# Patient Record
Sex: FEMALE | Race: NATIVE HAWAIIAN OR OTHER PACIFIC ISLANDER | Employment: OTHER
[De-identification: names, ages, dates, MRNs, and addresses within clinical notes are randomized per-mention and may not be internally consistent; named-entity substitution may affect disease eponyms.]

---

## 2017-01-12 ENCOUNTER — OFFICE VISIT (OUTPATIENT)
Dept: FAMILY MEDICINE CLINIC | Facility: CLINIC | Age: 47
End: 2017-01-12

## 2017-01-12 VITALS
TEMPERATURE: 98.1 F | HEIGHT: 59 IN | DIASTOLIC BLOOD PRESSURE: 77 MMHG | WEIGHT: 233.2 LBS | HEART RATE: 90 BPM | BODY MASS INDEX: 47.01 KG/M2 | SYSTOLIC BLOOD PRESSURE: 125 MMHG

## 2017-01-12 DIAGNOSIS — Z79.4 TYPE 2 DIABETES MELLITUS WITH CHRONIC KIDNEY DISEASE ON CHRONIC DIALYSIS, WITH LONG-TERM CURRENT USE OF INSULIN (HCC): Primary | ICD-10-CM

## 2017-01-12 DIAGNOSIS — L73.2 HIDRADENITIS SUPPURATIVA: ICD-10-CM

## 2017-01-12 DIAGNOSIS — I10 ESSENTIAL HYPERTENSION: ICD-10-CM

## 2017-01-12 DIAGNOSIS — Z99.2 TYPE 2 DIABETES MELLITUS WITH CHRONIC KIDNEY DISEASE ON CHRONIC DIALYSIS, WITH LONG-TERM CURRENT USE OF INSULIN (HCC): Primary | ICD-10-CM

## 2017-01-12 DIAGNOSIS — E11.22 TYPE 2 DIABETES MELLITUS WITH CHRONIC KIDNEY DISEASE ON CHRONIC DIALYSIS, WITH LONG-TERM CURRENT USE OF INSULIN (HCC): Primary | ICD-10-CM

## 2017-01-12 DIAGNOSIS — G47.33 SLEEP APNEA, OBSTRUCTIVE: ICD-10-CM

## 2017-01-12 DIAGNOSIS — N18.6 TYPE 2 DIABETES MELLITUS WITH CHRONIC KIDNEY DISEASE ON CHRONIC DIALYSIS, WITH LONG-TERM CURRENT USE OF INSULIN (HCC): Primary | ICD-10-CM

## 2017-01-12 LAB — HBA1C MFR BLD: 5.6 %

## 2017-01-12 PROCEDURE — 99214 OFFICE O/P EST MOD 30 MIN: CPT | Performed by: FAMILY MEDICINE

## 2017-01-12 PROCEDURE — 83036 HEMOGLOBIN GLYCOSYLATED A1C: CPT | Performed by: FAMILY MEDICINE

## 2017-01-12 RX ORDER — FLUCONAZOLE 150 MG/1
150 TABLET ORAL DAILY
Qty: 3 TABLET | Refills: 0 | Status: SHIPPED | OUTPATIENT
Start: 2017-01-12 | End: 2017-02-22 | Stop reason: ALTCHOICE

## 2017-01-12 RX ORDER — DOXYCYCLINE 100 MG/1
100 TABLET ORAL 2 TIMES DAILY
Qty: 20 TABLET | Refills: 0 | Status: SHIPPED | OUTPATIENT
Start: 2017-01-12 | End: 2017-01-22

## 2017-01-12 RX ORDER — HYDROCODONE BITARTRATE AND ACETAMINOPHEN 5; 325 MG/1; MG/1
1 TABLET ORAL EVERY 6 HOURS PRN
Qty: 15 TABLET | Refills: 0 | Status: SHIPPED | OUTPATIENT
Start: 2017-01-12 | End: 2017-03-28 | Stop reason: SDUPTHER

## 2017-01-30 RX ORDER — ASPIRIN 81 MG
TABLET, DELAYED RELEASE (ENTERIC COATED) ORAL
Qty: 30 TABLET | Refills: 3 | Status: SHIPPED | OUTPATIENT
Start: 2017-01-30 | End: 2017-10-03

## 2017-01-30 RX ORDER — SIMVASTATIN 10 MG
TABLET ORAL
Qty: 30 TABLET | Refills: 3 | Status: SHIPPED | OUTPATIENT
Start: 2017-01-30 | End: 2017-03-22 | Stop reason: ALTCHOICE

## 2017-01-31 ENCOUNTER — TELEPHONE (OUTPATIENT)
Dept: FAMILY MEDICINE CLINIC | Facility: CLINIC | Age: 47
End: 2017-01-31

## 2017-01-31 ASSESSMENT — ENCOUNTER SYMPTOMS
SHORTNESS OF BREATH: 0
ABDOMINAL PAIN: 0

## 2017-02-01 ENCOUNTER — TELEPHONE (OUTPATIENT)
Dept: FAMILY MEDICINE CLINIC | Facility: CLINIC | Age: 47
End: 2017-02-01

## 2017-02-01 RX ORDER — ACETAMINOPHEN 500 MG
1000 TABLET ORAL EVERY 6 HOURS PRN
Qty: 120 TABLET | Refills: 3 | Status: SHIPPED | OUTPATIENT
Start: 2017-02-01 | End: 2017-04-18 | Stop reason: ALTCHOICE

## 2017-02-14 ENCOUNTER — OFFICE VISIT (OUTPATIENT)
Dept: FAMILY MEDICINE CLINIC | Facility: CLINIC | Age: 47
End: 2017-02-14

## 2017-02-14 VITALS
HEART RATE: 69 BPM | DIASTOLIC BLOOD PRESSURE: 69 MMHG | TEMPERATURE: 95 F | BODY MASS INDEX: 47.26 KG/M2 | SYSTOLIC BLOOD PRESSURE: 125 MMHG | WEIGHT: 234 LBS

## 2017-02-14 DIAGNOSIS — E66.01 MORBID OBESITY DUE TO EXCESS CALORIES (HCC): Primary | ICD-10-CM

## 2017-02-14 DIAGNOSIS — M25.562 CHRONIC PAIN OF BOTH KNEES: ICD-10-CM

## 2017-02-14 DIAGNOSIS — N18.6 ESRD ON DIALYSIS (HCC): ICD-10-CM

## 2017-02-14 DIAGNOSIS — G89.29 CHRONIC PAIN OF BOTH KNEES: ICD-10-CM

## 2017-02-14 DIAGNOSIS — G89.29 CHRONIC MIDLINE LOW BACK PAIN WITHOUT SCIATICA: ICD-10-CM

## 2017-02-14 DIAGNOSIS — M25.561 CHRONIC PAIN OF BOTH KNEES: ICD-10-CM

## 2017-02-14 DIAGNOSIS — M54.50 CHRONIC MIDLINE LOW BACK PAIN WITHOUT SCIATICA: ICD-10-CM

## 2017-02-14 DIAGNOSIS — Z99.2 ESRD ON DIALYSIS (HCC): ICD-10-CM

## 2017-02-14 PROCEDURE — 99213 OFFICE O/P EST LOW 20 MIN: CPT | Performed by: FAMILY MEDICINE

## 2017-02-14 RX ORDER — INSULIN GLARGINE 100 [IU]/ML
20 INJECTION, SOLUTION SUBCUTANEOUS NIGHTLY
Qty: 1 VIAL | Refills: 3 | Status: SHIPPED | OUTPATIENT
Start: 2017-02-14 | End: 2017-09-18 | Stop reason: SDUPTHER

## 2017-02-14 RX ORDER — HYDROCODONE BITARTRATE AND ACETAMINOPHEN 5; 325 MG/1; MG/1
1 TABLET ORAL EVERY 6 HOURS PRN
Qty: 20 TABLET | Refills: 0 | Status: SHIPPED | OUTPATIENT
Start: 2017-02-14 | End: 2017-02-21

## 2017-02-14 ASSESSMENT — ENCOUNTER SYMPTOMS: BACK PAIN: 1

## 2017-02-22 ENCOUNTER — TELEPHONE (OUTPATIENT)
Dept: FAMILY MEDICINE CLINIC | Facility: CLINIC | Age: 47
End: 2017-02-22

## 2017-02-22 RX ORDER — THIAMINE MONONITRATE (VIT B1) 100 MG
TABLET ORAL
Qty: 30 TABLET | Refills: 3 | Status: SHIPPED | OUTPATIENT
Start: 2017-02-22 | End: 2017-04-18 | Stop reason: ALTCHOICE

## 2017-02-22 RX ORDER — MOMETASONE FUROATE AND FORMOTEROL FUMARATE DIHYDRATE 200; 5 UG/1; UG/1
AEROSOL RESPIRATORY (INHALATION)
Qty: 13 G | Refills: 3 | Status: SHIPPED | OUTPATIENT
Start: 2017-02-22 | End: 2017-06-09 | Stop reason: SDUPTHER

## 2017-02-22 RX ORDER — ATORVASTATIN CALCIUM 10 MG/1
10 TABLET, FILM COATED ORAL DAILY
Qty: 30 TABLET | Refills: 3 | Status: SHIPPED | OUTPATIENT
Start: 2017-02-22 | End: 2017-06-09 | Stop reason: SDUPTHER

## 2017-02-22 RX ORDER — PREDNISONE 20 MG/1
40 TABLET ORAL DAILY
Qty: 10 TABLET | Refills: 0 | OUTPATIENT
Start: 2017-02-22 | End: 2017-02-27

## 2017-02-22 RX ORDER — GUAIFENESIN 600 MG/1
600 TABLET, EXTENDED RELEASE ORAL 4 TIMES DAILY PRN
Qty: 30 TABLET | Refills: 1 | OUTPATIENT
Start: 2017-02-22

## 2017-02-22 RX ORDER — ASPIRIN 81 MG
TABLET, DELAYED RELEASE (ENTERIC COATED) ORAL
Qty: 30 TABLET | Refills: 3 | Status: ON HOLD | OUTPATIENT
Start: 2017-02-22 | End: 2017-04-17 | Stop reason: HOSPADM

## 2017-02-22 RX ORDER — SIMVASTATIN 10 MG
TABLET ORAL
Qty: 30 TABLET | Refills: 3 | OUTPATIENT
Start: 2017-02-22

## 2017-02-23 ENCOUNTER — TELEPHONE (OUTPATIENT)
Dept: FAMILY MEDICINE CLINIC | Facility: CLINIC | Age: 47
End: 2017-02-23

## 2017-02-28 RX ORDER — PREDNISONE 20 MG/1
20 TABLET ORAL 2 TIMES DAILY
Qty: 10 TABLET | Refills: 0 | Status: SHIPPED | OUTPATIENT
Start: 2017-02-28 | End: 2017-04-20 | Stop reason: SDUPTHER

## 2017-02-28 RX ORDER — GUAIFENESIN 600 MG/1
600 TABLET, EXTENDED RELEASE ORAL 2 TIMES DAILY
Qty: 20 TABLET | Refills: 0 | Status: SHIPPED | OUTPATIENT
Start: 2017-02-28 | End: 2017-03-10

## 2017-03-03 RX ORDER — MOMETASONE FUROATE AND FORMOTEROL FUMARATE DIHYDRATE 200; 5 UG/1; UG/1
AEROSOL RESPIRATORY (INHALATION)
Qty: 13 G | Refills: 3 | Status: ON HOLD | OUTPATIENT
Start: 2017-03-03 | End: 2017-04-17 | Stop reason: HOSPADM

## 2017-03-22 ENCOUNTER — TELEPHONE (OUTPATIENT)
Dept: PODIATRY | Age: 47
End: 2017-03-22

## 2017-03-22 ENCOUNTER — TELEPHONE (OUTPATIENT)
Dept: FAMILY MEDICINE CLINIC | Age: 47
End: 2017-03-22

## 2017-03-22 RX ORDER — FUROSEMIDE 40 MG/1
TABLET ORAL
Qty: 60 TABLET | Refills: 3 | Status: SHIPPED | OUTPATIENT
Start: 2017-03-22 | End: 2017-07-12 | Stop reason: SDUPTHER

## 2017-03-22 NOTE — TELEPHONE ENCOUNTER
Good morning called and states they need to know is the pt taking Atorvastatin 10 mg, it had the most recent date on it and they just wanted to make sure if it was the one the pt is taking, Simvastatin is also on the most recent list thanks writer

## 2017-03-28 ENCOUNTER — OFFICE VISIT (OUTPATIENT)
Dept: FAMILY MEDICINE CLINIC | Age: 47
End: 2017-03-28
Payer: COMMERCIAL

## 2017-03-28 VITALS
TEMPERATURE: 97.4 F | WEIGHT: 243.2 LBS | BODY MASS INDEX: 49.03 KG/M2 | HEIGHT: 59 IN | DIASTOLIC BLOOD PRESSURE: 68 MMHG | HEART RATE: 105 BPM | SYSTOLIC BLOOD PRESSURE: 128 MMHG

## 2017-03-28 DIAGNOSIS — G47.33 OSA (OBSTRUCTIVE SLEEP APNEA): ICD-10-CM

## 2017-03-28 DIAGNOSIS — M54.50 CHRONIC MIDLINE LOW BACK PAIN WITHOUT SCIATICA: ICD-10-CM

## 2017-03-28 DIAGNOSIS — N18.6 TYPE 2 DIABETES MELLITUS WITH CHRONIC KIDNEY DISEASE ON CHRONIC DIALYSIS, WITH LONG-TERM CURRENT USE OF INSULIN (HCC): ICD-10-CM

## 2017-03-28 DIAGNOSIS — E11.22 TYPE 2 DIABETES MELLITUS WITH CHRONIC KIDNEY DISEASE ON CHRONIC DIALYSIS, WITH LONG-TERM CURRENT USE OF INSULIN (HCC): ICD-10-CM

## 2017-03-28 DIAGNOSIS — Z79.4 TYPE 2 DIABETES MELLITUS WITH CHRONIC KIDNEY DISEASE ON CHRONIC DIALYSIS, WITH LONG-TERM CURRENT USE OF INSULIN (HCC): ICD-10-CM

## 2017-03-28 DIAGNOSIS — Z99.2 TYPE 2 DIABETES MELLITUS WITH CHRONIC KIDNEY DISEASE ON CHRONIC DIALYSIS, WITH LONG-TERM CURRENT USE OF INSULIN (HCC): ICD-10-CM

## 2017-03-28 DIAGNOSIS — J45.40 MODERATE PERSISTENT ASTHMA WITHOUT COMPLICATION: Primary | ICD-10-CM

## 2017-03-28 DIAGNOSIS — B37.31 YEAST INFECTION OF THE VAGINA: ICD-10-CM

## 2017-03-28 DIAGNOSIS — E66.01 MORBID OBESITY DUE TO EXCESS CALORIES (HCC): ICD-10-CM

## 2017-03-28 DIAGNOSIS — G89.29 CHRONIC MIDLINE LOW BACK PAIN WITHOUT SCIATICA: ICD-10-CM

## 2017-03-28 PROCEDURE — 99214 OFFICE O/P EST MOD 30 MIN: CPT | Performed by: FAMILY MEDICINE

## 2017-03-28 RX ORDER — CINACALCET HYDROCHLORIDE 30 MG/1
1 TABLET, COATED ORAL DAILY
Status: ON HOLD | COMMUNITY
Start: 2017-03-17 | End: 2021-01-01

## 2017-03-28 RX ORDER — HYDROCODONE BITARTRATE AND ACETAMINOPHEN 5; 325 MG/1; MG/1
1 TABLET ORAL EVERY 6 HOURS PRN
Qty: 20 TABLET | Refills: 0 | Status: SHIPPED | OUTPATIENT
Start: 2017-03-28 | End: 2021-01-01

## 2017-03-28 RX ORDER — FLUCONAZOLE 150 MG/1
150 TABLET ORAL ONCE
Qty: 1 TABLET | Refills: 0 | Status: SHIPPED | OUTPATIENT
Start: 2017-03-28 | End: 2017-03-28

## 2017-03-28 RX ORDER — ALBUTEROL SULFATE 2.5 MG/3ML
2.5 SOLUTION RESPIRATORY (INHALATION) EVERY 6 HOURS PRN
Qty: 120 EACH | Refills: 3 | Status: SHIPPED | OUTPATIENT
Start: 2017-03-28 | End: 2018-03-22 | Stop reason: SDUPTHER

## 2017-03-28 RX ORDER — ALBUTEROL SULFATE 2.5 MG/3ML
SOLUTION RESPIRATORY (INHALATION)
COMMUNITY
Start: 2017-02-23 | End: 2017-03-28 | Stop reason: SDUPTHER

## 2017-03-28 ASSESSMENT — ENCOUNTER SYMPTOMS
SHORTNESS OF BREATH: 1
BACK PAIN: 1
COUGH: 0
WHEEZING: 0

## 2017-04-03 RX ORDER — PREDNISONE 20 MG/1
TABLET ORAL
Qty: 10 TABLET | OUTPATIENT
Start: 2017-04-03

## 2017-04-12 ENCOUNTER — HOSPITAL ENCOUNTER (INPATIENT)
Age: 47
LOS: 5 days | Discharge: HOME | DRG: 291 | End: 2017-04-17
Attending: EMERGENCY MEDICINE | Admitting: FAMILY MEDICINE
Payer: COMMERCIAL

## 2017-04-12 ENCOUNTER — APPOINTMENT (OUTPATIENT)
Dept: GENERAL RADIOLOGY | Age: 47
DRG: 291 | End: 2017-04-12
Payer: COMMERCIAL

## 2017-04-12 DIAGNOSIS — R79.89 ELEVATED LFTS: ICD-10-CM

## 2017-04-12 DIAGNOSIS — D64.9 SYMPTOMATIC ANEMIA: Primary | ICD-10-CM

## 2017-04-12 DIAGNOSIS — E83.39 HYPOPHOSPHATEMIA: ICD-10-CM

## 2017-04-12 DIAGNOSIS — I50.9 CONGESTIVE HEART FAILURE, UNSPECIFIED CONGESTIVE HEART FAILURE CHRONICITY, UNSPECIFIED CONGESTIVE HEART FAILURE TYPE: ICD-10-CM

## 2017-04-12 DIAGNOSIS — E87.6 HYPOKALEMIA: ICD-10-CM

## 2017-04-12 DIAGNOSIS — R79.89 ELEVATED SERUM CREATININE: ICD-10-CM

## 2017-04-12 LAB
ABSOLUTE EOS #: 0.2 K/UL (ref 0–0.4)
ABSOLUTE EOS #: 0.2 K/UL (ref 0–0.4)
ABSOLUTE LYMPH #: 1.4 K/UL (ref 1–4.8)
ABSOLUTE LYMPH #: 1.4 K/UL (ref 1–4.8)
ABSOLUTE MONO #: 0.6 K/UL (ref 0.1–1.2)
ABSOLUTE MONO #: 0.7 K/UL (ref 0.1–1.2)
ABSOLUTE RETIC #: 0.09 M/UL (ref 0.02–0.1)
ALBUMIN SERPL-MCNC: 3.4 G/DL (ref 3.5–5.2)
ALBUMIN/GLOBULIN RATIO: 0.9 (ref 1–2.5)
ALP BLD-CCNC: 122 U/L (ref 35–104)
ALT SERPL-CCNC: 36 U/L (ref 5–33)
ANION GAP SERPL CALCULATED.3IONS-SCNC: 9 MMOL/L (ref 9–17)
AST SERPL-CCNC: 27 U/L
BASOPHILS # BLD: 0 % (ref 0–2)
BASOPHILS # BLD: 0 % (ref 0–2)
BASOPHILS ABSOLUTE: 0 K/UL (ref 0–0.2)
BASOPHILS ABSOLUTE: 0 K/UL (ref 0–0.2)
BILIRUB SERPL-MCNC: 0.31 MG/DL (ref 0.3–1.2)
BNP INTERPRETATION: ABNORMAL
BUN BLDV-MCNC: 17 MG/DL (ref 6–20)
BUN/CREAT BLD: ABNORMAL (ref 9–20)
CALCIUM SERPL-MCNC: 8.3 MG/DL (ref 8.6–10.4)
CHLORIDE BLD-SCNC: 103 MMOL/L (ref 98–107)
CO2: 32 MMOL/L (ref 20–31)
CREAT SERPL-MCNC: 1.98 MG/DL (ref 0.5–0.9)
DIFFERENTIAL TYPE: ABNORMAL
DIFFERENTIAL TYPE: ABNORMAL
EOSINOPHILS RELATIVE PERCENT: 2 % (ref 1–4)
EOSINOPHILS RELATIVE PERCENT: 2 % (ref 1–4)
FERRITIN: 903 UG/L (ref 13–150)
FIO2: ABNORMAL
GFR AFRICAN AMERICAN: 33 ML/MIN
GFR NON-AFRICAN AMERICAN: 27 ML/MIN
GFR SERPL CREATININE-BSD FRML MDRD: ABNORMAL ML/MIN/{1.73_M2}
GFR SERPL CREATININE-BSD FRML MDRD: ABNORMAL ML/MIN/{1.73_M2}
GLUCOSE BLD-MCNC: 105 MG/DL (ref 70–99)
HCO3 VENOUS: 39.9 MMOL/L (ref 24–30)
HCT VFR BLD CALC: 26.7 % (ref 36–46)
HCT VFR BLD CALC: 29.5 % (ref 36–46)
HEMOGLOBIN: 8.7 G/DL (ref 12–16)
HEMOGLOBIN: 9.5 G/DL (ref 12–16)
IRON SATURATION: 77 % (ref 20–55)
IRON: 134 UG/DL (ref 37–145)
LACTIC ACID, WHOLE BLOOD: 1 MMOL/L (ref 0.7–2.1)
LYMPHOCYTES # BLD: 14 % (ref 24–44)
LYMPHOCYTES # BLD: 17 % (ref 24–44)
MAGNESIUM: 2 MG/DL (ref 1.6–2.6)
MCH RBC QN AUTO: 31.4 PG (ref 26–34)
MCH RBC QN AUTO: 31.4 PG (ref 26–34)
MCHC RBC AUTO-ENTMCNC: 32.4 G/DL (ref 31–37)
MCHC RBC AUTO-ENTMCNC: 32.5 G/DL (ref 31–37)
MCV RBC AUTO: 96.8 FL (ref 80–100)
MCV RBC AUTO: 97 FL (ref 80–100)
MONOCYTES # BLD: 7 % (ref 2–11)
MONOCYTES # BLD: 8 % (ref 2–11)
NEGATIVE BASE EXCESS, VEN: ABNORMAL (ref 0–2)
O2 DEVICE/FLOW/%: ABNORMAL
O2 SAT, VEN: 89 %
PATIENT TEMP: ABNORMAL
PCO2, VEN: 71 MM HG (ref 39–55)
PDW BLD-RTO: 16.1 % (ref 12.5–15.4)
PDW BLD-RTO: 16.3 % (ref 12.5–15.4)
PH VENOUS: 7.36 (ref 7.32–7.42)
PHOSPHORUS: 2.3 MG/DL (ref 2.6–4.5)
PLATELET # BLD: 160 K/UL (ref 140–450)
PLATELET # BLD: 174 K/UL (ref 140–450)
PLATELET ESTIMATE: ABNORMAL
PLATELET ESTIMATE: ABNORMAL
PMV BLD AUTO: 7.5 FL (ref 6–12)
PMV BLD AUTO: 7.8 FL (ref 6–12)
PO2, VEN: 63 MM HG (ref 30–50)
POC LACTIC ACID, VEN: 2.04 MMOL/L (ref 0.9–1.7)
POC PCO2 TEMP: ABNORMAL MM HG
POC PH TEMP: ABNORMAL
POC PO2 TEMP: ABNORMAL MM HG
POC TROPONIN I: 0.05 NG/ML (ref 0–0.1)
POC TROPONIN I: 0.05 NG/ML (ref 0–0.1)
POC TROPONIN INTERP: NORMAL
POC TROPONIN INTERP: NORMAL
POSITIVE BASE EXCESS, VEN: 14 (ref 0–2)
POTASSIUM SERPL-SCNC: 3.6 MMOL/L (ref 3.7–5.3)
PRO-BNP: ABNORMAL PG/ML
RBC # BLD: 2.76 M/UL (ref 4–5.2)
RBC # BLD: 3.04 M/UL (ref 4–5.2)
RBC # BLD: ABNORMAL 10*6/UL
RBC # BLD: ABNORMAL 10*6/UL
RETIC %: 3.3 % (ref 0.5–2)
SEG NEUTROPHILS: 74 % (ref 36–66)
SEG NEUTROPHILS: 76 % (ref 36–66)
SEGMENTED NEUTROPHILS ABSOLUTE COUNT: 6.4 K/UL (ref 1.8–7.7)
SEGMENTED NEUTROPHILS ABSOLUTE COUNT: 7.3 K/UL (ref 1.8–7.7)
SODIUM BLD-SCNC: 144 MMOL/L (ref 135–144)
TOTAL CO2, VENOUS: 42 MM HG (ref 25–31)
TOTAL IRON BINDING CAPACITY: 174 UG/DL (ref 250–450)
TOTAL PROTEIN: 7.1 G/DL (ref 6.4–8.3)
TROPONIN INTERP: ABNORMAL
TROPONIN T: 0.05 NG/ML
TSH SERPL DL<=0.05 MIU/L-ACNC: 1.22 MIU/L (ref 0.3–5)
UNSATURATED IRON BINDING CAPACITY: 40 UG/DL (ref 112–347)
WBC # BLD: 8.6 K/UL (ref 3.5–11)
WBC # BLD: 9.6 K/UL (ref 3.5–11)
WBC # BLD: ABNORMAL 10*3/UL
WBC # BLD: ABNORMAL 10*3/UL

## 2017-04-12 PROCEDURE — 71020 XR CHEST STANDARD TWO VW: CPT

## 2017-04-12 PROCEDURE — 83605 ASSAY OF LACTIC ACID: CPT

## 2017-04-12 PROCEDURE — 84443 ASSAY THYROID STIM HORMONE: CPT

## 2017-04-12 PROCEDURE — 83540 ASSAY OF IRON: CPT

## 2017-04-12 PROCEDURE — 83880 ASSAY OF NATRIURETIC PEPTIDE: CPT

## 2017-04-12 PROCEDURE — 2580000003 HC RX 258: Performed by: STUDENT IN AN ORGANIZED HEALTH CARE EDUCATION/TRAINING PROGRAM

## 2017-04-12 PROCEDURE — 1200000000 HC SEMI PRIVATE

## 2017-04-12 PROCEDURE — 99285 EMERGENCY DEPT VISIT HI MDM: CPT

## 2017-04-12 PROCEDURE — 93005 ELECTROCARDIOGRAM TRACING: CPT

## 2017-04-12 PROCEDURE — 85045 AUTOMATED RETICULOCYTE COUNT: CPT

## 2017-04-12 PROCEDURE — 83735 ASSAY OF MAGNESIUM: CPT

## 2017-04-12 PROCEDURE — 84484 ASSAY OF TROPONIN QUANT: CPT

## 2017-04-12 PROCEDURE — 82728 ASSAY OF FERRITIN: CPT

## 2017-04-12 PROCEDURE — 85025 COMPLETE CBC W/AUTO DIFF WBC: CPT

## 2017-04-12 PROCEDURE — 6360000002 HC RX W HCPCS: Performed by: STUDENT IN AN ORGANIZED HEALTH CARE EDUCATION/TRAINING PROGRAM

## 2017-04-12 PROCEDURE — 36415 COLL VENOUS BLD VENIPUNCTURE: CPT

## 2017-04-12 PROCEDURE — 84100 ASSAY OF PHOSPHORUS: CPT

## 2017-04-12 PROCEDURE — 6370000000 HC RX 637 (ALT 250 FOR IP): Performed by: STUDENT IN AN ORGANIZED HEALTH CARE EDUCATION/TRAINING PROGRAM

## 2017-04-12 PROCEDURE — 83550 IRON BINDING TEST: CPT

## 2017-04-12 PROCEDURE — 82803 BLOOD GASES ANY COMBINATION: CPT

## 2017-04-12 PROCEDURE — 80053 COMPREHEN METABOLIC PANEL: CPT

## 2017-04-12 PROCEDURE — G0328 FECAL BLOOD SCRN IMMUNOASSAY: HCPCS

## 2017-04-12 PROCEDURE — 99223 1ST HOSP IP/OBS HIGH 75: CPT | Performed by: FAMILY MEDICINE

## 2017-04-12 PROCEDURE — 87040 BLOOD CULTURE FOR BACTERIA: CPT

## 2017-04-12 RX ORDER — THIAMINE MONONITRATE (VIT B1) 100 MG
1 TABLET ORAL DAILY
Status: CANCELLED | OUTPATIENT
Start: 2017-04-12

## 2017-04-12 RX ORDER — SODIUM CHLORIDE 0.9 % (FLUSH) 0.9 %
10 SYRINGE (ML) INJECTION EVERY 12 HOURS SCHEDULED
Status: DISCONTINUED | OUTPATIENT
Start: 2017-04-12 | End: 2017-04-17 | Stop reason: HOSPADM

## 2017-04-12 RX ORDER — ONDANSETRON 2 MG/ML
4 INJECTION INTRAMUSCULAR; INTRAVENOUS EVERY 6 HOURS PRN
Status: DISCONTINUED | OUTPATIENT
Start: 2017-04-12 | End: 2017-04-17 | Stop reason: HOSPADM

## 2017-04-12 RX ORDER — INSULIN GLARGINE 100 [IU]/ML
20 INJECTION, SOLUTION SUBCUTANEOUS NIGHTLY
Status: DISCONTINUED | OUTPATIENT
Start: 2017-04-12 | End: 2017-04-17 | Stop reason: HOSPADM

## 2017-04-12 RX ORDER — DEXTROSE MONOHYDRATE 50 MG/ML
100 INJECTION, SOLUTION INTRAVENOUS PRN
Status: DISCONTINUED | OUTPATIENT
Start: 2017-04-12 | End: 2017-04-17 | Stop reason: HOSPADM

## 2017-04-12 RX ORDER — SODIUM CHLORIDE 0.9 % (FLUSH) 0.9 %
10 SYRINGE (ML) INJECTION EVERY 12 HOURS SCHEDULED
Status: CANCELLED | OUTPATIENT
Start: 2017-04-12

## 2017-04-12 RX ORDER — POTASSIUM CHLORIDE 20MEQ/15ML
40 LIQUID (ML) ORAL PRN
Status: DISCONTINUED | OUTPATIENT
Start: 2017-04-12 | End: 2017-04-17 | Stop reason: HOSPADM

## 2017-04-12 RX ORDER — ATORVASTATIN CALCIUM 10 MG/1
10 TABLET, FILM COATED ORAL DAILY
Status: DISCONTINUED | OUTPATIENT
Start: 2017-04-13 | End: 2017-04-17 | Stop reason: HOSPADM

## 2017-04-12 RX ORDER — POTASSIUM CHLORIDE 7.45 MG/ML
10 INJECTION INTRAVENOUS PRN
Status: DISCONTINUED | OUTPATIENT
Start: 2017-04-12 | End: 2017-04-17 | Stop reason: HOSPADM

## 2017-04-12 RX ORDER — POTASSIUM CHLORIDE 20 MEQ/1
40 TABLET, EXTENDED RELEASE ORAL PRN
Status: DISCONTINUED | OUTPATIENT
Start: 2017-04-12 | End: 2017-04-17 | Stop reason: HOSPADM

## 2017-04-12 RX ORDER — BISACODYL 10 MG
10 SUPPOSITORY, RECTAL RECTAL DAILY PRN
Status: DISCONTINUED | OUTPATIENT
Start: 2017-04-12 | End: 2017-04-17 | Stop reason: HOSPADM

## 2017-04-12 RX ORDER — LISINOPRIL 5 MG/1
5 TABLET ORAL DAILY
Status: DISCONTINUED | OUTPATIENT
Start: 2017-04-13 | End: 2017-04-17 | Stop reason: HOSPADM

## 2017-04-12 RX ORDER — ACETAMINOPHEN 325 MG/1
650 TABLET ORAL EVERY 4 HOURS PRN
Status: DISCONTINUED | OUTPATIENT
Start: 2017-04-12 | End: 2017-04-17 | Stop reason: HOSPADM

## 2017-04-12 RX ORDER — ASPIRIN 81 MG/1
81 TABLET ORAL ONCE
Status: DISCONTINUED | OUTPATIENT
Start: 2017-04-12 | End: 2017-04-17 | Stop reason: HOSPADM

## 2017-04-12 RX ORDER — CINACALCET 30 MG/1
30 TABLET, FILM COATED ORAL DAILY
Status: DISCONTINUED | OUTPATIENT
Start: 2017-04-13 | End: 2017-04-17 | Stop reason: HOSPADM

## 2017-04-12 RX ORDER — ALBUTEROL SULFATE 2.5 MG/3ML
2.5 SOLUTION RESPIRATORY (INHALATION) EVERY 6 HOURS PRN
Status: DISCONTINUED | OUTPATIENT
Start: 2017-04-12 | End: 2017-04-17 | Stop reason: HOSPADM

## 2017-04-12 RX ORDER — NICOTINE POLACRILEX 4 MG
15 LOZENGE BUCCAL PRN
Status: DISCONTINUED | OUTPATIENT
Start: 2017-04-12 | End: 2017-04-17 | Stop reason: HOSPADM

## 2017-04-12 RX ORDER — FUROSEMIDE 10 MG/ML
40 INJECTION INTRAMUSCULAR; INTRAVENOUS DAILY
Status: DISCONTINUED | OUTPATIENT
Start: 2017-04-12 | End: 2017-04-14

## 2017-04-12 RX ORDER — DEXTROSE MONOHYDRATE 25 G/50ML
12.5 INJECTION, SOLUTION INTRAVENOUS PRN
Status: DISCONTINUED | OUTPATIENT
Start: 2017-04-12 | End: 2017-04-17 | Stop reason: HOSPADM

## 2017-04-12 RX ORDER — ALBUTEROL SULFATE 90 UG/1
2 AEROSOL, METERED RESPIRATORY (INHALATION) EVERY 6 HOURS PRN
Status: DISCONTINUED | OUTPATIENT
Start: 2017-04-12 | End: 2017-04-17 | Stop reason: HOSPADM

## 2017-04-12 RX ORDER — SEVELAMER CARBONATE 800 MG/1
1600 TABLET, FILM COATED ORAL
Status: DISCONTINUED | OUTPATIENT
Start: 2017-04-13 | End: 2017-04-17 | Stop reason: HOSPADM

## 2017-04-12 RX ORDER — MAGNESIUM SULFATE 1 G/100ML
1 INJECTION INTRAVENOUS PRN
Status: DISCONTINUED | OUTPATIENT
Start: 2017-04-12 | End: 2017-04-17 | Stop reason: HOSPADM

## 2017-04-12 RX ORDER — HEPARIN SODIUM 5000 [USP'U]/ML
5000 INJECTION, SOLUTION INTRAVENOUS; SUBCUTANEOUS EVERY 8 HOURS SCHEDULED
Status: DISCONTINUED | OUTPATIENT
Start: 2017-04-12 | End: 2017-04-17 | Stop reason: HOSPADM

## 2017-04-12 RX ORDER — FLUTICASONE PROPIONATE 50 MCG
1 SPRAY, SUSPENSION (ML) NASAL DAILY
Status: DISCONTINUED | OUTPATIENT
Start: 2017-04-13 | End: 2017-04-17 | Stop reason: HOSPADM

## 2017-04-12 RX ORDER — CHOLECALCIFEROL (VITAMIN D3) 10 MCG
1 TABLET ORAL DAILY
Status: DISCONTINUED | OUTPATIENT
Start: 2017-04-13 | End: 2017-04-17 | Stop reason: HOSPADM

## 2017-04-12 RX ORDER — SODIUM CHLORIDE 0.9 % (FLUSH) 0.9 %
10 SYRINGE (ML) INJECTION PRN
Status: CANCELLED | OUTPATIENT
Start: 2017-04-12

## 2017-04-12 RX ORDER — FUROSEMIDE 40 MG/1
1 TABLET ORAL 2 TIMES DAILY
Status: CANCELLED | OUTPATIENT
Start: 2017-04-12

## 2017-04-12 RX ORDER — SODIUM CHLORIDE 0.9 % (FLUSH) 0.9 %
10 SYRINGE (ML) INJECTION PRN
Status: DISCONTINUED | OUTPATIENT
Start: 2017-04-12 | End: 2017-04-17 | Stop reason: HOSPADM

## 2017-04-12 RX ADMIN — INSULIN GLARGINE 20 UNITS: 100 INJECTION, SOLUTION SUBCUTANEOUS at 23:38

## 2017-04-12 RX ADMIN — HEPARIN SODIUM 5000 UNITS: 5000 INJECTION, SOLUTION INTRAVENOUS; SUBCUTANEOUS at 23:39

## 2017-04-12 RX ADMIN — Medication 10 ML: at 23:39

## 2017-04-12 ASSESSMENT — ENCOUNTER SYMPTOMS
EYE REDNESS: 0
VOMITING: 0
COUGH: 1
RHINORRHEA: 0
ABDOMINAL PAIN: 0
SHORTNESS OF BREATH: 0
NAUSEA: 0
EYE DISCHARGE: 0

## 2017-04-12 ASSESSMENT — PAIN SCALES - GENERAL: PAINLEVEL_OUTOF10: 6

## 2017-04-12 ASSESSMENT — PAIN DESCRIPTION - ORIENTATION: ORIENTATION: MID

## 2017-04-12 ASSESSMENT — PAIN DESCRIPTION - LOCATION: LOCATION: CHEST

## 2017-04-13 LAB
ALBUMIN SERPL-MCNC: 3.3 G/DL (ref 3.5–5.2)
ALBUMIN/GLOBULIN RATIO: 0.9 (ref 1–2.5)
ALP BLD-CCNC: 125 U/L (ref 35–104)
ALT SERPL-CCNC: 33 U/L (ref 5–33)
ANION GAP SERPL CALCULATED.3IONS-SCNC: 14 MMOL/L (ref 9–17)
AST SERPL-CCNC: 24 U/L
BILIRUB SERPL-MCNC: 0.24 MG/DL (ref 0.3–1.2)
BILIRUBIN DIRECT: <0.08 MG/DL
BILIRUBIN, INDIRECT: ABNORMAL MG/DL (ref 0–1)
BUN BLDV-MCNC: 32 MG/DL (ref 6–20)
BUN/CREAT BLD: ABNORMAL (ref 9–20)
CALCIUM SERPL-MCNC: 8.5 MG/DL (ref 8.6–10.4)
CHLORIDE BLD-SCNC: 104 MMOL/L (ref 98–107)
CO2: 29 MMOL/L (ref 20–31)
CREAT SERPL-MCNC: 3.12 MG/DL (ref 0.5–0.9)
EKG ATRIAL RATE: 97 BPM
EKG P AXIS: 68 DEGREES
EKG P-R INTERVAL: 126 MS
EKG Q-T INTERVAL: 360 MS
EKG QRS DURATION: 68 MS
EKG QTC CALCULATION (BAZETT): 457 MS
EKG R AXIS: 23 DEGREES
EKG T AXIS: 69 DEGREES
EKG VENTRICULAR RATE: 97 BPM
GFR AFRICAN AMERICAN: 19 ML/MIN
GFR NON-AFRICAN AMERICAN: 16 ML/MIN
GFR SERPL CREATININE-BSD FRML MDRD: ABNORMAL ML/MIN/{1.73_M2}
GFR SERPL CREATININE-BSD FRML MDRD: ABNORMAL ML/MIN/{1.73_M2}
GLOBULIN: ABNORMAL G/DL (ref 1.5–3.8)
GLUCOSE BLD-MCNC: 137 MG/DL (ref 65–105)
GLUCOSE BLD-MCNC: 151 MG/DL (ref 70–99)
GLUCOSE BLD-MCNC: 155 MG/DL (ref 65–105)
HAPTOGLOBIN: 213 MG/DL (ref 30–200)
HCT VFR BLD CALC: 27.6 % (ref 36–46)
HEMOGLOBIN: 8.7 G/DL (ref 12–16)
INR BLD: 1
LACTATE DEHYDROGENASE: 172 U/L (ref 135–214)
MCH RBC QN AUTO: 30.9 PG (ref 26–34)
MCHC RBC AUTO-ENTMCNC: 31.5 G/DL (ref 31–37)
MCV RBC AUTO: 98 FL (ref 80–100)
PDW BLD-RTO: 16.4 % (ref 12.5–15.4)
PLATELET # BLD: 165 K/UL (ref 140–450)
PMV BLD AUTO: 7.2 FL (ref 6–12)
POTASSIUM SERPL-SCNC: 4.1 MMOL/L (ref 3.7–5.3)
PROTHROMBIN TIME: 10.7 SEC (ref 9.4–12.6)
RBC # BLD: 2.82 M/UL (ref 4–5.2)
SODIUM BLD-SCNC: 147 MMOL/L (ref 135–144)
SURGICAL PATHOLOGY REPORT: NORMAL
TOTAL PROTEIN: 7.1 G/DL (ref 6.4–8.3)
TROPONIN INTERP: ABNORMAL
TROPONIN INTERP: ABNORMAL
TROPONIN T: 0.03 NG/ML
TROPONIN T: 0.06 NG/ML
WBC # BLD: 10.4 K/UL (ref 3.5–11)

## 2017-04-13 PROCEDURE — 94640 AIRWAY INHALATION TREATMENT: CPT

## 2017-04-13 PROCEDURE — 93005 ELECTROCARDIOGRAM TRACING: CPT

## 2017-04-13 PROCEDURE — 83010 ASSAY OF HAPTOGLOBIN QUANT: CPT

## 2017-04-13 PROCEDURE — 6370000000 HC RX 637 (ALT 250 FOR IP): Performed by: STUDENT IN AN ORGANIZED HEALTH CARE EDUCATION/TRAINING PROGRAM

## 2017-04-13 PROCEDURE — 6360000002 HC RX W HCPCS: Performed by: STUDENT IN AN ORGANIZED HEALTH CARE EDUCATION/TRAINING PROGRAM

## 2017-04-13 PROCEDURE — 80048 BASIC METABOLIC PNL TOTAL CA: CPT

## 2017-04-13 PROCEDURE — 87070 CULTURE OTHR SPECIMN AEROBIC: CPT

## 2017-04-13 PROCEDURE — 5A1D60Z PERFORMANCE OF URINARY FILTRATION, MULTIPLE: ICD-10-PCS | Performed by: INTERNAL MEDICINE

## 2017-04-13 PROCEDURE — 82947 ASSAY GLUCOSE BLOOD QUANT: CPT

## 2017-04-13 PROCEDURE — 97530 THERAPEUTIC ACTIVITIES: CPT

## 2017-04-13 PROCEDURE — 36415 COLL VENOUS BLD VENIPUNCTURE: CPT

## 2017-04-13 PROCEDURE — 84484 ASSAY OF TROPONIN QUANT: CPT

## 2017-04-13 PROCEDURE — 99232 SBSQ HOSP IP/OBS MODERATE 35: CPT | Performed by: FAMILY MEDICINE

## 2017-04-13 PROCEDURE — 85610 PROTHROMBIN TIME: CPT

## 2017-04-13 PROCEDURE — 87205 SMEAR GRAM STAIN: CPT

## 2017-04-13 PROCEDURE — 1200000000 HC SEMI PRIVATE

## 2017-04-13 PROCEDURE — 85027 COMPLETE CBC AUTOMATED: CPT

## 2017-04-13 PROCEDURE — 97161 PT EVAL LOW COMPLEX 20 MIN: CPT

## 2017-04-13 PROCEDURE — 2580000003 HC RX 258: Performed by: STUDENT IN AN ORGANIZED HEALTH CARE EDUCATION/TRAINING PROGRAM

## 2017-04-13 PROCEDURE — G8978 MOBILITY CURRENT STATUS: HCPCS

## 2017-04-13 PROCEDURE — G8979 MOBILITY GOAL STATUS: HCPCS

## 2017-04-13 PROCEDURE — 80076 HEPATIC FUNCTION PANEL: CPT

## 2017-04-13 PROCEDURE — 83615 LACTATE (LD) (LDH) ENZYME: CPT

## 2017-04-13 RX ORDER — 0.9 % SODIUM CHLORIDE 0.9 %
250 INTRAVENOUS SOLUTION INTRAVENOUS PRN
Status: DISCONTINUED | OUTPATIENT
Start: 2017-04-13 | End: 2017-04-17 | Stop reason: HOSPADM

## 2017-04-13 RX ORDER — HEPARIN SODIUM 1000 [USP'U]/ML
3000 INJECTION INTRAVENOUS; SUBCUTANEOUS PRN
Status: DISCONTINUED | OUTPATIENT
Start: 2017-04-13 | End: 2017-04-17 | Stop reason: HOSPADM

## 2017-04-13 RX ORDER — HEPARIN SODIUM 1000 [USP'U]/ML
1000 INJECTION INTRAVENOUS; SUBCUTANEOUS ONCE
Status: DISCONTINUED | OUTPATIENT
Start: 2017-04-13 | End: 2017-04-17 | Stop reason: HOSPADM

## 2017-04-13 RX ORDER — HYDROCODONE BITARTRATE AND ACETAMINOPHEN 5; 325 MG/1; MG/1
1 TABLET ORAL EVERY 6 HOURS PRN
Status: DISCONTINUED | OUTPATIENT
Start: 2017-04-13 | End: 2017-04-17 | Stop reason: HOSPADM

## 2017-04-13 RX ORDER — DILTIAZEM HYDROCHLORIDE 180 MG/1
180 CAPSULE, COATED, EXTENDED RELEASE ORAL DAILY
Status: DISCONTINUED | OUTPATIENT
Start: 2017-04-13 | End: 2017-04-17 | Stop reason: HOSPADM

## 2017-04-13 RX ORDER — 0.9 % SODIUM CHLORIDE 0.9 %
150 INTRAVENOUS SOLUTION INTRAVENOUS PRN
Status: DISCONTINUED | OUTPATIENT
Start: 2017-04-13 | End: 2017-04-17 | Stop reason: HOSPADM

## 2017-04-13 RX ADMIN — LISINOPRIL 5 MG: 5 TABLET ORAL at 08:18

## 2017-04-13 RX ADMIN — HEPARIN SODIUM 5000 UNITS: 5000 INJECTION, SOLUTION INTRAVENOUS; SUBCUTANEOUS at 07:07

## 2017-04-13 RX ADMIN — Medication 10 ML: at 20:01

## 2017-04-13 RX ADMIN — SEVELAMER CARBONATE 1600 MG: 800 TABLET, FILM COATED ORAL at 17:55

## 2017-04-13 RX ADMIN — HYDROCODONE BITARTRATE AND ACETAMINOPHEN 1 TABLET: 5; 325 TABLET ORAL at 00:23

## 2017-04-13 RX ADMIN — SEVELAMER CARBONATE 1600 MG: 800 TABLET, FILM COATED ORAL at 13:21

## 2017-04-13 RX ADMIN — HYDROCODONE BITARTRATE AND ACETAMINOPHEN 1 TABLET: 5; 325 TABLET ORAL at 15:32

## 2017-04-13 RX ADMIN — SEVELAMER CARBONATE 1600 MG: 800 TABLET, FILM COATED ORAL at 08:18

## 2017-04-13 RX ADMIN — HEPARIN SODIUM 5000 UNITS: 5000 INJECTION, SOLUTION INTRAVENOUS; SUBCUTANEOUS at 21:24

## 2017-04-13 RX ADMIN — ASCORBIC ACID, THIAMINE MONONITRATE,RIBOFLAVIN, NIACINAMIDE, PYRIDOXINE HYDROCHLORIDE, FOLIC ACID, CYANOCOBALAMIN, BIOTIN, CALCIUM PANTOTHENATE, 1 MG: 100; 1.5; 1.7; 20; 10; 1; 6000; 150000; 5 CAPSULE, LIQUID FILLED ORAL at 08:18

## 2017-04-13 RX ADMIN — DILTIAZEM HYDROCHLORIDE 180 MG: 180 CAPSULE, COATED, EXTENDED RELEASE ORAL at 20:01

## 2017-04-13 RX ADMIN — ATORVASTATIN CALCIUM 10 MG: 10 TABLET, FILM COATED ORAL at 20:00

## 2017-04-13 RX ADMIN — Medication 10 ML: at 08:18

## 2017-04-13 RX ADMIN — INSULIN GLARGINE 20 UNITS: 100 INJECTION, SOLUTION SUBCUTANEOUS at 21:24

## 2017-04-13 RX ADMIN — HEPARIN SODIUM 5000 UNITS: 5000 INJECTION, SOLUTION INTRAVENOUS; SUBCUTANEOUS at 13:22

## 2017-04-13 RX ADMIN — FLUTICASONE PROPIONATE 1 SPRAY: 50 SPRAY, METERED NASAL at 08:18

## 2017-04-13 RX ADMIN — FUROSEMIDE 40 MG: 10 INJECTION, SOLUTION INTRAMUSCULAR; INTRAVENOUS at 08:18

## 2017-04-13 RX ADMIN — CINACALCET HYDROCHLORIDE 30 MG: 30 TABLET, COATED ORAL at 08:18

## 2017-04-13 RX ADMIN — MOMETASONE FUROATE AND FORMOTEROL FUMARATE DIHYDRATE 2 PUFF: 200; 5 AEROSOL RESPIRATORY (INHALATION) at 07:37

## 2017-04-13 RX ADMIN — MOMETASONE FUROATE AND FORMOTEROL FUMARATE DIHYDRATE 2 PUFF: 200; 5 AEROSOL RESPIRATORY (INHALATION) at 22:33

## 2017-04-13 ASSESSMENT — PAIN DESCRIPTION - DESCRIPTORS: DESCRIPTORS: ACHING

## 2017-04-13 ASSESSMENT — PAIN DESCRIPTION - ORIENTATION: ORIENTATION: MID

## 2017-04-13 ASSESSMENT — PAIN SCALES - GENERAL
PAINLEVEL_OUTOF10: 8
PAINLEVEL_OUTOF10: 8
PAINLEVEL_OUTOF10: 3

## 2017-04-13 ASSESSMENT — PAIN DESCRIPTION - PAIN TYPE: TYPE: CHRONIC PAIN

## 2017-04-13 ASSESSMENT — PAIN DESCRIPTION - LOCATION: LOCATION: BACK

## 2017-04-14 ENCOUNTER — HOSPITAL ENCOUNTER (INPATIENT)
Dept: DIALYSIS | Age: 47
Discharge: HOME | DRG: 291 | End: 2017-04-14
Payer: COMMERCIAL

## 2017-04-14 LAB
ANION GAP SERPL CALCULATED.3IONS-SCNC: 15 MMOL/L (ref 9–17)
BUN BLDV-MCNC: 53 MG/DL (ref 6–20)
BUN/CREAT BLD: ABNORMAL (ref 9–20)
CALCIUM SERPL-MCNC: 8.6 MG/DL (ref 8.6–10.4)
CHLORIDE BLD-SCNC: 96 MMOL/L (ref 98–107)
CO2: 29 MMOL/L (ref 20–31)
CREAT SERPL-MCNC: 5.51 MG/DL (ref 0.5–0.9)
DATE, STOOL #1: NORMAL
DATE, STOOL #2: NORMAL
DATE, STOOL #3: NORMAL
EKG ATRIAL RATE: 99 BPM
EKG P AXIS: 59 DEGREES
EKG P-R INTERVAL: 132 MS
EKG Q-T INTERVAL: 356 MS
EKG QRS DURATION: 76 MS
EKG QTC CALCULATION (BAZETT): 456 MS
EKG R AXIS: 13 DEGREES
EKG T AXIS: 23 DEGREES
EKG VENTRICULAR RATE: 99 BPM
GFR AFRICAN AMERICAN: 10 ML/MIN
GFR NON-AFRICAN AMERICAN: 8 ML/MIN
GFR SERPL CREATININE-BSD FRML MDRD: ABNORMAL ML/MIN/{1.73_M2}
GFR SERPL CREATININE-BSD FRML MDRD: ABNORMAL ML/MIN/{1.73_M2}
GLUCOSE BLD-MCNC: 150 MG/DL (ref 65–105)
GLUCOSE BLD-MCNC: 196 MG/DL (ref 70–99)
GLUCOSE BLD-MCNC: 211 MG/DL (ref 65–105)
HCT VFR BLD CALC: 24.2 % (ref 36–46)
HEMOCCULT SP1 STL QL: NEGATIVE
HEMOCCULT SP2 STL QL: NORMAL
HEMOCCULT SP3 STL QL: NORMAL
HEMOGLOBIN: 7.9 G/DL (ref 12–16)
MCH RBC QN AUTO: 31.1 PG (ref 26–34)
MCHC RBC AUTO-ENTMCNC: 32.5 G/DL (ref 31–37)
MCV RBC AUTO: 95.9 FL (ref 80–100)
PATHOLOGIST REVIEW: NORMAL
PDW BLD-RTO: 16.1 % (ref 12.5–15.4)
PLATELET # BLD: 141 K/UL (ref 140–450)
PMV BLD AUTO: 7.7 FL (ref 6–12)
POTASSIUM SERPL-SCNC: 3.9 MMOL/L (ref 3.7–5.3)
RBC # BLD: 2.52 M/UL (ref 4–5.2)
SODIUM BLD-SCNC: 140 MMOL/L (ref 135–144)
TIME, STOOL #1: 600
TIME, STOOL #2: NORMAL
TIME, STOOL #3: NORMAL
WBC # BLD: 8.9 K/UL (ref 3.5–11)

## 2017-04-14 PROCEDURE — 2580000003 HC RX 258: Performed by: INTERNAL MEDICINE

## 2017-04-14 PROCEDURE — 6370000000 HC RX 637 (ALT 250 FOR IP): Performed by: STUDENT IN AN ORGANIZED HEALTH CARE EDUCATION/TRAINING PROGRAM

## 2017-04-14 PROCEDURE — 80048 BASIC METABOLIC PNL TOTAL CA: CPT

## 2017-04-14 PROCEDURE — 6360000002 HC RX W HCPCS: Performed by: STUDENT IN AN ORGANIZED HEALTH CARE EDUCATION/TRAINING PROGRAM

## 2017-04-14 PROCEDURE — 94640 AIRWAY INHALATION TREATMENT: CPT

## 2017-04-14 PROCEDURE — 2580000003 HC RX 258: Performed by: STUDENT IN AN ORGANIZED HEALTH CARE EDUCATION/TRAINING PROGRAM

## 2017-04-14 PROCEDURE — 1200000000 HC SEMI PRIVATE

## 2017-04-14 PROCEDURE — 99231 SBSQ HOSP IP/OBS SF/LOW 25: CPT | Performed by: FAMILY MEDICINE

## 2017-04-14 PROCEDURE — 90937 HEMODIALYSIS REPEATED EVAL: CPT

## 2017-04-14 PROCEDURE — 85027 COMPLETE CBC AUTOMATED: CPT

## 2017-04-14 PROCEDURE — 6360000002 HC RX W HCPCS: Performed by: FAMILY MEDICINE

## 2017-04-14 PROCEDURE — 6370000000 HC RX 637 (ALT 250 FOR IP): Performed by: INTERNAL MEDICINE

## 2017-04-14 PROCEDURE — 82947 ASSAY GLUCOSE BLOOD QUANT: CPT

## 2017-04-14 RX ORDER — MIDODRINE HYDROCHLORIDE 5 MG/1
5 TABLET ORAL ONCE
Status: COMPLETED | OUTPATIENT
Start: 2017-04-14 | End: 2017-04-14

## 2017-04-14 RX ORDER — LEVOFLOXACIN 5 MG/ML
250 INJECTION, SOLUTION INTRAVENOUS
Status: DISCONTINUED | OUTPATIENT
Start: 2017-04-14 | End: 2017-04-15

## 2017-04-14 RX ORDER — FUROSEMIDE 40 MG/1
40 TABLET ORAL DAILY
Status: DISCONTINUED | OUTPATIENT
Start: 2017-04-14 | End: 2017-04-17 | Stop reason: HOSPADM

## 2017-04-14 RX ORDER — 0.9 % SODIUM CHLORIDE 0.9 %
250 INTRAVENOUS SOLUTION INTRAVENOUS ONCE
Status: COMPLETED | OUTPATIENT
Start: 2017-04-14 | End: 2017-04-14

## 2017-04-14 RX ORDER — DEXTROMETHORPHAN POLISTIREX 30 MG/5ML
30 SUSPENSION ORAL NIGHTLY PRN
Status: DISCONTINUED | OUTPATIENT
Start: 2017-04-14 | End: 2017-04-17 | Stop reason: HOSPADM

## 2017-04-14 RX ADMIN — INSULIN GLARGINE 20 UNITS: 100 INJECTION, SOLUTION SUBCUTANEOUS at 23:44

## 2017-04-14 RX ADMIN — SEVELAMER CARBONATE 1600 MG: 800 TABLET, FILM COATED ORAL at 21:47

## 2017-04-14 RX ADMIN — FLUTICASONE PROPIONATE 1 SPRAY: 50 SPRAY, METERED NASAL at 16:52

## 2017-04-14 RX ADMIN — HYDROCODONE BITARTRATE AND ACETAMINOPHEN 1 TABLET: 5; 325 TABLET ORAL at 11:29

## 2017-04-14 RX ADMIN — HEPARIN SODIUM 5000 UNITS: 5000 INJECTION, SOLUTION INTRAVENOUS; SUBCUTANEOUS at 16:48

## 2017-04-14 RX ADMIN — DILTIAZEM HYDROCHLORIDE 180 MG: 180 CAPSULE, COATED, EXTENDED RELEASE ORAL at 09:40

## 2017-04-14 RX ADMIN — HEPARIN SODIUM 5000 UNITS: 5000 INJECTION, SOLUTION INTRAVENOUS; SUBCUTANEOUS at 23:43

## 2017-04-14 RX ADMIN — ATORVASTATIN CALCIUM 10 MG: 10 TABLET, FILM COATED ORAL at 21:47

## 2017-04-14 RX ADMIN — HEPARIN SODIUM 5000 UNITS: 5000 INJECTION, SOLUTION INTRAVENOUS; SUBCUTANEOUS at 05:36

## 2017-04-14 RX ADMIN — Medication 30 MG: at 01:03

## 2017-04-14 RX ADMIN — HYDROCODONE BITARTRATE AND ACETAMINOPHEN 1 TABLET: 5; 325 TABLET ORAL at 21:53

## 2017-04-14 RX ADMIN — HYDROCODONE BITARTRATE AND ACETAMINOPHEN 1 TABLET: 5; 325 TABLET ORAL at 01:06

## 2017-04-14 RX ADMIN — CINACALCET HYDROCHLORIDE 30 MG: 30 TABLET, COATED ORAL at 16:48

## 2017-04-14 RX ADMIN — Medication 10 ML: at 21:47

## 2017-04-14 RX ADMIN — LEVOFLOXACIN 250 MG: 5 INJECTION, SOLUTION INTRAVENOUS at 16:08

## 2017-04-14 RX ADMIN — SODIUM CHLORIDE 250 ML: 9 INJECTION, SOLUTION INTRAVENOUS at 15:15

## 2017-04-14 RX ADMIN — MOMETASONE FUROATE AND FORMOTEROL FUMARATE DIHYDRATE 2 PUFF: 200; 5 AEROSOL RESPIRATORY (INHALATION) at 20:25

## 2017-04-14 RX ADMIN — ASCORBIC ACID, THIAMINE MONONITRATE,RIBOFLAVIN, NIACINAMIDE, PYRIDOXINE HYDROCHLORIDE, FOLIC ACID, CYANOCOBALAMIN, BIOTIN, CALCIUM PANTOTHENATE, 1 MG: 100; 1.5; 1.7; 20; 10; 1; 6000; 150000; 5 CAPSULE, LIQUID FILLED ORAL at 16:48

## 2017-04-14 RX ADMIN — MIDODRINE HYDROCHLORIDE 5 MG: 5 TABLET ORAL at 16:48

## 2017-04-14 ASSESSMENT — PAIN SCALES - GENERAL
PAINLEVEL_OUTOF10: 6
PAINLEVEL_OUTOF10: 8
PAINLEVEL_OUTOF10: 9
PAINLEVEL_OUTOF10: 7
PAINLEVEL_OUTOF10: 8
PAINLEVEL_OUTOF10: 3

## 2017-04-14 ASSESSMENT — PAIN DESCRIPTION - DESCRIPTORS: DESCRIPTORS: CONSTANT;ACHING

## 2017-04-14 ASSESSMENT — PAIN DESCRIPTION - LOCATION
LOCATION: BACK
LOCATION: ARM

## 2017-04-14 ASSESSMENT — PAIN DESCRIPTION - PAIN TYPE
TYPE: ACUTE PAIN
TYPE: CHRONIC PAIN

## 2017-04-14 ASSESSMENT — PAIN DESCRIPTION - ORIENTATION: ORIENTATION: LEFT

## 2017-04-15 LAB
ANION GAP SERPL CALCULATED.3IONS-SCNC: 11 MMOL/L (ref 9–17)
BUN BLDV-MCNC: 36 MG/DL (ref 6–20)
BUN/CREAT BLD: ABNORMAL (ref 9–20)
CALCIUM SERPL-MCNC: 8.4 MG/DL (ref 8.6–10.4)
CHLORIDE BLD-SCNC: 100 MMOL/L (ref 98–107)
CO2: 28 MMOL/L (ref 20–31)
CREAT SERPL-MCNC: 4.35 MG/DL (ref 0.5–0.9)
GFR AFRICAN AMERICAN: 13 ML/MIN
GFR NON-AFRICAN AMERICAN: 11 ML/MIN
GFR SERPL CREATININE-BSD FRML MDRD: ABNORMAL ML/MIN/{1.73_M2}
GFR SERPL CREATININE-BSD FRML MDRD: ABNORMAL ML/MIN/{1.73_M2}
GLUCOSE BLD-MCNC: 102 MG/DL (ref 65–105)
GLUCOSE BLD-MCNC: 116 MG/DL (ref 70–99)
GLUCOSE BLD-MCNC: 137 MG/DL (ref 65–105)
HCT VFR BLD CALC: 25.4 % (ref 36–46)
HEMOGLOBIN: 8.3 G/DL (ref 12–16)
LV EF: 55 %
LVEF MODALITY: NORMAL
MCH RBC QN AUTO: 31.5 PG (ref 26–34)
MCHC RBC AUTO-ENTMCNC: 32.6 G/DL (ref 31–37)
MCV RBC AUTO: 96.6 FL (ref 80–100)
PDW BLD-RTO: 16.1 % (ref 12.5–15.4)
PLATELET # BLD: 155 K/UL (ref 140–450)
PMV BLD AUTO: 7.1 FL (ref 6–12)
POTASSIUM SERPL-SCNC: 4.8 MMOL/L (ref 3.7–5.3)
RBC # BLD: 2.63 M/UL (ref 4–5.2)
SODIUM BLD-SCNC: 139 MMOL/L (ref 135–144)
WBC # BLD: 10.8 K/UL (ref 3.5–11)

## 2017-04-15 PROCEDURE — 1200000000 HC SEMI PRIVATE

## 2017-04-15 PROCEDURE — 80048 BASIC METABOLIC PNL TOTAL CA: CPT

## 2017-04-15 PROCEDURE — 82947 ASSAY GLUCOSE BLOOD QUANT: CPT

## 2017-04-15 PROCEDURE — 36415 COLL VENOUS BLD VENIPUNCTURE: CPT

## 2017-04-15 PROCEDURE — 6370000000 HC RX 637 (ALT 250 FOR IP): Performed by: STUDENT IN AN ORGANIZED HEALTH CARE EDUCATION/TRAINING PROGRAM

## 2017-04-15 PROCEDURE — 2580000003 HC RX 258: Performed by: STUDENT IN AN ORGANIZED HEALTH CARE EDUCATION/TRAINING PROGRAM

## 2017-04-15 PROCEDURE — 94762 N-INVAS EAR/PLS OXIMTRY CONT: CPT

## 2017-04-15 PROCEDURE — 6360000002 HC RX W HCPCS: Performed by: STUDENT IN AN ORGANIZED HEALTH CARE EDUCATION/TRAINING PROGRAM

## 2017-04-15 PROCEDURE — 99231 SBSQ HOSP IP/OBS SF/LOW 25: CPT | Performed by: FAMILY MEDICINE

## 2017-04-15 PROCEDURE — 93306 TTE W/DOPPLER COMPLETE: CPT

## 2017-04-15 PROCEDURE — 85027 COMPLETE CBC AUTOMATED: CPT

## 2017-04-15 PROCEDURE — 94640 AIRWAY INHALATION TREATMENT: CPT

## 2017-04-15 RX ADMIN — Medication 10 ML: at 21:23

## 2017-04-15 RX ADMIN — HYDROCODONE BITARTRATE AND ACETAMINOPHEN 1 TABLET: 5; 325 TABLET ORAL at 20:03

## 2017-04-15 RX ADMIN — CINACALCET HYDROCHLORIDE 30 MG: 30 TABLET, COATED ORAL at 08:34

## 2017-04-15 RX ADMIN — HEPARIN SODIUM 5000 UNITS: 5000 INJECTION, SOLUTION INTRAVENOUS; SUBCUTANEOUS at 06:25

## 2017-04-15 RX ADMIN — ATORVASTATIN CALCIUM 10 MG: 10 TABLET, FILM COATED ORAL at 08:34

## 2017-04-15 RX ADMIN — FLUTICASONE PROPIONATE 1 SPRAY: 50 SPRAY, METERED NASAL at 08:36

## 2017-04-15 RX ADMIN — HEPARIN SODIUM 5000 UNITS: 5000 INJECTION, SOLUTION INTRAVENOUS; SUBCUTANEOUS at 21:22

## 2017-04-15 RX ADMIN — Medication 10 ML: at 13:24

## 2017-04-15 RX ADMIN — MOMETASONE FUROATE AND FORMOTEROL FUMARATE DIHYDRATE 2 PUFF: 200; 5 AEROSOL RESPIRATORY (INHALATION) at 21:13

## 2017-04-15 RX ADMIN — HEPARIN SODIUM 5000 UNITS: 5000 INJECTION, SOLUTION INTRAVENOUS; SUBCUTANEOUS at 13:24

## 2017-04-15 RX ADMIN — INSULIN GLARGINE 20 UNITS: 100 INJECTION, SOLUTION SUBCUTANEOUS at 21:23

## 2017-04-15 RX ADMIN — DILTIAZEM HYDROCHLORIDE 180 MG: 180 CAPSULE, COATED, EXTENDED RELEASE ORAL at 08:34

## 2017-04-15 RX ADMIN — SEVELAMER CARBONATE 1600 MG: 800 TABLET, FILM COATED ORAL at 13:32

## 2017-04-15 RX ADMIN — ASCORBIC ACID, THIAMINE MONONITRATE,RIBOFLAVIN, NIACINAMIDE, PYRIDOXINE HYDROCHLORIDE, FOLIC ACID, CYANOCOBALAMIN, BIOTIN, CALCIUM PANTOTHENATE, 1 MG: 100; 1.5; 1.7; 20; 10; 1; 6000; 150000; 5 CAPSULE, LIQUID FILLED ORAL at 08:34

## 2017-04-15 RX ADMIN — SEVELAMER CARBONATE 1600 MG: 800 TABLET, FILM COATED ORAL at 08:34

## 2017-04-15 ASSESSMENT — PAIN SCALES - GENERAL
PAINLEVEL_OUTOF10: 7
PAINLEVEL_OUTOF10: 8

## 2017-04-15 ASSESSMENT — PAIN DESCRIPTION - PAIN TYPE: TYPE: ACUTE PAIN

## 2017-04-16 LAB
ANION GAP SERPL CALCULATED.3IONS-SCNC: 13 MMOL/L (ref 9–17)
BUN BLDV-MCNC: 54 MG/DL (ref 6–20)
BUN/CREAT BLD: ABNORMAL (ref 9–20)
CALCIUM SERPL-MCNC: 8.7 MG/DL (ref 8.6–10.4)
CHLORIDE BLD-SCNC: 99 MMOL/L (ref 98–107)
CO2: 27 MMOL/L (ref 20–31)
CREAT SERPL-MCNC: 5.95 MG/DL (ref 0.5–0.9)
CULTURE: ABNORMAL
CULTURE: ABNORMAL
DIRECT EXAM: ABNORMAL
GFR AFRICAN AMERICAN: 9 ML/MIN
GFR NON-AFRICAN AMERICAN: 8 ML/MIN
GFR SERPL CREATININE-BSD FRML MDRD: ABNORMAL ML/MIN/{1.73_M2}
GFR SERPL CREATININE-BSD FRML MDRD: ABNORMAL ML/MIN/{1.73_M2}
GLUCOSE BLD-MCNC: 89 MG/DL (ref 70–99)
GLUCOSE BLD-MCNC: 91 MG/DL (ref 65–105)
HCT VFR BLD CALC: 24.4 % (ref 36–46)
HEMOGLOBIN: 8.1 G/DL (ref 12–16)
Lab: ABNORMAL
MCH RBC QN AUTO: 31.6 PG (ref 26–34)
MCHC RBC AUTO-ENTMCNC: 33.1 G/DL (ref 31–37)
MCV RBC AUTO: 95.6 FL (ref 80–100)
PDW BLD-RTO: 16.4 % (ref 12.5–15.4)
PLATELET # BLD: 190 K/UL (ref 140–450)
PMV BLD AUTO: 7.7 FL (ref 6–12)
POTASSIUM SERPL-SCNC: 5.1 MMOL/L (ref 3.7–5.3)
RBC # BLD: 2.56 M/UL (ref 4–5.2)
SODIUM BLD-SCNC: 139 MMOL/L (ref 135–144)
SPECIMEN DESCRIPTION: ABNORMAL
STATUS: ABNORMAL
WBC # BLD: 11.5 K/UL (ref 3.5–11)

## 2017-04-16 PROCEDURE — G8987 SELF CARE CURRENT STATUS: HCPCS

## 2017-04-16 PROCEDURE — 94640 AIRWAY INHALATION TREATMENT: CPT

## 2017-04-16 PROCEDURE — 85027 COMPLETE CBC AUTOMATED: CPT

## 2017-04-16 PROCEDURE — 1200000000 HC SEMI PRIVATE

## 2017-04-16 PROCEDURE — G8988 SELF CARE GOAL STATUS: HCPCS

## 2017-04-16 PROCEDURE — 2580000003 HC RX 258: Performed by: STUDENT IN AN ORGANIZED HEALTH CARE EDUCATION/TRAINING PROGRAM

## 2017-04-16 PROCEDURE — 6360000002 HC RX W HCPCS: Performed by: STUDENT IN AN ORGANIZED HEALTH CARE EDUCATION/TRAINING PROGRAM

## 2017-04-16 PROCEDURE — 36415 COLL VENOUS BLD VENIPUNCTURE: CPT

## 2017-04-16 PROCEDURE — 97166 OT EVAL MOD COMPLEX 45 MIN: CPT

## 2017-04-16 PROCEDURE — 6370000000 HC RX 637 (ALT 250 FOR IP): Performed by: STUDENT IN AN ORGANIZED HEALTH CARE EDUCATION/TRAINING PROGRAM

## 2017-04-16 PROCEDURE — 97535 SELF CARE MNGMENT TRAINING: CPT

## 2017-04-16 PROCEDURE — 82947 ASSAY GLUCOSE BLOOD QUANT: CPT

## 2017-04-16 PROCEDURE — G8989 SELF CARE D/C STATUS: HCPCS

## 2017-04-16 PROCEDURE — 99232 SBSQ HOSP IP/OBS MODERATE 35: CPT | Performed by: FAMILY MEDICINE

## 2017-04-16 PROCEDURE — 80048 BASIC METABOLIC PNL TOTAL CA: CPT

## 2017-04-16 RX ADMIN — SEVELAMER CARBONATE 1600 MG: 800 TABLET, FILM COATED ORAL at 08:56

## 2017-04-16 RX ADMIN — DILTIAZEM HYDROCHLORIDE 180 MG: 180 CAPSULE, COATED, EXTENDED RELEASE ORAL at 08:56

## 2017-04-16 RX ADMIN — HEPARIN SODIUM 5000 UNITS: 5000 INJECTION, SOLUTION INTRAVENOUS; SUBCUTANEOUS at 20:50

## 2017-04-16 RX ADMIN — FLUTICASONE PROPIONATE 1 SPRAY: 50 SPRAY, METERED NASAL at 09:02

## 2017-04-16 RX ADMIN — LISINOPRIL 5 MG: 5 TABLET ORAL at 08:56

## 2017-04-16 RX ADMIN — ASCORBIC ACID, THIAMINE MONONITRATE,RIBOFLAVIN, NIACINAMIDE, PYRIDOXINE HYDROCHLORIDE, FOLIC ACID, CYANOCOBALAMIN, BIOTIN, CALCIUM PANTOTHENATE, 1 MG: 100; 1.5; 1.7; 20; 10; 1; 6000; 150000; 5 CAPSULE, LIQUID FILLED ORAL at 08:55

## 2017-04-16 RX ADMIN — INSULIN GLARGINE 20 UNITS: 100 INJECTION, SOLUTION SUBCUTANEOUS at 20:50

## 2017-04-16 RX ADMIN — ATORVASTATIN CALCIUM 10 MG: 10 TABLET, FILM COATED ORAL at 08:57

## 2017-04-16 RX ADMIN — HEPARIN SODIUM 5000 UNITS: 5000 INJECTION, SOLUTION INTRAVENOUS; SUBCUTANEOUS at 12:28

## 2017-04-16 RX ADMIN — HYDROCODONE BITARTRATE AND ACETAMINOPHEN 1 TABLET: 5; 325 TABLET ORAL at 09:00

## 2017-04-16 RX ADMIN — MOMETASONE FUROATE AND FORMOTEROL FUMARATE DIHYDRATE 2 PUFF: 200; 5 AEROSOL RESPIRATORY (INHALATION) at 20:05

## 2017-04-16 RX ADMIN — HYDROCODONE BITARTRATE AND ACETAMINOPHEN 1 TABLET: 5; 325 TABLET ORAL at 16:59

## 2017-04-16 RX ADMIN — Medication 10 ML: at 20:50

## 2017-04-16 RX ADMIN — SEVELAMER CARBONATE 1600 MG: 800 TABLET, FILM COATED ORAL at 12:28

## 2017-04-16 RX ADMIN — SEVELAMER CARBONATE 1600 MG: 800 TABLET, FILM COATED ORAL at 16:59

## 2017-04-16 RX ADMIN — Medication 10 ML: at 08:57

## 2017-04-16 RX ADMIN — HEPARIN SODIUM 5000 UNITS: 5000 INJECTION, SOLUTION INTRAVENOUS; SUBCUTANEOUS at 07:19

## 2017-04-16 RX ADMIN — FUROSEMIDE 40 MG: 40 TABLET ORAL at 08:56

## 2017-04-16 RX ADMIN — CINACALCET HYDROCHLORIDE 30 MG: 30 TABLET, COATED ORAL at 08:56

## 2017-04-16 RX ADMIN — MOMETASONE FUROATE AND FORMOTEROL FUMARATE DIHYDRATE 2 PUFF: 200; 5 AEROSOL RESPIRATORY (INHALATION) at 08:13

## 2017-04-16 ASSESSMENT — PAIN SCALES - GENERAL
PAINLEVEL_OUTOF10: 7
PAINLEVEL_OUTOF10: 3

## 2017-04-16 ASSESSMENT — PAIN DESCRIPTION - PAIN TYPE
TYPE: CHRONIC PAIN
TYPE: CHRONIC PAIN

## 2017-04-16 ASSESSMENT — PAIN DESCRIPTION - LOCATION
LOCATION: GENERALIZED
LOCATION: BACK
LOCATION: GENERALIZED

## 2017-04-17 ENCOUNTER — HOSPITAL ENCOUNTER (INPATIENT)
Dept: DIALYSIS | Age: 47
Discharge: HOME | DRG: 291 | End: 2017-04-17
Payer: COMMERCIAL

## 2017-04-17 ENCOUNTER — APPOINTMENT (OUTPATIENT)
Dept: INTERVENTIONAL RADIOLOGY/VASCULAR | Age: 47
DRG: 291 | End: 2017-04-17
Payer: COMMERCIAL

## 2017-04-17 VITALS
RESPIRATION RATE: 16 BRPM | OXYGEN SATURATION: 92 % | TEMPERATURE: 98.1 F | BODY MASS INDEX: 49.56 KG/M2 | SYSTOLIC BLOOD PRESSURE: 129 MMHG | DIASTOLIC BLOOD PRESSURE: 74 MMHG | WEIGHT: 245.81 LBS | HEART RATE: 92 BPM

## 2017-04-17 LAB
ANION GAP SERPL CALCULATED.3IONS-SCNC: 16 MMOL/L (ref 9–17)
BUN BLDV-MCNC: 72 MG/DL (ref 6–20)
BUN/CREAT BLD: ABNORMAL (ref 9–20)
CALCIUM SERPL-MCNC: 8.7 MG/DL (ref 8.6–10.4)
CHLORIDE BLD-SCNC: 99 MMOL/L (ref 98–107)
CO2: 24 MMOL/L (ref 20–31)
CREAT SERPL-MCNC: 7.43 MG/DL (ref 0.5–0.9)
GFR AFRICAN AMERICAN: 7 ML/MIN
GFR NON-AFRICAN AMERICAN: 6 ML/MIN
GFR SERPL CREATININE-BSD FRML MDRD: ABNORMAL ML/MIN/{1.73_M2}
GFR SERPL CREATININE-BSD FRML MDRD: ABNORMAL ML/MIN/{1.73_M2}
GLUCOSE BLD-MCNC: 174 MG/DL (ref 65–105)
GLUCOSE BLD-MCNC: 194 MG/DL (ref 65–105)
GLUCOSE BLD-MCNC: 70 MG/DL (ref 70–99)
GLUCOSE BLD-MCNC: 83 MG/DL (ref 65–105)
HCT VFR BLD CALC: 24.4 % (ref 36–46)
HEMOGLOBIN: 7.8 G/DL (ref 12–16)
MCH RBC QN AUTO: 31.2 PG (ref 26–34)
MCHC RBC AUTO-ENTMCNC: 32 G/DL (ref 31–37)
MCV RBC AUTO: 97.5 FL (ref 80–100)
PDW BLD-RTO: 16.6 % (ref 12.5–15.4)
PLATELET # BLD: 145 K/UL (ref 140–450)
PMV BLD AUTO: 7.5 FL (ref 6–12)
POTASSIUM SERPL-SCNC: 5 MMOL/L (ref 3.7–5.3)
RBC # BLD: 2.5 M/UL (ref 4–5.2)
SODIUM BLD-SCNC: 139 MMOL/L (ref 135–144)
WBC # BLD: 9.7 K/UL (ref 3.5–11)

## 2017-04-17 PROCEDURE — 85027 COMPLETE CBC AUTOMATED: CPT

## 2017-04-17 PROCEDURE — 6360000002 HC RX W HCPCS

## 2017-04-17 PROCEDURE — 80048 BASIC METABOLIC PNL TOTAL CA: CPT

## 2017-04-17 PROCEDURE — 99239 HOSP IP/OBS DSCHRG MGMT >30: CPT | Performed by: FAMILY MEDICINE

## 2017-04-17 PROCEDURE — 36901 INTRO CATH DIALYSIS CIRCUIT: CPT | Performed by: RADIOLOGY

## 2017-04-17 PROCEDURE — C1894 INTRO/SHEATH, NON-LASER: HCPCS

## 2017-04-17 PROCEDURE — 6370000000 HC RX 637 (ALT 250 FOR IP): Performed by: STUDENT IN AN ORGANIZED HEALTH CARE EDUCATION/TRAINING PROGRAM

## 2017-04-17 PROCEDURE — 36415 COLL VENOUS BLD VENIPUNCTURE: CPT

## 2017-04-17 PROCEDURE — 90937 HEMODIALYSIS REPEATED EVAL: CPT

## 2017-04-17 PROCEDURE — 6360000002 HC RX W HCPCS: Performed by: RADIOLOGY

## 2017-04-17 PROCEDURE — 6360000004 HC RX CONTRAST MEDICATION: Performed by: FAMILY MEDICINE

## 2017-04-17 PROCEDURE — 82947 ASSAY GLUCOSE BLOOD QUANT: CPT

## 2017-04-17 RX ORDER — HYDROCODONE BITARTRATE AND ACETAMINOPHEN 5; 325 MG/1; MG/1
1 TABLET ORAL EVERY 6 HOURS PRN
Qty: 10 TABLET | Refills: 0 | Status: SHIPPED | OUTPATIENT
Start: 2017-04-17 | End: 2017-04-18 | Stop reason: SDUPTHER

## 2017-04-17 RX ORDER — SODIUM CHLORIDE 0.9 % (FLUSH) 0.9 %
10 SYRINGE (ML) INJECTION PRN
Status: DISCONTINUED | OUTPATIENT
Start: 2017-04-17 | End: 2017-04-17 | Stop reason: HOSPADM

## 2017-04-17 RX ORDER — SEVELAMER CARBONATE 800 MG/1
1600 TABLET, FILM COATED ORAL
Qty: 90 TABLET | Refills: 3 | Status: SHIPPED | OUTPATIENT
Start: 2017-04-17 | End: 2021-01-01 | Stop reason: SINTOL

## 2017-04-17 RX ORDER — ACETAMINOPHEN 325 MG/1
650 TABLET ORAL EVERY 4 HOURS PRN
Status: DISCONTINUED | OUTPATIENT
Start: 2017-04-17 | End: 2017-04-17 | Stop reason: HOSPADM

## 2017-04-17 RX ORDER — FENTANYL CITRATE 50 UG/ML
INJECTION, SOLUTION INTRAMUSCULAR; INTRAVENOUS
Status: COMPLETED | OUTPATIENT
Start: 2017-04-17 | End: 2017-04-17

## 2017-04-17 RX ORDER — MIDAZOLAM HYDROCHLORIDE 1 MG/ML
INJECTION INTRAMUSCULAR; INTRAVENOUS
Status: COMPLETED | OUTPATIENT
Start: 2017-04-17 | End: 2017-04-17

## 2017-04-17 RX ORDER — SEVELAMER CARBONATE 800 MG/1
1600 TABLET, FILM COATED ORAL
Qty: 90 TABLET | Refills: 3 | Status: CANCELLED | OUTPATIENT
Start: 2017-04-17

## 2017-04-17 RX ADMIN — FUROSEMIDE 40 MG: 40 TABLET ORAL at 14:21

## 2017-04-17 RX ADMIN — DILTIAZEM HYDROCHLORIDE 180 MG: 180 CAPSULE, COATED, EXTENDED RELEASE ORAL at 14:21

## 2017-04-17 RX ADMIN — FENTANYL CITRATE 50 MCG: 50 INJECTION INTRAMUSCULAR; INTRAVENOUS at 09:12

## 2017-04-17 RX ADMIN — IOVERSOL 60 ML: 509 INJECTION INTRA-ARTERIAL; INTRAVENOUS at 09:10

## 2017-04-17 RX ADMIN — MIDAZOLAM HYDROCHLORIDE 0.5 MG: 1 INJECTION, SOLUTION INTRAMUSCULAR; INTRAVENOUS at 09:12

## 2017-04-17 RX ADMIN — HYDROCODONE BITARTRATE AND ACETAMINOPHEN 1 TABLET: 5; 325 TABLET ORAL at 14:25

## 2017-04-17 RX ADMIN — ASCORBIC ACID, THIAMINE MONONITRATE,RIBOFLAVIN, NIACINAMIDE, PYRIDOXINE HYDROCHLORIDE, FOLIC ACID, CYANOCOBALAMIN, BIOTIN, CALCIUM PANTOTHENATE, 1 MG: 100; 1.5; 1.7; 20; 10; 1; 6000; 150000; 5 CAPSULE, LIQUID FILLED ORAL at 14:21

## 2017-04-17 RX ADMIN — CINACALCET HYDROCHLORIDE 30 MG: 30 TABLET, COATED ORAL at 14:21

## 2017-04-17 RX ADMIN — FLUTICASONE PROPIONATE 1 SPRAY: 50 SPRAY, METERED NASAL at 14:21

## 2017-04-17 RX ADMIN — SEVELAMER CARBONATE 1600 MG: 800 TABLET, FILM COATED ORAL at 14:20

## 2017-04-17 RX ADMIN — LISINOPRIL 5 MG: 5 TABLET ORAL at 14:21

## 2017-04-17 ASSESSMENT — PAIN SCALES - GENERAL: PAINLEVEL_OUTOF10: 8

## 2017-04-18 ENCOUNTER — CARE COORDINATION (OUTPATIENT)
Dept: CARE COORDINATION | Age: 47
End: 2017-04-18

## 2017-04-18 LAB
CULTURE: NORMAL
CULTURE: NORMAL
Lab: NORMAL
SPECIMEN DESCRIPTION: NORMAL
STATUS: NORMAL

## 2017-04-20 ENCOUNTER — OFFICE VISIT (OUTPATIENT)
Dept: FAMILY MEDICINE CLINIC | Age: 47
End: 2017-04-20
Payer: COMMERCIAL

## 2017-04-20 VITALS
DIASTOLIC BLOOD PRESSURE: 50 MMHG | HEART RATE: 96 BPM | WEIGHT: 246 LBS | TEMPERATURE: 98 F | SYSTOLIC BLOOD PRESSURE: 117 MMHG | BODY MASS INDEX: 49.59 KG/M2

## 2017-04-20 DIAGNOSIS — N18.6 ESRD (END STAGE RENAL DISEASE) ON DIALYSIS (HCC): ICD-10-CM

## 2017-04-20 DIAGNOSIS — J44.1 COPD EXACERBATION (HCC): ICD-10-CM

## 2017-04-20 DIAGNOSIS — M54.50 CHRONIC MIDLINE LOW BACK PAIN WITHOUT SCIATICA: ICD-10-CM

## 2017-04-20 DIAGNOSIS — E66.01 MORBID OBESITY DUE TO EXCESS CALORIES (HCC): ICD-10-CM

## 2017-04-20 DIAGNOSIS — M25.562 CHRONIC PAIN OF BOTH KNEES: ICD-10-CM

## 2017-04-20 DIAGNOSIS — G89.29 CHRONIC MIDLINE LOW BACK PAIN WITHOUT SCIATICA: ICD-10-CM

## 2017-04-20 DIAGNOSIS — I50.9 ACUTE ON CHRONIC CONGESTIVE HEART FAILURE, UNSPECIFIED CONGESTIVE HEART FAILURE TYPE: ICD-10-CM

## 2017-04-20 DIAGNOSIS — N18.6 ESRD ON DIALYSIS (HCC): ICD-10-CM

## 2017-04-20 DIAGNOSIS — E66.01 MORBID OBESITY DUE TO EXCESS CALORIES (HCC): Primary | ICD-10-CM

## 2017-04-20 DIAGNOSIS — Z99.2 ESRD ON DIALYSIS (HCC): ICD-10-CM

## 2017-04-20 DIAGNOSIS — Z99.2 ESRD (END STAGE RENAL DISEASE) ON DIALYSIS (HCC): ICD-10-CM

## 2017-04-20 DIAGNOSIS — I50.32 CHRONIC DIASTOLIC CONGESTIVE HEART FAILURE (HCC): Primary | ICD-10-CM

## 2017-04-20 DIAGNOSIS — G89.29 CHRONIC PAIN OF BOTH KNEES: ICD-10-CM

## 2017-04-20 DIAGNOSIS — M25.561 CHRONIC PAIN OF BOTH KNEES: ICD-10-CM

## 2017-04-20 PROCEDURE — 99496 TRANSJ CARE MGMT HIGH F2F 7D: CPT | Performed by: FAMILY MEDICINE

## 2017-04-20 RX ORDER — PREDNISONE 20 MG/1
20 TABLET ORAL 2 TIMES DAILY
Qty: 10 TABLET | Refills: 0 | Status: SHIPPED | OUTPATIENT
Start: 2017-04-20 | End: 2017-04-25

## 2017-05-19 DIAGNOSIS — Z79.4 TYPE 2 DIABETES MELLITUS WITH HYPERGLYCEMIA, WITH LONG-TERM CURRENT USE OF INSULIN (HCC): Primary | ICD-10-CM

## 2017-05-19 DIAGNOSIS — E11.65 TYPE 2 DIABETES MELLITUS WITH HYPERGLYCEMIA, WITH LONG-TERM CURRENT USE OF INSULIN (HCC): Primary | ICD-10-CM

## 2017-05-23 RX ORDER — LANCETS 30 GAUGE
EACH MISCELLANEOUS
Qty: 100 EACH | Refills: 3 | Status: SHIPPED | OUTPATIENT
Start: 2017-05-23 | End: 2020-03-17 | Stop reason: SDUPTHER

## 2017-05-23 RX ORDER — BLOOD-GLUCOSE METER
1 KIT MISCELLANEOUS DAILY
Qty: 1 KIT | Refills: 0 | Status: ON HOLD | OUTPATIENT
Start: 2017-05-23 | End: 2021-01-01 | Stop reason: HOSPADM

## 2017-05-30 ENCOUNTER — CARE COORDINATION (OUTPATIENT)
Dept: CARE COORDINATION | Age: 47
End: 2017-05-30

## 2017-06-12 RX ORDER — ASPIRIN 81 MG
TABLET, DELAYED RELEASE (ENTERIC COATED) ORAL
Qty: 30 TABLET | Refills: 3 | Status: SHIPPED | OUTPATIENT
Start: 2017-06-12 | End: 2017-09-18 | Stop reason: SDUPTHER

## 2017-06-12 RX ORDER — ATORVASTATIN CALCIUM 10 MG/1
TABLET, FILM COATED ORAL
Qty: 30 TABLET | Refills: 3 | Status: SHIPPED | OUTPATIENT
Start: 2017-06-12 | End: 2017-09-18 | Stop reason: SDUPTHER

## 2017-06-12 RX ORDER — MOMETASONE FUROATE AND FORMOTEROL FUMARATE DIHYDRATE 200; 5 UG/1; UG/1
AEROSOL RESPIRATORY (INHALATION)
Qty: 13 G | Refills: 3 | Status: SHIPPED | OUTPATIENT
Start: 2017-06-12 | End: 2017-09-18 | Stop reason: SDUPTHER

## 2017-07-17 RX ORDER — FUROSEMIDE 40 MG/1
TABLET ORAL
Qty: 60 TABLET | Refills: 1 | Status: SHIPPED | OUTPATIENT
Start: 2017-07-17 | End: 2017-08-31 | Stop reason: SDUPTHER

## 2017-07-17 RX ORDER — ISOPROPYL ALCOHOL 0.7 ML/ML
SWAB TOPICAL
Qty: 100 EACH | Refills: 1 | Status: SHIPPED | OUTPATIENT
Start: 2017-07-17 | End: 2017-10-10 | Stop reason: SDUPTHER

## 2017-07-20 ENCOUNTER — TELEPHONE (OUTPATIENT)
Dept: FAMILY MEDICINE CLINIC | Age: 47
End: 2017-07-20

## 2017-07-20 DIAGNOSIS — E66.01 MORBID OBESITY WITH BMI OF 45.0-49.9, ADULT (HCC): ICD-10-CM

## 2017-07-20 DIAGNOSIS — R06.83 SNORING: ICD-10-CM

## 2017-07-20 DIAGNOSIS — G47.33 OSA (OBSTRUCTIVE SLEEP APNEA): Primary | Chronic | ICD-10-CM

## 2017-08-01 ENCOUNTER — OFFICE VISIT (OUTPATIENT)
Dept: FAMILY MEDICINE CLINIC | Age: 47
End: 2017-08-01
Payer: COMMERCIAL

## 2017-08-01 VITALS
TEMPERATURE: 96.7 F | BODY MASS INDEX: 48.18 KG/M2 | HEART RATE: 91 BPM | SYSTOLIC BLOOD PRESSURE: 134 MMHG | WEIGHT: 239 LBS | DIASTOLIC BLOOD PRESSURE: 80 MMHG

## 2017-08-01 DIAGNOSIS — G47.33 OSA (OBSTRUCTIVE SLEEP APNEA): Primary | ICD-10-CM

## 2017-08-01 DIAGNOSIS — Z99.2 ESRD (END STAGE RENAL DISEASE) ON DIALYSIS (HCC): ICD-10-CM

## 2017-08-01 DIAGNOSIS — E66.01 MORBID OBESITY WITH BMI OF 45.0-49.9, ADULT (HCC): ICD-10-CM

## 2017-08-01 DIAGNOSIS — I10 ESSENTIAL HYPERTENSION: Chronic | ICD-10-CM

## 2017-08-01 DIAGNOSIS — N18.6 ESRD (END STAGE RENAL DISEASE) ON DIALYSIS (HCC): ICD-10-CM

## 2017-08-01 DIAGNOSIS — I50.32 CHRONIC DIASTOLIC CONGESTIVE HEART FAILURE (HCC): ICD-10-CM

## 2017-08-01 PROCEDURE — 99214 OFFICE O/P EST MOD 30 MIN: CPT | Performed by: FAMILY MEDICINE

## 2017-08-01 ASSESSMENT — ENCOUNTER SYMPTOMS
COUGH: 0
WHEEZING: 0
SHORTNESS OF BREATH: 1

## 2017-08-15 ENCOUNTER — TELEPHONE (OUTPATIENT)
Dept: FAMILY MEDICINE CLINIC | Age: 47
End: 2017-08-15

## 2017-08-15 ENCOUNTER — TELEPHONE (OUTPATIENT)
Dept: ADMINISTRATIVE | Age: 47
End: 2017-08-15

## 2017-08-15 DIAGNOSIS — Z99.2 ESRD (END STAGE RENAL DISEASE) ON DIALYSIS (HCC): ICD-10-CM

## 2017-08-15 DIAGNOSIS — N18.6 ESRD (END STAGE RENAL DISEASE) ON DIALYSIS (HCC): ICD-10-CM

## 2017-08-15 DIAGNOSIS — I50.32 CHRONIC DIASTOLIC CONGESTIVE HEART FAILURE (HCC): ICD-10-CM

## 2017-08-15 DIAGNOSIS — I10 ESSENTIAL HYPERTENSION: Chronic | ICD-10-CM

## 2017-08-15 DIAGNOSIS — G47.33 OSA (OBSTRUCTIVE SLEEP APNEA): ICD-10-CM

## 2017-08-15 DIAGNOSIS — E66.01 MORBID OBESITY WITH BMI OF 45.0-49.9, ADULT (HCC): ICD-10-CM

## 2017-08-15 NOTE — TELEPHONE ENCOUNTER
Leobardo Clarke , Did you fax patients C-Pap Order over you were with  this day? Just want to see if you did it or not, so If not I can take care of it. Thank you.

## 2017-09-01 RX ORDER — FUROSEMIDE 40 MG/1
TABLET ORAL
Qty: 60 TABLET | Refills: 3 | Status: SHIPPED | OUTPATIENT
Start: 2017-09-01 | End: 2017-10-03

## 2017-09-05 DIAGNOSIS — B37.31 YEAST INFECTION OF THE VAGINA: ICD-10-CM

## 2017-09-05 RX ORDER — PREDNISONE 20 MG/1
TABLET ORAL
Qty: 10 TABLET | OUTPATIENT
Start: 2017-09-05

## 2017-09-05 RX ORDER — FLUCONAZOLE 150 MG/1
TABLET ORAL
Qty: 1 TABLET | OUTPATIENT
Start: 2017-09-05

## 2017-09-18 RX ORDER — MOMETASONE FUROATE AND FORMOTEROL FUMARATE DIHYDRATE 200; 5 UG/1; UG/1
AEROSOL RESPIRATORY (INHALATION)
Qty: 13 G | Refills: 0 | Status: SHIPPED | OUTPATIENT
Start: 2017-09-18 | End: 2017-10-03

## 2017-09-18 RX ORDER — INSULIN GLARGINE 100 [IU]/ML
INJECTION, SOLUTION SUBCUTANEOUS
Qty: 15 ML | Refills: 0 | Status: SHIPPED | OUTPATIENT
Start: 2017-09-18 | End: 2017-10-03

## 2017-09-18 RX ORDER — ATORVASTATIN CALCIUM 10 MG/1
TABLET, FILM COATED ORAL
Qty: 30 TABLET | Refills: 0 | Status: SHIPPED | OUTPATIENT
Start: 2017-09-18 | End: 2017-10-03

## 2017-09-18 RX ORDER — ASPIRIN 81 MG/1
TABLET ORAL
Qty: 30 TABLET | Refills: 0 | Status: SHIPPED | OUTPATIENT
Start: 2017-09-18 | End: 2017-10-03

## 2017-10-03 RX ORDER — ATORVASTATIN CALCIUM 10 MG/1
TABLET, FILM COATED ORAL
Qty: 30 TABLET | Refills: 3 | Status: SHIPPED | OUTPATIENT
Start: 2017-10-03 | End: 2018-01-27 | Stop reason: SDUPTHER

## 2017-10-03 RX ORDER — INSULIN GLARGINE 100 [IU]/ML
INJECTION, SOLUTION SUBCUTANEOUS
Qty: 15 ML | Refills: 3 | Status: SHIPPED | OUTPATIENT
Start: 2017-10-03 | End: 2018-03-22 | Stop reason: SDUPTHER

## 2017-10-03 RX ORDER — ASPIRIN 81 MG/1
TABLET ORAL
Qty: 30 TABLET | Refills: 3 | Status: SHIPPED | OUTPATIENT
Start: 2017-10-03 | End: 2018-01-27 | Stop reason: SDUPTHER

## 2017-10-03 RX ORDER — BLOOD-GLUCOSE METER
EACH MISCELLANEOUS
Qty: 100 EACH | Refills: 3 | Status: SHIPPED | OUTPATIENT
Start: 2017-10-03 | End: 2017-10-10

## 2017-10-03 RX ORDER — MOMETASONE FUROATE AND FORMOTEROL FUMARATE DIHYDRATE 200; 5 UG/1; UG/1
AEROSOL RESPIRATORY (INHALATION)
Qty: 13 G | Refills: 3 | Status: SHIPPED | OUTPATIENT
Start: 2017-10-03 | End: 2018-01-27 | Stop reason: SDUPTHER

## 2017-10-03 RX ORDER — FUROSEMIDE 40 MG/1
TABLET ORAL
Qty: 60 TABLET | Refills: 3 | Status: SHIPPED | OUTPATIENT
Start: 2017-10-03 | End: 2017-10-10

## 2017-10-03 NOTE — TELEPHONE ENCOUNTER
respiratory failure with hypoxia (HCC)     COPD exacerbation (HCC)     Asthma exacerbation     Type 2 diabetes mellitus with diabetic nephropathy (HCC)     ESRD (end stage renal disease) on dialysis ([facility] [address]     Bronchitis     Essential hypertension     Sleep apnea, obstructive     Hyperglycemia, drug-induced     Needs smoking cessation education     Hyperglycemia     Drowsiness     Acute respiratory acidosis     Acute on chronic respiratory failure with hypercapnia ([facility] [address]     Mobility impaired     S/P cardiac cath-mINIMAL caD 3/15/16 - dR. 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)     Type 2 diabetes mellitus without complication (HCC)     Congestive heart failure (HCC)     Asthma with COPD with exacerbation (HCC)     Acute exacerbation of CHF (congestive heart failure) (HCC)     COPD exacerbation (HCC)     Chronic kidney disease, stage V (HCC)     Acute respiratory acidosis     Precordial pain     Gastroesophageal reflux disease without esophagitis     Pulmonary HTN     Morbid obesity due to excess calories (HCC)     Symptomatic anemia     Elevated serum creatinine

## 2017-10-03 NOTE — TELEPHONE ENCOUNTER
Please address the medication refill and close the encounter. If I can be of assistance, please route to the applicable pool. Thank you. Health Maintenance   Topic Date Due    Diabetic retinal exam  02/25/1980    DTaP/Tdap/Td vaccine (1 - Tdap) 02/25/1989    Diabetic foot exam  03/04/2016    Lipid screen  12/09/2016    Flu vaccine (1) 09/01/2017    Cervical cancer screen  09/05/2017    Diabetic hemoglobin A1C test  01/12/2018    Pneumococcal highest risk (3 of 3 - PPSV23) 10/03/2018    HIV screen  Completed       Hemoglobin A1C (%)   Date Value   01/12/2017 5.6   06/13/2016 6.2 (H)   03/14/2016 7.1 (H)             ( goal A1C is < 7)   Microalb/Crt.  Ratio (mcg/mg creat)   Date Value   04/29/2014 1828     LDL Cholesterol (mg/dL)   Date Value   12/09/2015 65       (goal LDL is <100)   AST (U/L)   Date Value   04/13/2017 24     ALT (U/L)   Date Value   04/13/2017 33     BUN (mg/dL)   Date Value   04/17/2017 72 (H)     BP Readings from Last 3 Encounters:   08/01/17 134/80   04/20/17 (!) 117/50   04/17/17 129/74          (goal 120/80)    All Future Testing planned in [facility]  Lab Frequency Next Occurrence       Next Visit Date:  Future Appointments  Date Time Provider Micha Farrar   10/5/2017 10:45 AM aMlcom Galindo MD [address] [facility]            Patient Active Problem List:     Asthma     YONI (obstructive sleep apnea)     HTN (hypertension)     Left knee pain     Hidradenitis     Current smoker     Microalbuminuria     Acute exacerbation of CHF (congestive heart failure) (HCC)     Essential hypertension     YONI (obstructive sleep apnea)     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 kidney disease) stage 4, GFR 15-29 ml/min (HCC)     Acute diastolic congestive heart failure (HCC)     Hyperkalemia     Acute systolic congestive heart failure ([facility] [address]     Pulmonary hypertension     Morbid obesity with body mass index of 40.0-44.9 in [facility])     Acute on chronic

## 2017-10-10 RX ORDER — LANCING DEVICE
EACH MISCELLANEOUS
Qty: 100 EACH | Refills: 3 | Status: SHIPPED | OUTPATIENT
Start: 2017-10-10 | End: 2018-04-10 | Stop reason: SDUPTHER

## 2017-10-10 RX ORDER — BLOOD-GLUCOSE METER
EACH MISCELLANEOUS
Qty: 100 EACH | Refills: 3 | Status: SHIPPED | OUTPATIENT
Start: 2017-10-10 | End: 2018-12-07

## 2017-10-10 RX ORDER — FUROSEMIDE 40 MG/1
TABLET ORAL
Qty: 60 TABLET | Refills: 3 | Status: SHIPPED | OUTPATIENT
Start: 2017-10-10 | End: 2018-02-01

## 2017-10-10 NOTE — TELEPHONE ENCOUNTER
Please address the medication refill and close the encounter. If I can be of assistance, please route to the applicable pool. Thank you. Health Maintenance   Topic Date Due    Diabetic retinal exam  02/25/1980    DTaP/Tdap/Td vaccine (1 - Tdap) 02/25/1989    Diabetic foot exam  03/04/2016    Lipid screen  12/09/2016    Flu vaccine (1) 09/01/2017    Cervical cancer screen  09/05/2017    Diabetic hemoglobin A1C test  01/12/2018    Pneumococcal highest risk (3 of 3 - PPSV23) 10/03/2018    HIV screen  Completed       Hemoglobin A1C (%)   Date Value   01/12/2017 5.6   06/13/2016 6.2 (H)   03/14/2016 7.1 (H)             ( goal A1C is < 7)   Microalb/Crt.  Ratio (mcg/mg creat)   Date Value   04/29/2014 1,828     LDL Cholesterol (mg/dL)   Date Value   12/09/2015 65       (goal LDL is <100)   AST (U/L)   Date Value   04/13/2017 24     ALT (U/L)   Date Value   04/13/2017 33     BUN (mg/dL)   Date Value   04/17/2017 72 (H)     BP Readings from Last 3 Encounters:   08/01/17 134/80   04/20/17 (!) 117/50   04/17/17 129/74          (goal 120/80)    All Future Testing planned in [facility]  Lab Frequency Next Occurrence       Next Visit Date:  Future Appointments  Date Time Provider Micha Farrar   10/26/2017 1:30 PM Richard Horn MD [address] [facility]            Patient Active Problem List:     Asthma     YONI (obstructive sleep apnea)     HTN (hypertension)     Left knee pain     Hidradenitis     Current smoker     Microalbuminuria     Acute exacerbation of CHF (congestive heart failure) (HCC)     Essential hypertension     YONI (obstructive sleep apnea)     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 kidney disease) stage 4, GFR 15-29 ml/min (HCC)     Acute diastolic congestive heart failure (HCC)     Hyperkalemia     Acute systolic congestive heart failure ([facility] [address]     Pulmonary hypertension     Morbid obesity with body mass index of 40.0-44.9 in [facility])     Acute on chronic

## 2017-10-16 ENCOUNTER — TELEPHONE (OUTPATIENT)
Dept: ADMINISTRATIVE | Age: 47
End: 2017-10-16

## 2017-10-16 RX ORDER — LISINOPRIL 5 MG/1
5 TABLET ORAL DAILY
Qty: 30 TABLET | Refills: 3 | Status: SHIPPED | OUTPATIENT
Start: 2017-10-16 | End: 2018-02-01 | Stop reason: SDUPTHER

## 2017-10-16 RX ORDER — LISINOPRIL 5 MG/1
5 TABLET ORAL DAILY
Qty: 30 TABLET | Refills: 0 | Status: CANCELLED | OUTPATIENT
Start: 2017-10-16

## 2017-10-16 NOTE — TELEPHONE ENCOUNTER
Pt calling to [facility] refill for Lisinopril. Pharmacy faxed req to office last week.

## 2017-10-26 ENCOUNTER — OFFICE VISIT (OUTPATIENT)
Dept: FAMILY MEDICINE CLINIC | Age: 47
End: 2017-10-26
Payer: COMMERCIAL

## 2017-10-26 VITALS
SYSTOLIC BLOOD PRESSURE: 131 MMHG | HEART RATE: 99 BPM | WEIGHT: 237.8 LBS | TEMPERATURE: 97.6 F | DIASTOLIC BLOOD PRESSURE: 82 MMHG | BODY MASS INDEX: 47.94 KG/M2

## 2017-10-26 DIAGNOSIS — Z79.4 TYPE 2 DIABETES MELLITUS WITH CHRONIC KIDNEY DISEASE ON CHRONIC DIALYSIS, WITH LONG-TERM CURRENT USE OF INSULIN (HCC): ICD-10-CM

## 2017-10-26 DIAGNOSIS — Z99.89 OSA ON CPAP: Primary | ICD-10-CM

## 2017-10-26 DIAGNOSIS — N18.6 TYPE 2 DIABETES MELLITUS WITH CHRONIC KIDNEY DISEASE ON CHRONIC DIALYSIS, WITH LONG-TERM CURRENT USE OF INSULIN (HCC): ICD-10-CM

## 2017-10-26 DIAGNOSIS — G47.33 OSA ON CPAP: Primary | ICD-10-CM

## 2017-10-26 DIAGNOSIS — Z99.2 TYPE 2 DIABETES MELLITUS WITH CHRONIC KIDNEY DISEASE ON CHRONIC DIALYSIS, WITH LONG-TERM CURRENT USE OF INSULIN (HCC): ICD-10-CM

## 2017-10-26 DIAGNOSIS — E11.22 TYPE 2 DIABETES MELLITUS WITH CHRONIC KIDNEY DISEASE ON CHRONIC DIALYSIS, WITH LONG-TERM CURRENT USE OF INSULIN (HCC): ICD-10-CM

## 2017-10-26 DIAGNOSIS — E66.01 MORBID OBESITY WITH BMI OF 45.0-49.9, ADULT (HCC): ICD-10-CM

## 2017-10-26 PROCEDURE — 3044F HG A1C LEVEL LT 7.0%: CPT | Performed by: FAMILY MEDICINE

## 2017-10-26 PROCEDURE — G8427 DOCREV CUR MEDS BY ELIG CLIN: HCPCS | Performed by: FAMILY MEDICINE

## 2017-10-26 PROCEDURE — 99214 OFFICE O/P EST MOD 30 MIN: CPT | Performed by: FAMILY MEDICINE

## 2017-10-26 PROCEDURE — G8417 CALC BMI ABV UP PARAM F/U: HCPCS | Performed by: FAMILY MEDICINE

## 2017-10-26 PROCEDURE — G8484 FLU IMMUNIZE NO ADMIN: HCPCS | Performed by: FAMILY MEDICINE

## 2017-10-26 PROCEDURE — 1036F TOBACCO NON-USER: CPT | Performed by: FAMILY MEDICINE

## 2017-10-26 ASSESSMENT — PATIENT HEALTH QUESTIONNAIRE - PHQ9
SUM OF ALL RESPONSES TO PHQ QUESTIONS 1-9: 0
SUM OF ALL RESPONSES TO PHQ9 QUESTIONS 1 & 2: 0
1. LITTLE INTEREST OR PLEASURE IN DOING THINGS: 0
2. FEELING DOWN, DEPRESSED OR HOPELESS: 0

## 2017-10-26 NOTE — PROGRESS NOTES
Subjective:      Patient ID: Devyn Alexander is a 52 y.o. female. HPI  Established patient, here in follow up. YONI - reports compliance with CPAP. Symptoms have improved since she adheres to nocturnal cpap. Reports feeling better. DM - reports to using insulin as prescribed. Denies any acute hypo or hyperglycemic episodes. Is due for eye exam  Declines flu shot today    PMHx and SocialHx reviewed and remain unchanged. Review of Systems   Constitutional: Negative for chills, fatigue and fever. HENT: Negative for congestion. Eyes: Negative for visual disturbance. Respiratory: Negative for shortness of breath. Cardiovascular: Negative for chest pain. Gastrointestinal: Negative for abdominal pain. Endocrine: Negative for polydipsia and polyuria. Musculoskeletal: Positive for arthralgias. Neurological: Negative for dizziness, light-headedness and headaches. Objective:   Physical Exam   Constitutional: She appears well-developed and well-nourished. HENT:   Head: Normocephalic and atraumatic. Neck: Neck supple. Cardiovascular: Normal rate, regular rhythm and normal heart sounds. Pulmonary/Chest: Effort normal and breath sounds normal. No respiratory distress. She has no wheezes. Abdominal: Soft. Bowel sounds are normal. She exhibits no distension. There is no tenderness. Musculoskeletal: She exhibits no edema. Sensory exam of the foot is normal, tested with the monofilament. Good pulses, no lesions or ulcers, good peripheral pulses. Assessment  & Plan     1. YONI on CPAP  Continue using CPAP    2. Morbid obesity with BMI of 45.0-49.9, adult (HCC)  Diet and lifestyle modifications    3.  Type 2 diabetes mellitus with chronic kidney disease on chronic dialysis, with long-term current use of insulin ([facility])  Continue lantus 20u  - HM DIABETES FOOT EXAM  - eye exam indicated, pt to make appt      Careful review of urgent symptoms and need for immediate medical attention if

## 2017-10-26 NOTE — PATIENT INSTRUCTIONS
Visit Information    Have you changed or started any medications since your last visit including any over-the-counter medicines, vitamins, or herbal medicines? no   Have you stopped taking any of your medications? Is so, why? -  no  Are you having any side effects from any of your medications? - no    Have you seen any other physician or provider since your last visit?  no   Have you had any other diagnostic tests since your last visit?  no   Have you been seen in the emergency room and/or had an admission in a hospital since we last saw you?  no   Have you had your routine dental cleaning in the past 6 months?  no     Do you have an active MyChart account? If no, what is the barrier? No:     Patient Care Team:  Nalini Rodriguez MD as PCP - General (Family Medicine)  Mayuri Dobbs MD (Obstetrics & Gynecology)  [facility] [place] ([place] [address])  Nalini Rodriguez MD as Referring Physician (Family Medicine)    Medical History Review  Past Medical, Family, and Social History reviewed and does not contribute to the patient presenting condition    Health Maintenance   Topic Date Due    Diabetic retinal exam  1980    DTaP/Tdap/Td vaccine (1 - Tdap) 1989    Diabetic foot exam  2016    Lipid screen  2016    Flu vaccine (1) 2017    Cervical cancer screen  2017    Diabetic hemoglobin A1C test  2018    Pneumococcal highest risk (3 of 3 - PPSV23) 10/03/2018    HIV screen  Completed     Thank you for letting us take care of you today. We hope all your questions were addressed. If a question was overlooked or something else comes to mind after you return home, please contact a member of your Care Team listed below. Please make sure you have a routine office visit set up to follow-up on [address].      Your Care Team at [place] [address] is Team #3  Nalini Rodriguez MD (Faculty)  Manuela Nixon MD (Faculty  Phyllis Miller MD (Resident)  Evert Williamson MD (Resident)  Charlene Worthy MD (Resident)  Gideon Murray MD (Resident)  TIM Hayes RMA Jenna Croak ([address])  Alesha Mooney RN, ([address] )  Justin Bhandari, Ph.D., (Behavioral Services)  Darrell Nino, [facility] (Clinical Pharmacist)     Office phone number: [phone]    If you need to get in right away due to illness, please be advised we have \"Same Day\" appointments available Monday-Friday. Please call us at [phone] option #1 to schedule your \"Same Day\" appointment.

## 2017-10-27 ENCOUNTER — TELEPHONE (OUTPATIENT)
Dept: ADMINISTRATIVE | Age: 47
End: 2017-10-27

## 2017-10-27 DIAGNOSIS — L30.9 ECZEMA, UNSPECIFIED TYPE: Primary | ICD-10-CM

## 2017-10-27 ASSESSMENT — ENCOUNTER SYMPTOMS
SHORTNESS OF BREATH: 0
ABDOMINAL PAIN: 0

## 2017-10-27 NOTE — TELEPHONE ENCOUNTER
Patient seen Dr Arjun Costa yesterday 10/26/17 and she forgot to ask if doctor would send something to her pharmacy for her eczema. She would like something sent over to [facility] [place] on [address]. Please call patient and let her know if doctor will or when RX is sent to pharmacy. Thank you.

## 2017-11-24 RX ORDER — ISOPROPYL ALCOHOL 0.7 ML/ML
SWAB TOPICAL
Qty: 100 EACH | Refills: 3 | Status: SHIPPED | OUTPATIENT
Start: 2017-11-24 | End: 2018-04-13 | Stop reason: SDUPTHER

## 2018-01-09 DIAGNOSIS — L30.9 ECZEMA, UNSPECIFIED TYPE: ICD-10-CM

## 2018-01-09 RX ORDER — WATER / MINERAL OIL / WHITE PETROLATUM 16 OZ
CREAM TOPICAL
Qty: 454 G | Refills: 0 | Status: SHIPPED | OUTPATIENT
Start: 2018-01-09 | End: 2018-04-23 | Stop reason: SDUPTHER

## 2018-01-09 NOTE — TELEPHONE ENCOUNTER
Please address the medication refill and close the encounter. If I can be of assistance, please route to the applicable pool. Thank you. Next Visit Date:  Future Appointments  Date Time Provider Micha Fitzgeraldi   1/23/2018 11:00 AM Imelda Schmid MD [facility] [address] [facility]   1/30/2018 8:15 AM Imelda Schmid  [facility] Maintenance   Topic Date Due    Diabetic retinal exam  02/25/1980    DTaP/Tdap/Td vaccine (1 - Tdap) 02/25/1989    Lipid screen  12/09/2016    Cervical cancer screen  09/05/2017    A1C test (Diabetic or Prediabetic)  01/12/2018    Flu vaccine (1) 10/27/2018 (Originally 9/1/2017)    Potassium monitoring  04/17/2018    Creatinine monitoring  04/17/2018    Pneumococcal highest risk (3 of 3 - PPSV23) 10/03/2018    Diabetic foot exam  10/27/2018    HIV screen  Completed       Hemoglobin A1C (%)   Date Value   01/12/2017 5.6   06/13/2016 6.2 (H)   03/14/2016 7.1 (H)             ( goal A1C is < 7)   Microalb/Crt.  Ratio (mcg/mg creat)   Date Value   04/29/2014 1,828     LDL Cholesterol (mg/dL)   Date Value   12/09/2015 65       (goal LDL is <100)   AST (U/L)   Date Value   04/13/2017 24     ALT (U/L)   Date Value   04/13/2017 33     BUN (mg/dL)   Date Value   04/17/2017 72 (H)     BP Readings from Last 3 Encounters:   10/26/17 131/82   08/01/17 134/80   04/20/17 (!) 117/50          (goal 120/80)    All Future Testing planned in [facility]  Lab Frequency Next Occurrence               Patient Active Problem List:     Asthma     YONI (obstructive sleep apnea)     HTN (hypertension)     Left knee pain     Hidradenitis     Current smoker     Microalbuminuria     Acute exacerbation of CHF (congestive heart failure) (HCC)     Essential hypertension     YONI (obstructive sleep apnea)     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 kidney disease) stage 4, GFR 15-29 ml/min (HCC)     Acute diastolic congestive heart failure (HCC)     Hyperkalemia     Acute

## 2018-01-31 NOTE — TELEPHONE ENCOUNTER
mass index of 40.0-44.9 in adult [facility])     Acute on chronic respiratory failure with hypoxia (HCC)     COPD exacerbation (HCC)     Asthma exacerbation     Type 2 diabetes mellitus with diabetic nephropathy (HCC)     ESRD (end stage renal disease) on dialysis ([facility] [address]     Bronchitis     Essential hypertension     Sleep apnea, obstructive     Hyperglycemia, drug-induced     Needs smoking cessation education     Hyperglycemia     Drowsiness     Acute respiratory acidosis     Acute on chronic respiratory failure with hypercapnia ([facility] [address]     Mobility impaired     S/P cardiac cath-mINIMAL caD 3/15/16 - dR. 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)     Type 2 diabetes mellitus without complication (HCC)     Congestive heart failure (HCC)     Asthma with COPD with exacerbation (HCC)     Acute exacerbation of CHF (congestive heart failure) (HCC)     COPD exacerbation (HCC)     Chronic kidney disease, stage V (HCC)     Acute respiratory acidosis     Precordial pain     Gastroesophageal reflux disease without esophagitis     Pulmonary HTN     Morbid obesity due to excess calories (HCC)     Symptomatic anemia     Elevated serum creatinine

## 2018-02-01 RX ORDER — LISINOPRIL 5 MG/1
5 TABLET ORAL DAILY
Qty: 30 TABLET | Refills: 2 | Status: SHIPPED | OUTPATIENT
Start: 2018-02-01 | End: 2018-04-13 | Stop reason: SDUPTHER

## 2018-02-01 RX ORDER — FUROSEMIDE 40 MG/1
TABLET ORAL
Qty: 60 TABLET | Refills: 2 | Status: SHIPPED | OUTPATIENT
Start: 2018-02-01 | End: 2018-04-11 | Stop reason: SDUPTHER

## 2018-02-01 RX ORDER — MOMETASONE FUROATE AND FORMOTEROL FUMARATE DIHYDRATE 200; 5 UG/1; UG/1
AEROSOL RESPIRATORY (INHALATION)
Qty: 13 G | Refills: 2 | Status: SHIPPED | OUTPATIENT
Start: 2018-02-01 | End: 2018-04-13 | Stop reason: SDUPTHER

## 2018-02-01 RX ORDER — ATORVASTATIN CALCIUM 10 MG/1
TABLET, FILM COATED ORAL
Qty: 30 TABLET | Refills: 2 | Status: SHIPPED | OUTPATIENT
Start: 2018-02-01 | End: 2018-04-13 | Stop reason: SDUPTHER

## 2018-02-01 RX ORDER — ASPIRIN 81 MG
TABLET, DELAYED RELEASE (ENTERIC COATED) ORAL
Qty: 30 TABLET | Refills: 2 | Status: SHIPPED | OUTPATIENT
Start: 2018-02-01 | End: 2018-04-13 | Stop reason: SDUPTHER

## 2018-02-01 NOTE — TELEPHONE ENCOUNTER
Please address the medication refill and close the encounter. If I can be of assistance, please route to the applicable pool. Thank you. Next Visit Date:  No future appointments. Health Maintenance   Topic Date Due    Diabetic retinal exam  02/25/1980    DTaP/Tdap/Td vaccine (1 - Tdap) 02/25/1989    Lipid screen  12/09/2016    Cervical cancer screen  09/05/2017    A1C test (Diabetic or Prediabetic)  01/12/2018    Flu vaccine (1) 10/27/2018 (Originally 9/1/2017)    Potassium monitoring  04/17/2018    Creatinine monitoring  04/17/2018    Pneumococcal highest risk (3 of 3 - PPSV23) 10/03/2018    Diabetic foot exam  10/27/2018    HIV screen  Completed       Hemoglobin A1C (%)   Date Value   01/12/2017 5.6   06/13/2016 6.2 (H)   03/14/2016 7.1 (H)             ( goal A1C is < 7)   Microalb/Crt.  Ratio (mcg/mg creat)   Date Value   04/29/2014 1,828     LDL Cholesterol (mg/dL)   Date Value   12/09/2015 65       (goal LDL is <100)   AST (U/L)   Date Value   04/13/2017 24     ALT (U/L)   Date Value   04/13/2017 33     BUN (mg/dL)   Date Value   04/17/2017 72 (H)     BP Readings from Last 3 Encounters:   10/26/17 131/82   08/01/17 134/80   04/20/17 (!) 117/50          (goal 120/80)    All Future Testing planned in [facility]  Lab Frequency Next Occurrence               Patient Active Problem List:     Asthma     YONI (obstructive sleep apnea)     HTN (hypertension)     Left knee pain     Hidradenitis     Current smoker     Microalbuminuria     Acute exacerbation of CHF (congestive heart failure) (HCC)     Essential hypertension     YONI (obstructive sleep apnea)     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 kidney disease) stage 4, GFR 15-29 ml/min (HCC)     Acute diastolic congestive heart failure (HCC)     Hyperkalemia     Acute systolic congestive heart failure ([facility] [address]     Pulmonary hypertension     Morbid obesity with body mass index of 40.0-44.9 in [facility])     Acute on

## 2018-02-09 ENCOUNTER — TELEPHONE (OUTPATIENT)
Dept: ADMINISTRATIVE | Age: 48
End: 2018-02-09

## 2018-02-09 DIAGNOSIS — G47.33 OSA (OBSTRUCTIVE SLEEP APNEA): Primary | ICD-10-CM

## 2018-02-20 ENCOUNTER — TELEPHONE (OUTPATIENT)
Dept: ADMINISTRATIVE | Age: 48
End: 2018-02-20

## 2018-02-20 NOTE — TELEPHONE ENCOUNTER
Patient called for new face mask for cpap machine. She asked it to be faxed. But the notes says for pt to . Can you please fax. [phone]   Thanks. She also needs her lasix. This needs to go to [facility].
cpap orders and face to face orders faxed.
Strict aspiration and reflux precautions!/yes

## 2018-02-22 DIAGNOSIS — J45.40 MODERATE PERSISTENT ASTHMA WITHOUT COMPLICATION: ICD-10-CM

## 2018-03-07 RX ORDER — ALBUTEROL SULFATE 2.5 MG/3ML
SOLUTION RESPIRATORY (INHALATION)
Qty: 360 ML | OUTPATIENT
Start: 2018-03-07

## 2018-03-19 ENCOUNTER — TELEPHONE (OUTPATIENT)
Dept: FAMILY MEDICINE CLINIC | Age: 48
End: 2018-03-19

## 2018-03-22 ENCOUNTER — OFFICE VISIT (OUTPATIENT)
Dept: FAMILY MEDICINE CLINIC | Age: 48
End: 2018-03-22
Payer: COMMERCIAL

## 2018-03-22 VITALS
TEMPERATURE: 97.5 F | BODY MASS INDEX: 50.45 KG/M2 | WEIGHT: 257 LBS | HEIGHT: 60 IN | HEART RATE: 116 BPM | DIASTOLIC BLOOD PRESSURE: 68 MMHG | SYSTOLIC BLOOD PRESSURE: 107 MMHG

## 2018-03-22 DIAGNOSIS — J44.9 CHRONIC OBSTRUCTIVE PULMONARY DISEASE, UNSPECIFIED COPD TYPE (HCC): ICD-10-CM

## 2018-03-22 DIAGNOSIS — Z99.2 TYPE 2 DIABETES MELLITUS WITH CHRONIC KIDNEY DISEASE ON CHRONIC DIALYSIS, WITH LONG-TERM CURRENT USE OF INSULIN (HCC): Primary | Chronic | ICD-10-CM

## 2018-03-22 DIAGNOSIS — G47.33 OSA ON CPAP: ICD-10-CM

## 2018-03-22 DIAGNOSIS — K59.00 CONSTIPATION, UNSPECIFIED CONSTIPATION TYPE: ICD-10-CM

## 2018-03-22 DIAGNOSIS — I10 ESSENTIAL HYPERTENSION: Chronic | ICD-10-CM

## 2018-03-22 DIAGNOSIS — J45.40 MODERATE PERSISTENT ASTHMA WITHOUT COMPLICATION: ICD-10-CM

## 2018-03-22 DIAGNOSIS — Z79.4 TYPE 2 DIABETES MELLITUS WITH CHRONIC KIDNEY DISEASE ON CHRONIC DIALYSIS, WITH LONG-TERM CURRENT USE OF INSULIN (HCC): Primary | Chronic | ICD-10-CM

## 2018-03-22 DIAGNOSIS — E11.22 TYPE 2 DIABETES MELLITUS WITH CHRONIC KIDNEY DISEASE ON CHRONIC DIALYSIS, WITH LONG-TERM CURRENT USE OF INSULIN (HCC): Primary | Chronic | ICD-10-CM

## 2018-03-22 DIAGNOSIS — Z99.89 OSA ON CPAP: ICD-10-CM

## 2018-03-22 DIAGNOSIS — J96.21 ACUTE ON CHRONIC RESPIRATORY FAILURE WITH HYPOXIA (HCC): ICD-10-CM

## 2018-03-22 DIAGNOSIS — N18.6 TYPE 2 DIABETES MELLITUS WITH CHRONIC KIDNEY DISEASE ON CHRONIC DIALYSIS, WITH LONG-TERM CURRENT USE OF INSULIN (HCC): Primary | Chronic | ICD-10-CM

## 2018-03-22 LAB — HBA1C MFR BLD: 5 %

## 2018-03-22 PROCEDURE — G8484 FLU IMMUNIZE NO ADMIN: HCPCS | Performed by: FAMILY MEDICINE

## 2018-03-22 PROCEDURE — G8926 SPIRO NO PERF OR DOC: HCPCS | Performed by: FAMILY MEDICINE

## 2018-03-22 PROCEDURE — 83036 HEMOGLOBIN GLYCOSYLATED A1C: CPT | Performed by: FAMILY MEDICINE

## 2018-03-22 PROCEDURE — 1036F TOBACCO NON-USER: CPT | Performed by: FAMILY MEDICINE

## 2018-03-22 PROCEDURE — 3044F HG A1C LEVEL LT 7.0%: CPT | Performed by: FAMILY MEDICINE

## 2018-03-22 PROCEDURE — G8417 CALC BMI ABV UP PARAM F/U: HCPCS | Performed by: FAMILY MEDICINE

## 2018-03-22 PROCEDURE — 3023F SPIROM DOC REV: CPT | Performed by: FAMILY MEDICINE

## 2018-03-22 PROCEDURE — 99214 OFFICE O/P EST MOD 30 MIN: CPT | Performed by: FAMILY MEDICINE

## 2018-03-22 PROCEDURE — G8427 DOCREV CUR MEDS BY ELIG CLIN: HCPCS | Performed by: FAMILY MEDICINE

## 2018-03-22 RX ORDER — ALBUTEROL SULFATE 2.5 MG/3ML
2.5 SOLUTION RESPIRATORY (INHALATION) EVERY 6 HOURS PRN
Qty: 120 EACH | Refills: 3 | Status: SHIPPED | OUTPATIENT
Start: 2018-03-22 | End: 2018-07-18 | Stop reason: SDUPTHER

## 2018-03-22 RX ORDER — CETIRIZINE HYDROCHLORIDE 10 MG/1
10 TABLET ORAL DAILY
Qty: 30 TABLET | Refills: 1 | Status: SHIPPED | OUTPATIENT
Start: 2018-03-22 | End: 2018-05-09 | Stop reason: SDUPTHER

## 2018-03-22 RX ORDER — SENNA AND DOCUSATE SODIUM 50; 8.6 MG/1; MG/1
1 TABLET, FILM COATED ORAL DAILY
Qty: 30 TABLET | Refills: 1 | Status: SHIPPED | OUTPATIENT
Start: 2018-03-22 | End: 2018-05-09 | Stop reason: SDUPTHER

## 2018-03-22 NOTE — PROGRESS NOTES
Subjective:      Patient ID: Lemuel Stephen is a 50 y.o. female. HPI  Established patient, here in follow up. DM - reports to be taking her lantus as prescribed. Denies any hypoglycemic episodes but does report that the FBS have been in 80/90 at times. +ESRD on HD at [facility]. Has not updated on eye exam.    In regards to her breathing, reports to be continuously using oxygen at 2L and then at 4L at night. Does report to be using her CPAP. Denies any acute exacerbation of her respiratory symptoms. Needs to follow up with pulm doctor. At times, uses her nebulizer machine but is out of her albuterol solution. HTN - Denies any thunderclap headaches, blurred vision, or palpitations. Reports constipation. Usually regular, daily BM. But now has been having harder stools and not going daily. PMHx and SocialHx reviewed and remain unchanged. Review of Systems   Constitutional: Negative for chills, fatigue and fever. HENT: Negative for congestion. Eyes: Negative for visual disturbance. Respiratory: Negative for shortness of breath. Cardiovascular: Negative for chest pain. Gastrointestinal: Negative for abdominal pain. Endocrine: Negative for polydipsia and polyuria. Neurological: Negative for dizziness, light-headedness and headaches. Objective:   Physical Exam   Constitutional: She appears well-developed and well-nourished. HENT:   Head: Normocephalic and atraumatic. Neck: Neck supple. Cardiovascular: Normal rate, regular rhythm and normal heart sounds. Pulmonary/Chest: Effort normal and breath sounds normal. No respiratory distress. She has no wheezes. Abdominal: Soft. Bowel sounds are normal. She exhibits no distension. There is no tenderness. Musculoskeletal: She exhibits no edema. Assessment:      1. Type 2 diabetes mellitus with chronic kidney disease on chronic dialysis, with long-term current use of insulin ([address]    2. YONI on CPAP    3.  Moderate persistent asthma without complication    4. Chronic obstructive pulmonary disease, unspecified COPD type ([address]    5. Acute on chronic respiratory failure with hypoxia (HCC)    6. Essential hypertension    7. Constipation, unspecified constipation type          Plan:      1. Type 2 diabetes mellitus with chronic kidney disease on chronic dialysis, with long-term current use of insulin ([facility])  POCT glycosylated hemoglobin (Hb A1C) - 5.0  Lantus decreased from 20u to 15u    Lipid Panel    Comprehensive Metabolic Panel    insulin glargine (LANTUS SOLOSTAR) 100 UNIT/ML injection pen   2. YONI on CPAP  Continue CPAP   3. Moderate persistent asthma without complication  albuterol (PROVENTIL) (2.5 MG/3ML) 0.083% nebulizer solution   4. Chronic obstructive pulmonary disease, unspecified COPD type ([facility]  Advised to follow up with pulm doctor as scheduled. 5. Acute on chronic respiratory failure with hypoxia (HCC)     6. Essential hypertension  Comprehensive Metabolic Panel   7. Constipation, unspecified constipation type  sennosides-docusate sodium (SENOKOT-S) 8.6-50 MG tablet     More than 25 minutes spent  in face to face encounter with the patient and more than half in counseling. Patient's questions were answered. Patient Voiced understanding to the counseling. Return in about 3 months (around 6/22/2018) for constipation.

## 2018-03-24 ASSESSMENT — ENCOUNTER SYMPTOMS
SHORTNESS OF BREATH: 0
ABDOMINAL PAIN: 0

## 2018-03-28 ENCOUNTER — TELEPHONE (OUTPATIENT)
Dept: ADMINISTRATIVE | Age: 48
End: 2018-03-28

## 2018-03-29 RX ORDER — ALBUTEROL SULFATE 90 UG/1
2 AEROSOL, METERED RESPIRATORY (INHALATION) EVERY 6 HOURS PRN
Qty: 1 INHALER | Refills: 3 | Status: SHIPPED | OUTPATIENT
Start: 2018-03-29 | End: 2018-11-13 | Stop reason: SDUPTHER

## 2018-04-10 RX ORDER — BLOOD-GLUCOSE METER
EACH MISCELLANEOUS
Qty: 100 EACH | Refills: 2 | Status: SHIPPED | OUTPATIENT
Start: 2018-04-10 | End: 2018-08-11 | Stop reason: SDUPTHER

## 2018-04-11 RX ORDER — FUROSEMIDE 40 MG/1
TABLET ORAL
Qty: 60 TABLET | Refills: 0 | Status: SHIPPED | OUTPATIENT
Start: 2018-04-11 | End: 2018-05-09 | Stop reason: SDUPTHER

## 2018-04-13 RX ORDER — ATORVASTATIN CALCIUM 10 MG/1
TABLET, FILM COATED ORAL
Qty: 30 TABLET | Refills: 3 | Status: SHIPPED | OUTPATIENT
Start: 2018-04-13 | End: 2018-09-11 | Stop reason: SDUPTHER

## 2018-04-13 RX ORDER — ISOPROPYL ALCOHOL 0.7 ML/ML
SWAB TOPICAL
Qty: 100 EACH | Refills: 3 | Status: SHIPPED | OUTPATIENT
Start: 2018-04-13 | End: 2019-01-11 | Stop reason: SDUPTHER

## 2018-04-13 RX ORDER — LISINOPRIL 5 MG/1
5 TABLET ORAL DAILY
Qty: 30 TABLET | Refills: 3 | Status: SHIPPED | OUTPATIENT
Start: 2018-04-13 | End: 2018-08-11 | Stop reason: SDUPTHER

## 2018-04-13 RX ORDER — MOMETASONE FUROATE AND FORMOTEROL FUMARATE DIHYDRATE 200; 5 UG/1; UG/1
AEROSOL RESPIRATORY (INHALATION)
Qty: 13 G | Refills: 3 | Status: SHIPPED | OUTPATIENT
Start: 2018-04-13 | End: 2018-08-11 | Stop reason: SDUPTHER

## 2018-04-13 RX ORDER — ASPIRIN 81 MG/1
TABLET ORAL
Qty: 30 TABLET | Refills: 3 | Status: SHIPPED | OUTPATIENT
Start: 2018-04-13 | End: 2018-08-28 | Stop reason: SDUPTHER

## 2018-05-09 DIAGNOSIS — K59.00 CONSTIPATION, UNSPECIFIED CONSTIPATION TYPE: ICD-10-CM

## 2018-05-10 RX ORDER — SENNA AND DOCUSATE SODIUM 50; 8.6 MG/1; MG/1
1 TABLET, FILM COATED ORAL DAILY
Qty: 30 TABLET | Refills: 2 | Status: SHIPPED | OUTPATIENT
Start: 2018-05-10 | End: 2018-07-25 | Stop reason: SDUPTHER

## 2018-05-10 RX ORDER — FUROSEMIDE 40 MG/1
TABLET ORAL
Qty: 60 TABLET | Refills: 2 | Status: SHIPPED | OUTPATIENT
Start: 2018-05-10 | End: 2018-07-25 | Stop reason: SDUPTHER

## 2018-05-10 RX ORDER — CETIRIZINE HYDROCHLORIDE 10 MG/1
10 TABLET ORAL DAILY
Qty: 30 TABLET | Refills: 2 | Status: SHIPPED | OUTPATIENT
Start: 2018-05-10 | End: 2018-07-25 | Stop reason: SDUPTHER

## 2018-05-22 DIAGNOSIS — L30.9 ECZEMA, UNSPECIFIED TYPE: ICD-10-CM

## 2018-05-22 RX ORDER — WATER / MINERAL OIL / WHITE PETROLATUM 16 OZ
CREAM TOPICAL
Qty: 454 G | Refills: 3 | Status: SHIPPED | OUTPATIENT
Start: 2018-05-22 | End: 2020-03-10 | Stop reason: SDUPTHER

## 2018-07-18 DIAGNOSIS — J45.40 MODERATE PERSISTENT ASTHMA WITHOUT COMPLICATION: ICD-10-CM

## 2018-07-19 RX ORDER — ALBUTEROL SULFATE 2.5 MG/3ML
SOLUTION RESPIRATORY (INHALATION)
Qty: 360 ML | Refills: 1 | Status: SHIPPED | OUTPATIENT
Start: 2018-07-19 | End: 2018-09-18 | Stop reason: SDUPTHER

## 2018-07-19 NOTE — TELEPHONE ENCOUNTER
Medication is on med list please review and address    Please address the medication refill and close the encounter. If I can be of assistance, please route to the applicable pool. Thank you. Next Visit Date:  Future Appointments  Date Time Provider Micha Farrar   7/31/2018 9:00 AM Sánchez Mosqueda MD [address] [place] [address] Maintenance   Topic Date Due    Diabetic retinal exam  02/25/1980    DTaP/Tdap/Td vaccine (1 - Tdap) 02/25/1989    Lipid screen  12/09/2016    Cervical cancer screen  09/05/2017    Potassium monitoring  04/17/2018    Creatinine monitoring  04/17/2018    Flu vaccine (1) 10/27/2018 (Originally 9/1/2018)    Pneumococcal highest risk (3 of 3 - PPSV23) 10/03/2018    Diabetic foot exam  10/27/2018    A1C test (Diabetic or Prediabetic)  03/22/2019    HIV screen  Completed       Hemoglobin A1C (%)   Date Value   03/22/2018 5.0   01/12/2017 5.6   06/13/2016 6.2 (H)             ( goal A1C is < 7)   Microalb/Crt.  Ratio (mcg/mg creat)   Date Value   04/29/2014 1,828     LDL Cholesterol (mg/dL)   Date Value   12/09/2015 65   04/14/2015 92       (goal LDL is <100)   AST (U/L)   Date Value   04/13/2017 24     ALT (U/L)   Date Value   04/13/2017 33     BUN (mg/dL)   Date Value   04/17/2017 72 (H)     BP Readings from Last 3 Encounters:   03/22/18 107/68   10/26/17 131/82   08/01/17 134/80          (goal 120/80)    All Future Testing planned in [facility]  Lab Frequency Next Occurrence   Lipid Panel Once 08/07/2018   Comprehensive Metabolic Panel Once 28/47/6067               Patient Active Problem List:     Asthma     YONI (obstructive sleep apnea)     HTN (hypertension)     Left knee pain     Hidradenitis     Current smoker     Microalbuminuria     Acute exacerbation of CHF (congestive heart failure) (HCC)     Essential hypertension     YONI (obstructive sleep apnea)     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 kidney disease) stage 4, GFR 15-29 ml/min

## 2018-07-25 DIAGNOSIS — K59.00 CONSTIPATION, UNSPECIFIED CONSTIPATION TYPE: ICD-10-CM

## 2018-07-26 NOTE — TELEPHONE ENCOUNTER
([address]     Acute diastolic congestive heart failure (HCC)     Hyperkalemia     Acute systolic congestive heart failure ([address]     Pulmonary hypertension     Morbid obesity with body mass index of 40.0-44.9 in adult [facility])     Acute on chronic respiratory failure with hypoxia (HCC)     COPD exacerbation (HCC)     Asthma exacerbation     Type 2 diabetes mellitus with diabetic nephropathy (HCC)     ESRD (end stage renal disease) on dialysis ([address]     Bronchitis     Essential hypertension     Sleep apnea, obstructive     Hyperglycemia, drug-induced     Needs smoking cessation education     Hyperglycemia     Drowsiness     Acute respiratory acidosis     Acute on chronic respiratory failure with hypercapnia ([address]     Mobility impaired     S/P cardiac cath-mINIMAL caD 3/15/16 - dR. 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)     Type 2 diabetes mellitus without complication (HCC)     Congestive heart failure (HCC)     Asthma with COPD with exacerbation (HCC)     Acute exacerbation of CHF (congestive heart failure) (HCC)     COPD exacerbation (HCC)     Chronic kidney disease, stage V (HCC)     Acute respiratory acidosis     Precordial pain     Gastroesophageal reflux disease without esophagitis     Pulmonary HTN     Morbid obesity due to excess calories (HCC)     Symptomatic anemia     Elevated serum creatinine

## 2018-07-28 RX ORDER — FUROSEMIDE 40 MG/1
TABLET ORAL
Qty: 60 TABLET | Refills: 3 | Status: SHIPPED | OUTPATIENT
Start: 2018-07-28 | End: 2018-11-13 | Stop reason: SDUPTHER

## 2018-07-28 RX ORDER — ASPIRIN 81 MG
1 TABLET, DELAYED RELEASE (ENTERIC COATED) ORAL DAILY
Qty: 30 TABLET | Refills: 3 | Status: ON HOLD | OUTPATIENT
Start: 2018-07-28 | End: 2021-01-01

## 2018-07-28 RX ORDER — CETIRIZINE HYDROCHLORIDE 10 MG/1
10 TABLET ORAL DAILY
Qty: 30 TABLET | Refills: 3 | Status: SHIPPED | OUTPATIENT
Start: 2018-07-28 | End: 2021-01-01

## 2018-08-11 DIAGNOSIS — K59.00 CONSTIPATION, UNSPECIFIED CONSTIPATION TYPE: ICD-10-CM

## 2018-08-13 RX ORDER — BLOOD-GLUCOSE METER
EACH MISCELLANEOUS
Qty: 100 EACH | Refills: 3 | Status: SHIPPED | OUTPATIENT
Start: 2018-08-13 | End: 2019-04-01 | Stop reason: SDUPTHER

## 2018-08-13 RX ORDER — ASPIRIN 81 MG
1 TABLET, DELAYED RELEASE (ENTERIC COATED) ORAL DAILY
Qty: 30 TABLET | OUTPATIENT
Start: 2018-08-13

## 2018-08-13 RX ORDER — LISINOPRIL 5 MG/1
5 TABLET ORAL DAILY
Qty: 30 TABLET | Refills: 3 | Status: SHIPPED | OUTPATIENT
Start: 2018-08-13 | End: 2018-11-13 | Stop reason: SDUPTHER

## 2018-08-13 RX ORDER — MOMETASONE FUROATE AND FORMOTEROL FUMARATE DIHYDRATE 200; 5 UG/1; UG/1
AEROSOL RESPIRATORY (INHALATION)
Qty: 13 G | Refills: 3 | Status: SHIPPED | OUTPATIENT
Start: 2018-08-13 | End: 2018-11-13 | Stop reason: SDUPTHER

## 2018-08-13 RX ORDER — CETIRIZINE HYDROCHLORIDE 10 MG/1
10 TABLET ORAL DAILY
Qty: 30 TABLET | OUTPATIENT
Start: 2018-08-13

## 2018-08-13 NOTE — TELEPHONE ENCOUNTER
Medications are on med list please review and address    Please address the medication refill and close the encounter. If I can be of assistance, please route to the applicable pool. Thank you. Last visit:N/A  Last Med refill:N/A    Next Visit Date:  Future Appointments  Date Time Provider Micha Farrar   8/16/2018 3:30 PM Tavares Hernandez MD [address] [place] [address] Maintenance   Topic Date Due    Diabetic retinal exam  02/25/1980    DTaP/Tdap/Td vaccine (1 - Tdap) 02/25/1989    Lipid screen  12/09/2016    Cervical cancer screen  09/05/2017    Potassium monitoring  04/17/2018    Creatinine monitoring  04/17/2018    Flu vaccine (1) 10/27/2018 (Originally 9/1/2018)    Pneumococcal highest risk (3 of 3 - PPSV23) 10/03/2018    Diabetic foot exam  10/27/2018    A1C test (Diabetic or Prediabetic)  03/22/2019    HIV screen  Completed       Hemoglobin A1C (%)   Date Value   03/22/2018 5.0   01/12/2017 5.6   06/13/2016 6.2 (H)             ( goal A1C is < 7)   Microalb/Crt.  Ratio (mcg/mg creat)   Date Value   04/29/2014 1,828     LDL Cholesterol (mg/dL)   Date Value   12/09/2015 65   04/14/2015 92       (goal LDL is <100)   AST (U/L)   Date Value   04/13/2017 24     ALT (U/L)   Date Value   04/13/2017 33     BUN (mg/dL)   Date Value   04/17/2017 72 (H)     BP Readings from Last 3 Encounters:   03/22/18 107/68   10/26/17 131/82   08/01/17 134/80          (goal 120/80)    All Future Testing planned in [facility]  Lab Frequency Next Occurrence   Lipid Panel Once 08/07/2018   Comprehensive Metabolic Panel Once 11/22/8906               Patient Active Problem List:     Asthma     YONI (obstructive sleep apnea)     HTN (hypertension)     Left knee pain     Hidradenitis     Current smoker     Microalbuminuria     Acute exacerbation of CHF (congestive heart failure) (HCC)     Essential hypertension     YONI (obstructive sleep apnea)     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

## 2018-08-28 RX ORDER — ASPIRIN 81 MG/1
TABLET ORAL
Qty: 30 TABLET | Refills: 2 | Status: SHIPPED | OUTPATIENT
Start: 2018-08-28 | End: 2018-12-10 | Stop reason: SDUPTHER

## 2018-09-11 RX ORDER — ATORVASTATIN CALCIUM 10 MG/1
TABLET, FILM COATED ORAL
Qty: 30 TABLET | OUTPATIENT
Start: 2018-09-11

## 2018-09-11 RX ORDER — ATORVASTATIN CALCIUM 10 MG/1
TABLET, FILM COATED ORAL
Qty: 30 TABLET | Refills: 3 | Status: SHIPPED | OUTPATIENT
Start: 2018-09-11 | End: 2018-11-13 | Stop reason: SDUPTHER

## 2018-09-11 RX ORDER — LISINOPRIL 5 MG/1
5 TABLET ORAL DAILY
Qty: 30 TABLET | OUTPATIENT
Start: 2018-09-11

## 2018-09-11 NOTE — TELEPHONE ENCOUNTER
Medication is on med list please review and address    Please address the medication refill and close the encounter. If I can be of assistance, please route to the applicable pool. Thank you. Last visit:n/a  Last Med refill:n/a    Next Visit Date:  No future appointments. Health Maintenance   Topic Date Due    Diabetic retinal exam  02/25/1980    DTaP/Tdap/Td vaccine (1 - Tdap) 02/25/1989    Lipid screen  12/09/2016    Cervical cancer screen  09/05/2017    Potassium monitoring  04/17/2018    Creatinine monitoring  04/17/2018    Flu vaccine (1) 10/27/2018 (Originally 9/1/2018)    Pneumococcal highest risk (3 of 3 - PPSV23) 10/03/2018    Diabetic foot exam  10/27/2018    A1C test (Diabetic or Prediabetic)  03/22/2019    HIV screen  Completed       Hemoglobin A1C (%)   Date Value   03/22/2018 5.0   01/12/2017 5.6   06/13/2016 6.2 (H)             ( goal A1C is < 7)   Microalb/Crt.  Ratio (mcg/mg creat)   Date Value   04/29/2014 1,828     LDL Cholesterol (mg/dL)   Date Value   12/09/2015 65   04/14/2015 92       (goal LDL is <100)   AST (U/L)   Date Value   04/13/2017 24     ALT (U/L)   Date Value   04/13/2017 33     BUN (mg/dL)   Date Value   04/17/2017 72 (H)     BP Readings from Last 3 Encounters:   03/22/18 107/68   10/26/17 131/82   08/01/17 134/80          (goal 120/80)    All Future Testing planned in [facility]  Lab Frequency Next Occurrence   Lipid Panel Once 09/22/2018   Comprehensive Metabolic Panel Once 82/73/7307               Patient Active Problem List:     Asthma     YONI (obstructive sleep apnea)     HTN (hypertension)     Left knee pain     Hidradenitis     Current smoker     Microalbuminuria     Acute exacerbation of CHF (congestive heart failure) (HCC)     Essential hypertension     YONI (obstructive sleep apnea)     Type 2 diabetes mellitus with renal manifestations ([facility])     CKD (chronic kidney disease) stage 4, GFR 15-29 ml/min (HCC)     Acute diastolic congestive heart failure

## 2018-09-18 DIAGNOSIS — J45.40 MODERATE PERSISTENT ASTHMA WITHOUT COMPLICATION: ICD-10-CM

## 2018-09-20 RX ORDER — ALBUTEROL SULFATE 2.5 MG/3ML
SOLUTION RESPIRATORY (INHALATION)
Qty: 360 ML | Refills: 3 | Status: SHIPPED | OUTPATIENT
Start: 2018-09-20 | End: 2018-11-13 | Stop reason: SDUPTHER

## 2018-11-12 RX ORDER — FUROSEMIDE 40 MG/1
TABLET ORAL
Qty: 60 TABLET | Refills: 3 | OUTPATIENT
Start: 2018-11-12

## 2018-11-12 RX ORDER — GUAIFENESIN 600 MG/1
600 TABLET, EXTENDED RELEASE ORAL 2 TIMES DAILY
Qty: 20 TABLET | Refills: 0 | OUTPATIENT
Start: 2018-11-12

## 2018-11-12 RX ORDER — GUAIFENESIN 600 MG/1
600 TABLET, EXTENDED RELEASE ORAL 2 TIMES DAILY
COMMUNITY
End: 2018-11-13 | Stop reason: SDUPTHER

## 2018-11-12 NOTE — TELEPHONE ENCOUNTER
Patient called asking about her medication refills. Both scripts were refused due to patient needing to make an appointment. Patient is scheduled for 11/13/18 for medication refills.

## 2018-11-13 ENCOUNTER — OFFICE VISIT (OUTPATIENT)
Dept: FAMILY MEDICINE CLINIC | Age: 48
End: 2018-11-13
Payer: COMMERCIAL

## 2018-11-13 VITALS
BODY MASS INDEX: 46.87 KG/M2 | HEART RATE: 113 BPM | DIASTOLIC BLOOD PRESSURE: 90 MMHG | WEIGHT: 236 LBS | SYSTOLIC BLOOD PRESSURE: 140 MMHG | TEMPERATURE: 99.2 F

## 2018-11-13 DIAGNOSIS — Z99.2 TYPE 2 DIABETES MELLITUS WITH CHRONIC KIDNEY DISEASE ON CHRONIC DIALYSIS, WITH LONG-TERM CURRENT USE OF INSULIN (HCC): ICD-10-CM

## 2018-11-13 DIAGNOSIS — N18.6 TYPE 2 DIABETES MELLITUS WITH CHRONIC KIDNEY DISEASE ON CHRONIC DIALYSIS, WITH LONG-TERM CURRENT USE OF INSULIN (HCC): ICD-10-CM

## 2018-11-13 DIAGNOSIS — J45.40 MODERATE PERSISTENT ASTHMA WITHOUT COMPLICATION: ICD-10-CM

## 2018-11-13 DIAGNOSIS — J96.21 ACUTE ON CHRONIC RESPIRATORY FAILURE WITH HYPOXIA (HCC): Primary | ICD-10-CM

## 2018-11-13 DIAGNOSIS — E11.22 TYPE 2 DIABETES MELLITUS WITH CHRONIC KIDNEY DISEASE ON CHRONIC DIALYSIS, WITH LONG-TERM CURRENT USE OF INSULIN (HCC): ICD-10-CM

## 2018-11-13 DIAGNOSIS — J44.9 CHRONIC OBSTRUCTIVE PULMONARY DISEASE, UNSPECIFIED COPD TYPE (HCC): ICD-10-CM

## 2018-11-13 DIAGNOSIS — I50.32 CHRONIC DIASTOLIC CONGESTIVE HEART FAILURE (HCC): ICD-10-CM

## 2018-11-13 DIAGNOSIS — Z79.4 TYPE 2 DIABETES MELLITUS WITH CHRONIC KIDNEY DISEASE ON CHRONIC DIALYSIS, WITH LONG-TERM CURRENT USE OF INSULIN (HCC): ICD-10-CM

## 2018-11-13 DIAGNOSIS — I10 ESSENTIAL HYPERTENSION: ICD-10-CM

## 2018-11-13 PROCEDURE — 99214 OFFICE O/P EST MOD 30 MIN: CPT | Performed by: STUDENT IN AN ORGANIZED HEALTH CARE EDUCATION/TRAINING PROGRAM

## 2018-11-13 RX ORDER — IPRATROPIUM BROMIDE 42 UG/1
2 SPRAY, METERED NASAL 4 TIMES DAILY
Qty: 1 BOTTLE | Refills: 3 | Status: CANCELLED | OUTPATIENT
Start: 2018-11-13

## 2018-11-13 RX ORDER — ALBUTEROL SULFATE 90 UG/1
2 AEROSOL, METERED RESPIRATORY (INHALATION) EVERY 6 HOURS PRN
Qty: 1 INHALER | Refills: 3 | Status: ON HOLD | OUTPATIENT
Start: 2018-11-13 | End: 2021-01-01 | Stop reason: SDUPTHER

## 2018-11-13 RX ORDER — LEVOFLOXACIN 250 MG/1
250 TABLET ORAL DAILY
Qty: 7 TABLET | Refills: 0 | Status: SHIPPED | OUTPATIENT
Start: 2018-11-13 | End: 2018-11-20

## 2018-11-13 RX ORDER — DILTIAZEM HYDROCHLORIDE 180 MG/1
CAPSULE, COATED, EXTENDED RELEASE ORAL
Qty: 30 CAPSULE | Refills: 0 | Status: SHIPPED | OUTPATIENT
Start: 2018-11-13 | End: 2021-01-01 | Stop reason: SDUPTHER

## 2018-11-13 RX ORDER — LISINOPRIL 5 MG/1
5 TABLET ORAL DAILY
Qty: 30 TABLET | Refills: 3 | Status: SHIPPED | OUTPATIENT
Start: 2018-11-13 | End: 2019-04-11 | Stop reason: SDUPTHER

## 2018-11-13 RX ORDER — ALBUTEROL SULFATE 2.5 MG/3ML
SOLUTION RESPIRATORY (INHALATION)
Qty: 360 ML | Refills: 3 | Status: SHIPPED | OUTPATIENT
Start: 2018-11-13 | End: 2021-01-01 | Stop reason: SDUPTHER

## 2018-11-13 RX ORDER — GUAIFENESIN 600 MG/1
600 TABLET, EXTENDED RELEASE ORAL 2 TIMES DAILY
Qty: 10 TABLET | Refills: 0 | Status: SHIPPED | OUTPATIENT
Start: 2018-11-13 | End: 2018-11-18

## 2018-11-13 RX ORDER — FUROSEMIDE 40 MG/1
TABLET ORAL
Qty: 60 TABLET | Refills: 3 | Status: SHIPPED | OUTPATIENT
Start: 2018-11-13 | End: 2019-02-27 | Stop reason: SDUPTHER

## 2018-11-13 RX ORDER — ATORVASTATIN CALCIUM 10 MG/1
TABLET, FILM COATED ORAL
Qty: 30 TABLET | Refills: 3 | Status: SHIPPED | OUTPATIENT
Start: 2018-11-13 | End: 2019-04-11 | Stop reason: SDUPTHER

## 2018-11-13 RX ORDER — PREDNISONE 20 MG/1
20 TABLET ORAL DAILY
Qty: 5 TABLET | Refills: 0 | Status: SHIPPED | OUTPATIENT
Start: 2018-11-13 | End: 2018-11-18

## 2018-11-13 ASSESSMENT — PATIENT HEALTH QUESTIONNAIRE - PHQ9
SUM OF ALL RESPONSES TO PHQ9 QUESTIONS 1 & 2: 0
SUM OF ALL RESPONSES TO PHQ QUESTIONS 1-9: 0
SUM OF ALL RESPONSES TO PHQ QUESTIONS 1-9: 0
1. LITTLE INTEREST OR PLEASURE IN DOING THINGS: 0
2. FEELING DOWN, DEPRESSED OR HOPELESS: 0

## 2018-11-13 ASSESSMENT — ENCOUNTER SYMPTOMS
SHORTNESS OF BREATH: 1
EYE ITCHING: 0
EYE DISCHARGE: 0
WHEEZING: 1
COUGH: 1
RHINORRHEA: 1

## 2018-12-07 RX ORDER — BLOOD-GLUCOSE METER
EACH MISCELLANEOUS
Qty: 100 EACH | Refills: 3 | Status: SHIPPED | OUTPATIENT
Start: 2018-12-07 | End: 2019-11-08 | Stop reason: SDUPTHER

## 2018-12-11 RX ORDER — ASPIRIN 81 MG/1
TABLET, COATED ORAL
Qty: 30 TABLET | Refills: 2 | Status: SHIPPED | OUTPATIENT
Start: 2018-12-11 | End: 2019-03-04 | Stop reason: SDUPTHER

## 2019-01-15 RX ORDER — BLOOD SUGAR DIAGNOSTIC
STRIP MISCELLANEOUS
Qty: 100 EACH | Refills: 2 | Status: SHIPPED | OUTPATIENT
Start: 2019-01-15 | End: 2019-03-05 | Stop reason: SDUPTHER

## 2019-02-12 ENCOUNTER — TELEPHONE (OUTPATIENT)
Dept: FAMILY MEDICINE CLINIC | Age: 49
End: 2019-02-12

## 2019-02-12 DIAGNOSIS — G89.29 CHRONIC LOW BACK PAIN, UNSPECIFIED BACK PAIN LATERALITY, WITH SCIATICA PRESENCE UNSPECIFIED: Primary | ICD-10-CM

## 2019-02-12 DIAGNOSIS — M54.5 CHRONIC LOW BACK PAIN, UNSPECIFIED BACK PAIN LATERALITY, WITH SCIATICA PRESENCE UNSPECIFIED: Primary | ICD-10-CM

## 2019-02-12 NOTE — TELEPHONE ENCOUNTER
I will refer patient to pain management as she requested. After their work up/recommendations, if surgery is indicated, they will coordinate with neurosurgery at that time. Please ask where patient would like to go for pain mgmt. I can place referral based on her location preference.

## 2019-02-12 NOTE — TELEPHONE ENCOUNTER
Pt is requesting a referral for her back pain a bulging disc to Neuro can you place one and pain management as well?

## 2019-02-26 ENCOUNTER — HOSPITAL ENCOUNTER (OUTPATIENT)
Dept: PAIN MANAGEMENT | Age: 49
Discharge: HOME | End: 2019-02-26
Payer: COMMERCIAL

## 2019-02-26 VITALS
BODY MASS INDEX: 48.38 KG/M2 | HEIGHT: 59 IN | SYSTOLIC BLOOD PRESSURE: 103 MMHG | RESPIRATION RATE: 14 BRPM | DIASTOLIC BLOOD PRESSURE: 57 MMHG | TEMPERATURE: 98.6 F | WEIGHT: 240 LBS | HEART RATE: 92 BPM

## 2019-02-26 DIAGNOSIS — M54.42 CHRONIC BILATERAL LOW BACK PAIN WITH BILATERAL SCIATICA: ICD-10-CM

## 2019-02-26 DIAGNOSIS — Z99.2 ESRD (END STAGE RENAL DISEASE) ON DIALYSIS (HCC): ICD-10-CM

## 2019-02-26 DIAGNOSIS — G47.33 OSA (OBSTRUCTIVE SLEEP APNEA): Primary | Chronic | ICD-10-CM

## 2019-02-26 DIAGNOSIS — G89.29 CHRONIC BILATERAL LOW BACK PAIN WITH BILATERAL SCIATICA: ICD-10-CM

## 2019-02-26 DIAGNOSIS — M54.41 CHRONIC BILATERAL LOW BACK PAIN WITH BILATERAL SCIATICA: ICD-10-CM

## 2019-02-26 DIAGNOSIS — E66.01 MORBID OBESITY WITH BMI OF 45.0-49.9, ADULT (HCC): ICD-10-CM

## 2019-02-26 DIAGNOSIS — N18.6 ESRD (END STAGE RENAL DISEASE) ON DIALYSIS (HCC): ICD-10-CM

## 2019-02-26 PROCEDURE — 99204 OFFICE O/P NEW MOD 45 MIN: CPT | Performed by: ANESTHESIOLOGY

## 2019-02-26 PROCEDURE — 99203 OFFICE O/P NEW LOW 30 MIN: CPT

## 2019-02-26 ASSESSMENT — ENCOUNTER SYMPTOMS
SHORTNESS OF BREATH: 1
COUGH: 1
ABDOMINAL DISTENTION: 0
APNEA: 1
BACK PAIN: 1

## 2019-02-26 ASSESSMENT — PAIN DESCRIPTION - ORIENTATION: ORIENTATION: LOWER

## 2019-02-26 ASSESSMENT — PAIN DESCRIPTION - LOCATION: LOCATION: BACK;KNEE;SHOULDER

## 2019-02-26 ASSESSMENT — PAIN DESCRIPTION - DESCRIPTORS: DESCRIPTORS: CONSTANT;ACHING;SHOOTING;SHARP

## 2019-02-27 DIAGNOSIS — I50.32 CHRONIC DIASTOLIC CONGESTIVE HEART FAILURE (HCC): ICD-10-CM

## 2019-02-27 RX ORDER — FUROSEMIDE 40 MG/1
TABLET ORAL
Qty: 60 TABLET | Refills: 0 | Status: SHIPPED | OUTPATIENT
Start: 2019-02-27 | End: 2019-04-01 | Stop reason: SDUPTHER

## 2019-03-04 DIAGNOSIS — E11.22 TYPE 2 DIABETES MELLITUS WITH CHRONIC KIDNEY DISEASE ON CHRONIC DIALYSIS, WITH LONG-TERM CURRENT USE OF INSULIN (HCC): Chronic | ICD-10-CM

## 2019-03-04 DIAGNOSIS — Z99.2 TYPE 2 DIABETES MELLITUS WITH CHRONIC KIDNEY DISEASE ON CHRONIC DIALYSIS, WITH LONG-TERM CURRENT USE OF INSULIN (HCC): Chronic | ICD-10-CM

## 2019-03-04 DIAGNOSIS — N18.6 TYPE 2 DIABETES MELLITUS WITH CHRONIC KIDNEY DISEASE ON CHRONIC DIALYSIS, WITH LONG-TERM CURRENT USE OF INSULIN (HCC): Chronic | ICD-10-CM

## 2019-03-04 DIAGNOSIS — Z79.4 TYPE 2 DIABETES MELLITUS WITH CHRONIC KIDNEY DISEASE ON CHRONIC DIALYSIS, WITH LONG-TERM CURRENT USE OF INSULIN (HCC): Chronic | ICD-10-CM

## 2019-03-05 ENCOUNTER — TELEPHONE (OUTPATIENT)
Dept: FAMILY MEDICINE CLINIC | Age: 49
End: 2019-03-05

## 2019-03-05 RX ORDER — ASPIRIN 81 MG/1
TABLET, COATED ORAL
Qty: 30 TABLET | Refills: 3 | Status: SHIPPED | OUTPATIENT
Start: 2019-03-05 | End: 2019-06-15 | Stop reason: SDUPTHER

## 2019-03-05 RX ORDER — BLOOD SUGAR DIAGNOSTIC
STRIP MISCELLANEOUS
Qty: 100 EACH | Refills: 2 | Status: SHIPPED | OUTPATIENT
Start: 2019-03-05 | End: 2019-07-11 | Stop reason: SDUPTHER

## 2019-03-12 ENCOUNTER — HOSPITAL ENCOUNTER (OUTPATIENT)
Dept: PHYSICAL THERAPY | Age: 49
Setting detail: THERAPIES SERIES
Discharge: HOME | End: 2019-03-12

## 2019-04-01 DIAGNOSIS — I50.32 CHRONIC DIASTOLIC CONGESTIVE HEART FAILURE (HCC): ICD-10-CM

## 2019-04-02 RX ORDER — BLOOD-GLUCOSE METER
EACH MISCELLANEOUS
Qty: 100 EACH | Refills: 3 | Status: SHIPPED | OUTPATIENT
Start: 2019-04-02 | End: 2019-11-08 | Stop reason: SDUPTHER

## 2019-04-02 RX ORDER — FUROSEMIDE 40 MG/1
TABLET ORAL
Qty: 60 TABLET | Refills: 0 | Status: SHIPPED | OUTPATIENT
Start: 2019-04-02 | End: 2019-04-26 | Stop reason: SDUPTHER

## 2019-04-02 NOTE — TELEPHONE ENCOUNTER
Please address the medication refill and close the encounter. If I can be of assistance, please route to the applicable pool. Thank you. Last visit: [id number]  Last Med refill: [id number]  Does patient have enough medication for 72 hours:   Next Visit Date:  Future Appointments   Date Time Provider Micha Farrar   4/9/2019  9:00 AM Lashell Sorensen MD [address] [place] [address] Maintenance   Topic Date Due    Diabetic retinal exam  02/25/1980    Hepatitis B Vaccine (1 of 3 - Risk Recombivax 3-dose series) 02/25/1989    DTaP/Tdap/Td vaccine (1 - Tdap) 02/25/1989    Lipid screen  12/09/2016    Cervical cancer screen  09/05/2017    Potassium monitoring  04/17/2018    Creatinine monitoring  04/17/2018    Pneumococcal 0-64 years at Risk Vaccine (3 of 3 - PPSV23) 10/03/2018    Diabetic foot exam  10/27/2018    A1C test (Diabetic or Prediabetic)  03/22/2019    Flu vaccine (Season Ended) 09/01/2019    HIV screen  Completed    HPV vaccine  Aged Out       Hemoglobin A1C (%)   Date Value   03/22/2018 5.0   01/12/2017 5.6   06/13/2016 6.2 (H)             ( goal A1C is < 7)   Microalb/Crt.  Ratio (mcg/mg creat)   Date Value   04/29/2014 1,828     LDL Cholesterol (mg/dL)   Date Value   12/09/2015 65   04/14/2015 92       (goal LDL is <100)   AST (U/L)   Date Value   04/13/2017 24     ALT (U/L)   Date Value   04/13/2017 33     BUN (mg/dL)   Date Value   04/17/2017 72 (H)     BP Readings from Last 3 Encounters:   02/26/19 (!) 103/57   11/13/18 (!) 140/90   03/22/18 107/68          (goal 120/80)    All Future Testing planned in [facility]  Lab Frequency Next Occurrence   ECHO Complete 2D W Doppler W Color Once 57/27/2161   Basic Metabolic Panel Once 40/20/2283   Hemoglobin A1C Once 11/13/2019   EMG Once 02/26/2019   XR LUMBAR SPINE (2-3 VIEWS) Once 02/26/2019               Patient Active Problem List:     Asthma     YONI (obstructive sleep apnea)     HTN (hypertension)     Left knee pain     Hidradenitis     Current 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 kidney disease) stage 4, GFR 15-29 ml/min (HCC)     Acute diastolic congestive heart failure (HCC)     Hyperkalemia     Acute systolic congestive heart failure (HCC)     Pulmonary hypertension (HCC)     Morbid obesity with BMI of 45.0-49.9, adult (HCC)     Acute on chronic respiratory failure with hypoxia (HCC)     COPD exacerbation (HCC)     Asthma exacerbation     Type 2 diabetes mellitus with diabetic nephropathy (HCC)     ESRD (end stage renal disease) on dialysis ([address]     Bronchitis     Essential hypertension     YONI on CPAP     Hyperglycemia, drug-induced     Needs smoking cessation education     Hyperglycemia     Drowsiness     Acute respiratory acidosis     Acute on chronic respiratory failure with hypercapnia ([address]     Mobility impaired     S/P cardiac cath-mINIMAL caD 3/15/16 - dR. 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)     Type 2 diabetes mellitus without complication (HCC)     Congestive heart failure (HCC)     Asthma with COPD with exacerbation (HCC)     Acute exacerbation of CHF (congestive heart failure) (HCC)     Chronic obstructive pulmonary disease (HCC)     Chronic kidney disease, stage V (HCC)     Acute respiratory acidosis     Precordial pain     Gastroesophageal reflux disease without esophagitis     Pulmonary HTN ([address]     Morbid obesity due to excess calories (HCC)     Symptomatic anemia     Elevated serum creatinine

## 2019-04-03 ENCOUNTER — TELEPHONE (OUTPATIENT)
Dept: FAMILY MEDICINE CLINIC | Age: 49
End: 2019-04-03

## 2019-04-11 DIAGNOSIS — I10 ESSENTIAL HYPERTENSION: ICD-10-CM

## 2019-04-11 NOTE — TELEPHONE ENCOUNTER
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 kidney disease) stage 4, GFR 15-29 ml/min (HCC)     Acute diastolic congestive heart failure (HCC)     Hyperkalemia     Acute systolic congestive heart failure (HCC)     Pulmonary hypertension (HCC)     Morbid obesity with BMI of 45.0-49.9, adult (HCC)     Acute on chronic respiratory failure with hypoxia (HCC)     COPD exacerbation (HCC)     Asthma exacerbation     Type 2 diabetes mellitus with diabetic nephropathy (HCC)     ESRD (end stage renal disease) on dialysis ([address]     Bronchitis     Essential hypertension     YONI on CPAP     Hyperglycemia, drug-induced     Needs smoking cessation education     Hyperglycemia     Drowsiness     Acute respiratory acidosis     Acute on chronic respiratory failure with hypercapnia ([address]     Mobility impaired     S/P cardiac cath-mINIMAL caD 3/15/16 - dR. 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)     Type 2 diabetes mellitus without complication (HCC)     Congestive heart failure (HCC)     Asthma with COPD with exacerbation (HCC)     Acute exacerbation of CHF (congestive heart failure) (HCC)     Chronic obstructive pulmonary disease (HCC)     Chronic kidney disease, stage V (HCC)     Acute respiratory acidosis     Precordial pain     Gastroesophageal reflux disease without esophagitis     Pulmonary HTN ([address]     Morbid obesity due to excess calories (HCC)     Symptomatic anemia     Elevated serum creatinine

## 2019-04-12 RX ORDER — ATORVASTATIN CALCIUM 10 MG/1
TABLET, FILM COATED ORAL
Qty: 30 TABLET | Refills: 3 | Status: SHIPPED | OUTPATIENT
Start: 2019-04-12 | End: 2019-08-08 | Stop reason: SDUPTHER

## 2019-04-12 RX ORDER — LISINOPRIL 5 MG/1
5 TABLET ORAL DAILY
Qty: 30 TABLET | Refills: 3 | Status: SHIPPED | OUTPATIENT
Start: 2019-04-12 | End: 2019-08-08 | Stop reason: SDUPTHER

## 2019-04-26 DIAGNOSIS — I50.32 CHRONIC DIASTOLIC CONGESTIVE HEART FAILURE (HCC): ICD-10-CM

## 2019-04-26 RX ORDER — FUROSEMIDE 40 MG/1
TABLET ORAL
Qty: 60 TABLET | Refills: 0 | Status: SHIPPED | OUTPATIENT
Start: 2019-04-26 | End: 2019-05-22 | Stop reason: SDUPTHER

## 2019-04-26 NOTE — TELEPHONE ENCOUNTER
Please address the medication refill and close the encounter. If I can be of assistance, please route to the applicable pool. Thank you. Last visit: [id number]  Last Med refill:   Does patient have enough medication for 72 hours:   Next Visit Date:  No future appointments. Health Maintenance   Topic Date Due    Diabetic retinal exam  02/25/1980    Hepatitis B Vaccine (1 of 3 - Risk Recombivax 3-dose series) 02/25/1989    DTaP/Tdap/Td vaccine (1 - Tdap) 02/25/1989    Lipid screen  12/09/2016    Cervical cancer screen  09/05/2017    Potassium monitoring  04/17/2018    Creatinine monitoring  04/17/2018    Pneumococcal 0-64 years Vaccine (3 of 3 - PPSV23) 10/03/2018    Diabetic foot exam  10/27/2018    A1C test (Diabetic or Prediabetic)  03/22/2019    Flu vaccine (Season Ended) 09/01/2019    HIV screen  Completed    HPV vaccine  Aged Out       Hemoglobin A1C (%)   Date Value   03/22/2018 5.0   01/12/2017 5.6   06/13/2016 6.2 (H)             ( goal A1C is < 7)   Microalb/Crt.  Ratio (mcg/mg creat)   Date Value   04/29/2014 1,828     LDL Cholesterol (mg/dL)   Date Value   12/09/2015 65   04/14/2015 92       (goal LDL is <100)   AST (U/L)   Date Value   04/13/2017 24     ALT (U/L)   Date Value   04/13/2017 33     BUN (mg/dL)   Date Value   04/17/2017 72 (H)     BP Readings from Last 3 Encounters:   02/26/19 (!) 103/57   11/13/18 (!) 140/90   03/22/18 107/68          (goal 120/80)    All Future Testing planned in [facility]  Lab Frequency Next Occurrence   ECHO Complete 2D W Doppler W Color Once 68/86/1765   Basic Metabolic Panel Once 77/66/1320   Hemoglobin A1C Once 11/13/2019   EMG Once 02/26/2019   XR LUMBAR SPINE (2-3 VIEWS) Once 02/26/2019               Patient Active Problem List:     Asthma     YONI (obstructive sleep apnea)     HTN (hypertension)     Left knee pain     Hidradenitis     Current smoker     Microalbuminuria     Acute exacerbation of CHF (congestive heart failure) ([address]     Essential 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 kidney disease) stage 4, GFR 15-29 ml/min (HCC)     Acute diastolic congestive heart failure (HCC)     Hyperkalemia     Acute systolic congestive heart failure (HCC)     Pulmonary hypertension (HCC)     Morbid obesity with BMI of 45.0-49.9, adult ([address]     Acute on chronic respiratory failure with hypoxia (HCC)     COPD exacerbation (HCC)     Asthma exacerbation     Type 2 diabetes mellitus with diabetic nephropathy (HCC)     ESRD (end stage renal disease) on dialysis ([address]     Bronchitis     Essential hypertension     YONI on CPAP     Hyperglycemia, drug-induced     Needs smoking cessation education     Hyperglycemia     Drowsiness     Acute respiratory acidosis     Acute on chronic respiratory failure with hypercapnia ([address]     Mobility impaired     S/P cardiac cath-mINIMAL caD 3/15/16 - dR. 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)     Type 2 diabetes mellitus without complication (HCC)     Congestive heart failure (HCC)     Asthma with COPD with exacerbation (HCC)     Acute exacerbation of CHF (congestive heart failure) (HCC)     Chronic obstructive pulmonary disease (HCC)     Chronic kidney disease, stage V (HCC)     Acute respiratory acidosis     Precordial pain     Gastroesophageal reflux disease without esophagitis     Pulmonary HTN ([address]     Morbid obesity due to excess calories (HCC)     Symptomatic anemia     Elevated serum creatinine

## 2019-04-26 NOTE — TELEPHONE ENCOUNTER
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 kidney disease) stage 4, GFR 15-29 ml/min (HCC)     Acute diastolic congestive heart failure (HCC)     Hyperkalemia     Acute systolic congestive heart failure (HCC)     Pulmonary hypertension (HCC)     Morbid obesity with BMI of 45.0-49.9, adult ([address]     Acute on chronic respiratory failure with hypoxia (HCC)     COPD exacerbation (HCC)     Asthma exacerbation     Type 2 diabetes mellitus with diabetic nephropathy (HCC)     ESRD (end stage renal disease) on dialysis ([address]     Bronchitis     Essential hypertension     YONI on CPAP     Hyperglycemia, drug-induced     Needs smoking cessation education     Hyperglycemia     Drowsiness     Acute respiratory acidosis     Acute on chronic respiratory failure with hypercapnia ([address]     Mobility impaired     S/P cardiac cath-mINIMAL caD 3/15/16 - dR. 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)     Type 2 diabetes mellitus without complication (HCC)     Congestive heart failure (HCC)     Asthma with COPD with exacerbation (HCC)     Acute exacerbation of CHF (congestive heart failure) (HCC)     Chronic obstructive pulmonary disease (HCC)     Chronic kidney disease, stage V (HCC)     Acute respiratory acidosis     Precordial pain     Gastroesophageal reflux disease without esophagitis     Pulmonary HTN ([address]     Morbid obesity due to excess calories (HCC)     Symptomatic anemia     Elevated serum creatinine

## 2019-05-22 DIAGNOSIS — I50.32 CHRONIC DIASTOLIC CONGESTIVE HEART FAILURE (HCC): ICD-10-CM

## 2019-05-22 RX ORDER — FUROSEMIDE 40 MG/1
TABLET ORAL
Qty: 60 TABLET | Refills: 0 | Status: SHIPPED | OUTPATIENT
Start: 2019-05-22 | End: 2019-06-15 | Stop reason: SDUPTHER

## 2019-05-22 NOTE — TELEPHONE ENCOUNTER
Last ov 11/2018    Health Maintenance   Topic Date Due    Diabetic retinal exam  02/25/1980    Hepatitis B Vaccine (1 of 3 - Risk Recombivax 3-dose series) 02/25/1989    DTaP/Tdap/Td vaccine (1 - Tdap) 02/25/1989    Lipid screen  12/09/2016    Cervical cancer screen  09/05/2017    Potassium monitoring  04/17/2018    Creatinine monitoring  04/17/2018    Pneumococcal 0-64 years Vaccine (3 of 3 - PPSV23) 10/03/2018    Diabetic foot exam  10/27/2018    A1C test (Diabetic or Prediabetic)  03/22/2019    Flu vaccine (Season Ended) 09/01/2019    HIV screen  Completed    HPV vaccine  Aged Out             (applicable per patient's age: Cancer Screenings, Depression Screening, Fall Risk Screening, Immunizations)    Hemoglobin A1C (%)   Date Value   03/22/2018 5.0   01/12/2017 5.6   06/13/2016 6.2 (H)     Microalb/Crt. Ratio (mcg/mg creat)   Date Value   04/29/2014 1,828     LDL Cholesterol (mg/dL)   Date Value   12/09/2015 65     AST (U/L)   Date Value   04/13/2017 24     ALT (U/L)   Date Value   04/13/2017 33     BUN (mg/dL)   Date Value   04/17/2017 72 (H)      (goal A1C is < 7)   (goal LDL is <100) need 30-50% reduction from baseline     BP Readings from Last 3 Encounters:   02/26/19 (!) 103/57   11/13/18 (!) 140/90   03/22/18 107/68    (goal /80)      All Future Testing planned in [facility]:  Lab Frequency Next Occurrence   ECHO Complete 2D W Doppler W Color Once 61/76/2667   Basic Metabolic Panel Once 04/96/2080   Hemoglobin A1C Once 11/13/2019   XR LUMBAR SPINE (2-3 VIEWS) Once 02/26/2019       Next Visit Date:  No future appointments.          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 kidney disease) stage 4, GFR 15-29 ml/min (HCC)     Acute diastolic congestive heart failure (HCC)     Hyperkalemia     Acute systolic congestive heart failure (HCC)     Pulmonary hypertension (HCC)     Morbid obesity with BMI of 45.0-49.9, adult (HCC)     Acute on chronic respiratory failure with hypoxia (HCC)     COPD exacerbation (HCC)     Asthma exacerbation     Type 2 diabetes mellitus with diabetic nephropathy (HCC)     ESRD (end stage renal disease) on dialysis ([address]     Bronchitis     Essential hypertension     YONI on CPAP     Hyperglycemia, drug-induced     Needs smoking cessation education     Hyperglycemia     Drowsiness     Acute respiratory acidosis     Acute on chronic respiratory failure with hypercapnia ([address]     Mobility impaired     S/P cardiac cath-mINIMAL caD 3/15/16 - dR. 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)     Type 2 diabetes mellitus without complication (HCC)     Congestive heart failure (HCC)     Asthma with COPD with exacerbation (HCC)     Acute exacerbation of CHF (congestive heart failure) (HCC)     Chronic obstructive pulmonary disease (HCC)     Chronic kidney disease, stage V (HCC)     Acute respiratory acidosis     Precordial pain     Gastroesophageal reflux disease without esophagitis     Pulmonary HTN ([address]     Morbid obesity due to excess calories (HCC)     Symptomatic anemia     Elevated serum creatinine

## 2019-06-15 DIAGNOSIS — I50.32 CHRONIC DIASTOLIC CONGESTIVE HEART FAILURE (HCC): ICD-10-CM

## 2019-06-17 RX ORDER — FUROSEMIDE 40 MG/1
TABLET ORAL
Qty: 60 TABLET | Refills: 0 | Status: ON HOLD | OUTPATIENT
Start: 2019-06-17 | End: 2019-07-01 | Stop reason: SDUPTHER

## 2019-06-17 RX ORDER — ASPIRIN 81 MG/1
TABLET, COATED ORAL
Qty: 30 TABLET | Refills: 0 | Status: ON HOLD | OUTPATIENT
Start: 2019-06-17 | End: 2019-07-01 | Stop reason: SDUPTHER

## 2019-06-17 NOTE — TELEPHONE ENCOUNTER
2 diabetes mellitus with renal manifestations (HCC)     CKD (chronic kidney disease) stage 4, GFR 15-29 ml/min (HCC)     Acute diastolic congestive heart failure (HCC)     Hyperkalemia     Acute systolic congestive heart failure (HCC)     Pulmonary hypertension (HCC)     Morbid obesity with BMI of 45.0-49.9, adult ([address]     Acute on chronic respiratory failure with hypoxia (HCC)     COPD exacerbation (HCC)     Asthma exacerbation     Type 2 diabetes mellitus with diabetic nephropathy (HCC)     ESRD (end stage renal disease) on dialysis ([address]     Bronchitis     Essential hypertension     YONI on CPAP     Hyperglycemia, drug-induced     Needs smoking cessation education     Hyperglycemia     Drowsiness     Acute respiratory acidosis     Acute on chronic respiratory failure with hypercapnia ([address]     Mobility impaired     S/P cardiac cath-mINIMAL caD 3/15/16 - dR. 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)     Type 2 diabetes mellitus without complication (HCC)     Congestive heart failure (HCC)     Asthma with COPD with exacerbation (HCC)     Acute exacerbation of CHF (congestive heart failure) (HCC)     Chronic obstructive pulmonary disease (HCC)     Chronic kidney disease, stage V (HCC)     Acute respiratory acidosis     Precordial pain     Gastroesophageal reflux disease without esophagitis     Pulmonary HTN ([address]     Morbid obesity due to excess calories (HCC)     Symptomatic anemia     Elevated serum creatinine

## 2019-07-01 ENCOUNTER — HOSPITAL ENCOUNTER (EMERGENCY)
Age: 49
Discharge: HOME | End: 2019-07-01
Attending: EMERGENCY MEDICINE
Payer: COMMERCIAL

## 2019-07-01 ENCOUNTER — APPOINTMENT (OUTPATIENT)
Dept: GENERAL RADIOLOGY | Age: 49
DRG: 291 | End: 2019-07-01
Payer: COMMERCIAL

## 2019-07-01 ENCOUNTER — HOSPITAL ENCOUNTER (INPATIENT)
Age: 49
LOS: 1 days | Discharge: HOME | DRG: 291 | End: 2019-07-02
Attending: EMERGENCY MEDICINE | Admitting: INTERNAL MEDICINE
Payer: COMMERCIAL

## 2019-07-01 VITALS
DIASTOLIC BLOOD PRESSURE: 94 MMHG | SYSTOLIC BLOOD PRESSURE: 137 MMHG | BODY MASS INDEX: 46.37 KG/M2 | WEIGHT: 230 LBS | HEIGHT: 59 IN | HEART RATE: 101 BPM | RESPIRATION RATE: 42 BRPM | TEMPERATURE: 97 F | OXYGEN SATURATION: 93 %

## 2019-07-01 DIAGNOSIS — R06.03 RESPIRATORY DISTRESS: Primary | ICD-10-CM

## 2019-07-01 DIAGNOSIS — R06.02 SOB (SHORTNESS OF BREATH): ICD-10-CM

## 2019-07-01 DIAGNOSIS — E87.70 HYPERVOLEMIA, UNSPECIFIED HYPERVOLEMIA TYPE: Primary | ICD-10-CM

## 2019-07-01 DIAGNOSIS — J81.0 ACUTE PULMONARY EDEMA (HCC): ICD-10-CM

## 2019-07-01 DIAGNOSIS — I50.32 CHRONIC DIASTOLIC CONGESTIVE HEART FAILURE (HCC): ICD-10-CM

## 2019-07-01 DIAGNOSIS — E87.5 HYPERKALEMIA: ICD-10-CM

## 2019-07-01 DIAGNOSIS — K59.00 CONSTIPATION, UNSPECIFIED CONSTIPATION TYPE: ICD-10-CM

## 2019-07-01 PROBLEM — Z99.2 ESRD NEEDING DIALYSIS (HCC): Status: ACTIVE | Noted: 2019-07-01

## 2019-07-01 PROBLEM — N18.6 ESRD NEEDING DIALYSIS (HCC): Status: ACTIVE | Noted: 2019-07-01

## 2019-07-01 LAB
ABSOLUTE EOS #: 0.35 K/UL (ref 0–0.44)
ABSOLUTE IMMATURE GRANULOCYTE: 0.06 K/UL (ref 0–0.3)
ABSOLUTE LYMPH #: 1.61 K/UL (ref 1.1–3.7)
ABSOLUTE MONO #: 0.83 K/UL (ref 0.1–1.2)
ALLEN TEST: ABNORMAL
ANION GAP SERPL CALCULATED.3IONS-SCNC: 17 MMOL/L (ref 9–17)
BASOPHILS # BLD: 0 % (ref 0–2)
BASOPHILS ABSOLUTE: 0.04 K/UL (ref 0–0.2)
BNP INTERPRETATION: ABNORMAL
BUN BLDV-MCNC: 87 MG/DL (ref 6–20)
BUN/CREAT BLD: ABNORMAL (ref 9–20)
CALCIUM SERPL-MCNC: 8.4 MG/DL (ref 8.6–10.4)
CHLORIDE BLD-SCNC: 105 MMOL/L (ref 98–107)
CO2: 20 MMOL/L (ref 20–31)
CREAT SERPL-MCNC: 7.57 MG/DL (ref 0.5–0.9)
DIFFERENTIAL TYPE: ABNORMAL
EOSINOPHILS RELATIVE PERCENT: 3 % (ref 1–4)
FIO2: ABNORMAL
GFR AFRICAN AMERICAN: 7 ML/MIN
GFR NON-AFRICAN AMERICAN: 6 ML/MIN
GFR SERPL CREATININE-BSD FRML MDRD: ABNORMAL ML/MIN/{1.73_M2}
GFR SERPL CREATININE-BSD FRML MDRD: ABNORMAL ML/MIN/{1.73_M2}
GLUCOSE BLD-MCNC: 91 MG/DL (ref 70–99)
HCO3 VENOUS: 21.8 MMOL/L (ref 22–29)
HCT VFR BLD CALC: 32 % (ref 36.3–47.1)
HEMOGLOBIN: 9.8 G/DL (ref 11.9–15.1)
IMMATURE GRANULOCYTES: 1 %
LYMPHOCYTES # BLD: 14 % (ref 24–43)
MCH RBC QN AUTO: 32.5 PG (ref 25.2–33.5)
MCHC RBC AUTO-ENTMCNC: 30.6 G/DL (ref 28.4–34.8)
MCV RBC AUTO: 106 FL (ref 82.6–102.9)
MODE: ABNORMAL
MONOCYTES # BLD: 7 % (ref 3–12)
NEGATIVE BASE EXCESS, VEN: 4 (ref 0–2)
NRBC AUTOMATED: 0 PER 100 WBC
O2 DEVICE/FLOW/%: ABNORMAL
O2 SAT, VEN: 88 % (ref 60–85)
PATIENT TEMP: ABNORMAL
PCO2, VEN: 43.8 MM HG (ref 41–51)
PDW BLD-RTO: 14.2 % (ref 11.8–14.4)
PH VENOUS: 7.3 (ref 7.32–7.43)
PLATELET # BLD: 207 K/UL (ref 138–453)
PLATELET ESTIMATE: ABNORMAL
PMV BLD AUTO: 10.2 FL (ref 8.1–13.5)
PO2, VEN: 59.9 MM HG (ref 30–50)
POC PCO2 TEMP: ABNORMAL MM HG
POC PH TEMP: ABNORMAL
POC PO2 TEMP: ABNORMAL MM HG
POSITIVE BASE EXCESS, VEN: ABNORMAL (ref 0–3)
POTASSIUM SERPL-SCNC: 5.7 MMOL/L (ref 3.7–5.3)
PRO-BNP: ABNORMAL PG/ML
RBC # BLD: 3.02 M/UL (ref 3.95–5.11)
RBC # BLD: ABNORMAL 10*6/UL
SAMPLE SITE: ABNORMAL
SEG NEUTROPHILS: 75 % (ref 36–65)
SEGMENTED NEUTROPHILS ABSOLUTE COUNT: 8.59 K/UL (ref 1.5–8.1)
SODIUM BLD-SCNC: 142 MMOL/L (ref 135–144)
TOTAL CO2, VENOUS: 23 MMOL/L (ref 23–30)
TROPONIN INTERP: ABNORMAL
TROPONIN INTERP: ABNORMAL
TROPONIN T: ABNORMAL NG/ML
TROPONIN T: ABNORMAL NG/ML
TROPONIN, HIGH SENSITIVITY: 67 NG/L (ref 0–14)
TROPONIN, HIGH SENSITIVITY: 72 NG/L (ref 0–14)
WBC # BLD: 11.5 K/UL (ref 3.5–11.3)
WBC # BLD: ABNORMAL 10*3/UL

## 2019-07-01 PROCEDURE — 80048 BASIC METABOLIC PNL TOTAL CA: CPT

## 2019-07-01 PROCEDURE — 6360000002 HC RX W HCPCS: Performed by: EMERGENCY MEDICINE

## 2019-07-01 PROCEDURE — 94640 AIRWAY INHALATION TREATMENT: CPT

## 2019-07-01 PROCEDURE — 6360000002 HC RX W HCPCS: Performed by: STUDENT IN AN ORGANIZED HEALTH CARE EDUCATION/TRAINING PROGRAM

## 2019-07-01 PROCEDURE — 96375 TX/PRO/DX INJ NEW DRUG ADDON: CPT

## 2019-07-01 PROCEDURE — 85025 COMPLETE CBC W/AUTO DIFF WBC: CPT

## 2019-07-01 PROCEDURE — 84484 ASSAY OF TROPONIN QUANT: CPT

## 2019-07-01 PROCEDURE — 2060000000 HC ICU INTERMEDIATE R&B

## 2019-07-01 PROCEDURE — 99284 EMERGENCY DEPT VISIT MOD MDM: CPT

## 2019-07-01 PROCEDURE — 71045 X-RAY EXAM CHEST 1 VIEW: CPT

## 2019-07-01 PROCEDURE — 6370000000 HC RX 637 (ALT 250 FOR IP): Performed by: STUDENT IN AN ORGANIZED HEALTH CARE EDUCATION/TRAINING PROGRAM

## 2019-07-01 PROCEDURE — 93005 ELECTROCARDIOGRAM TRACING: CPT

## 2019-07-01 PROCEDURE — 94660 CPAP INITIATION&MGMT: CPT

## 2019-07-01 PROCEDURE — 96374 THER/PROPH/DIAG INJ IV PUSH: CPT

## 2019-07-01 PROCEDURE — 82803 BLOOD GASES ANY COMBINATION: CPT

## 2019-07-01 PROCEDURE — 99285 EMERGENCY DEPT VISIT HI MDM: CPT

## 2019-07-01 PROCEDURE — 83880 ASSAY OF NATRIURETIC PEPTIDE: CPT

## 2019-07-01 RX ORDER — ALBUTEROL SULFATE 90 UG/1
2 AEROSOL, METERED RESPIRATORY (INHALATION)
Status: DISCONTINUED | OUTPATIENT
Start: 2019-07-01 | End: 2019-07-02 | Stop reason: HOSPADM

## 2019-07-01 RX ORDER — IPRATROPIUM BROMIDE AND ALBUTEROL SULFATE 2.5; .5 MG/3ML; MG/3ML
1 SOLUTION RESPIRATORY (INHALATION)
Status: DISCONTINUED | OUTPATIENT
Start: 2019-07-02 | End: 2019-07-01

## 2019-07-01 RX ORDER — ALBUTEROL SULFATE 90 UG/1
2 AEROSOL, METERED RESPIRATORY (INHALATION)
Status: DISCONTINUED | OUTPATIENT
Start: 2019-07-01 | End: 2019-07-01

## 2019-07-01 RX ORDER — ALBUTEROL SULFATE 2.5 MG/3ML
5 SOLUTION RESPIRATORY (INHALATION)
Status: DISCONTINUED | OUTPATIENT
Start: 2019-07-01 | End: 2019-07-02 | Stop reason: HOSPADM

## 2019-07-01 RX ORDER — ALBUTEROL SULFATE 2.5 MG/3ML
5 SOLUTION RESPIRATORY (INHALATION)
Status: DISCONTINUED | OUTPATIENT
Start: 2019-07-01 | End: 2019-07-07 | Stop reason: HOSPADM

## 2019-07-01 RX ORDER — ALBUTEROL SULFATE 90 UG/1
2 AEROSOL, METERED RESPIRATORY (INHALATION)
Status: DISCONTINUED | OUTPATIENT
Start: 2019-07-01 | End: 2019-07-07 | Stop reason: HOSPADM

## 2019-07-01 RX ORDER — NITROGLYCERIN 0.4 MG/1
0.4 TABLET SUBLINGUAL EVERY 5 MIN PRN
Status: DISCONTINUED | OUTPATIENT
Start: 2019-07-01 | End: 2019-07-02 | Stop reason: HOSPADM

## 2019-07-01 RX ORDER — MORPHINE SULFATE 4 MG/ML
4 INJECTION, SOLUTION INTRAMUSCULAR; INTRAVENOUS ONCE
Status: COMPLETED | OUTPATIENT
Start: 2019-07-01 | End: 2019-07-01

## 2019-07-01 RX ORDER — ASPIRIN 81 MG/1
324 TABLET, CHEWABLE ORAL ONCE
Status: DISCONTINUED | OUTPATIENT
Start: 2019-07-01 | End: 2019-07-02 | Stop reason: HOSPADM

## 2019-07-01 RX ORDER — ALBUTEROL SULFATE 2.5 MG/3ML
5 SOLUTION RESPIRATORY (INHALATION)
Status: DISCONTINUED | OUTPATIENT
Start: 2019-07-01 | End: 2019-07-01

## 2019-07-01 RX ORDER — METHYLPREDNISOLONE SODIUM SUCCINATE 125 MG/2ML
125 INJECTION, POWDER, LYOPHILIZED, FOR SOLUTION INTRAMUSCULAR; INTRAVENOUS ONCE
Status: COMPLETED | OUTPATIENT
Start: 2019-07-01 | End: 2019-07-01

## 2019-07-01 RX ADMIN — ALBUTEROL SULFATE 5 MG: 5 SOLUTION RESPIRATORY (INHALATION) at 21:42

## 2019-07-01 RX ADMIN — ALBUTEROL SULFATE 5 MG: 5 SOLUTION RESPIRATORY (INHALATION) at 21:41

## 2019-07-01 RX ADMIN — NITROGLYCERIN 0.4 MG: 0.4 TABLET SUBLINGUAL at 21:57

## 2019-07-01 RX ADMIN — IPRATROPIUM BROMIDE 0.5 MG: 0.5 SOLUTION RESPIRATORY (INHALATION) at 21:41

## 2019-07-01 RX ADMIN — IPRATROPIUM BROMIDE 0.5 MG: 0.5 SOLUTION RESPIRATORY (INHALATION) at 21:42

## 2019-07-01 RX ADMIN — METHYLPREDNISOLONE SODIUM SUCCINATE 125 MG: 125 INJECTION, POWDER, FOR SOLUTION INTRAMUSCULAR; INTRAVENOUS at 21:52

## 2019-07-01 RX ADMIN — MORPHINE SULFATE 4 MG: 4 INJECTION INTRAVENOUS at 21:52

## 2019-07-01 ASSESSMENT — PAIN DESCRIPTION - PAIN TYPE
TYPE: ACUTE PAIN
TYPE: ACUTE PAIN

## 2019-07-01 ASSESSMENT — PAIN DESCRIPTION - LOCATION
LOCATION: CHEST
LOCATION: CHEST

## 2019-07-01 ASSESSMENT — PAIN SCALES - GENERAL
PAINLEVEL_OUTOF10: 10
PAINLEVEL_OUTOF10: 10
PAINLEVEL_OUTOF10: 8

## 2019-07-02 ENCOUNTER — APPOINTMENT (OUTPATIENT)
Dept: GENERAL RADIOLOGY | Age: 49
DRG: 291 | End: 2019-07-02
Payer: COMMERCIAL

## 2019-07-02 VITALS
BODY MASS INDEX: 45.16 KG/M2 | DIASTOLIC BLOOD PRESSURE: 80 MMHG | HEIGHT: 59 IN | TEMPERATURE: 98.4 F | HEART RATE: 98 BPM | WEIGHT: 223.99 LBS | OXYGEN SATURATION: 99 % | RESPIRATION RATE: 26 BRPM | SYSTOLIC BLOOD PRESSURE: 142 MMHG

## 2019-07-02 LAB
ANION GAP SERPL CALCULATED.3IONS-SCNC: 15 MMOL/L (ref 9–17)
BNP INTERPRETATION: ABNORMAL
BUN BLDV-MCNC: 57 MG/DL (ref 6–20)
BUN/CREAT BLD: ABNORMAL (ref 9–20)
CALCIUM SERPL-MCNC: 8.5 MG/DL (ref 8.6–10.4)
CHLORIDE BLD-SCNC: 99 MMOL/L (ref 98–107)
CHOLESTEROL/HDL RATIO: 2.2
CHOLESTEROL: 75 MG/DL
CO2: 23 MMOL/L (ref 20–31)
CREAT SERPL-MCNC: 5.68 MG/DL (ref 0.5–0.9)
EKG ATRIAL RATE: 60 BPM
EKG Q-T INTERVAL: 354 MS
EKG QRS DURATION: 74 MS
EKG QTC CALCULATION (BAZETT): 454 MS
EKG R AXIS: -2 DEGREES
EKG T AXIS: 55 DEGREES
EKG VENTRICULAR RATE: 99 BPM
GFR AFRICAN AMERICAN: 10 ML/MIN
GFR NON-AFRICAN AMERICAN: 8 ML/MIN
GFR SERPL CREATININE-BSD FRML MDRD: ABNORMAL ML/MIN/{1.73_M2}
GFR SERPL CREATININE-BSD FRML MDRD: ABNORMAL ML/MIN/{1.73_M2}
GLUCOSE BLD-MCNC: 181 MG/DL (ref 65–105)
GLUCOSE BLD-MCNC: 190 MG/DL (ref 65–105)
GLUCOSE BLD-MCNC: 197 MG/DL (ref 70–99)
HCT VFR BLD CALC: 27.5 % (ref 36.3–47.1)
HDLC SERPL-MCNC: 34 MG/DL
HEMOGLOBIN: 8.6 G/DL (ref 11.9–15.1)
LDL CHOLESTEROL: 33 MG/DL (ref 0–130)
LV EF: 45 %
LVEF MODALITY: NORMAL
MAGNESIUM: 2 MG/DL (ref 1.6–2.6)
MCH RBC QN AUTO: 32.5 PG (ref 25.2–33.5)
MCHC RBC AUTO-ENTMCNC: 31.3 G/DL (ref 28.4–34.8)
MCV RBC AUTO: 103.8 FL (ref 82.6–102.9)
NRBC AUTOMATED: 0 PER 100 WBC
PDW BLD-RTO: 13.9 % (ref 11.8–14.4)
PLATELET # BLD: 169 K/UL (ref 138–453)
PMV BLD AUTO: 9.9 FL (ref 8.1–13.5)
POTASSIUM SERPL-SCNC: 4.7 MMOL/L (ref 3.7–5.3)
PRO-BNP: ABNORMAL PG/ML
RBC # BLD: 2.65 M/UL (ref 3.95–5.11)
SODIUM BLD-SCNC: 137 MMOL/L (ref 135–144)
TRIGL SERPL-MCNC: 39 MG/DL
TSH SERPL DL<=0.05 MIU/L-ACNC: 0.88 MIU/L (ref 0.3–5)
VLDLC SERPL CALC-MCNC: ABNORMAL MG/DL (ref 1–30)
WBC # BLD: 10.5 K/UL (ref 3.5–11.3)

## 2019-07-02 PROCEDURE — 93306 TTE W/DOPPLER COMPLETE: CPT

## 2019-07-02 PROCEDURE — 83735 ASSAY OF MAGNESIUM: CPT

## 2019-07-02 PROCEDURE — 97530 THERAPEUTIC ACTIVITIES: CPT

## 2019-07-02 PROCEDURE — 5A1D70Z PERFORMANCE OF URINARY FILTRATION, INTERMITTENT, LESS THAN 6 HOURS PER DAY: ICD-10-PCS | Performed by: INTERNAL MEDICINE

## 2019-07-02 PROCEDURE — 6370000000 HC RX 637 (ALT 250 FOR IP): Performed by: NURSE PRACTITIONER

## 2019-07-02 PROCEDURE — 2580000003 HC RX 258: Performed by: NURSE PRACTITIONER

## 2019-07-02 PROCEDURE — 6360000002 HC RX W HCPCS: Performed by: NURSE PRACTITIONER

## 2019-07-02 PROCEDURE — 85027 COMPLETE CBC AUTOMATED: CPT

## 2019-07-02 PROCEDURE — 90935 HEMODIALYSIS ONE EVALUATION: CPT

## 2019-07-02 PROCEDURE — 80048 BASIC METABOLIC PNL TOTAL CA: CPT

## 2019-07-02 PROCEDURE — 80061 LIPID PANEL: CPT

## 2019-07-02 PROCEDURE — 71046 X-RAY EXAM CHEST 2 VIEWS: CPT

## 2019-07-02 PROCEDURE — 83880 ASSAY OF NATRIURETIC PEPTIDE: CPT

## 2019-07-02 PROCEDURE — 36415 COLL VENOUS BLD VENIPUNCTURE: CPT

## 2019-07-02 PROCEDURE — 84443 ASSAY THYROID STIM HORMONE: CPT

## 2019-07-02 PROCEDURE — 97162 PT EVAL MOD COMPLEX 30 MIN: CPT

## 2019-07-02 PROCEDURE — 2700000000 HC OXYGEN THERAPY PER DAY

## 2019-07-02 PROCEDURE — 94660 CPAP INITIATION&MGMT: CPT

## 2019-07-02 PROCEDURE — 6370000000 HC RX 637 (ALT 250 FOR IP): Performed by: INTERNAL MEDICINE

## 2019-07-02 PROCEDURE — 99236 HOSP IP/OBS SAME DATE HI 85: CPT | Performed by: INTERNAL MEDICINE

## 2019-07-02 PROCEDURE — 94640 AIRWAY INHALATION TREATMENT: CPT

## 2019-07-02 PROCEDURE — 82947 ASSAY GLUCOSE BLOOD QUANT: CPT

## 2019-07-02 PROCEDURE — 94761 N-INVAS EAR/PLS OXIMETRY MLT: CPT

## 2019-07-02 RX ORDER — SENNA AND DOCUSATE SODIUM 50; 8.6 MG/1; MG/1
1 TABLET, FILM COATED ORAL DAILY
Status: DISCONTINUED | OUTPATIENT
Start: 2019-07-02 | End: 2019-07-02 | Stop reason: HOSPADM

## 2019-07-02 RX ORDER — ATORVASTATIN CALCIUM 10 MG/1
10 TABLET, FILM COATED ORAL DAILY
Status: DISCONTINUED | OUTPATIENT
Start: 2019-07-02 | End: 2019-07-02 | Stop reason: HOSPADM

## 2019-07-02 RX ORDER — SODIUM CHLORIDE 0.9 % (FLUSH) 0.9 %
10 SYRINGE (ML) INJECTION PRN
Status: DISCONTINUED | OUTPATIENT
Start: 2019-07-02 | End: 2019-07-02 | Stop reason: HOSPADM

## 2019-07-02 RX ORDER — HEPARIN SODIUM 5000 [USP'U]/ML
5000 INJECTION, SOLUTION INTRAVENOUS; SUBCUTANEOUS EVERY 8 HOURS SCHEDULED
Status: DISCONTINUED | OUTPATIENT
Start: 2019-07-02 | End: 2019-07-02 | Stop reason: HOSPADM

## 2019-07-02 RX ORDER — CETIRIZINE HYDROCHLORIDE 10 MG/1
10 TABLET ORAL DAILY
Status: DISCONTINUED | OUTPATIENT
Start: 2019-07-02 | End: 2019-07-02 | Stop reason: HOSPADM

## 2019-07-02 RX ORDER — GUAIFENESIN 600 MG/1
600 TABLET, EXTENDED RELEASE ORAL 2 TIMES DAILY
Status: DISCONTINUED | OUTPATIENT
Start: 2019-07-02 | End: 2019-07-02 | Stop reason: HOSPADM

## 2019-07-02 RX ORDER — NICOTINE 21 MG/24HR
1 PATCH, TRANSDERMAL 24 HOURS TRANSDERMAL DAILY PRN
Status: DISCONTINUED | OUTPATIENT
Start: 2019-07-02 | End: 2019-07-02 | Stop reason: HOSPADM

## 2019-07-02 RX ORDER — ONDANSETRON 2 MG/ML
4 INJECTION INTRAMUSCULAR; INTRAVENOUS EVERY 6 HOURS PRN
Status: DISCONTINUED | OUTPATIENT
Start: 2019-07-02 | End: 2019-07-02 | Stop reason: HOSPADM

## 2019-07-02 RX ORDER — SODIUM CHLORIDE 0.9 % (FLUSH) 0.9 %
10 SYRINGE (ML) INJECTION EVERY 12 HOURS SCHEDULED
Status: DISCONTINUED | OUTPATIENT
Start: 2019-07-02 | End: 2019-07-02 | Stop reason: HOSPADM

## 2019-07-02 RX ORDER — NICOTINE 21 MG/24HR
1 PATCH, TRANSDERMAL 24 HOURS TRANSDERMAL EVERY 24 HOURS
Status: DISCONTINUED | OUTPATIENT
Start: 2019-07-02 | End: 2019-07-02 | Stop reason: HOSPADM

## 2019-07-02 RX ORDER — ASPIRIN 81 MG/1
81 TABLET ORAL DAILY
Status: DISCONTINUED | OUTPATIENT
Start: 2019-07-02 | End: 2019-07-02 | Stop reason: HOSPADM

## 2019-07-02 RX ORDER — FUROSEMIDE 10 MG/ML
40 INJECTION INTRAMUSCULAR; INTRAVENOUS 2 TIMES DAILY
Status: DISCONTINUED | OUTPATIENT
Start: 2019-07-02 | End: 2019-07-02 | Stop reason: HOSPADM

## 2019-07-02 RX ORDER — SEVELAMER CARBONATE 800 MG/1
1600 TABLET, FILM COATED ORAL
Status: DISCONTINUED | OUTPATIENT
Start: 2019-07-02 | End: 2019-07-02 | Stop reason: HOSPADM

## 2019-07-02 RX ORDER — ASPIRIN 81 MG/1
TABLET ORAL
Qty: 30 TABLET | Refills: 0 | Status: SHIPPED | OUTPATIENT
Start: 2019-07-02 | End: 2019-08-08 | Stop reason: SDUPTHER

## 2019-07-02 RX ORDER — CINACALCET 30 MG/1
30 TABLET, FILM COATED ORAL DAILY
Status: DISCONTINUED | OUTPATIENT
Start: 2019-07-02 | End: 2019-07-02 | Stop reason: HOSPADM

## 2019-07-02 RX ORDER — INSULIN GLARGINE 100 [IU]/ML
15 INJECTION, SOLUTION SUBCUTANEOUS NIGHTLY
Status: DISCONTINUED | OUTPATIENT
Start: 2019-07-02 | End: 2019-07-02 | Stop reason: HOSPADM

## 2019-07-02 RX ORDER — LISINOPRIL 5 MG/1
5 TABLET ORAL DAILY
Status: DISCONTINUED | OUTPATIENT
Start: 2019-07-02 | End: 2019-07-02 | Stop reason: HOSPADM

## 2019-07-02 RX ORDER — FUROSEMIDE 40 MG/1
TABLET ORAL
Qty: 60 TABLET | Refills: 0 | Status: SHIPPED | OUTPATIENT
Start: 2019-07-02 | End: 2019-08-08 | Stop reason: SDUPTHER

## 2019-07-02 RX ORDER — CHOLECALCIFEROL (VITAMIN D3) 10 MCG
1 TABLET ORAL DAILY
Status: DISCONTINUED | OUTPATIENT
Start: 2019-07-02 | End: 2019-07-02 | Stop reason: HOSPADM

## 2019-07-02 RX ORDER — GUAIFENESIN 600 MG/1
600 TABLET, EXTENDED RELEASE ORAL 2 TIMES DAILY
Qty: 30 TABLET | Refills: 0 | Status: SHIPPED | OUTPATIENT
Start: 2019-07-02 | End: 2021-01-01 | Stop reason: SDUPTHER

## 2019-07-02 RX ORDER — ACETAMINOPHEN 325 MG/1
650 TABLET ORAL EVERY 4 HOURS PRN
Status: DISCONTINUED | OUTPATIENT
Start: 2019-07-02 | End: 2019-07-02 | Stop reason: HOSPADM

## 2019-07-02 RX ADMIN — FUROSEMIDE 40 MG: 10 INJECTION, SOLUTION INTRAMUSCULAR; INTRAVENOUS at 19:30

## 2019-07-02 RX ADMIN — ATORVASTATIN CALCIUM 10 MG: 10 TABLET, FILM COATED ORAL at 08:22

## 2019-07-02 RX ADMIN — CETIRIZINE HYDROCHLORIDE 10 MG: 10 TABLET ORAL at 08:22

## 2019-07-02 RX ADMIN — LISINOPRIL 5 MG: 5 TABLET ORAL at 08:29

## 2019-07-02 RX ADMIN — Medication 10 ML: at 08:26

## 2019-07-02 RX ADMIN — HEPARIN SODIUM 5000 UNITS: 5000 INJECTION INTRAVENOUS; SUBCUTANEOUS at 05:39

## 2019-07-02 RX ADMIN — MOMETASONE FUROATE AND FORMOTEROL FUMARATE DIHYDRATE 2 PUFF: 200; 5 AEROSOL RESPIRATORY (INHALATION) at 08:22

## 2019-07-02 RX ADMIN — ASPIRIN 81 MG: 81 TABLET ORAL at 08:22

## 2019-07-02 RX ADMIN — SENNOSIDES AND DOCUSATE SODIUM 1 TABLET: 8.6; 5 TABLET ORAL at 08:21

## 2019-07-02 RX ADMIN — SEVELAMER CARBONATE 1600 MG: 800 TABLET, FILM COATED ORAL at 17:08

## 2019-07-02 RX ADMIN — SEVELAMER CARBONATE 1600 MG: 800 TABLET, FILM COATED ORAL at 11:47

## 2019-07-02 RX ADMIN — ASCORBIC ACID, THIAMINE MONONITRATE,RIBOFLAVIN, NIACINAMIDE, PYRIDOXINE HYDROCHLORIDE, FOLIC ACID, CYANOCOBALAMIN, BIOTIN, CALCIUM PANTOTHENATE, 1 MG: 100; 1.5; 1.7; 20; 10; 1; 6000; 150000; 5 CAPSULE, LIQUID FILLED ORAL at 08:21

## 2019-07-02 RX ADMIN — GUAIFENESIN 600 MG: 600 TABLET, EXTENDED RELEASE ORAL at 19:01

## 2019-07-02 RX ADMIN — SEVELAMER CARBONATE 1600 MG: 800 TABLET, FILM COATED ORAL at 08:21

## 2019-07-02 RX ADMIN — FUROSEMIDE 40 MG: 10 INJECTION, SOLUTION INTRAMUSCULAR; INTRAVENOUS at 08:22

## 2019-07-02 RX ADMIN — CINACALCET HYDROCHLORIDE 30 MG: 30 TABLET, COATED ORAL at 08:22

## 2019-07-02 ASSESSMENT — PAIN SCALES - GENERAL
PAINLEVEL_OUTOF10: 0
PAINLEVEL_OUTOF10: 7
PAINLEVEL_OUTOF10: 7
PAINLEVEL_OUTOF10: 5

## 2019-07-02 ASSESSMENT — ENCOUNTER SYMPTOMS
VOMITING: 0
SHORTNESS OF BREATH: 1
ABDOMINAL PAIN: 0
TROUBLE SWALLOWING: 0
SORE THROAT: 0
WHEEZING: 0
CHEST TIGHTNESS: 0
BACK PAIN: 0
COUGH: 0
PHOTOPHOBIA: 0
NAUSEA: 0

## 2019-07-02 ASSESSMENT — PAIN DESCRIPTION - ORIENTATION
ORIENTATION: LOWER;RIGHT
ORIENTATION: RIGHT
ORIENTATION: RIGHT

## 2019-07-02 ASSESSMENT — PAIN DESCRIPTION - DESCRIPTORS
DESCRIPTORS: DISCOMFORT
DESCRIPTORS: DISCOMFORT

## 2019-07-02 ASSESSMENT — PAIN DESCRIPTION - PAIN TYPE
TYPE: ACUTE PAIN

## 2019-07-02 ASSESSMENT — PAIN DESCRIPTION - FREQUENCY
FREQUENCY: INTERMITTENT
FREQUENCY: CONTINUOUS

## 2019-07-02 ASSESSMENT — PAIN DESCRIPTION - PROGRESSION
CLINICAL_PROGRESSION: NOT CHANGED
CLINICAL_PROGRESSION: NOT CHANGED

## 2019-07-02 ASSESSMENT — PAIN DESCRIPTION - LOCATION
LOCATION: ABDOMEN
LOCATION: BACK;ARM
LOCATION: CHEST

## 2019-07-02 ASSESSMENT — PAIN DESCRIPTION - ONSET
ONSET: ON-GOING
ONSET: ON-GOING

## 2019-07-02 NOTE — PROGRESS NOTES
Physical Therapy    Facility/Department: [facility] 4B STEPDOWN  Initial Assessment    NAME: Genia Lopez  : 1970  MRN: [id number]    Date of Service: 2019  Chief Complaint   Patient presents with    Respiratory Distress     brought in my LS4 on CPAP. Pt has had diff breathing all day. Was on way to hospital and had to call EMS     Discharge Recommendations:    No therapy recommended at discharge. PT Equipment Recommendations  Equipment Needed: No    Assessment   Body structures, Functions, Activity limitations: Decreased functional mobility ; Decreased endurance;Decreased balance  Assessment: pt mod I for all mobility, amb 125' using single point cane, pt took two standing rest breaks and states this is normal at baseline as she does not amb far distances at baseline   Prognosis: Good  Decision Making: Medium Complexity  Patient Education: PT POC  REQUIRES PT FOLLOW UP: No  Activity Tolerance  Activity Tolerance: Patient Tolerated treatment well;Patient limited by endurance; Patient limited by fatigue       Patient Diagnosis(es): The primary encounter diagnosis was Hypervolemia, unspecified hypervolemia type. Diagnoses of Acute pulmonary edema ([address] SOB (shortness of breath), and Hyperkalemia were also pertinent to this visit. has a past medical history of Asthma, CHF (congestive heart failure) ([address] Chronic kidney disease, COPD (chronic obstructive pulmonary disease) ([address] Dialysis patient ([address] Hemodialysis patient ([address] Hyperlipidemia, Hypertension, Obesity, YONI (obstructive sleep apnea), Pneumonia, Renal failure, Type II or unspecified type diabetes mellitus without mention of complication, not stated as uncontrolled, and Ventilator dependence ([address] has a past surgical history that includes Hysterectomy (2014);  section ([address]); and skin full graft ().     Restrictions  Restrictions/Precautions  Restrictions/Precautions: General Precautions  Required Braces or Orthoses?: No  Position Activity Restriction  Other position/activity restrictions:  Up with assist  Vision/Hearing  Vision: Within Functional Limits  Hearing: Within functional limits     Subjective  General  Chart Reviewed: Yes  Patient assessed for rehabilitation services?: Yes  Response To Previous Treatment: Not applicable  Family / Caregiver Present: No  Follows Commands: Within Functional Limits  Subjective  Subjective: pt and RN agreeable to PT, pt alert in chair upon arrival  Pain Screening  Patient Currently in Pain: Yes  Pain Assessment  Pain Assessment: 0-10  Pain Level: 5  Pain Type: Acute pain  Pain Location: Back;Arm  Pain Orientation: Lower;Right  Response to Pain Intervention: Patient Satisfied  Vital Signs  Patient Currently in Pain: Yes  Pre Treatment Pain Screening  Intervention List: Patient able to continue with treatment    Orientation  Orientation  Overall Orientation Status: Within Functional Limits  Social/Functional History  Social/Functional History  Lives With: Family  Type of Home: Apartment  Home Layout: One level  Home Access: Level entry  Bathroom Shower/Tub: Tub/Shower unit  Bathroom Toilet: Standard  Bathroom Equipment: Grab bars in shower  Home Equipment: Electric scooter, Cane(pt uses cane at baseline for household distances, for community distances pt stated she uses an electric scooter)  ADL Assistance: Independent  Homemaking Assistance: Independent  Homemaking Responsibilities: Yes  Ambulation Assistance: Independent  Transfer Assistance: Independent  Active : No  Patient's  Info: family  Occupation: On disability  Leisure & Hobbies: Go to Jainism, watch [employer] play sports  Additional Comments: pt stated baseline ambulation is limited to household distances, pt is on 4L home O2   Cognition   Cognition  Overall Cognitive Status: WFL    Objective     Observation/Palpation  Posture: Good    AROM RLE (degrees)  RLE AROM: WFL  AROM LLE (degrees)  LLE AROM : WFL  AROM Raad Mckeon     Patient was evaluated/treated by QUANG Truong, and the above documentation was completed and checked for accuracy by the supervising Physical Therapist

## 2019-07-02 NOTE — CONSULTS
Diagnosis Date    Asthma     AS A CHILD    CHF (congestive heart failure) ([facility]) 2000    Chronic kidney disease     COPD (chronic obstructive pulmonary disease) ([facility] 2000    INHALER USE AS NEEDED    Dialysis patient ([facility]     CENTRAL DIALYSIS MON, WEDS AND FRI, FLUID RESTRICTION 2L/24 HRS PER PT    Hemodialysis patient [facility])     had Dilaysis 8-17-16  M-W-F @ [facility]    Hyperlipidemia     Hypertension 2000    ON RX    Obesity     YONI (obstructive sleep apnea) 2013    SLEEP STUDY -6/2016 AWAITING MACHINE, USING O2 AT NIGHT PRESENTLY    Pneumonia 2000    HAD TO BE ON VENTILATOR 12 DAYS    Renal failure     STARTED DIALYSIS 02/25/2016    Type II or unspecified type diabetes mellitus without mention of complication, not stated as uncontrolled 2000    IDDM    Ventilator dependence ([facility]     X 2 200 AND 2014.  2014 HAD TO GO ON VENT POST OP       Access:  previous  Left AV fistula    Past Surgical History:        Procedure Laterality Date   [facility]    x3    HYSTERECTOMY  12/12/2014    TOTAL    SKIN FULL GRAFT  1983    CHEST-BIRTH YOU AND MOLES REMOVED       Current Medications:      aspirin EC tablet 81 mg Daily   atorvastatin (LIPITOR) tablet 10 mg Daily   b complex-C-folic acid (NEPHROCAPS) capsule 1 mg Daily   cetirizine (ZYRTEC) tablet 10 mg Daily   mometasone-formoterol (DULERA) 200-5 MCG/ACT inhaler 2 puff BID   insulin glargine (LANTUS) injection vial 15 Units Nightly   lisinopril (PRINIVIL;ZESTRIL) tablet 5 mg Daily   nicotine (NICODERM CQ) 21 MG/24HR 1 patch Q24H   sennosides-docusate sodium (SENOKOT-S) 8.6-50 MG tablet 1 tablet Daily   cinacalcet (SENSIPAR) tablet 30 mg Daily   sevelamer (RENVELA) tablet 1,600 mg TID WC   sodium chloride flush 0.9 % injection 10 mL 2 times per day   sodium chloride flush 0.9 % injection 10 mL PRN   magnesium hydroxide (MILK OF MAGNESIA) 400 MG/5ML suspension 30 mL Daily PRN   ondansetron (ZOFRAN) injection 4 mg Q6H PRN   nicotine (NICODERM CQ) 21 MG/24HR 1 patch Daily PRN   acetaminophen (TYLENOL) tablet 650 mg Q4H PRN   furosemide (LASIX) injection 40 mg BID   heparin (porcine) injection 5,000 Units 3 times per day   guaiFENesin (MUCINEX) extended release tablet 600 mg BID   aspirin chewable tablet 324 mg Once   nitroGLYCERIN (NITROSTAT) SL tablet 0.4 mg Q5 Min PRN   albuterol (PROVENTIL) nebulizer solution 5 mg As Directed RT PRN   albuterol (PROVENTIL) nebulizer solution 5 mg As Directed RT PRN   albuterol sulfate  (90 Base) MCG/ACT inhaler 2 puff As Directed RT PRN   ipratropium (ATROVENT) 0.02 % nebulizer solution 0.5 mg As Directed RT PRN       Allergies:  Tramadol; Motrin [ibuprofen micronized];  Strawberry extract; and Tape Clance Brayden tape]    Social History:    Social History     Socioeconomic History    Marital status: Single     Spouse name: Not on file    Number of children: Not on file    Years of education: Not on file    Highest education level: Not on file   Occupational History    Not on file   Social Needs    Financial resource strain: Not on file    Food insecurity:     Worry: Not on file     Inability: Not on file    Transportation needs:     Medical: Not on file     Non-medical: Not on file   Tobacco Use    Smoking status: Former Smoker     Years: 7.00     Types: Cigarettes     Last attempt to quit: 2017     Years since quittin.3    Smokeless tobacco: Never Used    Tobacco comment: 1-2 cigs daily   Substance and Sexual Activity    Alcohol use: No    Drug use: No    Sexual activity: Not Currently     Partners: Male   Lifestyle    Physical activity:     Days per week: Not on file     Minutes per session: Not on file    Stress: Not on file   Relationships    Social connections:     Talks on phone: Not on file     Gets together: Not on file     Attends Congregational service: Not on file     Active member of club or organization: Not on file     Attends meetings of clubs or

## 2019-07-02 NOTE — ED NOTES
Bed: 28  Expected date:   Expected time:   Means of arrival:   Comments:  [address] Sohail Nassar RN  07/01/19 5499

## 2019-07-02 NOTE — H&P
ON RX    Obesity     YONI (obstructive sleep apnea) 2013    SLEEP STUDY -6/2016 AWAITING MACHINE, USING O2 AT NIGHT PRESENTLY    Pneumonia 2000    HAD TO BE ON VENTILATOR 12 DAYS    Renal failure     STARTED DIALYSIS 02/25/2016    Type II or unspecified type diabetes mellitus without mention of complication, not stated as uncontrolled 2000    IDDM    Ventilator dependence ([facility]     X 2 200 AND 2014. 2014 HAD TO GO ON VENT POST OP        Past Surgical History:     Past Surgical History:   Procedure Laterality Date   [address]    x3    HYSTERECTOMY  12/12/2014    TOTAL    SKIN FULL GRAFT  1983    CHEST-BIRTH YOU AND MOLES REMOVED        Medications Prior to Admission:     Prior to Admission medications    Medication Sig Start Date End Date Taking?  Authorizing Provider   guaiFENesin (MUCINEX) 600 MG extended release tablet Take 1 tablet by mouth 2 times daily 7/2/19  Yes Lewis Bowden MD   fluticasone-salmeterol (ADVAIR) 250-50 MCG/DOSE AEPB INHALE 1 PUFF BY MOUTH AND INTO THE LUNGS TWICE DAILY 7/2/19   Gabriela Sicard, MD   furosemide (LASIX) 40 MG tablet TAKE 1 TABLET BY MOUTH TWICE DAILY 7/2/19   Gabriela Sicard, MD   ASPIRIN ADULT LOW STRENGTH 81 MG EC tablet TAKE 1 TABLET BY MOUTH DAILY 7/2/19   Gabriela Sicard, MD   atorvastatin (LIPITOR) 10 MG tablet TAKE 1 TABLET BY MOUTH DAILY 4/12/19   Jeffery Rao MD   lisinopril (PRINIVIL;ZESTRIL) 5 MG tablet TAKE 1 TABLET BY MOUTH DAILY 4/12/19   Jeffery Rao MD   EASY COMFORT LANCETS MISC USE TO CHECK BLOOD SUGAR TWICE DAILY AS DIRECTED 4/2/19   Jeffery Rao MD   insulin glargine (LANTUS SOLOSTAR) 100 UNIT/ML injection pen INJECT 15 UNITS SUBCUTANEOUSLY EVERY NIGHTY 3/5/19   Jeffery Rao MD   Alcohol Swabs (ALCOHOL PADS) 70 % PADS USE TO TEST BLOOD SUGAR THREE TIMES DAILY AS DIRECTED 3/5/19   Jeffery Rao MD   EASY COMFORT PEN NEEDLES 31G X 5 MM MISC USE TO INJECT INSULIN ONCE DAILY AS DIRECTED 12/7/18   Jeffery Rao MD   diltiazeandreea (CARDIZEM CD) 180 MG extended release capsule TAKE 1 CAPSULE BY MOUTH DAILY 11/13/18   Kizzy Santizo MD   mometasone-formoterol (DULERA) 200-5 MCG/ACT inhaler INHALE 2 PUFFS INTO THE LUNGS BY MOUTH EVERY 12 HOURS 11/13/18   Kizzy Santizo MD   albuterol (PROVENTIL) (2.5 MG/3ML) 0.083% nebulizer solution INHALE THE CONTENTS OF 1 VIAL VIA NEBULIZER EVERY 6 HOURS AS NEEDED FOR WHEEZING OR FOR SHORTNESS OF BREATH 11/13/18   Kizzy Santizo MD   albuterol sulfate HFA (PROAIR HFA) 108 (90 Base) MCG/ACT inhaler Inhale 2 puffs into the lungs every 6 hours as needed for Wheezing 11/13/18   Kizzy Santizo MD   ipratropium (ATROVENT HFA) 17 MCG/ACT inhaler Inhale 1 puff into the lungs 3 times daily 11/13/18 11/13/19  Kizzy Santizo MD   SENNA PLUS 8.6-50 MG per tablet TAKE 1 TABLET BY MOUTH DAILY 7/28/18   Catie Baker MD   cetirizine (ZYRTEC) 10 MG tablet TAKE 1 TABLET BY MOUTH DAILY 7/28/18   Catie Baker MD   Skin Protectants, Misc. (EUCERIN) cream APPLY TO AFFECTED AREA TOPICALLY AS NEEDED 5/22/18   Lawyer Logan MD   Misc. Devices MISC 1 each by Does not apply route daily Electric scooter 8/15/17   Milagro Emmanuel MD   glucose blood VI test strips (FREESTYLE LITE) strip Test 1 time daily, DX: DM 5/23/17   Milagro Emmanuel MD   Lancets (BD LANCET ULTRAFINE 30G) MISC TEST TWICE DAILY 5/23/17   Milagro Emmanuel MD   glucose monitoring kit (FREESTYLE) monitoring kit 1 kit by Does not apply route daily 5/23/17   Milagro Emmanuel MD   sevelamer (RENVELA) 800 MG tablet Take 2 tablets by mouth 3 times daily (with meals) 4/17/17   Helen Cárdenas MD   SENSIPAR 30 MG tablet Take 1 tablet by mouth daily 3/17/17   Historical Provider, MD   HYDROcodone-acetaminophen (NORCO) 5-325 MG per tablet Take 1 tablet by mouth every 6 hours as needed for Pain .  3/28/17   Milagro Emmanuel MD   OXYGEN Inhale into the lungs O2  3L  PER NASAL CANNULA NIGHTLY    Historical Provider, MD   nicotine (NICODERM CQ) 21 MG/24HR Place 1 patch onto the skin every 24 hours 6/14/16   Manuel Dobbs MD   b complex-C-folic acid (NEPHROCAPS) 1 MG capsule Take 1 capsule by mouth daily Indications: two in AM,  one at snack time, one at night    Historical Provider, MD   ondansetron (ZOFRAN ODT) 4 MG disintegrating tablet Take 1 tablet by mouth every 8 hours as needed for Nausea or Vomiting 6/10/16   Urena Flow, APRN - CNP   Blood Glucose Monitoring Suppl (FREESTYLE LITE) DANITZA 1 Device by Does not apply route daily as needed 4/6/16   Josie Velasquez MD   [facility]. Devices (DIGITAL GLASS SCALE) MISC Diagnosis: Diastolic Congestive heart failure 3/9/16   Maria E Rainey MD   nitroGLYCERIN (NITROSTAT) 0.4 MG SL tablet Place 1 tablet under the tongue every 5 minutes as needed for Chest pain 12/15/15   Ly Martinez MD   fluticasone Devota Aase) 50 MCG/ACT nasal spray 1 spray by Nasal route daily  Patient taking differently: 1 spray by Nasal route as needed  12/15/15   Ly Martinez MD        Allergies:     Tramadol; Motrin [ibuprofen micronized]; Strawberry extract; and Tape [adhesive tape]    Social History:     Tobacco:    reports that she quit smoking about 2 years ago. Her smoking use included cigarettes. She quit after 7.00 years of use. She has never used smokeless tobacco.  Alcohol:      reports that she does not drink alcohol. Drug Use:  reports that she does not use drugs.     Family History:     Family History   Problem Relation Age of Onset    Diabetes Other     Cancer Mother         BREAST    Heart Disease Father         CAD-MI    Diabetes Father     High Blood Pressure Father     Diabetes Maternal Grandfather     Diabetes Paternal Grandfather     Heart Disease Paternal Grandfather     High Blood Pressure Paternal Grandfather        Review of Systems:     Negative except for those highlighted:    CONSTITUTIONAL:  fevers, chills, sweats, fatigue, weight loss, generalized weakness  HEENT:  Vision changes, hearing changes, runny nose, throat pain  RESPIRATORY:  shortness of breath, sensation, normal speech, cranial nerves II through XII grossly intact  Skin: No gross lesions, rashes, bruising or bleeding on exposed skin area  Extremities:  peripheral pulses palpable, no pedal edema or calf pain with palpation  Psych: normal affect    Investigations:      Laboratory Testing:  Recent Results (from the past 24 hour(s))   Basic Metabolic Panel    Collection Time: 07/01/19  9:39 PM   Result Value Ref Range    Glucose 91 70 - 99 mg/dL    BUN 87 (H) 6 - 20 mg/dL    CREATININE 7.57 (HH) 0.50 - 0.90 mg/dL    Bun/Cre Ratio NOT REPORTED 9 - 20    Calcium 8.4 (L) 8.6 - 10.4 mg/dL    Sodium 142 135 - 144 mmol/L    Potassium 5.7 (H) 3.7 - 5.3 mmol/L    Chloride 105 98 - 107 mmol/L    CO2 20 20 - 31 mmol/L    Anion Gap 17 9 - 17 mmol/L    GFR Non-African American 6 (L) >60 mL/min    GFR  7 (L) >60 mL/min    GFR Comment          GFR Staging NOT REPORTED    Brain Natriuretic Peptide    Collection Time: 07/01/19  9:39 PM   Result Value Ref Range    Pro-BNP 33,503 (H) <300 pg/mL    BNP Interpretation Pro-BNP Reference Range:    CBC Auto Differential    Collection Time: 07/01/19  9:39 PM   Result Value Ref Range    WBC 11.5 (H) 3.5 - 11.3 k/uL    RBC 3.02 (L) 3.95 - 5.11 m/uL    Hemoglobin 9.8 (L) 11.9 - 15.1 g/dL    Hematocrit 32.0 (L) 36.3 - 47.1 %    .0 (H) 82.6 - 102.9 fL    MCH 32.5 25.2 - 33.5 pg    MCHC 30.6 28.4 - 34.8 g/dL    RDW 14.2 11.8 - 14.4 %    Platelets 010 229 - 940 k/uL    MPV 10.2 8.1 - 13.5 fL    NRBC Automated 0.0 0.0 per 100 WBC    Differential Type NOT REPORTED     Seg Neutrophils 75 (H) 36 - 65 %    Lymphocytes 14 (L) 24 - 43 %    Monocytes 7 3 - 12 %    Eosinophils % 3 1 - 4 %    Basophils 0 0 - 2 %    Immature Granulocytes 1 (H) 0 %    Segs Absolute 8.59 (H) 1.50 - 8.10 k/uL    Absolute Lymph # 1.61 1.10 - 3.70 k/uL    Absolute Mono # 0.83 0.10 - 1.20 k/uL    Absolute Eos # 0.35 0.00 - 0.44 k/uL    Basophils # 0.04 0.00 - 0.20 k/uL    Absolute Immature Granulocyte

## 2019-07-02 NOTE — ED NOTES
Pt continues to receive dialysis in ED room. Remains on monitor. Dialysis nurse at bedside. Will cont to monitor.      Fauzia Corado RN  07/02/19 6034

## 2019-07-02 NOTE — ED NOTES
Pt calls out asking for ice chips. Dr. Janette Jacome denies the request at this time.      Anita Larsen RN  07/01/19 5121

## 2019-07-02 NOTE — ED PROVIDER NOTES
[facility]     Emergency Department     Faculty Attestation    I performed a history and physical examination of the patient and discussed management with the resident. I have reviewed and agree with the residents findings including all diagnostic interpretations, and treatment plans as written. Any areas of disagreement are noted on the chart. I was personally present for the key portions of any procedures. I have documented in the chart those procedures where I was not present during the key portions. I have reviewed the emergency nurses triage note. I agree with the chief complaint, past medical history, past surgical history, allergies, medications, social and family history as documented unless otherwise noted below. Documentation of the HPI, Physical Exam and Medical Decision Making performed by scribjean claude is based on my personal performance of the HPI, PE and MDM. For Physician Assistant/ Nurse Practitioner cases/documentation I have personally evaluated this patient and have completed at least one if not all key elements of the E/M (history, physical exam, and MDM). Additional findings are as noted. Primary Care Physician: Veronica Sanders MD    History: This is a 52 y.o. female who presents to the Emergency Department with complaint of wrist pain and shortness of breath. Patient is end-stage renal dialysis patient missed her dialysis 2 days ago. Because she was feeling poorly, also reports subjective fever at home. With cough. She does report history of respiratory failure and has been on a ventilator twice in the past.  Patient does have a history of CHF as well as COPD. She does have a previous history of smoking. Was brought in on CPAP by EMS is on 4 L home oxygen    Physical:   height is 4' 11\" (1.499 m) and weight is 230 lb (104.3 kg). Her oral temperature is 97 °F (36.1 °C). Her blood pressure is 137/94 (abnormal) and her pulse is 101.
Troponin    Troponin    Basic Metabolic Panel w/ Reflex to MG    Magnesium    TSH without Reflex    Brain Natriuretic Peptide    Lipid panel - fasting    CBC    DIET RENAL; Daily Fluid Restriction: 1500 ml    Continuous Pulse Oximetry    Vital signs per unit routine    Telemetry monitoring    Notify physician    Up with assistance    Daily weights    Intake and output    Tobacco cessation education protocol    Encourage deep breathing and coughing every two hours while awake    Place intermittent pneumatic compression device    Full Code    Inpatient consult to Nephrology    Inpatient consult to Hospitalist    Inpatient consult to Dietitian    Inpatient consult to Nephrology    OT eval and treat    PT evaluation and treat    Initiate ED Aerosol Protocol    HHN Treatment    MDI Treatment    BIPAP    Initiate Oxygen Therapy Protocol    Pulse Oximetry Spot Check    POC Blood Gas    EKG 12 Lead    Insert peripheral IV    Hemodialysis    PATIENT STATUS (FROM ED OR OR/PROCEDURAL) Inpatient       MEDICATIONS ORDERED:  Orders Placed This Encounter   Medications    aspirin chewable tablet 324 mg    morphine injection 4 mg    nitroGLYCERIN (NITROSTAT) SL tablet 0.4 mg    albuterol (PROVENTIL) nebulizer solution 5 mg    albuterol (PROVENTIL) nebulizer solution 5 mg    albuterol sulfate  (90 Base) MCG/ACT inhaler 2 puff    ipratropium (ATROVENT) 0.02 % nebulizer solution 0.5 mg    DISCONTD: albuterol (PROVENTIL) nebulizer solution 5 mg    DISCONTD: ipratropium-albuterol (DUONEB) nebulizer solution 1 ampule    DISCONTD: albuterol (PROVENTIL) nebulizer solution 5 mg    DISCONTD: albuterol sulfate  (90 Base) MCG/ACT inhaler 2 puff    DISCONTD: albuterol sulfate  (90 Base) MCG/ACT inhaler 2 puff    DISCONTD: ipratropium (ATROVENT HFA) 17 MCG/ACT inhaler 2 puff    DISCONTD: ipratropium (ATROVENT) 0.02 % nebulizer solution 0.5 mg    methylPREDNISolone sodium

## 2019-07-02 NOTE — ED NOTES
Report given to Anibal Barry RN. All questions answered at this time.      Vita Nesbitt RN  07/02/19 0156

## 2019-07-02 NOTE — CARE COORDINATION
Case Management Initial Discharge Plan  [facility],             Met with:patient to discuss discharge plans. Information verified: address, contacts, phone number, , insurance Yes  PCP: Veronica Sanders MD  Date of last visit: 18 per [address] Provider: Milka Kelly    Discharge Planning    Living Arrangements:  (with cousin)   Support Systems:  Children, Family Members, Parent    Home has 1 stories  0 stairs to climb to get into front door, n/a stairs to climb to reach second floor  Location of bedroom/bathroom in home ground floor apartment    Patient able to perform ADL's:Independent    Current Services (outpatient & in home) DME  DME equipment: electric scooter, oxygen, nebulizer, cane, glucometer   DME provider: [facility]    Pharmacy: [facility] [place] (delivery)   Potential Assistance Purchasing Medications:  No  Does patient want to participate in local refill/ meds to beds program?  No    Potential Assistance Needed:  Home Care, Durable Medical Equipment    Patient agreeable to home care: No  May of choice provided:  n/a    Prior SNF/Rehab Placement and Facility: No  Agreeable to SNF/Rehab: No  May of choice provided: n/a   Evaluation: n/a    Expected Discharge date:  19  Patient expects to be discharged to:  home  Follow Up Appointment: Best Day/ Time: Monday PM    Transportation provider: cab or mother  Transportation arrangements needed for discharge: No     Readmission Risk              Risk of Unplanned Readmission:        26             Does patient have a readmission risk score greater than 14?: Yes  If yes, follow-up appointment must be made within 7 days of discharge. Discharge Plan: Home independently  Lives at home with her cousin, but has daughters (ages 22 and 32) that she sees often. She goes to dialysis T--Sat via cab. She has [facility].         Electronically signed by Fior Caballero RN on 19 at 12:23
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
## 2019-07-02 NOTE — DISCHARGE SUMMARY
Jf Crowder [place] [address]    Discharge Summary     Patient ID: Mari Salomon  :  1970   MRN: [id number]     ACCOUNT:  [de-identified]   Patient's PCP: Jeffery Rao MD  Admit Date: 2019   Discharge Date: 2019    Length of Stay: 1  Code Status:  Full Code  Admitting Physician: Lewis Bowden MD  Discharge Physician: Lewis Bowden MD     Active Discharge Diagnoses:     Hospital Problem Lists:  Principal Problem:    ESRD needing dialysis [facility])  Active Problems:    Acute on chronic respiratory failure with hypoxia ([facility]    Acute on chronic respiratory failure with hypercapnia ([facility]    Essential hypertension    Type 2 diabetes mellitus with renal manifestations ([facility]    CKD (chronic kidney disease) stage 4, GFR 15-29 ml/min ([facility])    Pulmonary hypertension (HCC)    YONI (obstructive sleep apnea)    Current smoker    Acute exacerbation of CHF (congestive heart failure) ([facility]    Morbid obesity with BMI of 45.0-49.9, adult ([facility]    COPD exacerbation ([facility]    Type 2 diabetes mellitus without complication ([facility]    Gastroesophageal reflux disease without esophagitis  Resolved Problems:    * No resolved hospital problems. *      Admission Condition:  poor     Discharged Condition: fair    Hospital Stay:     Hospital Course:  Mari Salomon is a 52 y.o. female who was admitted for the management of   ESRD needing dialysis [facility]) , presented to ER with Respiratory Distress (brought in my LS4 on CPAP. Pt has had diff breathing all day. Was on way to hospital and had to call EMS)    The patient is a 52 y.o. Non-/non  female who presents with Respiratory Distress (brought in my LS4 on CPAP. Pt has had diff breathing all day.  Was on way to hospital and had to call EMS)   and she is admitted to the hospital for the management of Acute CHf Exacerbation.      She has the following significant co-morbidities: ESRD on HD, HTN, COPD, GERD.      She presented with Standard (2 Vw)    Result Date: 7/2/2019  Improved CHF with persistent findings, as above. Xr Chest Portable    Result Date: 7/1/2019  Findings concerning for pulmonary edema with small effusions. Consultations:    Consults:     Final Specialist Recommendations/Findings:   IP CONSULT TO NEPHROLOGY  IP CONSULT TO HOSPITALIST  IP CONSULT TO DIETITIAN  IP CONSULT TO NEPHROLOGY      The patient was seen and examined on day of discharge and this discharge summary is in conjunction with any daily progress note from day of discharge. Discharge plan:     Disposition: Home    Physician Follow Up:     Jose Hill MD  [facility] [address] [place] [id number]  [phone]             Requiring Further Evaluation/Follow Up POST HOSPITALIZATION/Incidental Findings: F/U with PCP as advised    Diet: cardiac diet, diabetic diet and renal diet    Activity: As tolerated    Instructions to Patient: F/U as advised    Discharge Medications:      Medication List      START taking these medications    guaiFENesin 600 MG extended release tablet  Commonly known as:  MUCINEX  Take 1 tablet by mouth 2 times daily        CHANGE how you take these medications    fluticasone 50 MCG/ACT nasal spray  Commonly known as:  FLONASE  1 spray by Nasal route daily  What changed:    · when to take this  · reasons to take this     fluticasone-salmeterol 250-50 MCG/DOSE Aepb  Commonly known as:  ADVAIR  INHALE 1 PUFF BY MOUTH AND INTO THE LUNGS TWICE DAILY  What changed:  See the new instructions.         CONTINUE taking these medications    * albuterol (2.5 MG/3ML) 0.083% nebulizer solution  Commonly known as:  PROVENTIL  INHALE THE CONTENTS OF 1 VIAL VIA NEBULIZER EVERY 6 HOURS AS NEEDED FOR WHEEZING OR FOR SHORTNESS OF BREATH     * albuterol sulfate  (90 Base) MCG/ACT inhaler  Commonly known as:  PROAIR HFA  Inhale 2 puffs into the lungs every 6 hours as needed for Wheezing     Alcohol Pads 70 % Pads  USE TO TEST BLOOD SUGAR THREE TIMES onto the skin every 24 hours     nitroGLYCERIN 0.4 MG SL tablet  Commonly known as:  NITROSTAT  Place 1 tablet under the tongue every 5 minutes as needed for Chest pain     ondansetron 4 MG disintegrating tablet  Commonly known as:  ZOFRAN ODT  Take 1 tablet by mouth every 8 hours as needed for Nausea or Vomiting     OXYGEN     SENNA PLUS 8.6-50 MG per tablet  Generic drug:  senna-docusate  TAKE 1 TABLET BY MOUTH DAILY     SENSIPAR 30 MG tablet  Generic drug:  cinacalcet     sevelamer 800 MG tablet  Commonly known as:  RENVELA  Take 2 tablets by mouth 3 times daily (with meals)         * This list has 8 medication(s) that are the same as other medications prescribed for you. Read the directions carefully, and ask your doctor or other care provider to review them with you. Where to Get Your Medications      These medications were sent to [facility] [address] [facility], [address], [address] [place] [address]    Phone:  [phone]   · guaiFENesin 600 MG extended release tablet     These medications were sent to [address] [place] [address], [address] [place] [address]. [facility] [phone] - F [phone]  [facility] [address] [place]. [facility] Nurse [address] [place] [address]    Phone:  [phone]   · ASPIRIN ADULT LOW STRENGTH 81 MG EC tablet  · fluticasone-salmeterol 250-50 MCG/DOSE Aepb  · furosemide 40 MG tablet         Time Spent on discharge is  37 mins in patient examination, evaluation, counseling as well as medication reconciliation, prescriptions for required medications, discharge plan and follow up. Electronically signed by   Camilla Green MD  7/2/2019  6:44 PM      Thank you Dr. Tyler Cortez MD for the opportunity to be involved in this patient's care.

## 2019-07-03 ENCOUNTER — TELEPHONE (OUTPATIENT)
Dept: FAMILY MEDICINE CLINIC | Age: 49
End: 2019-07-03

## 2019-07-11 DIAGNOSIS — E11.22 TYPE 2 DIABETES MELLITUS WITH CHRONIC KIDNEY DISEASE ON CHRONIC DIALYSIS, WITH LONG-TERM CURRENT USE OF INSULIN (HCC): Chronic | ICD-10-CM

## 2019-07-11 DIAGNOSIS — N18.6 TYPE 2 DIABETES MELLITUS WITH CHRONIC KIDNEY DISEASE ON CHRONIC DIALYSIS, WITH LONG-TERM CURRENT USE OF INSULIN (HCC): Chronic | ICD-10-CM

## 2019-07-11 DIAGNOSIS — Z99.2 TYPE 2 DIABETES MELLITUS WITH CHRONIC KIDNEY DISEASE ON CHRONIC DIALYSIS, WITH LONG-TERM CURRENT USE OF INSULIN (HCC): Chronic | ICD-10-CM

## 2019-07-11 DIAGNOSIS — E11.65 TYPE 2 DIABETES MELLITUS WITH HYPERGLYCEMIA, WITH LONG-TERM CURRENT USE OF INSULIN (HCC): ICD-10-CM

## 2019-07-11 DIAGNOSIS — Z79.4 TYPE 2 DIABETES MELLITUS WITH CHRONIC KIDNEY DISEASE ON CHRONIC DIALYSIS, WITH LONG-TERM CURRENT USE OF INSULIN (HCC): Chronic | ICD-10-CM

## 2019-07-11 DIAGNOSIS — Z79.4 TYPE 2 DIABETES MELLITUS WITH HYPERGLYCEMIA, WITH LONG-TERM CURRENT USE OF INSULIN (HCC): ICD-10-CM

## 2019-07-12 RX ORDER — LANCING DEVICE
EACH MISCELLANEOUS
Qty: 1 EACH | Refills: 0 | Status: SHIPPED | OUTPATIENT
Start: 2019-07-12 | End: 2020-03-16 | Stop reason: SDUPTHER

## 2019-07-12 RX ORDER — LANCING DEVICE
EACH MISCELLANEOUS
Qty: 100 EACH | Refills: 0 | Status: ON HOLD | OUTPATIENT
Start: 2019-07-12 | End: 2021-01-01 | Stop reason: HOSPADM

## 2019-07-12 RX ORDER — PEN NEEDLE, DIABETIC
NEEDLE, DISPOSABLE MISCELLANEOUS
Qty: 100 EACH | Refills: 0 | Status: SHIPPED | OUTPATIENT
Start: 2019-07-12 | End: 2019-09-16 | Stop reason: SDUPTHER

## 2019-07-12 RX ORDER — LANCING DEVICE
EACH MISCELLANEOUS
Qty: 1 DEVICE | Refills: 0 | Status: SHIPPED | OUTPATIENT
Start: 2019-07-12 | End: 2019-12-13 | Stop reason: SDUPTHER

## 2019-07-12 RX ORDER — ISOPROPYL ALCOHOL 0.7 ML/ML
SWAB TOPICAL
Qty: 100 EACH | Refills: 0 | Status: SHIPPED | OUTPATIENT
Start: 2019-07-12 | End: 2019-09-16 | Stop reason: SDUPTHER

## 2019-08-08 DIAGNOSIS — I10 ESSENTIAL HYPERTENSION: ICD-10-CM

## 2019-08-08 DIAGNOSIS — I50.32 CHRONIC DIASTOLIC CONGESTIVE HEART FAILURE (HCC): ICD-10-CM

## 2019-08-12 RX ORDER — ASPIRIN 81 MG/1
TABLET, COATED ORAL
Qty: 30 TABLET | Refills: 0 | Status: SHIPPED | OUTPATIENT
Start: 2019-08-12 | End: 2019-09-09 | Stop reason: SDUPTHER

## 2019-08-12 RX ORDER — ATORVASTATIN CALCIUM 10 MG/1
TABLET, FILM COATED ORAL
Qty: 30 TABLET | Refills: 0 | Status: SHIPPED | OUTPATIENT
Start: 2019-08-12 | End: 2019-09-09 | Stop reason: SDUPTHER

## 2019-08-12 RX ORDER — FUROSEMIDE 40 MG/1
TABLET ORAL
Qty: 60 TABLET | Refills: 0 | Status: SHIPPED | OUTPATIENT
Start: 2019-08-12 | End: 2019-09-09 | Stop reason: SDUPTHER

## 2019-08-12 RX ORDER — LISINOPRIL 5 MG/1
5 TABLET ORAL DAILY
Qty: 30 TABLET | Refills: 0 | Status: SHIPPED | OUTPATIENT
Start: 2019-08-12 | End: 2019-09-09 | Stop reason: SDUPTHER

## 2019-08-19 ENCOUNTER — OFFICE VISIT (OUTPATIENT)
Dept: FAMILY MEDICINE CLINIC | Age: 49
End: 2019-08-19
Payer: COMMERCIAL

## 2019-08-19 VITALS
SYSTOLIC BLOOD PRESSURE: 140 MMHG | HEIGHT: 59 IN | BODY MASS INDEX: 45.16 KG/M2 | OXYGEN SATURATION: 94 % | DIASTOLIC BLOOD PRESSURE: 76 MMHG | WEIGHT: 223.99 LBS | TEMPERATURE: 97.5 F

## 2019-08-19 DIAGNOSIS — Z99.2 TYPE 2 DIABETES MELLITUS WITH CHRONIC KIDNEY DISEASE ON CHRONIC DIALYSIS, WITH LONG-TERM CURRENT USE OF INSULIN (HCC): Primary | ICD-10-CM

## 2019-08-19 DIAGNOSIS — Z79.4 TYPE 2 DIABETES MELLITUS WITH CHRONIC KIDNEY DISEASE ON CHRONIC DIALYSIS, WITH LONG-TERM CURRENT USE OF INSULIN (HCC): Primary | ICD-10-CM

## 2019-08-19 DIAGNOSIS — E11.22 TYPE 2 DIABETES MELLITUS WITH CHRONIC KIDNEY DISEASE ON CHRONIC DIALYSIS, WITH LONG-TERM CURRENT USE OF INSULIN (HCC): Primary | ICD-10-CM

## 2019-08-19 DIAGNOSIS — I10 ESSENTIAL HYPERTENSION: ICD-10-CM

## 2019-08-19 DIAGNOSIS — N18.6 TYPE 2 DIABETES MELLITUS WITH CHRONIC KIDNEY DISEASE ON CHRONIC DIALYSIS, WITH LONG-TERM CURRENT USE OF INSULIN (HCC): Primary | ICD-10-CM

## 2019-08-19 DIAGNOSIS — Z01.00 DIABETIC EYE EXAM (HCC): ICD-10-CM

## 2019-08-19 DIAGNOSIS — J44.9 CHRONIC OBSTRUCTIVE PULMONARY DISEASE, UNSPECIFIED COPD TYPE (HCC): ICD-10-CM

## 2019-08-19 DIAGNOSIS — D64.9 LOW HEMOGLOBIN: ICD-10-CM

## 2019-08-19 DIAGNOSIS — K92.1 BLOOD IN STOOL: ICD-10-CM

## 2019-08-19 DIAGNOSIS — E11.9 DIABETIC EYE EXAM (HCC): ICD-10-CM

## 2019-08-19 LAB — HBA1C MFR BLD: 4.6 %

## 2019-08-19 PROCEDURE — 99213 OFFICE O/P EST LOW 20 MIN: CPT | Performed by: STUDENT IN AN ORGANIZED HEALTH CARE EDUCATION/TRAINING PROGRAM

## 2019-08-19 PROCEDURE — 1036F TOBACCO NON-USER: CPT | Performed by: STUDENT IN AN ORGANIZED HEALTH CARE EDUCATION/TRAINING PROGRAM

## 2019-08-19 PROCEDURE — G8428 CUR MEDS NOT DOCUMENT: HCPCS | Performed by: STUDENT IN AN ORGANIZED HEALTH CARE EDUCATION/TRAINING PROGRAM

## 2019-08-19 PROCEDURE — 3023F SPIROM DOC REV: CPT | Performed by: STUDENT IN AN ORGANIZED HEALTH CARE EDUCATION/TRAINING PROGRAM

## 2019-08-19 PROCEDURE — G8926 SPIRO NO PERF OR DOC: HCPCS | Performed by: STUDENT IN AN ORGANIZED HEALTH CARE EDUCATION/TRAINING PROGRAM

## 2019-08-19 PROCEDURE — 2022F DILAT RTA XM EVC RTNOPTHY: CPT | Performed by: STUDENT IN AN ORGANIZED HEALTH CARE EDUCATION/TRAINING PROGRAM

## 2019-08-19 PROCEDURE — G8417 CALC BMI ABV UP PARAM F/U: HCPCS | Performed by: STUDENT IN AN ORGANIZED HEALTH CARE EDUCATION/TRAINING PROGRAM

## 2019-08-19 PROCEDURE — 83036 HEMOGLOBIN GLYCOSYLATED A1C: CPT | Performed by: STUDENT IN AN ORGANIZED HEALTH CARE EDUCATION/TRAINING PROGRAM

## 2019-08-19 PROCEDURE — 3044F HG A1C LEVEL LT 7.0%: CPT | Performed by: STUDENT IN AN ORGANIZED HEALTH CARE EDUCATION/TRAINING PROGRAM

## 2019-08-19 ASSESSMENT — ENCOUNTER SYMPTOMS
APNEA: 0
CONSTIPATION: 1
CHEST TIGHTNESS: 0
SHORTNESS OF BREATH: 0
BACK PAIN: 1

## 2019-08-19 ASSESSMENT — PATIENT HEALTH QUESTIONNAIRE - PHQ9
1. LITTLE INTEREST OR PLEASURE IN DOING THINGS: 0
SUM OF ALL RESPONSES TO PHQ QUESTIONS 1-9: 0
2. FEELING DOWN, DEPRESSED OR HOPELESS: 0
SUM OF ALL RESPONSES TO PHQ QUESTIONS 1-9: 0
SUM OF ALL RESPONSES TO PHQ9 QUESTIONS 1 & 2: 0

## 2019-08-19 NOTE — PROGRESS NOTES
Positive for constipation. Genitourinary: Negative for difficulty urinating. Musculoskeletal: Positive for back pain. Negative for joint swelling. Neurological: Negative for dizziness and light-headedness. Family History   Problem Relation Age of Onset    Diabetes Other     Cancer Mother         BREAST    Heart Disease Father         CAD-MI    Diabetes Father     High Blood Pressure Father     Diabetes Maternal Grandfather     Diabetes Paternal Grandfather     Heart Disease Paternal Grandfather     High Blood Pressure Paternal Grandfather        Objective:    BP (!) 140/76 (Site: Right Upper Arm, Position: Sitting, Cuff Size: Medium Adult)   Temp 97.5 °F (36.4 °C) (Temporal)   Ht 4' 11.02\" (1.499 m)   Wt 223 lb 15.8 oz (101.6 kg)   LMP 11/12/2014 (Approximate)   SpO2 94% Comment: 3 liters of oxygen  BMI 45.22 kg/m²     BP Readings from Last 3 Encounters:   08/19/19 (!) 140/76   07/02/19 (!) 142/80   07/01/19 (!) 137/94       Physical Exam   Constitutional: She is oriented to person, place, and time. She appears well-developed and well-nourished. Cardiovascular: Normal rate, regular rhythm, normal heart sounds and intact distal pulses. B/L 1+ lower extremity pitting edema w/ tenderness on palpation    Pulmonary/Chest: Effort normal and breath sounds normal.   Abdominal: Soft. Neurological: She is alert and oriented to person, place, and time. Psychiatric: She has a normal mood and affect.  Her behavior is normal. Thought content normal.       Lab Results   Component Value Date    WBC 10.5 07/02/2019    HGB 8.6 (L) 07/02/2019    HCT 27.5 (L) 07/02/2019     07/02/2019    CHOL 75 07/02/2019    TRIG 39 07/02/2019    HDL 34 (L) 07/02/2019    ALT 33 04/13/2017    AST 24 04/13/2017     07/02/2019    K 4.7 07/02/2019    CL 99 07/02/2019    CREATININE 5.68 (HH) 07/02/2019    BUN 57 (H) 07/02/2019    CO2 23 07/02/2019    TSH 0.88 07/02/2019    INR 1.0 04/13/2017    LABA1C 4.6

## 2019-09-09 DIAGNOSIS — I10 ESSENTIAL HYPERTENSION: ICD-10-CM

## 2019-09-09 DIAGNOSIS — I50.32 CHRONIC DIASTOLIC CONGESTIVE HEART FAILURE (HCC): ICD-10-CM

## 2019-09-09 NOTE — TELEPHONE ENCOUNTER
Last visit: 08/19/2019  Last Med refill:   Does patient have enough medication for 72 hours:     Next Visit Date:  Future Appointments   Date Time Provider Micha Farrar   9/11/2019 10:30 AM SCHEDULE, [facility] Maintenance   Topic Date Due    Diabetic retinal exam  02/25/1980    Hepatitis B Vaccine (1 of 3 - Risk Recombivax 3-dose series) 02/25/1989    DTaP/Tdap/Td vaccine (1 - Tdap) 02/25/1989    Cervical cancer screen  09/05/2017    Pneumococcal 0-64 years Vaccine (3 of 3 - PPSV23) 10/03/2018    Diabetic foot exam  10/27/2018    Flu vaccine (1) 09/01/2019    Lipid screen  07/02/2020    Potassium monitoring  07/02/2020    Creatinine monitoring  07/02/2020    A1C test (Diabetic or Prediabetic)  08/19/2020    HIV screen  Completed    HPV vaccine  Aged Out       Hemoglobin A1C (%)   Date Value   08/19/2019 4.6   03/22/2018 5.0   01/12/2017 5.6             ( goal A1C is < 7)   Microalb/Crt.  Ratio (mcg/mg creat)   Date Value   04/29/2014 1,828     LDL Cholesterol (mg/dL)   Date Value   07/02/2019 33   12/09/2015 65       (goal LDL is <100)   AST (U/L)   Date Value   04/13/2017 24     ALT (U/L)   Date Value   04/13/2017 33     BUN (mg/dL)   Date Value   07/02/2019 57 (H)     BP Readings from Last 3 Encounters:   08/19/19 (!) 140/76   07/02/19 (!) 142/80   07/01/19 (!) 137/94          (goal 120/80)    All Future Testing planned in [facility]  Lab Frequency Next Occurrence   ECHO Complete 2D W Doppler W Color Once 92/03/2932   Basic Metabolic Panel Once 37/03/2462   Hemoglobin A1C Once 11/13/2019   XR LUMBAR SPINE (2-3 VIEWS) Once 02/26/2019               Patient Active Problem List:     Asthma     YONI (obstructive sleep apnea)     HTN (hypertension)     Left knee pain     Hidradenitis     Current smoker     Microalbuminuria     Acute exacerbation of CHF (congestive heart failure) ([facility]     Essential hypertension     Type 2 diabetes mellitus with renal

## 2019-09-11 RX ORDER — ATORVASTATIN CALCIUM 10 MG/1
TABLET, FILM COATED ORAL
Qty: 30 TABLET | Refills: 0 | Status: SHIPPED | OUTPATIENT
Start: 2019-09-11 | End: 2019-10-04 | Stop reason: SDUPTHER

## 2019-09-11 RX ORDER — FUROSEMIDE 40 MG/1
TABLET ORAL
Qty: 60 TABLET | Refills: 0 | Status: SHIPPED | OUTPATIENT
Start: 2019-09-11 | End: 2019-10-04 | Stop reason: SDUPTHER

## 2019-09-11 RX ORDER — LISINOPRIL 5 MG/1
5 TABLET ORAL DAILY
Qty: 30 TABLET | Refills: 0 | Status: SHIPPED | OUTPATIENT
Start: 2019-09-11 | End: 2019-10-04 | Stop reason: SDUPTHER

## 2019-09-11 RX ORDER — ASPIRIN 81 MG/1
TABLET, COATED ORAL
Qty: 30 TABLET | Refills: 0 | Status: SHIPPED | OUTPATIENT
Start: 2019-09-11 | End: 2019-10-04 | Stop reason: SDUPTHER

## 2019-09-16 DIAGNOSIS — E11.65 TYPE 2 DIABETES MELLITUS WITH HYPERGLYCEMIA, WITH LONG-TERM CURRENT USE OF INSULIN (HCC): ICD-10-CM

## 2019-09-16 DIAGNOSIS — Z79.4 TYPE 2 DIABETES MELLITUS WITH HYPERGLYCEMIA, WITH LONG-TERM CURRENT USE OF INSULIN (HCC): ICD-10-CM

## 2019-09-19 RX ORDER — ISOPROPYL ALCOHOL 0.7 ML/ML
SWAB TOPICAL
Qty: 200 EACH | Refills: 2 | Status: SHIPPED | OUTPATIENT
Start: 2019-09-19 | End: 2020-02-05 | Stop reason: SDUPTHER

## 2019-09-19 RX ORDER — PEN NEEDLE, DIABETIC
NEEDLE, DISPOSABLE MISCELLANEOUS
Qty: 100 EACH | Refills: 0 | Status: ON HOLD | OUTPATIENT
Start: 2019-09-19 | End: 2021-01-01 | Stop reason: HOSPADM

## 2019-10-04 DIAGNOSIS — I50.32 CHRONIC DIASTOLIC CONGESTIVE HEART FAILURE (HCC): ICD-10-CM

## 2019-10-04 DIAGNOSIS — I10 ESSENTIAL HYPERTENSION: ICD-10-CM

## 2019-10-08 RX ORDER — ASPIRIN 81 MG/1
TABLET, COATED ORAL
Qty: 30 TABLET | Refills: 0 | Status: SHIPPED | OUTPATIENT
Start: 2019-10-08 | End: 2019-11-12 | Stop reason: SDUPTHER

## 2019-10-08 RX ORDER — LISINOPRIL 5 MG/1
5 TABLET ORAL DAILY
Qty: 30 TABLET | Refills: 0 | Status: SHIPPED | OUTPATIENT
Start: 2019-10-08 | End: 2019-11-12 | Stop reason: SDUPTHER

## 2019-10-08 RX ORDER — ATORVASTATIN CALCIUM 10 MG/1
TABLET, FILM COATED ORAL
Qty: 30 TABLET | Refills: 0 | Status: SHIPPED | OUTPATIENT
Start: 2019-10-08 | End: 2019-11-12 | Stop reason: SDUPTHER

## 2019-10-08 RX ORDER — FUROSEMIDE 40 MG/1
TABLET ORAL
Qty: 60 TABLET | Refills: 0 | Status: SHIPPED | OUTPATIENT
Start: 2019-10-08 | End: 2019-11-12 | Stop reason: SDUPTHER

## 2019-11-08 DIAGNOSIS — I10 ESSENTIAL HYPERTENSION: ICD-10-CM

## 2019-11-08 DIAGNOSIS — I50.32 CHRONIC DIASTOLIC CONGESTIVE HEART FAILURE (HCC): ICD-10-CM

## 2019-11-11 DIAGNOSIS — E11.22 TYPE 2 DIABETES MELLITUS WITH CHRONIC KIDNEY DISEASE ON CHRONIC DIALYSIS, WITH LONG-TERM CURRENT USE OF INSULIN (HCC): Chronic | ICD-10-CM

## 2019-11-11 DIAGNOSIS — Z79.4 TYPE 2 DIABETES MELLITUS WITH CHRONIC KIDNEY DISEASE ON CHRONIC DIALYSIS, WITH LONG-TERM CURRENT USE OF INSULIN (HCC): Chronic | ICD-10-CM

## 2019-11-11 DIAGNOSIS — Z99.2 TYPE 2 DIABETES MELLITUS WITH CHRONIC KIDNEY DISEASE ON CHRONIC DIALYSIS, WITH LONG-TERM CURRENT USE OF INSULIN (HCC): Chronic | ICD-10-CM

## 2019-11-11 DIAGNOSIS — E11.65 TYPE 2 DIABETES MELLITUS WITH HYPERGLYCEMIA, WITH LONG-TERM CURRENT USE OF INSULIN (HCC): ICD-10-CM

## 2019-11-11 DIAGNOSIS — N18.6 TYPE 2 DIABETES MELLITUS WITH CHRONIC KIDNEY DISEASE ON CHRONIC DIALYSIS, WITH LONG-TERM CURRENT USE OF INSULIN (HCC): Chronic | ICD-10-CM

## 2019-11-11 DIAGNOSIS — Z79.4 TYPE 2 DIABETES MELLITUS WITH HYPERGLYCEMIA, WITH LONG-TERM CURRENT USE OF INSULIN (HCC): ICD-10-CM

## 2019-11-11 RX ORDER — BLOOD-GLUCOSE METER
EACH MISCELLANEOUS
Qty: 100 EACH | Refills: 3 | Status: SHIPPED | OUTPATIENT
Start: 2019-11-11 | End: 2020-02-05 | Stop reason: SDUPTHER

## 2019-11-12 RX ORDER — FUROSEMIDE 40 MG/1
TABLET ORAL
Qty: 60 TABLET | Refills: 0 | Status: SHIPPED | OUTPATIENT
Start: 2019-11-12 | End: 2019-11-13 | Stop reason: SDUPTHER

## 2019-11-12 RX ORDER — LISINOPRIL 5 MG/1
5 TABLET ORAL DAILY
Qty: 30 TABLET | Refills: 0 | Status: SHIPPED | OUTPATIENT
Start: 2019-11-12 | End: 2019-11-13 | Stop reason: SDUPTHER

## 2019-11-12 RX ORDER — ATORVASTATIN CALCIUM 10 MG/1
TABLET, FILM COATED ORAL
Qty: 30 TABLET | Refills: 0 | Status: SHIPPED | OUTPATIENT
Start: 2019-11-12 | End: 2019-11-13 | Stop reason: SDUPTHER

## 2019-11-12 RX ORDER — ASPIRIN 81 MG/1
TABLET, COATED ORAL
Qty: 30 TABLET | Refills: 0 | Status: SHIPPED | OUTPATIENT
Start: 2019-11-12 | End: 2019-11-13 | Stop reason: SDUPTHER

## 2019-11-13 DIAGNOSIS — I10 ESSENTIAL HYPERTENSION: ICD-10-CM

## 2019-11-13 DIAGNOSIS — Z79.4 TYPE 2 DIABETES MELLITUS WITH HYPERGLYCEMIA, WITH LONG-TERM CURRENT USE OF INSULIN (HCC): ICD-10-CM

## 2019-11-13 DIAGNOSIS — E11.65 TYPE 2 DIABETES MELLITUS WITH HYPERGLYCEMIA, WITH LONG-TERM CURRENT USE OF INSULIN (HCC): ICD-10-CM

## 2019-11-13 DIAGNOSIS — I50.32 CHRONIC DIASTOLIC CONGESTIVE HEART FAILURE (HCC): ICD-10-CM

## 2019-11-13 RX ORDER — LISINOPRIL 5 MG/1
5 TABLET ORAL DAILY
Qty: 30 TABLET | Refills: 0 | Status: SHIPPED | OUTPATIENT
Start: 2019-11-13 | End: 2020-01-01

## 2019-11-13 RX ORDER — FUROSEMIDE 40 MG/1
TABLET ORAL
Qty: 60 TABLET | Refills: 0 | Status: SHIPPED | OUTPATIENT
Start: 2019-11-13 | End: 2020-01-01

## 2019-11-13 RX ORDER — ASPIRIN 81 MG/1
TABLET, COATED ORAL
Qty: 30 TABLET | Refills: 0 | Status: SHIPPED | OUTPATIENT
Start: 2019-11-13 | End: 2020-01-01

## 2019-11-13 RX ORDER — ATORVASTATIN CALCIUM 10 MG/1
TABLET, FILM COATED ORAL
Qty: 30 TABLET | Refills: 0 | Status: SHIPPED | OUTPATIENT
Start: 2019-11-13 | End: 2020-01-01

## 2019-12-31 NOTE — TELEPHONE ENCOUNTER
chronic respiratory failure with hypoxia (HCC)     COPD exacerbation (HCC)     Asthma exacerbation     Type 2 diabetes mellitus with diabetic nephropathy (HCC)     ESRD (end stage renal disease) on dialysis ([address]     Bronchitis     Essential hypertension     YONI on CPAP     Hyperglycemia, drug-induced     Needs smoking cessation education     Hyperglycemia     Drowsiness     Acute respiratory acidosis     Acute on chronic respiratory failure with hypercapnia ([address]     Mobility impaired     S/P cardiac cath-mINIMAL caD 3/15/16 - dR. 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

## 2020-01-01 ENCOUNTER — TELEPHONE (OUTPATIENT)
Dept: FAMILY MEDICINE CLINIC | Age: 50
End: 2020-01-01

## 2020-01-01 DIAGNOSIS — I50.32 CHRONIC DIASTOLIC CONGESTIVE HEART FAILURE (HCC): ICD-10-CM

## 2020-01-01 DIAGNOSIS — E11.22 TYPE 2 DIABETES MELLITUS WITH CHRONIC KIDNEY DISEASE ON CHRONIC DIALYSIS, WITH LONG-TERM CURRENT USE OF INSULIN (HCC): Chronic | ICD-10-CM

## 2020-01-01 DIAGNOSIS — N18.6 TYPE 2 DIABETES MELLITUS WITH CHRONIC KIDNEY DISEASE ON CHRONIC DIALYSIS, WITH LONG-TERM CURRENT USE OF INSULIN (HCC): Chronic | ICD-10-CM

## 2020-01-01 DIAGNOSIS — Z79.4 TYPE 2 DIABETES MELLITUS WITH CHRONIC KIDNEY DISEASE ON CHRONIC DIALYSIS, WITH LONG-TERM CURRENT USE OF INSULIN (HCC): Chronic | ICD-10-CM

## 2020-01-01 DIAGNOSIS — I10 ESSENTIAL HYPERTENSION: ICD-10-CM

## 2020-01-01 DIAGNOSIS — Z99.2 TYPE 2 DIABETES MELLITUS WITH CHRONIC KIDNEY DISEASE ON CHRONIC DIALYSIS, WITH LONG-TERM CURRENT USE OF INSULIN (HCC): Chronic | ICD-10-CM

## 2020-01-01 RX ORDER — ASPIRIN 81 MG/1
TABLET ORAL
Qty: 30 TABLET | Refills: 0 | Status: SHIPPED | OUTPATIENT
Start: 2020-01-01 | End: 2020-01-01

## 2020-01-01 RX ORDER — ATORVASTATIN CALCIUM 10 MG/1
TABLET, FILM COATED ORAL
Qty: 30 TABLET | Refills: 0 | Status: SHIPPED | OUTPATIENT
Start: 2020-01-01 | End: 2020-02-03

## 2020-01-01 RX ORDER — ATORVASTATIN CALCIUM 10 MG/1
TABLET, FILM COATED ORAL
Qty: 30 TABLET | Refills: 0 | Status: SHIPPED | OUTPATIENT
Start: 2020-01-01 | End: 2021-01-01

## 2020-01-01 RX ORDER — FUROSEMIDE 40 MG/1
TABLET ORAL
Qty: 60 TABLET | Refills: 0 | Status: SHIPPED | OUTPATIENT
Start: 2020-01-01 | End: 2020-01-01

## 2020-01-01 RX ORDER — BLOOD SUGAR DIAGNOSTIC
STRIP MISCELLANEOUS
Qty: 100 EACH | Refills: 5 | Status: SHIPPED | OUTPATIENT
Start: 2020-01-01 | End: 2021-01-01

## 2020-01-01 RX ORDER — BLOOD-GLUCOSE METER
EACH MISCELLANEOUS
Qty: 100 EACH | Refills: 3 | Status: ON HOLD
Start: 2020-01-01 | End: 2021-01-01 | Stop reason: HOSPADM

## 2020-01-01 RX ORDER — FUROSEMIDE 40 MG/1
TABLET ORAL
Qty: 60 TABLET | Refills: 0 | Status: SHIPPED | OUTPATIENT
Start: 2020-01-01 | End: 2020-02-02

## 2020-01-01 RX ORDER — ATORVASTATIN CALCIUM 10 MG/1
TABLET, FILM COATED ORAL
Qty: 30 TABLET | Refills: 0 | Status: SHIPPED | OUTPATIENT
Start: 2020-01-01 | End: 2020-01-01

## 2020-01-01 RX ORDER — LISINOPRIL 5 MG/1
5 TABLET ORAL DAILY
Qty: 30 TABLET | Refills: 0 | Status: SHIPPED | OUTPATIENT
Start: 2020-01-01 | End: 2020-02-03

## 2020-01-01 RX ORDER — ASPIRIN 81 MG/1
TABLET, COATED ORAL
Qty: 30 TABLET | Refills: 0 | Status: SHIPPED | OUTPATIENT
Start: 2020-01-01 | End: 2020-02-03

## 2020-01-01 RX ORDER — LISINOPRIL 5 MG/1
5 TABLET ORAL DAILY
Qty: 30 TABLET | Refills: 3 | Status: SHIPPED | OUTPATIENT
Start: 2020-01-01 | End: 2021-01-01

## 2020-01-30 NOTE — TELEPHONE ENCOUNTER
Next Visit Date:  No future appointments. Health Maintenance   Topic Date Due    Diabetic retinal exam  02/25/1980    DTaP/Tdap/Td vaccine (1 - Tdap) 02/25/1981    Hepatitis B vaccine (1 of 3 - Risk 3-dose series) 02/25/1989    Cervical cancer screen  09/05/2017    Pneumococcal 0-64 years Vaccine (3 of 3 - PPSV23) 10/03/2018    Diabetic foot exam  10/27/2018    Annual Wellness Visit (AWV)  06/23/2019    Flu vaccine (1) 09/01/2019    Lipid screen  07/02/2020    Potassium monitoring  07/02/2020    Creatinine monitoring  07/02/2020    A1C test (Diabetic or Prediabetic)  08/19/2020    HIV screen  Completed    HPV vaccine  Aged Out       Hemoglobin A1C (%)   Date Value   08/19/2019 4.6   03/22/2018 5.0   01/12/2017 5.6             ( goal A1C is < 7)   Microalb/Crt.  Ratio (mcg/mg creat)   Date Value   04/29/2014 1,828     LDL Cholesterol (mg/dL)   Date Value   07/02/2019 33   12/09/2015 65       (goal LDL is <100)   AST (U/L)   Date Value   04/13/2017 24     ALT (U/L)   Date Value   04/13/2017 33     BUN (mg/dL)   Date Value   07/02/2019 57 (H)     BP Readings from Last 3 Encounters:   08/19/19 (!) 140/76   07/02/19 (!) 142/80   07/01/19 (!) 137/94          (goal 120/80)    All Future Testing planned in [facility]  Lab Frequency Next Occurrence   XR LUMBAR SPINE (2-3 VIEWS) Once 02/26/2019               Patient Active Problem List:     Asthma     YONI (obstructive sleep apnea)     HTN (hypertension)     Left knee pain     Hidradenitis     Current 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 kidney disease) stage 4, GFR 15-29 ml/min (HCC)     Acute diastolic congestive heart failure (HCC)     Hyperkalemia     Acute systolic congestive heart failure ([address]     Pulmonary hypertension ([facility]     Morbid obesity with BMI of 45.0-49.9, adult ([address]     Acute on chronic respiratory failure with hypoxia (HCC)     COPD exacerbation (HCC)     Asthma exacerbation     Type 2 diabetes mellitus with diabetic nephropathy (HCC)     ESRD (end stage renal disease) on dialysis ([address]     Bronchitis     Essential hypertension     YONI on CPAP     Hyperglycemia, drug-induced     Needs smoking cessation education     Hyperglycemia     Drowsiness     Acute respiratory acidosis     Acute on chronic respiratory failure with hypercapnia ([address]     Mobility impaired     S/P cardiac cath-mINIMAL caD 3/15/16 - dR. 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

## 2020-02-02 RX ORDER — FUROSEMIDE 40 MG/1
TABLET ORAL
Qty: 60 TABLET | Refills: 0 | Status: SHIPPED | OUTPATIENT
Start: 2020-02-02 | End: 2020-03-10 | Stop reason: SDUPTHER

## 2020-02-03 RX ORDER — ATORVASTATIN CALCIUM 10 MG/1
TABLET, FILM COATED ORAL
Qty: 30 TABLET | Refills: 0 | Status: SHIPPED | OUTPATIENT
Start: 2020-02-03 | End: 2020-02-05 | Stop reason: SDUPTHER

## 2020-02-03 RX ORDER — LISINOPRIL 5 MG/1
5 TABLET ORAL DAILY
Qty: 30 TABLET | Refills: 0 | Status: SHIPPED | OUTPATIENT
Start: 2020-02-03 | End: 2020-03-10 | Stop reason: SDUPTHER

## 2020-02-03 RX ORDER — ASPIRIN 81 MG/1
TABLET, COATED ORAL
Qty: 30 TABLET | Refills: 0 | Status: SHIPPED | OUTPATIENT
Start: 2020-02-03 | End: 2020-02-05 | Stop reason: SDUPTHER

## 2020-02-03 NOTE — TELEPHONE ENCOUNTER
failure (HCC)     Hyperkalemia     Acute systolic congestive heart failure (HCC)     Pulmonary hypertension (HCC)     Morbid obesity with BMI of 45.0-49.9, adult (HCC)     Acute on chronic respiratory failure with hypoxia (HCC)     COPD exacerbation (HCC)     Asthma exacerbation     Type 2 diabetes mellitus with diabetic nephropathy (HCC)     ESRD (end stage renal disease) on dialysis ([address]     Bronchitis     Essential hypertension     YONI on CPAP     Hyperglycemia, drug-induced     Needs smoking cessation education     Hyperglycemia     Drowsiness     Acute respiratory acidosis     Acute on chronic respiratory failure with hypercapnia ([address]     Mobility impaired     S/P cardiac cath-mINIMAL caD 3/15/16 - dR. 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

## 2020-02-05 NOTE — TELEPHONE ENCOUNTER
The aspirin and atorvastatin pharmacy is requesting refills on them. Please address the medication refill and close the encounter. If I can be of assistance, please route to the applicable pool. Thank you. Last visit: 08/19/19  Last Med refill: 02/03/20 aspirin    02/03/20 atorvastatin    01/01/20 fluticasone    11/11/19 [facility] ambika    11/11/19 [facility] need    07/12/19 insulin    11/13/19 blood glucose test    09/19/19 alcohol   Does patient have enough medication for 72 hours: No:     Next Visit Date:  No future appointments. Health Maintenance   Topic Date Due    Diabetic retinal exam  02/25/1980    DTaP/Tdap/Td vaccine (1 - Tdap) 02/25/1981    Hepatitis B vaccine (1 of 3 - Risk 3-dose series) 02/25/1989    Cervical cancer screen  09/05/2017    Pneumococcal 0-64 years Vaccine (3 of 3 - PPSV23) 10/03/2018    Diabetic foot exam  10/27/2018    Annual Wellness Visit (AWV)  06/23/2019    Flu vaccine (1) 09/01/2019    Shingles Vaccine (1 of 2) 02/25/2020    Lipid screen  07/02/2020    Potassium monitoring  07/02/2020    Creatinine monitoring  07/02/2020    A1C test (Diabetic or Prediabetic)  08/19/2020    HIV screen  Completed    Hepatitis A vaccine  Aged Out    Hib vaccine  Aged Out    Meningococcal (ACWY) vaccine  Aged Out       Hemoglobin A1C (%)   Date Value   08/19/2019 4.6   03/22/2018 5.0   01/12/2017 5.6             ( goal A1C is < 7)   Microalb/Crt.  Ratio (mcg/mg creat)   Date Value   04/29/2014 1,828     LDL Cholesterol (mg/dL)   Date Value   07/02/2019 33   12/09/2015 65       (goal LDL is <100)   AST (U/L)   Date Value   04/13/2017 24     ALT (U/L)   Date Value   04/13/2017 33     BUN (mg/dL)   Date Value   07/02/2019 57 (H)     BP Readings from Last 3 Encounters:   08/19/19 (!) 140/76   07/02/19 (!) 142/80   07/01/19 (!) 137/94          (goal 120/80)    All Future Testing planned in [facility]  Lab Frequency Next Occurrence   XR LUMBAR SPINE (2-3 VIEWS) Once 02/26/2019               Patient Active Problem List:     Asthma     YONI (obstructive sleep apnea)     HTN (hypertension)     Left knee pain     Hidradenitis     Current smoker     Microalbuminuria     Acute exacerbation of CHF (congestive heart failure) ([address]     Essential 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 kidney disease) stage 4, GFR 15-29 ml/min (HCC)     Acute diastolic congestive heart failure (HCC)     Hyperkalemia     Acute systolic congestive heart failure ([address]     Pulmonary hypertension ([address]     Morbid obesity with BMI of 45.0-49.9, adult ([address]     Acute on chronic respiratory failure with hypoxia (HCC)     COPD exacerbation (HCC)     Asthma exacerbation     Type 2 diabetes mellitus with diabetic nephropathy (HCC)     ESRD (end stage renal disease) on dialysis ([address]     Bronchitis     Essential hypertension     YONI on CPAP     Hyperglycemia, drug-induced     Needs smoking cessation education     Hyperglycemia     Drowsiness     Acute respiratory acidosis     Acute on chronic respiratory failure with hypercapnia ([address]     Mobility impaired     S/P cardiac cath-mINIMAL caD 3/15/16 - dR. 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

## 2020-02-06 RX ORDER — ATORVASTATIN CALCIUM 10 MG/1
TABLET, FILM COATED ORAL
Qty: 30 TABLET | Refills: 0 | Status: SHIPPED | OUTPATIENT
Start: 2020-02-06 | End: 2020-03-10 | Stop reason: SDUPTHER

## 2020-02-06 RX ORDER — ASPIRIN 81 MG/1
TABLET ORAL
Qty: 30 TABLET | Refills: 0 | Status: SHIPPED | OUTPATIENT
Start: 2020-02-06 | End: 2020-03-10 | Stop reason: SDUPTHER

## 2020-02-06 RX ORDER — ISOPROPYL ALCOHOL 0.7 ML/ML
SWAB TOPICAL
Qty: 200 EACH | Refills: 2 | Status: SHIPPED | OUTPATIENT
Start: 2020-02-06 | End: 2020-03-17 | Stop reason: SDUPTHER

## 2020-02-06 RX ORDER — BLOOD-GLUCOSE METER
EACH MISCELLANEOUS
Qty: 100 EACH | Refills: 3 | Status: SHIPPED | OUTPATIENT
Start: 2020-02-06 | End: 2020-08-17 | Stop reason: SDUPTHER

## 2020-03-10 RX ORDER — FUROSEMIDE 40 MG/1
TABLET ORAL
Qty: 60 TABLET | Refills: 0 | Status: SHIPPED | OUTPATIENT
Start: 2020-03-10 | End: 2020-03-13 | Stop reason: SDUPTHER

## 2020-03-10 RX ORDER — ASPIRIN 81 MG/1
TABLET ORAL
Qty: 30 TABLET | Refills: 0 | Status: SHIPPED | OUTPATIENT
Start: 2020-03-10 | End: 2020-03-13 | Stop reason: SDUPTHER

## 2020-03-10 RX ORDER — LISINOPRIL 5 MG/1
5 TABLET ORAL DAILY
Qty: 30 TABLET | Refills: 0 | Status: SHIPPED | OUTPATIENT
Start: 2020-03-10 | End: 2020-03-13 | Stop reason: SDUPTHER

## 2020-03-10 RX ORDER — ATORVASTATIN CALCIUM 10 MG/1
TABLET, FILM COATED ORAL
Qty: 30 TABLET | Refills: 0 | Status: SHIPPED | OUTPATIENT
Start: 2020-03-10 | End: 2020-03-13 | Stop reason: SDUPTHER

## 2020-03-10 RX ORDER — WATER / MINERAL OIL / WHITE PETROLATUM 16 OZ
CREAM TOPICAL
Qty: 454 G | Refills: 3 | Status: SHIPPED | OUTPATIENT
Start: 2020-03-10 | End: 2020-03-13 | Stop reason: SDUPTHER

## 2020-03-12 NOTE — TELEPHONE ENCOUNTER
Last visit:   Last Med refill:   Does patient have enough medication for 72 hours: no patient pharmacy called stating that Dr. Lin Mercado # is not recognized need a attending to reorder these med    Next Visit Date:  No future appointments. Health Maintenance   Topic Date Due    Diabetic retinal exam  02/25/1980    Hepatitis B vaccine (1 of 3 - Risk 3-dose series) 02/25/1989    DTaP/Tdap/Td vaccine (1 - Tdap) 02/25/1989    Cervical cancer screen  09/05/2017    Pneumococcal 0-64 years Vaccine (3 of 3 - PPSV23) 10/03/2018    Diabetic foot exam  10/27/2018    Annual Wellness Visit (AWV)  06/23/2019    Flu vaccine (1) 09/01/2019    Shingles Vaccine (1 of 2) 02/25/2020    Breast cancer screen  02/25/2020    Colon Cancer Screen FIT/FOBT  02/25/2020    Lipid screen  07/02/2020    Potassium monitoring  07/02/2020    Creatinine monitoring  07/02/2020    A1C test (Diabetic or Prediabetic)  08/19/2020    HIV screen  Completed    Hepatitis A vaccine  Aged Out    Hib vaccine  Aged Out    Meningococcal (ACWY) vaccine  Aged Out       Hemoglobin A1C (%)   Date Value   08/19/2019 4.6   03/22/2018 5.0   01/12/2017 5.6             ( goal A1C is < 7)   Microalb/Crt.  Ratio (mcg/mg creat)   Date Value   04/29/2014 1,828     LDL Cholesterol (mg/dL)   Date Value   07/02/2019 33   12/09/2015 65       (goal LDL is <100)   AST (U/L)   Date Value   04/13/2017 24     ALT (U/L)   Date Value   04/13/2017 33     BUN (mg/dL)   Date Value   07/02/2019 57 (H)     BP Readings from Last 3 Encounters:   08/19/19 (!) 140/76   07/02/19 (!) 142/80   07/01/19 (!) 137/94          (goal 120/80)    All Future Testing planned in [facility]  Lab Frequency Next Occurrence               Patient Active Problem List:     Asthma     YONI (obstructive sleep apnea)     HTN (hypertension)     Left knee pain     Hidradenitis     Current smoker     Microalbuminuria     Acute exacerbation of CHF (congestive heart failure) (HCC)     Essential hypertension

## 2020-03-13 RX ORDER — WATER / MINERAL OIL / WHITE PETROLATUM 16 OZ
CREAM TOPICAL
Qty: 454 G | Refills: 3 | Status: SHIPPED | OUTPATIENT
Start: 2020-03-13 | End: 2020-06-24

## 2020-03-13 RX ORDER — LISINOPRIL 5 MG/1
5 TABLET ORAL DAILY
Qty: 30 TABLET | Refills: 0 | Status: SHIPPED | OUTPATIENT
Start: 2020-03-13 | End: 2020-05-19 | Stop reason: SDUPTHER

## 2020-03-13 RX ORDER — ATORVASTATIN CALCIUM 10 MG/1
TABLET, FILM COATED ORAL
Qty: 30 TABLET | Refills: 0 | Status: SHIPPED | OUTPATIENT
Start: 2020-03-13 | End: 2020-04-06

## 2020-03-13 RX ORDER — FUROSEMIDE 40 MG/1
TABLET ORAL
Qty: 60 TABLET | Refills: 0 | Status: SHIPPED | OUTPATIENT
Start: 2020-03-13 | End: 2020-05-27 | Stop reason: SDUPTHER

## 2020-03-13 RX ORDER — ASPIRIN 81 MG/1
TABLET ORAL
Qty: 30 TABLET | Refills: 0 | Status: SHIPPED | OUTPATIENT
Start: 2020-03-13 | End: 2020-04-06

## 2020-03-16 NOTE — TELEPHONE ENCOUNTER
Please address the medication refill and close the encounter. If I can be of assistance, please route to the applicable pool. Thank you. Last visit:08/19/2019  Last Med refill: 12/10/2019  Does patient have enough medication for 72 hours: No:     Next Visit Date:  No future appointments. Health Maintenance   Topic Date Due    Diabetic retinal exam  02/25/1980    Hepatitis B vaccine (1 of 3 - Risk 3-dose series) 02/25/1989    DTaP/Tdap/Td vaccine (1 - Tdap) 02/25/1989    Cervical cancer screen  09/05/2017    Pneumococcal 0-64 years Vaccine (3 of 3 - PPSV23) 10/03/2018    Diabetic foot exam  10/27/2018    Annual Wellness Visit (AWV)  06/23/2019    Flu vaccine (1) 09/01/2019    Shingles Vaccine (1 of 2) 02/25/2020    Breast cancer screen  02/25/2020    Colon Cancer Screen FIT/FOBT  02/25/2020    Lipid screen  07/02/2020    Potassium monitoring  07/02/2020    Creatinine monitoring  07/02/2020    A1C test (Diabetic or Prediabetic)  08/19/2020    HIV screen  Completed    Hepatitis A vaccine  Aged Out    Hib vaccine  Aged Out    Meningococcal (ACWY) vaccine  Aged Out       Hemoglobin A1C (%)   Date Value   08/19/2019 4.6   03/22/2018 5.0   01/12/2017 5.6             ( goal A1C is < 7)   Microalb/Crt.  Ratio (mcg/mg creat)   Date Value   04/29/2014 1,828     LDL Cholesterol (mg/dL)   Date Value   07/02/2019 33   12/09/2015 65       (goal LDL is <100)   AST (U/L)   Date Value   04/13/2017 24     ALT (U/L)   Date Value   04/13/2017 33     BUN (mg/dL)   Date Value   07/02/2019 57 (H)     BP Readings from Last 3 Encounters:   08/19/19 (!) 140/76   07/02/19 (!) 142/80   07/01/19 (!) 137/94          (goal 120/80)    All Future Testing planned in [facility]  Lab Frequency Next Occurrence               Patient Active Problem List:     Asthma     YONI (obstructive sleep apnea)     HTN (hypertension)     Left knee pain     Hidradenitis     Current smoker     Microalbuminuria     Acute exacerbation of CHF

## 2020-03-17 RX ORDER — LANCING DEVICE
EACH MISCELLANEOUS
Qty: 1 EACH | Refills: 0 | Status: ON HOLD | OUTPATIENT
Start: 2020-03-17 | End: 2021-01-01 | Stop reason: HOSPADM

## 2020-03-17 RX ORDER — LANCETS 30 GAUGE
EACH MISCELLANEOUS
Qty: 100 EACH | Refills: 3 | Status: ON HOLD | OUTPATIENT
Start: 2020-03-17 | End: 2021-01-01 | Stop reason: HOSPADM

## 2020-03-17 RX ORDER — CALCIUM CITRATE/VITAMIN D3 200MG-6.25
TABLET ORAL
Qty: 50 STRIP | Refills: 5 | Status: SHIPPED | OUTPATIENT
Start: 2020-03-17 | End: 2020-08-17 | Stop reason: SDUPTHER

## 2020-03-17 RX ORDER — ISOPROPYL ALCOHOL 0.7 ML/ML
SWAB TOPICAL
Qty: 200 EACH | Refills: 2 | Status: SHIPPED | OUTPATIENT
Start: 2020-03-17 | End: 2020-01-01

## 2020-04-06 RX ORDER — ATORVASTATIN CALCIUM 10 MG/1
TABLET, FILM COATED ORAL
Qty: 30 TABLET | Refills: 0 | Status: SHIPPED | OUTPATIENT
Start: 2020-04-06 | End: 2020-08-06

## 2020-04-06 RX ORDER — LISINOPRIL 30 MG/1
TABLET ORAL
Qty: 30 TABLET | Refills: 5 | Status: SHIPPED | OUTPATIENT
Start: 2020-04-06 | End: 2020-05-27

## 2020-04-06 RX ORDER — ASPIRIN 81 MG/1
TABLET ORAL
Qty: 30 TABLET | Refills: 0 | Status: SHIPPED | OUTPATIENT
Start: 2020-04-06 | End: 2020-05-27 | Stop reason: SDUPTHER

## 2020-04-13 ENCOUNTER — TELEPHONE (OUTPATIENT)
Dept: INTERNAL MEDICINE | Age: 50
End: 2020-04-13

## 2020-04-17 RX ORDER — INSULIN GLARGINE 100 [IU]/ML
INJECTION, SOLUTION SUBCUTANEOUS
Qty: 15 ML | Refills: 0 | Status: SHIPPED | OUTPATIENT
Start: 2020-04-17 | End: 2020-05-27 | Stop reason: SDUPTHER

## 2020-04-17 NOTE — TELEPHONE ENCOUNTER
Hyperkalemia     Acute systolic congestive heart failure ([address]     Pulmonary hypertension (HCC)     Morbid obesity with BMI of 45.0-49.9, adult (HCC)     Acute on chronic respiratory failure with hypoxia (HCC)     COPD exacerbation (HCC)     Asthma exacerbation     Type 2 diabetes mellitus with diabetic nephropathy (HCC)     ESRD (end stage renal disease) on dialysis ([address]     Bronchitis     Essential hypertension     YONI on CPAP     Hyperglycemia, drug-induced     Needs smoking cessation education     Hyperglycemia     Drowsiness     Acute on chronic respiratory failure with hypercapnia ([address]     Mobility impaired     S/P cardiac cath-mINIMAL caD 3/15/16 - dR. 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

## 2020-05-12 ENCOUNTER — TELEPHONE (OUTPATIENT)
Dept: FAMILY MEDICINE CLINIC | Age: 50
End: 2020-05-12

## 2020-05-15 ENCOUNTER — TELEPHONE (OUTPATIENT)
Dept: FAMILY MEDICINE CLINIC | Age: 50
End: 2020-05-15

## 2020-05-18 ENCOUNTER — TELEPHONE (OUTPATIENT)
Dept: FAMILY MEDICINE CLINIC | Age: 50
End: 2020-05-18

## 2020-05-19 RX ORDER — LISINOPRIL 5 MG/1
5 TABLET ORAL DAILY
Qty: 30 TABLET | Refills: 3 | Status: SHIPPED | OUTPATIENT
Start: 2020-05-19 | End: 2020-08-17

## 2020-05-27 ENCOUNTER — OFFICE VISIT (OUTPATIENT)
Dept: FAMILY MEDICINE CLINIC | Age: 50
End: 2020-05-27
Payer: COMMERCIAL

## 2020-05-27 VITALS
OXYGEN SATURATION: 98 % | HEIGHT: 59 IN | SYSTOLIC BLOOD PRESSURE: 130 MMHG | HEART RATE: 76 BPM | DIASTOLIC BLOOD PRESSURE: 80 MMHG | BODY MASS INDEX: 47.78 KG/M2 | WEIGHT: 237 LBS | TEMPERATURE: 98.3 F

## 2020-05-27 PROCEDURE — G8428 CUR MEDS NOT DOCUMENT: HCPCS | Performed by: STUDENT IN AN ORGANIZED HEALTH CARE EDUCATION/TRAINING PROGRAM

## 2020-05-27 PROCEDURE — 2022F DILAT RTA XM EVC RTNOPTHY: CPT | Performed by: STUDENT IN AN ORGANIZED HEALTH CARE EDUCATION/TRAINING PROGRAM

## 2020-05-27 PROCEDURE — 3017F COLORECTAL CA SCREEN DOC REV: CPT | Performed by: STUDENT IN AN ORGANIZED HEALTH CARE EDUCATION/TRAINING PROGRAM

## 2020-05-27 PROCEDURE — 99213 OFFICE O/P EST LOW 20 MIN: CPT | Performed by: STUDENT IN AN ORGANIZED HEALTH CARE EDUCATION/TRAINING PROGRAM

## 2020-05-27 PROCEDURE — 3046F HEMOGLOBIN A1C LEVEL >9.0%: CPT | Performed by: STUDENT IN AN ORGANIZED HEALTH CARE EDUCATION/TRAINING PROGRAM

## 2020-05-27 PROCEDURE — 1036F TOBACCO NON-USER: CPT | Performed by: STUDENT IN AN ORGANIZED HEALTH CARE EDUCATION/TRAINING PROGRAM

## 2020-05-27 PROCEDURE — G8417 CALC BMI ABV UP PARAM F/U: HCPCS | Performed by: STUDENT IN AN ORGANIZED HEALTH CARE EDUCATION/TRAINING PROGRAM

## 2020-05-27 RX ORDER — ASPIRIN 81 MG/1
TABLET ORAL
Qty: 30 TABLET | Refills: 0 | Status: SHIPPED | OUTPATIENT
Start: 2020-05-27 | End: 2020-08-17 | Stop reason: SDUPTHER

## 2020-05-27 RX ORDER — INSULIN GLARGINE 100 [IU]/ML
INJECTION, SOLUTION SUBCUTANEOUS
Qty: 15 ML | Refills: 0 | Status: SHIPPED | OUTPATIENT
Start: 2020-05-27 | End: 2020-07-21

## 2020-05-27 RX ORDER — FUROSEMIDE 40 MG/1
TABLET ORAL
Qty: 60 TABLET | Refills: 0 | Status: SHIPPED | OUTPATIENT
Start: 2020-05-27 | End: 2020-07-06

## 2020-05-27 ASSESSMENT — ENCOUNTER SYMPTOMS
NAUSEA: 0
SORE THROAT: 0
VOMITING: 0
SHORTNESS OF BREATH: 0
PHOTOPHOBIA: 0
RHINORRHEA: 0
EYE PAIN: 0
WHEEZING: 0
ABDOMINAL PAIN: 0
COUGH: 0

## 2020-05-27 NOTE — PROGRESS NOTES
Visit Information    Have you changed or started any medications since your last visit including any over-the-counter medicines, vitamins, or herbal medicines? no   Have you stopped taking any of your medications? Is so, why? -  no  Are you having any side effects from any of your medications? - no    Have you seen any other physician or provider since your last visit? Yes   Have you had any other diagnostic tests since your last visit?  no   Have you been seen in the emergency room and/or had an admission in a hospital since we last saw you?  no   Have you had your routine dental cleaning in the past 6 months?  no     Do you have an active MyChart account? If no, what is the barrier?   Yes    Patient Care Team:  Irene Lin MD as PCP - General (Family Medicine)  Carla Duran MD (Obstetrics & Gynecology)  [facility] [place] ([place] [id number])  Krissy Lu MD as Referring Physician (Family Medicine)    Medical History Review  Past Medical, Family, and Social History reviewed and does contribute to the patient presenting condition    Health Maintenance   Topic Date Due    Diabetic retinal exam  02/25/1980    DTaP/Tdap/Td vaccine (1 - Tdap) 02/25/1989    Cervical cancer screen  09/05/2017    Diabetic foot exam  10/27/2018    Annual Wellness Visit (AWV)  06/23/2019    Breast cancer screen  02/25/2020    Colon Cancer Screen FIT/FOBT  02/25/2020    Hepatitis B vaccine (1 of 3 - Risk 3-dose series) 05/27/2021 (Originally 2/25/1989)    Shingles Vaccine (1 of 2) 05/27/2021 (Originally 2/25/2020)    Pneumococcal 0-64 years Vaccine (3 of 3 - PPSV23) 05/27/2021 (Originally 10/3/2018)    Lipid screen  07/02/2020    Potassium monitoring  07/02/2020    Creatinine monitoring  07/02/2020    A1C test (Diabetic or Prediabetic)  08/19/2020    Flu vaccine (Season Ended) 09/01/2020    HIV screen  Completed    Hepatitis A vaccine  Aged Out    Hib vaccine  Aged Out    Meningococcal (ACWY) vaccine  Aged Out

## 2020-05-27 NOTE — PROGRESS NOTES
Objective:    /80   Pulse 76   Temp 98.3 °F (36.8 °C) (Temporal)   Ht 4' 11.02\" (1.499 m)   Wt 237 lb (107.5 kg)   LMP 11/12/2014 (Approximate)   SpO2 98% Comment: 3 liters oxygen  BMI 47.84 kg/m²    BP Readings from Last 3 Encounters:   05/27/20 130/80   08/19/19 (!) 140/76   07/02/19 (!) 142/80       Physical Exam  Constitutional:       General: She is not in acute distress. Appearance: She is well-developed. Comments: obese   HENT:      Head: Normocephalic and atraumatic. Eyes:      Pupils: Pupils are equal, round, and reactive to light. Neck:      Musculoskeletal: Neck supple. Trachea: No tracheal deviation. Cardiovascular:      Rate and Rhythm: Normal rate and regular rhythm. Heart sounds: No murmur. No friction rub. No gallop. Pulmonary:      Effort: Pulmonary effort is normal.      Breath sounds: Normal breath sounds. No wheezing or rales. Comments: On 3L NC  Abdominal:      General: There is no distension. Palpations: Abdomen is soft. There is no mass. Tenderness: There is no abdominal tenderness. Musculoskeletal: Normal range of motion. General: No tenderness or deformity. Skin:     General: Skin is warm and dry. Findings: No erythema or rash. Neurological:      Mental Status: She is alert and oriented to person, place, and time. Psychiatric:         Behavior: Behavior is cooperative.        Visual inspection:  Deformity/amputation: absent  Skin lesions/pre-ulcerative calluses: absent  Edema: right- negative, left- negative    Sensory exam:  Monofilament sensation: normal  (minimum of 5 random plantar locations tested, avoiding callused areas - > 1 area with absence of sensation is + for neuropathy)    Plus at least one of the following:  Pulses: normal,   Pinprick: Intact  Proprioception: Intact  Vibration (128 Hz): N/A    Lab Results   Component Value Date    WBC 10.5 07/02/2019    HGB 8.6 (L) 07/02/2019    HCT 27.5 (L) 07/02/2019     07/02/2019    CHOL 75 07/02/2019    TRIG 39 07/02/2019    HDL 34 (L) 07/02/2019    ALT 33 04/13/2017    AST 24 04/13/2017     07/02/2019    K 4.7 07/02/2019    CL 99 07/02/2019    CREATININE 5.68 (HH) 07/02/2019    BUN 57 (H) 07/02/2019    CO2 23 07/02/2019    TSH 0.88 07/02/2019    INR 1.0 04/13/2017    LABA1C 4.6 08/19/2019    LABMICR 1,828 04/29/2014     Lab Results   Component Value Date    CALCIUM 8.5 (L) 07/02/2019    PHOS 2.3 (L) 04/12/2017     Lab Results   Component Value Date    LDLCHOLESTEROL 33 07/02/2019       Assessment and Plan:    1. Type 2 diabetes mellitus with chronic kidney disease on chronic dialysis, with long-term current use of insulin (HCC)  -  DIABETES FOOT EXAM  - aspirin (ASPIRIN LOW DOSE) 81 MG EC tablet; TAKE ONE TABLET BY MOUTH  Dispense: 30 tablet; Refill: 0  - insulin glargine (LANTUS SOLOSTAR) 100 UNIT/ML injection pen; INJECT 15 UNITS SUBCUTANEOUSLY EVERY NIGHT  Dispense: 15 mL; Refill: 0    2. Essential hypertension  - BP well controlled  - Continue current medication regimen    3. Chronic diastolic congestive heart failure (HCC)  - furosemide (LASIX) 40 MG tablet; TAKE 1 TABLET BY MOUTH TWICE DAILY  Dispense: 60 tablet; Refill: 0    4. Anemia due to chronic kidney disease, unspecified CKD stage  - CBC With Auto Differential; Future    5. Encounter for screening mammogram for breast cancer  - [facility] Digital Screen Bilateral [[id number]]; Future    6.  Colon cancer screening  - [address] [facility],[address], Ana Paula Redd DO, General Surgery, [facility]      Requested Prescriptions     Signed Prescriptions Disp Refills    aspirin (ASPIRIN LOW DOSE) 81 MG EC tablet 30 tablet 0     Sig: TAKE ONE TABLET BY MOUTH    furosemide (LASIX) 40 MG tablet 60 tablet 0     Sig: TAKE 1 TABLET BY MOUTH TWICE DAILY    insulin glargine (LANTUS SOLOSTAR) 100 UNIT/ML injection pen 15 mL 0     Sig: INJECT 15 UNITS SUBCUTANEOUSLY EVERY NIGHT       Medications Discontinued During This Encounter Medication Reason    lisinopril (PRINIVIL;ZESTRIL) 30 MG tablet LIST CLEANUP    aspirin (ASPIRIN LOW DOSE) 81 MG EC tablet REORDER    furosemide (LASIX) 40 MG tablet REORDER    insulin glargine (LANTUS SOLOSTAR) 100 UNIT/ML injection pen REORDER       Opal received counseling on the following healthy behaviors:nutrition, exercise and medication adherence    Discussed use, benefit, and side effects of prescribed medications. Barriers to medication compliance addressed. All patient questions answered. Pt voicedunderstanding.      Return in about 3 months (around 8/27/2020) for DM, HTN, .

## 2020-06-24 RX ORDER — LANOLIN ALCOHOL/MO/W.PET/CERES
CREAM (GRAM) TOPICAL
Qty: 454 G | Refills: 3 | Status: ON HOLD | OUTPATIENT
Start: 2020-06-24 | End: 2021-01-01 | Stop reason: HOSPADM

## 2020-07-02 NOTE — TELEPHONE ENCOUNTER
Last visit:   Last Med refill:   Does patient have enough medication for 72 hours: No:     Next Visit Date:  Future Appointments   Date Time Provider [place] Farrar   7/8/2020  9:00 AM SCHEDULE, [facility] [address] [facility] Maintenance   Topic Date Due    Diabetic retinal exam  02/25/1980    DTaP/Tdap/Td vaccine (1 - Tdap) 02/25/1989    Cervical cancer screen  09/05/2017    Annual Wellness Visit (AWV)  06/23/2019    Breast cancer screen  02/25/2020    Colon Cancer Screen FIT/FOBT  02/25/2020    Potassium monitoring  07/02/2020    Creatinine monitoring  07/02/2020    Lipid screen  07/02/2020    Hepatitis B vaccine (1 of 3 - Risk 3-dose series) 05/27/2021 (Originally 2/25/1989)    Shingles Vaccine (1 of 2) 05/27/2021 (Originally 2/25/2020)    Pneumococcal 0-64 years Vaccine (3 of 3 - PPSV23) 05/27/2021 (Originally 10/3/2018)    A1C test (Diabetic or Prediabetic)  08/19/2020    Flu vaccine (1) 09/01/2020    Diabetic foot exam  05/27/2021    HIV screen  Completed    Hepatitis A vaccine  Aged Out    Hib vaccine  Aged Out    Meningococcal (ACWY) vaccine  Aged Out       Hemoglobin A1C (%)   Date Value   08/19/2019 4.6   03/22/2018 5.0   01/12/2017 5.6             ( goal A1C is < 7)   Microalb/Crt.  Ratio (mcg/mg creat)   Date Value   04/29/2014 1,828     LDL Cholesterol (mg/dL)   Date Value   07/02/2019 33   12/09/2015 65       (goal LDL is <100)   AST (U/L)   Date Value   04/13/2017 24     ALT (U/L)   Date Value   04/13/2017 33     BUN (mg/dL)   Date Value   07/02/2019 57 (H)     BP Readings from Last 3 Encounters:   05/27/20 130/80   08/19/19 (!) 140/76   07/02/19 (!) 142/80          (goal 120/80)    All Future Testing planned in [facility]  Lab Frequency Next Occurrence   [facility] Digital Screen Bilateral [[id number]] Once 06/26/2020   CBC With Auto Differential Once 10/10/2020               Patient Active Problem List:     Asthma     YONI (obstructive sleep apnea)     HTN (hypertension)     Left knee pain     Hidradenitis     Current smoker     Microalbuminuria     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 kidney disease) stage 4, GFR 15-29 ml/min (HCC)     Acute diastolic congestive heart failure (HCC)     Hyperkalemia     Acute systolic congestive heart failure (HCC)     Pulmonary hypertension (HCC)     Morbid obesity with BMI of 45.0-49.9, adult ([address]     Acute on chronic respiratory failure with hypoxia (HCC)     COPD exacerbation (HCC)     Asthma exacerbation     Type 2 diabetes mellitus with diabetic nephropathy (HCC)     ESRD (end stage renal disease) on dialysis ([address]     Bronchitis     Essential hypertension     YONI on CPAP     Hyperglycemia, drug-induced     Needs smoking cessation education     Hyperglycemia     Drowsiness     Acute on chronic respiratory failure with hypercapnia ([address]     Mobility impaired     S/P cardiac cath-mINIMAL caD 3/15/16 - dR. 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)     Type 2 diabetes mellitus without complication (HCC)     Congestive heart failure (HCC)     Asthma with COPD with exacerbation (HCC)     Acute exacerbation of CHF (congestive heart failure) (HCC)     Chronic obstructive pulmonary disease (HCC)     Chronic kidney disease, stage V (HCC)     Acute respiratory acidosis     Precordial pain     Gastroesophageal reflux disease without esophagitis     Pulmonary HTN ([address]     Morbid obesity due to excess calories (HCC)     Symptomatic anemia     Elevated serum creatinine     ESRD needing dialysis ([address]           Please address the medication refill and close the encounter. If I can be of assistance, please route to the applicable pool. Thank you.

## 2020-07-06 RX ORDER — FUROSEMIDE 40 MG/1
TABLET ORAL
Qty: 60 TABLET | Refills: 0 | Status: SHIPPED | OUTPATIENT
Start: 2020-07-06 | End: 2020-07-07

## 2020-07-07 RX ORDER — FUROSEMIDE 40 MG/1
TABLET ORAL
Qty: 60 TABLET | Refills: 0 | Status: SHIPPED | OUTPATIENT
Start: 2020-07-07 | End: 2020-08-17

## 2020-07-07 NOTE — TELEPHONE ENCOUNTER
Last visit:   Last Med refill:   Does patient have enough medication for 72 hours: No:     Next Visit Date:  Future Appointments   Date Time Provider [place] Farrar   7/8/2020  9:00 AM SCHEDULE, [facility] Maintenance   Topic Date Due    Diabetic retinal exam  02/25/1980    DTaP/Tdap/Td vaccine (1 - Tdap) 02/25/1989    Cervical cancer screen  09/05/2017    Annual Wellness Visit (AWV)  06/23/2019    Breast cancer screen  02/25/2020    Colon Cancer Screen FIT/FOBT  02/25/2020    Potassium monitoring  07/02/2020    Creatinine monitoring  07/02/2020    Lipid screen  07/02/2020    Hepatitis B vaccine (1 of 3 - Risk 3-dose series) 05/27/2021 (Originally 2/25/1989)    Shingles Vaccine (1 of 2) 05/27/2021 (Originally 2/25/2020)    Pneumococcal 0-64 years Vaccine (3 of 3 - PPSV23) 05/27/2021 (Originally 10/3/2018)    A1C test (Diabetic or Prediabetic)  08/19/2020    Flu vaccine (1) 09/01/2020    Diabetic foot exam  05/27/2021    HIV screen  Completed    Hepatitis A vaccine  Aged Out    Hib vaccine  Aged Out    Meningococcal (ACWY) vaccine  Aged Out       Hemoglobin A1C (%)   Date Value   08/19/2019 4.6   03/22/2018 5.0   01/12/2017 5.6             ( goal A1C is < 7)   Microalb/Crt.  Ratio (mcg/mg creat)   Date Value   04/29/2014 1,828     LDL Cholesterol (mg/dL)   Date Value   07/02/2019 33   12/09/2015 65       (goal LDL is <100)   AST (U/L)   Date Value   04/13/2017 24     ALT (U/L)   Date Value   04/13/2017 33     BUN (mg/dL)   Date Value   07/02/2019 57 (H)     BP Readings from Last 3 Encounters:   05/27/20 130/80   08/19/19 (!) 140/76   07/02/19 (!) 142/80          (goal 120/80)    All Future Testing planned in [facility]  Lab Frequency Next Occurrence   [facility] Digital Screen Bilateral [[id number]] Once 06/26/2020   CBC With Auto Differential Once 10/10/2020               Patient Active Problem List:     Asthma     YONI (obstructive sleep apnea)     HTN (hypertension)     Left knee pain     Hidradenitis     Current smoker     Microalbuminuria     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 kidney disease) stage 4, GFR 15-29 ml/min (HCC)     Acute diastolic congestive heart failure (HCC)     Hyperkalemia     Acute systolic congestive heart failure (HCC)     Pulmonary hypertension (HCC)     Morbid obesity with BMI of 45.0-49.9, adult ([address]     Acute on chronic respiratory failure with hypoxia (HCC)     COPD exacerbation (HCC)     Asthma exacerbation     Type 2 diabetes mellitus with diabetic nephropathy (HCC)     ESRD (end stage renal disease) on dialysis ([address]     Bronchitis     Essential hypertension     YONI on CPAP     Hyperglycemia, drug-induced     Needs smoking cessation education     Hyperglycemia     Drowsiness     Acute on chronic respiratory failure with hypercapnia ([address]     Mobility impaired     S/P cardiac cath-mINIMAL caD 3/15/16 - dR. 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)     Type 2 diabetes mellitus without complication (HCC)     Congestive heart failure (HCC)     Asthma with COPD with exacerbation (HCC)     Acute exacerbation of CHF (congestive heart failure) (HCC)     Chronic obstructive pulmonary disease (HCC)     Chronic kidney disease, stage V (HCC)     Acute respiratory acidosis     Precordial pain     Gastroesophageal reflux disease without esophagitis     Pulmonary HTN ([address]     Morbid obesity due to excess calories (HCC)     Symptomatic anemia     Elevated serum creatinine     ESRD needing dialysis ([address]           Please address the medication refill and close the encounter. If I can be of assistance, please route to the applicable pool. Thank you.

## 2020-07-21 RX ORDER — INSULIN GLARGINE 100 [IU]/ML
INJECTION, SOLUTION SUBCUTANEOUS
Qty: 15 ML | Refills: 0 | Status: SHIPPED | OUTPATIENT
Start: 2020-07-21 | End: 2020-10-13

## 2020-07-21 NOTE — TELEPHONE ENCOUNTER
Please address the medication refill and close the encounter. If I can be of assistance, please route to the applicable pool. Thank you.

## 2020-08-04 NOTE — TELEPHONE ENCOUNTER
Next Visit Date:  No future appointments. Health Maintenance   Topic Date Due    Diabetic retinal exam  02/25/1980    DTaP/Tdap/Td vaccine (1 - Tdap) 02/25/1989    Cervical cancer screen  09/05/2017    Annual Wellness Visit (AWV)  06/23/2019    Breast cancer screen  02/25/2020    Colon Cancer Screen FIT/FOBT  02/25/2020    Lipid screen  07/02/2020    Potassium monitoring  07/02/2020    Creatinine monitoring  07/02/2020    Hepatitis B vaccine (1 of 3 - Risk 3-dose series) 05/27/2021 (Originally 2/25/1989)    Shingles Vaccine (1 of 2) 05/27/2021 (Originally 2/25/2020)    Pneumococcal 0-64 years Vaccine (3 of 3 - PPSV23) 05/27/2021 (Originally 10/3/2018)    A1C test (Diabetic or Prediabetic)  08/19/2020    Flu vaccine (1) 09/01/2020    Diabetic foot exam  05/27/2021    HIV screen  Completed    Hepatitis A vaccine  Aged Out    Hib vaccine  Aged Out    Meningococcal (ACWY) vaccine  Aged Out       Hemoglobin A1C (%)   Date Value   08/19/2019 4.6   03/22/2018 5.0   01/12/2017 5.6             ( goal A1C is < 7)   Microalb/Crt.  Ratio (mcg/mg creat)   Date Value   04/29/2014 1,828     LDL Cholesterol (mg/dL)   Date Value   07/02/2019 33   12/09/2015 65       (goal LDL is <100)   AST (U/L)   Date Value   04/13/2017 24     ALT (U/L)   Date Value   04/13/2017 33     BUN (mg/dL)   Date Value   07/02/2019 57 (H)     BP Readings from Last 3 Encounters:   05/27/20 130/80   08/19/19 (!) 140/76   07/02/19 (!) 142/80          (goal 120/80)    All Future Testing planned in [facility]  Lab Frequency Next Occurrence   [facility] Digital Screen Bilateral [[id number]] Once 10/10/2020   CBC With Auto Differential Once 10/10/2020               Patient Active Problem List:     Asthma     YONI (obstructive sleep apnea)     HTN (hypertension)     Left knee pain     Hidradenitis     Current smoker     Microalbuminuria     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 kidney disease) stage 4, GFR 15-29 ml/min (HCC)     Acute diastolic congestive heart failure (HCC)     Hyperkalemia     Acute systolic congestive heart failure (HCC)     Pulmonary hypertension (HCC)     Morbid obesity with BMI of 45.0-49.9, adult (HCC)     Acute on chronic respiratory failure with hypoxia (HCC)     COPD exacerbation (HCC)     Asthma exacerbation     Type 2 diabetes mellitus with diabetic nephropathy (HCC)     ESRD (end stage renal disease) on dialysis ([address]     Bronchitis     Essential hypertension     YONI on CPAP     Hyperglycemia, drug-induced     Needs smoking cessation education     Hyperglycemia     Drowsiness     Acute on chronic respiratory failure with hypercapnia ([address]     Mobility impaired     S/P cardiac cath-mINIMAL caD 3/15/16 - dR. 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

## 2020-08-06 RX ORDER — ATORVASTATIN CALCIUM 10 MG/1
TABLET, FILM COATED ORAL
Qty: 30 TABLET | Refills: 0 | Status: SHIPPED | OUTPATIENT
Start: 2020-08-06 | End: 2020-08-17

## 2020-08-17 RX ORDER — LISINOPRIL 5 MG/1
5 TABLET ORAL DAILY
Qty: 30 TABLET | Refills: 3 | Status: SHIPPED | OUTPATIENT
Start: 2020-08-17 | End: 2020-01-01

## 2020-08-17 RX ORDER — BLOOD-GLUCOSE METER
EACH MISCELLANEOUS
Qty: 100 EACH | Refills: 3 | Status: SHIPPED | OUTPATIENT
Start: 2020-08-17 | End: 2021-01-01

## 2020-08-17 RX ORDER — FUROSEMIDE 40 MG/1
TABLET ORAL
Qty: 60 TABLET | Refills: 0 | Status: SHIPPED | OUTPATIENT
Start: 2020-08-17 | End: 2020-09-11

## 2020-08-17 RX ORDER — ATORVASTATIN CALCIUM 10 MG/1
TABLET, FILM COATED ORAL
Qty: 30 TABLET | Refills: 0 | Status: SHIPPED | OUTPATIENT
Start: 2020-08-17 | End: 2020-09-11

## 2020-08-17 RX ORDER — ASPIRIN 81 MG/1
TABLET ORAL
Qty: 30 TABLET | Refills: 0 | Status: SHIPPED | OUTPATIENT
Start: 2020-08-17 | End: 2020-09-15

## 2020-08-17 RX ORDER — CALCIUM CITRATE/VITAMIN D3 200MG-6.25
TABLET ORAL
Qty: 50 STRIP | Refills: 5 | Status: SHIPPED | OUTPATIENT
Start: 2020-08-17 | End: 2020-09-30

## 2020-08-17 NOTE — TELEPHONE ENCOUNTER
Last visit: 05/27/20  Last Med refill:   Does patient have enough medication for 72 hours:     Next Visit Date:  No future appointments. Health Maintenance   Topic Date Due    Diabetic retinal exam  02/25/1980    DTaP/Tdap/Td vaccine (1 - Tdap) 02/25/1989    Cervical cancer screen  09/05/2017    Annual Wellness Visit (AWV)  06/23/2019    Breast cancer screen  02/25/2020    Colon Cancer Screen FIT/FOBT  02/25/2020    Lipid screen  07/02/2020    Potassium monitoring  07/02/2020    Creatinine monitoring  07/02/2020    A1C test (Diabetic or Prediabetic)  08/19/2020    Hepatitis B vaccine (1 of 3 - Risk 3-dose series) 05/27/2021 (Originally 2/25/1989)    Shingles Vaccine (1 of 2) 05/27/2021 (Originally 2/25/2020)    Pneumococcal 0-64 years Vaccine (3 of 3 - PPSV23) 05/27/2021 (Originally 10/3/2018)    Flu vaccine (1) 09/01/2020    Diabetic foot exam  05/27/2021    HIV screen  Completed    Hepatitis A vaccine  Aged Out    Hib vaccine  Aged Out    Meningococcal (ACWY) vaccine  Aged Out       Hemoglobin A1C (%)   Date Value   08/19/2019 4.6   03/22/2018 5.0   01/12/2017 5.6             ( goal A1C is < 7)   Microalb/Crt.  Ratio (mcg/mg creat)   Date Value   04/29/2014 1,828     LDL Cholesterol (mg/dL)   Date Value   07/02/2019 33   12/09/2015 65       (goal LDL is <100)   AST (U/L)   Date Value   04/13/2017 24     ALT (U/L)   Date Value   04/13/2017 33     BUN (mg/dL)   Date Value   07/02/2019 57 (H)     BP Readings from Last 3 Encounters:   05/27/20 130/80   08/19/19 (!) 140/76   07/02/19 (!) 142/80          (goal 120/80)    All Future Testing planned in [facility]  Lab Frequency Next Occurrence   KR[facility] Digital Screen Bilateral [[id number]] Once 10/10/2020   CBC With Auto Differential Once 10/10/2020               Patient Active Problem List:     Asthma     YONI (obstructive sleep apnea)     HTN (hypertension)     Left knee pain     Hidradenitis     Current smoker     Microalbuminuria     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 kidney disease) stage 4, GFR 15-29 ml/min (HCC)     Acute diastolic congestive heart failure (HCC)     Hyperkalemia     Acute systolic congestive heart failure (HCC)     Pulmonary hypertension (HCC)     Morbid obesity with BMI of 45.0-49.9, adult (HCC)     Acute on chronic respiratory failure with hypoxia (HCC)     COPD exacerbation (HCC)     Asthma exacerbation     Type 2 diabetes mellitus with diabetic nephropathy (HCC)     ESRD (end stage renal disease) on dialysis ([address]     Bronchitis     Essential hypertension     YONI on CPAP     Hyperglycemia, drug-induced     Needs smoking cessation education     Hyperglycemia     Drowsiness     Acute on chronic respiratory failure with hypercapnia ([address]     Mobility impaired     S/P cardiac cath-mINIMAL caD 3/15/16 - dR. 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

## 2020-09-10 NOTE — TELEPHONE ENCOUNTER
Please address the medication refill and close the encounter. If I can be of assistance, please route to the applicable pool. Thank you. Last visit: 05/27/20  Last Med refill: 08/17/20 atorvastatin    08/17/20 furosemide  Does patient have enough medication for 72 hours: No:     Next Visit Date:  No future appointments. Health Maintenance   Topic Date Due    Diabetic retinal exam  02/25/1980    DTaP/Tdap/Td vaccine (1 - Tdap) 02/25/1989    Cervical cancer screen  09/05/2017    Annual Wellness Visit (AWV)  06/23/2019    Breast cancer screen  02/25/2020    Colon Cancer Screen FIT/FOBT  02/25/2020    Lipid screen  07/02/2020    Potassium monitoring  07/02/2020    Creatinine monitoring  07/02/2020    A1C test (Diabetic or Prediabetic)  08/19/2020    Flu vaccine (1) 09/01/2020    Hepatitis B vaccine (1 of 3 - Risk 3-dose series) 05/27/2021 (Originally 2/25/1989)    Shingles Vaccine (1 of 2) 05/27/2021 (Originally 2/25/2020)    Pneumococcal 0-64 years Vaccine (3 of 3 - PPSV23) 05/27/2021 (Originally 10/3/2018)    Diabetic foot exam  05/27/2021    HIV screen  Completed    Hepatitis A vaccine  Aged Out    Hib vaccine  Aged Out    Meningococcal (ACWY) vaccine  Aged Out       Hemoglobin A1C (%)   Date Value   08/19/2019 4.6   03/22/2018 5.0   01/12/2017 5.6             ( goal A1C is < 7)   Microalb/Crt.  Ratio (mcg/mg creat)   Date Value   04/29/2014 1,828     LDL Cholesterol (mg/dL)   Date Value   07/02/2019 33   12/09/2015 65       (goal LDL is <100)   AST (U/L)   Date Value   04/13/2017 24     ALT (U/L)   Date Value   04/13/2017 33     BUN (mg/dL)   Date Value   07/02/2019 57 (H)     BP Readings from Last 3 Encounters:   05/27/20 130/80   08/19/19 (!) 140/76   07/02/19 (!) 142/80          (goal 120/80)    All Future Testing planned in [facility]  Lab Frequency Next Occurrence   KRISHNA Digital Screen Bilateral [[id number]] Once 10/10/2020   CBC With Auto Differential Once 10/10/2020 Patient Active Problem List:     Asthma     YONI (obstructive sleep apnea)     HTN (hypertension)     Left knee pain     Hidradenitis     Current smoker     Microalbuminuria     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 kidney disease) stage 4, GFR 15-29 ml/min (HCC)     Acute diastolic congestive heart failure (HCC)     Hyperkalemia     Acute systolic congestive heart failure (HCC)     Pulmonary hypertension (HCC)     Morbid obesity with BMI of 45.0-49.9, adult ([address]     Acute on chronic respiratory failure with hypoxia (HCC)     COPD exacerbation (HCC)     Asthma exacerbation     Type 2 diabetes mellitus with diabetic nephropathy (HCC)     ESRD (end stage renal disease) on dialysis ([address]     Bronchitis     Essential hypertension     YONI on CPAP     Hyperglycemia, drug-induced     Needs smoking cessation education     Hyperglycemia     Drowsiness     Acute on chronic respiratory failure with hypercapnia ([address]     Mobility impaired     S/P cardiac cath-mINIMAL caD 3/15/16 - dR. 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

## 2020-09-11 RX ORDER — ATORVASTATIN CALCIUM 10 MG/1
TABLET, FILM COATED ORAL
Qty: 30 TABLET | Refills: 0 | Status: SHIPPED | OUTPATIENT
Start: 2020-09-11 | End: 2020-10-13

## 2020-09-11 RX ORDER — FUROSEMIDE 40 MG/1
TABLET ORAL
Qty: 60 TABLET | Refills: 0 | Status: SHIPPED | OUTPATIENT
Start: 2020-09-11 | End: 2020-10-13

## 2020-09-15 ENCOUNTER — TELEPHONE (OUTPATIENT)
Dept: FAMILY MEDICINE CLINIC | Age: 50
End: 2020-09-15

## 2020-09-15 DIAGNOSIS — R05.9 COUGH: ICD-10-CM

## 2020-09-15 DIAGNOSIS — J30.9 ALLERGIC RHINITIS, UNSPECIFIED SEASONALITY, UNSPECIFIED TRIGGER: Primary | ICD-10-CM

## 2020-09-15 RX ORDER — CETIRIZINE HYDROCHLORIDE 10 MG/1
10 TABLET ORAL DAILY
Qty: 30 TABLET | Refills: 0 | Status: SHIPPED | OUTPATIENT
Start: 2020-09-15 | End: 2020-10-13

## 2020-09-15 RX ORDER — BENZONATATE 100 MG/1
100 CAPSULE ORAL 2 TIMES DAILY PRN
Qty: 20 CAPSULE | Refills: 0 | Status: SHIPPED | OUTPATIENT
Start: 2020-09-15 | End: 2020-09-22

## 2020-09-17 ENCOUNTER — TELEPHONE (OUTPATIENT)
Dept: FAMILY MEDICINE CLINIC | Age: 50
End: 2020-09-17

## 2020-09-17 NOTE — TELEPHONE ENCOUNTER
Please address the medication refill and close the encounter. If I can be of assistance, please route to the applicable pool. Thank you. Last visit: 05/27/20  Last Med refill: 08/17/20  Does patient have enough medication for 72 hours: No:     Next Visit Date:  No future appointments. Health Maintenance   Topic Date Due    Diabetic retinal exam  02/25/1980    DTaP/Tdap/Td vaccine (1 - Tdap) 02/25/1989    Cervical cancer screen  09/05/2017    Annual Wellness Visit (AWV)  06/23/2019    Breast cancer screen  02/25/2020    Colon Cancer Screen FIT/FOBT  02/25/2020    Lipid screen  07/02/2020    Potassium monitoring  07/02/2020    Creatinine monitoring  07/02/2020    A1C test (Diabetic or Prediabetic)  08/19/2020    Flu vaccine (1) 09/01/2020    Hepatitis B vaccine (1 of 3 - Risk 3-dose series) 05/27/2021 (Originally 2/25/1989)    Shingles Vaccine (1 of 2) 05/27/2021 (Originally 2/25/2020)    Pneumococcal 0-64 years Vaccine (3 of 3 - PPSV23) 05/27/2021 (Originally 10/3/2018)    Diabetic foot exam  05/27/2021    HIV screen  Completed    Hepatitis A vaccine  Aged Out    Hib vaccine  Aged Out    Meningococcal (ACWY) vaccine  Aged Out       Hemoglobin A1C (%)   Date Value   08/19/2019 4.6   03/22/2018 5.0   01/12/2017 5.6             ( goal A1C is < 7)   Microalb/Crt.  Ratio (mcg/mg creat)   Date Value   04/29/2014 1,828     LDL Cholesterol (mg/dL)   Date Value   07/02/2019 33   12/09/2015 65       (goal LDL is <100)   AST (U/L)   Date Value   04/13/2017 24     ALT (U/L)   Date Value   04/13/2017 33     BUN (mg/dL)   Date Value   07/02/2019 57 (H)     BP Readings from Last 3 Encounters:   05/27/20 130/80   08/19/19 (!) 140/76   07/02/19 (!) 142/80          (goal 120/80)    All Future Testing planned in [facility]  Lab Frequency Next Occurrence   KRISHNA Digital Screen Bilateral [[id number]] Once 10/10/2020   CBC With Auto Differential Once 10/10/2020               Patient Active Problem List: Asthma     YONI (obstructive sleep apnea)     HTN (hypertension)     Left knee pain     Hidradenitis     Current smoker     Microalbuminuria     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 kidney disease) stage 4, GFR 15-29 ml/min (HCC)     Acute diastolic congestive heart failure (HCC)     Hyperkalemia     Acute systolic congestive heart failure (HCC)     Pulmonary hypertension (HCC)     Morbid obesity with BMI of 45.0-49.9, adult ([address]     Acute on chronic respiratory failure with hypoxia (HCC)     COPD exacerbation (HCC)     Asthma exacerbation     Type 2 diabetes mellitus with diabetic nephropathy (HCC)     ESRD (end stage renal disease) on dialysis ([address]     Bronchitis     Essential hypertension     YONI on CPAP     Hyperglycemia, drug-induced     Needs smoking cessation education     Hyperglycemia     Drowsiness     Acute on chronic respiratory failure with hypercapnia ([address]     Mobility impaired     S/P cardiac cath-mINIMAL caD 3/15/16 - dR. 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

## 2020-09-17 NOTE — TELEPHONE ENCOUNTER
Please address the medication refill and close the encounter. If I can be of assistance, please route to the applicable pool. Thank you. Last visit: 05/27/20  Last Med refill: 09/15/20 aspirin    08/17/20 advair  Does patient have enough medication for 72 hours: No:     Next Visit Date:  No future appointments. Health Maintenance   Topic Date Due    Diabetic retinal exam  02/25/1980    DTaP/Tdap/Td vaccine (1 - Tdap) 02/25/1989    Cervical cancer screen  09/05/2017    Annual Wellness Visit (AWV)  06/23/2019    Breast cancer screen  02/25/2020    Colon Cancer Screen FIT/FOBT  02/25/2020    Lipid screen  07/02/2020    Potassium monitoring  07/02/2020    Creatinine monitoring  07/02/2020    A1C test (Diabetic or Prediabetic)  08/19/2020    Flu vaccine (1) 09/01/2020    Hepatitis B vaccine (1 of 3 - Risk 3-dose series) 05/27/2021 (Originally 2/25/1989)    Shingles Vaccine (1 of 2) 05/27/2021 (Originally 2/25/2020)    Pneumococcal 0-64 years Vaccine (3 of 3 - PPSV23) 05/27/2021 (Originally 10/3/2018)    Diabetic foot exam  05/27/2021    HIV screen  Completed    Hepatitis A vaccine  Aged Out    Hib vaccine  Aged Out    Meningococcal (ACWY) vaccine  Aged Out       Hemoglobin A1C (%)   Date Value   08/19/2019 4.6   03/22/2018 5.0   01/12/2017 5.6             ( goal A1C is < 7)   Microalb/Crt.  Ratio (mcg/mg creat)   Date Value   04/29/2014 1,828     LDL Cholesterol (mg/dL)   Date Value   07/02/2019 33   12/09/2015 65       (goal LDL is <100)   AST (U/L)   Date Value   04/13/2017 24     ALT (U/L)   Date Value   04/13/2017 33     BUN (mg/dL)   Date Value   07/02/2019 57 (H)     BP Readings from Last 3 Encounters:   05/27/20 130/80   08/19/19 (!) 140/76   07/02/19 (!) 142/80          (goal 120/80)    All Future Testing planned in [facility]  Lab Frequency Next Occurrence   KRISHNA Digital Screen Bilateral [[id number]] Once 10/10/2020   CBC With Auto Differential Once 10/10/2020               Patient Active Problem List:     Asthma     YONI (obstructive sleep apnea)     HTN (hypertension)     Left knee pain     Hidradenitis     Current smoker     Microalbuminuria     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 kidney disease) stage 4, GFR 15-29 ml/min (HCC)     Acute diastolic congestive heart failure (HCC)     Hyperkalemia     Acute systolic congestive heart failure (HCC)     Pulmonary hypertension (HCC)     Morbid obesity with BMI of 45.0-49.9, adult ([place])     Acute on chronic respiratory failure with hypoxia (HCC)     COPD exacerbation (HCC)     Asthma exacerbation     Type 2 diabetes mellitus with diabetic nephropathy (HCC)     ESRD (end stage renal disease) on dialysis ([address])     Bronchitis     Essential hypertension     YONI on CPAP     Hyperglycemia, drug-induced     Needs smoking cessation education     Hyperglycemia     Drowsiness     Acute on chronic respiratory failure with hypercapnia ([address])     Mobility impaired     S/P cardiac cath-mINIMAL caD 3/15/16 - dR. 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)     Type 2 diabetes mellitus without complication (HCC)     Congestive heart failure (HCC)     Asthma with COPD with exacerbation (HCC)     Acute exacerbation of CHF (congestive heart failure) (HCC)     Chronic obstructive pulmonary disease (HCC)     Chronic kidney disease, stage V (HCC)     Acute respiratory acidosis     Precordial pain     Gastroesophageal reflux disease without esophagitis     Pulmonary HTN ([address])     Morbid obesity due to excess calories (HCC)     Symptomatic anemia     Elevated serum creatinine     ESRD needing dialysis ([address])

## 2020-09-18 RX ORDER — ASPIRIN 81 MG/1
TABLET ORAL
Qty: 30 TABLET | Refills: 0 | Status: SHIPPED | OUTPATIENT
Start: 2020-09-18 | End: 2020-10-13

## 2020-09-30 RX ORDER — CALCIUM CITRATE/VITAMIN D3 200MG-6.25
TABLET ORAL
Qty: 50 STRIP | Refills: 5 | Status: SHIPPED | OUTPATIENT
Start: 2020-09-30 | End: 2021-01-01

## 2020-09-30 NOTE — TELEPHONE ENCOUNTER
Juan Manuel Request for pending medication. Last Visit Date:5/27/2020   Next Visit Date:  No future appointments. Health Maintenance   Topic Date Due    Diabetic retinal exam  02/25/1980    DTaP/Tdap/Td vaccine (1 - Tdap) 02/25/1989    Cervical cancer screen  09/05/2017    Annual Wellness Visit (AWV)  06/23/2019    Breast cancer screen  02/25/2020    Colon Cancer Screen FIT/FOBT  02/25/2020    Lipid screen  07/02/2020    Potassium monitoring  07/02/2020    Creatinine monitoring  07/02/2020    A1C test (Diabetic or Prediabetic)  08/19/2020    Flu vaccine (1) 09/01/2020    Hepatitis B vaccine (1 of 3 - Risk 3-dose series) 05/27/2021 (Originally 2/25/1989)    Shingles Vaccine (1 of 2) 05/27/2021 (Originally 2/25/2020)    Pneumococcal 0-64 years Vaccine (3 of 3 - PPSV23) 05/27/2021 (Originally 10/3/2018)    Diabetic foot exam  05/27/2021    HIV screen  Completed    Hepatitis A vaccine  Aged Out    Hib vaccine  Aged Out    Meningococcal (ACWY) vaccine  Aged Out       Hemoglobin A1C (%)   Date Value   08/19/2019 4.6   03/22/2018 5.0   01/12/2017 5.6             ( goal A1C is < 7)   Microalb/Crt. Ratio (mcg/mg creat)   Date Value   04/29/2014 1,828     LDL Cholesterol (mg/dL)   Date Value   07/02/2019 33       (goal LDL is <100)   AST (U/L)   Date Value   04/13/2017 24     ALT (U/L)   Date Value   04/13/2017 33     BUN (mg/dL)   Date Value   07/02/2019 57 (H)     BP Readings from Last 3 Encounters:   05/27/20 130/80   08/19/19 (!) 140/76   07/02/19 (!) 142/80          (goal 120/80)    All Future Testing planned in [facility]  Lab Frequency Next Occurrence   KRISHNA Digital Screen Bilateral [[id number]] Once 10/10/2020   CBC With Auto Differential Once 10/10/2020       Next Visit Date:  No future appointments.       Patient Active Problem List:     Asthma     YONI (obstructive sleep apnea)     HTN (hypertension)     Left knee pain     Hidradenitis     Current smoker     Microalbuminuria     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 kidney disease) stage 4, GFR 15-29 ml/min (HCC)     Acute diastolic congestive heart failure (HCC)     Hyperkalemia     Acute systolic congestive heart failure (HCC)     Pulmonary hypertension (HCC)     Morbid obesity with BMI of 45.0-49.9, adult (HCC)     Acute on chronic respiratory failure with hypoxia (HCC)     COPD exacerbation (HCC)     Asthma exacerbation     Type 2 diabetes mellitus with diabetic nephropathy (HCC)     ESRD (end stage renal disease) on dialysis ([address]     Bronchitis     Essential hypertension     YONI on CPAP     Hyperglycemia, drug-induced     Needs smoking cessation education     Hyperglycemia     Drowsiness     Acute on chronic respiratory failure with hypercapnia ([address]     Mobility impaired     S/P cardiac cath-mINIMAL caD 3/15/16 - dR. 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

## 2020-10-08 NOTE — TELEPHONE ENCOUNTER
Last Visit Date:  5-27-20  Next Visit Date:  10/8/2020    Hemoglobin A1C (%)   Date Value   08/19/2019 4.6   03/22/2018 5.0   01/12/2017 5.6             ( goal A1C is < 7)   Microalb/Crt. Ratio (mcg/mg creat)   Date Value   04/29/2014 1,828     LDL Cholesterol (mg/dL)   Date Value   07/02/2019 33       (goal LDL is <100)   AST (U/L)   Date Value   04/13/2017 24     ALT (U/L)   Date Value   04/13/2017 33     BUN (mg/dL)   Date Value   07/02/2019 57 (H)     BP Readings from Last 3 Encounters:   05/27/20 130/80   08/19/19 (!) 140/76   07/02/19 (!) 142/80          (goal 120/80)        Patient Active Problem List:     Asthma     YONI (obstructive sleep apnea)     HTN (hypertension)     Left knee pain     Hidradenitis     Current smoker     Microalbuminuria     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 kidney disease) stage 4, GFR 15-29 ml/min (HCC)     Acute diastolic congestive heart failure (HCC)     Hyperkalemia     Acute systolic congestive heart failure (HCC)     Pulmonary hypertension (HCC)     Morbid obesity with BMI of 45.0-49.9, adult ([address]     Acute on chronic respiratory failure with hypoxia (HCC)     COPD exacerbation (HCC)     Asthma exacerbation     Type 2 diabetes mellitus with diabetic nephropathy (HCC)     ESRD (end stage renal disease) on dialysis ([address]     Bronchitis     Essential hypertension     YONI on CPAP     Hyperglycemia, drug-induced     Needs smoking cessation education     Hyperglycemia     Drowsiness     Acute on chronic respiratory failure with hypercapnia ([address]     Mobility impaired     S/P cardiac cath-mINIMAL caD 3/15/16 - dR. 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)     Type 2 diabetes mellitus without complication (HCC)     Congestive heart failure (HCC)     Asthma with COPD with exacerbation (HCC)     Acute exacerbation of CHF (congestive heart failure) (HCC)     Chronic obstructive pulmonary disease ([facility]     Chronic kidney disease, stage V ([facility] [facility]     Acute respiratory acidosis     Precordial pain     Gastroesophageal reflux disease without esophagitis     Pulmonary HTN ([address]     Morbid obesity due to excess calories (HCC)     Symptomatic anemia     Elevated serum creatinine     ESRD needing dialysis ([facility] [address]      ----Helen Hamilton

## 2020-10-08 NOTE — TELEPHONE ENCOUNTER
Last Visit Date:  5-27-20  Next Visit Date:  Visit date not found    Hemoglobin A1C (%)   Date Value   08/19/2019 4.6   03/22/2018 5.0   01/12/2017 5.6             ( goal A1C is < 7)   Microalb/Crt. Ratio (mcg/mg creat)   Date Value   04/29/2014 1,828     LDL Cholesterol (mg/dL)   Date Value   07/02/2019 33       (goal LDL is <100)   AST (U/L)   Date Value   04/13/2017 24     ALT (U/L)   Date Value   04/13/2017 33     BUN (mg/dL)   Date Value   07/02/2019 57 (H)     BP Readings from Last 3 Encounters:   05/27/20 130/80   08/19/19 (!) 140/76   07/02/19 (!) 142/80          (goal 120/80)        Patient Active Problem List:     Asthma     YONI (obstructive sleep apnea)     HTN (hypertension)     Left knee pain     Hidradenitis     Current smoker     Microalbuminuria     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 kidney disease) stage 4, GFR 15-29 ml/min (HCC)     Acute diastolic congestive heart failure (HCC)     Hyperkalemia     Acute systolic congestive heart failure (HCC)     Pulmonary hypertension (HCC)     Morbid obesity with BMI of 45.0-49.9, adult ([address]     Acute on chronic respiratory failure with hypoxia (HCC)     COPD exacerbation (HCC)     Asthma exacerbation     Type 2 diabetes mellitus with diabetic nephropathy (HCC)     ESRD (end stage renal disease) on dialysis ([address]     Bronchitis     Essential hypertension     YONI on CPAP     Hyperglycemia, drug-induced     Needs smoking cessation education     Hyperglycemia     Drowsiness     Acute on chronic respiratory failure with hypercapnia ([address]     Mobility impaired     S/P cardiac cath-mINIMAL caD 3/15/16 - dR. 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)     Type 2 diabetes mellitus without complication (HCC)     Congestive heart failure (HCC)     Asthma with COPD with exacerbation (HCC)     Acute exacerbation of CHF (congestive heart failure) (HCC)     Chronic obstructive pulmonary disease ([facility] [address]     Chronic kidney disease, stage V ([facility] [address]     Acute respiratory acidosis     Precordial pain     Gastroesophageal reflux disease without esophagitis     Pulmonary HTN (HCC)     Morbid obesity due to excess calories (HCC)     Symptomatic anemia     Elevated serum creatinine     ESRD needing dialysis ([facility] [address]      ----Tank Ybarra

## 2020-10-13 RX ORDER — ATORVASTATIN CALCIUM 10 MG/1
TABLET, FILM COATED ORAL
Qty: 30 TABLET | Refills: 0 | Status: SHIPPED | OUTPATIENT
Start: 2020-10-13 | End: 2020-01-01

## 2020-10-13 RX ORDER — CETIRIZINE HYDROCHLORIDE 10 MG/1
10 TABLET ORAL DAILY
Qty: 30 TABLET | Refills: 0 | Status: SHIPPED | OUTPATIENT
Start: 2020-10-13 | End: 2020-01-01

## 2020-10-13 RX ORDER — ASPIRIN 81 MG/1
TABLET ORAL
Qty: 30 TABLET | Refills: 0 | Status: SHIPPED | OUTPATIENT
Start: 2020-10-13 | End: 2020-01-01

## 2020-10-13 RX ORDER — INSULIN GLARGINE 100 [IU]/ML
INJECTION, SOLUTION SUBCUTANEOUS
Qty: 15 ML | Refills: 0 | Status: SHIPPED | OUTPATIENT
Start: 2020-10-13 | End: 2020-01-01

## 2020-10-13 RX ORDER — FUROSEMIDE 40 MG/1
TABLET ORAL
Qty: 60 TABLET | Refills: 0 | Status: SHIPPED | OUTPATIENT
Start: 2020-10-13 | End: 2020-01-01

## 2020-10-13 NOTE — TELEPHONE ENCOUNTER
Last Visit Date:  5-27-20  Next Visit Date:  Visit date not found    Hemoglobin A1C (%)   Date Value   08/19/2019 4.6   03/22/2018 5.0   01/12/2017 5.6             ( goal A1C is < 7)   Microalb/Crt. Ratio (mcg/mg creat)   Date Value   04/29/2014 1,828     LDL Cholesterol (mg/dL)   Date Value   07/02/2019 33       (goal LDL is <100)   AST (U/L)   Date Value   04/13/2017 24     ALT (U/L)   Date Value   04/13/2017 33     BUN (mg/dL)   Date Value   07/02/2019 57 (H)     BP Readings from Last 3 Encounters:   05/27/20 130/80   08/19/19 (!) 140/76   07/02/19 (!) 142/80          (goal 120/80)        Patient Active Problem List:     Asthma     YONI (obstructive sleep apnea)     HTN (hypertension)     Left knee pain     Hidradenitis     Current smoker     Microalbuminuria     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 kidney disease) stage 4, GFR 15-29 ml/min (HCC)     Acute diastolic congestive heart failure (HCC)     Hyperkalemia     Acute systolic congestive heart failure (HCC)     Pulmonary hypertension (HCC)     Morbid obesity with BMI of 45.0-49.9, adult ([address]     Acute on chronic respiratory failure with hypoxia (HCC)     COPD exacerbation (HCC)     Asthma exacerbation     Type 2 diabetes mellitus with diabetic nephropathy (HCC)     ESRD (end stage renal disease) on dialysis ([address]     Bronchitis     Essential hypertension     YONI on CPAP     Hyperglycemia, drug-induced     Needs smoking cessation education     Hyperglycemia     Drowsiness     Acute on chronic respiratory failure with hypercapnia ([address]     Mobility impaired     S/P cardiac cath-mINIMAL caD 3/15/16 - dR. 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)     Type 2 diabetes mellitus without complication (HCC)     Congestive heart failure (HCC)     Asthma with COPD with exacerbation (HCC)     Acute exacerbation of CHF (congestive heart failure) (HCC)     Chronic obstructive pulmonary disease ([facility] [address]     Chronic kidney disease, stage V ([email or web address]     Acute respiratory acidosis     Precordial pain     Gastroesophageal reflux disease without esophagitis     Pulmonary HTN (HCC)     Morbid obesity due to excess calories (HCC)     Symptomatic anemia     Elevated serum creatinine     ESRD needing dialysis ([email or web address]      ----Sarah Schneider

## 2020-10-14 NOTE — TELEPHONE ENCOUNTER
Last visit:  05/27/20  Last Med refill:   Does patient have enough medication for 72 hours:     Next Visit Date:  No future appointments. Health Maintenance   Topic Date Due    Diabetic retinal exam  02/25/1980    DTaP/Tdap/Td vaccine (1 - Tdap) 02/25/1989    Cervical cancer screen  09/05/2017    Annual Wellness Visit (AWV)  06/23/2019    Breast cancer screen  02/25/2020    Colon Cancer Screen FIT/FOBT  02/25/2020    Lipid screen  07/02/2020    Potassium monitoring  07/02/2020    Creatinine monitoring  07/02/2020    A1C test (Diabetic or Prediabetic)  08/19/2020    Flu vaccine (1) 09/01/2020    Hepatitis B vaccine (1 of 3 - Risk 3-dose series) 05/27/2021 (Originally 2/25/1989)    Shingles Vaccine (1 of 2) 05/27/2021 (Originally 2/25/2020)    Pneumococcal 0-64 years Vaccine (3 of 3 - PPSV23) 05/27/2021 (Originally 10/3/2018)    Diabetic foot exam  05/27/2021    HIV screen  Completed    Hepatitis A vaccine  Aged Out    Hib vaccine  Aged Out    Meningococcal (ACWY) vaccine  Aged Out       Hemoglobin A1C (%)   Date Value   08/19/2019 4.6   03/22/2018 5.0   01/12/2017 5.6             ( goal A1C is < 7)   Microalb/Crt.  Ratio (mcg/mg creat)   Date Value   04/29/2014 1,828     LDL Cholesterol (mg/dL)   Date Value   07/02/2019 33   12/09/2015 65       (goal LDL is <100)   AST (U/L)   Date Value   04/13/2017 24     ALT (U/L)   Date Value   04/13/2017 33     BUN (mg/dL)   Date Value   07/02/2019 57 (H)     BP Readings from Last 3 Encounters:   05/27/20 130/80   08/19/19 (!) 140/76   07/02/19 (!) 142/80          (goal 120/80)    All Future Testing planned in [facility]  Lab Frequency Next Occurrence   KRISHNA Digital Screen Bilateral [[id number]] Once 10/10/2020   CBC With Auto Differential Once 10/10/2020               Patient Active Problem List:     Asthma     YONI (obstructive sleep apnea)     HTN (hypertension)     Left knee pain     Hidradenitis     Current smoker     Microalbuminuria     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 kidney disease) stage 4, GFR 15-29 ml/min (HCC)     Acute diastolic congestive heart failure (HCC)     Hyperkalemia     Acute systolic congestive heart failure (HCC)     Pulmonary hypertension (HCC)     Morbid obesity with BMI of 45.0-49.9, adult (HCC)     Acute on chronic respiratory failure with hypoxia (HCC)     COPD exacerbation (HCC)     Asthma exacerbation     Type 2 diabetes mellitus with diabetic nephropathy (HCC)     ESRD (end stage renal disease) on dialysis ([address]     Bronchitis     Essential hypertension     YONI on CPAP     Hyperglycemia, drug-induced     Needs smoking cessation education     Hyperglycemia     Drowsiness     Acute on chronic respiratory failure with hypercapnia ([address]     Mobility impaired     S/P cardiac cath-mINIMAL caD 3/15/16 - dR. 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

## 2020-10-19 NOTE — TELEPHONE ENCOUNTER
Please address the medication refill and close the encounter. If I can be of assistance, please route to the applicable pool. Thank you. Last visit: 05/27/20  Last Med refill: 10/18/20  Does patient have enough medication for 72 hours: No:     Next Visit Date:  No future appointments. Health Maintenance   Topic Date Due    Diabetic retinal exam  02/25/1980    DTaP/Tdap/Td vaccine (1 - Tdap) 02/25/1989    Cervical cancer screen  09/05/2017    Annual Wellness Visit (AWV)  06/23/2019    Breast cancer screen  02/25/2020    Colon Cancer Screen FIT/FOBT  02/25/2020    Lipid screen  07/02/2020    Potassium monitoring  07/02/2020    Creatinine monitoring  07/02/2020    A1C test (Diabetic or Prediabetic)  08/19/2020    Flu vaccine (1) 09/01/2020    Hepatitis B vaccine (1 of 3 - Risk 3-dose series) 05/27/2021 (Originally 2/25/1989)    Shingles Vaccine (1 of 2) 05/27/2021 (Originally 2/25/2020)    Pneumococcal 0-64 years Vaccine (3 of 3 - PPSV23) 05/27/2021 (Originally 10/3/2018)    Diabetic foot exam  05/27/2021    HIV screen  Completed    Hepatitis A vaccine  Aged Out    Hib vaccine  Aged Out    Meningococcal (ACWY) vaccine  Aged Out       Hemoglobin A1C (%)   Date Value   08/19/2019 4.6   03/22/2018 5.0   01/12/2017 5.6             ( goal A1C is < 7)   Microalb/Crt.  Ratio (mcg/mg creat)   Date Value   04/29/2014 1,828     LDL Cholesterol (mg/dL)   Date Value   07/02/2019 33   12/09/2015 65       (goal LDL is <100)   AST (U/L)   Date Value   04/13/2017 24     ALT (U/L)   Date Value   04/13/2017 33     BUN (mg/dL)   Date Value   07/02/2019 57 (H)     BP Readings from Last 3 Encounters:   05/27/20 130/80   08/19/19 (!) 140/76   07/02/19 (!) 142/80          (goal 120/80)    All Future Testing planned in [facility]  Lab Frequency Next Occurrence   KRISHNA Digital Screen Bilateral [[id number]] Once 10/10/2020   CBC With Auto Differential Once 10/10/2020               Patient Active Problem List: Asthma     YONI (obstructive sleep apnea)     HTN (hypertension)     Left knee pain     Hidradenitis     Current smoker     Microalbuminuria     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 kidney disease) stage 4, GFR 15-29 ml/min (HCC)     Acute diastolic congestive heart failure (HCC)     Hyperkalemia     Acute systolic congestive heart failure (HCC)     Pulmonary hypertension (HCC)     Morbid obesity with BMI of 45.0-49.9, adult ([address]     Acute on chronic respiratory failure with hypoxia (HCC)     COPD exacerbation (HCC)     Asthma exacerbation     Type 2 diabetes mellitus with diabetic nephropathy (HCC)     ESRD (end stage renal disease) on dialysis ([address]     Bronchitis     Essential hypertension     YONI on CPAP     Hyperglycemia, drug-induced     Needs smoking cessation education     Hyperglycemia     Drowsiness     Acute on chronic respiratory failure with hypercapnia ([address]     Mobility impaired     S/P cardiac cath-mINIMAL caD 3/15/16 - dR. 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

## 2020-11-05 NOTE — TELEPHONE ENCOUNTER
Last visit: 5/27/20  Last Med refill: 10/13/20  Does patient have enough medication for 72 hours: Yes    Next Visit Date:  No future appointments. Health Maintenance   Topic Date Due    Diabetic retinal exam  02/25/1980    DTaP/Tdap/Td vaccine (1 - Tdap) 02/25/1989    Cervical cancer screen  09/05/2017    Annual Wellness Visit (AWV)  06/23/2019    Breast cancer screen  02/25/2020    Colon Cancer Screen FIT/FOBT  02/25/2020    Lipid screen  07/02/2020    Potassium monitoring  07/02/2020    Creatinine monitoring  07/02/2020    A1C test (Diabetic or Prediabetic)  08/19/2020    Flu vaccine (1) 09/01/2020    Hepatitis B vaccine (1 of 3 - Risk 3-dose series) 05/27/2021 (Originally 2/25/1989)    Shingles Vaccine (1 of 2) 05/27/2021 (Originally 2/25/2020)    Pneumococcal 0-64 years Vaccine (3 of 3 - PPSV23) 05/27/2021 (Originally 10/3/2018)    Diabetic foot exam  05/27/2021    HIV screen  Completed    Hepatitis A vaccine  Aged Out    Hib vaccine  Aged Out    Meningococcal (ACWY) vaccine  Aged Out       Hemoglobin A1C (%)   Date Value   08/19/2019 4.6   03/22/2018 5.0   01/12/2017 5.6             ( goal A1C is < 7)   Microalb/Crt.  Ratio (mcg/mg creat)   Date Value   04/29/2014 1,828     LDL Cholesterol (mg/dL)   Date Value   07/02/2019 33   12/09/2015 65       (goal LDL is <100)   AST (U/L)   Date Value   04/13/2017 24     ALT (U/L)   Date Value   04/13/2017 33     BUN (mg/dL)   Date Value   07/02/2019 57 (H)     BP Readings from Last 3 Encounters:   05/27/20 130/80   08/19/19 (!) 140/76   07/02/19 (!) 142/80          (goal 120/80)    All Future Testing planned in [facility]  Lab Frequency Next Occurrence   KRISHNA Digital Screen Bilateral [[id number]] Once 05/27/2021   CBC With Auto Differential Once 05/27/2021               Patient Active Problem List:     Asthma     YONI (obstructive sleep apnea)     Left knee pain     Hidradenitis     Current smoker     Microalbuminuria     Type 2 diabetes mellitus with renal manifestations ([address]     CKD (chronic kidney disease) stage 4, GFR 15-29 ml/min (HCC)     Acute diastolic congestive heart failure (HCC)     Hyperkalemia     Acute systolic congestive heart failure (HCC)     Pulmonary hypertension (HCC)     Morbid obesity with BMI of 45.0-49.9, adult (HCC)     Acute on chronic respiratory failure with hypoxia (HCC)     COPD exacerbation (HCC)     Asthma exacerbation     Type 2 diabetes mellitus with diabetic nephropathy (HCC)     ESRD (end stage renal disease) on dialysis ([address]     Bronchitis     Essential hypertension     YONI on CPAP     Hyperglycemia, drug-induced     Needs smoking cessation education     Hyperglycemia     Drowsiness     Acute on chronic respiratory failure with hypercapnia ([address]     Mobility impaired     S/P cardiac cath-mINIMAL caD 3/15/16 - dR. 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

## 2020-12-01 NOTE — TELEPHONE ENCOUNTER
Last visit: 05/27/2020  Last Med refill: 07/21/20  Does patient have enough medication for 72 hours: Yes     Next Visit Date:  No future appointments. Health Maintenance   Topic Date Due    Diabetic retinal exam  02/25/1980    DTaP/Tdap/Td vaccine (1 - Tdap) 02/25/1989    Cervical cancer screen  09/05/2017    Annual Wellness Visit (AWV)  06/23/2019    Breast cancer screen  02/25/2020    Colon Cancer Screen FIT/FOBT  02/25/2020    Lipid screen  07/02/2020    Potassium monitoring  07/02/2020    Creatinine monitoring  07/02/2020    A1C test (Diabetic or Prediabetic)  08/19/2020    Flu vaccine (1) 09/01/2020    Hepatitis B vaccine (1 of 3 - Risk 3-dose series) 05/27/2021 (Originally 2/25/1989)    Shingles Vaccine (1 of 2) 05/27/2021 (Originally 2/25/2020)    Pneumococcal 0-64 years Vaccine (3 of 3 - PPSV23) 05/27/2021 (Originally 10/3/2018)    Diabetic foot exam  05/27/2021    HIV screen  Completed    Hepatitis A vaccine  Aged Out    Hib vaccine  Aged Out    Meningococcal (ACWY) vaccine  Aged Out       Hemoglobin A1C (%)   Date Value   08/19/2019 4.6   03/22/2018 5.0   01/12/2017 5.6             ( goal A1C is < 7)   Microalb/Crt.  Ratio (mcg/mg creat)   Date Value   04/29/2014 1,828     LDL Cholesterol (mg/dL)   Date Value   07/02/2019 33   12/09/2015 65       (goal LDL is <100)   AST (U/L)   Date Value   04/13/2017 24     ALT (U/L)   Date Value   04/13/2017 33     BUN (mg/dL)   Date Value   07/02/2019 57 (H)     BP Readings from Last 3 Encounters:   05/27/20 130/80   08/19/19 (!) 140/76   07/02/19 (!) 142/80          (goal 120/80)    All Future Testing planned in [facility]  Lab Frequency Next Occurrence   KRISHNA Digital Screen Bilateral [[id number]] Once 05/27/2021   CBC With Auto Differential Once 05/27/2021               Patient Active Problem List:     Asthma     YONI (obstructive sleep apnea)     Left knee pain     Hidradenitis     Current smoker     Microalbuminuria     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 kidney disease) stage 4, GFR 15-29 ml/min (HCC)     Acute diastolic congestive heart failure (HCC)     Hyperkalemia     Acute systolic congestive heart failure (HCC)     Pulmonary hypertension (HCC)     Morbid obesity with BMI of 45.0-49.9, adult (HCC)     Acute on chronic respiratory failure with hypoxia (HCC)     COPD exacerbation (HCC)     Asthma exacerbation     Type 2 diabetes mellitus with diabetic nephropathy (HCC)     ESRD (end stage renal disease) on dialysis ([address]     Bronchitis     Essential hypertension     YONI on CPAP     Hyperglycemia, drug-induced     Needs smoking cessation education     Hyperglycemia     Drowsiness     Acute on chronic respiratory failure with hypercapnia ([address]     Mobility impaired     S/P cardiac cath-mINIMAL caD 3/15/16 - dR. 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

## 2021-01-01 ENCOUNTER — CARE COORDINATION (OUTPATIENT)
Dept: CASE MANAGEMENT | Age: 51
End: 2021-01-01

## 2021-01-01 ENCOUNTER — APPOINTMENT (OUTPATIENT)
Dept: ULTRASOUND IMAGING | Age: 51
DRG: 291 | End: 2021-01-01
Payer: COMMERCIAL

## 2021-01-01 ENCOUNTER — ANESTHESIA (OUTPATIENT)
Dept: OPERATING ROOM | Age: 51
DRG: 291 | End: 2021-01-01
Payer: COMMERCIAL

## 2021-01-01 ENCOUNTER — APPOINTMENT (OUTPATIENT)
Dept: GENERAL RADIOLOGY | Age: 51
DRG: 312 | End: 2021-01-01
Payer: COMMERCIAL

## 2021-01-01 ENCOUNTER — HOSPITAL ENCOUNTER (INPATIENT)
Age: 51
LOS: 14 days | DRG: 312 | End: 2021-10-23
Attending: EMERGENCY MEDICINE | Admitting: STUDENT IN AN ORGANIZED HEALTH CARE EDUCATION/TRAINING PROGRAM
Payer: COMMERCIAL

## 2021-01-01 ENCOUNTER — OFFICE VISIT (OUTPATIENT)
Dept: FAMILY MEDICINE CLINIC | Age: 51
End: 2021-01-01
Payer: COMMERCIAL

## 2021-01-01 ENCOUNTER — ANESTHESIA EVENT (OUTPATIENT)
Dept: INPATIENT UNIT | Age: 51
DRG: 312 | End: 2021-01-01
Payer: COMMERCIAL

## 2021-01-01 ENCOUNTER — TELEPHONE (OUTPATIENT)
Dept: FAMILY MEDICINE CLINIC | Age: 51
End: 2021-01-01

## 2021-01-01 ENCOUNTER — APPOINTMENT (OUTPATIENT)
Dept: INTERVENTIONAL RADIOLOGY/VASCULAR | Age: 51
DRG: 312 | End: 2021-01-01
Payer: COMMERCIAL

## 2021-01-01 ENCOUNTER — APPOINTMENT (OUTPATIENT)
Dept: CT IMAGING | Age: 51
DRG: 291 | End: 2021-01-01
Payer: COMMERCIAL

## 2021-01-01 ENCOUNTER — ANESTHESIA EVENT (OUTPATIENT)
Dept: OPERATING ROOM | Age: 51
DRG: 291 | End: 2021-01-01
Payer: COMMERCIAL

## 2021-01-01 ENCOUNTER — APPOINTMENT (OUTPATIENT)
Dept: CT IMAGING | Age: 51
DRG: 312 | End: 2021-01-01
Payer: COMMERCIAL

## 2021-01-01 ENCOUNTER — TELEPHONE (OUTPATIENT)
Dept: GASTROENTEROLOGY | Age: 51
End: 2021-01-01

## 2021-01-01 ENCOUNTER — ANESTHESIA (OUTPATIENT)
Dept: INPATIENT UNIT | Age: 51
DRG: 312 | End: 2021-01-01
Payer: COMMERCIAL

## 2021-01-01 ENCOUNTER — HOSPITAL ENCOUNTER (INPATIENT)
Age: 51
LOS: 9 days | Discharge: HOME | DRG: 291 | End: 2021-08-29
Attending: EMERGENCY MEDICINE | Admitting: INTERNAL MEDICINE
Payer: COMMERCIAL

## 2021-01-01 ENCOUNTER — APPOINTMENT (OUTPATIENT)
Dept: GENERAL RADIOLOGY | Age: 51
DRG: 291 | End: 2021-01-01
Payer: COMMERCIAL

## 2021-01-01 VITALS
HEIGHT: 59 IN | BODY MASS INDEX: 50.8 KG/M2 | TEMPERATURE: 96.8 F | RESPIRATION RATE: 16 BRPM | WEIGHT: 252 LBS | DIASTOLIC BLOOD PRESSURE: 121 MMHG | HEART RATE: 92 BPM | OXYGEN SATURATION: 94 % | SYSTOLIC BLOOD PRESSURE: 150 MMHG

## 2021-01-01 VITALS
DIASTOLIC BLOOD PRESSURE: 71 MMHG | WEIGHT: 241 LBS | BODY MASS INDEX: 48.65 KG/M2 | SYSTOLIC BLOOD PRESSURE: 124 MMHG | HEART RATE: 78 BPM

## 2021-01-01 VITALS
BODY MASS INDEX: 57.58 KG/M2 | OXYGEN SATURATION: 76 % | SYSTOLIC BLOOD PRESSURE: 97 MMHG | HEIGHT: 59 IN | TEMPERATURE: 91.5 F | RESPIRATION RATE: 36 BRPM | WEIGHT: 285.6 LBS | DIASTOLIC BLOOD PRESSURE: 58 MMHG

## 2021-01-01 VITALS
SYSTOLIC BLOOD PRESSURE: 101 MMHG | RESPIRATION RATE: 35 BRPM | OXYGEN SATURATION: 100 % | DIASTOLIC BLOOD PRESSURE: 35 MMHG

## 2021-01-01 DIAGNOSIS — J45.40 MODERATE PERSISTENT ASTHMA WITHOUT COMPLICATION: ICD-10-CM

## 2021-01-01 DIAGNOSIS — I10 ESSENTIAL HYPERTENSION: ICD-10-CM

## 2021-01-01 DIAGNOSIS — I50.32 CHRONIC DIASTOLIC CONGESTIVE HEART FAILURE (HCC): ICD-10-CM

## 2021-01-01 DIAGNOSIS — E11.22 TYPE 2 DIABETES MELLITUS WITH CHRONIC KIDNEY DISEASE ON CHRONIC DIALYSIS, WITH LONG-TERM CURRENT USE OF INSULIN (HCC): Chronic | ICD-10-CM

## 2021-01-01 DIAGNOSIS — E11.65 TYPE 2 DIABETES MELLITUS WITH HYPERGLYCEMIA, WITH LONG-TERM CURRENT USE OF INSULIN (HCC): ICD-10-CM

## 2021-01-01 DIAGNOSIS — Z99.2 TYPE 2 DIABETES MELLITUS WITH CHRONIC KIDNEY DISEASE ON CHRONIC DIALYSIS, WITH LONG-TERM CURRENT USE OF INSULIN (HCC): Chronic | ICD-10-CM

## 2021-01-01 DIAGNOSIS — I50.31 ACUTE DIASTOLIC CONGESTIVE HEART FAILURE (HCC): Primary | ICD-10-CM

## 2021-01-01 DIAGNOSIS — J44.9 CHRONIC OBSTRUCTIVE PULMONARY DISEASE, UNSPECIFIED COPD TYPE (HCC): Primary | ICD-10-CM

## 2021-01-01 DIAGNOSIS — Z79.4 TYPE 2 DIABETES MELLITUS WITH CHRONIC KIDNEY DISEASE ON CHRONIC DIALYSIS, WITH LONG-TERM CURRENT USE OF INSULIN (HCC): Chronic | ICD-10-CM

## 2021-01-01 DIAGNOSIS — J96.21 ACUTE ON CHRONIC RESPIRATORY FAILURE WITH HYPOXIA (HCC): ICD-10-CM

## 2021-01-01 DIAGNOSIS — I95.9 HYPOTENSION, UNSPECIFIED HYPOTENSION TYPE: ICD-10-CM

## 2021-01-01 DIAGNOSIS — G89.29 CHRONIC MIDLINE BACK PAIN, UNSPECIFIED BACK LOCATION: ICD-10-CM

## 2021-01-01 DIAGNOSIS — J44.9 CHRONIC OBSTRUCTIVE PULMONARY DISEASE, UNSPECIFIED COPD TYPE (HCC): ICD-10-CM

## 2021-01-01 DIAGNOSIS — R07.9 CHEST PAIN, UNSPECIFIED TYPE: Primary | ICD-10-CM

## 2021-01-01 DIAGNOSIS — N18.6 TYPE 2 DIABETES MELLITUS WITH CHRONIC KIDNEY DISEASE ON CHRONIC DIALYSIS, WITH LONG-TERM CURRENT USE OF INSULIN (HCC): Chronic | ICD-10-CM

## 2021-01-01 DIAGNOSIS — Z79.4 TYPE 2 DIABETES MELLITUS WITH HYPERGLYCEMIA, WITH LONG-TERM CURRENT USE OF INSULIN (HCC): ICD-10-CM

## 2021-01-01 DIAGNOSIS — Z01.811 PRE-OP CHEST EXAM: Primary | ICD-10-CM

## 2021-01-01 DIAGNOSIS — M54.9 CHRONIC MIDLINE BACK PAIN, UNSPECIFIED BACK LOCATION: ICD-10-CM

## 2021-01-01 LAB
-: NORMAL
ABSOLUTE EOS #: 0 K/UL (ref 0–0.4)
ABSOLUTE EOS #: 0 K/UL (ref 0–0.44)
ABSOLUTE EOS #: 0.07 K/UL (ref 0–0.4)
ABSOLUTE EOS #: 0.07 K/UL (ref 0–0.44)
ABSOLUTE EOS #: 0.07 K/UL (ref 0–0.44)
ABSOLUTE EOS #: 0.08 K/UL (ref 0–0.44)
ABSOLUTE EOS #: 0.09 K/UL (ref 0–0.4)
ABSOLUTE EOS #: 0.09 K/UL (ref 0–0.4)
ABSOLUTE EOS #: 0.11 K/UL (ref 0–0.44)
ABSOLUTE EOS #: 0.15 K/UL (ref 0–0.4)
ABSOLUTE EOS #: 0.17 K/UL (ref 0–0.44)
ABSOLUTE EOS #: 0.21 K/UL (ref 0–0.44)
ABSOLUTE EOS #: 0.22 K/UL (ref 0–0.44)
ABSOLUTE IMMATURE GRANULOCYTE: 0 K/UL (ref 0–0.3)
ABSOLUTE IMMATURE GRANULOCYTE: 0.07 K/UL (ref 0–0.3)
ABSOLUTE IMMATURE GRANULOCYTE: 0.07 K/UL (ref 0–0.3)
ABSOLUTE IMMATURE GRANULOCYTE: 0.08 K/UL (ref 0–0.3)
ABSOLUTE IMMATURE GRANULOCYTE: 0.08 K/UL (ref 0–0.3)
ABSOLUTE IMMATURE GRANULOCYTE: 0.09 K/UL (ref 0–0.3)
ABSOLUTE IMMATURE GRANULOCYTE: 0.11 K/UL (ref 0–0.3)
ABSOLUTE LYMPH #: 0.15 K/UL (ref 1.1–3.7)
ABSOLUTE LYMPH #: 0.87 K/UL (ref 1.1–3.7)
ABSOLUTE LYMPH #: 1.3 K/UL (ref 1.1–3.7)
ABSOLUTE LYMPH #: 1.33 K/UL (ref 1.1–3.7)
ABSOLUTE LYMPH #: 1.36 K/UL (ref 1.1–3.7)
ABSOLUTE LYMPH #: 1.42 K/UL (ref 1–4.8)
ABSOLUTE LYMPH #: 1.56 K/UL (ref 1.1–3.7)
ABSOLUTE LYMPH #: 1.63 K/UL (ref 1–4.8)
ABSOLUTE LYMPH #: 1.65 K/UL (ref 1.1–3.7)
ABSOLUTE LYMPH #: 1.65 K/UL (ref 1–4.8)
ABSOLUTE LYMPH #: 1.67 K/UL (ref 1.1–3.7)
ABSOLUTE LYMPH #: 1.7 K/UL (ref 1–4.8)
ABSOLUTE LYMPH #: 2.47 K/UL (ref 1–4.8)
ABSOLUTE MONO #: 0.3 K/UL (ref 0.1–1.2)
ABSOLUTE MONO #: 0.36 K/UL (ref 0.2–0.8)
ABSOLUTE MONO #: 0.55 K/UL (ref 0.1–1.2)
ABSOLUTE MONO #: 0.64 K/UL (ref 0.1–1.2)
ABSOLUTE MONO #: 0.79 K/UL (ref 0.1–1.2)
ABSOLUTE MONO #: 0.83 K/UL (ref 0.2–0.8)
ABSOLUTE MONO #: 0.85 K/UL (ref 0.2–0.8)
ABSOLUTE MONO #: 0.89 K/UL (ref 0.1–1.2)
ABSOLUTE MONO #: 0.89 K/UL (ref 0.2–0.8)
ABSOLUTE MONO #: 0.91 K/UL (ref 0.1–1.2)
ABSOLUTE MONO #: 0.97 K/UL (ref 0.1–1.2)
ABSOLUTE MONO #: 1.11 K/UL (ref 0.2–0.8)
ABSOLUTE MONO #: 1.25 K/UL (ref 0.1–1.2)
ABSOLUTE RETIC #: 0.12 M/UL (ref 0.03–0.08)
ABSOLUTE RETIC #: 0.12 M/UL (ref 0.03–0.08)
ACTION: NORMAL
ALBUMIN SERPL-MCNC: 3.3 G/DL (ref 3.5–5.2)
ALBUMIN SERPL-MCNC: 3.4 G/DL (ref 3.5–5.2)
ALBUMIN SERPL-MCNC: 3.5 G/DL (ref 3.5–5.2)
ALBUMIN SERPL-MCNC: 3.5 G/DL (ref 3.5–5.2)
ALBUMIN SERPL-MCNC: 3.7 G/DL (ref 3.5–5.2)
ALBUMIN/GLOBULIN RATIO: ABNORMAL (ref 1–2.5)
ALLEN TEST: ABNORMAL
ALP BLD-CCNC: 121 U/L (ref 35–104)
ALP BLD-CCNC: 126 U/L (ref 35–104)
ALP BLD-CCNC: 144 U/L (ref 35–104)
ALP BLD-CCNC: 148 U/L (ref 35–104)
ALP BLD-CCNC: 151 U/L (ref 35–104)
ALP BLD-CCNC: 164 U/L (ref 35–104)
ALP BLD-CCNC: 168 U/L (ref 35–104)
ALP BLD-CCNC: 177 U/L (ref 35–104)
ALT SERPL-CCNC: 10 U/L (ref 5–33)
ALT SERPL-CCNC: 11 U/L (ref 5–33)
ALT SERPL-CCNC: 13 U/L (ref 5–33)
ALT SERPL-CCNC: 14 U/L (ref 5–33)
ALT SERPL-CCNC: 15 U/L (ref 5–33)
ALT SERPL-CCNC: 15 U/L (ref 5–33)
ANION GAP SERPL CALCULATED.3IONS-SCNC: 10 MMOL/L (ref 9–17)
ANION GAP SERPL CALCULATED.3IONS-SCNC: 11 MMOL/L (ref 9–17)
ANION GAP SERPL CALCULATED.3IONS-SCNC: 11 MMOL/L (ref 9–17)
ANION GAP SERPL CALCULATED.3IONS-SCNC: 12 MMOL/L (ref 9–17)
ANION GAP SERPL CALCULATED.3IONS-SCNC: 13 MMOL/L (ref 9–17)
ANION GAP SERPL CALCULATED.3IONS-SCNC: 13 MMOL/L (ref 9–17)
ANION GAP SERPL CALCULATED.3IONS-SCNC: 14 MMOL/L (ref 9–17)
ANION GAP SERPL CALCULATED.3IONS-SCNC: 15 MMOL/L (ref 9–17)
ANION GAP SERPL CALCULATED.3IONS-SCNC: 16 MMOL/L (ref 9–17)
ANION GAP SERPL CALCULATED.3IONS-SCNC: 17 MMOL/L (ref 9–17)
ANION GAP SERPL CALCULATED.3IONS-SCNC: 17 MMOL/L (ref 9–17)
ANION GAP SERPL CALCULATED.3IONS-SCNC: 19 MMOL/L (ref 9–17)
ANION GAP SERPL CALCULATED.3IONS-SCNC: 20 MMOL/L (ref 9–17)
ANION GAP SERPL CALCULATED.3IONS-SCNC: 20 MMOL/L (ref 9–17)
ANION GAP SERPL CALCULATED.3IONS-SCNC: 24 MMOL/L (ref 9–17)
ANTI-XA UNFRAC HEPARIN: 0.15 IU/L (ref 0.3–0.7)
ANTI-XA UNFRAC HEPARIN: 0.18 IU/L (ref 0.3–0.7)
ANTI-XA UNFRAC HEPARIN: 0.4 IU/L (ref 0.3–0.7)
ANTI-XA UNFRAC HEPARIN: 0.42 IU/L (ref 0.3–0.7)
AST SERPL-CCNC: 13 U/L
AST SERPL-CCNC: 23 U/L
AST SERPL-CCNC: 23 U/L
AST SERPL-CCNC: 24 U/L
AST SERPL-CCNC: 25 U/L
AST SERPL-CCNC: 25 U/L
AST SERPL-CCNC: 26 U/L
AST SERPL-CCNC: 27 U/L
BASOPHILS # BLD: 0 %
BASOPHILS # BLD: 0 % (ref 0–2)
BASOPHILS # BLD: 1 %
BASOPHILS # BLD: 1 %
BASOPHILS # BLD: 1 % (ref 0–2)
BASOPHILS ABSOLUTE: 0 K/UL (ref 0–0.2)
BASOPHILS ABSOLUTE: 0.05 K/UL (ref 0–0.2)
BASOPHILS ABSOLUTE: 0.07 K/UL (ref 0–0.2)
BASOPHILS ABSOLUTE: 0.08 K/UL (ref 0–0.2)
BASOPHILS ABSOLUTE: 0.08 K/UL (ref 0–0.2)
BASOPHILS ABSOLUTE: 0.09 K/UL (ref 0–0.2)
BASOPHILS ABSOLUTE: 0.09 K/UL (ref 0–0.2)
BASOPHILS ABSOLUTE: 0.11 K/UL (ref 0–0.2)
BILIRUB SERPL-MCNC: 0.35 MG/DL (ref 0.3–1.2)
BILIRUB SERPL-MCNC: 0.46 MG/DL (ref 0.3–1.2)
BILIRUB SERPL-MCNC: 0.47 MG/DL (ref 0.3–1.2)
BILIRUB SERPL-MCNC: 0.48 MG/DL (ref 0.3–1.2)
BILIRUB SERPL-MCNC: 0.48 MG/DL (ref 0.3–1.2)
BILIRUB SERPL-MCNC: 0.51 MG/DL (ref 0.3–1.2)
BILIRUB SERPL-MCNC: 0.53 MG/DL (ref 0.3–1.2)
BILIRUB SERPL-MCNC: 0.55 MG/DL (ref 0.3–1.2)
BILIRUBIN DIRECT: 0.1 MG/DL
BILIRUBIN DIRECT: 0.27 MG/DL
BILIRUBIN, INDIRECT: 0.2 MG/DL (ref 0–1)
BILIRUBIN, INDIRECT: 0.25 MG/DL (ref 0–1)
BNP INTERPRETATION: ABNORMAL
BNP INTERPRETATION: ABNORMAL
BUN BLDV-MCNC: 13 MG/DL (ref 6–20)
BUN BLDV-MCNC: 13 MG/DL (ref 6–20)
BUN BLDV-MCNC: 17 MG/DL (ref 6–20)
BUN BLDV-MCNC: 19 MG/DL (ref 6–20)
BUN BLDV-MCNC: 19 MG/DL (ref 6–20)
BUN BLDV-MCNC: 20 MG/DL (ref 6–20)
BUN BLDV-MCNC: 21 MG/DL (ref 6–20)
BUN BLDV-MCNC: 21 MG/DL (ref 6–20)
BUN BLDV-MCNC: 22 MG/DL (ref 6–20)
BUN BLDV-MCNC: 22 MG/DL (ref 6–20)
BUN BLDV-MCNC: 23 MG/DL (ref 6–20)
BUN BLDV-MCNC: 23 MG/DL (ref 6–20)
BUN BLDV-MCNC: 25 MG/DL (ref 6–20)
BUN BLDV-MCNC: 25 MG/DL (ref 6–20)
BUN BLDV-MCNC: 26 MG/DL (ref 6–20)
BUN BLDV-MCNC: 27 MG/DL (ref 6–20)
BUN BLDV-MCNC: 30 MG/DL (ref 6–20)
BUN BLDV-MCNC: 34 MG/DL (ref 6–20)
BUN BLDV-MCNC: 42 MG/DL (ref 6–20)
BUN BLDV-MCNC: 44 MG/DL (ref 6–20)
BUN/CREAT BLD: 3 (ref 9–20)
BUN/CREAT BLD: 4 (ref 9–20)
BUN/CREAT BLD: 5 (ref 9–20)
BUN/CREAT BLD: 6 (ref 9–20)
BUN/CREAT BLD: 6 (ref 9–20)
BUN/CREAT BLD: 7 (ref 9–20)
BUN/CREAT BLD: 8 (ref 9–20)
C DIFF AG + TOXIN: NEGATIVE
CALCIUM IONIZED: 1.06 MMOL/L (ref 1.13–1.33)
CALCIUM IONIZED: 1.1 MMOL/L (ref 1.13–1.33)
CALCIUM SERPL-MCNC: 5.5 MG/DL (ref 8.6–10.4)
CALCIUM SERPL-MCNC: 5.6 MG/DL (ref 8.6–10.4)
CALCIUM SERPL-MCNC: 6 MG/DL (ref 8.6–10.4)
CALCIUM SERPL-MCNC: 6.1 MG/DL (ref 8.6–10.4)
CALCIUM SERPL-MCNC: 6.3 MG/DL (ref 8.6–10.4)
CALCIUM SERPL-MCNC: 6.6 MG/DL (ref 8.6–10.4)
CALCIUM SERPL-MCNC: 6.8 MG/DL (ref 8.6–10.4)
CALCIUM SERPL-MCNC: 7.4 MG/DL (ref 8.6–10.4)
CALCIUM SERPL-MCNC: 7.6 MG/DL (ref 8.6–10.4)
CALCIUM SERPL-MCNC: 7.8 MG/DL (ref 8.6–10.4)
CALCIUM SERPL-MCNC: 7.9 MG/DL (ref 8.6–10.4)
CALCIUM SERPL-MCNC: 8 MG/DL (ref 8.6–10.4)
CALCIUM SERPL-MCNC: 8.1 MG/DL (ref 8.6–10.4)
CALCIUM SERPL-MCNC: 8.2 MG/DL (ref 8.6–10.4)
CALCIUM SERPL-MCNC: 8.3 MG/DL (ref 8.6–10.4)
CALCIUM SERPL-MCNC: 8.3 MG/DL (ref 8.6–10.4)
CALCIUM SERPL-MCNC: 8.5 MG/DL (ref 8.6–10.4)
CALCIUM SERPL-MCNC: 8.5 MG/DL (ref 8.6–10.4)
CALCIUM SERPL-MCNC: 8.6 MG/DL (ref 8.6–10.4)
CHLORIDE BLD-SCNC: 100 MMOL/L (ref 98–107)
CHLORIDE BLD-SCNC: 101 MMOL/L (ref 98–107)
CHLORIDE BLD-SCNC: 102 MMOL/L (ref 98–107)
CHLORIDE BLD-SCNC: 102 MMOL/L (ref 98–107)
CHLORIDE BLD-SCNC: 90 MMOL/L (ref 98–107)
CHLORIDE BLD-SCNC: 91 MMOL/L (ref 98–107)
CHLORIDE BLD-SCNC: 91 MMOL/L (ref 98–107)
CHLORIDE BLD-SCNC: 92 MMOL/L (ref 98–107)
CHLORIDE BLD-SCNC: 93 MMOL/L (ref 98–107)
CHLORIDE BLD-SCNC: 93 MMOL/L (ref 98–107)
CHLORIDE BLD-SCNC: 94 MMOL/L (ref 98–107)
CHLORIDE BLD-SCNC: 95 MMOL/L (ref 98–107)
CHLORIDE BLD-SCNC: 96 MMOL/L (ref 98–107)
CHLORIDE BLD-SCNC: 98 MMOL/L (ref 98–107)
CHOLESTEROL/HDL RATIO: 1.9
CHOLESTEROL: 60 MG/DL
CO2: 16 MMOL/L (ref 20–31)
CO2: 18 MMOL/L (ref 20–31)
CO2: 20 MMOL/L (ref 20–31)
CO2: 21 MMOL/L (ref 20–31)
CO2: 21 MMOL/L (ref 20–31)
CO2: 22 MMOL/L (ref 20–31)
CO2: 23 MMOL/L (ref 20–31)
CO2: 24 MMOL/L (ref 20–31)
CO2: 25 MMOL/L (ref 20–31)
CO2: 26 MMOL/L (ref 20–31)
CO2: 26 MMOL/L (ref 20–31)
CO2: 27 MMOL/L (ref 20–31)
CO2: 27 MMOL/L (ref 20–31)
CO2: 28 MMOL/L (ref 20–31)
CO2: 28 MMOL/L (ref 20–31)
CO2: 29 MMOL/L (ref 20–31)
CO2: 29 MMOL/L (ref 20–31)
CO2: 30 MMOL/L (ref 20–31)
CORTISOL COLLECTION INFO: ABNORMAL
CORTISOL COLLECTION INFO: NORMAL
CORTISOL: 18.7 UG/DL (ref 2.7–18.4)
CORTISOL: 6.5 UG/DL (ref 2.7–18.4)
CREAT SERPL-MCNC: 2.6 MG/DL (ref 0.5–0.9)
CREAT SERPL-MCNC: 2.61 MG/DL (ref 0.5–0.9)
CREAT SERPL-MCNC: 2.63 MG/DL (ref 0.5–0.9)
CREAT SERPL-MCNC: 2.96 MG/DL (ref 0.5–0.9)
CREAT SERPL-MCNC: 3.32 MG/DL (ref 0.5–0.9)
CREAT SERPL-MCNC: 3.39 MG/DL (ref 0.5–0.9)
CREAT SERPL-MCNC: 3.65 MG/DL (ref 0.5–0.9)
CREAT SERPL-MCNC: 3.66 MG/DL (ref 0.5–0.9)
CREAT SERPL-MCNC: 3.74 MG/DL (ref 0.5–0.9)
CREAT SERPL-MCNC: 3.88 MG/DL (ref 0.5–0.9)
CREAT SERPL-MCNC: 4.1 MG/DL (ref 0.5–0.9)
CREAT SERPL-MCNC: 4.12 MG/DL (ref 0.5–0.9)
CREAT SERPL-MCNC: 4.23 MG/DL (ref 0.5–0.9)
CREAT SERPL-MCNC: 4.32 MG/DL (ref 0.5–0.9)
CREAT SERPL-MCNC: 4.67 MG/DL (ref 0.5–0.9)
CREAT SERPL-MCNC: 4.85 MG/DL (ref 0.5–0.9)
CREAT SERPL-MCNC: 5.04 MG/DL (ref 0.5–0.9)
CREAT SERPL-MCNC: 5.05 MG/DL (ref 0.5–0.9)
CREAT SERPL-MCNC: 5.06 MG/DL (ref 0.5–0.9)
CREAT SERPL-MCNC: 5.08 MG/DL (ref 0.5–0.9)
CREAT SERPL-MCNC: 5.2 MG/DL (ref 0.5–0.9)
CREAT SERPL-MCNC: 5.32 MG/DL (ref 0.5–0.9)
CREAT SERPL-MCNC: 5.35 MG/DL (ref 0.5–0.9)
CREAT SERPL-MCNC: 5.37 MG/DL (ref 0.5–0.9)
CREAT SERPL-MCNC: 5.59 MG/DL (ref 0.5–0.9)
CREAT SERPL-MCNC: 5.63 MG/DL (ref 0.5–0.9)
CREAT SERPL-MCNC: 6.6 MG/DL (ref 0.5–0.9)
CULTURE: NORMAL
DATE AND TIME: NORMAL
DATE, STOOL #1: ABNORMAL
DATE, STOOL #2: ABNORMAL
DATE, STOOL #3: ABNORMAL
DIFFERENTIAL TYPE: ABNORMAL
EKG ATRIAL RATE: 132 BPM
EKG ATRIAL RATE: 234 BPM
EKG ATRIAL RATE: 249 BPM
EKG ATRIAL RATE: 258 BPM
EKG ATRIAL RATE: 276 BPM
EKG ATRIAL RATE: 57 BPM
EKG ATRIAL RATE: 77 BPM
EKG ATRIAL RATE: 80 BPM
EKG P AXIS: 70 DEGREES
EKG P AXIS: 72 DEGREES
EKG P AXIS: 78 DEGREES
EKG P AXIS: 84 DEGREES
EKG P-R INTERVAL: 136 MS
EKG P-R INTERVAL: 178 MS
EKG P-R INTERVAL: 184 MS
EKG Q-T INTERVAL: 284 MS
EKG Q-T INTERVAL: 294 MS
EKG Q-T INTERVAL: 336 MS
EKG Q-T INTERVAL: 354 MS
EKG Q-T INTERVAL: 364 MS
EKG Q-T INTERVAL: 390 MS
EKG Q-T INTERVAL: 398 MS
EKG Q-T INTERVAL: 422 MS
EKG QRS DURATION: 62 MS
EKG QRS DURATION: 62 MS
EKG QRS DURATION: 64 MS
EKG QRS DURATION: 70 MS
EKG QRS DURATION: 72 MS
EKG QRS DURATION: 74 MS
EKG QRS DURATION: 76 MS
EKG QRS DURATION: 80 MS
EKG QTC CALCULATION (BAZETT): 351 MS
EKG QTC CALCULATION (BAZETT): 400 MS
EKG QTC CALCULATION (BAZETT): 410 MS
EKG QTC CALCULATION (BAZETT): 420 MS
EKG QTC CALCULATION (BAZETT): 435 MS
EKG QTC CALCULATION (BAZETT): 490 MS
EKG QTC CALCULATION (BAZETT): 531 MS
EKG QTC CALCULATION (BAZETT): 539 MS
EKG R AXIS: 12 DEGREES
EKG R AXIS: 24 DEGREES
EKG R AXIS: 28 DEGREES
EKG R AXIS: 29 DEGREES
EKG R AXIS: 32 DEGREES
EKG R AXIS: 50 DEGREES
EKG R AXIS: 80 DEGREES
EKG R AXIS: 95 DEGREES
EKG T AXIS: 108 DEGREES
EKG T AXIS: 131 DEGREES
EKG T AXIS: 174 DEGREES
EKG T AXIS: 19 DEGREES
EKG T AXIS: 38 DEGREES
EKG T AXIS: 65 DEGREES
EKG T AXIS: 91 DEGREES
EKG T AXIS: 93 DEGREES
EKG VENTRICULAR RATE: 107 BPM
EKG VENTRICULAR RATE: 132 BPM
EKG VENTRICULAR RATE: 132 BPM
EKG VENTRICULAR RATE: 57 BPM
EKG VENTRICULAR RATE: 77 BPM
EKG VENTRICULAR RATE: 92 BPM
EKG VENTRICULAR RATE: 94 BPM
EKG VENTRICULAR RATE: 95 BPM
EOSINOPHILS RELATIVE PERCENT: 0 % (ref 1–4)
EOSINOPHILS RELATIVE PERCENT: 0 % (ref 1–4)
EOSINOPHILS RELATIVE PERCENT: 1 % (ref 1–4)
EOSINOPHILS RELATIVE PERCENT: 2 % (ref 1–4)
ESTIMATED AVERAGE GLUCOSE: 80 MG/DL
FERRITIN: 884 UG/L (ref 13–150)
FIO2: 100
FIO2: 60
FIO2: ABNORMAL
FIO2: ABNORMAL
FOLATE: 19.4 NG/ML
FREE KAPPA/LAMBDA RATIO: 1.73 (ref 0.26–1.65)
GFR AFRICAN AMERICAN: 10 ML/MIN
GFR AFRICAN AMERICAN: 11 ML/MIN
GFR AFRICAN AMERICAN: 12 ML/MIN
GFR AFRICAN AMERICAN: 13 ML/MIN
GFR AFRICAN AMERICAN: 13 ML/MIN
GFR AFRICAN AMERICAN: 14 ML/MIN
GFR AFRICAN AMERICAN: 14 ML/MIN
GFR AFRICAN AMERICAN: 15 ML/MIN
GFR AFRICAN AMERICAN: 15 ML/MIN
GFR AFRICAN AMERICAN: 16 ML/MIN
GFR AFRICAN AMERICAN: 16 ML/MIN
GFR AFRICAN AMERICAN: 17 ML/MIN
GFR AFRICAN AMERICAN: 18 ML/MIN
GFR AFRICAN AMERICAN: 20 ML/MIN
GFR AFRICAN AMERICAN: 23 ML/MIN
GFR AFRICAN AMERICAN: 23 ML/MIN
GFR AFRICAN AMERICAN: 24 ML/MIN
GFR AFRICAN AMERICAN: 8 ML/MIN
GFR NON-AFRICAN AMERICAN: 10 ML/MIN
GFR NON-AFRICAN AMERICAN: 11 ML/MIN
GFR NON-AFRICAN AMERICAN: 12 ML/MIN
GFR NON-AFRICAN AMERICAN: 13 ML/MIN
GFR NON-AFRICAN AMERICAN: 14 ML/MIN
GFR NON-AFRICAN AMERICAN: 15 ML/MIN
GFR NON-AFRICAN AMERICAN: 17 ML/MIN
GFR NON-AFRICAN AMERICAN: 19 ML/MIN
GFR NON-AFRICAN AMERICAN: 7 ML/MIN
GFR NON-AFRICAN AMERICAN: 8 ML/MIN
GFR NON-AFRICAN AMERICAN: 9 ML/MIN
GFR SERPL CREATININE-BSD FRML MDRD: ABNORMAL ML/MIN/{1.73_M2}
GLOBULIN: ABNORMAL G/DL (ref 1.5–3.8)
GLOBULIN: ABNORMAL G/DL (ref 1.5–3.8)
GLUCOSE BLD-MCNC: 101 MG/DL (ref 65–105)
GLUCOSE BLD-MCNC: 101 MG/DL (ref 65–105)
GLUCOSE BLD-MCNC: 102 MG/DL (ref 65–105)
GLUCOSE BLD-MCNC: 102 MG/DL (ref 65–105)
GLUCOSE BLD-MCNC: 103 MG/DL (ref 65–105)
GLUCOSE BLD-MCNC: 103 MG/DL (ref 65–105)
GLUCOSE BLD-MCNC: 106 MG/DL (ref 65–105)
GLUCOSE BLD-MCNC: 106 MG/DL (ref 65–105)
GLUCOSE BLD-MCNC: 108 MG/DL (ref 65–105)
GLUCOSE BLD-MCNC: 108 MG/DL (ref 65–105)
GLUCOSE BLD-MCNC: 109 MG/DL (ref 65–105)
GLUCOSE BLD-MCNC: 109 MG/DL (ref 65–105)
GLUCOSE BLD-MCNC: 111 MG/DL (ref 65–105)
GLUCOSE BLD-MCNC: 112 MG/DL (ref 65–105)
GLUCOSE BLD-MCNC: 112 MG/DL (ref 70–99)
GLUCOSE BLD-MCNC: 113 MG/DL (ref 65–105)
GLUCOSE BLD-MCNC: 115 MG/DL (ref 65–105)
GLUCOSE BLD-MCNC: 115 MG/DL (ref 70–99)
GLUCOSE BLD-MCNC: 116 MG/DL (ref 65–105)
GLUCOSE BLD-MCNC: 118 MG/DL (ref 65–105)
GLUCOSE BLD-MCNC: 120 MG/DL (ref 65–105)
GLUCOSE BLD-MCNC: 120 MG/DL (ref 70–99)
GLUCOSE BLD-MCNC: 122 MG/DL (ref 70–99)
GLUCOSE BLD-MCNC: 124 MG/DL (ref 65–105)
GLUCOSE BLD-MCNC: 127 MG/DL (ref 65–105)
GLUCOSE BLD-MCNC: 128 MG/DL (ref 65–105)
GLUCOSE BLD-MCNC: 128 MG/DL (ref 65–105)
GLUCOSE BLD-MCNC: 129 MG/DL (ref 65–105)
GLUCOSE BLD-MCNC: 130 MG/DL (ref 65–105)
GLUCOSE BLD-MCNC: 130 MG/DL (ref 65–105)
GLUCOSE BLD-MCNC: 132 MG/DL (ref 65–105)
GLUCOSE BLD-MCNC: 136 MG/DL (ref 65–105)
GLUCOSE BLD-MCNC: 136 MG/DL (ref 65–105)
GLUCOSE BLD-MCNC: 137 MG/DL (ref 65–105)
GLUCOSE BLD-MCNC: 139 MG/DL (ref 65–105)
GLUCOSE BLD-MCNC: 139 MG/DL (ref 70–99)
GLUCOSE BLD-MCNC: 141 MG/DL (ref 70–99)
GLUCOSE BLD-MCNC: 145 MG/DL (ref 65–105)
GLUCOSE BLD-MCNC: 147 MG/DL (ref 70–99)
GLUCOSE BLD-MCNC: 155 MG/DL (ref 65–105)
GLUCOSE BLD-MCNC: 155 MG/DL (ref 65–105)
GLUCOSE BLD-MCNC: 156 MG/DL (ref 65–105)
GLUCOSE BLD-MCNC: 158 MG/DL (ref 70–99)
GLUCOSE BLD-MCNC: 160 MG/DL (ref 70–99)
GLUCOSE BLD-MCNC: 168 MG/DL (ref 65–105)
GLUCOSE BLD-MCNC: 172 MG/DL (ref 65–105)
GLUCOSE BLD-MCNC: 179 MG/DL (ref 65–105)
GLUCOSE BLD-MCNC: 182 MG/DL (ref 65–105)
GLUCOSE BLD-MCNC: 186 MG/DL (ref 70–99)
GLUCOSE BLD-MCNC: 189 MG/DL (ref 65–105)
GLUCOSE BLD-MCNC: 191 MG/DL (ref 65–105)
GLUCOSE BLD-MCNC: 195 MG/DL (ref 65–105)
GLUCOSE BLD-MCNC: 195 MG/DL (ref 70–99)
GLUCOSE BLD-MCNC: 198 MG/DL (ref 70–99)
GLUCOSE BLD-MCNC: 211 MG/DL (ref 70–99)
GLUCOSE BLD-MCNC: 242 MG/DL (ref 65–105)
GLUCOSE BLD-MCNC: 252 MG/DL (ref 70–99)
GLUCOSE BLD-MCNC: 386 MG/DL (ref 65–105)
GLUCOSE BLD-MCNC: 46 MG/DL (ref 70–99)
GLUCOSE BLD-MCNC: 50 MG/DL (ref 65–105)
GLUCOSE BLD-MCNC: 51 MG/DL (ref 65–105)
GLUCOSE BLD-MCNC: 53 MG/DL (ref 65–105)
GLUCOSE BLD-MCNC: 53 MG/DL (ref 65–105)
GLUCOSE BLD-MCNC: 55 MG/DL (ref 65–105)
GLUCOSE BLD-MCNC: 55 MG/DL (ref 65–105)
GLUCOSE BLD-MCNC: 56 MG/DL (ref 65–105)
GLUCOSE BLD-MCNC: 58 MG/DL (ref 65–105)
GLUCOSE BLD-MCNC: 59 MG/DL (ref 65–105)
GLUCOSE BLD-MCNC: 60 MG/DL (ref 65–105)
GLUCOSE BLD-MCNC: 61 MG/DL (ref 65–105)
GLUCOSE BLD-MCNC: 62 MG/DL (ref 65–105)
GLUCOSE BLD-MCNC: 62 MG/DL (ref 65–105)
GLUCOSE BLD-MCNC: 63 MG/DL (ref 65–105)
GLUCOSE BLD-MCNC: 64 MG/DL (ref 65–105)
GLUCOSE BLD-MCNC: 65 MG/DL (ref 65–105)
GLUCOSE BLD-MCNC: 66 MG/DL (ref 65–105)
GLUCOSE BLD-MCNC: 66 MG/DL (ref 65–105)
GLUCOSE BLD-MCNC: 66 MG/DL (ref 70–99)
GLUCOSE BLD-MCNC: 68 MG/DL (ref 65–105)
GLUCOSE BLD-MCNC: 69 MG/DL (ref 65–105)
GLUCOSE BLD-MCNC: 69 MG/DL (ref 70–99)
GLUCOSE BLD-MCNC: 69 MG/DL (ref 70–99)
GLUCOSE BLD-MCNC: 70 MG/DL (ref 65–105)
GLUCOSE BLD-MCNC: 71 MG/DL (ref 65–105)
GLUCOSE BLD-MCNC: 72 MG/DL (ref 65–105)
GLUCOSE BLD-MCNC: 72 MG/DL (ref 65–105)
GLUCOSE BLD-MCNC: 73 MG/DL (ref 65–105)
GLUCOSE BLD-MCNC: 74 MG/DL (ref 65–105)
GLUCOSE BLD-MCNC: 75 MG/DL (ref 65–105)
GLUCOSE BLD-MCNC: 76 MG/DL (ref 65–105)
GLUCOSE BLD-MCNC: 76 MG/DL (ref 65–105)
GLUCOSE BLD-MCNC: 76 MG/DL (ref 70–99)
GLUCOSE BLD-MCNC: 77 MG/DL (ref 65–105)
GLUCOSE BLD-MCNC: 77 MG/DL (ref 65–105)
GLUCOSE BLD-MCNC: 78 MG/DL (ref 65–105)
GLUCOSE BLD-MCNC: 78 MG/DL (ref 65–105)
GLUCOSE BLD-MCNC: 79 MG/DL (ref 65–105)
GLUCOSE BLD-MCNC: 81 MG/DL (ref 65–105)
GLUCOSE BLD-MCNC: 81 MG/DL (ref 70–99)
GLUCOSE BLD-MCNC: 82 MG/DL (ref 65–105)
GLUCOSE BLD-MCNC: 83 MG/DL (ref 65–105)
GLUCOSE BLD-MCNC: 83 MG/DL (ref 70–99)
GLUCOSE BLD-MCNC: 83 MG/DL (ref 70–99)
GLUCOSE BLD-MCNC: 84 MG/DL (ref 65–105)
GLUCOSE BLD-MCNC: 84 MG/DL (ref 70–99)
GLUCOSE BLD-MCNC: 85 MG/DL (ref 65–105)
GLUCOSE BLD-MCNC: 85 MG/DL (ref 65–105)
GLUCOSE BLD-MCNC: 86 MG/DL (ref 65–105)
GLUCOSE BLD-MCNC: 87 MG/DL (ref 65–105)
GLUCOSE BLD-MCNC: 87 MG/DL (ref 65–105)
GLUCOSE BLD-MCNC: 88 MG/DL (ref 65–105)
GLUCOSE BLD-MCNC: 88 MG/DL (ref 70–99)
GLUCOSE BLD-MCNC: 89 MG/DL (ref 65–105)
GLUCOSE BLD-MCNC: 89 MG/DL (ref 65–105)
GLUCOSE BLD-MCNC: 90 MG/DL (ref 65–105)
GLUCOSE BLD-MCNC: 91 MG/DL (ref 65–105)
GLUCOSE BLD-MCNC: 92 MG/DL (ref 65–105)
GLUCOSE BLD-MCNC: 92 MG/DL (ref 65–105)
GLUCOSE BLD-MCNC: 94 MG/DL (ref 65–105)
GLUCOSE BLD-MCNC: 94 MG/DL (ref 70–99)
GLUCOSE BLD-MCNC: 95 MG/DL (ref 65–105)
GLUCOSE BLD-MCNC: 95 MG/DL (ref 65–105)
GLUCOSE BLD-MCNC: 96 MG/DL (ref 65–105)
GLUCOSE BLD-MCNC: 97 MG/DL (ref 65–105)
GLUCOSE BLD-MCNC: 97 MG/DL (ref 65–105)
GLUCOSE BLD-MCNC: 97 MG/DL (ref 70–99)
GLUCOSE BLD-MCNC: 98 MG/DL (ref 65–105)
GLUCOSE BLD-MCNC: 99 MG/DL (ref 70–99)
HBA1C MFR BLD: 4.4 % (ref 4–6)
HBV SURFACE AB TITR SER: <3.5 MIU/ML
HCO3 VENOUS: 24.3 MMOL/L (ref 22–29)
HCT VFR BLD CALC: 26.8 % (ref 36.3–47.1)
HCT VFR BLD CALC: 27.1 % (ref 36.3–47.1)
HCT VFR BLD CALC: 27.1 % (ref 36.3–47.1)
HCT VFR BLD CALC: 27.8 % (ref 36.3–47.1)
HCT VFR BLD CALC: 28.2 % (ref 36.3–47.1)
HCT VFR BLD CALC: 28.3 % (ref 36.3–47.1)
HCT VFR BLD CALC: 28.3 % (ref 36.3–47.1)
HCT VFR BLD CALC: 28.6 % (ref 36.3–47.1)
HCT VFR BLD CALC: 28.7 % (ref 36.3–47.1)
HCT VFR BLD CALC: 28.7 % (ref 36.3–47.1)
HCT VFR BLD CALC: 28.8 % (ref 36.3–47.1)
HCT VFR BLD CALC: 28.9 % (ref 36.3–47.1)
HCT VFR BLD CALC: 29 % (ref 36.3–47.1)
HCT VFR BLD CALC: 29 % (ref 36.3–47.1)
HCT VFR BLD CALC: 29.1 % (ref 36.3–47.1)
HCT VFR BLD CALC: 29.1 % (ref 36.3–47.1)
HCT VFR BLD CALC: 29.2 % (ref 36.3–47.1)
HCT VFR BLD CALC: 29.4 % (ref 36.3–47.1)
HCT VFR BLD CALC: 29.4 % (ref 36.3–47.1)
HCT VFR BLD CALC: 29.5 % (ref 36.3–47.1)
HCT VFR BLD CALC: 29.7 % (ref 36.3–47.1)
HCT VFR BLD CALC: 29.8 % (ref 36.3–47.1)
HCT VFR BLD CALC: 29.9 % (ref 36.3–47.1)
HCT VFR BLD CALC: 29.9 % (ref 36.3–47.1)
HCT VFR BLD CALC: 30 % (ref 36.3–47.1)
HCT VFR BLD CALC: 30.2 % (ref 36.3–47.1)
HCT VFR BLD CALC: 30.3 % (ref 36.3–47.1)
HCT VFR BLD CALC: 30.4 % (ref 36.3–47.1)
HCT VFR BLD CALC: 30.6 % (ref 36.3–47.1)
HCT VFR BLD CALC: 30.6 % (ref 36.3–47.1)
HCT VFR BLD CALC: 30.7 % (ref 36.3–47.1)
HCT VFR BLD CALC: 30.7 % (ref 36.3–47.1)
HCT VFR BLD CALC: 31 % (ref 36.3–47.1)
HCT VFR BLD CALC: 31 % (ref 36.3–47.1)
HCT VFR BLD CALC: 31.1 % (ref 36.3–47.1)
HCT VFR BLD CALC: 31.3 % (ref 36.3–47.1)
HCT VFR BLD CALC: 31.3 % (ref 36.3–47.1)
HCT VFR BLD CALC: 32.4 % (ref 36.3–47.1)
HCT VFR BLD CALC: 32.6 % (ref 36.3–47.1)
HCT VFR BLD CALC: 32.9 % (ref 36.3–47.1)
HCT VFR BLD CALC: 33.1 % (ref 36.3–47.1)
HCT VFR BLD CALC: 33.3 % (ref 36.3–47.1)
HCT VFR BLD CALC: 34 % (ref 36.3–47.1)
HCT VFR BLD CALC: 34.3 % (ref 36.3–47.1)
HCT VFR BLD CALC: 36.5 % (ref 36.3–47.1)
HDLC SERPL-MCNC: 32 MG/DL
HEMOCCULT SP1 STL QL: POSITIVE
HEMOCCULT SP2 STL QL: ABNORMAL
HEMOCCULT SP3 STL QL: ABNORMAL
HEMOGLOBIN: 10.1 G/DL (ref 11.9–15.1)
HEMOGLOBIN: 7.1 G/DL (ref 11.9–15.1)
HEMOGLOBIN: 7.3 G/DL (ref 11.9–15.1)
HEMOGLOBIN: 7.6 G/DL (ref 11.9–15.1)
HEMOGLOBIN: 7.8 G/DL (ref 11.9–15.1)
HEMOGLOBIN: 7.8 G/DL (ref 11.9–15.1)
HEMOGLOBIN: 7.9 G/DL (ref 11.9–15.1)
HEMOGLOBIN: 8 G/DL (ref 11.9–15.1)
HEMOGLOBIN: 8.1 G/DL (ref 11.9–15.1)
HEMOGLOBIN: 8.2 G/DL (ref 11.9–15.1)
HEMOGLOBIN: 8.3 G/DL (ref 11.9–15.1)
HEMOGLOBIN: 8.4 G/DL (ref 11.9–15.1)
HEMOGLOBIN: 8.5 G/DL (ref 11.9–15.1)
HEMOGLOBIN: 8.6 G/DL (ref 11.9–15.1)
HEMOGLOBIN: 8.7 G/DL (ref 11.9–15.1)
HEMOGLOBIN: 8.8 G/DL (ref 11.9–15.1)
HEMOGLOBIN: 8.8 G/DL (ref 11.9–15.1)
HEMOGLOBIN: 9 G/DL (ref 11.9–15.1)
HEMOGLOBIN: 9 G/DL (ref 11.9–15.1)
HEMOGLOBIN: 9.1 G/DL (ref 11.9–15.1)
HEMOGLOBIN: 9.3 G/DL (ref 11.9–15.1)
HEMOGLOBIN: 9.4 G/DL (ref 11.9–15.1)
HEPATITIS B CORE TOTAL ANTIBODY: NONREACTIVE
HEPATITIS B SURFACE ANTIGEN: NONREACTIVE
IMMATURE GRANULOCYTES: 0 %
IMMATURE GRANULOCYTES: 1 %
IMMATURE RETIC FRACT: 41.8 % (ref 2.7–18.3)
IMMATURE RETIC FRACT: 44.4 % (ref 2.7–18.3)
INR BLD: 1.3
INR BLD: 1.8
IRON SATURATION: 53 % (ref 20–55)
IRON SATURATION: 75 % (ref 20–55)
IRON: 129 UG/DL (ref 37–145)
IRON: 92 UG/DL (ref 37–145)
KAPPA FREE LIGHT CHAINS QNT: 40.16 MG/DL (ref 0.37–1.94)
LACTIC ACID, SEPSIS WHOLE BLOOD: NORMAL MMOL/L (ref 0.5–1.9)
LACTIC ACID, SEPSIS: 0.8 MMOL/L (ref 0.5–1.9)
LACTIC ACID, SEPSIS: 1 MMOL/L (ref 0.5–1.9)
LACTIC ACID, SEPSIS: 1.3 MMOL/L (ref 0.5–1.9)
LACTIC ACID, SEPSIS: 1.8 MMOL/L (ref 0.5–1.9)
LACTIC ACID: 1.2 MMOL/L (ref 0.5–2.2)
LACTIC ACID: 2.5 MMOL/L (ref 0.5–2.2)
LACTIC ACID: 3.2 MMOL/L (ref 0.5–2.2)
LACTIC ACID: 8.9 MMOL/L (ref 0.5–2.2)
LAMBDA FREE LIGHT CHAINS QNT: 23.22 MG/DL (ref 0.57–2.63)
LDL CHOLESTEROL: 18 MG/DL (ref 0–130)
LIPASE: 26 U/L (ref 13–60)
LV EF: 45 %
LV EF: 65 %
LVEF MODALITY: NORMAL
LVEF MODALITY: NORMAL
LYMPHOCYTES # BLD: 11 % (ref 24–43)
LYMPHOCYTES # BLD: 12 % (ref 24–43)
LYMPHOCYTES # BLD: 15 % (ref 24–43)
LYMPHOCYTES # BLD: 16 % (ref 24–43)
LYMPHOCYTES # BLD: 17 % (ref 24–43)
LYMPHOCYTES # BLD: 2 % (ref 24–43)
LYMPHOCYTES # BLD: 20 % (ref 24–43)
LYMPHOCYTES # BLD: 20 % (ref 24–44)
LYMPHOCYTES # BLD: 20 % (ref 24–44)
LYMPHOCYTES # BLD: 22 % (ref 24–43)
LYMPHOCYTES # BLD: 22 % (ref 24–44)
LYMPHOCYTES # BLD: 22 % (ref 24–44)
LYMPHOCYTES # BLD: 29 % (ref 24–44)
Lab: NORMAL
MAGNESIUM: 1.5 MG/DL (ref 1.6–2.6)
MAGNESIUM: 1.5 MG/DL (ref 1.6–2.6)
MAGNESIUM: 1.6 MG/DL (ref 1.6–2.6)
MAGNESIUM: 1.7 MG/DL (ref 1.6–2.6)
MAGNESIUM: 1.9 MG/DL (ref 1.6–2.6)
MAGNESIUM: 2 MG/DL (ref 1.6–2.6)
MAGNESIUM: 2.1 MG/DL (ref 1.6–2.6)
MAGNESIUM: 2.1 MG/DL (ref 1.6–2.6)
MCH RBC QN AUTO: 30 PG (ref 25.2–33.5)
MCH RBC QN AUTO: 30 PG (ref 25.2–33.5)
MCH RBC QN AUTO: 30.2 PG (ref 25.2–33.5)
MCH RBC QN AUTO: 30.2 PG (ref 25.2–33.5)
MCH RBC QN AUTO: 30.6 PG (ref 25.2–33.5)
MCH RBC QN AUTO: 30.7 PG (ref 25.2–33.5)
MCH RBC QN AUTO: 30.7 PG (ref 25.2–33.5)
MCH RBC QN AUTO: 30.8 PG (ref 25.2–33.5)
MCH RBC QN AUTO: 31 PG (ref 25.2–33.5)
MCH RBC QN AUTO: 31 PG (ref 25.2–33.5)
MCH RBC QN AUTO: 31.1 PG (ref 25.2–33.5)
MCH RBC QN AUTO: 31.3 PG (ref 25.2–33.5)
MCH RBC QN AUTO: 31.3 PG (ref 25.2–33.5)
MCH RBC QN AUTO: 32.9 PG (ref 25.2–33.5)
MCH RBC QN AUTO: 33 PG (ref 25.2–33.5)
MCH RBC QN AUTO: 33.1 PG (ref 25.2–33.5)
MCH RBC QN AUTO: 34 PG (ref 25.2–33.5)
MCHC RBC AUTO-ENTMCNC: 27 G/DL (ref 28.4–34.8)
MCHC RBC AUTO-ENTMCNC: 27 G/DL (ref 28.4–34.8)
MCHC RBC AUTO-ENTMCNC: 27.2 G/DL (ref 28.4–34.8)
MCHC RBC AUTO-ENTMCNC: 27.4 G/DL (ref 28.4–34.8)
MCHC RBC AUTO-ENTMCNC: 27.5 G/DL (ref 28.4–34.8)
MCHC RBC AUTO-ENTMCNC: 27.6 G/DL (ref 28.4–34.8)
MCHC RBC AUTO-ENTMCNC: 27.9 G/DL (ref 28.4–34.8)
MCHC RBC AUTO-ENTMCNC: 27.9 G/DL (ref 28.4–34.8)
MCHC RBC AUTO-ENTMCNC: 28 G/DL (ref 28.4–34.8)
MCHC RBC AUTO-ENTMCNC: 28.1 G/DL (ref 28.4–34.8)
MCHC RBC AUTO-ENTMCNC: 28.3 G/DL (ref 28.4–34.8)
MCHC RBC AUTO-ENTMCNC: 29 G/DL (ref 28.4–34.8)
MCHC RBC AUTO-ENTMCNC: 29.1 G/DL (ref 28.4–34.8)
MCHC RBC AUTO-ENTMCNC: 29.1 G/DL (ref 28.4–34.8)
MCV RBC AUTO: 103.3 FL (ref 82.6–102.9)
MCV RBC AUTO: 105.6 FL (ref 82.6–102.9)
MCV RBC AUTO: 107 FL (ref 82.6–102.9)
MCV RBC AUTO: 107.7 FL (ref 82.6–102.9)
MCV RBC AUTO: 108.3 FL (ref 82.6–102.9)
MCV RBC AUTO: 111.4 FL (ref 82.6–102.9)
MCV RBC AUTO: 111.8 FL (ref 82.6–102.9)
MCV RBC AUTO: 111.9 FL (ref 82.6–102.9)
MCV RBC AUTO: 112 FL (ref 82.6–102.9)
MCV RBC AUTO: 112.3 FL (ref 82.6–102.9)
MCV RBC AUTO: 112.5 FL (ref 82.6–102.9)
MCV RBC AUTO: 113.5 FL (ref 82.6–102.9)
MCV RBC AUTO: 114.1 FL (ref 82.6–102.9)
MCV RBC AUTO: 114.8 FL (ref 82.6–102.9)
MCV RBC AUTO: 117.8 FL (ref 82.6–102.9)
MCV RBC AUTO: 118.4 FL (ref 82.6–102.9)
MCV RBC AUTO: 119.5 FL (ref 82.6–102.9)
MCV RBC AUTO: 120.4 FL (ref 82.6–102.9)
MCV RBC AUTO: 121.6 FL (ref 82.6–102.9)
MODE: ABNORMAL
MONOCYTES # BLD: 10 % (ref 1–7)
MONOCYTES # BLD: 10 % (ref 3–12)
MONOCYTES # BLD: 11 % (ref 1–7)
MONOCYTES # BLD: 12 % (ref 1–7)
MONOCYTES # BLD: 13 % (ref 1–7)
MONOCYTES # BLD: 14 % (ref 3–12)
MONOCYTES # BLD: 15 % (ref 3–12)
MONOCYTES # BLD: 4 % (ref 3–12)
MONOCYTES # BLD: 5 % (ref 1–7)
MONOCYTES # BLD: 7 % (ref 3–12)
MONOCYTES # BLD: 7 % (ref 3–12)
MONOCYTES # BLD: 8 % (ref 3–12)
MONOCYTES # BLD: 9 % (ref 3–12)
MORPHOLOGY: ABNORMAL
MYOGLOBIN: 169 NG/ML (ref 25–58)
MYOGLOBIN: 175 NG/ML (ref 25–58)
MYOGLOBIN: 194 NG/ML (ref 25–58)
MYOGLOBIN: 197 NG/ML (ref 25–58)
MYOGLOBIN: 205 NG/ML (ref 25–58)
MYOGLOBIN: 216 NG/ML (ref 25–58)
NEGATIVE BASE EXCESS, ART: 10 (ref 0–2)
NEGATIVE BASE EXCESS, ART: 11 (ref 0–2)
NEGATIVE BASE EXCESS, ART: 16 (ref 0–2)
NEGATIVE BASE EXCESS, ART: 5 (ref 0–2)
NEGATIVE BASE EXCESS, ART: 6 (ref 0–2)
NEGATIVE BASE EXCESS, ART: 6 (ref 0–2)
NEGATIVE BASE EXCESS, VEN: 4 (ref 0–2)
NOTIFY: NORMAL
NRBC AUTOMATED: 0 PER 100 WBC
NRBC AUTOMATED: 0 PER 100 WBC
NRBC AUTOMATED: 0.2 PER 100 WBC
NRBC AUTOMATED: 0.2 PER 100 WBC
NRBC AUTOMATED: 0.3 PER 100 WBC
NRBC AUTOMATED: 0.4 PER 100 WBC
NRBC AUTOMATED: 0.5 PER 100 WBC
NRBC AUTOMATED: 0.5 PER 100 WBC
NRBC AUTOMATED: 0.7 PER 100 WBC
NRBC AUTOMATED: 0.8 PER 100 WBC
NRBC AUTOMATED: 1.6 PER 100 WBC
NRBC AUTOMATED: 1.8 PER 100 WBC
NRBC AUTOMATED: 3.8 PER 100 WBC
NRBC AUTOMATED: 7.2 PER 100 WBC
NRBC AUTOMATED: ABNORMAL PER 100 WBC
NRBC AUTOMATED: ABNORMAL PER 100 WBC
NUCLEATED RED BLOOD CELLS: 4 PER 100 WBC
O2 DEVICE/FLOW/%: ABNORMAL
O2 SAT, VEN: 99 % (ref 60–85)
PARTIAL THROMBOPLASTIN TIME: 30.2 SEC (ref 23.9–33.8)
PARTIAL THROMBOPLASTIN TIME: 36.1 SEC (ref 23.9–33.8)
PATHOLOGIST REVIEW: NORMAL
PATHOLOGIST: NORMAL
PATIENT TEMP: 37
PATIENT TEMP: ABNORMAL
PCO2, VEN: 58.6 MM HG (ref 41–51)
PDW BLD-RTO: 15.3 % (ref 11.8–14.4)
PDW BLD-RTO: 15.5 % (ref 11.8–14.4)
PDW BLD-RTO: 16 % (ref 11.8–14.4)
PDW BLD-RTO: 16.1 % (ref 11.8–14.4)
PDW BLD-RTO: 16.2 % (ref 11.8–14.4)
PDW BLD-RTO: 16.3 % (ref 11.8–14.4)
PDW BLD-RTO: 16.4 % (ref 11.8–14.4)
PDW BLD-RTO: 16.4 % (ref 11.8–14.4)
PDW BLD-RTO: 16.5 % (ref 11.8–14.4)
PDW BLD-RTO: 16.5 % (ref 11.8–14.4)
PDW BLD-RTO: 17.2 % (ref 11.8–14.4)
PDW BLD-RTO: 17.2 % (ref 11.8–14.4)
PDW BLD-RTO: 17.3 % (ref 11.8–14.4)
PDW BLD-RTO: 17.5 % (ref 11.8–14.4)
PDW BLD-RTO: 17.6 % (ref 11.8–14.4)
PDW BLD-RTO: 17.9 % (ref 11.8–14.4)
PDW BLD-RTO: 18 % (ref 11.8–14.4)
PH VENOUS: 7.23 (ref 7.32–7.43)
PHOSPHORUS: 2.4 MG/DL (ref 2.6–4.5)
PHOSPHORUS: 2.7 MG/DL (ref 2.6–4.5)
PHOSPHORUS: 2.8 MG/DL (ref 2.6–4.5)
PHOSPHORUS: 3.1 MG/DL (ref 2.6–4.5)
PHOSPHORUS: 3.7 MG/DL (ref 2.6–4.5)
PHOSPHORUS: 4.1 MG/DL (ref 2.6–4.5)
PHOSPHORUS: 4.1 MG/DL (ref 2.6–4.5)
PHOSPHORUS: 4.2 MG/DL (ref 2.6–4.5)
PLATELET # BLD: 123 K/UL (ref 138–453)
PLATELET # BLD: 128 K/UL (ref 138–453)
PLATELET # BLD: 131 K/UL (ref 138–453)
PLATELET # BLD: 131 K/UL (ref 138–453)
PLATELET # BLD: 132 K/UL (ref 138–453)
PLATELET # BLD: 132 K/UL (ref 138–453)
PLATELET # BLD: 133 K/UL (ref 138–453)
PLATELET # BLD: 137 K/UL (ref 138–453)
PLATELET # BLD: 145 K/UL (ref 138–453)
PLATELET # BLD: 145 K/UL (ref 138–453)
PLATELET # BLD: 148 K/UL (ref 138–453)
PLATELET # BLD: 149 K/UL (ref 138–453)
PLATELET # BLD: 150 K/UL (ref 138–453)
PLATELET # BLD: 153 K/UL (ref 138–453)
PLATELET # BLD: 171 K/UL (ref 138–453)
PLATELET # BLD: 176 K/UL (ref 138–453)
PLATELET # BLD: 183 K/UL (ref 138–453)
PLATELET # BLD: 80 K/UL (ref 138–453)
PLATELET # BLD: 94 K/UL (ref 138–453)
PLATELET ESTIMATE: ABNORMAL
PMV BLD AUTO: 10 FL (ref 8.1–13.5)
PMV BLD AUTO: 10.1 FL (ref 8.1–13.5)
PMV BLD AUTO: 10.5 FL (ref 8.1–13.5)
PMV BLD AUTO: 10.6 FL (ref 8.1–13.5)
PMV BLD AUTO: 10.7 FL (ref 8.1–13.5)
PMV BLD AUTO: 10.8 FL (ref 8.1–13.5)
PMV BLD AUTO: 11 FL (ref 8.1–13.5)
PMV BLD AUTO: 11.1 FL (ref 8.1–13.5)
PMV BLD AUTO: 11.1 FL (ref 8.1–13.5)
PMV BLD AUTO: 11.4 FL (ref 8.1–13.5)
PMV BLD AUTO: 11.4 FL (ref 8.1–13.5)
PMV BLD AUTO: 11.7 FL (ref 8.1–13.5)
PMV BLD AUTO: 11.7 FL (ref 8.1–13.5)
PMV BLD AUTO: 11.8 FL (ref 8.1–13.5)
PMV BLD AUTO: 12.3 FL (ref 8.1–13.5)
PO2, VEN: 150 MM HG (ref 30–50)
POC HCO3: 14.1 MMOL/L (ref 21–28)
POC HCO3: 19.2 MMOL/L (ref 21–28)
POC HCO3: 19.4 MMOL/L (ref 21–28)
POC HCO3: 23.7 MMOL/L (ref 21–28)
POC HCO3: 24 MMOL/L (ref 21–28)
POC HCO3: 24.8 MMOL/L (ref 21–28)
POC IONIZED CALCIUM: 0.78 MMOL/L (ref 1.15–1.33)
POC IONIZED CALCIUM: 0.8 MMOL/L (ref 1.15–1.33)
POC IONIZED CALCIUM: 0.85 MMOL/L (ref 1.15–1.33)
POC IONIZED CALCIUM: 0.88 MMOL/L (ref 1.15–1.33)
POC IONIZED CALCIUM: 0.9 MMOL/L (ref 1.15–1.33)
POC IONIZED CALCIUM: 0.94 MMOL/L (ref 1.15–1.33)
POC IONIZED CALCIUM: 0.94 MMOL/L (ref 1.15–1.33)
POC IONIZED CALCIUM: 0.96 MMOL/L (ref 1.15–1.33)
POC O2 SATURATION: 62 % (ref 94–98)
POC O2 SATURATION: 65 % (ref 94–98)
POC O2 SATURATION: 68 % (ref 94–98)
POC O2 SATURATION: 75 % (ref 94–98)
POC O2 SATURATION: 81 % (ref 94–98)
POC O2 SATURATION: 90 % (ref 94–98)
POC PCO2 TEMP: ABNORMAL MM HG
POC PCO2: 50.2 MM HG (ref 35–48)
POC PCO2: 57.2 MM HG (ref 35–48)
POC PCO2: 61.9 MM HG (ref 35–48)
POC PCO2: 64.9 MM HG (ref 35–48)
POC PCO2: 72 MM HG (ref 35–48)
POC PCO2: 86.9 MM HG (ref 35–48)
POC PH TEMP: ABNORMAL
POC PH: 7.05 (ref 7.35–7.45)
POC PH: 7.06 (ref 7.35–7.45)
POC PH: 7.1 (ref 7.35–7.45)
POC PH: 7.12 (ref 7.35–7.45)
POC PH: 7.14 (ref 7.35–7.45)
POC PH: 7.18 (ref 7.35–7.45)
POC PO2 TEMP: ABNORMAL MM HG
POC PO2: 45.6 MM HG (ref 83–108)
POC PO2: 46.2 MM HG (ref 83–108)
POC PO2: 46.4 MM HG (ref 83–108)
POC PO2: 50.9 MM HG (ref 83–108)
POC PO2: 65.1 MM HG (ref 83–108)
POC PO2: 79.9 MM HG (ref 83–108)
POC TCO2: 16 MMOL/L (ref 22–30)
POSITIVE BASE EXCESS, ART: ABNORMAL (ref 0–3)
POSITIVE BASE EXCESS, VEN: ABNORMAL (ref 0–3)
POTASSIUM SERPL-SCNC: 2.8 MMOL/L (ref 3.7–5.3)
POTASSIUM SERPL-SCNC: 3.1 MMOL/L (ref 3.7–5.3)
POTASSIUM SERPL-SCNC: 3.2 MMOL/L (ref 3.7–5.3)
POTASSIUM SERPL-SCNC: 3.6 MMOL/L (ref 3.7–5.3)
POTASSIUM SERPL-SCNC: 3.8 MMOL/L (ref 3.7–5.3)
POTASSIUM SERPL-SCNC: 3.8 MMOL/L (ref 3.7–5.3)
POTASSIUM SERPL-SCNC: 3.9 MMOL/L (ref 3.7–5.3)
POTASSIUM SERPL-SCNC: 4 MMOL/L (ref 3.7–5.3)
POTASSIUM SERPL-SCNC: 4.1 MMOL/L (ref 3.7–5.3)
POTASSIUM SERPL-SCNC: 4.2 MMOL/L (ref 3.7–5.3)
POTASSIUM SERPL-SCNC: 4.3 MMOL/L (ref 3.7–5.3)
POTASSIUM SERPL-SCNC: 4.4 MMOL/L (ref 3.7–5.3)
POTASSIUM SERPL-SCNC: 4.6 MMOL/L (ref 3.7–5.3)
POTASSIUM SERPL-SCNC: 4.7 MMOL/L (ref 3.7–5.3)
POTASSIUM SERPL-SCNC: 4.8 MMOL/L (ref 3.7–5.3)
POTASSIUM SERPL-SCNC: 5 MMOL/L (ref 3.7–5.3)
POTASSIUM SERPL-SCNC: 5 MMOL/L (ref 3.7–5.3)
POTASSIUM SERPL-SCNC: 5.4 MMOL/L (ref 3.7–5.3)
POTASSIUM SERPL-SCNC: 5.7 MMOL/L (ref 3.7–5.3)
PRO-BNP: ABNORMAL PG/ML
PRO-BNP: ABNORMAL PG/ML
PROCALCITONIN: 0.47 NG/ML
PROTHROMBIN TIME: 16 SEC (ref 11.5–14.2)
PROTHROMBIN TIME: 20.6 SEC (ref 11.5–14.2)
RBC # BLD: 2.45 M/UL (ref 3.95–5.11)
RBC # BLD: 2.5 M/UL (ref 3.95–5.11)
RBC # BLD: 2.52 M/UL (ref 3.95–5.11)
RBC # BLD: 2.52 M/UL (ref 3.95–5.11)
RBC # BLD: 2.58 M/UL (ref 3.95–5.11)
RBC # BLD: 2.64 M/UL (ref 3.95–5.11)
RBC # BLD: 2.72 M/UL (ref 3.95–5.11)
RBC # BLD: 2.77 M/UL (ref 3.95–5.11)
RBC # BLD: 2.77 M/UL (ref 3.95–5.11)
RBC # BLD: 2.84 M/UL (ref 3.95–5.11)
RBC # BLD: 2.85 M/UL (ref 3.95–5.11)
RBC # BLD: 2.85 M/UL (ref 3.95–5.11)
RBC # BLD: 2.86 M/UL (ref 3.95–5.11)
RBC # BLD: 2.87 M/UL (ref 3.95–5.11)
RBC # BLD: 2.9 M/UL (ref 3.95–5.11)
RBC # BLD: 2.91 M/UL (ref 3.95–5.11)
RBC # BLD: 2.93 M/UL (ref 3.95–5.11)
RBC # BLD: 3.03 M/UL (ref 3.95–5.11)
RBC # BLD: 3.04 M/UL (ref 3.95–5.11)
RBC # BLD: ABNORMAL 10*6/UL
READ BACK: YES
REASON FOR REJECTION: NORMAL
RETIC %: 4.5 % (ref 0.5–1.9)
RETIC %: 4.8 % (ref 0.5–1.9)
RETIC HEMOGLOBIN: 22.1 PG (ref 28.2–35.7)
RETIC HEMOGLOBIN: 22.5 PG (ref 28.2–35.7)
SAMPLE SITE: ABNORMAL
SARS-COV-2, RAPID: NOT DETECTED
SARS-COV-2, RAPID: NOT DETECTED
SEG NEUTROPHILS: 58 % (ref 36–66)
SEG NEUTROPHILS: 63 % (ref 36–65)
SEG NEUTROPHILS: 64 % (ref 36–66)
SEG NEUTROPHILS: 64 % (ref 36–66)
SEG NEUTROPHILS: 65 % (ref 36–65)
SEG NEUTROPHILS: 65 % (ref 36–66)
SEG NEUTROPHILS: 68 % (ref 36–65)
SEG NEUTROPHILS: 70 % (ref 36–65)
SEG NEUTROPHILS: 73 % (ref 36–65)
SEG NEUTROPHILS: 75 % (ref 36–66)
SEG NEUTROPHILS: 79 % (ref 36–65)
SEG NEUTROPHILS: 80 % (ref 36–65)
SEG NEUTROPHILS: 94 % (ref 36–65)
SEGMENTED NEUTROPHILS ABSOLUTE COUNT: 4.08 K/UL (ref 1.5–8.1)
SEGMENTED NEUTROPHILS ABSOLUTE COUNT: 4.74 K/UL (ref 1.8–7.7)
SEGMENTED NEUTROPHILS ABSOLUTE COUNT: 4.83 K/UL (ref 1.5–8.1)
SEGMENTED NEUTROPHILS ABSOLUTE COUNT: 4.87 K/UL (ref 1.8–7.7)
SEGMENTED NEUTROPHILS ABSOLUTE COUNT: 4.91 K/UL (ref 1.8–7.7)
SEGMENTED NEUTROPHILS ABSOLUTE COUNT: 5.32 K/UL (ref 1.8–7.7)
SEGMENTED NEUTROPHILS ABSOLUTE COUNT: 5.39 K/UL (ref 1.5–8.1)
SEGMENTED NEUTROPHILS ABSOLUTE COUNT: 5.42 K/UL (ref 1.8–7.7)
SEGMENTED NEUTROPHILS ABSOLUTE COUNT: 6.24 K/UL (ref 1.5–8.1)
SEGMENTED NEUTROPHILS ABSOLUTE COUNT: 7.05 K/UL (ref 1.5–8.1)
SEGMENTED NEUTROPHILS ABSOLUTE COUNT: 7.06 K/UL (ref 1.5–8.1)
SEGMENTED NEUTROPHILS ABSOLUTE COUNT: 8.1 K/UL (ref 1.5–8.1)
SEGMENTED NEUTROPHILS ABSOLUTE COUNT: 9.04 K/UL (ref 1.5–8.1)
SERUM IFX INTERP: NORMAL
SODIUM BLD-SCNC: 125 MMOL/L (ref 135–144)
SODIUM BLD-SCNC: 125 MMOL/L (ref 135–144)
SODIUM BLD-SCNC: 128 MMOL/L (ref 135–144)
SODIUM BLD-SCNC: 129 MMOL/L (ref 135–144)
SODIUM BLD-SCNC: 131 MMOL/L (ref 135–144)
SODIUM BLD-SCNC: 131 MMOL/L (ref 135–144)
SODIUM BLD-SCNC: 132 MMOL/L (ref 135–144)
SODIUM BLD-SCNC: 133 MMOL/L (ref 135–144)
SODIUM BLD-SCNC: 133 MMOL/L (ref 135–144)
SODIUM BLD-SCNC: 134 MMOL/L (ref 135–144)
SODIUM BLD-SCNC: 134 MMOL/L (ref 135–144)
SODIUM BLD-SCNC: 135 MMOL/L (ref 135–144)
SODIUM BLD-SCNC: 138 MMOL/L (ref 135–144)
SODIUM BLD-SCNC: 139 MMOL/L (ref 135–144)
SODIUM BLD-SCNC: 141 MMOL/L (ref 135–144)
SODIUM BLD-SCNC: 142 MMOL/L (ref 135–144)
SODIUM BLD-SCNC: 144 MMOL/L (ref 135–144)
SODIUM BLD-SCNC: 146 MMOL/L (ref 135–144)
SPECIMEN DESCRIPTION: NORMAL
SURGICAL PATHOLOGY REPORT: NORMAL
SURGICAL PATHOLOGY REPORT: NORMAL
TCO2 (CALC), ART: ABNORMAL MMOL/L (ref 22–29)
TIME, STOOL #1: 1148
TIME, STOOL #1: 1829
TIME, STOOL #1: ABNORMAL
TIME, STOOL #2: ABNORMAL
TIME, STOOL #3: ABNORMAL
TOTAL CO2, VENOUS: ABNORMAL MMOL/L (ref 23–30)
TOTAL IRON BINDING CAPACITY: 172 UG/DL (ref 250–450)
TOTAL IRON BINDING CAPACITY: 174 UG/DL (ref 250–450)
TOTAL PROTEIN: 6.3 G/DL (ref 6.4–8.3)
TOTAL PROTEIN: 6.4 G/DL (ref 6.4–8.3)
TOTAL PROTEIN: 6.6 G/DL (ref 6.4–8.3)
TOTAL PROTEIN: 6.6 G/DL (ref 6.4–8.3)
TOTAL PROTEIN: 6.7 G/DL (ref 6.4–8.3)
TOTAL PROTEIN: 7.1 G/DL (ref 6.4–8.3)
TOTAL PROTEIN: 7.4 G/DL (ref 6.4–8.3)
TOTAL PROTEIN: 7.6 G/DL (ref 6.4–8.3)
TRIGL SERPL-MCNC: 50 MG/DL
TROPONIN INTERP: ABNORMAL
TROPONIN T: ABNORMAL NG/ML
TROPONIN, HIGH SENSITIVITY: 35 NG/L (ref 0–14)
TROPONIN, HIGH SENSITIVITY: 37 NG/L (ref 0–14)
TROPONIN, HIGH SENSITIVITY: 41 NG/L (ref 0–14)
TROPONIN, HIGH SENSITIVITY: 45 NG/L (ref 0–14)
TROPONIN, HIGH SENSITIVITY: 50 NG/L (ref 0–14)
TROPONIN, HIGH SENSITIVITY: 51 NG/L (ref 0–14)
TROPONIN, HIGH SENSITIVITY: 52 NG/L (ref 0–14)
TROPONIN, HIGH SENSITIVITY: 53 NG/L (ref 0–14)
TROPONIN, HIGH SENSITIVITY: 55 NG/L (ref 0–14)
TROPONIN, HIGH SENSITIVITY: 59 NG/L (ref 0–14)
TSH SERPL DL<=0.05 MIU/L-ACNC: 4.79 MIU/L (ref 0.3–5)
UNSATURATED IRON BINDING CAPACITY: 43 UG/DL (ref 112–347)
UNSATURATED IRON BINDING CAPACITY: 82 UG/DL (ref 112–347)
VITAMIN B-12: >2000 PG/ML (ref 232–1245)
VLDLC SERPL CALC-MCNC: ABNORMAL MG/DL (ref 1–30)
WBC # BLD: 10 K/UL (ref 3.5–11.3)
WBC # BLD: 11.1 K/UL (ref 3.5–11.3)
WBC # BLD: 11.3 K/UL (ref 3.5–11.3)
WBC # BLD: 11.3 K/UL (ref 3.5–11.3)
WBC # BLD: 2.7 K/UL (ref 3.5–11.3)
WBC # BLD: 6.5 K/UL (ref 3.5–11.3)
WBC # BLD: 7.1 K/UL (ref 3.5–11.3)
WBC # BLD: 7.4 K/UL (ref 3.5–11.3)
WBC # BLD: 7.4 K/UL (ref 3.5–11.3)
WBC # BLD: 7.5 K/UL (ref 3.5–11.3)
WBC # BLD: 7.5 K/UL (ref 3.5–11.3)
WBC # BLD: 7.6 K/UL (ref 3.5–11.3)
WBC # BLD: 7.9 K/UL (ref 3.5–11.3)
WBC # BLD: 8.3 K/UL (ref 3.5–11.3)
WBC # BLD: 8.5 K/UL (ref 3.5–11.3)
WBC # BLD: ABNORMAL 10*3/UL
ZZ NTE CLEAN UP: ORDERED TEST: NORMAL
ZZ NTE WITH NAME CLEAN UP: SPECIMEN SOURCE: NORMAL

## 2021-01-01 PROCEDURE — 2500000003 HC RX 250 WO HCPCS: Performed by: INTERNAL MEDICINE

## 2021-01-01 PROCEDURE — 6370000000 HC RX 637 (ALT 250 FOR IP): Performed by: INTERNAL MEDICINE

## 2021-01-01 PROCEDURE — 97163 PT EVAL HIGH COMPLEX 45 MIN: CPT

## 2021-01-01 PROCEDURE — 83540 ASSAY OF IRON: CPT

## 2021-01-01 PROCEDURE — 83550 IRON BINDING TEST: CPT

## 2021-01-01 PROCEDURE — 80048 BASIC METABOLIC PNL TOTAL CA: CPT

## 2021-01-01 PROCEDURE — 82803 BLOOD GASES ANY COMBINATION: CPT

## 2021-01-01 PROCEDURE — 85520 HEPARIN ASSAY: CPT

## 2021-01-01 PROCEDURE — 6370000000 HC RX 637 (ALT 250 FOR IP): Performed by: NURSE PRACTITIONER

## 2021-01-01 PROCEDURE — C9113 INJ PANTOPRAZOLE SODIUM, VIA: HCPCS | Performed by: INTERNAL MEDICINE

## 2021-01-01 PROCEDURE — 93005 ELECTROCARDIOGRAM TRACING: CPT | Performed by: INTERNAL MEDICINE

## 2021-01-01 PROCEDURE — 2580000003 HC RX 258: Performed by: INTERNAL MEDICINE

## 2021-01-01 PROCEDURE — 82330 ASSAY OF CALCIUM: CPT

## 2021-01-01 PROCEDURE — 90935 HEMODIALYSIS ONE EVALUATION: CPT

## 2021-01-01 PROCEDURE — 93005 ELECTROCARDIOGRAM TRACING: CPT | Performed by: STUDENT IN AN ORGANIZED HEALTH CARE EDUCATION/TRAINING PROGRAM

## 2021-01-01 PROCEDURE — 84484 ASSAY OF TROPONIN QUANT: CPT

## 2021-01-01 PROCEDURE — 82607 VITAMIN B-12: CPT

## 2021-01-01 PROCEDURE — 2500000003 HC RX 250 WO HCPCS

## 2021-01-01 PROCEDURE — 94761 N-INVAS EAR/PLS OXIMETRY MLT: CPT

## 2021-01-01 PROCEDURE — 2700000000 HC OXYGEN THERAPY PER DAY

## 2021-01-01 PROCEDURE — 99232 SBSQ HOSP IP/OBS MODERATE 35: CPT | Performed by: INTERNAL MEDICINE

## 2021-01-01 PROCEDURE — 87040 BLOOD CULTURE FOR BACTERIA: CPT

## 2021-01-01 PROCEDURE — G8417 CALC BMI ABV UP PARAM F/U: HCPCS | Performed by: STUDENT IN AN ORGANIZED HEALTH CARE EDUCATION/TRAINING PROGRAM

## 2021-01-01 PROCEDURE — 85014 HEMATOCRIT: CPT

## 2021-01-01 PROCEDURE — 36415 COLL VENOUS BLD VENIPUNCTURE: CPT

## 2021-01-01 PROCEDURE — 6360000002 HC RX W HCPCS: Performed by: INTERNAL MEDICINE

## 2021-01-01 PROCEDURE — 83735 ASSAY OF MAGNESIUM: CPT

## 2021-01-01 PROCEDURE — 2060000000 HC ICU INTERMEDIATE R&B

## 2021-01-01 PROCEDURE — 85025 COMPLETE CBC W/AUTO DIFF WBC: CPT

## 2021-01-01 PROCEDURE — 94640 AIRWAY INHALATION TREATMENT: CPT

## 2021-01-01 PROCEDURE — 80053 COMPREHEN METABOLIC PANEL: CPT

## 2021-01-01 PROCEDURE — 0DB98ZX EXCISION OF DUODENUM, VIA NATURAL OR ARTIFICIAL OPENING ENDOSCOPIC, DIAGNOSTIC: ICD-10-PCS | Performed by: INTERNAL MEDICINE

## 2021-01-01 PROCEDURE — 82947 ASSAY GLUCOSE BLOOD QUANT: CPT

## 2021-01-01 PROCEDURE — 2500000003 HC RX 250 WO HCPCS: Performed by: NURSE PRACTITIONER

## 2021-01-01 PROCEDURE — 87324 CLOSTRIDIUM AG IA: CPT

## 2021-01-01 PROCEDURE — 37799 UNLISTED PX VASCULAR SURGERY: CPT

## 2021-01-01 PROCEDURE — 93005 ELECTROCARDIOGRAM TRACING: CPT | Performed by: NURSE PRACTITIONER

## 2021-01-01 PROCEDURE — P9041 ALBUMIN (HUMAN),5%, 50ML: HCPCS | Performed by: INTERNAL MEDICINE

## 2021-01-01 PROCEDURE — 99223 1ST HOSP IP/OBS HIGH 75: CPT | Performed by: INTERNAL MEDICINE

## 2021-01-01 PROCEDURE — 97530 THERAPEUTIC ACTIVITIES: CPT

## 2021-01-01 PROCEDURE — 99222 1ST HOSP IP/OBS MODERATE 55: CPT | Performed by: NURSE PRACTITIONER

## 2021-01-01 PROCEDURE — 85018 HEMOGLOBIN: CPT

## 2021-01-01 PROCEDURE — 3609012400 HC EGD TRANSORAL BIOPSY SINGLE/MULTIPLE: Performed by: INTERNAL MEDICINE

## 2021-01-01 PROCEDURE — 6360000002 HC RX W HCPCS: Performed by: STUDENT IN AN ORGANIZED HEALTH CARE EDUCATION/TRAINING PROGRAM

## 2021-01-01 PROCEDURE — 2580000003 HC RX 258

## 2021-01-01 PROCEDURE — 2580000003 HC RX 258: Performed by: NURSE PRACTITIONER

## 2021-01-01 PROCEDURE — G0378 HOSPITAL OBSERVATION PER HR: HCPCS

## 2021-01-01 PROCEDURE — 36620 INSERTION CATHETER ARTERY: CPT

## 2021-01-01 PROCEDURE — 93010 ELECTROCARDIOGRAM REPORT: CPT | Performed by: INTERNAL MEDICINE

## 2021-01-01 PROCEDURE — 2000000000 HC ICU R&B

## 2021-01-01 PROCEDURE — 86317 IMMUNOASSAY INFECTIOUS AGENT: CPT

## 2021-01-01 PROCEDURE — 96372 THER/PROPH/DIAG INJ SC/IM: CPT

## 2021-01-01 PROCEDURE — G0328 FECAL BLOOD SCRN IMMUNOASSAY: HCPCS

## 2021-01-01 PROCEDURE — APPSS45 APP SPLIT SHARED TIME 31-45 MINUTES: Performed by: NURSE PRACTITIONER

## 2021-01-01 PROCEDURE — 85027 COMPLETE CBC AUTOMATED: CPT

## 2021-01-01 PROCEDURE — 36556 INSERT NON-TUNNEL CV CATH: CPT | Performed by: RADIOLOGY

## 2021-01-01 PROCEDURE — 77001 FLUOROGUIDE FOR VEIN DEVICE: CPT | Performed by: RADIOLOGY

## 2021-01-01 PROCEDURE — 90945 DIALYSIS ONE EVALUATION: CPT

## 2021-01-01 PROCEDURE — 99232 SBSQ HOSP IP/OBS MODERATE 35: CPT | Performed by: STUDENT IN AN ORGANIZED HEALTH CARE EDUCATION/TRAINING PROGRAM

## 2021-01-01 PROCEDURE — 84100 ASSAY OF PHOSPHORUS: CPT

## 2021-01-01 PROCEDURE — 6370000000 HC RX 637 (ALT 250 FOR IP): Performed by: STUDENT IN AN ORGANIZED HEALTH CARE EDUCATION/TRAINING PROGRAM

## 2021-01-01 PROCEDURE — 0BH18EZ INSERTION OF ENDOTRACHEAL AIRWAY INTO TRACHEA, VIA NATURAL OR ARTIFICIAL OPENING ENDOSCOPIC: ICD-10-PCS | Performed by: INTERNAL MEDICINE

## 2021-01-01 PROCEDURE — 2022F DILAT RTA XM EVC RTNOPTHY: CPT | Performed by: STUDENT IN AN ORGANIZED HEALTH CARE EDUCATION/TRAINING PROGRAM

## 2021-01-01 PROCEDURE — 86850 RBC ANTIBODY SCREEN: CPT

## 2021-01-01 PROCEDURE — 94660 CPAP INITIATION&MGMT: CPT

## 2021-01-01 PROCEDURE — 86334 IMMUNOFIX E-PHORESIS SERUM: CPT

## 2021-01-01 PROCEDURE — 6360000002 HC RX W HCPCS

## 2021-01-01 PROCEDURE — P9047 ALBUMIN (HUMAN), 25%, 50ML: HCPCS | Performed by: INTERNAL MEDICINE

## 2021-01-01 PROCEDURE — 6360000002 HC RX W HCPCS: Performed by: RADIOLOGY

## 2021-01-01 PROCEDURE — 85730 THROMBOPLASTIN TIME PARTIAL: CPT

## 2021-01-01 PROCEDURE — APPSS30 APP SPLIT SHARED TIME 16-30 MINUTES: Performed by: NURSE PRACTITIONER

## 2021-01-01 PROCEDURE — 93308 TTE F-UP OR LMTD: CPT

## 2021-01-01 PROCEDURE — 97535 SELF CARE MNGMENT TRAINING: CPT

## 2021-01-01 PROCEDURE — 80076 HEPATIC FUNCTION PANEL: CPT

## 2021-01-01 PROCEDURE — 97110 THERAPEUTIC EXERCISES: CPT

## 2021-01-01 PROCEDURE — 83880 ASSAY OF NATRIURETIC PEPTIDE: CPT

## 2021-01-01 PROCEDURE — 74022 RADEX COMPL AQT ABD SERIES: CPT

## 2021-01-01 PROCEDURE — 99497 ADVNCD CARE PLAN 30 MIN: CPT | Performed by: INTERNAL MEDICINE

## 2021-01-01 PROCEDURE — 85045 AUTOMATED RETICULOCYTE COUNT: CPT

## 2021-01-01 PROCEDURE — 93306 TTE W/DOPPLER COMPLETE: CPT

## 2021-01-01 PROCEDURE — 02HV33Z INSERTION OF INFUSION DEVICE INTO SUPERIOR VENA CAVA, PERCUTANEOUS APPROACH: ICD-10-PCS | Performed by: RADIOLOGY

## 2021-01-01 PROCEDURE — 6360000002 HC RX W HCPCS: Performed by: NURSE PRACTITIONER

## 2021-01-01 PROCEDURE — 99233 SBSQ HOSP IP/OBS HIGH 50: CPT | Performed by: STUDENT IN AN ORGANIZED HEALTH CARE EDUCATION/TRAINING PROGRAM

## 2021-01-01 PROCEDURE — 80061 LIPID PANEL: CPT

## 2021-01-01 PROCEDURE — 83605 ASSAY OF LACTIC ACID: CPT

## 2021-01-01 PROCEDURE — APPNB180 APP NON BILLABLE TIME > 60 MINS: Performed by: NURSE PRACTITIONER

## 2021-01-01 PROCEDURE — 5A1D70Z PERFORMANCE OF URINARY FILTRATION, INTERMITTENT, LESS THAN 6 HOURS PER DAY: ICD-10-PCS | Performed by: INTERNAL MEDICINE

## 2021-01-01 PROCEDURE — 76705 ECHO EXAM OF ABDOMEN: CPT

## 2021-01-01 PROCEDURE — 83874 ASSAY OF MYOGLOBIN: CPT

## 2021-01-01 PROCEDURE — 84145 PROCALCITONIN (PCT): CPT

## 2021-01-01 PROCEDURE — 94002 VENT MGMT INPAT INIT DAY: CPT

## 2021-01-01 PROCEDURE — 6360000002 HC RX W HCPCS: Performed by: NURSE ANESTHETIST, CERTIFIED REGISTERED

## 2021-01-01 PROCEDURE — 99233 SBSQ HOSP IP/OBS HIGH 50: CPT | Performed by: INTERNAL MEDICINE

## 2021-01-01 PROCEDURE — 96375 TX/PRO/DX INJ NEW DRUG ADDON: CPT

## 2021-01-01 PROCEDURE — 71045 X-RAY EXAM CHEST 1 VIEW: CPT

## 2021-01-01 PROCEDURE — 74176 CT ABD & PELVIS W/O CONTRAST: CPT

## 2021-01-01 PROCEDURE — 88305 TISSUE EXAM BY PATHOLOGIST: CPT

## 2021-01-01 PROCEDURE — 5A1D90Z PERFORMANCE OF URINARY FILTRATION, CONTINUOUS, GREATER THAN 18 HOURS PER DAY: ICD-10-PCS | Performed by: INTERNAL MEDICINE

## 2021-01-01 PROCEDURE — 43239 EGD BIOPSY SINGLE/MULTIPLE: CPT | Performed by: INTERNAL MEDICINE

## 2021-01-01 PROCEDURE — 7100000000 HC PACU RECOVERY - FIRST 15 MIN: Performed by: INTERNAL MEDICINE

## 2021-01-01 PROCEDURE — 3700000000 HC ANESTHESIA ATTENDED CARE: Performed by: INTERNAL MEDICINE

## 2021-01-01 PROCEDURE — 87635 SARS-COV-2 COVID-19 AMP PRB: CPT

## 2021-01-01 PROCEDURE — 96374 THER/PROPH/DIAG INJ IV PUSH: CPT

## 2021-01-01 PROCEDURE — 83036 HEMOGLOBIN GLYCOSYLATED A1C: CPT

## 2021-01-01 PROCEDURE — 1200000000 HC SEMI PRIVATE

## 2021-01-01 PROCEDURE — 85610 PROTHROMBIN TIME: CPT

## 2021-01-01 PROCEDURE — G8926 SPIRO NO PERF OR DOC: HCPCS | Performed by: STUDENT IN AN ORGANIZED HEALTH CARE EDUCATION/TRAINING PROGRAM

## 2021-01-01 PROCEDURE — APPNB30 APP NON BILLABLE TIME 0-30 MINS: Performed by: NURSE PRACTITIONER

## 2021-01-01 PROCEDURE — 99213 OFFICE O/P EST LOW 20 MIN: CPT | Performed by: STUDENT IN AN ORGANIZED HEALTH CARE EDUCATION/TRAINING PROGRAM

## 2021-01-01 PROCEDURE — 02H633Z INSERTION OF INFUSION DEVICE INTO RIGHT ATRIUM, PERCUTANEOUS APPROACH: ICD-10-PCS | Performed by: RADIOLOGY

## 2021-01-01 PROCEDURE — 97116 GAIT TRAINING THERAPY: CPT

## 2021-01-01 PROCEDURE — 87449 NOS EACH ORGANISM AG IA: CPT

## 2021-01-01 PROCEDURE — P9047 ALBUMIN (HUMAN), 25%, 50ML: HCPCS

## 2021-01-01 PROCEDURE — 93005 ELECTROCARDIOGRAM TRACING: CPT | Performed by: EMERGENCY MEDICINE

## 2021-01-01 PROCEDURE — 82728 ASSAY OF FERRITIN: CPT

## 2021-01-01 PROCEDURE — 99283 EMERGENCY DEPT VISIT LOW MDM: CPT

## 2021-01-01 PROCEDURE — 1036F TOBACCO NON-USER: CPT | Performed by: STUDENT IN AN ORGANIZED HEALTH CARE EDUCATION/TRAINING PROGRAM

## 2021-01-01 PROCEDURE — 2500000003 HC RX 250 WO HCPCS: Performed by: NURSE ANESTHETIST, CERTIFIED REGISTERED

## 2021-01-01 PROCEDURE — 2709999900 IR FLUORO GUIDED CVA DEVICE PLMT/REPLACE/REMOVAL

## 2021-01-01 PROCEDURE — 82533 TOTAL CORTISOL: CPT

## 2021-01-01 PROCEDURE — 83883 ASSAY NEPHELOMETRY NOT SPEC: CPT

## 2021-01-01 PROCEDURE — 97530 THERAPEUTIC ACTIVITIES: CPT | Performed by: NURSE PRACTITIONER

## 2021-01-01 PROCEDURE — 94760 N-INVAS EAR/PLS OXIMETRY 1: CPT

## 2021-01-01 PROCEDURE — 3023F SPIROM DOC REV: CPT | Performed by: STUDENT IN AN ORGANIZED HEALTH CARE EDUCATION/TRAINING PROGRAM

## 2021-01-01 PROCEDURE — 84443 ASSAY THYROID STIM HORMONE: CPT

## 2021-01-01 PROCEDURE — 97167 OT EVAL HIGH COMPLEX 60 MIN: CPT

## 2021-01-01 PROCEDURE — 99239 HOSP IP/OBS DSCHRG MGMT >30: CPT | Performed by: INTERNAL MEDICINE

## 2021-01-01 PROCEDURE — 2580000003 HC RX 258: Performed by: NURSE ANESTHETIST, CERTIFIED REGISTERED

## 2021-01-01 PROCEDURE — 87340 HEPATITIS B SURFACE AG IA: CPT

## 2021-01-01 PROCEDURE — 76937 US GUIDE VASCULAR ACCESS: CPT | Performed by: RADIOLOGY

## 2021-01-01 PROCEDURE — 36430 TRANSFUSION BLD/BLD COMPNT: CPT

## 2021-01-01 PROCEDURE — 82272 OCCULT BLD FECES 1-3 TESTS: CPT

## 2021-01-01 PROCEDURE — 6370000000 HC RX 637 (ALT 250 FOR IP): Performed by: EMERGENCY MEDICINE

## 2021-01-01 PROCEDURE — 82374 ASSAY BLOOD CARBON DIOXIDE: CPT

## 2021-01-01 PROCEDURE — 2709999900 HC NON-CHARGEABLE SUPPLY: Performed by: INTERNAL MEDICINE

## 2021-01-01 PROCEDURE — 99284 EMERGENCY DEPT VISIT MOD MDM: CPT

## 2021-01-01 PROCEDURE — 5A1935Z RESPIRATORY VENTILATION, LESS THAN 24 CONSECUTIVE HOURS: ICD-10-PCS | Performed by: INTERNAL MEDICINE

## 2021-01-01 PROCEDURE — 80051 ELECTROLYTE PANEL: CPT

## 2021-01-01 PROCEDURE — 74019 RADEX ABDOMEN 2 VIEWS: CPT

## 2021-01-01 PROCEDURE — 2500000003 HC RX 250 WO HCPCS: Performed by: ANESTHESIOLOGY

## 2021-01-01 PROCEDURE — 3046F HEMOGLOBIN A1C LEVEL >9.0%: CPT | Performed by: STUDENT IN AN ORGANIZED HEALTH CARE EDUCATION/TRAINING PROGRAM

## 2021-01-01 PROCEDURE — 7100000001 HC PACU RECOVERY - ADDTL 15 MIN: Performed by: INTERNAL MEDICINE

## 2021-01-01 PROCEDURE — 96376 TX/PRO/DX INJ SAME DRUG ADON: CPT

## 2021-01-01 PROCEDURE — 3700000001 HC ADD 15 MINUTES (ANESTHESIA): Performed by: INTERNAL MEDICINE

## 2021-01-01 PROCEDURE — 84132 ASSAY OF SERUM POTASSIUM: CPT

## 2021-01-01 PROCEDURE — 2709999900 IR NON TUNNELED CATH WO PORT REPLACEMENT

## 2021-01-01 PROCEDURE — 99222 1ST HOSP IP/OBS MODERATE 55: CPT | Performed by: INTERNAL MEDICINE

## 2021-01-01 PROCEDURE — 3017F COLORECTAL CA SCREEN DOC REV: CPT | Performed by: STUDENT IN AN ORGANIZED HEALTH CARE EDUCATION/TRAINING PROGRAM

## 2021-01-01 PROCEDURE — 80069 RENAL FUNCTION PANEL: CPT

## 2021-01-01 PROCEDURE — 83690 ASSAY OF LIPASE: CPT

## 2021-01-01 PROCEDURE — 86900 BLOOD TYPING SEROLOGIC ABO: CPT

## 2021-01-01 PROCEDURE — 82746 ASSAY OF FOLIC ACID SERUM: CPT

## 2021-01-01 PROCEDURE — 86901 BLOOD TYPING SEROLOGIC RH(D): CPT

## 2021-01-01 PROCEDURE — 86920 COMPATIBILITY TEST SPIN: CPT

## 2021-01-01 PROCEDURE — 74018 RADEX ABDOMEN 1 VIEW: CPT

## 2021-01-01 PROCEDURE — G8427 DOCREV CUR MEDS BY ELIG CLIN: HCPCS | Performed by: STUDENT IN AN ORGANIZED HEALTH CARE EDUCATION/TRAINING PROGRAM

## 2021-01-01 PROCEDURE — 86704 HEP B CORE ANTIBODY TOTAL: CPT

## 2021-01-01 PROCEDURE — P9016 RBC LEUKOCYTES REDUCED: HCPCS

## 2021-01-01 PROCEDURE — 2709999900 IR NONTUNNELED VASCULAR CATHETER > 5 YEARS

## 2021-01-01 RX ORDER — SODIUM CHLORIDE 9 MG/ML
25 INJECTION, SOLUTION INTRAVENOUS PRN
Status: DISCONTINUED | OUTPATIENT
Start: 2021-01-01 | End: 2021-01-01 | Stop reason: SDUPTHER

## 2021-01-01 RX ORDER — MIDODRINE HYDROCHLORIDE 10 MG/1
10 TABLET ORAL
Status: DISCONTINUED | OUTPATIENT
Start: 2021-01-01 | End: 2021-01-01

## 2021-01-01 RX ORDER — FUROSEMIDE 40 MG/1
40 TABLET ORAL DAILY
Qty: 60 TABLET | Refills: 3 | Status: ON HOLD
Start: 2021-01-01 | End: 2021-01-01 | Stop reason: SDUPTHER

## 2021-01-01 RX ORDER — ATORVASTATIN CALCIUM 10 MG/1
10 TABLET, FILM COATED ORAL NIGHTLY
Qty: 30 TABLET | Refills: 2 | Status: SHIPPED | OUTPATIENT
Start: 2021-01-01 | End: 2021-01-01 | Stop reason: SDUPTHER

## 2021-01-01 RX ORDER — SODIUM CHLORIDE 0.9 % (FLUSH) 0.9 %
10 SYRINGE (ML) INJECTION PRN
Status: DISCONTINUED | OUTPATIENT
Start: 2021-01-01 | End: 2021-01-01 | Stop reason: HOSPADM

## 2021-01-01 RX ORDER — ATORVASTATIN CALCIUM 40 MG/1
40 TABLET, FILM COATED ORAL NIGHTLY
Status: DISCONTINUED | OUTPATIENT
Start: 2021-01-01 | End: 2021-01-01 | Stop reason: HOSPADM

## 2021-01-01 RX ORDER — PANTOPRAZOLE SODIUM 40 MG/10ML
40 INJECTION, POWDER, LYOPHILIZED, FOR SOLUTION INTRAVENOUS DAILY
Status: DISCONTINUED | OUTPATIENT
Start: 2021-01-01 | End: 2021-01-01

## 2021-01-01 RX ORDER — POLYETHYLENE GLYCOL 3350 17 G/17G
238 POWDER, FOR SOLUTION ORAL ONCE
Status: COMPLETED | OUTPATIENT
Start: 2021-01-01 | End: 2021-01-01

## 2021-01-01 RX ORDER — SODIUM CHLORIDE 9 MG/ML
INJECTION, SOLUTION INTRAVENOUS CONTINUOUS
Status: DISCONTINUED | OUTPATIENT
Start: 2021-01-01 | End: 2021-01-01 | Stop reason: HOSPADM

## 2021-01-01 RX ORDER — LIDOCAINE HYDROCHLORIDE 10 MG/ML
5 INJECTION, SOLUTION EPIDURAL; INFILTRATION; INTRACAUDAL; PERINEURAL ONCE
Status: DISCONTINUED | OUTPATIENT
Start: 2021-01-01 | End: 2021-01-01 | Stop reason: HOSPADM

## 2021-01-01 RX ORDER — MIDAZOLAM HYDROCHLORIDE 1 MG/ML
INJECTION INTRAMUSCULAR; INTRAVENOUS
Status: DISPENSED
Start: 2021-01-01 | End: 2021-01-01

## 2021-01-01 RX ORDER — HEPARIN SODIUM 1000 [USP'U]/ML
INJECTION, SOLUTION INTRAVENOUS; SUBCUTANEOUS
Status: COMPLETED | OUTPATIENT
Start: 2021-01-01 | End: 2021-01-01

## 2021-01-01 RX ORDER — MIDODRINE HYDROCHLORIDE 10 MG/1
10 TABLET ORAL
Status: COMPLETED | OUTPATIENT
Start: 2021-01-01 | End: 2021-01-01

## 2021-01-01 RX ORDER — SODIUM CHLORIDE 0.9 % (FLUSH) 0.9 %
5-40 SYRINGE (ML) INJECTION EVERY 12 HOURS SCHEDULED
Status: DISCONTINUED | OUTPATIENT
Start: 2021-01-01 | End: 2021-01-01 | Stop reason: SDUPTHER

## 2021-01-01 RX ORDER — SODIUM CHLORIDE 9 MG/ML
INJECTION, SOLUTION INTRAVENOUS CONTINUOUS
Status: DISCONTINUED | OUTPATIENT
Start: 2021-01-01 | End: 2021-01-01

## 2021-01-01 RX ORDER — POLYETHYLENE GLYCOL 3350 17 G/17G
17 POWDER, FOR SOLUTION ORAL DAILY PRN
Status: ON HOLD | COMMUNITY
Start: 2021-01-01 | End: 2021-01-01 | Stop reason: HOSPADM

## 2021-01-01 RX ORDER — ALBUTEROL SULFATE 90 UG/1
2 AEROSOL, METERED RESPIRATORY (INHALATION)
Status: DISCONTINUED | OUTPATIENT
Start: 2021-01-01 | End: 2021-01-01 | Stop reason: HOSPADM

## 2021-01-01 RX ORDER — HYDROCORTISONE 25 MG/G
CREAM TOPICAL
Qty: 1 TUBE | Refills: 0 | Status: ON HOLD
Start: 2021-01-01 | End: 2021-01-01 | Stop reason: HOSPADM

## 2021-01-01 RX ORDER — ALBUMIN (HUMAN) 12.5 G/50ML
SOLUTION INTRAVENOUS
Status: COMPLETED
Start: 2021-01-01 | End: 2021-01-01

## 2021-01-01 RX ORDER — LIDOCAINE HYDROCHLORIDE 20 MG/ML
INJECTION, SOLUTION INFILTRATION; PERINEURAL PRN
Status: DISCONTINUED | OUTPATIENT
Start: 2021-01-01 | End: 2021-01-01 | Stop reason: SDUPTHER

## 2021-01-01 RX ORDER — LORAZEPAM 0.5 MG/1
0.25 TABLET ORAL ONCE
Status: COMPLETED | OUTPATIENT
Start: 2021-01-01 | End: 2021-01-01

## 2021-01-01 RX ORDER — DEXTROSE MONOHYDRATE 50 MG/ML
100 INJECTION, SOLUTION INTRAVENOUS PRN
Status: DISCONTINUED | OUTPATIENT
Start: 2021-01-01 | End: 2021-01-01 | Stop reason: HOSPADM

## 2021-01-01 RX ORDER — AMIODARONE HYDROCHLORIDE 200 MG/1
200 TABLET ORAL 2 TIMES DAILY
Qty: 60 TABLET | Refills: 1 | OUTPATIENT
Start: 2021-01-01

## 2021-01-01 RX ORDER — INSULIN GLARGINE 100 [IU]/ML
15 INJECTION, SOLUTION SUBCUTANEOUS NIGHTLY
Status: DISCONTINUED | OUTPATIENT
Start: 2021-01-01 | End: 2021-01-01

## 2021-01-01 RX ORDER — MILRINONE LACTATE 0.2 MG/ML
.125-.75 INJECTION, SOLUTION INTRAVENOUS CONTINUOUS
Status: DISCONTINUED | OUTPATIENT
Start: 2021-01-01 | End: 2021-01-01

## 2021-01-01 RX ORDER — DILTIAZEM HYDROCHLORIDE 180 MG/1
CAPSULE, COATED, EXTENDED RELEASE ORAL
Qty: 30 CAPSULE | Refills: 1 | Status: SHIPPED | OUTPATIENT
Start: 2021-01-01 | End: 2021-01-01

## 2021-01-01 RX ORDER — PROPOFOL 10 MG/ML
INJECTION, EMULSION INTRAVENOUS PRN
Status: DISCONTINUED | OUTPATIENT
Start: 2021-01-01 | End: 2021-01-01 | Stop reason: SDUPTHER

## 2021-01-01 RX ORDER — DIPHENHYDRAMINE HCL 25 MG
25 TABLET ORAL EVERY 6 HOURS PRN
Status: DISCONTINUED | OUTPATIENT
Start: 2021-01-01 | End: 2021-01-01 | Stop reason: HOSPADM

## 2021-01-01 RX ORDER — MAGNESIUM SULFATE IN WATER 40 MG/ML
2000 INJECTION, SOLUTION INTRAVENOUS ONCE
Status: COMPLETED | OUTPATIENT
Start: 2021-01-01 | End: 2021-01-01

## 2021-01-01 RX ORDER — CALCIUM ACETATE 667 MG/1
2 CAPSULE ORAL
Status: DISCONTINUED | OUTPATIENT
Start: 2021-01-01 | End: 2021-01-01 | Stop reason: HOSPADM

## 2021-01-01 RX ORDER — GUAIFENESIN 600 MG/1
600 TABLET, EXTENDED RELEASE ORAL 2 TIMES DAILY PRN
Status: DISCONTINUED | OUTPATIENT
Start: 2021-01-01 | End: 2021-01-01 | Stop reason: HOSPADM

## 2021-01-01 RX ORDER — SODIUM CHLORIDE 9 MG/ML
INJECTION, SOLUTION INTRAVENOUS PRN
Status: DISCONTINUED | OUTPATIENT
Start: 2021-01-01 | End: 2021-01-01 | Stop reason: HOSPADM

## 2021-01-01 RX ORDER — MIDODRINE HYDROCHLORIDE 10 MG/1
15 TABLET ORAL
Status: DISCONTINUED | OUTPATIENT
Start: 2021-01-01 | End: 2021-01-01 | Stop reason: HOSPADM

## 2021-01-01 RX ORDER — HYDROCORTISONE 25 MG/G
CREAM TOPICAL 2 TIMES DAILY
Status: DISCONTINUED | OUTPATIENT
Start: 2021-01-01 | End: 2021-01-01 | Stop reason: HOSPADM

## 2021-01-01 RX ORDER — CINACALCET 30 MG/1
30 TABLET, FILM COATED ORAL DAILY
Status: DISCONTINUED | OUTPATIENT
Start: 2021-01-01 | End: 2021-01-01

## 2021-01-01 RX ORDER — SODIUM CHLORIDE 9 MG/ML
INJECTION, SOLUTION INTRAVENOUS CONTINUOUS PRN
Status: DISCONTINUED | OUTPATIENT
Start: 2021-01-01 | End: 2021-01-01 | Stop reason: SDUPTHER

## 2021-01-01 RX ORDER — ALBUTEROL SULFATE 2.5 MG/3ML
2.5 SOLUTION RESPIRATORY (INHALATION) EVERY 4 HOURS PRN
Status: DISCONTINUED | OUTPATIENT
Start: 2021-01-01 | End: 2021-01-01

## 2021-01-01 RX ORDER — POLYETHYLENE GLYCOL 3350 17 G/17G
17 POWDER, FOR SOLUTION ORAL DAILY
Status: DISCONTINUED | OUTPATIENT
Start: 2021-01-01 | End: 2021-01-01 | Stop reason: HOSPADM

## 2021-01-01 RX ORDER — ONDANSETRON 4 MG/1
4 TABLET, ORALLY DISINTEGRATING ORAL EVERY 8 HOURS PRN
Status: DISCONTINUED | OUTPATIENT
Start: 2021-01-01 | End: 2021-01-01 | Stop reason: HOSPADM

## 2021-01-01 RX ORDER — SODIUM CITRATE 4 % (5 ML)
5 SYRINGE (ML) MISCELLANEOUS ONCE
Status: COMPLETED | OUTPATIENT
Start: 2021-01-01 | End: 2021-01-01

## 2021-01-01 RX ORDER — ALBUMIN (HUMAN) 12.5 G/50ML
25 SOLUTION INTRAVENOUS PRN
Status: DISCONTINUED | OUTPATIENT
Start: 2021-01-01 | End: 2021-01-01 | Stop reason: HOSPADM

## 2021-01-01 RX ORDER — ATORVASTATIN CALCIUM 10 MG/1
TABLET, FILM COATED ORAL
Qty: 30 TABLET | Refills: 0 | Status: SHIPPED | OUTPATIENT
Start: 2021-01-01 | End: 2021-01-01

## 2021-01-01 RX ORDER — ALBUTEROL SULFATE 2.5 MG/3ML
SOLUTION RESPIRATORY (INHALATION)
Qty: 360 ML | Refills: 3 | Status: ON HOLD | OUTPATIENT
Start: 2021-01-01 | End: 2021-01-01 | Stop reason: HOSPADM

## 2021-01-01 RX ORDER — PANTOPRAZOLE SODIUM 40 MG/1
40 TABLET, DELAYED RELEASE ORAL
Status: DISCONTINUED | OUTPATIENT
Start: 2021-01-01 | End: 2021-01-01 | Stop reason: HOSPADM

## 2021-01-01 RX ORDER — MIDODRINE HYDROCHLORIDE 5 MG/1
5 TABLET ORAL PRN
Status: ON HOLD | COMMUNITY
Start: 2021-01-01 | End: 2021-01-01 | Stop reason: HOSPADM

## 2021-01-01 RX ORDER — CALCIUM ACETATE 667 MG/1
1 CAPSULE ORAL PRN
Status: DISCONTINUED | OUTPATIENT
Start: 2021-01-01 | End: 2021-01-01 | Stop reason: HOSPADM

## 2021-01-01 RX ORDER — DIPHENHYDRAMINE HCL 25 MG
25 TABLET ORAL 2 TIMES DAILY PRN
Status: ON HOLD | COMMUNITY
Start: 2021-01-01 | End: 2021-01-01 | Stop reason: HOSPADM

## 2021-01-01 RX ORDER — ATORVASTATIN CALCIUM 10 MG/1
10 TABLET, FILM COATED ORAL NIGHTLY
Qty: 30 TABLET | Refills: 2 | Status: ON HOLD
Start: 2021-01-01 | End: 2021-01-01 | Stop reason: HOSPADM

## 2021-01-01 RX ORDER — BUDESONIDE AND FORMOTEROL FUMARATE DIHYDRATE 160; 4.5 UG/1; UG/1
2 AEROSOL RESPIRATORY (INHALATION) 2 TIMES DAILY
Status: DISCONTINUED | OUTPATIENT
Start: 2021-01-01 | End: 2021-01-01 | Stop reason: HOSPADM

## 2021-01-01 RX ORDER — SODIUM CHLORIDE 9 MG/ML
25 INJECTION, SOLUTION INTRAVENOUS PRN
Status: DISCONTINUED | OUTPATIENT
Start: 2021-01-01 | End: 2021-01-01 | Stop reason: HOSPADM

## 2021-01-01 RX ORDER — ISOPRPOPYL ALCOHOL 70 ML/100ML
SWAB TOPICAL
Qty: 1 EACH | Refills: 1 | Status: ON HOLD
Start: 2021-01-01 | End: 2021-01-01 | Stop reason: HOSPADM

## 2021-01-01 RX ORDER — DEXTROSE MONOHYDRATE 100 MG/ML
INJECTION, SOLUTION INTRAVENOUS CONTINUOUS
Status: DISCONTINUED | OUTPATIENT
Start: 2021-01-01 | End: 2021-01-01

## 2021-01-01 RX ORDER — AMIODARONE HYDROCHLORIDE 200 MG/1
200 TABLET ORAL 2 TIMES DAILY
Status: DISCONTINUED | OUTPATIENT
Start: 2021-01-01 | End: 2021-01-01 | Stop reason: HOSPADM

## 2021-01-01 RX ORDER — FUROSEMIDE 40 MG/1
40 TABLET ORAL DAILY
Qty: 30 TABLET | Refills: 3 | Status: ON HOLD
Start: 2021-01-01 | End: 2021-01-01 | Stop reason: HOSPADM

## 2021-01-01 RX ORDER — ALBUTEROL SULFATE 2.5 MG/3ML
SOLUTION RESPIRATORY (INHALATION)
Qty: 360 ML | Refills: 3 | Status: SHIPPED | OUTPATIENT
Start: 2021-01-01 | End: 2021-01-01

## 2021-01-01 RX ORDER — HEPARIN SODIUM 1000 [USP'U]/ML
4000 INJECTION, SOLUTION INTRAVENOUS; SUBCUTANEOUS ONCE
Status: COMPLETED | OUTPATIENT
Start: 2021-01-01 | End: 2021-01-01

## 2021-01-01 RX ORDER — GABAPENTIN 100 MG/1
100 CAPSULE ORAL 3 TIMES DAILY
Status: DISCONTINUED | OUTPATIENT
Start: 2021-01-01 | End: 2021-01-01 | Stop reason: HOSPADM

## 2021-01-01 RX ORDER — 0.9 % SODIUM CHLORIDE 0.9 %
400 INTRAVENOUS SOLUTION INTRAVENOUS ONCE
Status: COMPLETED | OUTPATIENT
Start: 2021-01-01 | End: 2021-01-01

## 2021-01-01 RX ORDER — MIDODRINE HYDROCHLORIDE 5 MG/1
5 TABLET ORAL
Status: DISCONTINUED | OUTPATIENT
Start: 2021-01-01 | End: 2021-01-01

## 2021-01-01 RX ORDER — FUROSEMIDE 40 MG/1
40 TABLET ORAL DAILY
Status: DISCONTINUED | OUTPATIENT
Start: 2021-01-01 | End: 2021-01-01 | Stop reason: HOSPADM

## 2021-01-01 RX ORDER — LISINOPRIL 5 MG/1
5 TABLET ORAL DAILY
Qty: 30 TABLET | Refills: 3 | Status: SHIPPED | OUTPATIENT
Start: 2021-01-01 | End: 2021-01-01 | Stop reason: SDUPTHER

## 2021-01-01 RX ORDER — DEXTROSE MONOHYDRATE 25 G/50ML
12.5 INJECTION, SOLUTION INTRAVENOUS PRN
Status: DISCONTINUED | OUTPATIENT
Start: 2021-01-01 | End: 2021-01-01 | Stop reason: HOSPADM

## 2021-01-01 RX ORDER — CALCITRIOL 0.25 UG/1
0.25 CAPSULE, LIQUID FILLED ORAL
Status: ON HOLD | COMMUNITY
End: 2021-01-01 | Stop reason: HOSPADM

## 2021-01-01 RX ORDER — BLOOD SUGAR DIAGNOSTIC
STRIP MISCELLANEOUS
Qty: 300 EACH | Refills: 1 | Status: ON HOLD
Start: 2021-01-01 | End: 2021-01-01 | Stop reason: HOSPADM

## 2021-01-01 RX ORDER — MAGNESIUM SULFATE 1 G/100ML
1000 INJECTION INTRAVENOUS PRN
Status: DISCONTINUED | OUTPATIENT
Start: 2021-01-01 | End: 2021-01-01 | Stop reason: HOSPADM

## 2021-01-01 RX ORDER — MIDODRINE HYDROCHLORIDE 10 MG/1
15 TABLET ORAL 4 TIMES DAILY
Qty: 100 TABLET | Refills: 1 | OUTPATIENT
Start: 2021-01-01

## 2021-01-01 RX ORDER — TRIAMCINOLONE ACETONIDE 1 MG/G
CREAM TOPICAL 2 TIMES DAILY
Status: DISCONTINUED | OUTPATIENT
Start: 2021-01-01 | End: 2021-01-01 | Stop reason: HOSPADM

## 2021-01-01 RX ORDER — CISATRACURIUM BESYLATE 2 MG/ML
INJECTION, SOLUTION INTRAVENOUS
Status: DISPENSED
Start: 2021-01-01 | End: 2021-01-01

## 2021-01-01 RX ORDER — 0.9 % SODIUM CHLORIDE 0.9 %
500 INTRAVENOUS SOLUTION INTRAVENOUS ONCE
Status: COMPLETED | OUTPATIENT
Start: 2021-01-01 | End: 2021-01-01

## 2021-01-01 RX ORDER — 0.9 % SODIUM CHLORIDE 0.9 %
1000 INTRAVENOUS SOLUTION INTRAVENOUS ONCE
Status: COMPLETED | OUTPATIENT
Start: 2021-01-01 | End: 2021-01-01

## 2021-01-01 RX ORDER — INSULIN GLARGINE 100 [IU]/ML
15 INJECTION, SOLUTION SUBCUTANEOUS NIGHTLY
Status: DISCONTINUED | OUTPATIENT
Start: 2021-01-01 | End: 2021-01-01 | Stop reason: HOSPADM

## 2021-01-01 RX ORDER — FUROSEMIDE 10 MG/ML
80 INJECTION INTRAMUSCULAR; INTRAVENOUS ONCE
Status: COMPLETED | OUTPATIENT
Start: 2021-01-01 | End: 2021-01-01

## 2021-01-01 RX ORDER — CINACALCET 90 MG/1
90 TABLET, FILM COATED ORAL
Status: ON HOLD | COMMUNITY
End: 2021-01-01 | Stop reason: HOSPADM

## 2021-01-01 RX ORDER — NITROGLYCERIN 0.4 MG/1
0.4 TABLET SUBLINGUAL EVERY 5 MIN PRN
Qty: 30 TABLET | Refills: 3 | Status: ON HOLD | OUTPATIENT
Start: 2021-01-01 | End: 2021-01-01

## 2021-01-01 RX ORDER — POTASSIUM CHLORIDE 20 MEQ/1
40 TABLET, EXTENDED RELEASE ORAL PRN
Status: DISCONTINUED | OUTPATIENT
Start: 2021-01-01 | End: 2021-01-01 | Stop reason: HOSPADM

## 2021-01-01 RX ORDER — LISINOPRIL 5 MG/1
5 TABLET ORAL DAILY
Status: DISCONTINUED | OUTPATIENT
Start: 2021-01-01 | End: 2021-01-01

## 2021-01-01 RX ORDER — SENNA PLUS 8.6 MG/1
2 TABLET ORAL NIGHTLY
Status: DISCONTINUED | OUTPATIENT
Start: 2021-01-01 | End: 2021-01-01

## 2021-01-01 RX ORDER — FLUTICASONE PROPIONATE 50 MCG
2 SPRAY, SUSPENSION (ML) NASAL DAILY
Status: DISCONTINUED | OUTPATIENT
Start: 2021-01-01 | End: 2021-01-01 | Stop reason: HOSPADM

## 2021-01-01 RX ORDER — FENTANYL CITRATE 50 UG/ML
25 INJECTION, SOLUTION INTRAMUSCULAR; INTRAVENOUS EVERY 10 MIN PRN
Status: DISCONTINUED | OUTPATIENT
Start: 2021-01-01 | End: 2021-01-01 | Stop reason: HOSPADM

## 2021-01-01 RX ORDER — ACETAMINOPHEN 650 MG/1
650 SUPPOSITORY RECTAL EVERY 6 HOURS PRN
Status: DISCONTINUED | OUTPATIENT
Start: 2021-01-01 | End: 2021-01-01 | Stop reason: HOSPADM

## 2021-01-01 RX ORDER — POTASSIUM CHLORIDE 7.45 MG/ML
10 INJECTION INTRAVENOUS PRN
Status: DISCONTINUED | OUTPATIENT
Start: 2021-01-01 | End: 2021-01-01 | Stop reason: HOSPADM

## 2021-01-01 RX ORDER — DIGOXIN 0.25 MG/ML
250 INJECTION INTRAMUSCULAR; INTRAVENOUS ONCE
Status: DISCONTINUED | OUTPATIENT
Start: 2021-01-01 | End: 2021-01-01

## 2021-01-01 RX ORDER — ASPIRIN 81 MG/1
81 TABLET ORAL DAILY
Qty: 30 TABLET | Refills: 3 | Status: ON HOLD | OUTPATIENT
Start: 2021-01-01 | End: 2021-01-01 | Stop reason: HOSPADM

## 2021-01-01 RX ORDER — HEPARIN SODIUM 10000 [USP'U]/100ML
5-30 INJECTION, SOLUTION INTRAVENOUS CONTINUOUS
Status: DISCONTINUED | OUTPATIENT
Start: 2021-01-01 | End: 2021-01-01

## 2021-01-01 RX ORDER — HEPARIN SODIUM 1000 [USP'U]/ML
4000 INJECTION, SOLUTION INTRAVENOUS; SUBCUTANEOUS PRN
Status: DISCONTINUED | OUTPATIENT
Start: 2021-01-01 | End: 2021-01-01 | Stop reason: SDUPTHER

## 2021-01-01 RX ORDER — HEPARIN SODIUM 5000 [USP'U]/ML
5000 INJECTION, SOLUTION INTRAVENOUS; SUBCUTANEOUS EVERY 8 HOURS SCHEDULED
Status: DISCONTINUED | OUTPATIENT
Start: 2021-01-01 | End: 2021-01-01 | Stop reason: HOSPADM

## 2021-01-01 RX ORDER — ALBUMIN (HUMAN) 12.5 G/50ML
25 SOLUTION INTRAVENOUS ONCE
Status: COMPLETED | OUTPATIENT
Start: 2021-01-01 | End: 2021-01-01

## 2021-01-01 RX ORDER — CALCITRIOL 0.25 UG/1
0.25 CAPSULE, LIQUID FILLED ORAL
Status: DISCONTINUED | OUTPATIENT
Start: 2021-01-01 | End: 2021-01-01

## 2021-01-01 RX ORDER — DIPHENHYDRAMINE HCL 25 MG
25 TABLET ORAL ONCE
Status: COMPLETED | OUTPATIENT
Start: 2021-01-01 | End: 2021-01-01

## 2021-01-01 RX ORDER — ALPRAZOLAM 0.25 MG/1
0.25 TABLET ORAL ONCE
Status: COMPLETED | OUTPATIENT
Start: 2021-01-01 | End: 2021-01-01

## 2021-01-01 RX ORDER — SENNA AND DOCUSATE SODIUM 50; 8.6 MG/1; MG/1
2 TABLET, FILM COATED ORAL 2 TIMES DAILY
Status: DISCONTINUED | OUTPATIENT
Start: 2021-01-01 | End: 2021-01-01 | Stop reason: HOSPADM

## 2021-01-01 RX ORDER — NITROGLYCERIN 0.4 MG/1
0.4 TABLET SUBLINGUAL EVERY 5 MIN PRN
Status: DISCONTINUED | OUTPATIENT
Start: 2021-01-01 | End: 2021-01-01 | Stop reason: HOSPADM

## 2021-01-01 RX ORDER — NICOTINE POLACRILEX 4 MG
15 LOZENGE BUCCAL PRN
Status: DISCONTINUED | OUTPATIENT
Start: 2021-01-01 | End: 2021-01-01 | Stop reason: HOSPADM

## 2021-01-01 RX ORDER — DILTIAZEM HYDROCHLORIDE 180 MG/1
CAPSULE, COATED, EXTENDED RELEASE ORAL
Qty: 30 CAPSULE | Refills: 1 | Status: ON HOLD | OUTPATIENT
Start: 2021-01-01 | End: 2021-01-01 | Stop reason: HOSPADM

## 2021-01-01 RX ORDER — SODIUM CHLORIDE 0.9 % (FLUSH) 0.9 %
10 SYRINGE (ML) INJECTION PRN
Status: DISCONTINUED | OUTPATIENT
Start: 2021-01-01 | End: 2021-01-01 | Stop reason: SDUPTHER

## 2021-01-01 RX ORDER — MIDODRINE HYDROCHLORIDE 5 MG/1
15 TABLET ORAL
Qty: 270 TABLET | Refills: 1 | Status: ON HOLD
Start: 2021-01-01 | End: 2021-01-01 | Stop reason: HOSPADM

## 2021-01-01 RX ORDER — GUAIFENESIN 600 MG/1
600 TABLET, EXTENDED RELEASE ORAL 2 TIMES DAILY
Qty: 30 TABLET | Refills: 0 | Status: ON HOLD | OUTPATIENT
Start: 2021-01-01 | End: 2021-01-01 | Stop reason: SDUPTHER

## 2021-01-01 RX ORDER — B,C/FOLIC/ZINC/SELENOMETH/D3/E 0.8-12.5MG
1 TABLET ORAL DAILY
Status: ON HOLD | COMMUNITY
Start: 2021-01-01 | End: 2021-01-01 | Stop reason: HOSPADM

## 2021-01-01 RX ORDER — ALBUTEROL SULFATE 2.5 MG/3ML
2.5 SOLUTION RESPIRATORY (INHALATION)
Status: DISCONTINUED | OUTPATIENT
Start: 2021-01-01 | End: 2021-01-01

## 2021-01-01 RX ORDER — SODIUM CHLORIDE 0.9 % (FLUSH) 0.9 %
5-40 SYRINGE (ML) INJECTION EVERY 12 HOURS SCHEDULED
Status: DISCONTINUED | OUTPATIENT
Start: 2021-01-01 | End: 2021-01-01 | Stop reason: HOSPADM

## 2021-01-01 RX ORDER — ALBUMIN, HUMAN INJ 5% 5 %
50 SOLUTION INTRAVENOUS ONCE
Status: COMPLETED | OUTPATIENT
Start: 2021-01-01 | End: 2021-01-01

## 2021-01-01 RX ORDER — MIDODRINE HYDROCHLORIDE 5 MG/1
5 TABLET ORAL ONCE
Status: COMPLETED | OUTPATIENT
Start: 2021-01-01 | End: 2021-01-01

## 2021-01-01 RX ORDER — HYDROXYZINE HYDROCHLORIDE 25 MG/1
25 TABLET, FILM COATED ORAL 3 TIMES DAILY PRN
Status: DISCONTINUED | OUTPATIENT
Start: 2021-01-01 | End: 2021-01-01 | Stop reason: HOSPADM

## 2021-01-01 RX ORDER — ACETAMINOPHEN 325 MG/1
650 TABLET ORAL EVERY 4 HOURS PRN
Status: DISCONTINUED | OUTPATIENT
Start: 2021-01-01 | End: 2021-01-01 | Stop reason: HOSPADM

## 2021-01-01 RX ORDER — CALCIUM CHLORIDE 100 MG/ML
1000 INJECTION INTRAVENOUS; INTRAVENTRICULAR ONCE
Status: COMPLETED | OUTPATIENT
Start: 2021-01-01 | End: 2021-01-01

## 2021-01-01 RX ORDER — SENNA AND DOCUSATE SODIUM 50; 8.6 MG/1; MG/1
1 TABLET, FILM COATED ORAL DAILY PRN
Status: ON HOLD | COMMUNITY
End: 2021-01-01 | Stop reason: HOSPADM

## 2021-01-01 RX ORDER — ASPIRIN 81 MG/1
81 TABLET ORAL DAILY
Qty: 30 TABLET | Refills: 3 | Status: SHIPPED | OUTPATIENT
Start: 2021-01-01 | End: 2021-01-01

## 2021-01-01 RX ORDER — ACETAMINOPHEN 325 MG/1
650 TABLET ORAL EVERY 4 HOURS PRN
Status: DISCONTINUED | OUTPATIENT
Start: 2021-01-01 | End: 2021-01-01

## 2021-01-01 RX ORDER — PANTOPRAZOLE SODIUM 40 MG/1
40 TABLET, DELAYED RELEASE ORAL
Qty: 30 TABLET | Refills: 3 | Status: ON HOLD
Start: 2021-01-01 | End: 2021-01-01 | Stop reason: HOSPADM

## 2021-01-01 RX ORDER — DIGOXIN 0.25 MG/ML
250 INJECTION INTRAMUSCULAR; INTRAVENOUS ONCE
Status: COMPLETED | OUTPATIENT
Start: 2021-01-01 | End: 2021-01-01

## 2021-01-01 RX ORDER — ACETAMINOPHEN 325 MG/1
650 TABLET ORAL EVERY 6 HOURS PRN
Status: DISCONTINUED | OUTPATIENT
Start: 2021-01-01 | End: 2021-01-01 | Stop reason: HOSPADM

## 2021-01-01 RX ORDER — CALCIUM ACETATE 667 MG/1
2 CAPSULE ORAL
Status: DISCONTINUED | OUTPATIENT
Start: 2021-01-01 | End: 2021-01-01

## 2021-01-01 RX ORDER — CALCIUM CHLORIDE 100 MG/ML
INJECTION INTRAVENOUS; INTRAVENTRICULAR
Status: COMPLETED
Start: 2021-01-01 | End: 2021-01-01

## 2021-01-01 RX ORDER — POTASSIUM CHLORIDE 20 MEQ/1
40 TABLET, EXTENDED RELEASE ORAL ONCE
Status: DISCONTINUED | OUTPATIENT
Start: 2021-01-01 | End: 2021-01-01

## 2021-01-01 RX ORDER — KETAMINE HCL IN NACL, ISO-OSM 100MG/10ML
SYRINGE (ML) INJECTION PRN
Status: DISCONTINUED | OUTPATIENT
Start: 2021-01-01 | End: 2021-01-01 | Stop reason: SDUPTHER

## 2021-01-01 RX ORDER — DOBUTAMINE HYDROCHLORIDE 400 MG/100ML
2-40 INJECTION INTRAVENOUS CONTINUOUS
Status: DISCONTINUED | OUTPATIENT
Start: 2021-01-01 | End: 2021-01-01 | Stop reason: HOSPADM

## 2021-01-01 RX ORDER — ASPIRIN 81 MG/1
81 TABLET ORAL DAILY
Status: DISCONTINUED | OUTPATIENT
Start: 2021-01-01 | End: 2021-01-01 | Stop reason: HOSPADM

## 2021-01-01 RX ORDER — 0.9 % SODIUM CHLORIDE 0.9 %
250 INTRAVENOUS SOLUTION INTRAVENOUS ONCE
Status: DISCONTINUED | OUTPATIENT
Start: 2021-01-01 | End: 2021-01-01

## 2021-01-01 RX ORDER — MIDODRINE HYDROCHLORIDE 10 MG/1
10 TABLET ORAL ONCE
Status: COMPLETED | OUTPATIENT
Start: 2021-01-01 | End: 2021-01-01

## 2021-01-01 RX ORDER — ATORVASTATIN CALCIUM 40 MG/1
40 TABLET, FILM COATED ORAL NIGHTLY
Qty: 30 TABLET | Refills: 3 | Status: ON HOLD
Start: 2021-01-01 | End: 2021-01-01 | Stop reason: HOSPADM

## 2021-01-01 RX ORDER — CINACALCET 30 MG/1
90 TABLET, FILM COATED ORAL
Status: DISCONTINUED | OUTPATIENT
Start: 2021-01-01 | End: 2021-01-01

## 2021-01-01 RX ORDER — ALBUTEROL SULFATE 2.5 MG/3ML
2.5 SOLUTION RESPIRATORY (INHALATION)
Status: DISCONTINUED | OUTPATIENT
Start: 2021-01-01 | End: 2021-01-01 | Stop reason: HOSPADM

## 2021-01-01 RX ORDER — ALBUTEROL SULFATE 90 UG/1
2 AEROSOL, METERED RESPIRATORY (INHALATION) EVERY 6 HOURS PRN
Qty: 1 INHALER | Refills: 3 | Status: ON HOLD
Start: 2021-01-01 | End: 2021-01-01 | Stop reason: HOSPADM

## 2021-01-01 RX ORDER — FUROSEMIDE 40 MG/1
40 TABLET ORAL DAILY
Status: DISCONTINUED | OUTPATIENT
Start: 2021-01-01 | End: 2021-01-01

## 2021-01-01 RX ORDER — METOPROLOL TARTRATE 5 MG/5ML
2.5 INJECTION INTRAVENOUS ONCE
Status: DISCONTINUED | OUTPATIENT
Start: 2021-01-01 | End: 2021-01-01

## 2021-01-01 RX ORDER — ALBUTEROL SULFATE 90 UG/1
2 AEROSOL, METERED RESPIRATORY (INHALATION) EVERY 4 HOURS PRN
Status: DISCONTINUED | OUTPATIENT
Start: 2021-01-01 | End: 2021-01-01 | Stop reason: HOSPADM

## 2021-01-01 RX ORDER — POLYETHYLENE GLYCOL 3350 17 G/17G
17 POWDER, FOR SOLUTION ORAL DAILY PRN
Status: DISCONTINUED | OUTPATIENT
Start: 2021-01-01 | End: 2021-01-01 | Stop reason: HOSPADM

## 2021-01-01 RX ORDER — LISINOPRIL 5 MG/1
5 TABLET ORAL DAILY
Qty: 30 TABLET | Refills: 3 | Status: SHIPPED | OUTPATIENT
Start: 2021-01-01 | End: 2021-01-01

## 2021-01-01 RX ORDER — LACTULOSE 10 G/15ML
20 SOLUTION ORAL 3 TIMES DAILY PRN
Status: DISCONTINUED | OUTPATIENT
Start: 2021-01-01 | End: 2021-01-01 | Stop reason: HOSPADM

## 2021-01-01 RX ORDER — ATORVASTATIN CALCIUM 10 MG/1
10 TABLET, FILM COATED ORAL NIGHTLY
Status: DISCONTINUED | OUTPATIENT
Start: 2021-01-01 | End: 2021-01-01 | Stop reason: DRUGHIGH

## 2021-01-01 RX ORDER — BLOOD-GLUCOSE METER
EACH MISCELLANEOUS
Qty: 100 EACH | Refills: 3 | Status: ON HOLD
Start: 2021-01-01 | End: 2021-01-01 | Stop reason: HOSPADM

## 2021-01-01 RX ORDER — FENTANYL CITRATE 50 UG/ML
25 INJECTION, SOLUTION INTRAMUSCULAR; INTRAVENOUS EVERY 5 MIN PRN
Status: DISCONTINUED | OUTPATIENT
Start: 2021-01-01 | End: 2021-01-01

## 2021-01-01 RX ORDER — CALCIUM ACETATE 667 MG/1
2 CAPSULE ORAL PRN
Status: ON HOLD | COMMUNITY
End: 2021-01-01 | Stop reason: HOSPADM

## 2021-01-01 RX ORDER — FENTANYL CITRATE 50 UG/ML
25 INJECTION, SOLUTION INTRAMUSCULAR; INTRAVENOUS
Status: DISCONTINUED | OUTPATIENT
Start: 2021-01-01 | End: 2021-01-01 | Stop reason: HOSPADM

## 2021-01-01 RX ORDER — DIPHENHYDRAMINE HCL 25 MG
25 TABLET ORAL
Status: DISCONTINUED | OUTPATIENT
Start: 2021-01-01 | End: 2021-01-01

## 2021-01-01 RX ORDER — HEPARIN SODIUM 5000 [USP'U]/ML
5000 INJECTION, SOLUTION INTRAVENOUS; SUBCUTANEOUS EVERY 8 HOURS SCHEDULED
Status: DISCONTINUED | OUTPATIENT
Start: 2021-01-01 | End: 2021-01-01

## 2021-01-01 RX ORDER — OXYCODONE HYDROCHLORIDE AND ACETAMINOPHEN 5; 325 MG/1; MG/1
1 TABLET ORAL EVERY 4 HOURS PRN
Status: DISCONTINUED | OUTPATIENT
Start: 2021-01-01 | End: 2021-01-01 | Stop reason: HOSPADM

## 2021-01-01 RX ORDER — ONDANSETRON 2 MG/ML
4 INJECTION INTRAMUSCULAR; INTRAVENOUS
Status: DISCONTINUED | OUTPATIENT
Start: 2021-01-01 | End: 2021-01-01

## 2021-01-01 RX ORDER — HYDROCODONE BITARTRATE AND ACETAMINOPHEN 5; 325 MG/1; MG/1
1 TABLET ORAL EVERY 6 HOURS PRN
Status: DISCONTINUED | OUTPATIENT
Start: 2021-01-01 | End: 2021-01-01 | Stop reason: HOSPADM

## 2021-01-01 RX ORDER — AMIODARONE HYDROCHLORIDE 50 MG/ML
300 INJECTION, SOLUTION INTRAVENOUS ONCE
Status: DISCONTINUED | OUTPATIENT
Start: 2021-01-01 | End: 2021-01-01 | Stop reason: CLARIF

## 2021-01-01 RX ORDER — ONDANSETRON 2 MG/ML
4 INJECTION INTRAMUSCULAR; INTRAVENOUS EVERY 6 HOURS PRN
Status: DISCONTINUED | OUTPATIENT
Start: 2021-01-01 | End: 2021-01-01 | Stop reason: HOSPADM

## 2021-01-01 RX ORDER — SODIUM CHLORIDE 9 MG/ML
10 INJECTION INTRAVENOUS DAILY
Status: DISCONTINUED | OUTPATIENT
Start: 2021-01-01 | End: 2021-01-01

## 2021-01-01 RX ORDER — GABAPENTIN 100 MG/1
100 CAPSULE ORAL 3 TIMES DAILY
Qty: 90 CAPSULE | Refills: 0 | OUTPATIENT
Start: 2021-01-01 | End: 2021-11-16

## 2021-01-01 RX ORDER — MIDODRINE HYDROCHLORIDE 10 MG/1
15 TABLET ORAL 4 TIMES DAILY
Status: DISCONTINUED | OUTPATIENT
Start: 2021-01-01 | End: 2021-01-01 | Stop reason: HOSPADM

## 2021-01-01 RX ORDER — DILTIAZEM HYDROCHLORIDE 180 MG/1
180 CAPSULE, COATED, EXTENDED RELEASE ORAL DAILY
Status: DISCONTINUED | OUTPATIENT
Start: 2021-01-01 | End: 2021-01-01

## 2021-01-01 RX ORDER — HEPARIN SODIUM 1000 [USP'U]/ML
2000 INJECTION, SOLUTION INTRAVENOUS; SUBCUTANEOUS PRN
Status: DISCONTINUED | OUTPATIENT
Start: 2021-01-01 | End: 2021-01-01 | Stop reason: SDUPTHER

## 2021-01-01 RX ORDER — LISINOPRIL 5 MG/1
5 TABLET ORAL DAILY
Qty: 30 TABLET | Refills: 3 | Status: ON HOLD
Start: 2021-01-01 | End: 2021-01-01 | Stop reason: HOSPADM

## 2021-01-01 RX ORDER — NITROGLYCERIN 0.4 MG/1
TABLET SUBLINGUAL
Qty: 25 TABLET | Refills: 5 | Status: ON HOLD
Start: 2021-01-01 | End: 2021-01-01 | Stop reason: HOSPADM

## 2021-01-01 RX ORDER — DEXTROSE MONOHYDRATE 25 G/50ML
25 INJECTION, SOLUTION INTRAVENOUS PRN
Status: COMPLETED | OUTPATIENT
Start: 2021-01-01 | End: 2021-01-01

## 2021-01-01 RX ORDER — DILTIAZEM HYDROCHLORIDE 120 MG/1
120 CAPSULE, COATED, EXTENDED RELEASE ORAL DAILY
Status: DISCONTINUED | OUTPATIENT
Start: 2021-01-01 | End: 2021-01-01

## 2021-01-01 RX ORDER — POTASSIUM CHLORIDE 29.8 MG/ML
20 INJECTION INTRAVENOUS PRN
Status: DISCONTINUED | OUTPATIENT
Start: 2021-01-01 | End: 2021-01-01

## 2021-01-01 RX ORDER — GUAIFENESIN 600 MG/1
600 TABLET, EXTENDED RELEASE ORAL 2 TIMES DAILY PRN
Qty: 30 TABLET | Refills: 0 | Status: ON HOLD
Start: 2021-01-01 | End: 2021-01-01 | Stop reason: HOSPADM

## 2021-01-01 RX ORDER — CINACALCET 30 MG/1
90 TABLET, FILM COATED ORAL
Status: DISCONTINUED | OUTPATIENT
Start: 2021-01-01 | End: 2021-01-01 | Stop reason: HOSPADM

## 2021-01-01 RX ORDER — ACETAMINOPHEN 325 MG/1
650 TABLET ORAL EVERY 6 HOURS PRN
Status: DISCONTINUED | OUTPATIENT
Start: 2021-01-01 | End: 2021-01-01 | Stop reason: SDUPTHER

## 2021-01-01 RX ORDER — SODIUM CHLORIDE 0.9 % (FLUSH) 0.9 %
5-40 SYRINGE (ML) INJECTION PRN
Status: DISCONTINUED | OUTPATIENT
Start: 2021-01-01 | End: 2021-01-01 | Stop reason: HOSPADM

## 2021-01-01 RX ORDER — ONDANSETRON 2 MG/ML
4 INJECTION INTRAMUSCULAR; INTRAVENOUS ONCE
Status: COMPLETED | OUTPATIENT
Start: 2021-01-01 | End: 2021-01-01

## 2021-01-01 RX ORDER — SODIUM CHLORIDE 9 MG/ML
50 INJECTION, SOLUTION INTRAVENOUS ONCE
Status: COMPLETED | OUTPATIENT
Start: 2021-01-01 | End: 2021-01-01

## 2021-01-01 RX ORDER — LORAZEPAM 2 MG/ML
0.5 INJECTION INTRAMUSCULAR
Status: DISCONTINUED | OUTPATIENT
Start: 2021-01-01 | End: 2021-01-01 | Stop reason: HOSPADM

## 2021-01-01 RX ORDER — GUAIFENESIN 600 MG/1
600 TABLET, EXTENDED RELEASE ORAL 2 TIMES DAILY
Status: DISCONTINUED | OUTPATIENT
Start: 2021-01-01 | End: 2021-01-01 | Stop reason: HOSPADM

## 2021-01-01 RX ORDER — ATORVASTATIN CALCIUM 10 MG/1
TABLET, FILM COATED ORAL
Qty: 30 TABLET | Refills: 0 | Status: SHIPPED | OUTPATIENT
Start: 2021-01-01 | End: 2021-01-01 | Stop reason: SDUPTHER

## 2021-01-01 RX ORDER — ALBUMIN (HUMAN) 12.5 G/50ML
25 SOLUTION INTRAVENOUS PRN
Status: DISCONTINUED | OUTPATIENT
Start: 2021-01-01 | End: 2021-01-01

## 2021-01-01 RX ORDER — DEXTROSE MONOHYDRATE 25 G/50ML
25 INJECTION, SOLUTION INTRAVENOUS ONCE
Status: COMPLETED | OUTPATIENT
Start: 2021-01-01 | End: 2021-01-01

## 2021-01-01 RX ORDER — PROPOFOL 10 MG/ML
INJECTION, EMULSION INTRAVENOUS
Status: DISPENSED
Start: 2021-01-01 | End: 2021-01-01

## 2021-01-01 RX ORDER — POTASSIUM CHLORIDE 29.8 MG/ML
20 INJECTION INTRAVENOUS PRN
Status: DISCONTINUED | OUTPATIENT
Start: 2021-01-01 | End: 2021-01-01 | Stop reason: HOSPADM

## 2021-01-01 RX ORDER — CALCIUM ACETATE 667 MG/1
2 CAPSULE ORAL
Status: ON HOLD | COMMUNITY
Start: 2021-01-01 | End: 2021-01-01 | Stop reason: HOSPADM

## 2021-01-01 RX ORDER — SENNA AND DOCUSATE SODIUM 50; 8.6 MG/1; MG/1
1 TABLET, FILM COATED ORAL DAILY
Status: DISCONTINUED | OUTPATIENT
Start: 2021-01-01 | End: 2021-01-01 | Stop reason: HOSPADM

## 2021-01-01 RX ADMIN — AMIODARONE HYDROCHLORIDE 200 MG: 200 TABLET ORAL at 08:51

## 2021-01-01 RX ADMIN — HEPARIN SODIUM 5000 UNITS: 5000 INJECTION INTRAVENOUS; SUBCUTANEOUS at 06:33

## 2021-01-01 RX ADMIN — ASPIRIN 81 MG: 81 TABLET, COATED ORAL at 08:10

## 2021-01-01 RX ADMIN — GABAPENTIN 100 MG: 100 CAPSULE ORAL at 20:57

## 2021-01-01 RX ADMIN — MIDODRINE HYDROCHLORIDE 15 MG: 10 TABLET ORAL at 12:40

## 2021-01-01 RX ADMIN — MIDODRINE HYDROCHLORIDE 15 MG: 10 TABLET ORAL at 21:30

## 2021-01-01 RX ADMIN — PANTOPRAZOLE SODIUM 40 MG: 40 TABLET, DELAYED RELEASE ORAL at 05:43

## 2021-01-01 RX ADMIN — ATORVASTATIN CALCIUM 40 MG: 40 TABLET, FILM COATED ORAL at 22:24

## 2021-01-01 RX ADMIN — ASPIRIN 81 MG: 81 TABLET, COATED ORAL at 08:40

## 2021-01-01 RX ADMIN — PANTOPRAZOLE SODIUM 40 MG: 40 TABLET, DELAYED RELEASE ORAL at 09:29

## 2021-01-01 RX ADMIN — Medication 85 MCG/MIN: at 22:01

## 2021-01-01 RX ADMIN — MIDODRINE HYDROCHLORIDE 15 MG: 10 TABLET ORAL at 21:05

## 2021-01-01 RX ADMIN — CALCIUM ACETATE 1334 MG: 667 CAPSULE ORAL at 07:55

## 2021-01-01 RX ADMIN — LORAZEPAM 0.25 MG: 0.5 TABLET ORAL at 22:27

## 2021-01-01 RX ADMIN — DEXTROSE MONOHYDRATE 150 MG: 50 INJECTION, SOLUTION INTRAVENOUS at 14:00

## 2021-01-01 RX ADMIN — VASOPRESSIN 0.03 UNITS/MIN: 20 INJECTION INTRAVENOUS at 18:24

## 2021-01-01 RX ADMIN — ALBUMIN (HUMAN) 25 G: 0.25 INJECTION, SOLUTION INTRAVENOUS at 10:43

## 2021-01-01 RX ADMIN — METRONIDAZOLE 500 MG: 500 INJECTION, SOLUTION INTRAVENOUS at 04:13

## 2021-01-01 RX ADMIN — AMIODARONE HYDROCHLORIDE 200 MG: 200 TABLET ORAL at 20:45

## 2021-01-01 RX ADMIN — SODIUM CHLORIDE, PRESERVATIVE FREE 10 ML: 5 INJECTION INTRAVENOUS at 10:47

## 2021-01-01 RX ADMIN — SODIUM CHLORIDE, PRESERVATIVE FREE 10 ML: 5 INJECTION INTRAVENOUS at 10:48

## 2021-01-01 RX ADMIN — MIDODRINE HYDROCHLORIDE 15 MG: 10 TABLET ORAL at 08:51

## 2021-01-01 RX ADMIN — DIPHENHYDRAMINE HCL 25 MG: 25 TABLET ORAL at 20:51

## 2021-01-01 RX ADMIN — GABAPENTIN 100 MG: 100 CAPSULE ORAL at 21:47

## 2021-01-01 RX ADMIN — SODIUM CHLORIDE, PRESERVATIVE FREE 10 ML: 5 INJECTION INTRAVENOUS at 09:46

## 2021-01-01 RX ADMIN — MIDODRINE HYDROCHLORIDE 15 MG: 10 TABLET ORAL at 09:01

## 2021-01-01 RX ADMIN — CALCIUM ACETATE 1334 MG: 667 CAPSULE ORAL at 12:30

## 2021-01-01 RX ADMIN — HYDROCORTISONE SODIUM SUCCINATE 100 MG: 100 INJECTION, POWDER, FOR SOLUTION INTRAMUSCULAR; INTRAVENOUS at 22:09

## 2021-01-01 RX ADMIN — MIDODRINE HYDROCHLORIDE 10 MG: 10 TABLET ORAL at 08:09

## 2021-01-01 RX ADMIN — DIPHENHYDRAMINE HCL 25 MG: 25 TABLET ORAL at 22:47

## 2021-01-01 RX ADMIN — ASPIRIN 81 MG: 81 TABLET, COATED ORAL at 08:07

## 2021-01-01 RX ADMIN — HEPARIN SODIUM 1100 UNITS: 1000 INJECTION, SOLUTION INTRAVENOUS; SUBCUTANEOUS at 15:53

## 2021-01-01 RX ADMIN — Medication: at 12:12

## 2021-01-01 RX ADMIN — CALCIUM ACETATE 1334 MG: 667 CAPSULE ORAL at 12:29

## 2021-01-01 RX ADMIN — PROBIOTIC PRODUCT - TAB 1 TABLET: TAB at 08:51

## 2021-01-01 RX ADMIN — OXYCODONE AND ACETAMINOPHEN 1 TABLET: 5; 325 TABLET ORAL at 03:53

## 2021-01-01 RX ADMIN — SODIUM PHOSPHATE, MONOBASIC, MONOHYDRATE AND SODIUM PHOSPHATE, DIBASIC, ANHYDROUS 6 MMOL: 276; 142 INJECTION, SOLUTION INTRAVENOUS at 18:02

## 2021-01-01 RX ADMIN — EPOETIN ALFA-EPBX 2000 UNITS: 2000 INJECTION, SOLUTION INTRAVENOUS; SUBCUTANEOUS at 10:51

## 2021-01-01 RX ADMIN — PANTOPRAZOLE SODIUM 40 MG: 40 INJECTION, POWDER, FOR SOLUTION INTRAVENOUS at 08:25

## 2021-01-01 RX ADMIN — PROBIOTIC PRODUCT - TAB 1 TABLET: TAB at 17:32

## 2021-01-01 RX ADMIN — HYDROCORTISONE: 25 CREAM TOPICAL at 20:05

## 2021-01-01 RX ADMIN — CALCIUM ACETATE 1334 MG: 667 CAPSULE ORAL at 12:44

## 2021-01-01 RX ADMIN — CALCIUM ACETATE 667 MG: 667 CAPSULE ORAL at 12:57

## 2021-01-01 RX ADMIN — MIDODRINE HYDROCHLORIDE 15 MG: 10 TABLET ORAL at 12:10

## 2021-01-01 RX ADMIN — MIDODRINE HYDROCHLORIDE 15 MG: 10 TABLET ORAL at 09:14

## 2021-01-01 RX ADMIN — Medication: at 07:51

## 2021-01-01 RX ADMIN — Medication 2 PUFF: at 20:20

## 2021-01-01 RX ADMIN — DIPHENHYDRAMINE HYDROCHLORIDE 25 MG: 25 TABLET ORAL at 17:21

## 2021-01-01 RX ADMIN — ASPIRIN 81 MG: 81 TABLET, COATED ORAL at 07:57

## 2021-01-01 RX ADMIN — MIDODRINE HYDROCHLORIDE 10 MG: 10 TABLET ORAL at 12:05

## 2021-01-01 RX ADMIN — DEXTROSE 15 G: 15 GEL ORAL at 08:05

## 2021-01-01 RX ADMIN — SODIUM CHLORIDE 1000 ML: 9 INJECTION, SOLUTION INTRAVENOUS at 16:06

## 2021-01-01 RX ADMIN — PHENYLEPHRINE HYDROCHLORIDE 30 MCG/MIN: 10 INJECTION INTRAVENOUS at 20:13

## 2021-01-01 RX ADMIN — Medication 2 PUFF: at 20:32

## 2021-01-01 RX ADMIN — Medication: at 13:15

## 2021-01-01 RX ADMIN — CALCIUM ACETATE 1334 MG: 667 CAPSULE ORAL at 08:39

## 2021-01-01 RX ADMIN — CALCIUM GLUCONATE 2000 MG: 98 INJECTION, SOLUTION INTRAVENOUS at 16:45

## 2021-01-01 RX ADMIN — HYDROXYZINE HYDROCHLORIDE 25 MG: 25 TABLET ORAL at 11:33

## 2021-01-01 RX ADMIN — SODIUM CHLORIDE, PRESERVATIVE FREE 10 ML: 5 INJECTION INTRAVENOUS at 20:55

## 2021-01-01 RX ADMIN — BUDESONIDE AND FORMOTEROL FUMARATE DIHYDRATE 2 PUFF: 160; 4.5 AEROSOL RESPIRATORY (INHALATION) at 07:30

## 2021-01-01 RX ADMIN — Medication 45 MCG/MIN: at 04:00

## 2021-01-01 RX ADMIN — ALBUMIN (HUMAN) 25 G: 0.25 INJECTION, SOLUTION INTRAVENOUS at 10:57

## 2021-01-01 RX ADMIN — METOPROLOL TARTRATE 25 MG: 25 TABLET, FILM COATED ORAL at 22:24

## 2021-01-01 RX ADMIN — BUDESONIDE AND FORMOTEROL FUMARATE DIHYDRATE 2 PUFF: 160; 4.5 AEROSOL RESPIRATORY (INHALATION) at 10:10

## 2021-01-01 RX ADMIN — PROPOFOL 30 MG: 10 INJECTION, EMULSION INTRAVENOUS at 15:38

## 2021-01-01 RX ADMIN — DEXTROSE MONOHYDRATE 12.5 G: 25 INJECTION, SOLUTION INTRAVENOUS at 23:54

## 2021-01-01 RX ADMIN — ATORVASTATIN CALCIUM 40 MG: 40 TABLET, FILM COATED ORAL at 21:27

## 2021-01-01 RX ADMIN — Medication 2 PUFF: at 21:21

## 2021-01-01 RX ADMIN — HEPARIN SODIUM 5000 UNITS: 5000 INJECTION INTRAVENOUS; SUBCUTANEOUS at 21:27

## 2021-01-01 RX ADMIN — CALCIUM ACETATE 1334 MG: 667 CAPSULE ORAL at 11:42

## 2021-01-01 RX ADMIN — ASPIRIN 81 MG: 81 TABLET, COATED ORAL at 22:24

## 2021-01-01 RX ADMIN — HYDROCORTISONE: 0.01 CREAM TOPICAL at 21:06

## 2021-01-01 RX ADMIN — PANTOPRAZOLE SODIUM 40 MG: 40 TABLET, DELAYED RELEASE ORAL at 09:17

## 2021-01-01 RX ADMIN — SODIUM CHLORIDE, PRESERVATIVE FREE 10 ML: 5 INJECTION INTRAVENOUS at 22:50

## 2021-01-01 RX ADMIN — POLYETHYLENE GLYCOL 3350 17 G: 17 POWDER, FOR SOLUTION ORAL at 09:36

## 2021-01-01 RX ADMIN — Medication 100 MCG/MIN: at 04:14

## 2021-01-01 RX ADMIN — TRIAMCINOLONE ACETONIDE: 1 CREAM TOPICAL at 20:45

## 2021-01-01 RX ADMIN — APIXABAN 5 MG: 5 TABLET, FILM COATED ORAL at 08:52

## 2021-01-01 RX ADMIN — APIXABAN 5 MG: 5 TABLET, FILM COATED ORAL at 17:33

## 2021-01-01 RX ADMIN — HYDROXYZINE HYDROCHLORIDE 25 MG: 25 TABLET ORAL at 08:49

## 2021-01-01 RX ADMIN — MIDODRINE HYDROCHLORIDE 15 MG: 10 TABLET ORAL at 21:49

## 2021-01-01 RX ADMIN — CALCIUM ACETATE 1334 MG: 667 CAPSULE ORAL at 18:25

## 2021-01-01 RX ADMIN — ENOXAPARIN SODIUM 120 MG: 150 INJECTION SUBCUTANEOUS at 17:26

## 2021-01-01 RX ADMIN — Medication 2000 ML/HR: at 12:32

## 2021-01-01 RX ADMIN — CALCIUM ACETATE 1334 MG: 667 CAPSULE ORAL at 12:09

## 2021-01-01 RX ADMIN — ASPIRIN 81 MG: 81 TABLET, COATED ORAL at 09:17

## 2021-01-01 RX ADMIN — MIDODRINE HYDROCHLORIDE 5 MG: 5 TABLET ORAL at 13:01

## 2021-01-01 RX ADMIN — GUAIFENESIN 600 MG: 600 TABLET ORAL at 20:52

## 2021-01-01 RX ADMIN — GABAPENTIN 100 MG: 100 CAPSULE ORAL at 15:11

## 2021-01-01 RX ADMIN — SODIUM CHLORIDE, PRESERVATIVE FREE 10 ML: 5 INJECTION INTRAVENOUS at 20:58

## 2021-01-01 RX ADMIN — CEFEPIME HYDROCHLORIDE 2000 MG: 2 INJECTION, POWDER, FOR SOLUTION INTRAVENOUS at 04:13

## 2021-01-01 RX ADMIN — GUAIFENESIN 600 MG: 600 TABLET ORAL at 08:56

## 2021-01-01 RX ADMIN — DIPHENHYDRAMINE HCL 25 MG: 25 TABLET ORAL at 22:16

## 2021-01-01 RX ADMIN — HYDROCORTISONE: 25 CREAM TOPICAL at 13:24

## 2021-01-01 RX ADMIN — Medication 2000 ML/HR: at 18:11

## 2021-01-01 RX ADMIN — SODIUM CHLORIDE 500 ML: 9 INJECTION, SOLUTION INTRAVENOUS at 00:10

## 2021-01-01 RX ADMIN — CALCIUM ACETATE 1334 MG: 667 CAPSULE ORAL at 17:27

## 2021-01-01 RX ADMIN — Medication 5 ML: at 04:13

## 2021-01-01 RX ADMIN — HYDROCORTISONE: 0.01 CREAM TOPICAL at 21:46

## 2021-01-01 RX ADMIN — MIDODRINE HYDROCHLORIDE 15 MG: 10 TABLET ORAL at 07:55

## 2021-01-01 RX ADMIN — DOCUSATE SODIUM 50MG AND SENNOSIDES 8.6MG 2 TABLET: 8.6; 5 TABLET, FILM COATED ORAL at 20:50

## 2021-01-01 RX ADMIN — AMIODARONE HYDROCHLORIDE 200 MG: 200 TABLET ORAL at 08:41

## 2021-01-01 RX ADMIN — DEXTROSE MONOHYDRATE: 100 INJECTION, SOLUTION INTRAVENOUS at 05:42

## 2021-01-01 RX ADMIN — BUDESONIDE AND FORMOTEROL FUMARATE DIHYDRATE 2 PUFF: 160; 4.5 AEROSOL RESPIRATORY (INHALATION) at 08:00

## 2021-01-01 RX ADMIN — GABAPENTIN 100 MG: 100 CAPSULE ORAL at 20:55

## 2021-01-01 RX ADMIN — AMIODARONE HYDROCHLORIDE 200 MG: 200 TABLET ORAL at 07:54

## 2021-01-01 RX ADMIN — CINACALCET HYDROCHLORIDE 90 MG: 30 TABLET, FILM COATED ORAL at 08:40

## 2021-01-01 RX ADMIN — PANTOPRAZOLE SODIUM 40 MG: 40 INJECTION, POWDER, FOR SOLUTION INTRAVENOUS at 08:13

## 2021-01-01 RX ADMIN — HYDROCORTISONE: 0.01 CREAM TOPICAL at 20:46

## 2021-01-01 RX ADMIN — SODIUM CHLORIDE, PRESERVATIVE FREE 10 ML: 5 INJECTION INTRAVENOUS at 09:36

## 2021-01-01 RX ADMIN — MIDODRINE HYDROCHLORIDE 10 MG: 10 TABLET ORAL at 12:12

## 2021-01-01 RX ADMIN — BUDESONIDE AND FORMOTEROL FUMARATE DIHYDRATE 2 PUFF: 160; 4.5 AEROSOL RESPIRATORY (INHALATION) at 09:28

## 2021-01-01 RX ADMIN — CINACALCET HYDROCHLORIDE 90 MG: 30 TABLET, FILM COATED ORAL at 09:17

## 2021-01-01 RX ADMIN — DEXTROSE MONOHYDRATE 12.5 G: 25 INJECTION, SOLUTION INTRAVENOUS at 09:44

## 2021-01-01 RX ADMIN — SODIUM CHLORIDE, PRESERVATIVE FREE 10 ML: 5 INJECTION INTRAVENOUS at 22:46

## 2021-01-01 RX ADMIN — HYDROCORTISONE SODIUM SUCCINATE 100 MG: 100 INJECTION, POWDER, FOR SOLUTION INTRAMUSCULAR; INTRAVENOUS at 06:13

## 2021-01-01 RX ADMIN — SODIUM CHLORIDE 1000 ML: 9 INJECTION, SOLUTION INTRAVENOUS at 20:14

## 2021-01-01 RX ADMIN — CALCIUM CHLORIDE 1000 MG: 100 INJECTION INTRAVENOUS; INTRAVENTRICULAR at 21:59

## 2021-01-01 RX ADMIN — Medication: at 23:43

## 2021-01-01 RX ADMIN — MIDODRINE HYDROCHLORIDE 10 MG: 10 TABLET ORAL at 12:45

## 2021-01-01 RX ADMIN — MIDODRINE HYDROCHLORIDE 10 MG: 10 TABLET ORAL at 09:49

## 2021-01-01 RX ADMIN — MIDODRINE HYDROCHLORIDE 10 MG: 10 TABLET ORAL at 14:42

## 2021-01-01 RX ADMIN — CALCIUM ACETATE 1334 MG: 667 CAPSULE ORAL at 09:13

## 2021-01-01 RX ADMIN — PHENYLEPHRINE HYDROCHLORIDE 300 MCG/MIN: 10 INJECTION INTRAVENOUS at 02:33

## 2021-01-01 RX ADMIN — EPINEPHRINE 30 MCG/MIN: 1 INJECTION INTRAMUSCULAR; INTRAVENOUS; SUBCUTANEOUS at 07:57

## 2021-01-01 RX ADMIN — DEXTROSE MONOHYDRATE 12.5 G: 25 INJECTION, SOLUTION INTRAVENOUS at 12:00

## 2021-01-01 RX ADMIN — NOREPINEPHRINE BITARTRATE 100 MCG/MIN: 1 INJECTION, SOLUTION, CONCENTRATE INTRAVENOUS at 09:18

## 2021-01-01 RX ADMIN — Medication: at 02:53

## 2021-01-01 RX ADMIN — Medication: at 00:06

## 2021-01-01 RX ADMIN — DOCUSATE SODIUM 50MG AND SENNOSIDES 8.6MG 2 TABLET: 8.6; 5 TABLET, FILM COATED ORAL at 08:08

## 2021-01-01 RX ADMIN — ASPIRIN 81 MG: 81 TABLET, COATED ORAL at 09:29

## 2021-01-01 RX ADMIN — MIDODRINE HYDROCHLORIDE 15 MG: 10 TABLET ORAL at 09:29

## 2021-01-01 RX ADMIN — ASPIRIN 81 MG: 81 TABLET, COATED ORAL at 08:24

## 2021-01-01 RX ADMIN — CALCIUM ACETATE 1334 MG: 667 CAPSULE ORAL at 08:46

## 2021-01-01 RX ADMIN — SODIUM CHLORIDE 500 ML: 9 INJECTION, SOLUTION INTRAVENOUS at 17:48

## 2021-01-01 RX ADMIN — POTASSIUM CHLORIDE 20 MEQ: 400 INJECTION, SOLUTION INTRAVENOUS at 09:09

## 2021-01-01 RX ADMIN — DEXTROSE MONOHYDRATE 12.5 G: 25 INJECTION, SOLUTION INTRAVENOUS at 03:55

## 2021-01-01 RX ADMIN — EPINEPHRINE 8 MCG/MIN: 1 INJECTION INTRAMUSCULAR; INTRAVENOUS; SUBCUTANEOUS at 22:45

## 2021-01-01 RX ADMIN — PANTOPRAZOLE SODIUM 40 MG: 40 TABLET, DELAYED RELEASE ORAL at 08:22

## 2021-01-01 RX ADMIN — MIDODRINE HYDROCHLORIDE 15 MG: 10 TABLET ORAL at 17:10

## 2021-01-01 RX ADMIN — SODIUM THIOSULFATE 25 G: 250 INJECTION, SOLUTION INTRAVENOUS at 19:30

## 2021-01-01 RX ADMIN — AMIODARONE HYDROCHLORIDE 200 MG: 200 TABLET ORAL at 09:29

## 2021-01-01 RX ADMIN — MIDODRINE HYDROCHLORIDE 15 MG: 10 TABLET ORAL at 13:45

## 2021-01-01 RX ADMIN — ASPIRIN 81 MG: 81 TABLET, COATED ORAL at 08:22

## 2021-01-01 RX ADMIN — GABAPENTIN 100 MG: 100 CAPSULE ORAL at 14:21

## 2021-01-01 RX ADMIN — SODIUM CHLORIDE, PRESERVATIVE FREE 10 ML: 5 INJECTION INTRAVENOUS at 08:52

## 2021-01-01 RX ADMIN — BUDESONIDE AND FORMOTEROL FUMARATE DIHYDRATE 2 PUFF: 160; 4.5 AEROSOL RESPIRATORY (INHALATION) at 08:39

## 2021-01-01 RX ADMIN — ALBUTEROL SULFATE 2.5 MG: 2.5 SOLUTION RESPIRATORY (INHALATION) at 13:39

## 2021-01-01 RX ADMIN — GABAPENTIN 100 MG: 100 CAPSULE ORAL at 21:30

## 2021-01-01 RX ADMIN — SODIUM CHLORIDE: 9 INJECTION, SOLUTION INTRAVENOUS at 16:15

## 2021-01-01 RX ADMIN — HYDROCORTISONE: 0.01 CREAM TOPICAL at 21:04

## 2021-01-01 RX ADMIN — ALBUTEROL SULFATE 2.5 MG: 2.5 SOLUTION RESPIRATORY (INHALATION) at 20:13

## 2021-01-01 RX ADMIN — MIDODRINE HYDROCHLORIDE 5 MG: 5 TABLET ORAL at 10:48

## 2021-01-01 RX ADMIN — DOCUSATE SODIUM 50MG AND SENNOSIDES 8.6MG 2 TABLET: 8.6; 5 TABLET, FILM COATED ORAL at 08:22

## 2021-01-01 RX ADMIN — HYDROCORTISONE: 0.01 CREAM TOPICAL at 08:02

## 2021-01-01 RX ADMIN — GABAPENTIN 100 MG: 100 CAPSULE ORAL at 14:26

## 2021-01-01 RX ADMIN — MAGNESIUM HYDROXIDE 30 ML: 2400 SUSPENSION ORAL at 04:19

## 2021-01-01 RX ADMIN — GUAIFENESIN 600 MG: 600 TABLET ORAL at 08:10

## 2021-01-01 RX ADMIN — MIDODRINE HYDROCHLORIDE 15 MG: 10 TABLET ORAL at 12:30

## 2021-01-01 RX ADMIN — CALCIUM ACETATE 1334 MG: 667 CAPSULE ORAL at 17:06

## 2021-01-01 RX ADMIN — MIDODRINE HYDROCHLORIDE 15 MG: 10 TABLET ORAL at 18:25

## 2021-01-01 RX ADMIN — ONDANSETRON 4 MG: 2 INJECTION INTRAMUSCULAR; INTRAVENOUS at 08:46

## 2021-01-01 RX ADMIN — APIXABAN 5 MG: 5 TABLET, FILM COATED ORAL at 21:47

## 2021-01-01 RX ADMIN — SODIUM CHLORIDE 50 ML: 9 INJECTION, SOLUTION INTRAVENOUS at 01:45

## 2021-01-01 RX ADMIN — ATORVASTATIN CALCIUM 40 MG: 40 TABLET, FILM COATED ORAL at 20:45

## 2021-01-01 RX ADMIN — HYDROCORTISONE: 25 CREAM TOPICAL at 22:48

## 2021-01-01 RX ADMIN — SODIUM CHLORIDE, PRESERVATIVE FREE 10 ML: 5 INJECTION INTRAVENOUS at 20:36

## 2021-01-01 RX ADMIN — DOCUSATE SODIUM 50MG AND SENNOSIDES 8.6MG 2 TABLET: 8.6; 5 TABLET, FILM COATED ORAL at 09:14

## 2021-01-01 RX ADMIN — FENTANYL CITRATE 25 MCG: 0.05 INJECTION, SOLUTION INTRAMUSCULAR; INTRAVENOUS at 14:04

## 2021-01-01 RX ADMIN — DIPHENHYDRAMINE HCL 25 MG: 25 TABLET ORAL at 00:38

## 2021-01-01 RX ADMIN — ATORVASTATIN CALCIUM 40 MG: 40 TABLET, FILM COATED ORAL at 21:19

## 2021-01-01 RX ADMIN — Medication 100 MEQ: at 23:19

## 2021-01-01 RX ADMIN — DOCUSATE SODIUM 50MG AND SENNOSIDES 8.6MG 2 TABLET: 8.6; 5 TABLET, FILM COATED ORAL at 08:41

## 2021-01-01 RX ADMIN — DIPHENHYDRAMINE HCL 25 MG: 25 TABLET ORAL at 15:29

## 2021-01-01 RX ADMIN — ASPIRIN 81 MG: 81 TABLET, COATED ORAL at 08:48

## 2021-01-01 RX ADMIN — ALBUTEROL SULFATE 2.5 MG: 2.5 SOLUTION RESPIRATORY (INHALATION) at 09:23

## 2021-01-01 RX ADMIN — MIDODRINE HYDROCHLORIDE 15 MG: 10 TABLET ORAL at 09:17

## 2021-01-01 RX ADMIN — SODIUM CHLORIDE: 9 INJECTION, SOLUTION INTRAVENOUS at 14:04

## 2021-01-01 RX ADMIN — DIPHENHYDRAMINE HYDROCHLORIDE 25 MG: 25 TABLET ORAL at 05:39

## 2021-01-01 RX ADMIN — MIDODRINE HYDROCHLORIDE 15 MG: 10 TABLET ORAL at 13:18

## 2021-01-01 RX ADMIN — CALCIUM ACETATE 1334 MG: 667 CAPSULE ORAL at 08:22

## 2021-01-01 RX ADMIN — SODIUM CHLORIDE, PRESERVATIVE FREE 10 ML: 5 INJECTION INTRAVENOUS at 20:44

## 2021-01-01 RX ADMIN — GABAPENTIN 100 MG: 100 CAPSULE ORAL at 09:17

## 2021-01-01 RX ADMIN — ATORVASTATIN CALCIUM 40 MG: 40 TABLET, FILM COATED ORAL at 20:46

## 2021-01-01 RX ADMIN — CALCIUM ACETATE 1334 MG: 667 CAPSULE ORAL at 12:45

## 2021-01-01 RX ADMIN — MIDODRINE HYDROCHLORIDE 10 MG: 10 TABLET ORAL at 17:36

## 2021-01-01 RX ADMIN — DEXTROSE MONOHYDRATE: 100 INJECTION, SOLUTION INTRAVENOUS at 05:38

## 2021-01-01 RX ADMIN — CALCIUM ACETATE 1334 MG: 667 CAPSULE ORAL at 17:29

## 2021-01-01 RX ADMIN — TRIAMCINOLONE ACETONIDE: 1 CREAM TOPICAL at 09:46

## 2021-01-01 RX ADMIN — CINACALCET HYDROCHLORIDE 30 MG: 30 TABLET, FILM COATED ORAL at 08:09

## 2021-01-01 RX ADMIN — BUDESONIDE AND FORMOTEROL FUMARATE DIHYDRATE 2 PUFF: 160; 4.5 AEROSOL RESPIRATORY (INHALATION) at 20:21

## 2021-01-01 RX ADMIN — DEXTROSE 1 MG/MIN: 5 SOLUTION INTRAVENOUS at 19:32

## 2021-01-01 RX ADMIN — PROBIOTIC PRODUCT - TAB 1 TABLET: TAB at 08:25

## 2021-01-01 RX ADMIN — BUDESONIDE AND FORMOTEROL FUMARATE DIHYDRATE 2 PUFF: 160; 4.5 AEROSOL RESPIRATORY (INHALATION) at 09:34

## 2021-01-01 RX ADMIN — HYDROCORTISONE: 0.01 CREAM TOPICAL at 20:56

## 2021-01-01 RX ADMIN — SODIUM CHLORIDE: 9 INJECTION, SOLUTION INTRAVENOUS at 02:11

## 2021-01-01 RX ADMIN — BUDESONIDE AND FORMOTEROL FUMARATE DIHYDRATE 2 PUFF: 160; 4.5 AEROSOL RESPIRATORY (INHALATION) at 20:34

## 2021-01-01 RX ADMIN — ATORVASTATIN CALCIUM 40 MG: 40 TABLET, FILM COATED ORAL at 20:04

## 2021-01-01 RX ADMIN — HYDROCORTISONE: 0.01 CREAM TOPICAL at 01:06

## 2021-01-01 RX ADMIN — MIDODRINE HYDROCHLORIDE 15 MG: 10 TABLET ORAL at 20:45

## 2021-01-01 RX ADMIN — POLYETHYLENE GLYCOL 3350 17 G: 17 POWDER, FOR SOLUTION ORAL at 07:38

## 2021-01-01 RX ADMIN — CALCIUM ACETATE 1334 MG: 667 CAPSULE ORAL at 14:09

## 2021-01-01 RX ADMIN — AMIODARONE HYDROCHLORIDE 200 MG: 200 TABLET ORAL at 20:56

## 2021-01-01 RX ADMIN — MIDODRINE HYDROCHLORIDE 15 MG: 10 TABLET ORAL at 17:05

## 2021-01-01 RX ADMIN — HYDROCODONE BITARTRATE AND ACETAMINOPHEN 1 TABLET: 5; 325 TABLET ORAL at 00:36

## 2021-01-01 RX ADMIN — MIDODRINE HYDROCHLORIDE 15 MG: 10 TABLET ORAL at 08:08

## 2021-01-01 RX ADMIN — APIXABAN 5 MG: 5 TABLET, FILM COATED ORAL at 09:31

## 2021-01-01 RX ADMIN — DIPHENHYDRAMINE HYDROCHLORIDE 25 MG: 25 TABLET ORAL at 19:46

## 2021-01-01 RX ADMIN — DEXTROSE MONOHYDRATE 12.5 G: 25 INJECTION, SOLUTION INTRAVENOUS at 15:02

## 2021-01-01 RX ADMIN — ATORVASTATIN CALCIUM 40 MG: 40 TABLET, FILM COATED ORAL at 22:48

## 2021-01-01 RX ADMIN — AMIODARONE HYDROCHLORIDE 200 MG: 200 TABLET ORAL at 09:17

## 2021-01-01 RX ADMIN — SODIUM CHLORIDE, PRESERVATIVE FREE 10 ML: 5 INJECTION INTRAVENOUS at 17:50

## 2021-01-01 RX ADMIN — HEPARIN SODIUM 1100 UNITS: 1000 INJECTION, SOLUTION INTRAVENOUS; SUBCUTANEOUS at 15:52

## 2021-01-01 RX ADMIN — AMIODARONE HYDROCHLORIDE 200 MG: 200 TABLET ORAL at 21:30

## 2021-01-01 RX ADMIN — MIDODRINE HYDROCHLORIDE 10 MG: 10 TABLET ORAL at 17:06

## 2021-01-01 RX ADMIN — FUROSEMIDE 40 MG: 40 TABLET ORAL at 10:48

## 2021-01-01 RX ADMIN — AMIODARONE HYDROCHLORIDE 200 MG: 200 TABLET ORAL at 07:44

## 2021-01-01 RX ADMIN — TRIAMCINOLONE ACETONIDE: 1 CREAM TOPICAL at 22:16

## 2021-01-01 RX ADMIN — SODIUM CHLORIDE, PRESERVATIVE FREE 5 ML: 5 INJECTION INTRAVENOUS at 20:05

## 2021-01-01 RX ADMIN — MIDODRINE HYDROCHLORIDE 5 MG: 5 TABLET ORAL at 17:01

## 2021-01-01 RX ADMIN — GABAPENTIN 100 MG: 100 CAPSULE ORAL at 21:26

## 2021-01-01 RX ADMIN — DOCUSATE SODIUM 50MG AND SENNOSIDES 8.6MG 1 TABLET: 8.6; 5 TABLET, FILM COATED ORAL at 08:18

## 2021-01-01 RX ADMIN — CALCIUM GLUCONATE 1000 MG: 98 INJECTION, SOLUTION INTRAVENOUS at 10:22

## 2021-01-01 RX ADMIN — ALBUMIN (HUMAN) 25 G: 0.25 INJECTION, SOLUTION INTRAVENOUS at 18:25

## 2021-01-01 RX ADMIN — HEPARIN SODIUM 8.5 UNITS/KG/HR: 10000 INJECTION, SOLUTION INTRAVENOUS at 10:57

## 2021-01-01 RX ADMIN — MIDODRINE HYDROCHLORIDE 10 MG: 10 TABLET ORAL at 23:35

## 2021-01-01 RX ADMIN — METOPROLOL TARTRATE 25 MG: 25 TABLET, FILM COATED ORAL at 08:18

## 2021-01-01 RX ADMIN — HYDROCORTISONE: 0.01 CREAM TOPICAL at 07:46

## 2021-01-01 RX ADMIN — EPOETIN ALFA-EPBX 3000 UNITS: 3000 INJECTION, SOLUTION INTRAVENOUS; SUBCUTANEOUS at 10:51

## 2021-01-01 RX ADMIN — GABAPENTIN 100 MG: 100 CAPSULE ORAL at 21:49

## 2021-01-01 RX ADMIN — MIDODRINE HYDROCHLORIDE 15 MG: 10 TABLET ORAL at 16:06

## 2021-01-01 RX ADMIN — MIDODRINE HYDROCHLORIDE 15 MG: 10 TABLET ORAL at 16:09

## 2021-01-01 RX ADMIN — ATORVASTATIN CALCIUM 40 MG: 40 TABLET, FILM COATED ORAL at 21:48

## 2021-01-01 RX ADMIN — PANTOPRAZOLE SODIUM 40 MG: 40 TABLET, DELAYED RELEASE ORAL at 08:51

## 2021-01-01 RX ADMIN — MIDODRINE HYDROCHLORIDE 15 MG: 10 TABLET ORAL at 20:55

## 2021-01-01 RX ADMIN — LORAZEPAM 0.5 MG: 2 INJECTION INTRAMUSCULAR; INTRAVENOUS at 14:04

## 2021-01-01 RX ADMIN — ONDANSETRON 4 MG: 2 INJECTION INTRAMUSCULAR; INTRAVENOUS at 03:01

## 2021-01-01 RX ADMIN — GABAPENTIN 100 MG: 100 CAPSULE ORAL at 13:38

## 2021-01-01 RX ADMIN — AMIODARONE HYDROCHLORIDE 200 MG: 200 TABLET ORAL at 09:14

## 2021-01-01 RX ADMIN — LIDOCAINE HYDROCHLORIDE 50 MG: 20 INJECTION, SOLUTION INFILTRATION; PERINEURAL at 15:38

## 2021-01-01 RX ADMIN — CINACALCET HYDROCHLORIDE 30 MG: 30 TABLET, FILM COATED ORAL at 08:56

## 2021-01-01 RX ADMIN — MIDODRINE HYDROCHLORIDE 15 MG: 10 TABLET ORAL at 16:56

## 2021-01-01 RX ADMIN — DEXTROSE MONOHYDRATE 12.5 G: 25 INJECTION, SOLUTION INTRAVENOUS at 17:08

## 2021-01-01 RX ADMIN — DEXTROSE MONOHYDRATE 12.5 G: 25 INJECTION, SOLUTION INTRAVENOUS at 09:31

## 2021-01-01 RX ADMIN — BUDESONIDE AND FORMOTEROL FUMARATE DIHYDRATE 2 PUFF: 160; 4.5 AEROSOL RESPIRATORY (INHALATION) at 08:08

## 2021-01-01 RX ADMIN — BUDESONIDE AND FORMOTEROL FUMARATE DIHYDRATE 2 PUFF: 160; 4.5 AEROSOL RESPIRATORY (INHALATION) at 08:25

## 2021-01-01 RX ADMIN — BUDESONIDE AND FORMOTEROL FUMARATE DIHYDRATE 2 PUFF: 160; 4.5 AEROSOL RESPIRATORY (INHALATION) at 20:16

## 2021-01-01 RX ADMIN — CALCIUM ACETATE 1334 MG: 667 CAPSULE ORAL at 08:56

## 2021-01-01 RX ADMIN — Medication 2000 ML/HR: at 02:07

## 2021-01-01 RX ADMIN — CINACALCET HYDROCHLORIDE 90 MG: 30 TABLET, FILM COATED ORAL at 22:24

## 2021-01-01 RX ADMIN — CALCIUM ACETATE 1334 MG: 667 CAPSULE ORAL at 13:18

## 2021-01-01 RX ADMIN — GABAPENTIN 100 MG: 100 CAPSULE ORAL at 12:41

## 2021-01-01 RX ADMIN — GABAPENTIN 100 MG: 100 CAPSULE ORAL at 08:49

## 2021-01-01 RX ADMIN — ALBUMIN (HUMAN) 25 G: 12.5 SOLUTION INTRAVENOUS at 19:04

## 2021-01-01 RX ADMIN — CEFEPIME HYDROCHLORIDE 2000 MG: 2 INJECTION, POWDER, FOR SOLUTION INTRAVENOUS at 22:39

## 2021-01-01 RX ADMIN — ALBUTEROL SULFATE 2 PUFF: 90 AEROSOL, METERED RESPIRATORY (INHALATION) at 11:28

## 2021-01-01 RX ADMIN — MIDODRINE HYDROCHLORIDE 15 MG: 10 TABLET ORAL at 16:31

## 2021-01-01 RX ADMIN — CINACALCET HYDROCHLORIDE 90 MG: 30 TABLET, FILM COATED ORAL at 14:10

## 2021-01-01 RX ADMIN — APIXABAN 5 MG: 5 TABLET, FILM COATED ORAL at 20:35

## 2021-01-01 RX ADMIN — SODIUM CHLORIDE, PRESERVATIVE FREE 10 ML: 5 INJECTION INTRAVENOUS at 08:25

## 2021-01-01 RX ADMIN — ATORVASTATIN CALCIUM 40 MG: 40 TABLET, FILM COATED ORAL at 20:02

## 2021-01-01 RX ADMIN — MIDODRINE HYDROCHLORIDE 15 MG: 10 TABLET ORAL at 17:33

## 2021-01-01 RX ADMIN — MIDODRINE HYDROCHLORIDE 5 MG: 10 TABLET ORAL at 10:30

## 2021-01-01 RX ADMIN — HYDROCORTISONE: 0.01 CREAM TOPICAL at 08:48

## 2021-01-01 RX ADMIN — CALCIUM ACETATE 1334 MG: 667 CAPSULE ORAL at 17:30

## 2021-01-01 RX ADMIN — ASPIRIN 81 MG: 81 TABLET, COATED ORAL at 10:48

## 2021-01-01 RX ADMIN — PHENYLEPHRINE HYDROCHLORIDE 300 MCG/MIN: 10 INJECTION INTRAVENOUS at 23:25

## 2021-01-01 RX ADMIN — ATORVASTATIN CALCIUM 40 MG: 40 TABLET, FILM COATED ORAL at 21:02

## 2021-01-01 RX ADMIN — APIXABAN 5 MG: 5 TABLET, FILM COATED ORAL at 09:17

## 2021-01-01 RX ADMIN — Medication 50 MEQ: at 05:13

## 2021-01-01 RX ADMIN — HEPARIN SODIUM 4000 UNITS: 1000 INJECTION INTRAVENOUS; SUBCUTANEOUS at 10:56

## 2021-01-01 RX ADMIN — HEPARIN SODIUM 5000 UNITS: 5000 INJECTION INTRAVENOUS; SUBCUTANEOUS at 01:25

## 2021-01-01 RX ADMIN — APIXABAN 5 MG: 5 TABLET, FILM COATED ORAL at 08:51

## 2021-01-01 RX ADMIN — MIDODRINE HYDROCHLORIDE 10 MG: 10 TABLET ORAL at 08:56

## 2021-01-01 RX ADMIN — SODIUM THIOSULFATE 25 G: 250 INJECTION, SOLUTION INTRAVENOUS at 19:45

## 2021-01-01 RX ADMIN — ASPIRIN 81 MG: 81 TABLET, COATED ORAL at 08:51

## 2021-01-01 RX ADMIN — MAGNESIUM HYDROXIDE 30 ML: 2400 SUSPENSION ORAL at 18:18

## 2021-01-01 RX ADMIN — CALCIUM ACETATE 1334 MG: 667 CAPSULE ORAL at 16:29

## 2021-01-01 RX ADMIN — DOBUTAMINE HYDROCHLORIDE 5 MCG/KG/MIN: 400 INJECTION INTRAVENOUS at 08:48

## 2021-01-01 RX ADMIN — ALBUTEROL SULFATE 2.5 MG: 2.5 SOLUTION RESPIRATORY (INHALATION) at 20:50

## 2021-01-01 RX ADMIN — Medication 50 MEQ: at 11:50

## 2021-01-01 RX ADMIN — BUDESONIDE AND FORMOTEROL FUMARATE DIHYDRATE 2 PUFF: 160; 4.5 AEROSOL RESPIRATORY (INHALATION) at 08:37

## 2021-01-01 RX ADMIN — MIDODRINE HYDROCHLORIDE 10 MG: 10 TABLET ORAL at 17:05

## 2021-01-01 RX ADMIN — MIDODRINE HYDROCHLORIDE 15 MG: 10 TABLET ORAL at 17:27

## 2021-01-01 RX ADMIN — BUDESONIDE AND FORMOTEROL FUMARATE DIHYDRATE 2 PUFF: 160; 4.5 AEROSOL RESPIRATORY (INHALATION) at 09:43

## 2021-01-01 RX ADMIN — VASOPRESSIN 0.03 UNITS/MIN: 20 INJECTION INTRAVENOUS at 03:08

## 2021-01-01 RX ADMIN — MIDODRINE HYDROCHLORIDE 15 MG: 10 TABLET ORAL at 08:40

## 2021-01-01 RX ADMIN — SODIUM CHLORIDE: 9 INJECTION, SOLUTION INTRAVENOUS at 23:57

## 2021-01-01 RX ADMIN — DEXTROSE MONOHYDRATE 12.5 G: 25 INJECTION, SOLUTION INTRAVENOUS at 03:46

## 2021-01-01 RX ADMIN — HYDROCORTISONE: 0.01 CREAM TOPICAL at 21:26

## 2021-01-01 RX ADMIN — DOCUSATE SODIUM 50MG AND SENNOSIDES 8.6MG 2 TABLET: 8.6; 5 TABLET, FILM COATED ORAL at 20:03

## 2021-01-01 RX ADMIN — HYDROCORTISONE: 25 CREAM TOPICAL at 18:29

## 2021-01-01 RX ADMIN — CALCITRIOL 0.25 MCG: 0.25 CAPSULE ORAL at 22:24

## 2021-01-01 RX ADMIN — HYDROCORTISONE: 0.01 CREAM TOPICAL at 21:50

## 2021-01-01 RX ADMIN — MIDODRINE HYDROCHLORIDE 15 MG: 10 TABLET ORAL at 14:58

## 2021-01-01 RX ADMIN — DOCUSATE SODIUM 50MG AND SENNOSIDES 8.6MG 2 TABLET: 8.6; 5 TABLET, FILM COATED ORAL at 08:48

## 2021-01-01 RX ADMIN — APIXABAN 5 MG: 5 TABLET, FILM COATED ORAL at 21:49

## 2021-01-01 RX ADMIN — SODIUM THIOSULFATE 25 G: 250 INJECTION, SOLUTION INTRAVENOUS at 12:37

## 2021-01-01 RX ADMIN — Medication: at 13:08

## 2021-01-01 RX ADMIN — APIXABAN 5 MG: 5 TABLET, FILM COATED ORAL at 21:06

## 2021-01-01 RX ADMIN — VANCOMYCIN HYDROCHLORIDE 2500 MG: 5 INJECTION, POWDER, LYOPHILIZED, FOR SOLUTION INTRAVENOUS at 23:25

## 2021-01-01 RX ADMIN — DEXTROSE MONOHYDRATE 12.5 G: 25 INJECTION, SOLUTION INTRAVENOUS at 15:31

## 2021-01-01 RX ADMIN — PROBIOTIC PRODUCT - TAB 1 TABLET: TAB at 08:18

## 2021-01-01 RX ADMIN — GABAPENTIN 100 MG: 100 CAPSULE ORAL at 12:09

## 2021-01-01 RX ADMIN — SODIUM CHLORIDE: 9 INJECTION, SOLUTION INTRAVENOUS at 16:09

## 2021-01-01 RX ADMIN — CALCIUM ACETATE 1334 MG: 667 CAPSULE ORAL at 17:01

## 2021-01-01 RX ADMIN — CALCIUM GLUCONATE 1000 MG: 98 INJECTION, SOLUTION INTRAVENOUS at 15:35

## 2021-01-01 RX ADMIN — POLYETHYLENE GLYCOL-3350 AND ELECTROLYTES 2000 ML: 236; 6.74; 5.86; 2.97; 22.74 POWDER, FOR SOLUTION ORAL at 06:20

## 2021-01-01 RX ADMIN — TRIAMCINOLONE ACETONIDE: 1 CREAM TOPICAL at 22:49

## 2021-01-01 RX ADMIN — MIDODRINE HYDROCHLORIDE 15 MG: 10 TABLET ORAL at 07:57

## 2021-01-01 RX ADMIN — POLYETHYLENE GLYCOL 3350 17 G: 17 POWDER, FOR SOLUTION ORAL at 08:17

## 2021-01-01 RX ADMIN — INSULIN GLARGINE 15 UNITS: 100 INJECTION, SOLUTION SUBCUTANEOUS at 22:23

## 2021-01-01 RX ADMIN — Medication 2000 ML/HR: at 04:44

## 2021-01-01 RX ADMIN — DEXTROSE MONOHYDRATE 25 G: 25 INJECTION, SOLUTION INTRAVENOUS at 16:14

## 2021-01-01 RX ADMIN — HYDROCORTISONE: 25 CREAM TOPICAL at 09:16

## 2021-01-01 RX ADMIN — PANTOPRAZOLE SODIUM 40 MG: 40 TABLET, DELAYED RELEASE ORAL at 06:29

## 2021-01-01 RX ADMIN — BUDESONIDE AND FORMOTEROL FUMARATE DIHYDRATE 2 PUFF: 160; 4.5 AEROSOL RESPIRATORY (INHALATION) at 09:40

## 2021-01-01 RX ADMIN — SODIUM CHLORIDE, PRESERVATIVE FREE 10 ML: 5 INJECTION INTRAVENOUS at 08:14

## 2021-01-01 RX ADMIN — BUDESONIDE AND FORMOTEROL FUMARATE DIHYDRATE 2 PUFF: 160; 4.5 AEROSOL RESPIRATORY (INHALATION) at 19:42

## 2021-01-01 RX ADMIN — HEPARIN SODIUM 5000 UNITS: 5000 INJECTION INTRAVENOUS; SUBCUTANEOUS at 22:24

## 2021-01-01 RX ADMIN — CALCIUM ACETATE 1334 MG: 667 CAPSULE ORAL at 17:10

## 2021-01-01 RX ADMIN — DEXTROSE MONOHYDRATE 300 MG: 50 INJECTION, SOLUTION INTRAVENOUS at 19:06

## 2021-01-01 RX ADMIN — MIDODRINE HYDROCHLORIDE 15 MG: 10 TABLET ORAL at 16:44

## 2021-01-01 RX ADMIN — Medication: at 06:50

## 2021-01-01 RX ADMIN — DIGOXIN 250 MCG: 250 INJECTION, SOLUTION INTRAMUSCULAR; INTRAVENOUS; PARENTERAL at 08:25

## 2021-01-01 RX ADMIN — CALCIUM ACETATE 1334 MG: 667 CAPSULE ORAL at 17:33

## 2021-01-01 RX ADMIN — ALBUMIN (HUMAN) 50 G: 12.5 INJECTION, SOLUTION INTRAVENOUS at 11:37

## 2021-01-01 RX ADMIN — DEXTROSE 15 G: 15 GEL ORAL at 07:41

## 2021-01-01 RX ADMIN — Medication 55.04 MCG/MIN: at 09:39

## 2021-01-01 RX ADMIN — SENNOSIDES 17.2 MG: 8.6 TABLET, FILM COATED ORAL at 21:19

## 2021-01-01 RX ADMIN — DIPHENHYDRAMINE HCL 25 MG: 25 TABLET ORAL at 22:27

## 2021-01-01 RX ADMIN — CALCIUM ACETATE 1334 MG: 667 CAPSULE ORAL at 08:24

## 2021-01-01 RX ADMIN — BUDESONIDE AND FORMOTEROL FUMARATE DIHYDRATE 2 PUFF: 160; 4.5 AEROSOL RESPIRATORY (INHALATION) at 14:30

## 2021-01-01 RX ADMIN — AMIODARONE HYDROCHLORIDE 200 MG: 200 TABLET ORAL at 21:26

## 2021-01-01 RX ADMIN — PROTHROMBIN, COAGULATION FACTOR VII HUMAN, COAGULATION FACTOR IX HUMAN, COAGULATION FACTOR X HUMAN, PROTEIN C, PROTEIN S HUMAN, AND WATER 5000 UNITS: KIT at 01:45

## 2021-01-01 RX ADMIN — DOCUSATE SODIUM 50MG AND SENNOSIDES 8.6MG 2 TABLET: 8.6; 5 TABLET, FILM COATED ORAL at 21:54

## 2021-01-01 RX ADMIN — AMIODARONE HYDROCHLORIDE 200 MG: 200 TABLET ORAL at 20:02

## 2021-01-01 RX ADMIN — GABAPENTIN 100 MG: 100 CAPSULE ORAL at 14:45

## 2021-01-01 RX ADMIN — Medication 2000 ML/HR: at 20:52

## 2021-01-01 RX ADMIN — PHENYLEPHRINE HYDROCHLORIDE 100 MCG: 10 INJECTION INTRAVENOUS at 15:34

## 2021-01-01 RX ADMIN — Medication 70 MCG/MIN: at 18:11

## 2021-01-01 RX ADMIN — Medication 2000 ML/HR: at 15:26

## 2021-01-01 RX ADMIN — ASPIRIN 81 MG: 81 TABLET, COATED ORAL at 08:41

## 2021-01-01 RX ADMIN — Medication 1 MG/HR: at 23:10

## 2021-01-01 RX ADMIN — HYDROCORTISONE: 0.01 CREAM TOPICAL at 14:10

## 2021-01-01 RX ADMIN — FLUTICASONE PROPIONATE 2 SPRAY: 50 SPRAY, METERED NASAL at 04:22

## 2021-01-01 RX ADMIN — ALBUTEROL SULFATE 2.5 MG: 2.5 SOLUTION RESPIRATORY (INHALATION) at 15:04

## 2021-01-01 RX ADMIN — CALCIUM ACETATE 1334 MG: 667 CAPSULE ORAL at 16:56

## 2021-01-01 RX ADMIN — SODIUM CHLORIDE: 9 INJECTION, SOLUTION INTRAVENOUS at 15:33

## 2021-01-01 RX ADMIN — HYDROCORTISONE: 0.01 CREAM TOPICAL at 08:10

## 2021-01-01 RX ADMIN — PANTOPRAZOLE SODIUM 40 MG: 40 TABLET, DELAYED RELEASE ORAL at 05:39

## 2021-01-01 RX ADMIN — SODIUM CHLORIDE, PRESERVATIVE FREE 10 ML: 5 INJECTION INTRAVENOUS at 09:10

## 2021-01-01 RX ADMIN — CALCIUM ACETATE 1334 MG: 667 CAPSULE ORAL at 08:10

## 2021-01-01 RX ADMIN — GABAPENTIN 100 MG: 100 CAPSULE ORAL at 08:41

## 2021-01-01 RX ADMIN — APIXABAN 5 MG: 5 TABLET, FILM COATED ORAL at 21:02

## 2021-01-01 RX ADMIN — OXYCODONE AND ACETAMINOPHEN 1 TABLET: 5; 325 TABLET ORAL at 18:16

## 2021-01-01 RX ADMIN — MIDODRINE HYDROCHLORIDE 15 MG: 10 TABLET ORAL at 21:47

## 2021-01-01 RX ADMIN — DOCUSATE SODIUM 50MG AND SENNOSIDES 8.6MG 1 TABLET: 8.6; 5 TABLET, FILM COATED ORAL at 07:38

## 2021-01-01 RX ADMIN — HYDROCORTISONE: 0.01 CREAM TOPICAL at 12:16

## 2021-01-01 RX ADMIN — MIDODRINE HYDROCHLORIDE 15 MG: 10 TABLET ORAL at 21:19

## 2021-01-01 RX ADMIN — ATORVASTATIN CALCIUM 40 MG: 40 TABLET, FILM COATED ORAL at 21:03

## 2021-01-01 RX ADMIN — DEXTROSE MONOHYDRATE 25 G: 25 INJECTION, SOLUTION INTRAVENOUS at 11:10

## 2021-01-01 RX ADMIN — GABAPENTIN 100 MG: 100 CAPSULE ORAL at 12:30

## 2021-01-01 RX ADMIN — MIDODRINE HYDROCHLORIDE 10 MG: 10 TABLET ORAL at 12:16

## 2021-01-01 RX ADMIN — PANTOPRAZOLE SODIUM 40 MG: 40 TABLET, DELAYED RELEASE ORAL at 09:21

## 2021-01-01 RX ADMIN — POLYETHYLENE GLYCOL 3350 17 G: 17 POWDER, FOR SOLUTION ORAL at 10:02

## 2021-01-01 RX ADMIN — GABAPENTIN 100 MG: 100 CAPSULE ORAL at 14:58

## 2021-01-01 RX ADMIN — ASPIRIN 81 MG: 81 TABLET, COATED ORAL at 09:09

## 2021-01-01 RX ADMIN — DIPHENHYDRAMINE HYDROCHLORIDE 25 MG: 25 TABLET ORAL at 02:54

## 2021-01-01 RX ADMIN — DOCUSATE SODIUM 50MG AND SENNOSIDES 8.6MG 2 TABLET: 8.6; 5 TABLET, FILM COATED ORAL at 20:35

## 2021-01-01 RX ADMIN — ASPIRIN 81 MG: 81 TABLET, COATED ORAL at 09:14

## 2021-01-01 RX ADMIN — POLYETHYLENE GLYCOL 3350 238 G: 17 POWDER, FOR SOLUTION ORAL at 14:49

## 2021-01-01 RX ADMIN — PANTOPRAZOLE SODIUM 40 MG: 40 INJECTION, POWDER, FOR SOLUTION INTRAVENOUS at 09:13

## 2021-01-01 RX ADMIN — PANTOPRAZOLE SODIUM 40 MG: 40 INJECTION, POWDER, FOR SOLUTION INTRAVENOUS at 08:50

## 2021-01-01 RX ADMIN — MIDODRINE HYDROCHLORIDE 15 MG: 10 TABLET ORAL at 12:09

## 2021-01-01 RX ADMIN — Medication 100 MCG/MIN: at 11:01

## 2021-01-01 RX ADMIN — DOCUSATE SODIUM 50MG AND SENNOSIDES 8.6MG 2 TABLET: 8.6; 5 TABLET, FILM COATED ORAL at 21:05

## 2021-01-01 RX ADMIN — AMIODARONE HYDROCHLORIDE 200 MG: 200 TABLET ORAL at 21:47

## 2021-01-01 RX ADMIN — HYDROXYZINE HYDROCHLORIDE 25 MG: 25 TABLET ORAL at 20:04

## 2021-01-01 RX ADMIN — PROBIOTIC PRODUCT - TAB 1 TABLET: TAB at 09:13

## 2021-01-01 RX ADMIN — SODIUM CHLORIDE, PRESERVATIVE FREE 10 ML: 5 INJECTION INTRAVENOUS at 19:56

## 2021-01-01 RX ADMIN — POTASSIUM CHLORIDE 20 MEQ: 400 INJECTION, SOLUTION INTRAVENOUS at 10:33

## 2021-01-01 RX ADMIN — DIPHENHYDRAMINE HCL 25 MG: 25 TABLET ORAL at 09:05

## 2021-01-01 RX ADMIN — BUDESONIDE AND FORMOTEROL FUMARATE DIHYDRATE 2 PUFF: 160; 4.5 AEROSOL RESPIRATORY (INHALATION) at 21:20

## 2021-01-01 RX ADMIN — SODIUM CHLORIDE, PRESERVATIVE FREE 10 ML: 5 INJECTION INTRAVENOUS at 08:23

## 2021-01-01 RX ADMIN — CINACALCET HYDROCHLORIDE 90 MG: 30 TABLET, FILM COATED ORAL at 09:29

## 2021-01-01 RX ADMIN — PHENYLEPHRINE HYDROCHLORIDE 100 MCG: 10 INJECTION INTRAVENOUS at 15:38

## 2021-01-01 RX ADMIN — APIXABAN 5 MG: 5 TABLET, FILM COATED ORAL at 21:30

## 2021-01-01 RX ADMIN — AMIODARONE HYDROCHLORIDE 200 MG: 200 TABLET ORAL at 21:02

## 2021-01-01 RX ADMIN — PANTOPRAZOLE SODIUM 40 MG: 40 INJECTION, POWDER, FOR SOLUTION INTRAVENOUS at 08:56

## 2021-01-01 RX ADMIN — ATORVASTATIN CALCIUM 40 MG: 40 TABLET, FILM COATED ORAL at 20:55

## 2021-01-01 RX ADMIN — MIDODRINE HYDROCHLORIDE 10 MG: 10 TABLET ORAL at 15:47

## 2021-01-01 RX ADMIN — Medication: at 21:09

## 2021-01-01 RX ADMIN — Medication 12 MCG/MIN: at 18:57

## 2021-01-01 RX ADMIN — CALCIUM GLUCONATE 2000 MG: 98 INJECTION, SOLUTION INTRAVENOUS at 23:31

## 2021-01-01 RX ADMIN — CALCIUM ACETATE 1334 MG: 667 CAPSULE ORAL at 09:14

## 2021-01-01 RX ADMIN — BUDESONIDE AND FORMOTEROL FUMARATE DIHYDRATE 2 PUFF: 160; 4.5 AEROSOL RESPIRATORY (INHALATION) at 09:44

## 2021-01-01 RX ADMIN — MIDODRINE HYDROCHLORIDE 10 MG: 10 TABLET ORAL at 09:48

## 2021-01-01 RX ADMIN — ATORVASTATIN CALCIUM 40 MG: 40 TABLET, FILM COATED ORAL at 20:44

## 2021-01-01 RX ADMIN — CALCIUM ACETATE 1334 MG: 667 CAPSULE ORAL at 17:05

## 2021-01-01 RX ADMIN — DOCUSATE SODIUM 50MG AND SENNOSIDES 8.6MG 1 TABLET: 8.6; 5 TABLET, FILM COATED ORAL at 09:36

## 2021-01-01 RX ADMIN — MIDODRINE HYDROCHLORIDE 15 MG: 10 TABLET ORAL at 11:42

## 2021-01-01 RX ADMIN — ALBUTEROL SULFATE 2.5 MG: 2.5 SOLUTION RESPIRATORY (INHALATION) at 08:00

## 2021-01-01 RX ADMIN — MIDODRINE HYDROCHLORIDE 15 MG: 10 TABLET ORAL at 20:35

## 2021-01-01 RX ADMIN — HEPARIN SODIUM 5000 UNITS: 5000 INJECTION INTRAVENOUS; SUBCUTANEOUS at 06:48

## 2021-01-01 RX ADMIN — BISACODYL 20 MG: 5 TABLET, COATED ORAL at 12:45

## 2021-01-01 RX ADMIN — OXYCODONE AND ACETAMINOPHEN 1 TABLET: 5; 325 TABLET ORAL at 18:39

## 2021-01-01 RX ADMIN — Medication 2 PUFF: at 10:36

## 2021-01-01 RX ADMIN — Medication 85 MCG/MIN: at 04:12

## 2021-01-01 RX ADMIN — HEPARIN SODIUM 10.5 UNITS/KG/HR: 10000 INJECTION, SOLUTION INTRAVENOUS at 08:43

## 2021-01-01 RX ADMIN — HYDROCORTISONE: 0.01 CREAM TOPICAL at 21:12

## 2021-01-01 RX ADMIN — HYDROXYZINE HYDROCHLORIDE 25 MG: 25 TABLET ORAL at 22:40

## 2021-01-01 RX ADMIN — CALCITRIOL 0.25 MCG: 0.25 CAPSULE ORAL at 09:17

## 2021-01-01 RX ADMIN — ONDANSETRON 4 MG: 2 INJECTION INTRAMUSCULAR; INTRAVENOUS at 17:21

## 2021-01-01 RX ADMIN — MAGNESIUM SULFATE 2000 MG: 2 INJECTION INTRAVENOUS at 08:58

## 2021-01-01 RX ADMIN — ENOXAPARIN SODIUM 120 MG: 150 INJECTION SUBCUTANEOUS at 16:31

## 2021-01-01 RX ADMIN — Medication 5 ML: at 04:12

## 2021-01-01 RX ADMIN — ATORVASTATIN CALCIUM 40 MG: 40 TABLET, FILM COATED ORAL at 21:47

## 2021-01-01 RX ADMIN — SODIUM CHLORIDE, PRESERVATIVE FREE 10 ML: 5 INJECTION INTRAVENOUS at 20:04

## 2021-01-01 RX ADMIN — MIDODRINE HYDROCHLORIDE 10 MG: 10 TABLET ORAL at 07:39

## 2021-01-01 RX ADMIN — CALCIUM ACETATE 1334 MG: 667 CAPSULE ORAL at 08:40

## 2021-01-01 RX ADMIN — DOCUSATE SODIUM 50MG AND SENNOSIDES 8.6MG 2 TABLET: 8.6; 5 TABLET, FILM COATED ORAL at 21:30

## 2021-01-01 RX ADMIN — Medication 2000 ML/HR: at 17:47

## 2021-01-01 RX ADMIN — AMIODARONE HYDROCHLORIDE 200 MG: 200 TABLET ORAL at 21:06

## 2021-01-01 RX ADMIN — ONDANSETRON 4 MG: 2 INJECTION INTRAMUSCULAR; INTRAVENOUS at 16:05

## 2021-01-01 RX ADMIN — APIXABAN 5 MG: 5 TABLET, FILM COATED ORAL at 08:49

## 2021-01-01 RX ADMIN — Medication 25 MG: at 15:38

## 2021-01-01 RX ADMIN — AMIODARONE HYDROCHLORIDE 200 MG: 200 TABLET ORAL at 20:46

## 2021-01-01 RX ADMIN — BUDESONIDE AND FORMOTEROL FUMARATE DIHYDRATE 2 PUFF: 160; 4.5 AEROSOL RESPIRATORY (INHALATION) at 21:09

## 2021-01-01 RX ADMIN — HYDROCORTISONE: 0.01 CREAM TOPICAL at 09:11

## 2021-01-01 RX ADMIN — BUDESONIDE AND FORMOTEROL FUMARATE DIHYDRATE 2 PUFF: 160; 4.5 AEROSOL RESPIRATORY (INHALATION) at 21:31

## 2021-01-01 RX ADMIN — CALCIUM ACETATE 1334 MG: 667 CAPSULE ORAL at 17:04

## 2021-01-01 RX ADMIN — DEXTROSE MONOHYDRATE 12.5 G: 25 INJECTION, SOLUTION INTRAVENOUS at 11:25

## 2021-01-01 RX ADMIN — HYDROCODONE BITARTRATE AND ACETAMINOPHEN 1 TABLET: 5; 325 TABLET ORAL at 03:57

## 2021-01-01 RX ADMIN — ATORVASTATIN CALCIUM 40 MG: 40 TABLET, FILM COATED ORAL at 20:35

## 2021-01-01 RX ADMIN — ONDANSETRON 4 MG: 4 TABLET, ORALLY DISINTEGRATING ORAL at 16:34

## 2021-01-01 RX ADMIN — HEPARIN SODIUM 2000 UNITS: 1000 INJECTION INTRAVENOUS; SUBCUTANEOUS at 18:30

## 2021-01-01 RX ADMIN — CALCIUM ACETATE 1334 MG: 667 CAPSULE ORAL at 12:10

## 2021-01-01 RX ADMIN — HYDROCORTISONE: 0.01 CREAM TOPICAL at 08:52

## 2021-01-01 RX ADMIN — ATORVASTATIN CALCIUM 40 MG: 40 TABLET, FILM COATED ORAL at 20:52

## 2021-01-01 RX ADMIN — BUDESONIDE AND FORMOTEROL FUMARATE DIHYDRATE 2 PUFF: 160; 4.5 AEROSOL RESPIRATORY (INHALATION) at 21:03

## 2021-01-01 RX ADMIN — GABAPENTIN 100 MG: 100 CAPSULE ORAL at 08:51

## 2021-01-01 RX ADMIN — MIDODRINE HYDROCHLORIDE 15 MG: 10 TABLET ORAL at 21:11

## 2021-01-01 RX ADMIN — ATORVASTATIN CALCIUM 40 MG: 40 TABLET, FILM COATED ORAL at 21:30

## 2021-01-01 RX ADMIN — Medication: at 10:31

## 2021-01-01 RX ADMIN — MIDODRINE HYDROCHLORIDE 10 MG: 10 TABLET ORAL at 11:55

## 2021-01-01 RX ADMIN — MIDODRINE HYDROCHLORIDE 15 MG: 10 TABLET ORAL at 11:57

## 2021-01-01 RX ADMIN — GABAPENTIN 100 MG: 100 CAPSULE ORAL at 20:45

## 2021-01-01 RX ADMIN — Medication 2000 ML/HR: at 20:00

## 2021-01-01 RX ADMIN — CALCIUM ACETATE 1334 MG: 667 CAPSULE ORAL at 08:51

## 2021-01-01 RX ADMIN — HYDROXYZINE HYDROCHLORIDE 25 MG: 25 TABLET ORAL at 07:58

## 2021-01-01 RX ADMIN — MIDODRINE HYDROCHLORIDE 15 MG: 10 TABLET ORAL at 17:59

## 2021-01-01 RX ADMIN — HYDROCORTISONE: 25 CREAM TOPICAL at 09:45

## 2021-01-01 RX ADMIN — Medication 50 MEQ: at 12:00

## 2021-01-01 RX ADMIN — ASPIRIN 81 MG: 81 TABLET, COATED ORAL at 07:54

## 2021-01-01 RX ADMIN — SODIUM CHLORIDE, PRESERVATIVE FREE 10 ML: 5 INJECTION INTRAVENOUS at 21:47

## 2021-01-01 RX ADMIN — ALPRAZOLAM 0.25 MG: 0.25 TABLET ORAL at 12:40

## 2021-01-01 RX ADMIN — EPINEPHRINE 30 MCG/MIN: 1 INJECTION INTRAMUSCULAR; INTRAVENOUS; SUBCUTANEOUS at 04:44

## 2021-01-01 RX ADMIN — GUAIFENESIN 600 MG: 600 TABLET ORAL at 19:56

## 2021-01-01 RX ADMIN — SODIUM CHLORIDE, PRESERVATIVE FREE 10 ML: 5 INJECTION INTRAVENOUS at 08:56

## 2021-01-01 RX ADMIN — BUDESONIDE AND FORMOTEROL FUMARATE DIHYDRATE 2 PUFF: 160; 4.5 AEROSOL RESPIRATORY (INHALATION) at 09:49

## 2021-01-01 RX ADMIN — GABAPENTIN 100 MG: 100 CAPSULE ORAL at 21:02

## 2021-01-01 RX ADMIN — GABAPENTIN 100 MG: 100 CAPSULE ORAL at 08:07

## 2021-01-01 RX ADMIN — CALCIUM ACETATE 1334 MG: 667 CAPSULE ORAL at 16:44

## 2021-01-01 RX ADMIN — MIDODRINE HYDROCHLORIDE 15 MG: 10 TABLET ORAL at 08:23

## 2021-01-01 RX ADMIN — HYDROCORTISONE: 25 CREAM TOPICAL at 09:37

## 2021-01-01 RX ADMIN — DOCUSATE SODIUM 50MG AND SENNOSIDES 8.6MG 2 TABLET: 8.6; 5 TABLET, FILM COATED ORAL at 08:51

## 2021-01-01 RX ADMIN — GABAPENTIN 100 MG: 100 CAPSULE ORAL at 08:52

## 2021-01-01 RX ADMIN — MIDODRINE HYDROCHLORIDE 15 MG: 10 TABLET ORAL at 13:08

## 2021-01-01 RX ADMIN — MIDODRINE HYDROCHLORIDE 15 MG: 10 TABLET ORAL at 17:01

## 2021-01-01 RX ADMIN — AMIODARONE HYDROCHLORIDE 200 MG: 200 TABLET ORAL at 20:57

## 2021-01-01 RX ADMIN — APIXABAN 5 MG: 5 TABLET, FILM COATED ORAL at 20:55

## 2021-01-01 RX ADMIN — GUAIFENESIN 600 MG: 600 TABLET ORAL at 10:48

## 2021-01-01 RX ADMIN — MIDODRINE HYDROCHLORIDE 15 MG: 10 TABLET ORAL at 11:00

## 2021-01-01 RX ADMIN — DIPHENHYDRAMINE HCL 25 MG: 25 TABLET ORAL at 15:47

## 2021-01-01 RX ADMIN — MIDODRINE HYDROCHLORIDE 15 MG: 10 TABLET ORAL at 21:26

## 2021-01-01 RX ADMIN — SODIUM CHLORIDE 400 ML: 9 INJECTION, SOLUTION INTRAVENOUS at 17:01

## 2021-01-01 RX ADMIN — BUDESONIDE AND FORMOTEROL FUMARATE DIHYDRATE 2 PUFF: 160; 4.5 AEROSOL RESPIRATORY (INHALATION) at 19:24

## 2021-01-01 RX ADMIN — ONDANSETRON 4 MG: 2 INJECTION INTRAMUSCULAR; INTRAVENOUS at 04:12

## 2021-01-01 RX ADMIN — CALCIUM ACETATE 1334 MG: 667 CAPSULE ORAL at 19:34

## 2021-01-01 RX ADMIN — Medication 2000 ML/HR: at 08:21

## 2021-01-01 RX ADMIN — MIDODRINE HYDROCHLORIDE 15 MG: 10 TABLET ORAL at 20:57

## 2021-01-01 RX ADMIN — OXYCODONE AND ACETAMINOPHEN 1 TABLET: 5; 325 TABLET ORAL at 22:16

## 2021-01-01 RX ADMIN — AMIODARONE HYDROCHLORIDE 200 MG: 200 TABLET ORAL at 08:08

## 2021-01-01 RX ADMIN — Medication 50 MEQ: at 23:19

## 2021-01-01 RX ADMIN — GABAPENTIN 100 MG: 100 CAPSULE ORAL at 14:09

## 2021-01-01 RX ADMIN — Medication 85 MCG/MIN: at 01:03

## 2021-01-01 RX ADMIN — DOCUSATE SODIUM 50MG AND SENNOSIDES 8.6MG 1 TABLET: 8.6; 5 TABLET, FILM COATED ORAL at 08:53

## 2021-01-01 RX ADMIN — DOCUSATE SODIUM 50MG AND SENNOSIDES 8.6MG 1 TABLET: 8.6; 5 TABLET, FILM COATED ORAL at 08:56

## 2021-01-01 RX ADMIN — APIXABAN 5 MG: 5 TABLET, FILM COATED ORAL at 08:41

## 2021-01-01 RX ADMIN — DOCUSATE SODIUM 50MG AND SENNOSIDES 8.6MG 2 TABLET: 8.6; 5 TABLET, FILM COATED ORAL at 20:57

## 2021-01-01 RX ADMIN — BUDESONIDE AND FORMOTEROL FUMARATE DIHYDRATE 2 PUFF: 160; 4.5 AEROSOL RESPIRATORY (INHALATION) at 09:27

## 2021-01-01 RX ADMIN — AMIODARONE HYDROCHLORIDE 200 MG: 200 TABLET ORAL at 08:49

## 2021-01-01 RX ADMIN — HYDROCORTISONE: 25 CREAM TOPICAL at 10:44

## 2021-01-01 RX ADMIN — EPINEPHRINE 30 MCG/MIN: 1 INJECTION INTRAMUSCULAR; INTRAVENOUS; SUBCUTANEOUS at 02:07

## 2021-01-01 RX ADMIN — INSULIN HUMAN 10 UNITS: 100 INJECTION, SOLUTION PARENTERAL at 11:10

## 2021-01-01 RX ADMIN — MIDODRINE HYDROCHLORIDE 15 MG: 10 TABLET ORAL at 08:24

## 2021-01-01 RX ADMIN — FENTANYL CITRATE 25 MCG: 50 INJECTION, SOLUTION INTRAMUSCULAR; INTRAVENOUS at 10:55

## 2021-01-01 RX ADMIN — ONDANSETRON 4 MG: 4 TABLET, ORALLY DISINTEGRATING ORAL at 09:30

## 2021-01-01 RX ADMIN — CALCIUM ACETATE 1334 MG: 667 CAPSULE ORAL at 12:40

## 2021-01-01 RX ADMIN — SODIUM CHLORIDE: 9 INJECTION, SOLUTION INTRAVENOUS at 05:02

## 2021-01-01 RX ADMIN — CALCIUM CHLORIDE 1000 MG: 100 INJECTION INTRAVENOUS; INTRAVENTRICULAR at 11:23

## 2021-01-01 RX ADMIN — DOCUSATE SODIUM 50MG AND SENNOSIDES 8.6MG 2 TABLET: 8.6; 5 TABLET, FILM COATED ORAL at 21:47

## 2021-01-01 RX ADMIN — OXYCODONE AND ACETAMINOPHEN 1 TABLET: 5; 325 TABLET ORAL at 22:47

## 2021-01-01 RX ADMIN — ATORVASTATIN CALCIUM 40 MG: 40 TABLET, FILM COATED ORAL at 20:57

## 2021-01-01 RX ADMIN — INSULIN GLARGINE 15 UNITS: 100 INJECTION, SOLUTION SUBCUTANEOUS at 21:03

## 2021-01-01 RX ADMIN — HYDROCORTISONE: 0.01 CREAM TOPICAL at 09:40

## 2021-01-01 RX ADMIN — HYDROCORTISONE: 25 CREAM TOPICAL at 20:45

## 2021-01-01 RX ADMIN — Medication 85 MCG/MIN: at 07:34

## 2021-01-01 RX ADMIN — HYDROCODONE BITARTRATE AND ACETAMINOPHEN 1 TABLET: 5; 325 TABLET ORAL at 02:37

## 2021-01-01 RX ADMIN — Medication 2000 ML/HR: at 23:11

## 2021-01-01 RX ADMIN — GABAPENTIN 100 MG: 100 CAPSULE ORAL at 20:35

## 2021-01-01 RX ADMIN — FUROSEMIDE 80 MG: 10 INJECTION, SOLUTION INTRAMUSCULAR; INTRAVENOUS at 13:18

## 2021-01-01 RX ADMIN — PANTOPRAZOLE SODIUM 40 MG: 40 TABLET, DELAYED RELEASE ORAL at 06:26

## 2021-01-01 RX ADMIN — BUDESONIDE AND FORMOTEROL FUMARATE DIHYDRATE 2 PUFF: 160; 4.5 AEROSOL RESPIRATORY (INHALATION) at 11:28

## 2021-01-01 RX ADMIN — PANTOPRAZOLE SODIUM 40 MG: 40 TABLET, DELAYED RELEASE ORAL at 06:20

## 2021-01-01 RX ADMIN — ASPIRIN 81 MG: 81 TABLET, COATED ORAL at 08:53

## 2021-01-01 RX ADMIN — SODIUM CHLORIDE, PRESERVATIVE FREE 10 ML: 5 INJECTION INTRAVENOUS at 10:02

## 2021-01-01 RX ADMIN — AMIODARONE HYDROCHLORIDE 200 MG: 200 TABLET ORAL at 21:49

## 2021-01-01 RX ADMIN — SODIUM CHLORIDE, PRESERVATIVE FREE 10 ML: 5 INJECTION INTRAVENOUS at 07:38

## 2021-01-01 RX ADMIN — PHENYLEPHRINE HYDROCHLORIDE 300 MCG/MIN: 10 INJECTION INTRAVENOUS at 09:43

## 2021-01-01 RX ADMIN — SODIUM CHLORIDE, PRESERVATIVE FREE 10 ML: 5 INJECTION INTRAVENOUS at 21:19

## 2021-01-01 RX ADMIN — OXYCODONE AND ACETAMINOPHEN 1 TABLET: 5; 325 TABLET ORAL at 23:30

## 2021-01-01 RX ADMIN — BUDESONIDE AND FORMOTEROL FUMARATE DIHYDRATE 2 PUFF: 160; 4.5 AEROSOL RESPIRATORY (INHALATION) at 19:34

## 2021-01-01 RX ADMIN — PANTOPRAZOLE SODIUM 40 MG: 40 TABLET, DELAYED RELEASE ORAL at 06:48

## 2021-01-01 RX ADMIN — SODIUM THIOSULFATE 25 G: 250 INJECTION, SOLUTION INTRAVENOUS at 17:48

## 2021-01-01 RX ADMIN — Medication 2 MCG/MIN: at 21:15

## 2021-01-01 RX ADMIN — MIDODRINE HYDROCHLORIDE 10 MG: 10 TABLET ORAL at 17:04

## 2021-01-01 RX ADMIN — TRIAMCINOLONE ACETONIDE: 1 CREAM TOPICAL at 07:37

## 2021-01-01 RX ADMIN — Medication: at 05:15

## 2021-01-01 RX ADMIN — SODIUM CHLORIDE, PRESERVATIVE FREE 10 ML: 5 INJECTION INTRAVENOUS at 03:46

## 2021-01-01 RX ADMIN — MIDODRINE HYDROCHLORIDE 10 MG: 10 TABLET ORAL at 09:05

## 2021-01-01 RX ADMIN — MIDODRINE HYDROCHLORIDE 15 MG: 10 TABLET ORAL at 08:39

## 2021-01-01 RX ADMIN — CINACALCET HYDROCHLORIDE 30 MG: 30 TABLET, FILM COATED ORAL at 16:59

## 2021-01-01 RX ADMIN — GABAPENTIN 100 MG: 100 CAPSULE ORAL at 08:22

## 2021-01-01 RX ADMIN — Medication 2 PUFF: at 10:21

## 2021-01-01 RX ADMIN — BUDESONIDE AND FORMOTEROL FUMARATE DIHYDRATE 2 PUFF: 160; 4.5 AEROSOL RESPIRATORY (INHALATION) at 20:10

## 2021-01-01 RX ADMIN — MIDODRINE HYDROCHLORIDE 10 MG: 10 TABLET ORAL at 16:29

## 2021-01-01 RX ADMIN — SODIUM CHLORIDE, PRESERVATIVE FREE 10 ML: 5 INJECTION INTRAVENOUS at 08:55

## 2021-01-01 RX ADMIN — CALCIUM ACETATE 1334 MG: 667 CAPSULE ORAL at 21:19

## 2021-01-01 RX ADMIN — DOCUSATE SODIUM 50MG AND SENNOSIDES 8.6MG 1 TABLET: 8.6; 5 TABLET, FILM COATED ORAL at 08:09

## 2021-01-01 RX ADMIN — PANTOPRAZOLE SODIUM 40 MG: 40 TABLET, DELAYED RELEASE ORAL at 07:44

## 2021-01-01 RX ADMIN — CALCIUM ACETATE 1334 MG: 667 CAPSULE ORAL at 13:08

## 2021-01-01 RX ADMIN — MIDODRINE HYDROCHLORIDE 15 MG: 10 TABLET ORAL at 12:45

## 2021-01-01 RX ADMIN — BUDESONIDE AND FORMOTEROL FUMARATE DIHYDRATE 2 PUFF: 160; 4.5 AEROSOL RESPIRATORY (INHALATION) at 20:20

## 2021-01-01 RX ADMIN — DEXTROSE 15 G: 15 GEL ORAL at 02:28

## 2021-01-01 RX ADMIN — Medication 60 MCG/MIN: at 13:59

## 2021-01-01 RX ADMIN — VASOPRESSIN 0.03 UNITS/MIN: 20 INJECTION INTRAVENOUS at 10:15

## 2021-01-01 RX ADMIN — MAGNESIUM SULFATE HEPTAHYDRATE 1000 MG: 1 INJECTION, SOLUTION INTRAVENOUS at 17:49

## 2021-01-01 RX ADMIN — GUAIFENESIN 600 MG: 600 TABLET ORAL at 21:03

## 2021-01-01 RX ADMIN — HYDROCORTISONE: 0.01 CREAM TOPICAL at 09:14

## 2021-01-01 RX ADMIN — SODIUM CHLORIDE, PRESERVATIVE FREE 10 ML: 5 INJECTION INTRAVENOUS at 20:51

## 2021-01-01 RX ADMIN — Medication 2000 ML/HR: at 15:07

## 2021-01-01 RX ADMIN — MIDODRINE HYDROCHLORIDE 15 MG: 10 TABLET ORAL at 08:48

## 2021-01-01 RX ADMIN — DOCUSATE SODIUM 50MG AND SENNOSIDES 8.6MG 2 TABLET: 8.6; 5 TABLET, FILM COATED ORAL at 20:56

## 2021-01-01 RX ADMIN — MIDODRINE HYDROCHLORIDE 5 MG: 5 TABLET ORAL at 14:08

## 2021-01-01 RX ADMIN — POLYETHYLENE GLYCOL 3350 17 G: 17 POWDER, FOR SOLUTION ORAL at 18:20

## 2021-01-01 RX ADMIN — TRIAMCINOLONE ACETONIDE: 1 CREAM TOPICAL at 09:36

## 2021-01-01 RX ADMIN — DEXTROSE MONOHYDRATE 12.5 G: 25 INJECTION, SOLUTION INTRAVENOUS at 07:48

## 2021-01-01 RX ADMIN — CALCITRIOL 0.25 MCG: 0.25 CAPSULE ORAL at 08:40

## 2021-01-01 RX ADMIN — PANTOPRAZOLE SODIUM 40 MG: 40 TABLET, DELAYED RELEASE ORAL at 08:08

## 2021-01-01 RX ADMIN — PANTOPRAZOLE SODIUM 40 MG: 40 TABLET, DELAYED RELEASE ORAL at 06:22

## 2021-01-01 RX ADMIN — ATORVASTATIN CALCIUM 40 MG: 40 TABLET, FILM COATED ORAL at 21:05

## 2021-01-01 RX ADMIN — ALBUMIN (HUMAN) 25 G: 0.25 INJECTION, SOLUTION INTRAVENOUS at 19:04

## 2021-01-01 RX ADMIN — CALCIUM ACETATE 1334 MG: 667 CAPSULE ORAL at 08:18

## 2021-01-01 RX ADMIN — OXYCODONE AND ACETAMINOPHEN 1 TABLET: 5; 325 TABLET ORAL at 11:44

## 2021-01-01 RX ADMIN — MIDODRINE HYDROCHLORIDE 15 MG: 10 TABLET ORAL at 13:25

## 2021-01-01 RX ADMIN — DEXTROSE 15 G: 15 GEL ORAL at 10:17

## 2021-01-01 RX ADMIN — ONDANSETRON 4 MG: 2 INJECTION INTRAMUSCULAR; INTRAVENOUS at 14:58

## 2021-01-01 RX ADMIN — HYDROCORTISONE: 0.01 CREAM TOPICAL at 08:41

## 2021-01-01 RX ADMIN — AMIODARONE HYDROCHLORIDE 200 MG: 200 TABLET ORAL at 08:22

## 2021-01-01 RX ADMIN — GABAPENTIN 100 MG: 100 CAPSULE ORAL at 21:06

## 2021-01-01 RX ADMIN — SODIUM CHLORIDE, PRESERVATIVE FREE 10 ML: 5 INJECTION INTRAVENOUS at 08:18

## 2021-01-01 RX ADMIN — CALCIUM GLUCONATE 1000 MG: 98 INJECTION, SOLUTION INTRAVENOUS at 11:47

## 2021-01-01 RX ADMIN — DOCUSATE SODIUM 50MG AND SENNOSIDES 8.6MG 2 TABLET: 8.6; 5 TABLET, FILM COATED ORAL at 09:17

## 2021-01-01 RX ADMIN — CALCIUM GLUCONATE 1000 MG: 98 INJECTION, SOLUTION INTRAVENOUS at 06:03

## 2021-01-01 RX ADMIN — ASPIRIN 81 MG: 81 TABLET, COATED ORAL at 08:17

## 2021-01-01 RX ADMIN — AMIODARONE HYDROCHLORIDE 200 MG: 200 TABLET ORAL at 20:35

## 2021-01-01 RX ADMIN — AMIODARONE HYDROCHLORIDE 200 MG: 200 TABLET ORAL at 21:19

## 2021-01-01 RX ADMIN — MIDODRINE HYDROCHLORIDE 15 MG: 10 TABLET ORAL at 17:29

## 2021-01-01 RX ADMIN — DOCUSATE SODIUM 50MG AND SENNOSIDES 8.6MG 2 TABLET: 8.6; 5 TABLET, FILM COATED ORAL at 10:44

## 2021-01-01 RX ADMIN — DOCUSATE SODIUM 50MG AND SENNOSIDES 8.6MG 2 TABLET: 8.6; 5 TABLET, FILM COATED ORAL at 07:56

## 2021-01-01 RX ADMIN — DIPHENHYDRAMINE HYDROCHLORIDE 25 MG: 25 TABLET ORAL at 01:44

## 2021-01-01 RX ADMIN — GABAPENTIN 100 MG: 100 CAPSULE ORAL at 09:31

## 2021-01-01 RX ADMIN — MIDODRINE HYDROCHLORIDE 15 MG: 10 TABLET ORAL at 20:02

## 2021-01-01 RX ADMIN — BUDESONIDE AND FORMOTEROL FUMARATE DIHYDRATE 2 PUFF: 160; 4.5 AEROSOL RESPIRATORY (INHALATION) at 19:52

## 2021-01-01 RX ADMIN — METRONIDAZOLE 500 MG: 500 INJECTION, SOLUTION INTRAVENOUS at 08:54

## 2021-01-01 RX ADMIN — CALCIUM ACETATE 1334 MG: 667 CAPSULE ORAL at 09:16

## 2021-01-01 RX ADMIN — PANTOPRAZOLE SODIUM 40 MG: 40 TABLET, DELAYED RELEASE ORAL at 07:00

## 2021-01-01 RX ADMIN — HYDROCORTISONE: 0.01 CREAM TOPICAL at 09:10

## 2021-01-01 RX ADMIN — GABAPENTIN 100 MG: 100 CAPSULE ORAL at 09:14

## 2021-01-01 RX ADMIN — SENNOSIDES 17.2 MG: 8.6 TABLET, FILM COATED ORAL at 22:24

## 2021-01-01 RX ADMIN — DILTIAZEM HYDROCHLORIDE 180 MG: 180 CAPSULE, COATED, EXTENDED RELEASE ORAL at 08:09

## 2021-01-01 RX ADMIN — GABAPENTIN 100 MG: 100 CAPSULE ORAL at 20:53

## 2021-01-01 RX ADMIN — BUDESONIDE AND FORMOTEROL FUMARATE DIHYDRATE 2 PUFF: 160; 4.5 AEROSOL RESPIRATORY (INHALATION) at 08:13

## 2021-01-01 RX ADMIN — MIDODRINE HYDROCHLORIDE 15 MG: 10 TABLET ORAL at 00:42

## 2021-01-01 RX ADMIN — DOCUSATE SODIUM 50MG AND SENNOSIDES 8.6MG 1 TABLET: 8.6; 5 TABLET, FILM COATED ORAL at 09:13

## 2021-01-01 RX ADMIN — APIXABAN 5 MG: 5 TABLET, FILM COATED ORAL at 09:15

## 2021-01-01 RX ADMIN — DOCUSATE SODIUM 50MG AND SENNOSIDES 8.6MG 2 TABLET: 8.6; 5 TABLET, FILM COATED ORAL at 11:43

## 2021-01-01 RX ADMIN — POTASSIUM CHLORIDE 20 MEQ: 400 INJECTION, SOLUTION INTRAVENOUS at 06:12

## 2021-01-01 RX ADMIN — MIDODRINE HYDROCHLORIDE 15 MG: 10 TABLET ORAL at 08:18

## 2021-01-01 RX ADMIN — SODIUM CHLORIDE: 9 INJECTION, SOLUTION INTRAVENOUS at 11:01

## 2021-01-01 RX ADMIN — GUAIFENESIN 600 MG: 600 TABLET ORAL at 21:27

## 2021-01-01 RX ADMIN — DIPHENHYDRAMINE HCL 25 MG: 25 TABLET ORAL at 19:30

## 2021-01-01 RX ADMIN — Medication 100 MCG/MIN: at 06:50

## 2021-01-01 RX ADMIN — ATORVASTATIN CALCIUM 40 MG: 40 TABLET, FILM COATED ORAL at 19:56

## 2021-01-01 RX ADMIN — Medication 100 MCG/MIN: at 13:49

## 2021-01-01 RX ADMIN — FUROSEMIDE 40 MG: 40 TABLET ORAL at 08:10

## 2021-01-01 RX ADMIN — POLYETHYLENE GLYCOL-3350 AND ELECTROLYTES 2000 ML: 236; 6.74; 5.86; 2.97; 22.74 POWDER, FOR SOLUTION ORAL at 16:15

## 2021-01-01 RX ADMIN — TRIAMCINOLONE ACETONIDE: 1 CREAM TOPICAL at 13:23

## 2021-01-01 RX ADMIN — GUAIFENESIN 600 MG: 600 TABLET ORAL at 10:54

## 2021-01-01 ASSESSMENT — PAIN SCALES - GENERAL
PAINLEVEL_OUTOF10: 9
PAINLEVEL_OUTOF10: 0
PAINLEVEL_OUTOF10: 6
PAINLEVEL_OUTOF10: 4
PAINLEVEL_OUTOF10: 0
PAINLEVEL_OUTOF10: 4
PAINLEVEL_OUTOF10: 9
PAINLEVEL_OUTOF10: 0
PAINLEVEL_OUTOF10: 4
PAINLEVEL_OUTOF10: 9
PAINLEVEL_OUTOF10: 9
PAINLEVEL_OUTOF10: 0
PAINLEVEL_OUTOF10: 7
PAINLEVEL_OUTOF10: 0
PAINLEVEL_OUTOF10: 0
PAINLEVEL_OUTOF10: 7
PAINLEVEL_OUTOF10: 0
PAINLEVEL_OUTOF10: 7
PAINLEVEL_OUTOF10: 7
PAINLEVEL_OUTOF10: 0
PAINLEVEL_OUTOF10: 8
PAINLEVEL_OUTOF10: 7
PAINLEVEL_OUTOF10: 9
PAINLEVEL_OUTOF10: 9
PAINLEVEL_OUTOF10: 0
PAINLEVEL_OUTOF10: 0
PAINLEVEL_OUTOF10: 7
PAINLEVEL_OUTOF10: 0
PAINLEVEL_OUTOF10: 0
PAINLEVEL_OUTOF10: 2
PAINLEVEL_OUTOF10: 9
PAINLEVEL_OUTOF10: 0
PAINLEVEL_OUTOF10: 0
PAINLEVEL_OUTOF10: 6
PAINLEVEL_OUTOF10: 0
PAINLEVEL_OUTOF10: 8
PAINLEVEL_OUTOF10: 0
PAINLEVEL_OUTOF10: 5
PAINLEVEL_OUTOF10: 0
PAINLEVEL_OUTOF10: 6
PAINLEVEL_OUTOF10: 0
PAINLEVEL_OUTOF10: 0
PAINLEVEL_OUTOF10: 6
PAINLEVEL_OUTOF10: 0
PAINLEVEL_OUTOF10: 9
PAINLEVEL_OUTOF10: 0
PAINLEVEL_OUTOF10: 0
PAINLEVEL_OUTOF10: 8
PAINLEVEL_OUTOF10: 0
PAINLEVEL_OUTOF10: 8
PAINLEVEL_OUTOF10: 0
PAINLEVEL_OUTOF10: 8
PAINLEVEL_OUTOF10: 0
PAINLEVEL_OUTOF10: 4
PAINLEVEL_OUTOF10: 0
PAINLEVEL_OUTOF10: 8
PAINLEVEL_OUTOF10: 7
PAINLEVEL_OUTOF10: 0
PAINLEVEL_OUTOF10: 8
PAINLEVEL_OUTOF10: 0

## 2021-01-01 ASSESSMENT — PAIN - FUNCTIONAL ASSESSMENT
PAIN_FUNCTIONAL_ASSESSMENT: PREVENTS OR INTERFERES SOME ACTIVE ACTIVITIES AND ADLS
PAIN_FUNCTIONAL_ASSESSMENT: PREVENTS OR INTERFERES SOME ACTIVE ACTIVITIES AND ADLS
PAIN_FUNCTIONAL_ASSESSMENT: ACTIVITIES ARE NOT PREVENTED
PAIN_FUNCTIONAL_ASSESSMENT: PREVENTS OR INTERFERES SOME ACTIVE ACTIVITIES AND ADLS
PAIN_FUNCTIONAL_ASSESSMENT: PREVENTS OR INTERFERES SOME ACTIVE ACTIVITIES AND ADLS
PAIN_FUNCTIONAL_ASSESSMENT: ACTIVITIES ARE NOT PREVENTED
PAIN_FUNCTIONAL_ASSESSMENT: PREVENTS OR INTERFERES SOME ACTIVE ACTIVITIES AND ADLS
PAIN_FUNCTIONAL_ASSESSMENT: ACTIVITIES ARE NOT PREVENTED
PAIN_FUNCTIONAL_ASSESSMENT: ACTIVITIES ARE NOT PREVENTED
PAIN_FUNCTIONAL_ASSESSMENT: PREVENTS OR INTERFERES WITH MANY ACTIVE NOT PASSIVE ACTIVITIES
PAIN_FUNCTIONAL_ASSESSMENT: PREVENTS OR INTERFERES SOME ACTIVE ACTIVITIES AND ADLS
PAIN_FUNCTIONAL_ASSESSMENT: ACTIVITIES ARE NOT PREVENTED

## 2021-01-01 ASSESSMENT — PAIN DESCRIPTION - PAIN TYPE
TYPE: CHRONIC PAIN
TYPE: ACUTE PAIN
TYPE: ACUTE PAIN
TYPE: CHRONIC PAIN
TYPE: ACUTE PAIN
TYPE: CHRONIC PAIN
TYPE: ACUTE PAIN
TYPE: CHRONIC PAIN
TYPE: ACUTE PAIN
TYPE: CHRONIC PAIN
TYPE: ACUTE PAIN

## 2021-01-01 ASSESSMENT — PAIN DESCRIPTION - DESCRIPTORS
DESCRIPTORS: ACHING
DESCRIPTORS: ACHING;PRESSURE
DESCRIPTORS: ACHING;DULL
DESCRIPTORS: DISCOMFORT
DESCRIPTORS: ACHING;PRESSURE
DESCRIPTORS: DISCOMFORT
DESCRIPTORS: DISCOMFORT
DESCRIPTORS: ACHING
DESCRIPTORS: ACHING;DISCOMFORT
DESCRIPTORS: THROBBING
DESCRIPTORS: ACHING;DISCOMFORT
DESCRIPTORS: THROBBING
DESCRIPTORS: ACHING
DESCRIPTORS: DISCOMFORT
DESCRIPTORS: DISCOMFORT

## 2021-01-01 ASSESSMENT — ENCOUNTER SYMPTOMS
SINUS PRESSURE: 0
CONSTIPATION: 0
SHORTNESS OF BREATH: 1
BACK PAIN: 0
BLOOD IN STOOL: 0
SHORTNESS OF BREATH: 0
COLOR CHANGE: 0
COUGH: 0
ABDOMINAL PAIN: 1
ABDOMINAL PAIN: 1
EYE DISCHARGE: 0
EYE REDNESS: 0
SHORTNESS OF BREATH: 1
RHINORRHEA: 0
VOMITING: 0
COUGH: 0
COUGH: 0
SINUS PRESSURE: 0
COLOR CHANGE: 0
SINUS PAIN: 0
ABDOMINAL PAIN: 0
SHORTNESS OF BREATH: 0
FACIAL SWELLING: 0
SHORTNESS OF BREATH: 0
SHORTNESS OF BREATH: 0
COLOR CHANGE: 0
NAUSEA: 0
DIARRHEA: 0
SINUS PRESSURE: 0
VOMITING: 0
NAUSEA: 0
SINUS PAIN: 0
ABDOMINAL PAIN: 0
WHEEZING: 0
WHEEZING: 0
CONSTIPATION: 0
DIARRHEA: 0
COUGH: 0
VOMITING: 0
ABDOMINAL DISTENTION: 1
DIARRHEA: 0
NAUSEA: 0
NAUSEA: 1
CONSTIPATION: 0
DIARRHEA: 0
SORE THROAT: 0
VOMITING: 0
CONSTIPATION: 0
PHOTOPHOBIA: 0
WHEEZING: 0
CHEST TIGHTNESS: 1

## 2021-01-01 ASSESSMENT — PAIN DESCRIPTION - FREQUENCY
FREQUENCY: CONTINUOUS
FREQUENCY: INTERMITTENT
FREQUENCY: CONTINUOUS

## 2021-01-01 ASSESSMENT — PULMONARY FUNCTION TESTS
PIF_VALUE: 3
PIF_VALUE: 3
PIF_VALUE: 22
PIF_VALUE: 1
PIF_VALUE: 0
PIF_VALUE: 38
PIF_VALUE: 0
PIF_VALUE: 1
PIF_VALUE: 0
PIF_VALUE: 22
PIF_VALUE: 1
PIF_VALUE: 3
PIF_VALUE: 40
PIF_VALUE: 1
PIF_VALUE: 48
PIF_VALUE: 1
PIF_VALUE: 25
PIF_VALUE: 1
PIF_VALUE: 44
PIF_VALUE: 14
PIF_VALUE: 11
PIF_VALUE: 2

## 2021-01-01 ASSESSMENT — PAIN SCALES - WONG BAKER

## 2021-01-01 ASSESSMENT — PAIN DESCRIPTION - PROGRESSION
CLINICAL_PROGRESSION: NOT CHANGED
CLINICAL_PROGRESSION: GRADUALLY WORSENING
CLINICAL_PROGRESSION: NOT CHANGED
CLINICAL_PROGRESSION: GRADUALLY WORSENING
CLINICAL_PROGRESSION: NOT CHANGED
CLINICAL_PROGRESSION: GRADUALLY WORSENING
CLINICAL_PROGRESSION: NOT CHANGED
CLINICAL_PROGRESSION: GRADUALLY WORSENING
CLINICAL_PROGRESSION: NOT CHANGED

## 2021-01-01 ASSESSMENT — PAIN DESCRIPTION - ORIENTATION
ORIENTATION: UPPER
ORIENTATION: RIGHT
ORIENTATION: LOWER
ORIENTATION: RIGHT
ORIENTATION: UPPER;ANTERIOR
ORIENTATION: UPPER
ORIENTATION: ANTERIOR;UPPER
ORIENTATION: UPPER
ORIENTATION: UPPER
ORIENTATION: RIGHT
ORIENTATION: UPPER
ORIENTATION: RIGHT

## 2021-01-01 ASSESSMENT — PAIN DESCRIPTION - LOCATION
LOCATION: ABDOMEN
LOCATION: ABDOMEN
LOCATION: BACK
LOCATION: ABDOMEN;CHEST
LOCATION: ABDOMEN
LOCATION: ABDOMEN
LOCATION: BACK;LEG
LOCATION: BACK;LEG
LOCATION: CHEST
LOCATION: ABDOMEN
LOCATION: BACK;LEG
LOCATION: BACK;LEG
LOCATION: ABDOMEN
LOCATION: LEG;BACK
LOCATION: ABDOMEN

## 2021-01-01 ASSESSMENT — PAIN DESCRIPTION - ONSET
ONSET: ON-GOING

## 2021-01-01 ASSESSMENT — COPD QUESTIONNAIRES: CAT_SEVERITY: SEVERE

## 2021-01-01 ASSESSMENT — LIFESTYLE VARIABLES: SMOKING_STATUS: 0

## 2021-01-01 ASSESSMENT — PATIENT HEALTH QUESTIONNAIRE - PHQ9
1. LITTLE INTEREST OR PLEASURE IN DOING THINGS: 0
SUM OF ALL RESPONSES TO PHQ QUESTIONS 1-9: 0
2. FEELING DOWN, DEPRESSED OR HOPELESS: 0
SUM OF ALL RESPONSES TO PHQ QUESTIONS 1-9: 0

## 2021-01-26 NOTE — TELEPHONE ENCOUNTER
Patient is karyn 2/2/21         Please address the medication refill and close the encounter. If I can be of assistance, please route to the applicable pool. Thank you. Last visit: 05/27/2020  Last Med refill: 9/30/20  Does patient have enough medication for 72 hours: No:     Next Visit Date:  No future appointments. Health Maintenance   Topic Date Due    Hepatitis C screen  1970    Diabetic retinal exam  02/25/1980    DTaP/Tdap/Td vaccine (1 - Tdap) 02/25/1989    Cervical cancer screen  09/05/2017    Annual Wellness Visit (AWV)  06/23/2019    Breast cancer screen  02/25/2020    Colon Cancer Screen FIT/FOBT  02/25/2020    Lipid screen  07/02/2020    Potassium monitoring  07/02/2020    Creatinine monitoring  07/02/2020    A1C test (Diabetic or Prediabetic)  08/19/2020    Flu vaccine (1) 09/01/2020    Hepatitis B vaccine (1 of 3 - Risk Recombivax 3-dose series) 05/27/2021 (Originally 2/25/1989)    Shingles Vaccine (1 of 2) 05/27/2021 (Originally 2/25/2020)    Pneumococcal 0-64 years Vaccine (3 of 3 - PPSV23) 05/27/2021 (Originally 10/3/2018)    Diabetic foot exam  05/27/2021    HIV screen  Completed    Hepatitis A vaccine  Aged Out    Hib vaccine  Aged Out    Meningococcal (ACWY) vaccine  Aged Out       Hemoglobin A1C (%)   Date Value   08/19/2019 4.6   03/22/2018 5.0   01/12/2017 5.6             ( goal A1C is < 7)   Microalb/Crt.  Ratio (mcg/mg creat)   Date Value   04/29/2014 1,828     LDL Cholesterol (mg/dL)   Date Value   07/02/2019 33   12/09/2015 65       (goal LDL is <100)   AST (U/L)   Date Value   04/13/2017 24     ALT (U/L)   Date Value   04/13/2017 33     BUN (mg/dL)   Date Value   07/02/2019 57 (H)     BP Readings from Last 3 Encounters:   05/27/20 130/80   08/19/19 (!) 140/76   07/02/19 (!) 142/80          (goal 120/80)    All Future Testing planned in [facility]  Lab Frequency Next Occurrence   KRISHNA Digital Screen Bilateral [[id number]] Once 05/27/2021   CBC With Auto Differential Once 05/27/2021               Patient Active Problem List:     Asthma     YONI (obstructive sleep apnea)     Left knee pain     Hidradenitis     Current smoker     Microalbuminuria     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 kidney disease) stage 4, GFR 15-29 ml/min (HCC)     Acute diastolic congestive heart failure (HCC)     Hyperkalemia     Acute systolic congestive heart failure (HCC)     Pulmonary hypertension (HCC)     Morbid obesity with BMI of 45.0-49.9, adult ([address]     Acute on chronic respiratory failure with hypoxia (HCC)     COPD exacerbation (HCC)     Asthma exacerbation     Type 2 diabetes mellitus with diabetic nephropathy (HCC)     ESRD (end stage renal disease) on dialysis ([address]     Bronchitis     Essential hypertension     YONI on CPAP     Hyperglycemia, drug-induced     Needs smoking cessation education     Hyperglycemia     Drowsiness     Acute on chronic respiratory failure with hypercapnia ([address]     Mobility impaired     S/P cardiac cath-mINIMAL caD 3/15/16 - dR. 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

## 2021-02-15 NOTE — TELEPHONE ENCOUNTER
E-scribe request for Atorvastatin. Please review and e-scribe if applicable. Next Visit Date:  2/16/2021    Health Maintenance   Topic Date Due    Hepatitis C screen  1970    Diabetic retinal exam  02/25/1980    COVID-19 Vaccine (1 of 2) 02/25/1986    DTaP/Tdap/Td vaccine (1 - Tdap) 02/25/1989    Cervical cancer screen  09/05/2017    Annual Wellness Visit (AWV)  06/23/2019    Breast cancer screen  02/25/2020    Colon Cancer Screen FIT/FOBT  02/25/2020    Lipid screen  07/02/2020    Potassium monitoring  07/02/2020    Creatinine monitoring  07/02/2020    A1C test (Diabetic or Prediabetic)  08/19/2020    Flu vaccine (1) 09/01/2020    Hepatitis B vaccine (1 of 3 - Risk Recombivax 3-dose series) 05/27/2021 (Originally 2/25/1989)    Shingles Vaccine (1 of 2) 05/27/2021 (Originally 2/25/2020)    Pneumococcal 0-64 years Vaccine (3 of 3 - PPSV23) 05/27/2021 (Originally 10/3/2018)    Diabetic foot exam  05/27/2021    HIV screen  Completed    Hepatitis A vaccine  Aged Out    Hib vaccine  Aged Out    Meningococcal (ACWY) vaccine  Aged Out             (applicable per patient's age: Cancer Screenings, Depression Screening, Fall Risk Screening, Immunizations)    Hemoglobin A1C (%)   Date Value   08/19/2019 4.6   03/22/2018 5.0   01/12/2017 5.6     Microalb/Crt.  Ratio (mcg/mg creat)   Date Value   04/29/2014 1,828     LDL Cholesterol (mg/dL)   Date Value   07/02/2019 33     AST (U/L)   Date Value   04/13/2017 24     ALT (U/L)   Date Value   04/13/2017 33     BUN (mg/dL)   Date Value   07/02/2019 57 (H)      (goal A1C is < 7)   (goal LDL is <100) need 30-50% reduction from baseline     BP Readings from Last 3 Encounters:   05/27/20 130/80   08/19/19 (!) 140/76   07/02/19 (!) 142/80    (goal /80)      All Future Testing planned in [facility]:  Lab Frequency Next Occurrence   KRISHNA Digital Screen Bilateral [[id number]] Once 05/27/2021   CBC With Auto Differential Once 05/27/2021       Next Visit Date:  Future Appointments   Date Time Provider Micha Farrar   2/16/2021 10:00 AM Rossy Lagos MD [facility]            Patient Active Problem List:     Asthma     YONI (obstructive sleep apnea)     Left knee pain     Hidradenitis     Current smoker     Microalbuminuria     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 kidney disease) stage 4, GFR 15-29 ml/min (HCC)     Acute diastolic congestive heart failure (HCC)     Hyperkalemia     Acute systolic congestive heart failure ([address]     Pulmonary hypertension ([address]     Morbid obesity with BMI of 45.0-49.9, adult ([address]     Acute on chronic respiratory failure with hypoxia (HCC)     COPD exacerbation (HCC)     Asthma exacerbation     Type 2 diabetes mellitus with diabetic nephropathy (HCC)     ESRD (end stage renal disease) on dialysis ([address]     Bronchitis     Essential hypertension     YONI on CPAP     Hyperglycemia, drug-induced     Needs smoking cessation education     Hyperglycemia     Drowsiness     Acute on chronic respiratory failure with hypercapnia ([address]     Mobility impaired     S/P cardiac cath-mINIMAL caD 3/15/16 - dR. 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

## 2021-02-17 NOTE — TELEPHONE ENCOUNTER
Pt called to check status of prescription request. States she has been out of medication for 4 days. Requested cb with update.

## 2021-02-22 NOTE — TELEPHONE ENCOUNTER
E-scribe request for atorvastatin. Please review and e-scribe if applicable. Last Visit Date: 05/27/2020  Next Visit Date:  Visit date not found    Hemoglobin A1C (%)   Date Value   08/19/2019 4.6   03/22/2018 5.0   01/12/2017 5.6             ( goal A1C is < 7)   Microalb/Crt. Ratio (mcg/mg creat)   Date Value   04/29/2014 1,828     LDL Cholesterol (mg/dL)   Date Value   07/02/2019 33       (goal LDL is <100)   AST (U/L)   Date Value   04/13/2017 24     ALT (U/L)   Date Value   04/13/2017 33     BUN (mg/dL)   Date Value   07/02/2019 57 (H)     BP Readings from Last 3 Encounters:   05/27/20 130/80   08/19/19 (!) 140/76   07/02/19 (!) 142/80          (goal 120/80)        Patient Active Problem List:     Asthma     YONI (obstructive sleep apnea)     Left knee pain     Hidradenitis     Current smoker     Microalbuminuria     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 kidney disease) stage 4, GFR 15-29 ml/min (HCC)     Acute diastolic congestive heart failure (HCC)     Hyperkalemia     Acute systolic congestive heart failure (HCC)     Pulmonary hypertension (HCC)     Morbid obesity with BMI of 45.0-49.9, adult ([address]     Acute on chronic respiratory failure with hypoxia (HCC)     COPD exacerbation (HCC)     Asthma exacerbation     Type 2 diabetes mellitus with diabetic nephropathy (HCC)     ESRD (end stage renal disease) on dialysis ([address]     Bronchitis     Essential hypertension     YONI on CPAP     Hyperglycemia, drug-induced     Needs smoking cessation education     Hyperglycemia     Drowsiness     Acute on chronic respiratory failure with hypercapnia ([address]     Mobility impaired     S/P cardiac cath-mINIMAL caD 3/15/16 - dR. 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)     Type 2 diabetes mellitus without complication (HCC)     Congestive heart failure (HCC)     Asthma with COPD with exacerbation (HCC)     Acute exacerbation of CHF (congestive heart failure) (HCC)     Chronic obstructive pulmonary disease (HCC)     Chronic kidney disease, stage V (HCC)     Acute respiratory acidosis     Precordial pain     Gastroesophageal reflux disease without esophagitis     Pulmonary HTN (HCC)     Morbid obesity due to excess calories (HCC)     Symptomatic anemia     Elevated serum creatinine     ESRD needing dialysis ([facility]

## 2021-02-22 NOTE — TELEPHONE ENCOUNTER
E-scribe request for . Please review and e-scribe if applicable. Last Visit Date:    Next Visit Date:  Visit date not found    Hemoglobin A1C (%)   Date Value   08/19/2019 4.6   03/22/2018 5.0   01/12/2017 5.6             ( goal A1C is < 7)   Microalb/Crt. Ratio (mcg/mg creat)   Date Value   04/29/2014 1,828     LDL Cholesterol (mg/dL)   Date Value   07/02/2019 33       (goal LDL is <100)   AST (U/L)   Date Value   04/13/2017 24     ALT (U/L)   Date Value   04/13/2017 33     BUN (mg/dL)   Date Value   07/02/2019 57 (H)     BP Readings from Last 3 Encounters:   05/27/20 130/80   08/19/19 (!) 140/76   07/02/19 (!) 142/80          (goal 120/80)        Patient Active Problem List:     Asthma     YONI (obstructive sleep apnea)     Left knee pain     Hidradenitis     Current smoker     Microalbuminuria     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 kidney disease) stage 4, GFR 15-29 ml/min (HCC)     Acute diastolic congestive heart failure (HCC)     Hyperkalemia     Acute systolic congestive heart failure (HCC)     Pulmonary hypertension (HCC)     Morbid obesity with BMI of 45.0-49.9, adult ([address]     Acute on chronic respiratory failure with hypoxia (HCC)     COPD exacerbation (HCC)     Asthma exacerbation     Type 2 diabetes mellitus with diabetic nephropathy (HCC)     ESRD (end stage renal disease) on dialysis ([address]     Bronchitis     Essential hypertension     YONI on CPAP     Hyperglycemia, drug-induced     Needs smoking cessation education     Hyperglycemia     Drowsiness     Acute on chronic respiratory failure with hypercapnia ([address]     Mobility impaired     S/P cardiac cath-mINIMAL caD 3/15/16 - dR. 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)     Type 2 diabetes mellitus without complication (HCC)     Congestive heart failure (HCC)     Asthma with COPD with exacerbation (HCC)     Acute exacerbation of CHF (congestive heart failure) ([facility]     Chronic obstructive pulmonary disease (HCC)     Chronic kidney disease, stage V (HCC)     Acute respiratory acidosis     Precordial pain     Gastroesophageal reflux disease without esophagitis     P[facility] HTN ([facility]     Morbid obesity due to excess calories (HCC)     Symptomatic anemia     Elevated serum creatinine     ESRD needing dialysis ([facility]      ----Cecil Jimenez

## 2021-03-15 NOTE — TELEPHONE ENCOUNTER
Lancets pending for refill       Health Maintenance   Topic Date Due    Hepatitis C screen  Never done    Diabetic retinal exam  Never done    COVID-19 Vaccine (1) Never done    DTaP/Tdap/Td vaccine (1 - Tdap) Never done    Cervical cancer screen  09/05/2017    Annual Wellness Visit (AWV)  Never done    Breast cancer screen  02/25/2020    Colon Cancer Screen FIT/FOBT  02/25/2020    Lipid screen  07/02/2020    Potassium monitoring  07/02/2020    Creatinine monitoring  07/02/2020    A1C test (Diabetic or Prediabetic)  08/19/2020    Flu vaccine (1) Never done    Hepatitis B vaccine (1 of 3 - Risk Recombivax 3-dose series) 05/27/2021 (Originally 2/25/1989)    Shingles Vaccine (1 of 2) 05/27/2021 (Originally 2/25/2020)    Pneumococcal 0-64 years Vaccine (3 of 3 - PPSV23) 05/27/2021 (Originally 10/3/2018)    Diabetic foot exam  05/27/2021    HIV screen  Completed    Hepatitis A vaccine  Aged Out    Hib vaccine  Aged Out    Meningococcal (ACWY) vaccine  Aged Out             (applicable per patient's age: Cancer Screenings, Depression Screening, Fall Risk Screening, Immunizations)    Hemoglobin A1C (%)   Date Value   08/19/2019 4.6   03/22/2018 5.0   01/12/2017 5.6     Microalb/Crt. Ratio (mcg/mg creat)   Date Value   04/29/2014 1,828     LDL Cholesterol (mg/dL)   Date Value   07/02/2019 33     AST (U/L)   Date Value   04/13/2017 24     ALT (U/L)   Date Value   04/13/2017 33     BUN (mg/dL)   Date Value   07/02/2019 57 (H)      (goal A1C is < 7)   (goal LDL is <100) need 30-50% reduction from baseline     BP Readings from Last 3 Encounters:   05/27/20 130/80   08/19/19 (!) 140/76   07/02/19 (!) 142/80    (goal /80)      All Future Testing planned in [facility]:  Lab Frequency Next Occurrence   KRISHNA Digital Screen Bilateral [[id number]] Once 05/27/2021   CBC With Auto Differential Once 05/27/2021       Next Visit Date:  No future appointments.          Patient Active Problem List:     Asthma     YONI

## 2021-04-14 NOTE — TELEPHONE ENCOUNTER
E-scribe request for lisinopril, diltiazem. Please review and e-scribe if applicable. Last Visit Date: 5/27/2020  Next Visit Date:  Visit date not found    Hemoglobin A1C (%)   Date Value   08/19/2019 4.6   03/22/2018 5.0   01/12/2017 5.6             ( goal A1C is < 7)   Microalb/Crt. Ratio (mcg/mg creat)   Date Value   04/29/2014 1,828     LDL Cholesterol (mg/dL)   Date Value   07/02/2019 33       (goal LDL is <100)   AST (U/L)   Date Value   04/13/2017 24     ALT (U/L)   Date Value   04/13/2017 33     BUN (mg/dL)   Date Value   07/02/2019 57 (H)     BP Readings from Last 3 Encounters:   05/27/20 130/80   08/19/19 (!) 140/76   07/02/19 (!) 142/80          (goal 120/80)        Patient Active Problem List:     Asthma     YONI (obstructive sleep apnea)     Left knee pain     Hidradenitis     Current smoker     Microalbuminuria     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 kidney disease) stage 4, GFR 15-29 ml/min (HCC)     Acute diastolic congestive heart failure (HCC)     Hyperkalemia     Acute systolic congestive heart failure (HCC)     Pulmonary hypertension (HCC)     Morbid obesity with BMI of 45.0-49.9, adult ([address]     Acute on chronic respiratory failure with hypoxia (HCC)     COPD exacerbation (HCC)     Asthma exacerbation     Type 2 diabetes mellitus with diabetic nephropathy (HCC)     ESRD (end stage renal disease) on dialysis ([address]     Bronchitis     Essential hypertension     YONI on CPAP     Hyperglycemia, drug-induced     Needs smoking cessation education     Hyperglycemia     Drowsiness     Acute on chronic respiratory failure with hypercapnia ([address]     Mobility impaired     S/P cardiac cath-mINIMAL caD 3/15/16 - dR. 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)     Type 2 diabetes mellitus without complication (HCC)     Congestive heart failure (HCC)     Asthma with COPD with exacerbation (HCC)     Acute exacerbation of CHF (congestive heart failure) (HCC)     Chronic obstructive pulmonary disease (HCC)     Chronic kidney disease, stage V (HCC)     Acute respiratory acidosis     Precordial pain     Gastroesophageal reflux disease without esophagitis     Pulmonary HTN (HCC)     Morbid obesity due to excess calories (HCC)     Symptomatic anemia     Elevated serum creatinine     ESRD needing dialysis ([facility]

## 2021-05-11 NOTE — PROGRESS NOTES
Subjective:    Debo Post is a 46 y.o. female with  has a past medical history of Asthma, CHF (congestive heart failure) ([facility] Chronic kidney disease, COPD (chronic obstructive pulmonary disease) ([facility] Dialysis patient ([facility] Hemodialysis patient ([facility] [facility] Hyperlipidemia, Hypertension, Obesity, YONI (obstructive sleep apnea), Pneumonia, Renal failure, Type II or unspecified type diabetes mellitus without mention of complication, not stated as uncontrolled, and Ventilator dependence ([facility] [facility] Presented to the office today for:  Chief Complaint   Patient presents with    Medication Refill       HPI  This is a 80-year-old female with a history of COPD s/p 3 L nasal cannula oxygen, combined systolic and diastolic heart failure, type 2 diabetes mellitus, CKD s/p dialysis Monday Wednesday and Friday came to the office today for the regular follow-up. Cc: Hypertension  Patient denies any complains of headache, blurry vision, chest pain, shortness of breath and racing of heart at this moment. Patient is compliant with the medication and check the blood pressure at home regularly. Cc: back pain  Patient complains of lower lumbosacral pain which is going on for almost any year. Per patient she was diagnosed with a disc slipped, previously was prescribed Norco and was wondering if he can do that. Patient was counseled that we cannot prescribe her any opioids and she showed establish care with [facility] for further intervention. Patient understands and agrees with the plan. Review of Systems   Constitutional: Negative for chills and fever. HENT: Negative for congestion, sinus pressure and sinus pain. Respiratory: Negative for cough, shortness of breath and wheezing. Cardiovascular: Negative for chest pain, palpitations and leg swelling. Gastrointestinal: Negative for abdominal pain, blood in stool, constipation, diarrhea, nausea and vomiting.    Genitourinary: Negative for difficulty urinating, flank pain and hematuria. Musculoskeletal: Negative for arthralgias, back pain and myalgias. Skin: Negative for color change and wound. Neurological: Negative for dizziness, light-headedness and headaches. Psychiatric/Behavioral: Negative for agitation and confusion. The patient has a   Family History   Problem Relation Age of Onset    Diabetes Other     Cancer Mother         BREAST    Heart Disease Father         CAD-MI    Diabetes Father     High Blood Pressure Father     Diabetes Maternal Grandfather     Diabetes Paternal Grandfather     Heart Disease Paternal Grandfather     High Blood Pressure Paternal Grandfather        Objective:    /71   Pulse 78   Wt 241 lb (109.3 kg)   LMP 11/12/2014 (Approximate)   BMI 48.65 kg/m²    BP Readings from Last 3 Encounters:   05/11/21 124/71   05/27/20 130/80   08/19/19 (!) 140/76       Physical Exam  Vitals signs and nursing note reviewed. Constitutional:       Appearance: Normal appearance. HENT:      Head: Normocephalic and atraumatic. Mouth/Throat:      Mouth: Mucous membranes are moist.   Eyes:      Conjunctiva/sclera: Conjunctivae normal.   Cardiovascular:      Rate and Rhythm: Normal rate and regular rhythm. Pulmonary:      Effort: Pulmonary effort is normal.      Breath sounds: Normal breath sounds. Abdominal:      General: Bowel sounds are normal. There is no distension. Palpations: Abdomen is soft. There is no mass. Tenderness: There is no abdominal tenderness. There is no guarding. Musculoskeletal:      Right lower leg: No edema. Left lower leg: No edema. Neurological:      General: No focal deficit present. Mental Status: She is alert and oriented to person, place, and time.    Psychiatric:         Mood and Affect: Mood normal.         Behavior: Behavior normal.         Lab Results   Component Value Date    WBC 10.5 07/02/2019    HGB 8.6 (L) 07/02/2019 HCT 27.5 (L) 07/02/2019     07/02/2019    CHOL 75 07/02/2019    TRIG 39 07/02/2019    HDL 34 (L) 07/02/2019    ALT 33 04/13/2017    AST 24 04/13/2017     07/02/2019    K 4.7 07/02/2019    CL 99 07/02/2019    CREATININE 5.68 (HH) 07/02/2019    BUN 57 (H) 07/02/2019    CO2 23 07/02/2019    TSH 0.88 07/02/2019    INR 1.0 04/13/2017    LABA1C 4.6 08/19/2019    LABMICR 1,828 04/29/2014     Lab Results   Component Value Date    CALCIUM 8.5 (L) 07/02/2019    PHOS 2.3 (L) 04/12/2017     Lab Results   Component Value Date    LDLCHOLESTEROL 33 07/02/2019       Assessment and Plan:    1. Type 2 diabetes mellitus with chronic kidney disease on chronic dialysis, with long-term current use of insulin ([facility])  - lisinopril (PRINIVIL;ZESTRIL) 5 MG tablet; Take 1 tablet by mouth daily  Dispense: 30 tablet; Refill: 3    2. Essential hypertension  - atorvastatin (LIPITOR) 10 MG tablet; Take 1 tablet by mouth nightly  Dispense: 30 tablet; Refill: 2  - furosemide (LASIX) 40 MG tablet; Take 1 tablet by mouth daily  Dispense: 60 tablet; Refill: 3  - lisinopril (PRINIVIL;ZESTRIL) 5 MG tablet; Take 1 tablet by mouth daily  Dispense: 30 tablet; Refill: 3    3. Chronic diastolic congestive heart failure (HCC)  - aspirin 81 MG EC tablet; Take 1 tablet by mouth daily  Dispense: 30 tablet; Refill: 3  - atorvastatin (LIPITOR) 10 MG tablet; Take 1 tablet by mouth nightly  Dispense: 30 tablet; Refill: 2  - furosemide (LASIX) 40 MG tablet; Take 1 tablet by mouth daily  Dispense: 60 tablet; Refill: 3  - nitroGLYCERIN (NITROSTAT) 0.4 MG SL tablet; Place 1 tablet under the tongue every 5 minutes as needed for Chest pain  Dispense: 30 tablet; Refill: 3  - DME Order for Home Oxygen as OP    4. Chronic obstructive pulmonary disease, unspecified COPD type ([facility])  - guaiFENesin (MUCINEX) 600 MG extended release tablet; Take 1 tablet by mouth 2 times daily  Dispense: 30 tablet;  Refill: 0    - Patient wanted portable oxygen concentrator, [facility] was called and they mentioned that they need a prescription. DME order for oxygen concentrator is placed. And it is faxed with the mention number. 5. Chronic midline back pain, unspecified back location  - [facility]        Patient was counseled about importance of taking medication which were prescribed at today visit. Patient was also counseled that lifestyle changes including diet, smoking and use of less alcohol will benefit their health in long term. All the question asked by the patient were answered in non-medical terms. Patient understands and agree to the plan. Teach back method was used while having the conversation.      Requested Prescriptions     Signed Prescriptions Disp Refills    aspirin 81 MG EC tablet 30 tablet 3     Sig: Take 1 tablet by mouth daily    atorvastatin (LIPITOR) 10 MG tablet 30 tablet 2     Sig: Take 1 tablet by mouth nightly    furosemide (LASIX) 40 MG tablet 60 tablet 3     Sig: Take 1 tablet by mouth daily    guaiFENesin (MUCINEX) 600 MG extended release tablet 30 tablet 0     Sig: Take 1 tablet by mouth 2 times daily    lisinopril (PRINIVIL;ZESTRIL) 5 MG tablet 30 tablet 3     Sig: Take 1 tablet by mouth daily    nitroGLYCERIN (NITROSTAT) 0.4 MG SL tablet 30 tablet 3     Sig: Place 1 tablet under the tongue every 5 minutes as needed for Chest pain       Medications Discontinued During This Encounter   Medication Reason    b complex-C-folic acid (NEPHROCAPS) 1 MG capsule ERROR    fluticasone (FLONASE) 50 MCG/ACT nasal spray LIST CLEANUP    cetirizine (ZYRTEC) 10 MG tablet LIST CLEANUP    HYDROcodone-acetaminophen (NORCO) 5-325 MG per tablet LIST CLEANUP    ipratropium (ATROVENT HFA) 17 MCG/ACT inhaler LIST CLEANUP    mometasone-formoterol (DULERA) 200-5 MCG/ACT inhaler LIST CLEANUP    nicotine (NICODERM CQ) 21 MG/24HR LIST CLEANUP    ondansetron (ZOFRAN ODT) 4 MG disintegrating tablet LIST CLEANUP    sevelamer (RENVELA)

## 2021-05-11 NOTE — TELEPHONE ENCOUNTER
E-scribe request for diltiazem (cardizem cd). Please review and e-scribe if applicable. Last Visit Date: 5/27/2020  Next Visit Date:  5/10/2021    Hemoglobin A1C (%)   Date Value   08/19/2019 4.6   03/22/2018 5.0   01/12/2017 5.6             ( goal A1C is < 7)   Microalb/Crt. Ratio (mcg/mg creat)   Date Value   04/29/2014 1,828     LDL Cholesterol (mg/dL)   Date Value   07/02/2019 33       (goal LDL is <100)   AST (U/L)   Date Value   04/13/2017 24     ALT (U/L)   Date Value   04/13/2017 33     BUN (mg/dL)   Date Value   07/02/2019 57 (H)     BP Readings from Last 3 Encounters:   05/27/20 130/80   08/19/19 (!) 140/76   07/02/19 (!) 142/80          (goal 120/80)        Patient Active Problem List:     Asthma     YONI (obstructive sleep apnea)     Left knee pain     Hidradenitis     Current smoker     Microalbuminuria     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 kidney disease) stage 4, GFR 15-29 ml/min (HCC)     Acute diastolic congestive heart failure (HCC)     Hyperkalemia     Acute systolic congestive heart failure (HCC)     Pulmonary hypertension (HCC)     Morbid obesity with BMI of 45.0-49.9, adult ([address]     Acute on chronic respiratory failure with hypoxia (HCC)     COPD exacerbation (HCC)     Asthma exacerbation     Type 2 diabetes mellitus with diabetic nephropathy (HCC)     ESRD (end stage renal disease) on dialysis ([address]     Bronchitis     Essential hypertension     YONI on CPAP     Hyperglycemia, drug-induced     Needs smoking cessation education     Hyperglycemia     Drowsiness     Acute on chronic respiratory failure with hypercapnia ([address]     Mobility impaired     S/P cardiac cath-mINIMAL caD 3/15/16 - dR. MAOTS     Type 2 diabetes mellitus with chronic kidney disease on chronic dialysis (HCC)     Type 2 diabetes mellitus without complication (HCC)     Congestive heart failure (HCC)     Asthma with COPD with exacerbation (HCC)     Acute exacerbation of CHF (congestive heart failure) (HCC)     Chronic obstructive pulmonary disease (HCC)     Chronic kidney disease, stage V (HCC)     Acute respiratory acidosis     Precordial pain     Gastroesophageal reflux disease without esophagitis     Pulmonary HTN (HCC)     Morbid obesity due to excess calories (HCC)     Symptomatic anemia     Elevated serum creatinine     ESRD needing dialysis ([facility]

## 2021-05-11 NOTE — TELEPHONE ENCOUNTER
E-scribe request for albuterol (proventil). Please review and e-scribe if applicable. Last Visit Date: 5/27/2020  Next Visit Date:  5/11/2021    Hemoglobin A1C (%)   Date Value   08/19/2019 4.6   03/22/2018 5.0   01/12/2017 5.6             ( goal A1C is < 7)   Microalb/Crt. Ratio (mcg/mg creat)   Date Value   04/29/2014 1,828     LDL Cholesterol (mg/dL)   Date Value   07/02/2019 33       (goal LDL is <100)   AST (U/L)   Date Value   04/13/2017 24     ALT (U/L)   Date Value   04/13/2017 33     BUN (mg/dL)   Date Value   07/02/2019 57 (H)     BP Readings from Last 3 Encounters:   05/27/20 130/80   08/19/19 (!) 140/76   07/02/19 (!) 142/80          (goal 120/80)        Patient Active Problem List:     Asthma     YONI (obstructive sleep apnea)     Left knee pain     Hidradenitis     Current smoker     Microalbuminuria     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 kidney disease) stage 4, GFR 15-29 ml/min (HCC)     Acute diastolic congestive heart failure (HCC)     Hyperkalemia     Acute systolic congestive heart failure (HCC)     Pulmonary hypertension (HCC)     Morbid obesity with BMI of 45.0-49.9, adult ([address]     Acute on chronic respiratory failure with hypoxia (HCC)     COPD exacerbation (HCC)     Asthma exacerbation     Type 2 diabetes mellitus with diabetic nephropathy (HCC)     ESRD (end stage renal disease) on dialysis ([address]     Bronchitis     Essential hypertension     YONI on CPAP     Hyperglycemia, drug-induced     Needs smoking cessation education     Hyperglycemia     Drowsiness     Acute on chronic respiratory failure with hypercapnia ([address]     Mobility impaired     S/P cardiac cath-mINIMAL caD 3/15/16 - dR. 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)     Type 2 diabetes mellitus without complication (HCC)     Congestive heart failure (HCC)     Asthma with COPD with exacerbation (HCC)     Acute exacerbation of CHF (congestive heart failure) (HCC)     Chronic obstructive pulmonary disease (HCC)     Chronic kidney disease, stage V (HCC)     Acute respiratory acidosis     Precordial pain     Gastroesophageal reflux disease without esophagitis     Pulmonary HTN (HCC)     Morbid obesity due to excess calories (HCC)     Symptomatic anemia     Elevated serum creatinine     ESRD needing dialysis ([facility]

## 2021-05-11 NOTE — PROGRESS NOTES
Visit Information    Have you changed or started any medications since your last visit including any over-the-counter medicines, vitamins, or herbal medicines? no   Have you stopped taking any of your medications? Is so, why? -  no  Are you having any side effects from any of your medications? - no    Have you seen any other physician or provider since your last visit? yes    Have you had any other diagnostic tests since your last visit? yes    Have you been seen in the emergency room and/or had an admission in a hospital since we last saw you?  no   Have you had your routine dental cleaning in the past 6 months?  no     Do you have an active MyChart account? If no, what is the barrier?   No    Patient Care Team:  Aimee Knight MD as PCP - General (Family Medicine)  Sally Macias MD (Obstetrics & Gynecology)  Senior Ossian (Betzy Bustillos)  Annalisa Gonzalez MD as Referring Physician (Family Medicine)    Medical History Review  Past Medical, Family, and Social History reviewed and does not contribute to the patient presenting condition    Health Maintenance   Topic Date Due    Hepatitis C screen  Never done    Diabetic retinal exam  Never done    COVID-19 Vaccine (1) Never done    Hepatitis B vaccine (1 of 3 - Risk Recombivax 3-dose series) 02/25/1989    DTaP/Tdap/Td vaccine (1 - Tdap) Never done    Cervical cancer screen  09/05/2017    Annual Wellness Visit (AWV)  Never done    Breast cancer screen  02/25/2020    Colon Cancer Screen FIT/FOBT  02/25/2020    Lipid screen  07/02/2020    Potassium monitoring  07/02/2020    Creatinine monitoring  07/02/2020    A1C test (Diabetic or Prediabetic)  08/19/2020    Shingles Vaccine (1 of 2) 05/27/2021 (Originally 2/25/2020)    Diabetic foot exam  05/27/2021    Flu vaccine (Season Ended) 09/01/2021    Pneumococcal 0-64 years Vaccine  Completed    HIV screen  Completed    Hepatitis A vaccine  Aged Out    Hib vaccine  Aged Out    Meningococcal (ACWY) vaccine  Aged Out

## 2021-05-17 NOTE — TELEPHONE ENCOUNTER
Pharmacy contacting office because they received a script for Lasix for patient to take once daily with a quantity of 60. Last script was for twice daily with quantity of 60. They would like to know if current script is correct. Please advise.

## 2021-06-05 NOTE — TELEPHONE ENCOUNTER
Escribe Request for pending medications. Last Visit Date:5/11/21  Next Visit Date:  No future appointments. Health Maintenance   Topic Date Due    Hepatitis C screen  Never done    Diabetic retinal exam  Never done    COVID-19 Vaccine (1) Never done    Hepatitis B vaccine (1 of 3 - Risk Recombivax 3-dose series) 02/25/1989    DTaP/Tdap/Td vaccine (1 - Tdap) Never done    Cervical cancer screen  09/05/2017    Annual Wellness Visit (AWV)  Never done    Breast cancer screen  02/25/2020    Colon Cancer Screen FIT/FOBT  02/25/2020    Shingles Vaccine (1 of 2) Never done    Lipid screen  07/02/2020    Potassium monitoring  07/02/2020    Creatinine monitoring  07/02/2020    A1C test (Diabetic or Prediabetic)  08/19/2020    Diabetic foot exam  05/27/2021    Pneumococcal 0-64 years Vaccine (4 of 4) 02/25/2035    Flu vaccine  Completed    HIV screen  Completed    Hepatitis A vaccine  Aged Out    Hib vaccine  Aged Out    Meningococcal (ACWY) vaccine  Aged Out       Hemoglobin A1C (%)   Date Value   08/19/2019 4.6   03/22/2018 5.0   01/12/2017 5.6             ( goal A1C is < 7)   Microalb/Crt. Ratio (mcg/mg creat)   Date Value   04/29/2014 1,828     LDL Cholesterol (mg/dL)   Date Value   07/02/2019 33       (goal LDL is <100)   AST (U/L)   Date Value   04/13/2017 24     ALT (U/L)   Date Value   04/13/2017 33     BUN (mg/dL)   Date Value   07/02/2019 57 (H)     BP Readings from Last 3 Encounters:   05/11/21 124/71   05/27/20 130/80   08/19/19 (!) 140/76          (goal 120/80)    All Future Testing planned in [facility]  Lab Frequency Next Occurrence       Next Visit Date:  No future appointments.       Patient Active Problem List:     Asthma     YONI (obstructive sleep apnea)     Left knee pain     Hidradenitis     Current smoker     Microalbuminuria     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 kidney disease) stage 4, GFR 15-29 ml/min (HCC)     Acute diastolic congestive heart failure (HCC)     Hyperkalemia     Acute systolic congestive heart failure (HCC)     Pulmonary hypertension (HCC)     Morbid obesity with BMI of 45.0-49.9, adult (HCC)     Acute on chronic respiratory failure with hypoxia (HCC)     COPD exacerbation (HCC)     Asthma exacerbation     Type 2 diabetes mellitus with diabetic nephropathy (HCC)     ESRD (end stage renal disease) on dialysis ([address]     Bronchitis     Essential hypertension     YONI on CPAP     Hyperglycemia, drug-induced     Needs smoking cessation education     Hyperglycemia     Drowsiness     Acute on chronic respiratory failure with hypercapnia ([address]     Mobility impaired     S/P cardiac cath-mINIMAL caD 3/15/16 - dR. 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

## 2021-06-10 NOTE — TELEPHONE ENCOUNTER
For oxygen order would need to say portable oxygen concentrater 3L by nasal canula pulse dose per [employer]. Please advise. Thank you. Once new order is placed would need new order, last office note, and face sheet to be faxed to [phone].

## 2021-06-30 NOTE — TELEPHONE ENCOUNTER
E-scribe request for med refills. Please review and e-scribe if applicable. Last Visit Date:  06/10/2021  Next Visit Date:  Visit date not found    Hemoglobin A1C (%)   Date Value   08/19/2019 4.6   03/22/2018 5.0   01/12/2017 5.6             ( goal A1C is < 7)   Microalb/Crt. Ratio (mcg/mg creat)   Date Value   04/29/2014 1,828     LDL Cholesterol (mg/dL)   Date Value   07/02/2019 33       (goal LDL is <100)   AST (U/L)   Date Value   04/13/2017 24     ALT (U/L)   Date Value   04/13/2017 33     BUN (mg/dL)   Date Value   07/02/2019 57 (H)     BP Readings from Last 3 Encounters:   05/11/21 124/71   05/27/20 130/80   08/19/19 (!) 140/76          (goal 120/80)        Patient Active Problem List:     Asthma     YONI (obstructive sleep apnea)     Left knee pain     Hidradenitis     Current smoker     Microalbuminuria     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 kidney disease) stage 4, GFR 15-29 ml/min (HCC)     Acute diastolic congestive heart failure (HCC)     Hyperkalemia     Acute systolic congestive heart failure (HCC)     Pulmonary hypertension (HCC)     Morbid obesity with BMI of 45.0-49.9, adult ([address]     Acute on chronic respiratory failure with hypoxia (HCC)     COPD exacerbation (HCC)     Asthma exacerbation     Type 2 diabetes mellitus with diabetic nephropathy (HCC)     ESRD (end stage renal disease) on dialysis ([address]     Bronchitis     Essential hypertension     YONI on CPAP     Hyperglycemia, drug-induced     Needs smoking cessation education     Hyperglycemia     Drowsiness     Acute on chronic respiratory failure with hypercapnia ([address]     Mobility impaired     S/P cardiac cath-mINIMAL caD 3/15/16 - dR. 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)     Type 2 diabetes mellitus without complication (HCC)     Congestive heart failure (HCC)     Asthma with COPD with exacerbation (HCC)     Acute exacerbation of CHF (congestive heart failure) (HCC)     Chronic obstructive pulmonary disease (HCC)     Chronic kidney disease, stage V (HCC)     Acute respiratory acidosis     Precordial pain     Gastroesophageal reflux disease without esophagitis     Pulmonary HTN ([address]     Morbid obesity due to excess calories (HCC)     Symptomatic anemia     Elevated serum creatinine     ESRD needing dialysis ([facility]      ----Della Hartman

## 2021-08-04 NOTE — TELEPHONE ENCOUNTER
Patient still has not received her oxygen - [facility] states [facility] still have not received her orders - please advise

## 2021-08-20 PROBLEM — R07.9 CHEST PAIN: Status: ACTIVE | Noted: 2021-01-01

## 2021-08-20 NOTE — ED PROVIDER NOTES
Patient's evaluation and treatment discussed with Kate JOHNSON. I am in agreement with patient's care as noted. Presents to the emergency department with complaints of chest tightness which occurred while she was in the middle of her hemodialysis treatment this afternoon. She apparently cut her hemodialysis treatment short by 2 hours to come to the emergency department. She is currently complaining of no chest discomfort but now is complaining of persistent and worsening upper abdominal pain. She has had nausea but no vomiting. No diarrhea. No fevers or chills. No cough or shortness of breath. Patient denies a sore throat or loss of sense of smell or taste. On examination the patient is awake and alert. She is cooperative and responsive. Speech is fluent and comprehension is normal.  Auscultation of lungs reveals somewhat diminished breath sounds throughout primarily due to body habitus. Cardiac exam reveals an S1-S2 without murmur rub or gallop. Abdomen is obese soft, nondistended with tenderness primarily in the epigastric and the left upper quadrant regions. There is involuntary guarding. There is no rebound. No bruits noted. Patient's lab results and radiographic findings are reviewed. CT scan of the abdomen is pending. Impression: Abdominal pain and tenderness and end-stage renal disease on dialysis. Plan we are obtaining a CT scan but I feel the patient's clinical presentation warrants admission to the hospital for further evaluation and observation.      David Russell MD  08/20/21 [address] [place] [address] Eliseo Bach MD  08/25/21 [id number]

## 2021-08-21 NOTE — ED PROVIDER NOTES
[address] [place] [address] ED  eMERGENCY dEPARTMENT eNCOUnter      Pt Name: Leslie Clarke  MRN: [id number]  [place] 1970  Date of evaluation: 8/20/2021  Provider: Cherelle Dee NP, [facility] - [place] [id number]       Chief Complaint   Patient presents with    Chest Pain    Nausea    Fatigue         HISTORY OF PRESENT ILLNESS  (Location/Symptom, Timing/Onset, Context/Setting, Quality, Duration, Modifying Factors, Severity.)   Leslie Clarke is a 46 y.o. female who presents to the emergency department by private vehicle for evaluation of chest pain. Patient states that she was in the middle of dialysis treatment. She was brought to hours and when she developed a tightness all across her chest.  The dialysis treatment was stopped and EMS was called. She states that she is also having some epigastric abdominal discomfort and some nausea. She states that she feels constipated like she has to have a bowel movement. She denies any history of any heart problems. She denies any calf pain or swelling in her lower extremities. Denies fevers or chills. Nursing Notes were reviewed.     ALLERGIES     Ibuprofen, Tramadol, Motrin [ibuprofen micronized], Strawberry extract, and Tape [adhesive tape]    CURRENT MEDICATIONS       Previous Medications    ALBUTEROL (PROVENTIL) (2.5 MG/3ML) 0.083% NEBULIZER SOLUTION    INHALE THE CONTENTS OF ONE VIAL VIA NEBULIZER EVERY 6 HOURS AS NEEDED FOR SHORTNESS OF BREATH OR WHEEZING    ALBUTEROL SULFATE HFA (PROAIR HFA) 108 (90 BASE) MCG/ACT INHALER    Inhale 2 puffs into the lungs every 6 hours as needed for Wheezing    ALCOHOL SWABS (ALCOHOL PADS) 70 % PADS    USE TO CLEAN TESTING AND INJECTION SITES TWICE DAILY    ASPIRIN 81 MG EC TABLET    TAKE 1 TABLET BY MOUTH DAILY    ATORVASTATIN (LIPITOR) 10 MG TABLET    Take 1 tablet by mouth nightly    BLOOD GLUCOSE CALIBRATION (RIGOBERTO DOC CONTROL) NORMAL SOLN    USE TO PERIODICALLY CHECK GLUCOSE MONITOR ACCORDING TO THE MANUAL    BLOOD GLUCOSE MONITORING SUPPL (TRUE METRIX METER) W/DEVICE KIT    USE TO TEST BLOOD GLUCOSE    BLOOD GLUCOSE TEST STRIPS (ONETOUCH ULTRA) STRIP    USE TO TEST BLOOD SUGAR TWICE DAILY AS DIRECTED    CALCIUM ACETATE, PHOS BINDER, 667 MG CAPS    TAKE 2 CAPSULES BY MOUTH THREE TIMES DAILY WITH MEALS AND 2 CAPSULES TWICE A DAY WITH SNACKS    DILTIAZEM (CARDIZEM CD) 180 MG EXTENDED RELEASE CAPSULE    TAKE 1 CAPSULE BY MOUTH DAILY    DIPHENHYDRAMINE (DIPHEN) 25 MG TABLET    Take 25 mg by mouth    EASY COMFORT LANCETS MISC    USE TO TEST BLOOD SUGAR TWICE DAILY AS DIRECTED    FLUTICASONE-SALMETEROL (ADVAIR) 250-50 MCG/DOSE AEPB    INHALE 1 PUFF BY MOUTH INTO THE LUNGS TWICE DAILY **RINSE MOUTH AFTER EACH USE**    FUROSEMIDE (LASIX) 40 MG TABLET    Take 1 tablet by mouth daily    FUROSEMIDE (LASIX) 40 MG TABLET    Take 1 tablet by mouth daily    GLUCOSE MONITORING KIT (FREESTYLE) MONITORING KIT    1 kit by Does not apply route daily    GUAIFENESIN (MUCINEX) 600 MG EXTENDED RELEASE TABLET    Take 1 tablet by mouth 2 times daily    INSULIN PEN NEEDLE (EASY COMFORT PEN NEEDLES) 31G X 5 MM MISC    USE TO INJECT INSULIN ONCE DAILY    LANCET DEVICES (SIMPLE DIAGNOSTICS LANCING DEV) MISC    USE WITH LANCETS TO TEST BLOOD GLUCOSE    LANCETS (BD LANCET ULTRAFINE 30G) MISC    TEST TWICE DAILY    LANTUS SOLOSTAR 100 UNIT/ML INJECTION PEN    INJECT 15 UNITS SUBCUTANEOUSLY DAILY AT NIGHT    LISINOPRIL (PRINIVIL;ZESTRIL) 5 MG TABLET    TAKE 1 TABLET BY MOUTH DAILY    MIDODRINE (PROAMATINE) 5 MG TABLET    Take 5 mg by mouth    MISC. DEVICES (DIGITAL GLASS SCALE) MISC    Diagnosis: Diastolic Congestive heart failure    MISC.  DEVICES MISC    1 each by Does not apply route daily Electric scooter    MULTIPLE VITAMINS-MINERALS (RENAPLEX-D) TABS    TAKE 1 TABLET BY MOUTH EVERY DAY    NITROGLYCERIN (NITROSTAT) 0.4 MG SL TABLET    Place 1 tablet under the tongue every 5 minutes as needed for Chest pain    OXYGEN    Inhale into the lungs O2  3L  PER NASAL CANNULA NIGHTLY    PHARMACIST CHOICE LANCETS MISC    USE TO TEST BLOOD GLUCOSE ONCE A DAY    POLYETHYLENE GLYCOL (GLYCOLAX) 17 GM/SCOOP POWDER        SENNA PLUS 8.6-50 MG PER TABLET    TAKE 1 TABLET BY MOUTH DAILY    SENSIPAR 30 MG TABLET    Take 1 tablet by mouth daily    SKIN PROTECTANTS, MISC. (Betty Mettle) CREA    APPLY TO AFFECTED AREA TOPICALLY AS NEEDED    UNIFINE PENTIPS 31G X 6 MM MISC    USE WITH insulin pens ONCE daily       PAST MEDICAL HISTORY         Diagnosis Date    Asthma     AS A CHILD    [facility]     Chronic kidney disease     COPD (chronic obstructive pulmonary disease) ([facility]     INHALER USE AS NEEDED    Dialysis patient ([facility]     CENTRAL DIALYSIS MON, WEDS AND FRI, FLUID RESTRICTION 2L/24 HRS PER PT    Hemodialysis patient [facility])     had Brissa 16  M-W-F @ [facility]    Hyperlipidemia     Hypertension     ON RX    Obesity     YONI (obstructive sleep apnea)     SLEEP STUDY -2016 AWAITING MACHINE, USING O2 AT NIGHT PRESENTLY    Pneumonia     HAD TO BE ON VENTILATOR 12 DAYS    Renal failure     STARTED DIALYSIS 2016    Type II or unspecified type diabetes mellitus without mention of complication, not stated as uncontrolled     IDDM    Ventilator dependence ([facility] [facility]     X 2 200 AND 2014.   HAD TO GO ON VENT POST OP       SURGICAL HISTORY           Procedure Laterality Date     [address]    x3    HYSTERECTOMY  2014    TOTAL    SKIN FULL GRAFT  1983    CHEST-BIRTH YOU AND MOLES REMOVED         FAMILY HISTORY           Problem Relation Age of Onset    Diabetes Other     Cancer Mother         BREAST    Heart Disease Father         CAD-MI    Diabetes Father     High Blood Pressure Father     Diabetes Maternal Grandfather     Diabetes Paternal Grandfather     Heart Disease Paternal Grandfather     High Blood Pressure Paternal Grandfather      Family Status   Relation Name Status    Other  (Not Specified)    Mother Anayeli Beard Father Katie Nolasco     Sister AMEYA Alive    Brother Katie Nolasco Alive    MGM      MGF      PGM      PGF      Sister SUSAN Alive    Sister CHACHO Alive        SOCIAL HISTORY      reports that she quit smoking about 4 years ago. Her smoking use included cigarettes. She quit after 7.00 years of use. She has never used smokeless tobacco. She reports that she does not drink alcohol and does not use drugs. REVIEW OF SYSTEMS    (2-9 systems for level 4, 10 or more for level 5)     Review of Systems   Constitutional: Negative for chills, fever and unexpected weight change. HENT: Negative for congestion, rhinorrhea, sinus pressure and sore throat. Respiratory: Positive for chest tightness. Negative for cough, shortness of breath and wheezing. Cardiovascular: Negative for chest pain and palpitations. Gastrointestinal: Positive for abdominal pain. Negative for constipation, diarrhea, nausea and vomiting. Genitourinary: Negative for dysuria and hematuria. Musculoskeletal: Negative for arthralgias and myalgias. Skin: Negative for color change and rash. Neurological: Negative for dizziness, weakness and headaches. Hematological: Negative for adenopathy. All other systems reviewed and are negative. Except as noted above the remainder of the review of systems was reviewed and negative. PHYSICAL EXAM    (up to 7 for level 4, 8 or more for level 5)     ED Triage Vitals   BP Temp Temp Source Pulse Resp SpO2 Height Weight   21 1515 21 1516 21 1516 21 1515 21 1515 21 1515 21 1516 21 1516   104/65 97.9 °F (36.6 °C) Oral 98 (!) 36 94 % 4' 11\" (1.499 m) 240 lb (108.9 kg)       Physical Exam  Vitals reviewed. Constitutional:       Appearance: She is well-developed. HENT:      Head: Normocephalic and atraumatic.    Eyes:      Conjunctiva/sclera: Conjunctivae normal.      Pupils: Pupils are equal, round, and reactive to light. Cardiovascular:      Rate and Rhythm: Normal rate and regular rhythm. Pulmonary:      Effort: Pulmonary effort is normal. No respiratory distress. Breath sounds: Normal breath sounds. No stridor. Abdominal:      General: Bowel sounds are normal.      Palpations: Abdomen is soft. Musculoskeletal:         General: Normal range of motion. Cervical back: Normal range of motion and neck supple. Lymphadenopathy:      Cervical: No cervical adenopathy. Skin:     General: Skin is warm and dry. Findings: No rash. Neurological:      Mental Status: She is alert and oriented to person, place, and time. DIAGNOSTIC RESULTS     EKG: All EKG's are interpreted by the Emergency Department Physician who either signs or Co-signs this chart in the absence of a cardiologist.    NSR no st elevation    RADIOLOGY:   Non-plain film images such as CT, Ultrasound and MRI are read by the radiologist. Plain radiographic images are visualized and preliminarily interpreted by the emergency physician with the below findings:    CT ABDOMEN PELVIS WO CONTRAST Additional Contrast? None    Result Date: 8/20/2021  EXAMINATION: CT OF THE ABDOMEN AND PELVIS WITHOUT CONTRAST 8/20/2021 10:40 pm TECHNIQUE: CT of the abdomen and pelvis was performed without the administration of intravenous contrast. Multiplanar reformatted images are provided for review. Dose modulation, iterative reconstruction, and/or weight based adjustment of the mA/kV was utilized to reduce the radiation dose to as low as reasonably achievable. COMPARISON: None.  HISTORY: ORDERING SYSTEM PROVIDED HISTORY: abd pain TECHNOLOGIST PROVIDED HISTORY: abd pain Decision Support Exception - unselect if not a suspected or confirmed emergency medical condition->Emergency Medical Condition (MA) Is the patient pregnant?->No Reason for Exam: CC: chest pain, nausea, fatigue Acuity: Acute Type of Exam: Initial FINDINGS: Lower Chest: Cardiomegaly. Pleural reaction. Organs: Diffuse subcutaneous edema. The liver, spleen, pancreas, and adrenals appear normal.  Gallbladder normal. Small amount of ascites. Moderate bilateral renal atrophy. Bilateral punctate vascular calcifications or proximal ureterolith. No hydronephrosis. The bladder appears normal. GI/Bowel: The stomach,small bowel, and colon appear normal. Fibrofatty infiltration right colon. Appendix not identified. Pelvis: As above Peritoneum/Retroperitoneum: Calcified plaque along the aorta and its branches. Bones/Soft Tissues: Normal     Mild ascites and subcutaneous edema. Moderate bilateral renal atrophy. Other incidental findings as above. Limited sensitivity without IV and oral contrast.     XR ACUTE ABD SERIES CHEST 1 VW    Result Date: 8/20/2021  EXAMINATION: TWO XRAY VIEWS OF THE ABDOMEN AND SINGLE  XRAY VIEW OF THE CHEST 8/20/2021 4:03 pm COMPARISON: None. HISTORY: ORDERING SYSTEM PROVIDED HISTORY: Pain TECHNOLOGIST PROVIDED HISTORY: Pain Reason for Exam: Pt c/o pain to chest, nausea, fatigue. Best films possible d/t pt body habitus. Acuity: Acute Type of Exam: Subsequent/Follow-up FINDINGS: Moderate cardiomegaly. Mild edema. Mediastinum normal.  Bony thorax intact. Bowel gas nonspecific. No free air. Osseous structures normal.     Possible mild CHF. Bowel gas pattern nonspecific. Interpretation per the Radiologist below, if available at the time of this note:    CT ABDOMEN PELVIS WO CONTRAST Additional Contrast? None   Final Result   Mild ascites and subcutaneous edema. Moderate bilateral renal atrophy. Other incidental findings as above. Limited sensitivity without IV and oral   contrast.         XR ACUTE ABD SERIES CHEST 1 VW   Final Result   Possible mild CHF. Bowel gas pattern nonspecific.                  LABS:  Labs Reviewed   HEPATIC FUNCTION PANEL - Abnormal; Notable for the following components: Result Value    Alkaline Phosphatase 126 (*)     All other components within normal limits   CBC WITH AUTO DIFFERENTIAL - Abnormal; Notable for the following components:    RBC 2.77 (*)     Hemoglobin 9.1 (*)     Hematocrit 33.1 (*)     .5 (*)     MCHC 27.5 (*)     RDW 17.2 (*)     NRBC Automated 0.8 (*)     Seg Neutrophils 79 (*)     Lymphocytes 11 (*)     Immature Granulocytes 1 (*)     Absolute Lymph # 0.87 (*)     All other components within normal limits   BASIC METABOLIC PANEL - Abnormal; Notable for the following components:    Glucose 139 (*)     CREATININE 3.32 (*)     Bun/Cre Ratio 5 (*)     Calcium 8.0 (*)     Potassium 3.6 (*)     GFR Non- 15 (*)     GFR  18 (*)     All other components within normal limits   TROP/MYOGLOBIN - Abnormal; Notable for the following components:    Troponin, High Sensitivity 59 (*)     Myoglobin 197 (*)     All other components within normal limits   TROP/MYOGLOBIN - Abnormal; Notable for the following components:    Troponin, High Sensitivity 51 (*)     Myoglobin 175 (*)     All other components within normal limits   LIPASE   HEMOGLOBIN A1C   TROPONIN   COMPREHENSIVE METABOLIC PANEL W/ REFLEX TO MG FOR LOW K   MAGNESIUM   CBC   LIPID PANEL   TROP/MYOGLOBIN   TROP/MYOGLOBIN   POCT GLUCOSE   POCT GLUCOSE       All other labs were within normal range or not returned as of this dictation. EMERGENCY DEPARTMENT COURSE and DIFFERENTIAL DIAGNOSIS/MDM:   Vitals:    Vitals:    08/20/21 2147 08/20/21 2200 08/20/21 2245 08/20/21 2308   BP: 94/77 103/72 (!) 108/59    Pulse: 86 92 87 92   Resp: 17 (!) 31 23 23   Temp:       TempSrc:       SpO2:    99%   Weight:       Height:           Medical Decision Making: Had 2 sets of cardiac enzymes that were negative but is still having some epigastric abdominal discomfort and with her history she will be admitted to the hospital for further evaluation follow-up. Case is discussed with internal medicine. There eis a concern that she only got 2 hours of a treatment today and she does not go back until monday    CONSULTS:  [address]      1. Chest pain, unspecified type          DISPOSITION/PLAN   DISPOSITION Admitted 08/20/2021 10:58:57 PM      PATIENT REFERRED TO:   No follow-up provider specified.     DISCHARGE MEDICATIONS:     New Prescriptions    No medications on file           (Please note that portions of this note were completed with a voice recognition program.  Efforts were made to edit the dictations but occasionally words are mis-transcribed.)    [id number] [facility] CLARITZA, APRN - CNP  Certified Nurse Practitioner            VENTURA Acosta CNP  08/20/21 1017

## 2021-08-21 NOTE — PROGRESS NOTES
HEMODIALYSIS POST TREATMENT NOTE    Treatment time ordered: 225 minutes   Actual treatment time: 225 minutes    UltraFiltration Goal: 7330-1252 ml  UltraFiltration Removed: 1800 ml      Pre Treatment weight: 115 kg question accuracy of bed weights  Post Treatment weight: 113.8 kg question accuracy of bed weights  Estimated Dry Weight: 106.8 KG          Internal Access:       AV Fistula          Site: LT upper arm       Access Flow: good bruit and thrill       Sign and symptoms of infection: N0       If YES:     Medications or blood products given: Proamatine, Sensipar    Chronic outpatient schedule: MW     Chronic outpatient unit: [facility]    Summary of response to treatment: tolerated dialysis well    Explain if orders NOT met, was physician notified:      ACES flowsheet faxed to patient unit/ placed in patient chart: N/A , Epic charting    Post assessment completed: Yes    Report given to: Mely Up RN      * Intra-treatment documented Safety Checks include the followin) Access and face visible at all times. 2) All connections and blood lines are secure with no kinks. 3) NVL alarm engaged. 4) Hemosafe device applied (if applicable). 5) No collapse of Arterial or Venous blood chambers. 6) All blood lines / pump segments in the air detectors.

## 2021-08-21 NOTE — H&P
[facility]  Office: [address], DO, Oralia Yip, DO, Ralf Garvin, DO, Lennox Mars Blood, DO, Balbir Burton MD, Volodymyr Wilson MD, Levi Louis MD, Seven Gresham MD, Kecia Wilson MD, Doc Cota MD, Conner Campos MD, Sophie Finch, DO, Anish Sousa MD, Bladimir Wheat, DO, Cortney Nicholson MD,  Shima Horan DO, Burna Cockayne, MD, Shayla Ron MD, Mauri Buck MD, Emilee Reddy MD, Dillon Grider MD, Duc Johnson MD, Aly Cantor MD, Gagandeep Gill CNP, [address], CNP, Abdifatah Guajardo, CNP, Paddy Duane, CNS, Dianne Childs, CNP, Xiomara King, CNP, Wero Rapp, CNP, Ashley Rivas, CNP, Alma Carpio CNP, Nallely Calderon PA-C, Daisey Meigs, [facility], Alana Cantu, CNP, Andry Maradiaga, CNP, Cecily Sanders, CNP, Kristi Matos, CNP, Tawanda Carpio, CNP, Delma Zabala, CNP, Luis Daniel Bedolla, CNP, Yunior Karimi, [facility]    HISTORY AND PHYSICAL EXAMINATION            Date:   8/21/2021  Patient name:  Janet Joseph  Date of admission:  8/20/2021  2:57 PM  MRN:   [id number]  Account:  [de-identified]  YOB: 1970  PCP:    Gama Da Silva MD  Room:   [place] [id number]  Code Status:    Prior    Chief Complaint:     Chief Complaint   Patient presents with    Chest Pain    Nausea    Fatigue       History Obtained From:     patient, electronic medical record    History of Present Illness:     Janet Joseph is a 46 y.o. Non- / non  female who presents with Chest Pain, Nausea, and Fatigue   and is admitted to the hospital for the management of Chest pain. 41-year-old female presented to the emergency room for chest pain that started while she was in dialysis. They had to stop the treatment 2 hours early because of this sudden onset of pain. She had midsternal chest pain which is resolving. No shortness of breath, dizziness or diaphoresis.   She denies cardiac history although I do see documented AND 2014. 2014 HAD TO GO ON VENT POST OP        Past Surgical History:     Past Surgical History:   Procedure Laterality Date   [address]    x3    HYSTERECTOMY  12/12/2014    TOTAL    SKIN FULL GRAFT  1983    CHEST-BIRTH YOU AND MOLES REMOVED        Medications Prior to Admission:     Prior to Admission medications    Medication Sig Start Date End Date Taking?  Authorizing Provider   diphenhydrAMINE (DIPHEN) 25 MG tablet Take 25 mg by mouth 7/28/21 7/27/22 Yes Historical Provider, MD   midodrine (PROAMATINE) 5 MG tablet Take 5 mg by mouth 7/19/21  Yes Historical Provider, MD   atorvastatin (LIPITOR) 10 MG tablet Take 1 tablet by mouth nightly 6/30/21  Yes Anthony Burns MD   aspirin 81 MG EC tablet TAKE 1 TABLET BY MOUTH DAILY 6/6/21 10/4/21 Yes Russ Lopez MD   fluticasone-salmeterol (ADVAIR) 250-50 MCG/DOSE AEPB INHALE 1 PUFF BY MOUTH INTO THE LUNGS TWICE DAILY **RINSE MOUTH AFTER EACH USE** 6/6/21  Yes Russ Lopez MD   dilTIAZem (CARDIZEM CD) 180 MG extended release capsule TAKE 1 CAPSULE BY MOUTH DAILY 5/11/21  Yes David Squires MD   albuterol (PROVENTIL) (2.5 MG/3ML) 0.083% nebulizer solution INHALE THE CONTENTS OF ONE VIAL VIA NEBULIZER EVERY 6 HOURS AS NEEDED FOR SHORTNESS OF BREATH OR WHEEZING 5/11/21  Yes David Squires MD   furosemide (LASIX) 40 MG tablet Take 1 tablet by mouth daily 5/11/21  Yes Maddie Pruett MD   guaiFENesin (MUCINEX) 600 MG extended release tablet Take 1 tablet by mouth 2 times daily 5/11/21  Yes Maddie Pruett MD   LANTUS SOLOSTAR 100 UNIT/ML injection pen INJECT 15 UNITS SUBCUTANEOUSLY DAILY AT NIGHT 12/31/20  Yes Prasanna Coburn MD   albuterol sulfate HFA (PROAIR HFA) 108 (90 Base) MCG/ACT inhaler Inhale 2 puffs into the lungs every 6 hours as needed for Wheezing 11/13/18  Yes Felix Garza MD   Calcium Acetate, Phos Binder, 667 MG CAPS TAKE 2 CAPSULES BY MOUTH THREE TIMES DAILY WITH MEALS AND 2 CAPSULES TWICE A DAY WITH SNACKS 8/6/21   Historical Provider, MD   polyethylene glycol (GLYCOLAX) 17 GM/SCOOP powder  6/21/21   Historical Provider, MD   Multiple Vitamins-Minerals (RENAPLEX-D) TABS TAKE 1 TABLET BY MOUTH EVERY DAY 7/14/21   Historical Provider, MD   lisinopril (PRINIVIL;ZESTRIL) 5 MG tablet TAKE 1 TABLET BY MOUTH DAILY 6/30/21   Tila Tobar MD   furosemide (LASIX) 40 MG tablet Take 1 tablet by mouth daily 5/18/21   Eloise Gutierrez MD   nitroGLYCERIN (NITROSTAT) 0.4 MG SL tablet Place 1 tablet under the tongue every 5 minutes as needed for Chest pain 5/11/21   Anthony Shah MD   Easy Comfort Lancets MISC USE TO TEST BLOOD SUGAR TWICE DAILY AS DIRECTED 3/15/21   Eloise Gutierrez MD   blood glucose test strips (ONETOUCH ULTRA) strip USE TO TEST BLOOD SUGAR TWICE DAILY AS DIRECTED 1/26/21   Eloise Gutierrez MD   Insulin Pen Needle (EASY COMFORT PEN NEEDLES) 31G X 5 MM MISC USE TO INJECT INSULIN ONCE DAILY 12/1/20   Luis Alberto Bell MD   Alcohol Swabs (ALCOHOL PADS) 70 % PADS USE TO CLEAN TESTING AND INJECTION SITES TWICE DAILY 12/1/20   Luis Alberto Bell MD   Skin Protectants, Misc. (MINERIN CREME) CREA APPLY TO AFFECTED AREA TOPICALLY AS NEEDED 6/24/20   Eloise Gutierrez MD   Blood Glucose Monitoring Suppl (TRUE METRIX METER) w/Device KIT USE TO TEST BLOOD GLUCOSE 3/17/20   Eloise Gutierrez MD   Lancets (BD LANCET ULTRAFINE 30G) MISC TEST TWICE DAILY 3/17/20   Eloise Gutierrez MD   Lancet Devices (SIMPLE DIAGNOSTICS LANCING DEV) MISC USE WITH LANCETS TO TEST BLOOD GLUCOSE 3/17/20   Eloise Gutierrez MD   Blood Glucose Calibration (RIGOBERTO DOC CONTROL) Normal SOLN USE TO PERIODICALLY CHECK GLUCOSE MONITOR ACCORDING TO THE MANUAL 3/17/20   Eloise Gutierrez MD   UNIFINE PENTIPS 31G X 6 MM MISC USE WITH insulin pens ONCE daily 9/19/19   Eloise Gutierrez MD   PHARMACIST CHOICE LANCETS MISC USE TO TEST BLOOD GLUCOSE ONCE A DAY 7/12/19   Sera Worthy MD   SENNA PLUS 8.6-50 MG per tablet TAKE 1 TABLET BY MOUTH DAILY 7/28/18   Lacey Serra MD   Misc.  Devices MISC 1 each by Does not apply route daily Electric scooter 8/15/17   Belén Ramírez MD   glucose monitoring kit (FREESTYLE) monitoring kit 1 kit by Does not apply route daily 5/23/17   Belén Ramírez MD   SENSIPAR 30 MG tablet Take 1 tablet by mouth daily 3/17/17   Historical Provider, MD   OXYGEN Inhale into the lungs O2  3L  PER NASAL CANNULA NIGHTLY    Historical Provider, MD   Misc. Devices (DIGITAL GLASS SCALE) MISC Diagnosis: Diastolic Congestive heart failure 3/9/16   Belén Ramírez MD        Allergies:     Ibuprofen, Tramadol, Motrin [ibuprofen micronized], Strawberry extract, and Tape [adhesive tape]    Social History:     Tobacco:    reports that she quit smoking about 4 years ago. Her smoking use included cigarettes. She quit after 7.00 years of use. She has never used smokeless tobacco.  Alcohol:      reports no history of alcohol use. Drug Use:  reports no history of drug use. Family History:     Family History   Problem Relation Age of Onset    Diabetes Other     Cancer Mother         BREAST    Heart Disease Father         CAD-MI    Diabetes Father     High Blood Pressure Father     Diabetes Maternal Grandfather     Diabetes Paternal Grandfather     Heart Disease Paternal Grandfather     High Blood Pressure Paternal Grandfather        Review of Systems:     Positive and Negative as described in HPI. Review of Systems   Constitutional: Negative for activity change, fatigue and fever. HENT: Negative for congestion and sneezing. Respiratory: Negative for cough and shortness of breath. Cardiovascular: Positive for chest pain. Gastrointestinal: Positive for abdominal pain and nausea. Negative for constipation, diarrhea and vomiting. Musculoskeletal: Negative for arthralgias and myalgias. Neurological: Negative for dizziness and headaches. Psychiatric/Behavioral: Negative for confusion and decreased concentration.        Physical Exam:   BP 98/60   Pulse 87   Temp 97.9 °F (36.6 °C) (Oral)   Resp 30   Ht 4' 11\" (1.499 m)   Wt 240 lb (108.9 kg)   LMP 2014 (Approximate)   SpO2 98%   BMI 48.47 kg/m²   Temp (24hrs), Av.9 °F (36.6 °C), Min:97.9 °F (36.6 °C), Max:97.9 °F (36.6 °C)    Recent Labs     21  0120   POCGLU 91     No intake or output data in the 24 hours ending 21 0605    Physical Exam  Constitutional:       Appearance: Normal appearance. HENT:      Right Ear: External ear normal.      Left Ear: External ear normal.   Eyes:      General:         Right eye: No discharge. Left eye: No discharge. Conjunctiva/sclera: Conjunctivae normal.   Cardiovascular:      Rate and Rhythm: Normal rate and regular rhythm. Pulmonary:      Effort: No respiratory distress. Breath sounds: Normal breath sounds. No wheezing. Abdominal:      General: Bowel sounds are normal.      Palpations: Abdomen is soft. Tenderness: There is no abdominal tenderness. Musculoskeletal:         General: No tenderness. Right lower leg: No edema. Left lower leg: No edema. Neurological:      General: No focal deficit present. Mental Status: She is alert and oriented to person, place, and time.    Psychiatric:         Mood and Affect: Mood normal.         Behavior: Behavior normal.         Investigations:      Laboratory Testing:  Recent Results (from the past 24 hour(s))   Lipase    Collection Time: 21  3:40 PM   Result Value Ref Range    Lipase 26 13 - 60 U/L   Hepatic Function Panel    Collection Time: 21  3:40 PM   Result Value Ref Range    Albumin 3.7 3.5 - 5.2 g/dL    Alkaline Phosphatase 126 (H) 35 - 104 U/L    ALT 11 5 - 33 U/L    AST 26 <32 U/L    Total Bilirubin 0.35 0.3 - 1.2 mg/dL    Bilirubin, Direct 0.10 <0.31 mg/dL    Bilirubin, Indirect 0.25 0.00 - 1.00 mg/dL    Total Protein 7.6 6.4 - 8.3 g/dL    Globulin NOT REPORTED 1.5 - 3.8 g/dL    Albumin/Globulin Ratio NOT REPORTED 1.0 - 2.5   CBC Auto Differential    Collection Time: 08/20/21  3:40 PM   Result Value Ref Range    WBC 7.9 3.5 - 11.3 k/uL    RBC 2.77 (L) 3.95 - 5.11 m/uL    Hemoglobin 9.1 (L) 11.9 - 15.1 g/dL    Hematocrit 33.1 (L) 36.3 - 47.1 %    .5 (H) 82.6 - 102.9 fL    MCH 32.9 25.2 - 33.5 pg    MCHC 27.5 (L) 28.4 - 34.8 g/dL    RDW 17.2 (H) 11.8 - 14.4 %    Platelets 977 486 - 254 k/uL    MPV 10.7 8.1 - 13.5 fL    NRBC Automated 0.8 (H) 0.0 per 100 WBC    Differential Type NOT REPORTED     WBC Morphology NOT REPORTED     RBC Morphology NOT REPORTED     Platelet Estimate NOT REPORTED     Seg Neutrophils 79 (H) 36 - 65 %    Lymphocytes 11 (L) 24 - 43 %    Monocytes 7 3 - 12 %    Eosinophils % 1 1 - 4 %    Basophils 1 0 - 2 %    Immature Granulocytes 1 (H) 0 %    Segs Absolute 6.24 1.50 - 8.10 k/uL    Absolute Lymph # 0.87 (L) 1.10 - 3.70 k/uL    Absolute Mono # 0.55 0.10 - 1.20 k/uL    Absolute Eos # 0.08 0.00 - 0.44 k/uL    Basophils Absolute 0.08 0.00 - 0.20 k/uL    Absolute Immature Granulocyte 0.08 0.00 - 0.30 k/uL    Morphology MACROCYTOSIS PRESENT    Basic Metabolic Panel    Collection Time: 08/20/21  3:40 PM   Result Value Ref Range    Glucose 139 (H) 70 - 99 mg/dL    BUN 17 6 - 20 mg/dL    CREATININE 3.32 (H) 0.50 - 0.90 mg/dL    Bun/Cre Ratio 5 (L) 9 - 20    Calcium 8.0 (L) 8.6 - 10.4 mg/dL    Sodium 141 135 - 144 mmol/L    Potassium 3.6 (L) 3.7 - 5.3 mmol/L    Chloride 101 98 - 107 mmol/L    CO2 29 20 - 31 mmol/L    Anion Gap 11 9 - 17 mmol/L    GFR Non-African American 15 (L) >60 mL/min    GFR  18 (L) >60 mL/min    GFR Comment          GFR Staging NOT REPORTED    Trop/Myoglobin    Collection Time: 08/20/21  3:40 PM   Result Value Ref Range    Troponin, High Sensitivity 59 (HH) 0 - 14 ng/L    Troponin T NOT REPORTED <0.03 ng/mL    Troponin Interp NOT REPORTED     Myoglobin 197 (H) 25 - 58 ng/mL   Trop/Myoglobin    Collection Time: 08/20/21  6:16 PM   Result Value Ref Range    Troponin, High Sensitivity 51 (H) 0 - 14 ng/L    Troponin T NOT Patient status inpatient in the  Med/Surge    Chest pain   Telemetry   Monitoring control pain   Serial troponins   May need updated echo  Diabetes   Monitor and control blood sugar   Hypoglycemia protocol  ESRD on dialysis   Monitor renal function   Intake and output   Nephrology consult  Hypertension   Monitor and control blood pressure   Cardizem, lisinopril  CHF   Lasix  COPD   Symbicort   Supplemental oxygen as needed   Respiratory care therapy  Smoker    smoking cessation education  8. DVT prophylaxis   Heparin    Consultations:   IP CONSULT TO NEPHROLOGY    Patient is admitted as inpatient status because of co-morbidities listed above, severity of signs and symptoms as outlined, requirement for current medical therapies and most importantly because of direct risk to patient if care not provided in a hospital setting. Expected length of stay > 48 hours.     VENTURA VICKERS - CNP  8/21/2021  6:05 AM    Copy sent to Dr. Socorro Shafer MD

## 2021-08-21 NOTE — RT PROTOCOL NOTE
RT Inhaler-Nebulizer Bronchodilator Protocol Note    There is a bronchodilator order in the chart from a provider indicating to follow the RT Bronchodilator Protocol and there is an Initiate RT Bronchodilator Protocol order as well (see protocol at bottom of note). The findings from the last RT Protocol Assessment were as follows:  Smoking: >15 Pack years  Surgical Status: No surgery  Xray: X-ray not available  Respiratory Pattern: RR 12-20  Mental Status: Alert and Oriented  Breath Sounds: Diminished and/or crackles  Cough: Strong, spontaneous, non-productive  Activity Level: Walking with assistance  Oxygen Requirement: Room Air - 2LNC/28% or home setting  Indication for Bronchodilator Therapy:    Bronchodilator Assessment Score: 3    Aerosolized bronchodilator medication orders have been revised according to the RT Bronchodilator Protocol. RT Inhaler-Nebulizer Bronchodilator Protocol:    Respiratory Therapist to perform RT Therapy Protocol Assessment then follow the protocol. No Indications - adjust the frequency to every 6 hours PRN wheezing or bronchospasm, if no treatments needed after 48 hours then discontinue using Per Protocol order mode. If indication present, adjust the RT bronchodilator orders based on the Bronchodilator Assessment Score as follows:    0-6 - enter or revise RT bronchodilator order to Albuterol Inhaler order with frequency of every 2 hours PRN for wheezing or increased work of breathing using Per Protocol order mode. If Albuterol Inhaler not tolerated or not effective, then discontinue the Albuterol Inhaler order and enter Albuterol Nebulizer order with same frequency and PRN reasons. Repeat RT Therapy Protocol Assessment as needed.     7-10 - discontinue any other Inpatient aerosolized bronchodilator medication orders and enter or revise two Albuterol Inhaler orders, one with BID frequency and one with frequency of every 2 hours PRN wheezing or increased work of breathing

## 2021-08-21 NOTE — CONSULTS
Renal Consult Note    Patient :  Lizzie Mckeon; 46 y.o. MRN# [id number]  Location:  2018/2018-02  Attending:  Rosario Zavala DO  Admit Date:  8/20/2021   Hospital Day: 1    Reason for Consult:     Asked by Dr Rosario Zavala DO to see for JAYLIN/Elevated Creatinine. History Obtained From:     patient, electronic medical record, patient's nurse    HD Access:     previous  Left AV fistula    HD Unit:     [facility] hemodialysis facility     Nephrologist:     Dr. Lidia Mccain    Dry Weight:     106.5 kg    Admission Weight:     ?115 kg    History of Present Illness:     Lizzie Mckeon; 46 y.o. female with past medical history of ESRD on HD, hypertension, pulmonary hypertension, COPD, YONI, morbid obesity, diabetes type 2 presented to the hospital due to symptoms of chest pain, abdomen pain and diarrhea along with some shortness of breath. Patient was getting regular dialysis and the treatment was stopped in about 2 hours after due to her sudden chest pain. Patient was sent to ED and currently admitted under medicine at [facility].  Nephrologist consulted to history of ESRD on HD. Past History/Allergies? Social History:     Past Medical History:   Diagnosis Date    Asthma     AS A CHILD    CH[facility]congestive heart failure) ([facility] 2000    Chronic kidney disease     COPD (chronic obstructive pulmonary disease) ([facility] [address] 2000    INHALER USE AS NEEDED    Dialysis patient ([facility] [address]     CENTRAL DIALYSIS MON, WEDS AND FRI, FLUID RESTRICTION 2L/24 HRS PER PT    Hemodialysis patient ([facility] [address]     had Dilaysis 8-17-16  M[facility]W-NIECY @ [facility]    Hyperlipidemia     Hypertension 2000    ON RX    Obesity     YONI (obstructive sleep apnea) 2013    SLEEP STUDY -6/2016 AWAITING MACHINE, USING O2 AT NIGHT PRESENTLY    Pneumonia 2000    HAD TO BE ON VENTILATOR 12 DAYS    Renal failure     STARTED DIALYSIS 02/25/2016    Type II or unspecified type diabetes mellitus without mention of complication, not stated as uncontrolled Take 1 tablet by mouth daily  nitroGLYCERIN (NITROSTAT) 0.4 MG SL tablet, Place 1 tablet under the tongue every 5 minutes as needed for Chest pain  Easy Comfort Lancets MISC, USE TO TEST BLOOD SUGAR TWICE DAILY AS DIRECTED  blood glucose test strips (ONETOUCH ULTRA) strip, USE TO TEST BLOOD SUGAR TWICE DAILY AS DIRECTED  Insulin Pen Needle (EASY COMFORT PEN NEEDLES) 31G X 5 MM MISC, USE TO INJECT INSULIN ONCE DAILY  Alcohol Swabs (ALCOHOL PADS) 70 % PADS, USE TO CLEAN TESTING AND INJECTION SITES TWICE DAILY  Skin Protectants, Misc. (MINERIN CREME) CREA, APPLY TO AFFECTED AREA TOPICALLY AS NEEDED  Blood Glucose Monitoring Suppl (TRUE METRIX METER) w/Device KIT, USE TO TEST BLOOD GLUCOSE  Lancets (BD LANCET ULTRAFINE 30G) MISC, TEST TWICE DAILY  Lancet Devices ([employer] DIAGNOSTICS LANCING DEV) MISC, USE WITH LANCETS TO TEST BLOOD GLUCOSE  Blood Glucose Calibration (RIGOBERTO DOC CONTROL) Normal SOLN, USE TO PERIODICALLY CHECK GLUCOSE MONITOR ACCORDING TO THE MANUAL  UNIFINE PENTIPS 31G X 6 MM MISC, USE WITH insulin pens ONCE daily  PHARMACIST CHOICE LANCETS MISC, USE TO TEST BLOOD GLUCOSE ONCE A DAY  SENNA PLUS 8.6-50 MG per tablet, TAKE 1 TABLET BY MOUTH DAILY  Misc. Devices MISC, 1 each by Does not apply route daily Electric scooter  glucose monitoring kit (FREESTYLE) monitoring kit, 1 kit by Does not apply route daily  SENSIPAR 30 MG tablet, Take 1 tablet by mouth daily  OXYGEN, Inhale into the lungs O2  3L  PER NASAL CANNULA NIGHTLY  Misc.  Devices (DIGITAL GLASS SCALE) MISC, Diagnosis: Diastolic Congestive heart failure    Current Medications:     Scheduled Meds:    midodrine  5 mg Oral TID     aspirin  81 mg Oral Daily    dilTIAZem  180 mg Oral Daily    budesonide-formoterol  2 puff Inhalation BID    furosemide  40 mg Oral Daily    guaiFENesin  600 mg Oral BID    insulin glargine  15 Units Subcutaneous Nightly    lisinopril  5 mg Oral Daily    sennosides-docusate sodium  1 tablet Oral Daily    cinacalcet  30 mg Oral Daily    sodium chloride flush  5-40 mL Intravenous 2 times per day    atorvastatin  40 mg Oral Nightly    insulin lispro  0-12 Units Subcutaneous TID WC    insulin lispro  0-6 Units Subcutaneous Nightly    heparin (porcine)  5,000 Units Subcutaneous 3 times per day     Continuous Infusions:    dextrose      sodium chloride       PRN Meds:  fentanNYL, oxyCODONE-acetaminophen, glucose, dextrose, glucagon (rDNA), dextrose, sodium chloride flush, sodium chloride, ondansetron **OR** ondansetron, acetaminophen **OR** acetaminophen, magnesium hydroxide, nitroGLYCERIN, albuterol    Review of Systems:     Constitutional: No fever, no chills, no lethargy, no weakness. HEENT:  No headache, otalgia, itchy eyes, nasal discharge or sore throat. Cardiac:  Positive chest pain, dyspnea, orthopnea or PND. Chest:   No cough, phlegm or wheezing. Abdomen:  Positive abdominal pain, positive diarrhea, no nausea or vomiting. Neuro:  No focal weakness, abnormal movements orseizure like activity. Skin:   No rashes, no itching. :   No hematuria, no pyuria, no dysuria, no flank pain. Extremities:  No swelling or joint pains. ROS was otherwise negative except as mentioned in the [address] Input/Output:     No intake/output data recorded. Vital Signs:   Temperature:  Temp: 98.2 °F (36.8 °C)  TMax:   Temp (24hrs), Av.2 °F (36.8 °C), Min:97.9 °F (36.6 °C), Max:98.6 °F (37 °C)    Respirations:  Resp: 18  Pulse:   Pulse: 75  BP:    BP: (!) 90/47  BP Range: Systolic (93TPP), ITS:977 , Min:82 , NPQ:899       Diastolic (19HGC), VOE:37, Min:47, Max:83      Physical Examination:     General:  AAO x 3, speaking in full sentences, no accessory muscle use. HEENT: Atraumatic, normocephalic, no throat congestion, moist mucosa. Eyes:   Pupils equal, round and reactive to light, EOMI. Neck:   Supple  Chest:   Bilateral vesicular breath sounds, no rales or wheezes.   Cardiac:  S1 S2 RR, no murmurs, gallops or rubs.  Abdomen: Soft, obese, non-tender, no masses or organomegaly, BS audible. :   No suprapubic or flank tenderness. Neuro:  AAO x 3, No FND. SKIN:  No rashes, good skin turgor. Extremities:  No edema. Labs:       Recent Labs     08/20/21  1540 08/21/21  0406   WBC 7.9 11.3   RBC 2.77* 2.85*   HGB 9.1* 9.4*   HCT 33.1* 34.3*   .5* 120.4*   MCH 32.9 33.0   MCHC 27.5* 27.4*   RDW 17.2* 17.5*    171   MPV 10.7 10.0      BMP:   Recent Labs     08/20/21  1540 08/21/21  0406    142   K 3.6* 4.0    102   CO2 29 30   BUN 17 20   CREATININE 3.32* 4.32*   GLUCOSE 139* 94   CALCIUM 8.0* 8.3*      Magnesium:    Recent Labs     08/21/21  0406   MG 2.0     Albumin:    Recent Labs     08/20/21  1540 08/21/21  0406   LABALBU 3.7 3.7     Urinalysis/Chemistries:      Lab Results   Component Value Date    NITRU NEGATIVE 06/12/2016    COLORU YELLOW 06/12/2016    PHUR 5.0 06/12/2016    WBCUA 2 TO 5 06/12/2016    RBCUA 0 TO 2 06/12/2016    MUCUS NOT REPORTED 06/12/2016    TRICHOMONAS NOT REPORTED 06/12/2016    YEAST NOT REPORTED 06/12/2016    BACTERIA FEW 06/12/2016    SPECGRAV 1.013 06/12/2016    LEUKOCYTESUR NEGATIVE 06/12/2016    UROBILINOGEN Normal 06/12/2016    BILIRUBINUR NEGATIVE 06/12/2016    GLUCOSEU TRACE 06/12/2016    KETUA NEGATIVE 06/12/2016    AMORPHOUS 1+ 06/12/2016       Radiology:     Reviewed. Assessment:     1. ESRD on Hemodialysis. His regular HD days are MWF at [facility] hemodialysis facility using LUE AVF under Dr. Victoria Moulton. His dry weight is 106.5 kg. Admitted with ?115 kg.  2.  Chest pain. 3.  Denies nausea. 4.  Fatigue. 5.  Hypotension. 6.  Abdomen pain and diarrhea. 7.  Obesity. 8.  Hypertension. 9.  Anemia of chronic disease  10. Secondary hyperparathyroidism  11. Hypertension    Plan:   1. Patient was seen and examined on HD at bedside. Orders were confirmed with the HD nurse. 2. Strict Input and Output, Daily weigh and document in the chart. 3.  Low Potassium, Low phosphorus and low salt diet. Fluids to be restricted to 1500ml/day. 4. IV Aranesp/Epogen for anemia of chronic disease with HD weekly. 5. IV Zemplar per protocol for secondary hyperparathyroidism with HD thrice a week. 6.  Chest pain treatment as per primary. 7.  BMP in AM.  8.  Will follow. Nutrition   Please ensure that patient is on a renal diet/TF. Thank you for the consultation. Please do not hesitate to contact us for any further questions/concerns. We will continue to follow along with you. Lobo Franklin MD  [facility]     This note is created with the assistance of a speech-recognition program. While intending to generate a document that actually reflects the content of the visit, no guarantees can be provided that every mistake has been identified and corrected by editing.

## 2021-08-21 NOTE — PROGRESS NOTES
[facility]  Office: [address], DO, Wily Luna, DO, Oscar Rain, DO, [place], DO, Pooja Perry MD, Zaki Mendoza MD, Dorothea Chase MD, Oscar Baig MD, Andres Perera MD, Micheal Coy MD, Eugene Duncan MD, Donal Torres, DO, Tahmina Jay MD, Anastasia Lang, DO, Camden Dubon MD,  Luis Bowling DO, Olivia Lira MD, Ashvin Hernandez MD, Mandy Pepper MD, Kadi Parks MD, Vito Giang MD, Mariama Grider MD, Ruth Ann Barrientos MD, Lara Duran, [facility], [facility], CNP, Daya Washburn, CNP, Lizzie Tate, [facility], Óscar Bah, CNP, Janice Rose, CNP, Celestino Mota, CNP, Toro Argueta, CNP, Lilian Pizano, CNP, Martha Fu, PA-C, Nazia Worthington, [facility], Laura Márquez, CNP, Isaias Dye, CNP, Sanchez Hewitt, CNP, Nagi Pacheco, CNP, Julito Myers, CNP, Lay Cunha, CNP, Live Mack, [facility], Janette Finch, Palomares [facility]    Progress Note    8/21/2021    3:48 PM    Name:   Jaskaran Calloway  MRN:     [id number]     [place]:      [de-identified]   Room:   2018/2018-02   Day:  1  Admit Date:  8/20/2021  2:57 PM    PCP:   Cony Jane MD  Code Status:  Full Code    Subjective:     C/C:   Chief Complaint   Patient presents with    Chest Pain    Nausea    Fatigue     Interval History Status: not changed. Patient continues to complain of epigastric pain/lower chest pain. Denies any shortness of breath, diaphoresis, nausea or vomiting or other associated symptoms. Symptoms started yesterday while undergoing her dialysis treatment ended treatment was aborted after approximately 2 hours. Brief History: This is a 51-year-old female that presents to [facility] emergency room with a complaint of chest pain that started while she was in dialysis on the date of the 20th. Pain was located in the epigastric region and lower chest without radiation.   Her dialysis treatment was aborted after approximately 2 hours due to her symptoms. She otherwise denies any other associated symptoms. She denies any cough, sputum reduction or other associated symptoms. Review of Systems:     Constitutional:  negative for chills, fevers, sweats  Respiratory:  negative for cough, dyspnea on exertion, shortness of breath, wheezing  Cardiovascular: Positive for chest pain, chest pressure/discomfort, negative for lower extremity edema, palpitations  Gastrointestinal:  negative for constipation, diarrhea, nausea, vomiting, positive for epigastric pain  Neurological:  negative for dizziness, headache    Medications: Allergies:     Allergies   Allergen Reactions    Ibuprofen     Tramadol Hives    Motrin [Ibuprofen Micronized] Itching    Strawberry Extract Itching and Rash    Tape [facility] Tape] Rash     No paper tape silk only       Current Meds:   Scheduled Meds:    midodrine  5 mg Oral TID WC    aspirin  81 mg Oral Daily    dilTIAZem  180 mg Oral Daily    budesonide-formoterol  2 puff Inhalation BID    furosemide  40 mg Oral Daily    guaiFENesin  600 mg Oral BID    insulin glargine  15 Units Subcutaneous Nightly    lisinopril  5 mg Oral Daily    sennosides-docusate sodium  1 tablet Oral Daily    cinacalcet  30 mg Oral Daily    sodium chloride flush  5-40 mL Intravenous 2 times per day    atorvastatin  40 mg Oral Nightly    insulin lispro  0-12 Units Subcutaneous TID WC    insulin lispro  0-6 Units Subcutaneous Nightly    heparin (porcine)  5,000 Units Subcutaneous 3 times per day     Continuous Infusions:    dextrose      sodium chloride       PRN Meds: fentanNYL, oxyCODONE-acetaminophen, diphenhydrAMINE, glucose, dextrose, glucagon (rDNA), dextrose, sodium chloride flush, sodium chloride, ondansetron **OR** ondansetron, acetaminophen **OR** acetaminophen, magnesium hydroxide, nitroGLYCERIN, albuterol    Data:     Past Medical History:   has a past medical history of Asthma, CHF (congestive heart failure) ([facility] [address] Chronic kidney disease, COPD (chronic obstructive pulmonary disease) ([facility] [address] [facility] Dialysis patient ([facility] Hemodialysis patient ([facility] Hyperlipidemia, Hypertension, Obesity, YONI (obstructive sleep apnea), Pneumonia, Renal failure, Type II or unspecified type diabetes mellitus without mention of complication, not stated as uncontrolled, and Ventilator dependence ([facility] Social History:   reports that she quit smoking about 4 years ago. Her smoking use included cigarettes. She quit after 7.00 years of use. She has never used smokeless tobacco. She reports that she does not drink alcohol and does not use drugs. Family History:   Family History   Problem Relation Age of Onset    Diabetes Other     Cancer Mother         BREAST    Heart Disease Father         CAD-MI    Diabetes Father     High Blood Pressure Father     Diabetes Maternal Grandfather     Diabetes Paternal Grandfather     Heart Disease Paternal Grandfather     High Blood Pressure Paternal Grandfather        Vitals:  /61   Pulse 77   Temp 98.2 °F (36.8 °C) (Oral)   Resp 18   Ht 4' 11\" (1.499 m)   Wt 240 lb (108.9 kg)   LMP 2014 (Approximate)   SpO2 92%   BMI 48.47 kg/m²   Temp (24hrs), Av.4 °F (36.9 °C), Min:98.2 °F (36.8 °C), Max:98.6 °F (37 °C)    Recent Labs     21  0120 21  0815 21  1114   POCGLU 91 75 96       I/O (24Hr):   No intake or output data in the 24 hours ending 21 1548    Labs:  Hematology:  Recent Labs     21  1540 21  0406   WBC 7.9 11.3   RBC 2.77* 2.85*   HGB 9.1* 9.4*   HCT 33.1* 34.3*   .5* 120.4*   MCH 32.9 33.0   MCHC 27.5* 27.4*   RDW 17.2* 17.5*    171   MPV 10.7 10.0     Chemistry:  Recent Labs     21  1540 21  1816 21  2351 21  0406 21  1150     --   --  142  --    K 3.6*  --   --  4.0  --      --   --  102  --    CO2 29  --   --  30  --    GLUCOSE 139*  --   --  94  --    BUN 17  --   --  20  --    CREATININE 3.32*  -- --  4.32*  --    MG  --   --   --  2.0  --    ANIONGAP 11  --   --  10  --    LABGLOM 15*  --   --  11*  --    GFRAA 18*  --   --  13*  --    CALCIUM 8.0*  --   --  8.3*  --    TROPHS 59*   < > 52* 55* 53*   MYOGLOBIN 197*   < > 194* 205* 216*    < > = values in this interval not displayed. Recent Labs     08/20/21  1540 08/21/21  0120 08/21/21  0406 08/21/21  0815 08/21/21  1114   PROT 7.6  --  7.4  --   --    LABALBU 3.7  --  3.7  --   --    AST 26  --  13  --   --    ALT 11  --  10  --   --    ALKPHOS 126*  --  121*  --   --    BILITOT 0.35  --  0.46  --   --    BILIDIR 0.10  --   --   --   --    LIPASE 26  --   --   --   --    CHOL  --   --  60  --   --    HDL  --   --  32*  --   --    LDLCHOLESTEROL  --   --  18  --   --    CHOLHDLRATIO  --   --  1.9  --   --    TRIG  --   --  50  --   --    VLDL  --   --  NOT REPORTED  --   --    POCGLU  --  91  --  75 96     ABG:  Lab Results   Component Value Date    POCPH 7.22 06/12/2016    POCPCO2 61 06/12/2016    POCPO2 68 06/12/2016    POCHCO3 24.8 06/12/2016    NBEA 3 06/12/2016    PBEA NOT REPORTED 06/12/2016    JVL9RRH 27 06/12/2016    KTPW2ERT 88 06/12/2016    FIO2 NOT REPORTED 07/01/2019     Lab Results   Component Value Date/Time    SPECIAL NOT REPORTED 04/13/2017 06:15 AM     Lab Results   Component Value Date/Time    CULTURE NORMAL RESPIRATORY NITZA 04/13/2017 06:15 AM    CULTURE  04/13/2017 06:15 AM     [facility] [id number] [place] [address], [address] [place] [address] [phone]       Radiology:  CT ABDOMEN PELVIS WO CONTRAST Additional Contrast? None    Result Date: 8/20/2021  Mild ascites and subcutaneous edema. Moderate bilateral renal atrophy. Other incidental findings as above. Limited sensitivity without IV and oral contrast.     XR ACUTE ABD SERIES CHEST 1 VW    Result Date: 8/20/2021  Possible mild CHF. Bowel gas pattern nonspecific.        Physical Examination:        General appearance:  alert, cooperative and no distress  Mental Status:  oriented to person, place and time and normal affect  Lungs:  clear to auscultation bilaterally, normal effort, diminished throughout  Heart:  regular rate and rhythm, no murmur  Abdomen:  soft, epigastric tenderness, nondistended, normal bowel sounds, no masses, hepatomegaly, splenomegaly  Extremities:  no edema, redness, tenderness in the calves  Skin:  no gross lesions, rashes, induration    Assessment:        Hospital Problems         Last Modified POA    * (Principal) Chest pain 8/21/2021 Yes    Type 2 diabetes mellitus with renal manifestations ([facility] [address] (Chronic) 8/21/2021 Yes    Current smoker 8/21/2021 Yes    Overview Signed 9/7/2015  4:44 AM by Sterling Johnson     replace inactive diagnosis         Acute diastolic congestive heart failure ([facility] [address] 8/21/2021 Yes    Morbid obesity with BMI of 45.0-49.9, adult ([address] 8/21/2021 Yes    COPD exacerbation ([address] 8/21/2021 Yes    ESRD (end stage renal disease) on dialysis ([facility] [address] 8/21/2021 Yes    Essential hypertension 8/21/2021 Yes          Plan:        Consult nephrology for dialysis, likely needs fluid removal due to presence of ascites and subcutaneous abdominal edema  Trend cardiac enzymes, troponin mildly elevated at baseline, consistent with chronic kidney disease  Insulin scale for glycemic control  Oxygen and aerosols as needed  Monitoring control blood pressure  GI and DVT prophylaxis  Trend labs  Further recommendations and plans pending her dialysis treatment, further investigation if symptoms persist after fluid removal    Georgie Lutz DO  8/21/2021  3:48 PM

## 2021-08-21 NOTE — PROGRESS NOTES
Dialysis Safety Checks:    Patient ID Verified (Y/N) Y     -Hepatitis Test                   Date      Result  Hepatitis B Surface Antigen   21 Negattive     Hepatitis B Surface Antibody 20 Negative     Hepatitis B Core Antibody  16 Negative     -Treatment Initiation  Blood Vol Processed Goal (Liters)  Pump Speed x Treatment Hours x 60 Minutes    Target Fluid Removal 3760-1245 ml     * Intra-treatment documented Safety Checks include the followin) Access and face visible at all times. 2) All connections and blood lines are secure with no kinks. 3) NVL alarm engaged. 4) Hemosafe device applied (if applicable). 5) No collapse of Arterial or Venous blood chambers. 6) All blood lines / pump segments in the air detectors.   --------------------------------------------------------------------------------    Report received from Maribel Muñoz

## 2021-08-21 NOTE — PLAN OF CARE
Problem: Pain:  Description: Pain management should include both nonpharmacologic and pharmacologic interventions. Goal: Pain level will decrease  Description: Pain level will decrease  Outcome: Ongoing  Goal: Control of acute pain  Description: Control of acute pain  Outcome: Ongoing  Goal: Control of chronic pain  Description: Control of chronic pain  Outcome: Ongoing     Problem: SAFETY  Goal: Free from accidental physical injury  Outcome: Ongoing  Note: Siderails up x 2  Hourly rounding  Call light in reach  Instructed to call for assist before attempting out of bed. Remains free from falls and accidental injury at this time   Floor free from obstacles  Bed is locked and in lowest position  Adequate lighting provided  Bed alarm on, [place] star and Stay with Me signs posted      Goal: Free from intentional harm  Outcome: Ongoing     Problem: DAILY CARE  Goal: Daily care needs are met  Outcome: Ongoing     Problem: PAIN  Goal: Patient's pain/discomfort is manageable  Outcome: Ongoing  Note: Pain level assessment complete. Patient educated on pain scale and control interventions  PRN pain medication given per patient request  Patient instructed to call out with new onset of pain or unrelieved pain       Problem: SKIN INTEGRITY  Goal: Skin integrity is maintained or improved  Outcome: Ongoing     Problem: KNOWLEDGE DEFICIT  Goal: Patient/S.O. demonstrates understanding of disease process, treatment plan, medications, and discharge instructions.   Outcome: Ongoing     Problem: DISCHARGE BARRIERS  Goal: Patient's continuum of care needs are met  Outcome: Ongoing

## 2021-08-21 NOTE — PROGRESS NOTES
Pt admitted to room #2018 from ER  Oriented to room and call light/tv controls. Bed in lowest position, wheels locked, 2/4 side rails up  Call light in reach, room free of clutter, adequate lighting provided.

## 2021-08-22 NOTE — PROGRESS NOTES
Occupational [address]  Occupational Therapy Not Seen Note    Patient not available for Occupational Therapy due to:    [] Testing:    [] Hemodialysis    [x] Cancelled by RN:KAREN Boone reporting pt w/ abnormal heart rhythms this morning, pt will be getting further testing and not appropriate at this time. Will try back later as able.    []Refusal by Patient:    [] Surgery:     [] Intubation:     [] Pain Medication:    [] Sedation:     [] Spine Precautions :    [] Medical Instability:    [] Other:      Mina King, OT

## 2021-08-22 NOTE — PROGRESS NOTES
Patient had one bowel movement before being transferred to CVICU room #2034 and it was just blood noted and no stool. Transfer nurse updated along with the report given. Patient transferred to room #2034 with all her belongings in stable condition.

## 2021-08-22 NOTE — PROGRESS NOTES
[facility]  Office: [address], DO, Christiano Moreno, DO, Rusty Toro, DO, [place], DO, Nilesh Hardy MD, Felipe Villarreal MD, Carito Escalera MD, Katelyn Doss MD, Mikki Salmon MD, Eric Lopez MD, Micki Zuniga MD, Octavio Yoon DO, Syl Lilly MD, Keila Salcedo DO, Venus Vanegas MD,  Robert Solo DO, Ashwin Squires MD, Lacie Wilkerson MD, Gabe Philippe MD, Cesar Darling MD, Lalita Eastman MD, Clark Cordero MD, Taylor Feliciano MD, Florencio Veloz, [facility], [facility], CNP, Linwood Camacho, CNP, Sourav Daniel, CNS, Samy Christiansen, CNP, Claude Leep, CNP, Noemi Rg, CNP, Suzi Palmer, CNP, Taina Locke, CNP, Irwin Vicente PA-C, Espinoza Guallpa, [facility], Sheron Liz, CNP, Isela Burgess, CNP, Moon Richardson, CNP, Hao Mccoy, CNP, Kristin Cabezas, CNP, Gabriela Bazzi, CNP, Agustin White, [facility], Fly Plummer, Palomares [facility]    Progress Note    8/22/2021    7:40 AM    Name:   Sebastian Trinh  MRN:     [id number]     Ryann Rafter:      [de-identified]   Room:   2018/2018-02  IP Day:  2  Admit Date:  8/20/2021  2:57 PM    PCP:   Dagoberto Worrell MD  Code Status:  Full Code    Subjective:     C/C:   Chief Complaint   Patient presents with    Chest Pain    Nausea    Fatigue     Interval History Status: improved. Patient's pain is slightly better today, improved with fluid removal on dialysis yesterday. Pain now more localized to the right upper quadrant region. Denies any nausea or vomiting today, no chest pain, fevers or chills, shortness of breath or other acute complaints. Brief History: This is a 59-year-old female that presents to [facility] emergency room with a complaint of chest pain that started while she was in dialysis on the date of the 20th. Pain was located in the epigastric region and lower chest without radiation. Her dialysis treatment was aborted after approximately 2 hours due to her symptoms. She otherwise denies any other associated symptoms. She denies any cough, sputum reduction or other associated symptoms    Review of Systems:     Constitutional:  negative for chills, fevers, sweats  Respiratory:  negative for cough, dyspnea on exertion, shortness of breath, wheezing  Cardiovascular:  negative for chest pain, chest pressure/discomfort, lower extremity edema, palpitations  Gastrointestinal:  negative for constipation, diarrhea, nausea, vomiting  Neurological:  negative for dizziness, headache    Medications: Allergies:     Allergies   Allergen Reactions    Ibuprofen     Tramadol Hives    Motrin [Ibuprofen Micronized] Itching    Strawberry Extract Itching and Rash    Tape Danny Lee Tape] Rash     No paper tape silk only       Current Meds:   Scheduled Meds:    midodrine  5 mg Oral TID WC    aspirin  81 mg Oral Daily    dilTIAZem  180 mg Oral Daily    budesonide-formoterol  2 puff Inhalation BID    furosemide  40 mg Oral Daily    guaiFENesin  600 mg Oral BID    insulin glargine  15 Units Subcutaneous Nightly    lisinopril  5 mg Oral Daily    sennosides-docusate sodium  1 tablet Oral Daily    cinacalcet  30 mg Oral Daily    sodium chloride flush  5-40 mL Intravenous 2 times per day    atorvastatin  40 mg Oral Nightly    insulin lispro  0-12 Units Subcutaneous TID WC    insulin lispro  0-6 Units Subcutaneous Nightly    heparin (porcine)  5,000 Units Subcutaneous 3 times per day     Continuous Infusions:    dextrose      sodium chloride       PRN Meds: fentanNYL, oxyCODONE-acetaminophen, diphenhydrAMINE, glucose, dextrose, glucagon (rDNA), dextrose, sodium chloride flush, sodium chloride, ondansetron **OR** ondansetron, acetaminophen **OR** acetaminophen, magnesium hydroxide, nitroGLYCERIN, albuterol    Data:     Past Medical History:   has a past medical history of Asthma, CHF (congestive heart failure) ([facility] [address] Chronic kidney disease, COPD (chronic obstructive pulmonary disease) [facility]), Dialysis patient [facility]), Hemodialysis patient ([facility] Hyperlipidemia, Hypertension, Obesity, YONI (obstructive sleep apnea), Pneumonia, Renal failure, Type II or unspecified type diabetes mellitus without mention of complication, not stated as uncontrolled, and Ventilator dependence ([facility] Social History:   reports that she quit smoking about 4 years ago. Her smoking use included cigarettes. She quit after 7.00 years of use. She has never used smokeless tobacco. She reports that she does not drink alcohol and does not use drugs. Family History:   Family History   Problem Relation Age of Onset    Diabetes Other     Cancer Mother         BREAST    Heart Disease Father         CAD-MI    Diabetes Father     High Blood Pressure Father     Diabetes Maternal Grandfather     Diabetes Paternal Grandfather     Heart Disease Paternal Grandfather     High Blood Pressure Paternal Grandfather        Vitals:  BP (!) 91/38   Pulse 128   Temp 98.6 °F (37 °C) (Oral)   Resp 18   Ht 4' 11\" (1.499 m)   Wt 251 lb (113.9 kg)   LMP 2014 (Approximate)   SpO2 100%   BMI 50.70 kg/m²   Temp (24hrs), Av.6 °F (37 °C), Min:98.2 °F (36.8 °C), Max:98.9 °F (37.2 °C)    Recent Labs     21  0815 21  1114 21  1948 21  0614   POCGLU 75 96 97 69       I/O (24Hr):     Intake/Output Summary (Last 24 hours) at 2021 0740  Last data filed at 2021 0354  Gross per 24 hour   Intake 230 ml   Output 2900 ml   Net -2670 ml       Labs:  Hematology:  Recent Labs     21  1540 21  0406   WBC 7.9 11.3   RBC 2.77* 2.85*   HGB 9.1* 9.4*   HCT 33.1* 34.3*   .5* 120.4*   MCH 32.9 33.0   MCHC 27.5* 27.4*   RDW 17.2* 17.5*    171   MPV 10.7 10.0     Chemistry:  Recent Labs     21  1540 21  1816 21  2351 21  0406 21  1150     --   --  142  --    K 3.6*  --   --  4.0  --      --   --  102  --    CO2 29  --   --  30  --    GLUCOSE 139*  -- --  94  --    BUN 17  --   --  20  --    CREATININE 3.32*  --   --  4.32*  --    MG  --   --   --  2.0  --    ANIONGAP 11  --   --  10  --    LABGLOM 15*  --   --  11*  --    GFRAA 18*  --   --  13*  --    CALCIUM 8.0*  --   --  8.3*  --    TROPHS 59*   < > 52* 55* 53*   MYOGLOBIN 197*   < > 194* 205* 216*    < > = values in this interval not displayed.      Recent Labs     08/20/21  1540 08/20/21  2351 08/21/21  0120 08/21/21  0406 08/21/21  0815 08/21/21  1114 08/21/21  1948 08/22/21  0614   PROT 7.6  --   --  7.4  --   --   --   --    LABALBU 3.7  --   --  3.7  --   --   --   --    LABA1C  --  4.4  --   --   --   --   --   --    AST 26  --   --  13  --   --   --   --    ALT 11  --   --  10  --   --   --   --    ALKPHOS 126*  --   --  121*  --   --   --   --    BILITOT 0.35  --   --  0.46  --   --   --   --    BILIDIR 0.10  --   --   --   --   --   --   --    LIPASE 26  --   --   --   --   --   --   --    CHOL  --   --   --  60  --   --   --   --    HDL  --   --   --  32*  --   --   --   --    LDLCHOLESTEROL  --   --   --  18  --   --   --   --    CHOLHDLRATIO  --   --   --  1.9  --   --   --   --    TRIG  --   --   --  50  --   --   --   --    VLDL  --   --   --  NOT REPORTED  --   --   --   --    POCGLU  --   --  91  --  75 96 97 69     ABG:  Lab Results   Component Value Date    POCPH 7.22 06/12/2016    POCPCO2 61 06/12/2016    POCPO2 68 06/12/2016    POCHCO3 24.8 06/12/2016    NBEA 3 06/12/2016    PBEA NOT REPORTED 06/12/2016    GJX3BNB 27 06/12/2016    XWUG2ZYW 88 06/12/2016    FIO2 NOT REPORTED 07/01/2019     Lab Results   Component Value Date/Time    SPECIAL NOT REPORTED 04/13/2017 06:15 AM     Lab Results   Component Value Date/Time    CULTURE NORMAL RESPIRATORY NITZA 04/13/2017 06:15 AM    CULTURE  04/13/2017 06:15 AM     [address] [phone]       Radiology:  CT ABDOMEN PELVIS WO CONTRAST Additional Contrast? None    Result Date: 8/20/2021  Mild ascites and subcutaneous edema. Moderate bilateral renal atrophy. Other incidental findings as above. Limited sensitivity without IV and oral contrast.     XR ACUTE ABD SERIES CHEST 1 VW    Result Date: 8/20/2021  Possible mild CHF. Bowel gas pattern nonspecific. Physical Examination:        General appearance:  alert, cooperative and no distress  Mental Status:  oriented to person, place and time and normal affect  Lungs:  clear to auscultation bilaterally, normal effort  Heart:  regular rate and rhythm, no murmur  Abdomen:  soft, right upper quadrant and epigastric tenderness, nondistended, normal bowel sounds, no masses, hepatomegaly, splenomegaly  Extremities:  no edema, redness, tenderness in the calves  Skin:  no gross lesions, rashes, induration    Assessment:        Hospital Problems         Last Modified POA    * (Principal) Chest pain 8/21/2021 Yes    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) (Chronic) 8/21/2021 Yes    Current smoker 8/21/2021 Yes    Overview Signed 9/7/2015  4:44 AM by [facility]     replace inactive diagnosis         Acute diastolic congestive heart failure ([address] 8/21/2021 Yes    Morbid obesity with BMI of 45.0-49.9, adult ([address] 8/21/2021 Yes    COPD exacerbation ([address] 8/21/2021 Yes    ESRD (end stage renal disease) on dialysis ([address] 8/21/2021 Yes    Essential hypertension 8/21/2021 Yes          Plan:        Check gallbladder ultrasound  Dialysis per nephrology  Insulin scale for glycemic control  Continue home cardiac medications  Oxygen and aerosols as needed  Monitor and control blood pressure  General surgery consultation for right upper quadrant pain,, possible gallbladder disease as well as management of her hemorrhoids.   See orders for detail    Juliana Milner DO  8/22/2021  7:40 AM

## 2021-08-22 NOTE — PLAN OF CARE
Problem: Pain:  Description: Pain management should include both nonpharmacologic and pharmacologic interventions. Goal: Pain level will decrease  Description: Pain level will decrease  8/22/2021 1311 by Marin Davis RN  Outcome: Ongoing  Note: Pain level assessment complete. Patient educated on pain scale and control interventions  PRN pain medication given per patient request  Patient instructed to call out with new onset of pain or unrelieved pain    8/22/2021 0422 by Eli Walden RN  Outcome: Ongoing  Note: Pain level assessment complete.    Patient educated on pain scale and control interventions  PRN pain medication given per patient request  Patient instructed to call out with new onset of pain or unrelieved pain    Goal: Control of acute pain  Description: Control of acute pain  8/22/2021 1311 by Marin Davis RN  Outcome: Ongoing  8/22/2021 0422 by Eli Walden RN  Outcome: Ongoing  Goal: Control of chronic pain  Description: Control of chronic pain  8/22/2021 1311 by Marin Davis RN  Outcome: Ongoing  8/22/2021 0422 by Eli Walden RN  Outcome: Ongoing     Problem: SAFETY  Goal: Free from accidental physical injury  8/22/2021 1311 by Marin Davis RN  Outcome: Ongoing  8/22/2021 0422 by Eli Walden RN  Outcome: Ongoing  Goal: Free from intentional harm  8/22/2021 1311 by Marin Davis RN  Outcome: Ongoing  8/22/2021 0422 by Eli Walden RN  Outcome: Ongoing     Problem: DAILY CARE  Goal: Daily care needs are met  8/22/2021 1311 by Marin Davis RN  Outcome: Ongoing  8/22/2021 0422 by Eli Walden RN  Outcome: Ongoing     Problem: PAIN  Goal: Patient's pain/discomfort is manageable  8/22/2021 1311 by Marin Davis RN  Outcome: Ongoing  8/22/2021 0422 by Eli Walden RN  Outcome: Ongoing     Problem: SKIN INTEGRITY  Goal: Skin integrity is maintained or improved  8/22/2021 1311 by Marin Davis RN  Outcome: Ongoing  8/22/2021 0422 by Eli Walden RN  Outcome: Ongoing Problem: KNOWLEDGE DEFICIT  Goal: Patient/S.O. demonstrates understanding of disease process, treatment plan, medications, and discharge instructions.   8/22/2021 1311 by José Miguel Kennedy RN  Outcome: Ongoing  8/22/2021 0422 by Cathy Triplett RN  Outcome: Ongoing     Problem: DISCHARGE BARRIERS  Goal: Patient's continuum of care needs are met  8/22/2021 1311 by José Miguel Kennedy RN  Outcome: Ongoing  8/22/2021 0422 by Cathy Triplett RN  Outcome: Ongoing

## 2021-08-22 NOTE — CARE COORDINATION
Case Management Initial Discharge Plan  [facility],             Met with:patient to discuss discharge plans. Information verified: address, contacts, phone number, , insurance Yes  PCP: Eliud Holder MD  Date of last visit: 2021    Insurance Provider: Joelle Lal Dual    Discharge Planning    Living Arrangements:  Alone   Support Systems:  Family Members    Lives in 1st Floor Apartment  0 stairs to climb to get into front door, 0 stairs to climb to reach second floor  Location of bedroom/bathroom in home Main Floor    Patient able to perform ADL's:Independent with walker  Current Services (outpatient & in home) None  DME equipment: walker, raised TS, electric scooter, O2 through [facility] (3L during the day & 4L HS)  DME provider: [facility] for O2    Pharmacy: [facility], [place], operated by [facility]    Potential Assistance Needed:  N/A    Patient agreeable to home care: Yes  Freedom of choice provided:  yes    Prior SNF/Rehab Placement and Facility: Yes, [address] [place] [address] to SNF/Rehab: No  [place] of choice provided: n/a   Evaluation: n/a    Expected Discharge date:  21  Patient expects to be discharged to: Follow Up Appointment: Best Day/ Time:      Transportation provider: Mother  Transportation arrangements needed for discharge: No    Readmission Risk              Risk of Unplanned Readmission:  25             Does patient have a readmission risk score greater than 14?: Yes  If yes, follow-up appointment must be made within 7 days of discharge. Goal of Care:       Discharge Plan:   Met with patient at bedside to discuss d/c plans. Patient is admitted with chest pain that started while she was receiving dialysis on Friday. Cardiac enzymes are being trended. Currently patient is using 2L O2, however patient has O2 at home through [facility]. Patient has had [facility] in the past and would like at d/c. Face sheet faxed to [phone]-Awaiting Response.   Patient is also requesting a shower seat with back-Need Rx and Documentation. Independent with a walker. Lives alone and does not drive. HD M/W/F at [facility]  [address] [facility], [address] [place] [address]  Chair time is 4091    Consults: Nephrology    The Plan for Transition of Care is related to the following treatment goals: SN/PT/OT    The Patient and/or patient representative was provided with a choice of provider and agrees   with the discharge plan. [x] Yes [] No    Freedom of choice list was provided with basic dialogue that supports the patient's individualized plan of care/goals, treatment preferences and shares the quality data associated with the providers.  [x] Yes [] No          Electronically signed by Shoaib Ortiz RN on 8/22/21 at 1:56 PM EDT

## 2021-08-22 NOTE — DISCHARGE INSTR - COC
Continuity of Care Form    Patient Name: Tawanda Bernal   :  1970  MRN:  [id number]    Admit date:  2021  Discharge date:  21    Code Status Order: Full Code   Advance Directives:      Admitting Physician:  Murray Self DO  PCP: Tracie Gross MD    Discharging Nurse: Ashley Ortiz 23 Unit/Room#:   Discharging Unit Phone Number: [phone]    Emergency Contact:   Extended Emergency Contact Information  Primary Emergency Contact: [address] [facility], [address] Phone: [phone]  Relation: Parent    Past Surgical History:  Past Surgical History:   Procedure Laterality Date   [facility]    x3    HYSTERECTOMY  2014    TOTAL    SKIN FULL GRAFT  1983    CHEST-BIRTH YOU AND MOLES REMOVED       Immunization History:   Immunization History   Administered Date(s) Administered    COVID-19, Moderna, PF, 100mcg/0.5mL 08/10/2021    Hepatitis B vaccine 10/15/2019, 2019, 2019, 2020, 2020, 2020, 10/16/2020, 2021    Influenza Virus Vaccine 2020    Pneumococcal Conjugate 13-valent (Freya Ally) 2016, 10/17/2019    Pneumococcal Polysaccharide ([id number]) 10/03/2013, 2019       Active Problems:  Patient Active Problem List   Diagnosis Code    Asthma J45.909    YONI (obstructive sleep apnea) G47.33    Left knee pain M25.562    Hidradenitis L73.2    Current smoker F17.200    Microalbuminuria R80.9    Type 2 diabetes mellitus with renal manifestations ([facility] [address] [address] E11.29    CKD (chronic kidney disease) stage 4, GFR 15-29 ml/min ([facility]) N18.4    Acute diastolic congestive heart failure (HCC) I50.31    Hyperkalemia K69.4    Acute systolic congestive heart failure ([facility]) I50.21    Pulmonary hypertension ([facility]) I27.20    Morbid obesity with BMI of 45.0-49.9, adult ([facility]) E66.01, Z68.42    Acute on chronic respiratory failure with hypoxia ([facility]) J96.21    COPD exacerbation ([facility]) J44.1    Asthma exacerbation J45. 0    Type 2 diabetes mellitus with diabetic nephropathy ([facility]) E11.21    ESRD (end stage renal disease) on dialysis ([facility]) N18.6, Z99.2    Bronchitis J40    Essential hypertension I10    YONI on CPAP G47.33, Z99.89    Hyperglycemia, drug-induced R73.9, T50.905A    Needs smoking cessation education F17.200    Hyperglycemia R73.9    Drowsiness R40.0    Acute on chronic respiratory failure with hypercapnia ([facility]) J96.22    Mobility impaired Z74.09    S/P cardiac cath-mINIMAL caD 3/15/16 - dR. Nikki Khan N05.797    Type 2 diabetes mellitus with chronic kidney disease on chronic dialysis ([facility]) E11.22, N18.6, Z99.2    Type 2 diabetes mellitus without complication ([facility]) C19.6    Congestive heart failure ([facility]) I50.9    Asthma with COPD with exacerbation ([facility]) J44.1, J45.901    Acute exacerbation of CHF (congestive heart failure) ([facility]) I50.9    Chronic obstructive pulmonary disease ([facility]) J44.9    Chronic kidney disease, stage V ([facility]) N18.5    Acute respiratory acidosis E87.2    Precordial pain R07.2    Gastroesophageal reflux disease without esophagitis K21.9    Pulmonary HTN ([facility]) I27.20    Morbid obesity due to excess calories ([facility]) E66.01    Symptomatic anemia D64.9    Elevated serum creatinine R79.89    ESRD needing dialysis ([facility]) N18.6, Z99.2    Chest pain R07.9       Isolation/Infection:   Isolation            No Isolation          Patient Infection Status       Infection Onset Added Last Indicated Last Indicated By Review Planned Expiration Resolved Resolved By    None active    Resolved    C-diff Rule Out 08/21/21 08/21/21 08/21/21 C DIFF TOXIN/ANTIGEN (Ordered)   08/22/21 Rule-Out Test Resulted            Nurse Assessment:  Last Vital Signs: BP (!) 101/43   Pulse 117   Temp 97.9 °F (36.6 °C) (Oral)   Resp 19   Ht 4' 11\" (1.499 m)   Wt 251 lb (113.9 kg)   LMP 11/12/2014 (Approximate)   SpO2 97%   BMI 50.70 kg/m²     Last documented pain score (0-10 scale): Pain Level: 6  Last Weight:   Wt Readings from Last 1 Encounters:   08/22/21 251 lb (113.9 kg)     Mental Status:  oriented    IV Access:  - Dialysis Catheter  - site  left, insertion date: ***    Nursing Mobility/ADLs:  Walking   Independent  Transfer  Independent  [facility] Delivery   whole    Wound Care Documentation and Therapy:        Elimination:  Continence:   · Bowel: Yes  · Bladder: Yes  Urinary Catheter: None   Colostomy/Ileostomy/Ileal Conduit: No       Date of Last BM: ***    Intake/Output Summary (Last 24 hours) at 8/22/2021 1407  Last data filed at 8/22/2021 0354  Gross per 24 hour   Intake 230 ml   Output 2900 ml   Net -2670 ml     I/O last 3 completed shifts: In: 230 [P.O.:30]  Out: 2900 [Stool:400]    Safety Concerns: At Risk for Falls    Impairments/Disabilities:      Vision    Nutrition Therapy:  Current Nutrition Therapy:   - Oral Diet:  Renal    Routes of Feeding: Oral  Liquids: Thin Liquids  Daily Fluid Restriction: yes - amount 1500  Last Modified Barium Swallow with Video (Video Swallowing Test): not done    Treatments at the Time of Hospital Discharge:   Respiratory Treatments: ***  Oxygen Therapy:  is on oxygen at 3 L/min per nasal cannula.   Ventilator:    - No ventilator support    Rehab Therapies: Physical Therapy and Occupational Therapy  Weight Bearing Status/Restrictions: No weight bearing restirctions  Other Medical Equipment (for information only, NOT a DME order):  walker  Other Treatments:   1) SN for Assessment  2) SN for Medication Teaching & Compliance    Patient's personal belongings (please select all that are sent with patient):  Glasses, Jewelry, pants, shirt, footwear, cell phone,     RN SIGNATURE:  Electronically signed by Skyler Cisneros RN on 8/29/21 at 2:20 PM EDT    CASE MANAGEMENT/SOCIAL WORK SECTION    Inpatient Status Date: 8/2021    Readmission Risk Assessment Score:  Readmission Risk              Risk of Unplanned Readmission:  25           Discharging to Facility/ Agency   · Name: [facility]  · Address:  · Phone: [phone]  · Fax: [phone]    Dialysis Facility (if applicable)   · [facility]  · Address: [address] [facility], [address] [place] [address]  · Dialysis Schedule: M/W/F at 56  · Phone: [phone]  · Fax: [phone]    / signature: Electronically signed by Edwina Frias RN on 8/25/21 at 10:23 AM EDT    PHYSICIAN SECTION    Prognosis: Fair    Condition at Discharge: Stable    Rehab Potential (if transferring to Rehab): Fair    Recommended Labs or Other Treatments After Discharge:     Physician Certification: I certify the above information and transfer of Rashmi Velasco  is necessary for the continuing treatment of the diagnosis listed and that she requires Home Care for greater 30 days.      Update Admission H&P: No change in H&P    PHYSICIAN SIGNATURE:  Electronically signed by Hilaria Green DO on 8/29/21 at 10:57 AM EDT

## 2021-08-22 NOTE — FLOWSHEET NOTE
08/22/21 1939   Provider Notification   Reason for Communication Evaluate; Review case;New orders   Provider Name Jacob Gilliland NP   Provider Notification Advance Practice Clinician (CNS/NP/CNM/CRNA/PA)   Method of Communication Secure Message   Response See orders   Notification Time 1939     RN contacted NP due to patient's poor IV access. New orders for a PICC line to be placed. Will continue to monitor closely.

## 2021-08-22 NOTE — ACP (ADVANCE CARE PLANNING)
[] Yes   [] No   Educated Patient / Ada Harding regarding differences between Advance Directives and portable DNR orders. Length of ACP Conversation in minutes: <5     Conversation Outcomes:  [x] ACP discussion completed  [] Existing advance directive reviewed with patient; no changes to patient's previously recorded wishes  [] New Advance Directive completed  [] Portable Do Not Rescitate prepared for Provider review and signature  [] POLST/POST/MOLST/MOST prepared for Provider review and signature      Follow-up plan:    [] Schedule follow-up conversation to continue planning  [] Referred individual to Provider for additional questions/concerns   [] Advised patient/agent/surrogate to review completed ACP document and update if needed with changes in condition, patient preferences or care setting    [] This note routed to one or more involved healthcare providers    If the relationship to the patient does NOT follow your state's Next of Kin hierarchy, the patient must complete an ACP document to allow him/her to act on the patient's behalf. If the patient has a valid advance directive AND verbally provides inconsistent care preference(s), advise the patient to either create a new advance directive or to orally revoke that prior directive.

## 2021-08-22 NOTE — PROGRESS NOTES
General Surgery:  Consult Note        PATIENT NAME: Ananda Curry   YOB: 1970    ADMISSION DATE: 8/20/2021  2:57 PM     Admitting Provider: Jennifer Flynn Physician: Dr. Xochitl Kaur DATE: 8/22/2021    Chief Complaint: Chest pain, nausea  Consult Regarding:  Abdominal pain and possible hemorrhoids    HISTORY OF PRESENT ILLNESS:  The patient is a 46 y.o. female with history of morbid obesity, CHF, DMT2, dialysis M/W/F, COPD, HTN who was admitted on 8/20/2020 1C/O chest pain sudden onset during dialysis treatment. Patient also complained of nausea without emesis, and epigastric abdominal pain. Patient was admitted for the management of acute on chronic CHF, COPD exacerbation. Patient underwent CT abdomen/pelvis without IV contrast which showed small amount of ascites, bilateral renal atrophy, and it was read as a normal gallbladder and limitation of noncontrast scan. LFTs WNL. Patient has had multiple daily bowel movements since arrival.    On exam, patient reports her nausea has continued since admission, but she had her first episode of emesis today. Patient also reports that she has had bloody bowel movement today and that is experiencing perirectal pain. Denies fever chills. Chest pain on presentation has improved. S OB at baseline. Patient has never had a colonoscopy, reports family history of colon cancer in her extended family. Patient is a former smoker and quit years ago. Patient with surgical history of hysterectomy and 3 C-sections.       Past Medical History:        Diagnosis Date    Asthma     AS A CHILD    CHF (congestive heart failure) ([facility]) 2000    Chronic kidney disease     COPD (chronic obstructive pulmonary disease) ([facility]) 2000    INHALER USE AS NEEDED    Dialysis patient ([facility] [address]     CENTRAL DIALYSIS MON, WEDS AND FRI, FLUID RESTRICTION 2L/24 HRS PER PT    Hemodialysis patient [facility])     had Dilaysis 8-17-16  M[facility]W-F @ [facility] Hyperlipidemia     Hypertension 2000    ON RX    Obesity     YONI (obstructive sleep apnea) 2013    SLEEP STUDY -6/2016 AWAITING MACHINE, USING O2 AT NIGHT PRESENTLY    Pneumonia 2000    HAD TO BE ON VENTILATOR 12 DAYS    Renal failure     STARTED DIALYSIS 02/25/2016    Type II or unspecified type diabetes mellitus without mention of complication, not stated as uncontrolled 2000    IDDM    Ventilator dependence ([facility] [address]     X 2 200 AND 2014.  2014 HAD TO GO ON VENT POST OP       Past Surgical History:        Procedure Laterality Date   [facility]    x3    HYSTERECTOMY  12/12/2014    TOTAL    SKIN FULL GRAFT  1983    CHEST-BIRTH YOU AND MOLES REMOVED       Medications Prior to Admission:   Medications Prior to Admission: diphenhydrAMINE (DIPHEN) 25 MG tablet, Take 25 mg by mouth  midodrine (PROAMATINE) 5 MG tablet, Take 5 mg by mouth  atorvastatin (LIPITOR) 10 MG tablet, Take 1 tablet by mouth nightly  aspirin 81 MG EC tablet, TAKE 1 TABLET BY MOUTH DAILY  fluticasone-salmeterol (ADVAIR) 250-50 MCG/DOSE AEPB, INHALE 1 PUFF BY MOUTH INTO THE LUNGS TWICE DAILY **RINSE MOUTH AFTER EACH USE**  dilTIAZem (CARDIZEM CD) 180 MG extended release capsule, TAKE 1 CAPSULE BY MOUTH DAILY  albuterol (PROVENTIL) (2.5 MG/3ML) 0.083% nebulizer solution, INHALE THE CONTENTS OF ONE VIAL VIA NEBULIZER EVERY 6 HOURS AS NEEDED FOR SHORTNESS OF BREATH OR WHEEZING  furosemide (LASIX) 40 MG tablet, Take 1 tablet by mouth daily  guaiFENesin (MUCINEX) 600 MG extended release tablet, Take 1 tablet by mouth 2 times daily  LANTUS SOLOSTAR 100 UNIT/ML injection pen, INJECT 15 UNITS SUBCUTANEOUSLY DAILY AT NIGHT  albuterol sulfate HFA (PROAIR HFA) 108 (90 Base) MCG/ACT inhaler, Inhale 2 puffs into the lungs every 6 hours as needed for Wheezing  Calcium Acetate, Phos Binder, 667 MG CAPS, TAKE 2 CAPSULES BY MOUTH THREE TIMES DAILY WITH MEALS AND 2 CAPSULES TWICE A DAY WITH SNACKS  polyethylene glycol (GLYCOLAX) 17 GM/SCOOP powder,   Multiple Vitamins-Minerals (RENAPLEX-D) TABS, TAKE 1 TABLET BY MOUTH EVERY DAY  lisinopril (PRINIVIL;ZESTRIL) 5 MG tablet, TAKE 1 TABLET BY MOUTH DAILY  furosemide (LASIX) 40 MG tablet, Take 1 tablet by mouth daily  nitroGLYCERIN (NITROSTAT) 0.4 MG SL tablet, Place 1 tablet under the tongue every 5 minutes as needed for Chest pain  Easy Comfort Lancets MISC, USE TO TEST BLOOD SUGAR TWICE DAILY AS DIRECTED  blood glucose test strips (ONETOUCH ULTRA) strip, USE TO TEST BLOOD SUGAR TWICE DAILY AS DIRECTED  Insulin Pen Needle (EASY COMFORT PEN NEEDLES) 31G X 5 MM MISC, USE TO INJECT INSULIN ONCE DAILY  Alcohol Swabs (ALCOHOL PADS) 70 % PADS, USE TO CLEAN TESTING AND INJECTION SITES TWICE DAILY  Skin Protectants, Misc. (MINERIN CREME) CREA, APPLY TO AFFECTED AREA TOPICALLY AS NEEDED  Blood Glucose Monitoring Suppl (TRUE METRIX METER) w/Device KIT, USE TO TEST BLOOD GLUCOSE  Lancets (BD LANCET ULTRAFINE 30G) MISC, TEST TWICE DAILY  Lancet Devices (SIMPLE DIAGNOSTICS LANCING DEV) MISC, USE WITH LANCETS TO TEST BLOOD GLUCOSE  Blood Glucose Calibration (RIGOBERTO DOC CONTROL) Normal SOLN, USE TO PERIODICALLY CHECK GLUCOSE MONITOR ACCORDING TO THE MANUAL  UNIFINE PENTIPS 31G X 6 MM MISC, USE WITH insulin pens ONCE daily  PHARMACIST CHOICE LANCETS MISC, USE TO TEST BLOOD GLUCOSE ONCE A DAY  SENNA PLUS 8.6-50 MG per tablet, TAKE 1 TABLET BY MOUTH DAILY  Misc. Devices MISC, 1 each by Does not apply route daily Electric scooter  glucose monitoring kit (FREESTYLE) monitoring kit, 1 kit by Does not apply route daily  SENSIPAR 30 MG tablet, Take 1 tablet by mouth daily  OXYGEN, Inhale into the lungs O2  3L  PER NASAL CANNULA NIGHTLY  Misc.  Devices (DIGITAL GLASS SCALE) MISC, Diagnosis: Diastolic Congestive heart failure    Allergies:  Ibuprofen, Tramadol, Motrin [ibuprofen micronized], Strawberry extract, and Tape [employer] tape]    Social History:   Social History     Socioeconomic History    Marital status: Single Spouse name: Not on file    Number of children: Not on file    Years of education: Not on file    Highest education level: Not on file   Occupational History    Not on file   Tobacco Use    Smoking status: Former Smoker     Years: 7.00     Types: Cigarettes     Quit date: 2017     Years since quittin.4    Smokeless tobacco: Never Used    Tobacco comment: 1-2 cigs daily   Substance and Sexual Activity    Alcohol use: No    Drug use: No    Sexual activity: Not Currently     Partners: Male   Other Topics Concern    Not on file   Social History Narrative    Not on file     Social Determinants of Health     Financial Resource Strain:     Difficulty of Paying Living Expenses:    Food Insecurity:     Worried About Running Out of Food in the Last Year:     [address] in the Last Year:    Transportation Needs:     Lack of Transportation (Medical):  Lack of Transportation (Non-Medical):    Physical Activity:     Days of Exercise per Week:     Minutes of Exercise per Session:    Stress:     Feeling of Stress :    Social Connections:     Frequency of Communication with Friends and Family:     Frequency of Social Gatherings with Friends and Family:     Attends [employer] Services:     Active Member of Clubs or Organizations:     Attends Club or Organization Meetings:     Marital Status:    Intimate Partner Violence:     Fear of Current or Ex-Partner:     Emotionally Abused:     Physically Abused:     Sexually Abused:        Family History:       Problem Relation Age of Onset    Diabetes Other     Cancer Mother         BREAST    Heart Disease Father         CAD-MI    Diabetes Father     High Blood Pressure Father     Diabetes Maternal Grandfather     Diabetes Paternal Grandfather     Heart Disease Paternal Grandfather     High Blood Pressure Paternal Grandfather        REVIEW OF SYSTEMS:    As reviewed in HPI. All other systems reviewed were negative.     PHYSICAL EXAM: VITALS:  BP (!) 79/46   Pulse 130   Temp 97.9 °F (36.6 °C) (Oral)   Resp 19   Ht 4' 11\" (1.499 m)   Wt 251 lb (113.9 kg)   LMP 11/12/2014 (Approximate)   SpO2 95%   BMI 50.70 kg/m²   INTAKE/OUTPUT:     Intake/Output Summary (Last 24 hours) at 8/22/2021 1647  Last data filed at 8/22/2021 0354  Gross per 24 hour   Intake 230 ml   Output 2900 ml   Net -2670 ml       CONSTITUTIONAL:  awake, alert, not distressed and morbidly obese  HEENT: Normocephalic/atraumatic, without obvious abnormality. Nasal cannula  NECK:  Supple, symmetrical, trachea midline   CARDIOVASCULAR: Atrial flutter with variable AV block with PVCs  LUNGS: Clear to auscultation bilaterally without evidence of wheezing or tachypnea. ABDOMEN: Morbidly obese, soft, no distention, diffuse TTP including the RUQ, epigastric, LLQ. This scars consistent with surgical history with complex abdominal pattern, pannus  RECTAL: Redundant tissue left of the anus without active external or thrombosed hemorrhoids, limited SHAYE performed patient did not tolerate exam well but internal hemorrhoids appreciated, no gross blood appreciated. No areas of induration, erythema, masses  MUSCULOSKELETAL: Muscle strength intact in all extremities bilaterally. NEUROLOGIC: CN II- XII intact. Gross motor intact without focal weakness. SKIN: No cyanosis, rashes, or edema noted.    Orientation:   oriented to person, place, and time      CBC with Differential:    Lab Results   Component Value Date    WBC 11.3 08/22/2021    RBC 2.50 08/22/2021    RBC 4.24 01/16/2012    HGB 8.5 08/22/2021    HCT 30.4 08/22/2021     08/22/2021     01/16/2012    .6 08/22/2021    MCH 34.0 08/22/2021    MCHC 28.0 08/22/2021    RDW 18.0 08/22/2021    NRBC 2 03/18/2016    LYMPHOPCT 12 08/22/2021    MONOPCT 7 08/22/2021    BASOPCT 0 08/22/2021    MONOSABS 0.79 08/22/2021    LYMPHSABS 1.36 08/22/2021    EOSABS 0.11 08/22/2021    BASOSABS 0.00 08/22/2021    DIFFTYPE NOT REPORTED Condition (MA) Is the patient pregnant?->No Reason for Exam: CC: chest pain, nausea, fatigue Acuity: Acute Type of Exam: Initial FINDINGS: Lower Chest: Cardiomegaly. Pleural reaction. Organs: Diffuse subcutaneous edema. The liver, spleen, pancreas, and adrenals appear normal.  Gallbladder normal. Small amount of ascites. Moderate bilateral renal atrophy. Bilateral punctate vascular calcifications or proximal ureterolith. No hydronephrosis. The bladder appears normal. GI/Bowel: The stomach,small bowel, and colon appear normal. Fibrofatty infiltration right colon. Appendix not identified. Pelvis: As above Peritoneum/Retroperitoneum: Calcified plaque along the aorta and its branches. Bones/Soft Tissues: Normal     Mild ascites and subcutaneous edema. Moderate bilateral renal atrophy. Other incidental findings as above. Limited sensitivity without IV and oral contrast.     XR ACUTE ABD SERIES CHEST 1 VW    Result Date: 8/20/2021  EXAMINATION: TWO XRAY VIEWS OF THE ABDOMEN AND SINGLE  XRAY VIEW OF THE CHEST 8/20/2021 4:03 pm COMPARISON: None. HISTORY: ORDERING SYSTEM PROVIDED HISTORY: Pain TECHNOLOGIST PROVIDED HISTORY: Pain Reason for Exam: Pt c/o pain to chest, nausea, fatigue. Best films possible d/t pt body habitus. Acuity: Acute Type of Exam: Subsequent/Follow-up FINDINGS: Moderate cardiomegaly. Mild edema. Mediastinum normal.  Bony thorax intact. Bowel gas nonspecific. No free air. Osseous structures normal.     Possible mild CHF. Bowel gas pattern nonspecific.          ASSESSMENT:  Active Hospital Problems    Diagnosis Date Noted    Type 2 diabetes mellitus with renal manifestations ([facility] [E11.29] 12/08/2015     Priority: Medium    Chest pain [R07.9] 08/20/2021    ESRD (end stage renal disease) on dialysis ([facility] [N18.6, Z99.2] 03/11/2016    Essential hypertension [I10] 03/11/2016    COPD exacerbation ([facility] [J44.1] 02/22/2016    Morbid obesity with BMI of 45.0-49.9, adult ([facility] [E66.01, Z68.42] 12/11/2015    Acute diastolic congestive heart failure ([facility] [address] [I50.31]     Current smoker [F17.200] 10/03/2013       1. 51F with abdominal pain and perianal pain    Plan:  · Reviewed CT abdomen/pelvis without IV contrast myself. In addition to the impression, there are what appears to be incisional hernias with small bowel in the distal stomach extending into the pannus. · Clinical picture inconclusive given patient's diffuse abdominal pain ranging from RUQ to LLQ, and not definitive for cholecystitis. LFTs WNL. No postprandial pain. Gallbladder on CT abdomen/pelvis limited without contrast appears normal.  RUQ US ordered. · Perianal pain: No external hemorrhoids appreciated on exam.  Patient poorly tolerated SHAYE, however internal hemorrhoids palpated, no masses felt with the limited physical exam.  Patient does report hematochezia over the last 1.5 months and had a bloody BM today. Described as BRBPR. Would recommend colonoscopy at some point, timing TBD, likely after patient recovers from acute disease process which she was admitted. · Incisional hernias: Does not appear to be an obstructive process given no dilation of the proximal small bowel. Would expect patient to have high-grade emesis and reduced bowel function if she was obstructed. We will continue to monitor  · No surgical intervention at this time. Patient would be a very poor surgical candidate if surgery was indicated given her extensive comorbidities and complex abdomen from her prior surgeries, BMI of 51. · Diet: Okay for diet per primary  · We will continue to follow  · Continue medical management per primary      Electronically signed by Maria Esther Rodriguez DO  on 8/22/2021 at 4:47 PM     Attending Physician Statement  I have discussed the case, including pertinent history and exam findings with the resident. I agree with the assessment, plan and orders as documented by the resident.       Recommend evaluation of GI bleeding with GI consult. Doubtful this is anything related to her gallbladder. Will follow along but no acute plans from my standpoint. Monitor H/H. She is a very poor operative candidate.

## 2021-08-22 NOTE — PROGRESS NOTES
Renal Progress Note    Patient :  Josephine Hernandez; 46 y.o. MRN# [id number]  Location:  2018/2018-02  Attending:  Kamlesh Mccain DO  Admit Date:  8/20/2021   Hospital Day: 2      Subjective:     Patient was seen and examined. No new issues reported overnight. Patient had regular dialysis yesterday ran for 225 minutes, got about 1.8 L removed. She normally gets dialysis on MWF schedule. Reports that her chest pain is better today. Has been having positive blood in stool. Patient mentioned that she has history of blood in stool and has history of hemorrhoids. Also been complaining of itchiness. Her blood pressures have been running on the lower end ranging from systolics of 73G to low 623X.     Outpatient Medications:     Medications Prior to Admission: diphenhydrAMINE (DIPHEN) 25 MG tablet, Take 25 mg by mouth  midodrine (PROAMATINE) 5 MG tablet, Take 5 mg by mouth  atorvastatin (LIPITOR) 10 MG tablet, Take 1 tablet by mouth nightly  aspirin 81 MG EC tablet, TAKE 1 TABLET BY MOUTH DAILY  fluticasone-salmeterol (ADVAIR) 250-50 MCG/DOSE AEPB, INHALE 1 PUFF BY MOUTH INTO THE LUNGS TWICE DAILY **RINSE MOUTH AFTER EACH USE**  dilTIAZem (CARDIZEM CD) 180 MG extended release capsule, TAKE 1 CAPSULE BY MOUTH DAILY  albuterol (PROVENTIL) (2.5 MG/3ML) 0.083% nebulizer solution, INHALE THE CONTENTS OF ONE VIAL VIA NEBULIZER EVERY 6 HOURS AS NEEDED FOR SHORTNESS OF BREATH OR WHEEZING  furosemide (LASIX) 40 MG tablet, Take 1 tablet by mouth daily  guaiFENesin (MUCINEX) 600 MG extended release tablet, Take 1 tablet by mouth 2 times daily  LANTUS SOLOSTAR 100 UNIT/ML injection pen, INJECT 15 UNITS SUBCUTANEOUSLY DAILY AT NIGHT  albuterol sulfate HFA (PROAIR HFA) 108 (90 Base) MCG/ACT inhaler, Inhale 2 puffs into the lungs every 6 hours as needed for Wheezing  Calcium Acetate, Phos Binder, 667 MG CAPS, TAKE 2 CAPSULES BY MOUTH THREE TIMES DAILY WITH MEALS AND 2 CAPSULES TWICE A DAY WITH SNACKS  polyethylene glycol (GLYCOLAX) 17 GM/SCOOP powder,   Multiple Vitamins-Minerals (RENAPLEX-D) TABS, TAKE 1 TABLET BY MOUTH EVERY DAY  lisinopril (PRINIVIL;ZESTRIL) 5 MG tablet, TAKE 1 TABLET BY MOUTH DAILY  furosemide (LASIX) 40 MG tablet, Take 1 tablet by mouth daily  nitroGLYCERIN (NITROSTAT) 0.4 MG SL tablet, Place 1 tablet under the tongue every 5 minutes as needed for Chest pain  Easy Comfort Lancets MISC, USE TO TEST BLOOD SUGAR TWICE DAILY AS DIRECTED  blood glucose test strips (ONETOUCH ULTRA) strip, USE TO TEST BLOOD SUGAR TWICE DAILY AS DIRECTED  Insulin Pen Needle (EASY COMFORT PEN NEEDLES) 31G X 5 MM MISC, USE TO INJECT INSULIN ONCE DAILY  Alcohol Swabs (ALCOHOL PADS) 70 % PADS, USE TO CLEAN TESTING AND INJECTION SITES TWICE DAILY  Skin Protectants, Misc. (MINERIN CREME) CREA, APPLY TO AFFECTED AREA TOPICALLY AS NEEDED  Blood Glucose Monitoring Suppl (TRUE METRIX METER) w/Device KIT, USE TO TEST BLOOD GLUCOSE  Lancets (BD LANCET ULTRAFINE 30G) MISC, TEST TWICE DAILY  Lancet Devices (SIMPLE DIAGNOSTICS LANCING DEV) MISC, USE WITH LANCETS TO TEST BLOOD GLUCOSE  Blood Glucose Calibration (RIGOBERTO DOC CONTROL) Normal SOLN, USE TO PERIODICALLY CHECK GLUCOSE MONITOR ACCORDING TO THE MANUAL  UNIFINE PENTIPS 31G X 6 MM MISC, USE WITH insulin pens ONCE daily  PHARMACIST CHOICE LANCETS MISC, USE TO TEST BLOOD GLUCOSE ONCE A DAY  SENNA PLUS 8.6-50 MG per tablet, TAKE 1 TABLET BY MOUTH DAILY  Misc. Devices MISC, 1 each by Does not apply route daily Electric scooter  glucose monitoring kit (FREESTYLE) monitoring kit, 1 kit by Does not apply route daily  SENSIPAR 30 MG tablet, Take 1 tablet by mouth daily  OXYGEN, Inhale into the lungs O2  3L  PER NASAL CANNULA NIGHTLY  Misc.  Devices (DIGITAL GLASS SCALE) MISC, Diagnosis: Diastolic Congestive heart failure    Current Medications:     Scheduled Meds:    midodrine  5 mg Oral TID WC    aspirin  81 mg Oral Daily    dilTIAZem  180 mg Oral Daily    budesonide-formoterol  2 puff Inhalation BID    furosemide 40 mg Oral Daily    guaiFENesin  600 mg Oral BID    insulin glargine  15 Units Subcutaneous Nightly    lisinopril  5 mg Oral Daily    sennosides-docusate sodium  1 tablet Oral Daily    cinacalcet  30 mg Oral Daily    sodium chloride flush  5-40 mL Intravenous 2 times per day    atorvastatin  40 mg Oral Nightly    insulin lispro  0-12 Units Subcutaneous TID WC    insulin lispro  0-6 Units Subcutaneous Nightly    [Held by provider] heparin (porcine)  5,000 Units Subcutaneous 3 times per day     Continuous Infusions:    dextrose      sodium chloride       PRN Meds:  fentanNYL, oxyCODONE-acetaminophen, diphenhydrAMINE, glucose, dextrose, glucagon (rDNA), dextrose, sodium chloride flush, sodium chloride, ondansetron **OR** ondansetron, acetaminophen **OR** acetaminophen, magnesium hydroxide, nitroGLYCERIN, albuterol    Input/Output:       I/O last 3 completed shifts: In: 230 [P.O.:30]  Out: 2900 [Stool:400]. Patient Vitals for the past 96 hrs (Last 3 readings):   Weight   21 0638 251 lb (113.9 kg)   21 1708 250 lb 14.1 oz (113.8 kg)   21 1516 240 lb (108.9 kg)       Vital Signs:   Temperature:  Temp: 97.9 °F (36.6 °C)  TMax:   Temp (24hrs), Av.6 °F (37 °C), Min:97.9 °F (36.6 °C), Max:98.9 °F (37.2 °C)    Respirations:  Resp: 19  Pulse:   Pulse: 117  BP:    BP: (!) 101/43  BP Range: Systolic (11VPI), MID:79 , Min:85 , LE       Diastolic (82ZZZ), JWV:24, Min:24, Max:69      Physical Examination:     General:  AAO x 3, speaking in full sentences, no accessory muscle use. HEENT: Atraumatic, normocephalic, no throat congestion, moist mucosa. Eyes:   Pupils equal, round and reactive to light, EOMI. Neck:   Supple  Chest:   Bilateral vesicular breath sounds, no rales or wheezes. Cardiac:  S1 S2 RR, no murmurs, gallops or rubs. Abdomen: Soft, non-tender, no masses or organomegaly, BS audible. :   No suprapubic or flank tenderness. Neuro:  AAO x 3, No FND.   SKIN:  No rashes, good skin turgor. Extremities:  No edema. Labs:       Recent Labs     08/20/21  1540 08/21/21  0406   WBC 7.9 11.3   RBC 2.77* 2.85*   HGB 9.1* 9.4*   HCT 33.1* 34.3*   .5* 120.4*   MCH 32.9 33.0   MCHC 27.5* 27.4*   RDW 17.2* 17.5*    171   MPV 10.7 10.0      BMP:   Recent Labs     08/20/21  1540 08/21/21  0406    142   K 3.6* 4.0    102   CO2 29 30   BUN 17 20   CREATININE 3.32* 4.32*   GLUCOSE 139* 94   CALCIUM 8.0* 8.3*      Magnesium:    Recent Labs     08/21/21  0406   MG 2.0     Albumin:    Recent Labs     08/20/21  1540 08/21/21  0406   LABALBU 3.7 3.7     BONNIE:      Lab Results   Component Value Date    BONNIE NEGATIVE 12/10/2015     SPEP:  Lab Results   Component Value Date    PROT 7.4 08/21/2021    ALBCAL 2.6 12/10/2015    ALBPCT 42 12/10/2015    LABALPH 0.3 12/10/2015    LABALPH 0.9 12/10/2015    A1PCT 4 12/10/2015    A2PCT 14 12/10/2015    LABBETA 1.3 12/10/2015    BETAPCT 21 12/10/2015    GAMGLOB 1.2 12/10/2015    GGPCT 19 12/10/2015    PATH ELECTRONICALLY SIGNED. Mary Urena M.D. 12/10/2015     UPEP:     Lab Results   Component Value Date    LABPE  12/10/2015     URINE PROTEIN CONCENTRATION IS ELEVATED.   PROTEIN ELECTROPHORESIS DEMONSTRATES     C3:     Lab Results   Component Value Date    C3 131 12/10/2015     C4:     Lab Results   Component Value Date    C4 39 12/10/2015     MPO ANCA:     Lab Results   Component Value Date    MPO 9 12/10/2015     PR3 ANCA:     Lab Results   Component Value Date    PR3 5 12/10/2015     Hep BsAg:         Lab Results   Component Value Date    HEPBSAG NONREACTIVE 02/25/2016     Urinalysis/Chemistries:      Lab Results   Component Value Date    NITRU NEGATIVE 06/12/2016    COLORU YELLOW 06/12/2016    PHUR 5.0 06/12/2016    WBCUA 2 TO 5 06/12/2016    RBCUA 0 TO 2 06/12/2016    MUCUS NOT REPORTED 06/12/2016    TRICHOMONAS NOT REPORTED 06/12/2016    YEAST NOT REPORTED 06/12/2016    BACTERIA FEW 06/12/2016    SPECGRAV 1.013 06/12/2016 LEUKOCYTESUR NEGATIVE 06/12/2016    UROBILINOGEN Normal 06/12/2016    BILIRUBINUR NEGATIVE 06/12/2016    GLUCOSEU TRACE 06/12/2016    KETUA NEGATIVE 06/12/2016    AMORPHOUS 1+ 06/12/2016     Urine Sodium:     Lab Results   Component Value Date    ANTONIO 28 06/12/2016     Urine Osmolarity:   Lab Results   Component Value Date    OSMOU 282 06/12/2016      Urine Creatinine:     Lab Results   Component Value Date    LABCREA 22.3 12/10/2015     Radiology:     Reviewed. Assessment:     1. ESRD on Hemodialysis. His regular HD days are MWF at [facility] hemodialysis facility using LUE AVF under Dr. Asad Addison. His dry weight is 106.5 kg. Admitted with ?115 kg.  2.  Chest pain. 3.  Denies nausea. 4.  Fatigue. 5.  Hypotension. 6.  Abdomen pain and diarrhea. 7.  Obesity. 8.  Hypertension. 9.  Anemia of chronic disease  10. Secondary hyperparathyroidism  11. Hypertension  12. Hypotension. 13.  Bright red blood in stool. Plan:   1. No acute need for dialysis today. Will get regular dialysis in a.m. as per MWF schedule. 2.  Will restart phosphate binders and check phosphorus due to history of itchiness. 3.  Will increase midodrine to 10 mg 3 times a day. 4.  Hold blood pressure meds if blood pressure less than 100.  5. BMP in AM.  6.  General surgery consulted by primary due to abdomen pain, positive blood in stool and hemorrhoids. 7.  Check CBC today to make sure hemoglobin is stable. 8.  Will follow. Nutrition   Please ensure that patient is on a renal diet/TF. Avoid nephrotoxic drugs/contrast exposure. We will continue to follow along with you. Lobo Barillas MD  [facility]     This note is created with the assistance of a speech-recognition program. While intending to generate a document that actually reflects the content of the visit, no guarantees can be provided that every mistake has been identified and corrected by editing.

## 2021-08-22 NOTE — FLOWSHEET NOTE
08/22/21 1951   Provider Notification   Reason for Communication Evaluate; Review case   Provider Name Dr. Nasreen Vee   Provider Notification Physician   Method of Communication Page   Response Waiting for response   Notification Time 1951     RN sent page to MD due to patient's complaints of chest tightness and pressure. Awaiting response at this time. Will continue to monitor closely.

## 2021-08-22 NOTE — FLOWSHEET NOTE
Pt admitted to room 2034 from [facility]. Oriented to room, call light and bed mechanics. Side rails up x2. Call light within reach. Orders reviewed. Will continue to monitor closely.

## 2021-08-22 NOTE — PROGRESS NOTES
Physical Therapy  DATE: 2021    NAME: Prosper Coates  MRN: [id number]   : 1970    Patient not seen this date for Physical Therapy due to:  [] Blood transfusion in progress  [x] Cancel by RN: KAREN Heller reporting pt w/ abnormal heart rhythms this morning, pt will be getting further testing and not appropriate at this time. PT will ck back as time allows or 21. [] Hemodialysis  []  Refusal by Patient   [] Spine Precautions   [] Strict Bedrest  [] Surgery  [] Testing      [] Other        [] PT being discontinued at this time. Patient independent. No further needs. [] PT being discontinued at this time as the patient has been transferred to hospice care. No further needs.     Prakash Manley, PT

## 2021-08-23 NOTE — PROGRESS NOTES
Dialysis Safety Checks:    Patient ID Verified (Y/N) y     -Hepatitis Test                   Date      Result  Hepatitis B Surface Antigen   21 neg     Hepatitis B Surface Antibody       Hepatitis B Core Antibody        -Treatment Initiation  Blood Vol Processed Goal (Liters)  Pump Speed x Treatment Hours x 60 Minutes    Target Fluid Removal 2kilo     * Intra-treatment documented Safety Checks include the followin) Access and face visible at all times. 2) All connections and blood lines are secure with no kinks. 3) NVL alarm engaged. 4) Hemosafe device applied (if applicable). 5) No collapse of Arterial or Venous blood chambers. 6) All blood lines / pump segments in the air detectors.   --------------------------------------------------------------------------------

## 2021-08-23 NOTE — FLOWSHEET NOTE
08/22/21 2311   Provider Notification   Reason for Communication Evaluate; Review case;New orders   Provider Name Dr. Kati Russo   Provider Notification Physician   Method of Communication Call   Response See orders   Notification Time 21      RN contacted MD regarding patient's low SBP. One time dose of 10mg midodrine ordered. If low SBP is not resolved RN to contact [facility] for further treatment. Will continue to monitor closely.

## 2021-08-23 NOTE — PROGRESS NOTES
HEMODIALYSIS POST TREATMENT NOTE    Treatment time ordered: 4hrs     Actual treatment time: 3hrs 10 min    UltraFiltration Goal: 2kilo  UltraFiltration Removed: 1450ml      Pre Treatment weight: 114  Post Treatment weight: 112.5  Estimated Dry Weight: 106. 5 per outpt paperwork    Access used:     Central Venous Catheter:          Tunneled or Non-tunneled: na           Site: na          Access Flow: na      Internal Access:       AV Fistula or AV Graft: fistula         Site: LUE       Access Flow: good       Sign and symptoms of infection: na       If YES: na    Medications or blood products given: no    Chronic outpatient schedule: [facility]    Chronic outpatient unit: [facility]    Summary of response to treatment: pt off early to use restroom, unable to encourage pt to use bedpan    Explain if orders NOT met, was physician notified:yes, dr Giana Valentin notified      ACES flowsheet faxed to patient unit/ placed in patient chart: yes    Post assessment completed: yes    Report given to: [address] documented Safety Checks include the followin) Access and face visible at all times. 2) All connections and blood lines are secure with no kinks. 3) NVL alarm engaged. 4) Hemosafe device applied (if applicable). 5) No collapse of Arterial or Venous blood chambers. 6) All blood lines / pump segments in the air detectors.

## 2021-08-23 NOTE — CONSULTS
GI Consult Note:    Name: Mikaela Gomez  MRN: [id number]     [place]: [de-identified]  Room: 2034/2034-01    Admit Date: 8/20/2021  PCP: Chava Vergara MD    Physician Requesting Consult: Skyler Brenner DO     Reason for Consult:    Anemia  Atypical chest pain  Nausea  Morbid obesity  Irritable bowel syndrome with alternating constipation and diarrhea    Chief Complaint:     Chief Complaint   Patient presents with    Chest Pain    Nausea    Fatigue       History Obtained From:     Patient and EMR    History of Present Illness:      Mikaela Gomez is a  46 y.o.  female who presents with Chest Pain, Nausea, and Fatigue    This 27-year-old Afro-American female has history significant of multiple chronic medical issues  She is a dialysis patient  Has history for congestive heart failure and cardiac issues  She has morbid obesity  She has been hospitalized at [facility] with history for what appears to be atypical chest pain associated with some nausea and fatigue weakness tiredness no  She has been on chronic narcotic pain medications  She denies any overt rectal bleeding  Patient has been complaining of some abdominal pains, off and on cramping  Also complains of abdominal bloating and gas  Has off and on nausea without any sig vomiting  Has some alternating constipation and diarrhea  Has no weight loss  Has some anxiety issues  Never had GI work-up done?   Complains of some acid reflux  Denies any dysphagia  No hematemesis coffee-ground emesis  No overt rectal bleeding melanotic stools  No known family history for colon cancer  Denies any alcohol abuse illicit drug usage  Symptoms:  Onset:  Location:  abdomen  Duration:  day(s)  Severity:  mild, moderate  Quality:  intermittent      Past Medical History:     Past Medical History:   Diagnosis Date    Asthma     AS A CHILD    CHF [facility]congestive heart failure) ([facility] 2000    Chronic kidney disease     COPD (chronic obstructive pulmonary disease) ([facility] 2000    INHALER USE AS NEEDED    Dialysis patient [facility])     CENTRAL DIALYSIS MON, WEDS AND FRI, FLUID RESTRICTION 2L/24 HRS PER PT    Hemodialysis patient [facility])     had Sigifredoaysis 8-17-16  M-W-F @ [facility]    Hyperlipidemia     Hypertension 2000    ON RX    Obesity     YONI (obstructive sleep apnea) 2013    SLEEP STUDY -6/2016 AWAITING MACHINE, USING O2 AT NIGHT PRESENTLY    Pneumonia 2000    HAD TO BE ON VENTILATOR 12 DAYS    Renal failure     STARTED DIALYSIS 02/25/2016    Type II or unspecified type diabetes mellitus without mention of complication, not stated as uncontrolled 2000    IDDM    Ventilator dependence ([facility] [address]     X 2 200 AND 2014. 2014 HAD TO GO ON VENT POST OP        Past Surgical History:     Past Surgical History:   Procedure Laterality Date   [address]    x3    HYSTERECTOMY  12/12/2014    TOTAL    SKIN FULL GRAFT  1983    CHEST-BIRTH YOU AND MOLES REMOVED        Medications Prior to Admission:       Prior to Admission medications    Medication Sig Start Date End Date Taking?  Authorizing Provider   diphenhydrAMINE (DIPHEN) 25 MG tablet Take 25 mg by mouth 7/28/21 7/27/22 Yes Historical Provider, MD   midodrine (PROAMATINE) 5 MG tablet Take 5 mg by mouth 7/19/21  Yes Historical Provider, MD   atorvastatin (LIPITOR) 10 MG tablet Take 1 tablet by mouth nightly 6/30/21  Yes Danny Kaur MD   aspirin 81 MG EC tablet TAKE 1 TABLET BY MOUTH DAILY 6/6/21 10/4/21 Yes Huan Calles MD   fluticasone-salmeterol (ADVAIR) 250-50 MCG/DOSE AEPB INHALE 1 PUFF BY MOUTH INTO THE LUNGS TWICE DAILY **RINSE MOUTH AFTER EACH USE** 6/6/21  Yes Huan Calles MD   dilTIAZem (CARDIZEM CD) 180 MG extended release capsule TAKE 1 CAPSULE BY MOUTH DAILY 5/11/21  Yes Mario Alberto Davis MD   albuterol (PROVENTIL) (2.5 MG/3ML) 0.083% nebulizer solution INHALE THE CONTENTS OF ONE VIAL VIA NEBULIZER EVERY 6 HOURS AS NEEDED FOR SHORTNESS OF BREATH OR WHEEZING 5/11/21  Yes Mario Alberto Davis MD   furosemide (LASIX) 40 MG tablet Take 1 tablet by mouth daily 5/11/21  Yes Maddie Cox MD   guaiFENesin (MUCINEX) 600 MG extended release tablet Take 1 tablet by mouth 2 times daily 5/11/21  Yes Maddie Cox MD   LANTUS SOLOSTAR 100 UNIT/ML injection pen INJECT 15 UNITS SUBCUTANEOUSLY DAILY AT NIGHT 12/31/20  Yes Yuridia Leigh MD   albuterol sulfate HFA (PROAIR HFA) 108 (90 Base) MCG/ACT inhaler Inhale 2 puffs into the lungs every 6 hours as needed for Wheezing 11/13/18  Yes Felix Spivey MD   Calcium Acetate, Phos Binder, 667 MG CAPS TAKE 2 CAPSULES BY MOUTH THREE TIMES DAILY WITH MEALS AND 2 CAPSULES TWICE A DAY WITH SNACKS 8/6/21   Historical Provider, MD   polyethylene glycol (GLYCOLAX) 17 GM/SCOOP powder  6/21/21   Historical Provider, MD   Multiple Vitamins-Minerals (RENAPLEX-D) TABS TAKE 1 TABLET BY MOUTH EVERY DAY 7/14/21   Historical Provider, MD   lisinopril (PRINIVIL;ZESTRIL) 5 MG tablet TAKE 1 TABLET BY MOUTH DAILY 6/30/21   Danny Kaur MD   furosemide (LASIX) 40 MG tablet Take 1 tablet by mouth daily 5/18/21   Chava Vergara MD   nitroGLYCERIN (NITROSTAT) 0.4 MG SL tablet Place 1 tablet under the tongue every 5 minutes as needed for Chest pain 5/11/21   India Hammond MD   Easy Comfort Lancets MISC USE TO TEST BLOOD SUGAR TWICE DAILY AS DIRECTED 3/15/21   Chava Vergara MD   blood glucose test strips (ONETOUCH ULTRA) strip USE TO TEST BLOOD SUGAR TWICE DAILY AS DIRECTED 1/26/21   Chava Vergara MD   Insulin Pen Needle (EASY COMFORT PEN NEEDLES) 31G X 5 MM MISC USE TO INJECT INSULIN ONCE DAILY 12/1/20   Dagmar Stearns MD   Alcohol Swabs (ALCOHOL PADS) 70 % PADS USE TO CLEAN TESTING AND INJECTION SITES TWICE DAILY 12/1/20   Dagmar Stearns MD   Skin Protectants, Misc.  (MINERIN CREME) CREA APPLY TO AFFECTED AREA TOPICALLY AS NEEDED 6/24/20   Chava Vergara MD   Blood Glucose Monitoring Suppl (TRUE METRIX METER) w/Device KIT USE TO TEST BLOOD GLUCOSE 3/17/20   Jonnie Knowles Jessica Jamison MD   Lancets (BD LANCET ULTRAFINE 30G) MISC TEST TWICE DAILY 3/17/20   Lucillie Simmonds, MD   Lancet Devices (SIMPLE DIAGNOSTICS LANCING DEV) MISC USE WITH LANCETS TO TEST BLOOD GLUCOSE 3/17/20   Lucillie Simmonds, MD   Blood Glucose Calibration (RIGOBERTO DOC CONTROL) Normal SOLN USE TO PERIODICALLY CHECK GLUCOSE MONITOR ACCORDING TO THE MANUAL 3/17/20   Lucillie Simmonds, MD   UNIFINE PENTIPS 31G X 6 MM MISC USE WITH insulin pens ONCE daily 9/19/19   Lucillie Simmonds, MD   PHARMACIST CHOICE LANCETS MISC USE TO TEST BLOOD GLUCOSE ONCE A DAY 7/12/19   Belén Ramírez MD   SENNA PLUS 8.6-50 MG per tablet TAKE 1 TABLET BY MOUTH DAILY 7/28/18   MD Elenita Redmond. Devices MISC 1 each by Does not apply route daily Electric scooter 8/15/17   Belén Ramírez MD   glucose monitoring kit (FREESTYLE) monitoring kit 1 kit by Does not apply route daily 5/23/17   Belén Ramírez MD   SENSIPAR 30 MG tablet Take 1 tablet by mouth daily 3/17/17   Historical Provider, MD   OXYGEN Inhale into the lungs O2  3L  PER NASAL CANNULA NIGHTLY    Historical Provider, MD   Misc. Devices (DIGITAL GLASS SCALE) MISC Diagnosis: Diastolic Congestive heart failure 3/9/16   Belén Ramírez MD        Allergies:       Ibuprofen, Tramadol, Motrin [ibuprofen micronized], Strawberry extract, and Tape [adhesive tape]    Social History:     Tobacco:    reports that she quit smoking about 4 years ago. Her smoking use included cigarettes. She quit after 7.00 years of use. She has never used smokeless tobacco.  Alcohol:      reports no history of alcohol use. Drug Use:  reports no history of drug use.     Family History:     Family History   Problem Relation Age of Onset    Diabetes Other     Cancer Mother         BREAST    Heart Disease Father         CAD-MI    Diabetes Father     High Blood Pressure Father     Diabetes Maternal Grandfather     Diabetes Paternal Grandfather     Heart Disease Paternal Grandfather     High Blood Pressure Paternal Grandfather Review of Systems:     Positive and Negative as described in HPI    Constitutional:  negative for  fevers, chills, sweats, positive fatigue, and weight loss  HEENT:  negative for vision or hearing changes,   Respiratory:  negative for shortness of breath, cough, or congestion  Cardiovascular:  negative for  chest pain, palpitations  Gastrointestinal: Positive nausea, vomiting, diarrhea, constipation, abdominal pain  Genitourinary:  negative for frequency, dysuria  Integument: Positive rash, skin lesions  Musculoskeletal: Positive muscle aches or joint pain  Neurological: Positive headaches, dizziness, lightheadedness, numbness, pain and tingling extrimities  Behavior/Psych:  negative for depression and positive anxiety    Code Status:  Full Code    Physical Exam:     Vitals:  BP 80/67   Pulse 107   Temp 98 °F (36.7 °C) (Temporal)   Resp 20   Ht 4' 11\" (1.499 m)   Wt 251 lb 8.7 oz (114.1 kg)   LMP 2014 (Approximate)   SpO2 99%   BMI 50.81 kg/m²   Temp (24hrs), Av.6 °F (36.4 °C), Min:97 °F (36.1 °C), Max:98 °F (36.7 °C)      General appearance - alert, morbidly obese sick appearing, and in no acute distress  Mental status - oriented to person, place, and time with anxious affect  Head - normocephalic and atraumatic  Eyes - pupils equal and reactive, extraocular eye movements intact, conjunctiva clear  Ears - hearing appears to be intact  Nose - no drainage noted  Mouth - mucous membranes dry  Neck - supple, no carotid bruits, thyroid not palpable  Chest -Rales to auscultation, normal effort  Heart - normal rate, regular rhythm, no murmurs  Abdomen - soft, obese mild diffuse tenderness, nondistended, bowel sounds present all four quadrants, no masses, hepatomegaly or splenomegaly.  No hernias  Neurological - normal speech, no focal findings or movement disorder noted, cranial nerves not tested at this time   extremities -mild pedal edema or calf pain with palpation  Skin -induration noted  Cranial Nerves : Not tested at this time  Lymph nodes: not done at this time    Data:   CBC:   Lab Results   Component Value Date    WBC 11.3 08/22/2021    RBC 2.50 08/22/2021    RBC 4.24 01/16/2012    HGB 8.3 08/23/2021    HCT 30.4 08/23/2021    .6 08/22/2021    MCH 34.0 08/22/2021    MCHC 28.0 08/22/2021    RDW 18.0 08/22/2021     08/22/2021     01/16/2012    MPV 10.7 08/22/2021     CBC with Differential:    Lab Results   Component Value Date    WBC 11.3 08/22/2021    RBC 2.50 08/22/2021    RBC 4.24 01/16/2012    HGB 8.3 08/23/2021    HCT 30.4 08/23/2021     08/22/2021     01/16/2012    .6 08/22/2021    MCH 34.0 08/22/2021    MCHC 28.0 08/22/2021    RDW 18.0 08/22/2021    NRBC 2 03/18/2016    LYMPHOPCT 12 08/22/2021    MONOPCT 7 08/22/2021    BASOPCT 0 08/22/2021    MONOSABS 0.79 08/22/2021    LYMPHSABS 1.36 08/22/2021    EOSABS 0.11 08/22/2021    BASOSABS 0.00 08/22/2021    DIFFTYPE NOT REPORTED 08/22/2021     Hemoglobin/Hematocrit:    Lab Results   Component Value Date    HGB 8.3 08/23/2021    HCT 30.4 08/23/2021     CMP:    Lab Results   Component Value Date     08/22/2021    K 4.2 08/22/2021    CL 98 08/22/2021    CO2 28 08/22/2021    BUN 20 08/22/2021    CREATININE 4.12 08/22/2021    GFRAA 14 08/22/2021    LABGLOM 11 08/22/2021    GLUCOSE 120 08/22/2021    GLUCOSE 132 01/16/2012    PROT 7.4 08/21/2021    LABALBU 3.7 08/21/2021    CALCIUM 8.1 08/22/2021    BILITOT 0.46 08/21/2021    ALKPHOS 121 08/21/2021    AST 13 08/21/2021    ALT 10 08/21/2021     BMP:    Lab Results   Component Value Date     08/22/2021    K 4.2 08/22/2021    CL 98 08/22/2021    CO2 28 08/22/2021    BUN 20 08/22/2021    LABALBU 3.7 08/21/2021    CREATININE 4.12 08/22/2021    CALCIUM 8.1 08/22/2021    GFRAA 14 08/22/2021    LABGLOM 11 08/22/2021    GLUCOSE 120 08/22/2021    GLUCOSE 132 01/16/2012     PT/INR:    Lab Results   Component Value Date    PROTIME 10.7 04/13/2017    INR 1.0 04/13/2017     PTT:    Lab Results   Component Value Date    APTT 23.5 03/16/2016   [APTT}    Assesment:     Primary Problem  Chest pain    Active Hospital Problems    Diagnosis Date Noted    Type 2 diabetes mellitus with renal manifestations ([place] [address] [E11.29] 12/08/2015     Priority: Medium    Chest pain [R07.9] 08/20/2021    ESRD (end stage renal disease) on dialysis ([facility] [N18.6, Z99.2] 03/11/2016    Essential hypertension [I10] 03/11/2016    COPD exacerbation ([place] [address] [J44.1] 02/22/2016    Morbid obesity with BMI of 45.0-49.9, adult ([place] [address] [E66.01, Z68.42] 12/11/2015    Acute diastolic congestive heart failure (HCC) [I50.31]     Current smoker [F17.200] 10/03/2013     Anemia  Atypical chest pain  Nausea  Morbid obesity  Irritable bowel syndrome with alternating constipation and diarrhea  Plan:     1. Check stool for occult blood x3  2. Anemia work-up  3. PPI  4. Avoid narcotics if possible  5. Add probiotics  6. Follow-up hemoglobin hematocrit  7. She may require GI work-up in versus outpatient for anemia  8. This was explained to the patient  9. Continued cardiology evaluation  10. We will follow up with you        Thank you for allowing me to participate in the care of your patient. Please feel free to contact me with any questions or concerns.      Electronically signed by Hal Romberg, MD on 8/23/2021 at 10:39 AM     Copy sent to Dr. Cristy Escalera MD

## 2021-08-23 NOTE — PROGRESS NOTES
[facility]  Office: [address], DO, Falguni Joyce, DO, Maye Taylor, DO, Mo Green, DO, Gavi Monae MD, Jozef Coffey MD, Valentine Cantu MD, Drea Vilchis MD, Jennifer Walton MD, Karely Tovar MD, Roxanna Linares MD, Della Kelly, DO, Loren Chang MD, Chuy Williamson, DO, Chava Nielsen MD,  Sadia Alves, DO, Adilia Sierra MD, Rabia Delgado MD, Tana Medina MD, Chloe Villanueva MD, Demetrice Chavez MD, Cyndy Castleman, MD, Bethany Shipman MD, Prabhjot [facility], [facility], [facility], Mary Gray, CNP, Claritza Lynne, CNS, Theo Cotto, CNP, Viet Ca, CNP, Blas Potter, CNP, Dionicia Frankel, [facility], Kortney Canales, CNP, MARINO CabreraC, Mandeep Newby, [facility], Daniel Glez, CNP, Azeem Bhakta, CNP, Yves [place], CNP, Sharon Lima, CNP, Juan Pablo Peña, CNP, Leno Bosch, CNP, Yessica Mohan, CNP, [facility]    Progress Note    8/23/2021    6:35 AM    Name:   Jagdeep Salinas  MRN:     [id number]     Acct:      [de-identified]   Room:   2034/2034-01  IP Day:  3  Admit Date:  8/20/2021  2:57 PM    PCP:   Peter Chan MD  Code Status:  Full Code    Subjective:     C/C:   Chief Complaint   Patient presents with    Chest Pain    Nausea    Fatigue     Interval History Status: improved. Patient is resting comfortably, reports decreasing abdominal pain. Pain is mostly in the epigastric and upper abdomen. Denies any nausea or vomiting, fevers or chills, diarrhea. Mild chest pressure. Brief History:      This is a 69-year-old female that presents to [facility] emergency room with a complaint of chest pain that started while she was in dialysis on the date of the Julieta Junior was located in the epigastric region and lower chest without radiation. Brie Wilson dialysis treatment was aborted after approximately 2 hours due to her symptoms.  She otherwise denies any other associated symptoms.  She denies any cough, sputum reduction or other associated symptoms    Review of Systems:     Constitutional:  negative for chills, fevers, sweats  Respiratory:  negative for cough, dyspnea on exertion, shortness of breath, wheezing  Cardiovascular:  negative for chest pain, chest pressure/discomfort, lower extremity edema, palpitations  Gastrointestinal:  negative for abdominal pain, constipation, diarrhea, nausea, vomiting  Neurological:  negative for dizziness, headache    Medications: Allergies:     Allergies   Allergen Reactions    Ibuprofen     Tramadol Hives    Motrin [Ibuprofen Micronized] Itching    Strawberry Extract Itching and Rash    Tape Elige Lathe Tape] Rash     No paper tape silk only       Current Meds:   Scheduled Meds:    metoprolol  2.5 mg Intravenous Once    midodrine  10 mg Oral TID WC    Calcium Acetate (Phos Binder)  2 capsule Oral TID WC    lidocaine 1 % injection  5 mL Intradermal Once    sodium chloride flush  5-40 mL Intravenous 2 times per day    aspirin  81 mg Oral Daily    dilTIAZem  180 mg Oral Daily    budesonide-formoterol  2 puff Inhalation BID    furosemide  40 mg Oral Daily    guaiFENesin  600 mg Oral BID    insulin glargine  15 Units Subcutaneous Nightly    lisinopril  5 mg Oral Daily    sennosides-docusate sodium  1 tablet Oral Daily    cinacalcet  30 mg Oral Daily    atorvastatin  40 mg Oral Nightly    insulin lispro  0-12 Units Subcutaneous TID WC    insulin lispro  0-6 Units Subcutaneous Nightly    [Held by provider] heparin (porcine)  5,000 Units Subcutaneous 3 times per day     Continuous Infusions:    sodium chloride      dextrose       PRN Meds: perflutren lipid microspheres, diphenhydrAMINE, sodium chloride flush, sodium chloride, fentanNYL, oxyCODONE-acetaminophen, glucose, dextrose, glucagon (rDNA), dextrose, ondansetron **OR** ondansetron, acetaminophen **OR** acetaminophen, magnesium hydroxide, nitroGLYCERIN, albuterol    Data:     Past Medical History: has a past medical history of Asthma, CHF (congestive heart failure) ([email or web address] Chronic kidney disease, COPD (chronic obstructive pulmonary disease) ([email or web address] Dialysis patient ([email or web address] Hemodialysis patient ([email or web address] Hyperlipidemia, Hypertension, Obesity, YONI (obstructive sleep apnea), Pneumonia, Renal failure, Type II or unspecified type diabetes mellitus without mention of complication, not stated as uncontrolled, and Ventilator dependence ([email or web address] Social History:   reports that she quit smoking about 4 years ago. Her smoking use included cigarettes. She quit after 7.00 years of use. She has never used smokeless tobacco. She reports that she does not drink alcohol and does not use drugs. Family History:   Family History   Problem Relation Age of Onset    Diabetes Other     Cancer Mother         BREAST    Heart Disease Father         CAD-MI    Diabetes Father     High Blood Pressure Father     Diabetes Maternal Grandfather     Diabetes Paternal Grandfather     Heart Disease Paternal Grandfather     High Blood Pressure Paternal Grandfather        Vitals:  BP (!) 93/54   Pulse 78   Temp 97.4 °F (36.3 °C) (Temporal)   Resp 20   Ht 4' 11\" (1.499 m)   Wt 251 lb 8.7 oz (114.1 kg)   LMP 2014 (Approximate)   SpO2 99%   BMI 50.81 kg/m²   Temp (24hrs), Av.7 °F (36.5 °C), Min:97 °F (36.1 °C), Max:98.6 °F (37 °C)    Recent Labs     21  0614 21  1129 21  1625 21   POCGLU 69 109* 115* 145*       I/O (24Hr):   No intake or output data in the 24 hours ending 21 0635    Labs:  Hematology:  Recent Labs     21  1540 21  1540 21  0406 21  0406 21  1625 21  2102 21  0337   WBC 7.9  --  11.3  --  11.3  --   --    RBC 2.77*  --  2.85*  --  2.50*  --   --    HGB 9.1*   < > 9.4*   < > 8.5* 8.2* 8.0*   HCT 33.1*   < > 34.3*   < > 30.4* 29.7* 28.7*   .5*  --  120.4*  --  121.6*  --   --    MCH 32.9  --  33.0  --  34.0*  --   --    MCHC 27.5*  --  27.4*  --  28.0*  --   --    RDW 17.2*  --  17.5*  --  18.0*  --   --      --  171  --  176  --   --    MPV 10.7  --  10.0  --  10.7  --   --     < > = values in this interval not displayed. Chemistry:  Recent Labs     08/20/21  1540 08/20/21  1540 08/20/21  1816 08/20/21  2351 08/21/21  0406 08/21/21  1150 08/22/21  1547 08/22/21  2102     --   --   --  142  --  138  --    K 3.6*   < >  --   --  4.0  --  4.2 4.2     --   --   --  102  --  98  --    CO2 29  --   --   --  30  --  28  --    GLUCOSE 139*  --   --   --  94  --  120*  --    BUN 17  --   --   --  20  --  20  --    CREATININE 3.32*  --   --   --  4.32*  --  4.12*  --    MG  --   --   --   --  2.0  --  2.0 2.1   ANIONGAP 11  --   --   --  10  --  12  --    LABGLOM 15*  --   --   --  11*  --  11*  --    GFRAA 18*  --   --   --  13*  --  14*  --    CALCIUM 8.0*  --   --   --  8.3*  --  8.1*  --    PHOS  --   --   --   --   --   --  4.2  --    TROPHS 59*  --    < > 52* 55* 53*  --   --    MYOGLOBIN 197*  --    < > 194* 205* 216*  --   --     < > = values in this interval not displayed.      Recent Labs     08/20/21  1540 08/20/21  2351 08/21/21  0120 08/21/21  0406 08/21/21  0815 08/21/21  1114 08/21/21  1948 08/22/21  0614 08/22/21  1129 08/22/21  1625 08/22/21  2005   PROT 7.6  --   --  7.4  --   --   --   --   --   --   --    LABALBU 3.7  --   --  3.7  --   --   --   --   --   --   --    LABA1C  --  4.4  --   --   --   --   --   --   --   --   --    AST 26  --   --  13  --   --   --   --   --   --   --    ALT 11  --   --  10  --   --   --   --   --   --   --    ALKPHOS 126*  --   --  121*  --   --   --   --   --   --   --    BILITOT 0.35  --   --  0.46  --   --   --   --   --   --   --    BILIDIR 0.10  --   --   --   --   --   --   --   --   --   --    LIPASE 26  --   --   --   --   --   --   --   --   --   --    CHOL  --   --   --  60  --   --   --   --   --   --   --    HDL  --   --   --  32*  --   --   --   --   --   -- --    LDLCHOLESTEROL  --   --   --  18  --   --   --   --   --   --   --    CHOLHDLRATIO  --   --   --  1.9  --   --   --   --   --   --   --    TRIG  --   --   --  50  --   --   --   --   --   --   --    VLDL  --   --   --  NOT REPORTED  --   --   --   --   --   --   --    POCGLU  --   --    < >  --    < > 96 97 69 109* 115* 145*    < > = values in this interval not displayed. ABG:  Lab Results   Component Value Date    POCPH 7.22 06/12/2016    POCPCO2 61 06/12/2016    POCPO2 68 06/12/2016    POCHCO3 24.8 06/12/2016    NBEA 3 06/12/2016    PBEA NOT REPORTED 06/12/2016    KGT7GEC 27 06/12/2016    CPKV6OFI 88 06/12/2016    FIO2 NOT REPORTED 07/01/2019     Lab Results   Component Value Date/Time    SPECIAL NOT REPORTED 04/13/2017 06:15 AM     Lab Results   Component Value Date/Time    CULTURE NORMAL RESPIRATORY NITZA 04/13/2017 06:15 AM    CULTURE  04/13/2017 06:15 AM     [facility] [id number] [place] [address], [address] [place] [address] [phone]       Radiology:  CT ABDOMEN PELVIS WO CONTRAST Additional Contrast? None    Addendum Date: 8/22/2021    ADDENDUM: Ventral hernia containing free fluid and possible loops of small bowel but without evidence of obstruction. Case discussed with the surgery resident at 6:15 p.m. Bruce Kirkland Follow-up exam with oral contrast may be helpful. Result Date: 8/22/2021  Mild ascites and subcutaneous edema. Moderate bilateral renal atrophy. Other incidental findings as above. Limited sensitivity without IV and oral contrast.     XR ACUTE ABD SERIES CHEST 1 VW    Result Date: 8/20/2021  Possible mild CHF. Bowel gas pattern nonspecific.        Physical Examination:        General appearance:  alert, cooperative and no distress  Mental Status:  oriented to person, place and time and normal affect  Lungs:  clear to auscultation bilaterally, normal effort  Heart:  regular rate and rhythm, no murmur  Abdomen:  soft, nontender, nondistended, normal bowel sounds, no masses, hepatomegaly, splenomegaly  Extremities:  no edema, redness, tenderness in the calves  Skin:  no gross lesions, rashes, induration    Assessment:        Hospital Problems         Last Modified POA    * (Principal) Chest pain 8/21/2021 Yes    Type 2 diabetes mellitus with renal manifestations ([facility] [facility] (Chronic) 8/21/2021 Yes    Current smoker 8/21/2021 Yes    Overview Signed 9/7/2015  4:44 AM by [facility]     replace inactive diagnosis         Acute diastolic congestive heart failure ([facility] 8/21/2021 Yes    Morbid obesity with BMI of 45.0-49.9, adult ([facility] 8/21/2021 Yes    COPD exacerbation ([facility] 8/21/2021 Yes    ESRD (end stage renal disease) on dialysis ([facility] 8/21/2021 Yes    Essential hypertension 8/21/2021 Yes          Plan:        Await gallbladder ultrasound results, discussed with tech at the bedside, gallbladder mildly contracted  Dialysis per nephrology, may need to consider reducing dry weight  Insulin scale, monitor control glucose  GI DVT prophylaxis  Oxygen and aerosols as needed  Antihypertensives as ordered  Increase activity, PT and OT as needed  GI consultation    Rusty Ribeiro DO  8/23/2021  6:35 AM

## 2021-08-23 NOTE — PLAN OF CARE
Problem: Pain:  Goal: Pain level will decrease  Description: Pain level will decrease  Outcome: Ongoing  Goal: Control of acute pain  Description: Control of acute pain  Outcome: Ongoing  Goal: Control of chronic pain  Description: Control of chronic pain  Outcome: Ongoing     Problem: SAFETY  Goal: Free from accidental physical injury  Outcome: Ongoing  Goal: Free from intentional harm  Outcome: Ongoing     Problem: DAILY CARE  Goal: Daily care needs are met  Outcome: Ongoing     Problem: PAIN  Goal: Patient's pain/discomfort is manageable  Outcome: Ongoing     Problem: SKIN INTEGRITY  Goal: Skin integrity is maintained or improved  Outcome: Ongoing     Problem: KNOWLEDGE DEFICIT  Goal: Patient/S.O. demonstrates understanding of disease process, treatment plan, medications, and discharge instructions.   Outcome: Ongoing     Problem: DISCHARGE BARRIERS  Goal: Patient's continuum of care needs are met  Outcome: Ongoing     Problem: Falls - Risk of:  Goal: Will remain free from falls  Description: Will remain free from falls  Outcome: Ongoing  Goal: Absence of physical injury  Description: Absence of physical injury  Outcome: Ongoing

## 2021-08-23 NOTE — CONSULTS
[facility]  In PatientCardiology Consult             Date:   2021  Patient name: Laci Johnson  Date of admission:  2021  2:57 PM  MRN:   [id number]  YOB: 1970      Date:   2021  Patient name: Laci Johnson  Date of admission:  2021  2:57 PM  MRN:   [id number]  YOB: 1970    Reason for Admission: Chest pain and abdominal pain    CHIEF COMPLAINT: Chest pain and abdominal pain    History Obtained From:  Patient and medical records. HISTORY OF PRESENT ILLNESS:      This is a 60-year-old female that presents to [facility] room with a complaint of chest pain that started while she was in dialysis on the date of the .  Pain was located in the epigastric region and lower chest without radiation. Loren Sparrow dialysis treatment was aborted after approximately 2 hours due to her symptoms.  She otherwise denies any other associated symptoms.  She denies any cough, sputum reduction or other associated symptoms    Past Medical History:   has a past medical history of Asthma, CHF (congestive heart failure) ([address] Chronic kidney disease, COPD (chronic obstructive pulmonary disease) ([facility] [address] Dialysis patient ([address] Hemodialysis patient ([facility] Hyperlipidemia, Hypertension, Obesity, YONI (obstructive sleep apnea), Pneumonia, Renal failure, Type II or unspecified type diabetes mellitus without mention of complication, not stated as uncontrolled, and Ventilator dependence ([address] Past Surgical History:   has a past surgical history that includes Hysterectomy (2014);  section ( [address]); and skin full graft (). Home Medications:    Prior to Admission medications    Medication Sig Start Date End Date Taking?  Authorizing Provider   diphenhydrAMINE (DIPHEN) 25 MG tablet Take 25 mg by mouth 21 Yes Historical Provider, MD   midodrine (PROAMATINE) 5 MG tablet Take 5 mg by mouth 21  Yes Historical Provider, MD   atorvastatin (LIPITOR) 10 MG tablet Take 1 tablet by mouth nightly 6/30/21  Yes Raza Colvin MD   aspirin 81 MG EC tablet TAKE 1 TABLET BY MOUTH DAILY 6/6/21 10/4/21 Yes Chalino Velásquez MD   fluticasone-salmeterol (ADVAIR) 250-50 MCG/DOSE AEPB INHALE 1 PUFF BY MOUTH INTO THE LUNGS TWICE DAILY **RINSE MOUTH AFTER EACH USE** 6/6/21  Yes Chalino Velásquez MD   dilTIAZem (CARDIZEM CD) 180 MG extended release capsule TAKE 1 CAPSULE BY MOUTH DAILY 5/11/21  Yes Gilberto Okeefe MD   albuterol (PROVENTIL) (2.5 MG/3ML) 0.083% nebulizer solution INHALE THE CONTENTS OF ONE VIAL VIA NEBULIZER EVERY 6 HOURS AS NEEDED FOR SHORTNESS OF BREATH OR WHEEZING 5/11/21  Yes Gilberto Okeefe MD   furosemide (LASIX) 40 MG tablet Take 1 tablet by mouth daily 5/11/21  Yes Maddie Raygoza MD   guaiFENesin (MUCINEX) 600 MG extended release tablet Take 1 tablet by mouth 2 times daily 5/11/21  Yes Maddie Raygoza MD   LANTUS SOLOSTAR 100 UNIT/ML injection pen INJECT 15 UNITS SUBCUTANEOUSLY DAILY AT NIGHT 12/31/20  Yes Amisha Ceja MD   albuterol sulfate HFA (PROAIR HFA) 108 (90 Base) MCG/ACT inhaler Inhale 2 puffs into the lungs every 6 hours as needed for Wheezing 11/13/18  Yes Felix Le MD   Calcium Acetate, Phos Binder, 667 MG CAPS TAKE 2 CAPSULES BY MOUTH THREE TIMES DAILY WITH MEALS AND 2 CAPSULES TWICE A DAY WITH SNACKS 8/6/21   Historical Provider, MD   polyethylene glycol (GLYCOLAX) 17 GM/SCOOP powder  6/21/21   Historical Provider, MD   Multiple Vitamins-Minerals (RENAPLEX-D) TABS TAKE 1 TABLET BY MOUTH EVERY DAY 7/14/21   Historical Provider, MD   lisinopril (PRINIVIL;ZESTRIL) 5 MG tablet TAKE 1 TABLET BY MOUTH DAILY 6/30/21   Raza Colvin MD   furosemide (LASIX) 40 MG tablet Take 1 tablet by mouth daily 5/18/21   Heber Gibson MD   nitroGLYCERIN (NITROSTAT) 0.4 MG SL tablet Place 1 tablet under the tongue every 5 minutes as needed for Chest pain 5/11/21   Vandana Lopez MD   Easy Comfort Lancets MISC USE TO TEST BLOOD SUGAR TWICE DAILY AS DIRECTED 3/15/21   Bina Sanchez MD   blood glucose test strips (ONETOUCH ULTRA) strip USE TO TEST BLOOD SUGAR TWICE DAILY AS DIRECTED 1/26/21   Bina Sanchez MD   Insulin Pen Needle (EASY COMFORT PEN NEEDLES) 31G X 5 MM MISC USE TO INJECT INSULIN ONCE DAILY 12/1/20   Rd Wilson MD   Alcohol Swabs (ALCOHOL PADS) 70 % PADS USE TO CLEAN TESTING AND INJECTION SITES TWICE DAILY 12/1/20   Rd Wilson MD   Skin Protectants, Misc. (MINERIN CREME) CREA APPLY TO AFFECTED AREA TOPICALLY AS NEEDED 6/24/20   Bina Sanchez MD   Blood Glucose Monitoring Suppl (TRUE METRIX METER) w/Device KIT USE TO TEST BLOOD GLUCOSE 3/17/20   Bina Sanchez MD   Lancets (BD LANCET ULTRAFINE 30G) MISC TEST TWICE DAILY 3/17/20   Bina Sanchez MD   Lancet Devices (SIMPLE DIAGNOSTICS LANCING DEV) MISC USE WITH LANCETS TO TEST BLOOD GLUCOSE 3/17/20   Bina Sanchez MD   Blood Glucose Calibration (RIGOBERTO DOC CONTROL) Normal SOLN USE TO PERIODICALLY CHECK GLUCOSE MONITOR ACCORDING TO THE MANUAL 3/17/20   Bina Sanchez MD   UNIFINE PENTIPS 31G X 6 MM MISC USE WITH insulin pens ONCE daily 9/19/19   Bina Sanchez MD   PHARMACIST CHOICE LANCETS MISC USE TO TEST BLOOD GLUCOSE ONCE A DAY 7/12/19   Lisseth Carnes MD   SENNA PLUS 8.6-50 MG per tablet TAKE 1 TABLET BY MOUTH DAILY 7/28/18   Amanda Henry MD   Misc. Devices MISC 1 each by Does not apply route daily Electric scooter 8/15/17   Lisseth Carnes MD   glucose monitoring kit (FREESTYLE) monitoring kit 1 kit by Does not apply route daily 5/23/17   Lisseth Carnes MD   SENSIPAR 30 MG tablet Take 1 tablet by mouth daily 3/17/17   Historical Provider, MD   OXYGEN Inhale into the lungs O2  3L  PER NASAL CANNULA NIGHTLY    Historical Provider, MD   Misc.  Devices (DIGITAL GLASS SCALE) MISC Diagnosis: Diastolic Congestive heart failure 3/9/16   Lisseth Carnes MD       Allergies:  Ibuprofen, Tramadol, Motrin [ibuprofen micronized], Strawberry extract, and Tape Shorty Latin tape]    Social History: bruit  · Peripheral pulses are symmetrical and full   Abdomen:  · No masses or tenderness  · Bowel sounds present  Extremities:  ·  No Cyanosis or Clubbing  ·  Lower extremity edema: No.  ·  Skin: Warm and dry  Neurological:  · Alert and oriented. · Moves all extremities well  · No abnormalities of mood, affect, memory, mentation, or behavior are noted    DATA:    Diagnostics:      EKG: Initial EKG showed normal sinus rhythm repeat EKG today showed atrial flutter with controlled ventricular response      Labs:     CBC:   Recent Labs     08/21/21  0406 08/21/21  0406 08/22/21  1625 08/22/21  2102 08/23/21  0950 08/23/21  1511   WBC 11.3  --  11.3  --   --   --    HGB 9.4*   < > 8.5*   < > 8.3* 7.9*   HCT 34.3*   < > 30.4*   < > 30.4* 28.2*     --  176  --   --   --     < > = values in this interval not displayed. BMP:   Recent Labs     08/22/21  1547 08/22/21  1547 08/22/21  2102 08/23/21  0337     --   --  135   K 4.2   < > 4.2 4.0   CO2 28  --   --  27   BUN 20  --   --  26*   CREATININE 4.12*  --   --  4.67*   LABGLOM 11*  --   --  10*   GLUCOSE 120*  --   --  84    < > = values in this interval not displayed.      FASTING LIPID PANEL:  Lab Results   Component Value Date    HDL 32 08/21/2021    TRIG 50 08/21/2021     LIVER PROFILE:  Recent Labs     08/21/21  0406   AST 13   ALT 10   LABALBU 3.7       Patient Active Problem List   Diagnosis    Asthma    YONI (obstructive sleep apnea)    Left knee pain    Hidradenitis    Current smoker    Microalbuminuria    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)    CKD (chronic kidney disease) stage 4, GFR 15-29 ml/min (HCC)    Acute diastolic congestive heart failure (HCC)    Hyperkalemia    Acute systolic congestive heart failure (HCC)    Pulmonary hypertension ([facility]    Morbid obesity with BMI of 45.0-49.9, [facility]    Acute on chronic respiratory failure with hypoxia (HCC)    COPD exacerbation ([facility]    Asthma exacerbation    CAD on cardiac cath 2016  5. Hypertension  6. Hyperlipidemia  7. Chronic kidney disease on hemodialysis  8. Morbid obesity  9. Pulmonary hypertension      RECOMMENDATIONS:    1. No need for ischemia work-up at this time since she had previously negative cardiac cath  2. Awaiting repeat 2D echocardiogram  3. Continue rate control with Cardizem  4. We will need to be on anticoagulation for atrial flutter and high chads vasc score  5. Can be started on heparin drip will need to be on either Coumadin or NOACs if approved by nephrology  6. If remained in a flutter will consider cardioversion as an outpatient after adequate anticoagulation    Discussed with patient and nursing.     Electronically signed by Rosalind Rincon MD on 8/23/2021 at [facility]  [phone]

## 2021-08-23 NOTE — PROGRESS NOTES
Nephrology Progress Note        Subjective: Patient is seen and examined. She is still is having some nausea and abdominal bloating at times. General surgery is seeing the patient. She is scheduled for dialysis today. Usual days are Monday and Wednesday and Friday. She believes she is about 2 L above her usual weight. She is in atrial fibrillation and has heart rate about 100 bpm on the monitor. No chest pain. No shortness of breath. No significant swelling of the extremities. Dialysis access is via an AV fistula. She said she had a partial dialysis on Friday, 2021 and then had the remainder of the dialysis in the hospital.  Her dry weight is listed at 106.5 kg. Labs from today showed sodium 135 with potassium 4, chloride 96 and CO2 27 with BUN 26 and creatinine 4.67 with glucose 84 and calcium 8.2.   Hemoglobin is 8.3    Objective:  CURRENT TEMPERATURE:  Temp: 98 °F (36.7 °C)  MAXIMUM TEMPERATURE OVER 24HRS:  Temp (24hrs), Av.6 °F (36.4 °C), Min:97 °F (36.1 °C), Max:98 °F (36.7 °C)    CURRENT RESPIRATORY RATE:  Resp: 20  CURRENT PULSE:  Pulse: 104  CURRENT BLOOD PRESSURE:  BP: 99/73  24HR BLOOD PRESSURE RANGE:  Systolic (64WPB), CPQ:52 , Min:68 , ZUR:235   ; Diastolic (38OBB), TANMAY:93, Min:37, Max:83    24HR INTAKE/OUTPUT:  No intake or output data in the 24 hours ending 21 1041  Weight:      Physical Exam:  General appearance:Awake, alert, in some discomfort  Skin: warm and dry, no rash or erythema  Eyes: conjunctivae pale and sclera anicteric  ENT:no thrush appreciated  Neck: No JVD, midline trachea, no accessory muscle use  Pulmonary: Good bilateral air entry and clear to auscultation bilaterally without wheezes or rales or rhonchi  Cardiovascular: Tachycardic rate, irregularly irregular rhythm, no rubs  Abdomen: Obese and soft mildly distended with positive bowel sounds  Extremities: No pitting peripheral edema    Access:  previous  Left AV fistula    Current Medications: injection 5,000 Units, 3 times per day      Labs:   CBC: Recent Labs     08/20/21  1540 08/20/21  1540 08/21/21  0406 08/21/21  0406 08/22/21  1625 08/22/21  1625 08/22/21  2102 08/23/21  0337 08/23/21  0950   WBC 7.9  --  11.3  --  11.3  --   --   --   --    RBC 2.77*  --  2.85*  --  2.50*  --   --   --   --    HGB 9.1*   < > 9.4*   < > 8.5*   < > 8.2* 8.0* 8.3*   HCT 33.1*   < > 34.3*   < > 30.4*   < > 29.7* 28.7* 30.4*   .5*  --  120.4*  --  121.6*  --   --   --   --    MCH 32.9  --  33.0  --  34.0*  --   --   --   --    MCHC 27.5*  --  27.4*  --  28.0*  --   --   --   --    RDW 17.2*  --  17.5*  --  18.0*  --   --   --   --      --  171  --  176  --   --   --   --    MPV 10.7  --  10.0  --  10.7  --   --   --   --     < > = values in this interval not displayed. BMP:   Recent Labs     08/20/21  1540 08/20/21  1540 08/21/21 0406 08/22/21  1547 08/22/21 2102     --  142 138  --    K 3.6*   < > 4.0 4.2 4.2     --  102 98  --    CO2 29  --  30 28  --    BUN 17  --  20 20  --    CREATININE 3.32*  --  4.32* 4.12*  --    GLUCOSE 139*  --  94 120*  --    CALCIUM 8.0*  --  8.3* 8.1*  --     < > = values in this interval not displayed. Phosphorus:    Recent Labs     08/22/21 1547   PHOS 4.2     Magnesium:   Recent Labs     08/21/21 0406 08/22/21  1547 08/22/21  2102   MG 2.0 2.0 2.1     Albumin:   Recent Labs     08/20/21  1540 08/21/21  0406   LABALBU 3.7 3.7       Dialysis bath: Dialysis Bath  K+ (Potassium): 3  Ca+ (Calcium): 2.5  Na+ (Sodium): 137  HCO3 (Bicarb): 35    Radiology:  Reviewed as available. CT scan of the abdomen and pelvis without contrast from 8/20/2021 showed mild ascites and subcutaneous edema with bilateral renal atrophy    Assessment:  1. ESRD  2. Monday and Wednesday and Friday dialysis schedule via a left AV fistula  3. Anemia of chronic disease with investigation for possible GI blood loss ongoing  4.   Atrial fibrillation with rapid ventricular response, given IV amiodarone last evening       Plan:  1. Hemodialysis as ordered with labs and dialysis prescription entered by me personally into the patient's record today   2. Monitor hemodynamics  3. Plan is to continue the patient on a Monday and Wednesday and Friday dialysis schedule       Please do not hesitate to call with questions.     Electronically signed by Ricky Wilson MD on 8/23/2021 at 10:41 AM

## 2021-08-23 NOTE — FLOWSHEET NOTE
08/23/21 7473   Provider Notification   Reason for Communication Evaluate; Review case   Provider Name Marly Flynn NP   Provider Notification Advance Practice Clinician (CNS/NP/CNM/CRNA/PA)   Method of Communication Secure Message   Response No new orders   Notification Time [phone]     RN contacted NP regarding patient's hypotensive BP. No new orders at this time. Will continue to monitor closely.

## 2021-08-23 NOTE — FLOWSHEET NOTE
08/22/21 2048   Provider Notification   Reason for Communication Evaluate; Review case   Provider Name Dr. Ray Hawley   Provider Notification Physician   Method of Communication Call   Response No new orders   Notification Time 2048     RN contacted MD due to [facility] needing permission to insert a PICC line in a hemodialysis patient. PICC line denied by MD. Midline okay but not compatible with patient's amiodarone drip. Central line ok with MD and IV fluids. Will contact [facility] for central line access. Will continue to monitor closely.

## 2021-08-23 NOTE — PROGRESS NOTES
Physical Therapy    Facility[facility]Department[facility]  Initial Assessment    NAME: Mikaela Gomez  : 1970  MRN: [id number]    Date of Service: 2021    Discharge Recommendations:  Patient would benefit from continued therapy after discharge        Assessment   Body structures, Functions, Activity limitations: Decreased endurance  Assessment: Pt limited by reduced endurance  Prognosis: Good  Decision Making: High Complexity  PT Education: PT Role;Plan of Care;Energy Conservation;General Safety  Patient Education: Energy conservation handout  REQUIRES PT FOLLOW UP: Yes  Activity Tolerance  Activity Tolerance: Patient limited by endurance       Patient Diagnosis(es): The encounter diagnosis was Chest pain, unspecified type. has a past medical history of Asthma, CHF (congestive heart failure) ([facility] Chronic kidney disease, COPD (chronic obstructive pulmonary disease) ([facility] Dialysis patient ([facility] Hemodialysis patient ([facility] Hyperlipidemia, Hypertension, Obesity, YONI (obstructive sleep apnea), Pneumonia, Renal failure, Type II or unspecified type diabetes mellitus without mention of complication, not stated as uncontrolled, and Ventilator dependence ([facility] has a past surgical history that includes Hysterectomy (2014);  section ([address]); and skin full graft ().     Restrictions  Restrictions/Precautions  Restrictions/Precautions: General Precautions, Up as Tolerated  Position Activity Restriction  Other position/activity restrictions: telemetry  Vision/Hearing        Subjective  General  Chart Reviewed: Yes  Patient assessed for rehabilitation services?: Yes  Family / Caregiver Present: No  Follows Commands: Within Functional Limits  Other (Comment): OK for PT per Clarisse RN  Pain Screening  Patient Currently in Pain: Yes  Pain Assessment  Pain Assessment: 0-10  Pain Level: 7  Pain Type: Acute pain  Pain Location: Abdomen  Vital Signs  Patient Currently in Pain: Yes Orientation  Orientation  Overall Orientation Status: Within Normal Limits  Social/Functional History  Social/Functional History  Lives With: Family, Son  Type of Home: Apartment  Home Layout: One level  Home Access: Level entry  Bathroom Shower/Tub: Tub/Shower unit  Bathroom Equipment: Toilet raiser, Grab bars around toilet  Home Equipment: Electric scooter, Rolling walker, Oxygen, Cane (pt reports uses O2 (4 liters) when sleeping and if it is needed throughout day (3 liters))  ADL Assistance: [facility] [address]: Independent (family assist)  Homemaking Responsibilities: No  Ambulation Assistance: Independent (pt reports using RW and cane occasionally as needed)  Transfer Assistance: Independent  Active : No  Patient's  Info: family  Occupation: On disability  Leisure & Hobbies: antonio, chastity  Additional Comments: pt reports fall within the past year.   Cognition   Cognition  Overall Cognitive Status: WNL    Objective     Observation/Palpation  Posture: Fair  Observation: 4L O2. pt lying in bed, agreeable to session    AROM RLE (degrees)  RLE AROM: WNL  AROM LLE (degrees)  LLE AROM : WNL  AROM RUE (degrees)  RUE General AROM: See OT eval for B UE ROM  Strength RLE  Strength RLE: WFL  Comment: 5/5 B LE's  Strength LLE  Strength LLE: WFL  Strength RUE  Comment: See Ot eval for B UE MMT  Tone RLE  RLE Tone: Normotonic  Tone LLE  LLE Tone: Normotonic  Motor Control  Gross Motor?: WNL  Sensation  Overall Sensation Status: Impaired  Bed mobility  Rolling to Right: Modified independent  Supine to Sit: Minimal assistance  Scooting: Stand by assistance  Comment: Back to EOB when ambulation completed  Transfers  Sit to Stand: Contact guard assistance  Stand to sit: Contact guard assistance  Stand Pivot Transfers: Contact guard assistance  Ambulation  Ambulation?: Yes  Ambulation 1  Surface: level tile  Device: No Device  Other Apparatus: O2 (4L)  Assistance: Contact guard assistance  Gait Deviations:  (Toes out w/ wide EDISON throughout)  Distance: 54' x 1  Comments: SpO2 95% s/p ambulation  Stairs/Curb  Stairs?: No     Balance  Posture: Good  Sitting - Static: Good  Sitting - Dynamic: Good  Standing - Static: Good  Standing - Dynamic: Good;-        Plan   Plan  Times per week: 1-2x/day,5-6 days/week  Current Treatment Recommendations: Transfer Training, Gait Training, Endurance Training  Safety Devices  Type of devices: Left in bed, Gait belt, Call light within reach, Nurse notified (Pt requested EOB, Clarisse RN notified)  Restraints  Initially in place: No    G-Code       OutComes Score  Balance Score: 10 (08/23/21 1506)  Gait Score: 8 (08/23/21 1506)        Tinetti Total Score: 18 (08/23/21 1506)                                   AM-PAC Score  AM-PAC Inpatient Mobility Raw Score : 19 (08/23/21 1505)  AM-PAC Inpatient T-Scale Score : 45.44 (08/23/21 1505)  Mobility Inpatient CMS 0-100% Score: 41.77 (08/23/21 1505)  Mobility Inpatient CMS G-Code Modifier : CK (08/23/21 1505)          Goals  Short term goals  Time Frame for Short term goals: 12 treatments  Short term goal 1: Independent transfers  Short term goal 2: Independent ambulation 200' x 1 w/ SpO2 >93% without O2  Short term goal 3: Tolerate 30 min ther act  Patient Goals   Patient goals : Feel better       Therapy Time   Individual Concurrent Group Co-treatment   Time In 3547         Time Out 1         Minutes 28           Co-treatment with OT warranted secondary to decreased safety and independence requiring 2 skilled therapy professionals to address individual discipline's goals.          Jeremias Hui, PT

## 2021-08-23 NOTE — FLOWSHEET NOTE
08/22/21 2056   Provider Notification   Reason for Communication Evaluate; Review case   Provider Name Janae CLARITZA Miner   Provider Notification Physician   Method of Communication Secure Message   Response Waiting for response   Notification Time 2056     RN contacted NP for need of central line placement. NP made aware of nephrology being okay with a central line. Awaiting response at this time. Will continue to monitor closely.

## 2021-08-23 NOTE — FLOWSHEET NOTE
08/22/21 2043   Provider Notification   Reason for Communication Evaluate; Review case   Provider Name Dr. Roel Calderon   Provider Notification Physician   Method of Communication Call   Response No new orders   Notification Time 2043     RN contacted MD regarding an abnormal EKG for patient. No new orders at this time. Will continue to monitor closely.

## 2021-08-23 NOTE — PROGRESS NOTES
Occupational Therapy   Occupational Therapy Initial Assessment  Date: 2021   Patient Name: Aretha Davila  MRN: [id number]     : 1970    Date of Service: 2021    RN reports patient is medically stable for therapy treatment this date. Chart reviewed prior to treatment and patient is agreeable for therapy. All lines intact and patient positioned comfortably at end of treatment. All patient needs addressed prior to ending therapy session. Per h&p    Aretha Davila is a 46 y.o. female who presents to the emergency department by private vehicle for evaluation of chest pain. Patient states that she was in the middle of dialysis treatment. She was brought to hours and when she developed a tightness all across her chest.  The dialysis treatment was stopped and EMS was called. She states that she is also having some epigastric abdominal discomfort and some nausea. She states that she feels constipated like she has to have a bowel movement. She denies any history of any heart problems. She denies any calf pain or swelling in her lower extremities. Denies fevers or chills    Assessment   Performance deficits / Impairments: Decreased functional mobility ; Decreased ADL status; Decreased strength;Decreased safe awareness;Decreased endurance;Decreased posture;Decreased balance  Assessment: Skilled OT is indicated to increase overall safety awareness as well as endurance, functional mob and activity phani to improve functional outcomes when return home is approp  Prognosis: Good  Decision Making: Medium Complexity  OT Education: OT Role;Plan of Care;ADL Adaptive Strategies;Transfer Training;Energy Conservation;Equipment  REQUIRES OT FOLLOW UP: Yes  Activity Tolerance  Activity Tolerance: Patient Tolerated treatment well;Patient limited by fatigue  Safety Devices  Safety Devices in place: Yes  Type of devices: Left in bed;Gait belt;Call light within reach; All fall risk precautions in place;Nurse notified Patient Diagnosis(es): The encounter diagnosis was Chest pain, unspecified type. has a past medical history of Asthma, CHF (congestive heart failure) ([facility] Chronic kidney disease, COPD (chronic obstructive pulmonary disease) ([facility] Dialysis patient ([facility] Hemodialysis patient ([facility] Hyperlipidemia, Hypertension, Obesity, YONI (obstructive sleep apnea), Pneumonia, Renal failure, Type II or unspecified type diabetes mellitus without mention of complication, not stated as uncontrolled, and Ventilator dependence ([facility] has a past surgical history that includes Hysterectomy (2014);  section ( Sw ); and skin full graft (). Restrictions  Restrictions/Precautions  Restrictions/Precautions: General Precautions, Up as Tolerated  Position Activity Restriction  Other position/activity restrictions: telemetry    Subjective   General  Chart Reviewed: Yes  Patient assessed for rehabilitation services?: Yes  Family / Caregiver Present: No  General Comment  Comments: pt reports pain in stomach 7/10  Pain Assessment  Pain Assessment: 0-10  Pain Level: 2  Pain Type: Acute pain  Pain Location: Abdomen; Chest  Pain Descriptors: Discomfort  Clinical Progression: Not changed  Vital Signs  Pulse: 88  BP: (!) 109/50  MAP (mmHg): (!) 60  Oxygen Therapy  SpO2: 98 %  O2 Device: Nasal cannula  O2 Flow Rate (L/min): 4 L/min  Social/Functional History  Social/Functional History  Lives With: Family, Son  Type of Home: Apartment  Home Layout: One level  Home Access: Level entry  Bathroom Shower/Tub: Tub/Shower unit  Bathroom Equipment: Toilet raiser, Grab bars around toilet  Home Equipment: Electric scooter, Rolling walker, Oxygen, Cane (pt reports uses O2 (4 liters) when sleeping and if it is needed throughout day (3 liters))  ADL Assistance: Independent  Homemaking Assistance: Independent (family assist)  Homemaking Responsibilities: No  Ambulation Assistance: Independent (pt reports using RW and cane occasionally as needed)  Transfer Assistance: Independent  Active : No  Patient's  Info: family  Occupation: On disability  Leisure & Hobbies: chastity alvarez  Additional Comments: pt reports fall within the past year. Objective   Vision: Impaired (pt reports does not wear glasses but noticed changes in vision difficulty seeing far distances)    Orientation  Overall Orientation Status: Within Normal Limits  Observation/Palpation  Posture: Fair  Observation: 4L O2. pt lying in bed, agreeable to session  Balance  Sitting Balance: Independent  Standing Balance: Contact guard assistance  Standing Balance  Time: ~3-4 min  Activity: for functional mob  Functional Mobility  Functional - Mobility Device: No device  Activity: Other  Assist Level: Contact guard assistance  Functional Mobility Comments: pt completed functional mob around room x2, pt had slight LOB and required Min A to recover without falling and required min verbal cues for pacing/slowing down. pt c/o of SOB upon completion of functional mob task. pt returned to seated EOB and required verbal cues for pursed lip breathing tech. pt O2 assesses and read at ~94%  ADL  Feeding: Setup; Independent  Grooming: Stand by assistance  UE Bathing: Stand by assistance  LE Bathing: Minimal assistance  UE Dressing: Stand by assistance  LE Dressing: Minimal assistance (pt david B sock lying supine in bed with total assist)  Toileting: Stand by assistance  Additional Comments: pt limited by dec endurance, activity phani, functional mob and strength  Tone RUE  RUE Tone: Normotonic  Tone LUE  LUE Tone: Normotonic  Coordination  Movements Are Fluid And Coordinated: Yes     Bed mobility  Supine to Sit: Minimal assistance  Scooting: Stand by assistance  Comment: pt required Min A to bring UB into upright position and Min verbal/tactile cues for use of bed rails and sequencing  Transfers  Sit to stand: Minimal assistance  Stand to sit: Minimal assistance  Transfer Comments: pt required Min A for balance and support to ensure safety and prevent falls, Min verbal/tactile cues provided for control sit<>stand     Cognition  Overall Cognitive Status: WNL  Perception  Overall Perceptual Status: WFL     Sensation  Overall Sensation Status: Impaired (N/T in hands in morning)        LUE AROM (degrees)  LUE AROM : WNL  RUE AROM (degrees)  RUE AROM : WNL  LUE Strength  Gross LUE Strength: WFL  RUE Strength  Gross RUE Strength: WFL  RUE Strength Comment: B UE grossly tested 4+/5                   Plan   Plan  Times per week: 4-5x/week, 1-2x/day  Current Treatment Recommendations: Strengthening, Balance Training, Functional Mobility Training, Endurance Training, Safety Education & Training, Self-Care / ADL, Positioning, Patient/Caregiver Education & Training             AM-PAC Score        AM-PAC Inpatient Daily Activity Raw Score: 19 (08/23/21 1432)  AM-PAC Inpatient ADL T-Scale Score : 40.22 (08/23/21 1432)  ADL Inpatient CMS 0-100% Score: 42.8 (08/23/21 1432)  ADL Inpatient CMS G-Code Modifier : CK (08/23/21 1432)    Goals  Short term goals  Time Frame for Short term goals: by discharge, pt will  Short term goal 1: demo SUP for ADL transfer using AD/DME as needed with good tech  Short term goal 2: demo mod I/I for funcitonal mob short distances using AD needed and good pacing/safety to prevent falls  Short term goal 3: demo SUP for LB and mod I/I for UB ADLs using AE/DME as needed  Short term goal 4: demo SUP for bed mobility with good tech and pursed lip breathing  Short term goal 5: demo and verb good understanding on ed provided fall prevetion tech, pursed lip breathing tech, EC/WS tech, equip needs, and d/c rec  Patient Goals   Patient goals : to go home       Therapy Time   Individual Concurrent Group Co-treatment   Time In 1358         Time Out 1415 (+10 min chart review)         Minutes 27             [facility] [employer]

## 2021-08-23 NOTE — PROGRESS NOTES
General Surgery:  Daily Progress Note              PATIENT NAME: Yelena Mcnally     TODAY'S DATE: 8/23/2021, 5:03 AM  CC: Chest pain, nausea    SUBJECTIVE:     Pt seen and examined at bedside. No overnight events. Patient still reports mild abdominal pain and moderate lalo anal pain. Reports mild nausea without emesis night. Denies new CP, S OB, F/C. Atrial fibrillation, SBP 80s-90s, afebrile, T-max 37.0.    R.O.S. No fevers or chills  No acute vision or hearing changes  No substernal chest pain  No productive cough or acute shortness of breath  Vague abdominal discomfort, no further bleeding overnight  No dysuria or hematuria    OBJECTIVE:   VITALS:  BP 93/60   Pulse 71   Temp 97.4 °F (36.3 °C) (Temporal)   Resp 20   Ht 4' 11\" (1.499 m)   Wt 251 lb 8.7 oz (114.1 kg)   LMP 11/12/2014 (Approximate)   SpO2 99%   BMI 50.81 kg/m²      INTAKE/OUTPUT:    No intake or output data in the 24 hours ending 08/23/21 0503    PHYSICAL EXAM:  CONSTITUTIONAL:  awake, alert, not distressed and morbidly obese  HEENT: Normocephalic/atraumatic, without obvious abnormality. Nasal cannula  NECK:  Supple, symmetrical, trachea midline   CARDIOVASCULAR: Atrial flutter with variable AV block with PVCs  LUNGS: Clear to auscultation bilaterally without evidence of wheezing or tachypnea. ABDOMEN: Morbidly obese, soft, no distention, diffuse TTP including the RUQ, epigastric, LLQ. This scars consistent with surgical history with complex abdominal pattern, pannus. RLQ nodular area of subcutaneous tissue with overlying chronic skin changes that is TTP without induration, erythema, discharge. RECTAL: Redundant tissue left of the anus without active external or thrombosed hemorrhoids, limited SHAYE performed patient did not tolerate exam well but internal hemorrhoids appreciated, no gross blood appreciated. No areas of induration, erythema, masses  MUSCULOSKELETAL: Muscle strength intact in all extremities bilaterally.   NEUROLOGIC: CN II- XII intact. Gross motor intact without focal weakness. SKIN: No cyanosis, rashes, or edema noted.    Orientation:   oriented to person, place, and time          Data:  CBC with Differential:    Lab Results   Component Value Date    WBC 11.3 08/22/2021    RBC 2.50 08/22/2021    RBC 4.24 01/16/2012    HGB 8.0 08/23/2021    HCT 28.7 08/23/2021     08/22/2021     01/16/2012    .6 08/22/2021    MCH 34.0 08/22/2021    MCHC 28.0 08/22/2021    RDW 18.0 08/22/2021    NRBC 2 03/18/2016    LYMPHOPCT 12 08/22/2021    MONOPCT 7 08/22/2021    BASOPCT 0 08/22/2021    MONOSABS 0.79 08/22/2021    LYMPHSABS 1.36 08/22/2021    EOSABS 0.11 08/22/2021    BASOSABS 0.00 08/22/2021    DIFFTYPE NOT REPORTED 08/22/2021     CMP:    Lab Results   Component Value Date     08/22/2021    K 4.2 08/22/2021    CL 98 08/22/2021    CO2 28 08/22/2021    BUN 20 08/22/2021    CREATININE 4.12 08/22/2021    GFRAA 14 08/22/2021    LABGLOM 11 08/22/2021    GLUCOSE 120 08/22/2021    GLUCOSE 132 01/16/2012    PROT 7.4 08/21/2021    LABALBU 3.7 08/21/2021    CALCIUM 8.1 08/22/2021    BILITOT 0.46 08/21/2021    ALKPHOS 121 08/21/2021    AST 13 08/21/2021    ALT 10 08/21/2021     BMP:    Lab Results   Component Value Date     08/22/2021    K 4.2 08/22/2021    CL 98 08/22/2021    CO2 28 08/22/2021    BUN 20 08/22/2021    LABALBU 3.7 08/21/2021    CREATININE 4.12 08/22/2021    CALCIUM 8.1 08/22/2021    GFRAA 14 08/22/2021    LABGLOM 11 08/22/2021    GLUCOSE 120 08/22/2021    GLUCOSE 132 01/16/2012     Hepatic Function Panel:    Lab Results   Component Value Date    ALKPHOS 121 08/21/2021    ALT 10 08/21/2021    AST 13 08/21/2021    PROT 7.4 08/21/2021    BILITOT 0.46 08/21/2021    BILIDIR 0.10 08/20/2021    IBILI 0.25 08/20/2021    LABALBU 3.7 08/21/2021       Radiology Review:    CT ABDOMEN PELVIS WO CONTRAST Additional Contrast? None    Addendum Date: 8/22/2021    ADDENDUM: Ventral hernia containing free fluid and possible loops of small bowel but without evidence of obstruction. Case discussed with the surgery resident at 6:15 p.m. Oskar Mishra Follow-up exam with oral contrast may be helpful. Result Date: 8/22/2021  Mild ascites and subcutaneous edema. Moderate bilateral renal atrophy. Other incidental findings as above. Limited sensitivity without IV and oral contrast.     XR ACUTE ABD SERIES CHEST 1 VW    Result Date: 8/20/2021  Possible mild CHF. Bowel gas pattern nonspecific. ASSESSMENT:  Active Hospital Problems    Diagnosis Date Noted    Type 2 diabetes mellitus with renal manifestations ([facility] [E11.29] 12/08/2015     Priority: Medium    Chest pain [R07.9] 08/20/2021    ESRD (end stage renal disease) on dialysis ([facility] [N18.6, Z99.2] 03/11/2016    Essential hypertension [I10] 03/11/2016    COPD exacerbation ([facility] [J44.1] 02/22/2016    Morbid obesity with BMI of 45.0-49.9, adult ([facility] [E66.01, Z68.42] 12/11/2015    Acute diastolic congestive heart failure ([facility] [I50.31]     Current smoker [F17.200] 10/03/2013       1. 51F with abdominal pain and perianal pain     Plan:  · Clinical picture has not changed, patient with diffuse, nonspecific abdominal discomfort. Doubt etiology is from the gallbladder, but will await RUQ US; anticipate study will be limited due to body habitus. · Recommend evaluation of GI bleeding with GI consult. · No surgical intervention at this time. Patient would be a very poor surgical candidate if surgery was indicated given her extensive comorbidities and complex abdomen from her prior surgeries, BMI of 51. · Diet: Okay for diet per primary  · We will continue to follow  · Continue medical management per primary       Electronically signed by Zack Buckley DO  on 8/23/2021 at 5:03 AM     Attending Physician Statement  I have discussed the case, including pertinent history and exam findings with the resident. I agree with the assessment, plan and orders as documented by the resident. At this point, from a surgery standpoint, I really don't have any specific recommendations other than follow-up her gallbladder ultrasound and recommend evaluation by GI for her recent bleeding.  She is an extremely poor operative candidate for my perspective for anything invasive

## 2021-08-24 PROBLEM — I48.92 ATRIAL FLUTTER (HCC): Status: ACTIVE | Noted: 2021-01-01

## 2021-08-24 NOTE — PROGRESS NOTES
Renal Progress Note    Patient :  Rachele Velez; 46 y.o. MRN# [id number]  Location:  2034/2034-01  Attending:  Lj Green DO  Admit Date:  8/20/2021   Hospital Day: 4      Subjective:     Patient was seen and examined. No new issues reported overnight. Patient did have dialysis yesterday she did cut the time short to 3 hours and 10 minutes. Got about 1.4 L removed. BMP from today reviewed. Potassium 4.2, bicarb 26. She remains dialysis as per MWF schedule. Has been having some hypotension with systolics as low as 48D to 70s. Lisinopril held due to hypotension. On midodrine. Hemoglobin 8.3. This admission patient did report having positive blood in stool. Patient mentioned that she has history of blood in stool and has history of hemorrhoids. GI on board work-up in progress. Also been complaining of itchiness. Phosphorus 4.2. Patient was having some hypotension and IV fluids 100 cc an hour are running.    Outpatient Medications:     Medications Prior to Admission: Calcium Acetate, Phos Binder, (PHOSLO) 667 MG CAPS, Take 2 capsules by mouth as needed (snacks)   calcitRIOL (ROCALTROL) 0.25 MCG capsule, Take 0.25 mcg by mouth three times a week  Methoxy PEG-Epoetin Beta (MIRCERA IJ), Inject 225 mcg as directed every 14 days  cinacalcet (SENSIPAR) 90 MG tablet, Take 90 mg by mouth three times a week Indications: after dialysis  diphenhydrAMINE (DIPHEN) 25 MG tablet, Take 25 mg by mouth  midodrine (PROAMATINE) 5 MG tablet, Take 5 mg by mouth as needed (low BP at dialysis)   atorvastatin (LIPITOR) 10 MG tablet, Take 1 tablet by mouth nightly  aspirin 81 MG EC tablet, TAKE 1 TABLET BY MOUTH DAILY  fluticasone-salmeterol (ADVAIR) 250-50 MCG/DOSE AEPB, INHALE 1 PUFF BY MOUTH INTO THE LUNGS TWICE DAILY **RINSE MOUTH AFTER EACH USE**  dilTIAZem (CARDIZEM CD) 180 MG extended release capsule, TAKE 1 CAPSULE BY MOUTH DAILY  albuterol (PROVENTIL) (2.5 MG/3ML) 0.083% nebulizer solution, INHALE THE CONTENTS OF ONE VIAL VIA NEBULIZER EVERY 6 HOURS AS NEEDED FOR SHORTNESS OF BREATH OR WHEEZING  furosemide (LASIX) 40 MG tablet, Take 1 tablet by mouth daily  guaiFENesin (MUCINEX) 600 MG extended release tablet, Take 1 tablet by mouth 2 times daily  LANTUS SOLOSTAR 100 UNIT/ML injection pen, INJECT 15 UNITS SUBCUTANEOUSLY DAILY AT NIGHT  albuterol sulfate HFA (PROAIR HFA) 108 (90 Base) MCG/ACT inhaler, Inhale 2 puffs into the lungs every 6 hours as needed for Wheezing  Calcium Acetate, Phos Binder, 667 MG CAPS, Take 2 capsules by mouth 3 times daily (with meals)   polyethylene glycol (GLYCOLAX) 17 GM/SCOOP powder,   Multiple Vitamins-Minerals (RENAPLEX-D) TABS, TAKE 1 TABLET BY MOUTH EVERY DAY  lisinopril (PRINIVIL;ZESTRIL) 5 MG tablet, TAKE 1 TABLET BY MOUTH DAILY  furosemide (LASIX) 40 MG tablet, Take 1 tablet by mouth daily  nitroGLYCERIN (NITROSTAT) 0.4 MG SL tablet, Place 1 tablet under the tongue every 5 minutes as needed for Chest pain  Easy Comfort Lancets MISC, USE TO TEST BLOOD SUGAR TWICE DAILY AS DIRECTED  blood glucose test strips (ONETOUCH ULTRA) strip, USE TO TEST BLOOD SUGAR TWICE DAILY AS DIRECTED  Insulin Pen Needle (EASY COMFORT PEN NEEDLES) 31G X 5 MM MISC, USE TO INJECT INSULIN ONCE DAILY  Alcohol Swabs (ALCOHOL PADS) 70 % PADS, USE TO CLEAN TESTING AND INJECTION SITES TWICE DAILY  Skin Protectants, Misc.  (MINERIN CREME) CREA, APPLY TO AFFECTED AREA TOPICALLY AS NEEDED  Blood Glucose Monitoring Suppl (TRUE METRIX METER) w/Device KIT, USE TO TEST BLOOD GLUCOSE  Lancets (BD LANCET ULTRAFINE 30G) MISC, TEST TWICE DAILY  Lancet Devices (SIMPLE DIAGNOSTICS LANCING DEV) MISC, USE WITH LANCETS TO TEST BLOOD GLUCOSE  Blood Glucose Calibration (RIGOBERTO DOC CONTROL) Normal SOLN, USE TO PERIODICALLY CHECK GLUCOSE MONITOR ACCORDING TO THE MANUAL  UNIFINE PENTIPS 31G X 6 MM MISC, USE WITH insulin pens ONCE daily  PHARMACIST CHOICE LANCETS MISC, USE TO TEST BLOOD GLUCOSE ONCE A DAY  SENNA PLUS 8.6-50 MG per tablet, TAKE 1 TABLET BY MOUTH DAILY  Misc. Devices MISC, 1 each by Does not apply route daily Electric scooter  glucose monitoring kit (FREESTYLE) monitoring kit, 1 kit by Does not apply route daily  [DISCONTINUED] SENSIPAR 30 MG tablet, Take 1 tablet by mouth daily  OXYGEN, Inhale into the lungs O2  3L  PER NASAL CANNULA NIGHTLY  Misc. Devices (DIGITAL GLASS SCALE) MISC, Diagnosis: Diastolic Congestive heart failure    Current Medications:     Scheduled Meds:    lactobacillus  1 tablet Oral Daily    pantoprazole  40 mg IntraVENous Daily    And    sodium chloride (PF)  10 mL IntraVENous Daily    metoprolol  2.5 mg IntraVENous Once    midodrine  10 mg Oral TID WC    Calcium Acetate (Phos Binder)  2 capsule Oral TID WC    lidocaine 1 % injection  5 mL Intradermal Once    sodium chloride flush  5-40 mL IntraVENous 2 times per day    aspirin  81 mg Oral Daily    dilTIAZem  180 mg Oral Daily    budesonide-formoterol  2 puff Inhalation BID    furosemide  40 mg Oral Daily    guaiFENesin  600 mg Oral BID    insulin glargine  15 Units Subcutaneous Nightly    lisinopril  5 mg Oral Daily    sennosides-docusate sodium  1 tablet Oral Daily    cinacalcet  30 mg Oral Daily    atorvastatin  40 mg Oral Nightly    insulin lispro  0-12 Units Subcutaneous TID WC    insulin lispro  0-6 Units Subcutaneous Nightly    [Held by provider] heparin (porcine)  5,000 Units Subcutaneous 3 times per day     Continuous Infusions:    sodium chloride      dextrose       PRN Meds:  albuterol sulfate HFA, albuterol, perflutren lipid microspheres, diphenhydrAMINE, sodium chloride flush, sodium chloride, fentanNYL, oxyCODONE-acetaminophen, glucose, dextrose, glucagon (rDNA), dextrose, ondansetron **OR** ondansetron, acetaminophen **OR** acetaminophen, magnesium hydroxide, nitroGLYCERIN    Input/Output:       I/O last 3 completed shifts:   In: 980 [P.O.:480; IV Piggyback:500]  Out: - .      Patient Vitals for the past 96 hrs (Last 3 readings): Weight   21 0500 250 lb 14.1 oz (113.8 kg)   21 0502 251 lb 8.7 oz (114.1 kg)   21 0638 251 lb (113.9 kg)       Vital Signs:   Temperature:  Temp: 96.3 °F (35.7 °C)  TMax:   Temp (24hrs), Av.6 °F (36.4 °C), Min:96.3 °F (35.7 °C), Max:99 °F (37.2 °C)    Respirations:  Resp: 16  Pulse:   Pulse: 85  BP:    BP: 89/61  BP Range: Systolic (87WVC), ZJY:15 , Min:63 , XGN:883       Diastolic (07CHC), RXD:94, Min:21, Max:70      Physical Examination:     General:  AAO x 3, speaking in full sentences, no accessory muscle use. HEENT: Atraumatic, normocephalic, no throat congestion, moist mucosa. Eyes:   Pupils equal, round and reactive to light, EOMI. Neck:   Supple  Chest:   Bilateral vesicular breath sounds, no rales or wheezes. Cardiac:  S1 S2 RR, no murmurs, gallops or rubs. Abdomen: Soft, non-tender, no masses or organomegaly, BS audible. :   No suprapubic or flank tenderness. Neuro:  AAO x 3, No FND. SKIN:  No rashes, good skin turgor. Extremities:  No edema. Labs:       Recent Labs     21  1625 21  2102 21  0408 21  0942 21  1500   WBC 11.3  --  11.1  --   --    RBC 2.50*  --  2.58*  --   --    HGB 8.5*   < > 8.5* 8.7* 8.3*   HCT 30.4*   < > 30.4* 31.3* 29.8*   .6*  --  117.8*  --   --    MCH 34.0*  --  32.9  --   --    MCHC 28.0*  --  28.0*  --   --    RDW 18.0*  --  17.3*  --   --      --  145  --   --    MPV 10.7  --  10.5  --   --     < > = values in this interval not displayed. BMP:   Recent Labs     21  1547 21  15421  0408     --   --  135 133*   K 4.2   < > 4.2 4.0 4.2   CL 98  --   --  96* 95*   CO2 28  --   --  27 26   BUN 20  --   --  26* 20   CREATININE 4.12*  --   --  4.67* 3.74*   GLUCOSE 120*  --   --  84 88   CALCIUM 8.1*  --   --  8.2* 8.5*    < > = values in this interval not displayed.       Magnesium:    Recent Labs     21  15421   MG 2.0 2.1 BONNIE:      Lab Results   Component Value Date    BONNIE NEGATIVE 12/10/2015     SPEP:  Lab Results   Component Value Date    PROT 7.4 08/21/2021    ALBCAL 2.6 12/10/2015    ALBPCT 42 12/10/2015    LABALPH 0.3 12/10/2015    LABALPH 0.9 12/10/2015    A1PCT 4 12/10/2015    A2PCT 14 12/10/2015    LABBETA 1.3 12/10/2015    BETAPCT 21 12/10/2015    GAMGLOB 1.2 12/10/2015    GGPCT 19 12/10/2015    PATH ELECTRONICALLY SIGNED. Napoleon Curtis M.D. 12/10/2015     UPEP:     Lab Results   Component Value Date    LABPE  12/10/2015     URINE PROTEIN CONCENTRATION IS ELEVATED. PROTEIN ELECTROPHORESIS DEMONSTRATES     C3:     Lab Results   Component Value Date    C3 131 12/10/2015     C4:     Lab Results   Component Value Date    C4 39 12/10/2015     MPO ANCA:     Lab Results   Component Value Date    MPO 9 12/10/2015     PR3 ANCA:     Lab Results   Component Value Date    PR3 5 12/10/2015     Hep BsAg:         Lab Results   Component Value Date    HEPBSAG NONREACTIVE 02/25/2016     Urinalysis/Chemistries:      Lab Results   Component Value Date    NITRU NEGATIVE 06/12/2016    COLORU YELLOW 06/12/2016    PHUR 5.0 06/12/2016    WBCUA 2 TO 5 06/12/2016    RBCUA 0 TO 2 06/12/2016    MUCUS NOT REPORTED 06/12/2016    TRICHOMONAS NOT REPORTED 06/12/2016    YEAST NOT REPORTED 06/12/2016    BACTERIA FEW 06/12/2016    SPECGRAV 1.013 06/12/2016    LEUKOCYTESUR NEGATIVE 06/12/2016    UROBILINOGEN Normal 06/12/2016    BILIRUBINUR NEGATIVE 06/12/2016    GLUCOSEU TRACE 06/12/2016    KETUA NEGATIVE 06/12/2016    AMORPHOUS 1+ 06/12/2016     Urine Sodium:     Lab Results   Component Value Date    ANTONIO 28 06/12/2016     Urine Osmolarity:   Lab Results   Component Value Date    OSMOU 282 06/12/2016      Urine Creatinine:     Lab Results   Component Value Date    LABCREA 22.3 12/10/2015     Radiology:     Reviewed. Assessment:     1. ESRD on Hemodialysis.  His regular HD days are MWF at [facility] hemodialysis facility using LUE AVF under Dr. Riky Sheets. His dry weight is 106.5 kg. Admitted with ?115 kg.  2.  Chest pain. 3.  Denies nausea. 4.  Fatigue. 5.  Hypotension. 6.  Abdomen pain and diarrhea. 7.  Obesity. 8.  Hypertension. 9.  Anemia of chronic disease  10. Secondary hyperparathyroidism  11. Hypertension  12. Hypotension. 13.  Bright red blood in stool. 14.  Itching. Plan:   1. No acute need for dialysis today. Will get regular dialysis in a.m. as per [facility] schedule. 2.  Follow-up GI plan due to right red blood stool. 3.  Continue midodrine to 10 mg 3 times a day. 4.  Change IV fluid orders to a bolus of 400 cc with patient regarding the cc.  5.  Agree with holding blood pressure meds if blood pressure less than 100.  6. BMP in AM.  7.  Will follow. Nutrition   Please ensure that patient is on a renal diet/TF. Avoid nephrotoxic drugs/contrast exposure. We will continue to follow along with you. Lobo De Leon MD  [facility]     This note is created with the assistance of a speech-recognition program. While intending to generate a document that actually reflects the content of the visit, no guarantees can be provided that every mistake has been identified and corrected by editing.

## 2021-08-24 NOTE — PROGRESS NOTES
Transitions of Care Pharmacy Service   Medication Review    The patient's list of current home medications has been reviewed. Source(s) of information: patient, Epic, [employer] refill report, [employer], [employer] dialysis      PROVIDER ACTION REQUESTED  Medications that need to be addressed by a physician/nurse practitioner:    Medication Action Requested   Senispar 30mg daily   Current dose is 90mg on dialysis days only -- please adjust as appopriate      Calcitriol is ready for physician review           Please feel free to call me with any questions about this encounter. Thank you.     Sharon Her [address] [place] [address]   Transitions of Care Pharmacy Service  Phone:  [phone]  Fax: [phone]      Electronically signed by Sharon Her [address] [place] [address] on 8/24/2021 at 5:11 PM           Medications Prior to Admission:   Calcium Acetate, Phos Binder, (PHOSLO) 667 MG CAPS, Take 2 capsules by mouth as needed (snacks)   calcitRIOL (ROCALTROL) 0.25 MCG capsule, Take 0.25 mcg by mouth three times a week  Methoxy PEG-Epoetin Beta (MIRCERA IJ), Inject 225 mcg as directed every 14 days  cinacalcet (SENSIPAR) 90 MG tablet, Take 90 mg by mouth three times a week Indications: after dialysis  sennosides-docusate sodium (SENOKOT-S) 8.6-50 MG tablet, Take 1 tablet by mouth daily as needed for Constipation  diphenhydrAMINE (DIPHEN) 25 MG tablet, Take 25 mg by mouth 2 times daily as needed for Itching   midodrine (PROAMATINE) 5 MG tablet, Take 5 mg by mouth as needed (low BP at dialysis)   polyethylene glycol (GLYCOLAX) 17 GM/SCOOP powder, Take 17 g by mouth daily as needed   Multiple Vitamins-Minerals (RENAPLEX-D) TABS, Take 1 tablet by mouth daily   lisinopril (PRINIVIL;ZESTRIL) 5 MG tablet, TAKE 1 TABLET BY MOUTH DAILY  atorvastatin (LIPITOR) 10 MG tablet, Take 1 tablet by mouth nightly  aspirin 81 MG EC tablet, TAKE 1 TABLET BY MOUTH DAILY  fluticasone-salmeterol (ADVAIR) 250-50 MCG/DOSE AEPB, INHALE 1 PUFF BY MOUTH INTO THE LUNGS TWICE DAILY **RINSE MOUTH AFTER EACH USE**  furosemide (LASIX) 40 MG tablet, Take 1 tablet by mouth daily  dilTIAZem (CARDIZEM CD) 180 MG extended release capsule, TAKE 1 CAPSULE BY MOUTH DAILY  albuterol (PROVENTIL) (2.5 MG/3ML) 0.083% nebulizer solution, INHALE THE CONTENTS OF ONE VIAL VIA NEBULIZER EVERY 6 HOURS AS NEEDED FOR SHORTNESS OF BREATH OR WHEEZING  guaiFENesin (MUCINEX) 600 MG extended release tablet, Take 1 tablet by mouth 2 times daily  LANTUS SOLOSTAR 100 UNIT/ML injection pen, INJECT 15 UNITS SUBCUTANEOUSLY DAILY AT NIGHT  Calcium Acetate, Phos Binder, 667 MG CAPS, Take 2 capsules by mouth 3 times daily (with meals)

## 2021-08-24 NOTE — PROGRESS NOTES
[facility]  Progress Note                   Date:   8/24/2021  Patient name: Kat Alonso  Date of admission:  8/20/2021  2:57 PM  MRN:   [id number]  YOB: 1970  PCP: Praveena Arcos MD    Reason for Admission: Chest pain [R07.9]  Chest pain, unspecified type [R07.9]    Subjective:       Clinical Changes /Abnormalities:  Patient seen and examined sitting up in chair in room after discussion with RN. Denies chest pain or SOB. Complains of hemorrhoidal pain. A Fib on tele HR 80s. Hypotensive. Review of Systems    Medications:   Scheduled Meds:   lactobacillus  1 tablet Oral Daily    pantoprazole  40 mg IntraVENous Daily    And    sodium chloride (PF)  10 mL IntraVENous Daily    metoprolol  2.5 mg IntraVENous Once    midodrine  10 mg Oral TID WC    Calcium Acetate (Phos Binder)  2 capsule Oral TID WC    lidocaine 1 % injection  5 mL Intradermal Once    sodium chloride flush  5-40 mL IntraVENous 2 times per day    aspirin  81 mg Oral Daily    dilTIAZem  180 mg Oral Daily    budesonide-formoterol  2 puff Inhalation BID    furosemide  40 mg Oral Daily    guaiFENesin  600 mg Oral BID    insulin glargine  15 Units Subcutaneous Nightly    lisinopril  5 mg Oral Daily    sennosides-docusate sodium  1 tablet Oral Daily    cinacalcet  30 mg Oral Daily    atorvastatin  40 mg Oral Nightly    insulin lispro  0-12 Units Subcutaneous TID WC    insulin lispro  0-6 Units Subcutaneous Nightly    [Held by provider] heparin (porcine)  5,000 Units Subcutaneous 3 times per day     Continuous Infusions:   sodium chloride      dextrose       CBC:   Recent Labs     08/22/21  1625 08/22/21  2102 08/24/21  0408 08/24/21  0942 08/24/21  1500   WBC 11.3  --  11.1  --   --    HGB 8.5*   < > 8.5* 8.7* 8.3*     --  145  --   --     < > = values in this interval not displayed.      BMP:    Recent Labs     08/22/21  1547 08/22/21  1547 08/22/21  2102 08/23/21  6800 08/24/21  0408   --   --  135 133*   K 4.2   < > 4.2 4.0 4.2   CL 98  --   --  96* 95*   CO2 28  --   --  27 26   BUN 20  --   --  26* 20   CREATININE 4.12*  --   --  4.67* 3.74*   GLUCOSE 120*  --   --  84 88    < > = values in this interval not displayed. Hepatic:No results for input(s): AST, ALT, ALB, BILITOT, ALKPHOS in the last 72 hours. Troponin: No results for input(s): TROPHS in the last 72 hours. BNP: No results for input(s): BNP in the last 72 hours. Lipids: No results for input(s): CHOL, HDL in the last 72 hours. Invalid input(s): LDLCALCU  INR: No results for input(s): INR in the last 72 hours. DIAGNOSTIC DATA    EKG: Initial EKG showed normal sinus rhythm repeat EKG today showed atrial flutter with controlled ventricular response    ECHO 8/23/2021  Mild left ventricular hypertrophy  Global left ventricular systolic function is normal  Estimated ejection fraction is 65 % . Right atrium is severely dilated . Right ventricular with severe dilatation with severely reduced systolic  function. Severe tricuspid regurgitation. Moderate to severe pulmonary hypertension. No pericardial effusion seen. Normal aortic root dimension. Echocardiogram 7/2/20/2019:  Left ventricle is mildly dilated with normal wall thickness. Overall systolic function appears mildly reduced, with an estimated EF 45%. Grade II (moderate) left ventricular diastolic dysfunction. Left atrium is moderately dilated. Mitral valve sclerosis without stenosis. Mild to moderate mitral regurgitation. Moderate tricuspid regurgitation. Estimated right ventricular systolic pressure is 94.9 mmHg, suggests mild  pulmonary HTN. Trivial pulmonic insufficiency. Trivial pericardial effusion. CATH 3/15/2016  Angiographic Findings      Cardiac Arteries and Lesion Findings     LMCA: Normal 0% stenosis.     LAD: Mild irregularities 10-20%.     LCx: Normal 0% stenosis.     RCA: Mild irregularities 20-30%.       Coronary Tree Dominance: Right     LV Analysis  LV function assessed as:Normal.  Ejection Fraction  +----------------------------------------------------------------------+---+  ! Method                                                                ! EF%! +----------------------------------------------------------------------+---+  ! LV gram                                                               !50 !  +----------------------------------------------------------------------+---+       Objective:   Vitals: BP 89/61   Pulse 85   Temp 96.8 °F (36 °C) (Temporal)   Resp 16   Ht 4' 11\" (1.499 m)   Wt 250 lb 14.1 oz (113.8 kg)   LMP 11/12/2014 (Approximate)   SpO2 99%   BMI 50.67 kg/m²   General appearance: alert and cooperative with exam  HEENT: Head: Normocephalic, no lesions, without obvious abnormality. Neck:no JVD, trachea midline, no adenopathy  Lungs: Clear to auscultation  Heart: Irregular rate and rhythm, s1/s2 auscultated, no murmurs  Abdomen: soft, non-tender, bowel sounds active  Extremities: no edema  Neurologic: not done        Assessment / Acute Cardiac Problems:   1. Atypical chest pain   2. New onset A Flutter with variable AV Block  3. Mild LV dysfunction on ECHO 2019 LVEF 45%  4. Minimal CAD on cath 2016  5. HTN  6. HLD  7. CKD on HD  8. Morbid obesity  9.  Pulmonary HTN    Patient Active Problem List:     Asthma     YONI (obstructive sleep apnea)     Left knee pain     Hidradenitis     Current smoker     Microalbuminuria     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 kidney disease) stage 4, GFR 15-29 ml/min (HCC)     Acute diastolic congestive heart failure (HCC)     Hyperkalemia     Acute systolic congestive heart failure ([facility] [address]     Pulmonary hypertension (HCC)     Morbid obesity with BMI of 45.0-49.9, adult ([facility] [address]     Acute on chronic respiratory failure with hypoxia (HCC)     COPD exacerbation (HCC)     Asthma exacerbation     Type 2 diabetes mellitus with diabetic nephropathy ([address]     ESRD (end stage renal disease) on dialysis ([address]     Bronchitis     Essential hypertension     YONI on CPAP     Hyperglycemia, drug-induced     Needs smoking cessation education     Hyperglycemia     Drowsiness     Acute on chronic respiratory failure with hypercapnia ([address]     Mobility impaired     S/P cardiac cath-mINIMAL caD 3/15/16 - dR. 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)     Type 2 diabetes mellitus without complication (HCC)     Congestive heart failure (HCC)     Asthma with COPD with exacerbation (HCC)     Acute exacerbation of CHF (congestive heart failure) (HCC)     Chronic obstructive pulmonary disease (HCC)     Chronic kidney disease, stage V (HCC)     Acute respiratory acidosis     Precordial pain     Gastroesophageal reflux disease without esophagitis     Pulmonary HTN ([address]     Morbid obesity due to excess calories ([facility])     Symptomatic anemia     Elevated serum creatinine     ESRD needing dialysis ([address]     Chest pain at rest     Absolute anemia     Atrial flutter ([address]     Chest pain      Plan of Treatment:   1. Atypical chest pain. No plans for ischemic work up per Dr Modesto Demarco consult  Negative cath 2016. Continue ASA, statin. 2. HFpEF.  ECHO reviewed  LVEF improved to 65% from previous ECHO 6434  Diastolic dysfunction and moderate to severe Pulmonary HTN. Fluid management per nephrology  Appreciate recs. 3. A Flutter rate controlled. Continue Cardizem for rate control but has been held for hypotension. Recommend anticoagulation for atrial flutter. Consider cardioversion as an outpatient after adequate anticoagulation. SC heparin held d/t anemia and GI work up. 4. HTN  Hypotension. Currently 89/61  Medications held d/t hypotension including ACE, CCB, Lasix. Continue Midodrine 10 mg TID. Will order 100 ml   5. ESRD on HD  followed by nephrology. 6. Anemia of chronic disease.   GI consulted for possible GI blood loss.  Anemia work up in progress. 7. Rec keep Hgb > 8.0  Currently 8.3  8.  Rest of care managed per Primary Team.      Electronically signed by VENTURA Ames NP on 8/24/2021 at 3:08 PM  [address]  [phone]

## 2021-08-24 NOTE — PLAN OF CARE
Problem: Pain:  Goal: Pain level will decrease  Description: Pain level will decrease  Outcome: Ongoing     Problem: SKIN INTEGRITY  Goal: Skin integrity is maintained or improved  Outcome: Met This Shift     Problem: Falls - Risk of:  Goal: Will remain free from falls  Description: Will remain free from falls  Outcome: Ongoing     Problem: Skin Integrity:  Goal: Will show no infection signs and symptoms  Description: Will show no infection signs and symptoms  Outcome: Ongoing

## 2021-08-24 NOTE — RT PROTOCOL NOTE
RT Inhaler-Nebulizer Bronchodilator Protocol Note    There is a bronchodilator order in the chart from a provider indicating to follow the RT Bronchodilator Protocol and there is an Initiate RT Bronchodilator Protocol order as well (see protocol at bottom of note). The findings from the last RT Protocol Assessment were as follows:  Smoking: >15 Pack years  Surgical Status: No surgery  Xray: X-ray not available  Respiratory Pattern: RR <12 or 21-30  Mental Status: Alert and Oriented  Breath Sounds: Clear  Cough: Strong, spontaneous, non-productive  Activity Level: Walking with assistance  Oxygen Requirement: Room Air - 2LNC/28% or home setting  Indication for Bronchodilator Therapy: On home bronchodilators  Bronchodilator Assessment Score: 3    Aerosolized bronchodilator medication orders have been revised according to the RT Bronchodilator Protocol. RT Inhaler-Nebulizer Bronchodilator Protocol:    Respiratory Therapist to perform RT Therapy Protocol Assessment then follow the protocol. No Indications - adjust the frequency to every 6 hours PRN wheezing or bronchospasm, if no treatments needed after 48 hours then discontinue using Per Protocol order mode. If indication present, adjust the RT bronchodilator orders based on the Bronchodilator Assessment Score as follows:    0-6 - enter or revise RT bronchodilator order to Albuterol Inhaler order with frequency of every 2 hours PRN for wheezing or increased work of breathing using Per Protocol order mode. If Albuterol Inhaler not tolerated or not effective, then discontinue the Albuterol Inhaler order and enter Albuterol Nebulizer order with same frequency and PRN reasons. Repeat RT Therapy Protocol Assessment as needed.     7-10 - discontinue any other Inpatient aerosolized bronchodilator medication orders and enter or revise two Albuterol Inhaler orders, one with BID frequency and one with frequency of every 2 hours PRN wheezing or increased work of breathing using Per Protocol order mode. Repeat RT Therapy Protocol Assessment with second treatment then BID and as needed. If Albuterol Inhaler not tolerated or not effective, then discontinue the Albuterol Inhaler orders and enter two Albuterol Nebulizer orders with same frequencies and PRN reasons. 11-13 - discontinue any other Inpatient aerosolized bronchodilator medication orders and enter DuoNeb Nebulizer orders QID frequency and an Albuterol Nebulizer order every 2 hours PRN wheezing or increased work of breathing using Per Protocol order mode. Repeat RT Therapy Protocol Assessment with second treatment then QID and as needed. Greater than 13 - discontinue any other Inpatient bronchodilator aerosolized medication orders and enter DuoNeb Nebulizer order every 4 hours frequency and Albuterol Nebulizer every 2 hours PRN wheezing or increased work of breathing using Per Protocol order mode. Repeat RT Therapy Protocol Assessment with second treatment then every 4 hours and as needed. RT to enter RT Home Evaluation for COPD & MDI Assessment order using Per Protocol order mode.     Electronically signed by Ghislaine Lorenz RCP on 8/24/2021 at 7:38 AM

## 2021-08-24 NOTE — PLAN OF CARE
Problem: Pain:  Goal: Pain level will decrease  Description: Pain level will decrease  8/24/2021 0441 by Donnie Davenport RN  Outcome: Ongoing   Pt has not required pain medication this shift. Problem: Falls - Risk of:  Goal: Will remain free from falls  Description: Will remain free from falls  8/24/2021 0441 by Donnie Davenport RN  Outcome: Ongoing     Problem: Falls - Risk of:  Goal: Absence of physical injury  Description: Absence of physical injury  8/24/2021 0441 by Donnie Davenport RN  Outcome: Ongoing   Pt has stood at the bedside wearing non-skid footwear. Bedside table, personal belongings, call light in reach. Frequent rounds by RN. No falls or injuries this shift.

## 2021-08-24 NOTE — FLOWSHEET NOTE
RN notified Dr. Green of patient's HR maintaining in the 130's, as well as systolic BP in the 24'I. See MAR for held medications this morning. Awaiting response at this time. Will continue to monitor closely.

## 2021-08-24 NOTE — PROGRESS NOTES
GI Progress notes    8/24/2021   11:19 AM    Name:  Martha Ojeda  MRN:    [id number]     Acct:     [de-identified]   Room:  2034/2034-01   Day: 4     Admit Date: 8/20/2021  2:57 PM  PCP: Lucillie Simmonds, MD    Subjective:     C/C:   Chief Complaint   Patient presents with    Chest Pain    Nausea    Fatigue       Interval History: Status: improved. Patient seen and examined. No acute events overnight. Patient reports improvement of her abdominal pain. She had BM. Reports that this was semiformed, tan. No melena, hematochezia. Anemia workup pending  Hgb stable. ROS:  Constitutional: negative for chills, fevers and sweats  Gastrointestinal: negative for abdominal pain, constipation, diarrhea, nausea and vomiting      Medications: Allergies:    Allergies   Allergen Reactions    Ibuprofen     Tramadol Hives    Motrin [Ibuprofen Micronized] Itching    Strawberry Extract Itching and Rash    Tape [Adhesive Tape] Rash     No paper tape silk only       Current Meds: lactobacillus (BACID) tablet 1 tablet, Daily  pantoprazole (PROTONIX) injection 40 mg, Daily   And  sodium chloride (PF) 0.9 % injection 10 mL, Daily  albuterol sulfate  (90 Base) MCG/ACT inhaler 2 puff, Q2H PRN  albuterol (PROVENTIL) nebulizer solution 2.5 mg, Q2H PRN  metoprolol (LOPRESSOR) injection 2.5 mg, Once  perflutren lipid microspheres (DEFINITY) injection 1.65 mg, ONCE PRN  diphenhydrAMINE (BENADRYL) tablet 25 mg, Q6H PRN  midodrine (PROAMATINE) tablet 10 mg, TID WC  Calcium Acetate (Phos Binder) CAPS 1,334 mg, TID WC  lidocaine PF 1 % injection 5 mL, Once  sodium chloride flush 0.9 % injection 5-40 mL, 2 times per day  sodium chloride flush 0.9 % injection 5-40 mL, PRN  0.9 % sodium chloride infusion, PRN  fentaNYL (SUBLIMAZE) injection 25 mcg, Q2H PRN  oxyCODONE-acetaminophen (PERCOCET) 5-325 MG per tablet 1 tablet, Q4H PRN  aspirin EC tablet 81 mg, Daily  dilTIAZem (CARDIZEM CD) extended release capsule 180 mg, Daily  budesonide-formoterol (SYMBICORT) 160-4.5 MCG/ACT inhaler 2 puff, BID  furosemide (LASIX) tablet 40 mg, Daily  guaiFENesin (MUCINEX) extended release tablet 600 mg, BID  insulin glargine (LANTUS) injection vial 15 Units, Nightly  lisinopril (PRINIVIL;ZESTRIL) tablet 5 mg, Daily  sennosides-docusate sodium (SENOKOT-S) 8.6-50 MG tablet 1 tablet, Daily  cinacalcet (SENSIPAR) tablet 30 mg, Daily  glucose (GLUTOSE) 40 % oral gel 15 g, PRN  dextrose 50 % IV solution, PRN  glucagon (rDNA) injection 1 mg, PRN  dextrose 5 % solution, PRN  ondansetron (ZOFRAN-ODT) disintegrating tablet 4 mg, Q8H PRN   Or  ondansetron (ZOFRAN) injection 4 mg, Q6H PRN  acetaminophen (TYLENOL) tablet 650 mg, Q6H PRN   Or  acetaminophen (TYLENOL) suppository 650 mg, Q6H PRN  magnesium hydroxide (MILK OF MAGNESIA) 400 MG/5ML suspension 30 mL, Daily PRN  atorvastatin (LIPITOR) tablet 40 mg, Nightly  nitroGLYCERIN (NITROSTAT) SL tablet 0.4 mg, Q5 Min PRN  insulin lispro (HUMALOG) injection vial 0-12 Units, TID WC  insulin lispro (HUMALOG) injection vial 0-6 Units, Nightly  [Held by provider] heparin (porcine) injection 5,000 Units, 3 times per day        Data:     Code Status:  Full Code    Family History   Problem Relation Age of Onset    Diabetes Other     Cancer Mother         BREAST    Heart Disease Father         CAD-MI    Diabetes Father     High Blood Pressure Father     Diabetes Maternal Grandfather     Diabetes Paternal Grandfather     Heart Disease Paternal Grandfather     High Blood Pressure Paternal Grandfather        Social History     Socioeconomic History    Marital status: Single     Spouse name: Not on file    Number of children: Not on file    Years of education: Not on file    Highest education level: Not on file   Occupational History    Not on file   Tobacco Use    Smoking status: Former Smoker     Years: 7.00     Types: Cigarettes     Quit date: 2017     Years since quittin.4    Smokeless tobacco: Never Used    Tobacco comment: 1-2 cigs daily   Substance and Sexual Activity    Alcohol use: No    Drug use: No    Sexual activity: Not Currently     Partners: Male   Other Topics Concern    Not on file   Social History Narrative    Not on file     Social Determinants of Health     Financial Resource Strain:     Difficulty of Paying Living Expenses:    Food Insecurity:     Worried About Running Out of Food in the Last Year:     [address] in the Last Year:    Transportation Needs:     Lack of Transportation (Medical):  Lack of Transportation (Non-Medical):    Physical Activity:     Days of Exercise per Week:     Minutes of Exercise per Session:    Stress:     Feeling of Stress :    Social Connections:     Frequency of Communication with Friends and Family:     Frequency of Social Gatherings with Friends and Family:     Attends [employer] Services:     Active Member of Clubs or Organizations:     Attends Club or Organization Meetings:     Marital Status:    Intimate Partner Violence:     Fear of Current or Ex-Partner:     Emotionally Abused:     Physically Abused:     Sexually Abused:        Vitals:  BP 89/61   Pulse 97   Temp 96.8 °F (36 °C) (Temporal)   Resp 16   Ht 4' 11\" (1.499 m)   Wt 250 lb 14.1 oz (113.8 kg)   LMP 2014 (Approximate)   SpO2 99%   BMI 50.67 kg/m²   Temp (24hrs), Av.9 °F (36.6 °C), Min:96.8 °F (36 °C), Max:99 °F (37.2 °C)    Recent Labs     21  1628 21  2020 21  0848 21  0915   POCGLU 90 95 64* 71       I/O (24Hr):     Intake/Output Summary (Last 24 hours) at 2021 1119  Last data filed at 2021 0617  Gross per 24 hour   Intake 1220 ml   Output --   Net 1220 ml       Labs:      CBC:   Lab Results   Component Value Date    WBC 11.1 2021    RBC 2.58 2021    RBC 4.24 2012    HGB 8.7 2021    HCT 31.3 2021    .8 2021    MCH 32.9 2021    MCHC 28.0 2021    RDW 17.3 08/24/2021     08/24/2021     01/16/2012    MPV 10.5 08/24/2021     CBC with Differential:    Lab Results   Component Value Date    WBC 11.1 08/24/2021    RBC 2.58 08/24/2021    RBC 4.24 01/16/2012    HGB 8.7 08/24/2021    HCT 31.3 08/24/2021     08/24/2021     01/16/2012    .8 08/24/2021    MCH 32.9 08/24/2021    MCHC 28.0 08/24/2021    RDW 17.3 08/24/2021    NRBC 2 03/18/2016    LYMPHOPCT 15 08/24/2021    MONOPCT 8 08/24/2021    BASOPCT 1 08/24/2021    MONOSABS 0.89 08/24/2021    LYMPHSABS 1.67 08/24/2021    EOSABS 0.22 08/24/2021    BASOSABS 0.11 08/24/2021    DIFFTYPE NOT REPORTED 08/24/2021     Hemoglobin/Hematocrit:    Lab Results   Component Value Date    HGB 8.7 08/24/2021    HCT 31.3 08/24/2021     CMP:    Lab Results   Component Value Date     08/24/2021    K 4.2 08/24/2021    CL 95 08/24/2021    CO2 26 08/24/2021    BUN 20 08/24/2021    CREATININE 3.74 08/24/2021    GFRAA 15 08/24/2021    LABGLOM 13 08/24/2021    GLUCOSE 88 08/24/2021    GLUCOSE 132 01/16/2012    PROT 7.4 08/21/2021    LABALBU 3.7 08/21/2021    CALCIUM 8.5 08/24/2021    BILITOT 0.46 08/21/2021    ALKPHOS 121 08/21/2021    AST 13 08/21/2021    ALT 10 08/21/2021     BMP:    Lab Results   Component Value Date     08/24/2021    K 4.2 08/24/2021    CL 95 08/24/2021    CO2 26 08/24/2021    BUN 20 08/24/2021    LABALBU 3.7 08/21/2021    CREATININE 3.74 08/24/2021    CALCIUM 8.5 08/24/2021    GFRAA 15 08/24/2021    LABGLOM 13 08/24/2021    GLUCOSE 88 08/24/2021    GLUCOSE 132 01/16/2012     PT/INR:    Lab Results   Component Value Date    PROTIME 10.7 04/13/2017    INR 1.0 04/13/2017     PTT:    Lab Results   Component Value Date    APTT 23.5 03/16/2016   [APTT}    Physical Examination:        General appearance: alert, cooperative and no distress  Mental Status: oriented to person, place and time and normal affect  Abdomen: soft, nontender, nondistended, bowel sounds present     Assessment:

## 2021-08-24 NOTE — PROGRESS NOTES
[facility]  Office: [address], DO, Lupe Janus, DO, Oliverio Ngo, DO, Luci Silva Blood, DO, Valiant Sicard, MD, Kinsey Escobar MD, Salo Diaz MD, Dianne Gill MD, Linda Mittal MD, Freddie Pandey MD, Amador Salazar MD, Venus Schuster, DO, Bruce Rondon MD, Isabel Castillo DO, Jessa Rapp MD,  Paxton Topete DO, Gilma Pradhan MD, Kortney Saez MD, Bree Dinero MD, Nic Seals MD, Jalyn Stallings MD, Pascual Quinn MD, Lokesh Fan MD, Ricki Vogel, [facility], [facility], [facility], Roxann Davidson, CNP, Heber Farley, CNS, Kalyn Padilla, CNP, Emani Yun, CNP, Mi Edwards, CNP, Emilia Palomino, CNP, [place], CNP, MARINO CastilloC, Paxton Chiu, [facility], Mj Ferro, CNP, Domingo Ramos, CNP, Chantell Jackson, CNP, Josue Morales, CNP, Maryana Sam, CNP, Stefanie Brock, CNP, Annamarie Alfaro, [facility], Sean Joyner, [facility]    Progress Note    8/24/2021    10:33 AM    Name:   Jevon Justice  MRN:     [id number]     Acct:      [de-identified]   Room:   2034/2034-01   Day:  4  Admit Date:  8/20/2021  2:57 PM    PCP:   Clotilde Heard MD  Code Status:  Full Code    Subjective:     C/C:   Chief Complaint   Patient presents with    Chest Pain    Nausea    Fatigue     Interval History Status: not changed. Still running a bit hypotensive this am (sbp 80s), also her hr spiked to 130s. Cardio contacted and digoxin ordered but ended up not being given as her hr came down on own    Denies cp/sob/n/v    Brief History:     Per my partner: \"This is a 70-year-old female that presents to [facility] emergency room with a complaint of chest pain that started while she was in dialysis on the date of the Alexy Ya was located in the epigastric region and lower chest without radiation. Shabbir Johnson dialysis treatment was aborted after approximately 2 hours due to her symptoms.  She otherwise denies any other associated symptoms.  She denies any cough, sputum reduction or other associated symptoms\"    Review of Systems:     Constitutional:  negative for chills, fevers, sweats  Respiratory:  negative for cough, dyspnea on exertion, shortness of breath, wheezing  Cardiovascular:  negative for chest pain, chest pressure/discomfort, lower extremity edema, palpitations  Gastrointestinal:  negative for abdominal pain, constipation, diarrhea, nausea, vomiting  Neurological:  negative for dizziness, headache    Medications: Allergies:     Allergies   Allergen Reactions    Ibuprofen     Tramadol Hives    Motrin [Ibuprofen Micronized] Itching    Strawberry Extract Itching and Rash    Tape [place] Tape] Rash     No paper tape silk only       Current Meds:   Scheduled Meds:    lactobacillus  1 tablet Oral Daily    pantoprazole  40 mg Intravenous Daily    And    sodium chloride (PF)  10 mL Intravenous Daily    metoprolol  2.5 mg Intravenous Once    midodrine  10 mg Oral TID WC    Calcium Acetate (Phos Binder)  2 capsule Oral TID WC    lidocaine 1 % injection  5 mL Intradermal Once    sodium chloride flush  5-40 mL Intravenous 2 times per day    aspirin  81 mg Oral Daily    dilTIAZem  180 mg Oral Daily    budesonide-formoterol  2 puff Inhalation BID    furosemide  40 mg Oral Daily    guaiFENesin  600 mg Oral BID    insulin glargine  15 Units Subcutaneous Nightly    lisinopril  5 mg Oral Daily    sennosides-docusate sodium  1 tablet Oral Daily    cinacalcet  30 mg Oral Daily    atorvastatin  40 mg Oral Nightly    insulin lispro  0-12 Units Subcutaneous TID WC    insulin lispro  0-6 Units Subcutaneous Nightly    [Held by provider] heparin (porcine)  5,000 Units Subcutaneous 3 times per day     Continuous Infusions:    sodium chloride      dextrose       PRN Meds: albuterol sulfate HFA, albuterol, perflutren lipid microspheres, diphenhydrAMINE, sodium chloride flush, sodium chloride, fentanNYL, oxyCODONE-acetaminophen, glucose, dextrose, glucagon (rDNA), dextrose, ondansetron **OR** ondansetron, acetaminophen **OR** acetaminophen, magnesium hydroxide, nitroGLYCERIN    Data:     Past Medical History:   has a past medical history of Asthma, CHF (congestive heart failure) ([facility] Chronic kidney disease, COPD (chronic obstructive pulmonary disease) ([facility] Dialysis patient ([facility] Hemodialysis patient ([facility] Hyperlipidemia, Hypertension, Obesity, YONI (obstructive sleep apnea), Pneumonia, Renal failure, Type II or unspecified type diabetes mellitus without mention of complication, not stated as uncontrolled, and Ventilator dependence ([facility] Social History:   reports that she quit smoking about 4 years ago. Her smoking use included cigarettes. She quit after 7.00 years of use. She has never used smokeless tobacco. She reports that she does not drink alcohol and does not use drugs. Family History:   Family History   Problem Relation Age of Onset    Diabetes Other     Cancer Mother         BREAST    Heart Disease Father         CAD-MI    Diabetes Father     High Blood Pressure Father     Diabetes Maternal Grandfather     Diabetes Paternal Grandfather     Heart Disease Paternal Grandfather     High Blood Pressure Paternal Grandfather        Vitals:  BP 89/61   Pulse 97   Temp 99 °F (37.2 °C) (Oral)   Resp 16   Ht 4' 11\" (1.499 m)   Wt 250 lb 14.1 oz (113.8 kg)   LMP 2014 (Approximate)   SpO2 99%   BMI 50.67 kg/m²   Temp (24hrs), Av °F (36.7 °C), Min:96.9 °F (36.1 °C), Max:99 °F (37.2 °C)    Recent Labs     21  1628 21  0848 21  0915   POCGLU 90 95 64* 71       I/O (24Hr):     Intake/Output Summary (Last 24 hours) at 2021 1033  Last data filed at 2021 0617  Gross per 24 hour   Intake 1220 ml   Output --   Net 1220 ml       Labs:  Hematology:  Recent Labs     21  1625 21  2102 21  2157 21  0408 21  0942   WBC 11.3  --   --  11.1  --    RBC 2.50*  --   --  2.58*  --    HGB 8.5*   < > 8.2* 8.5* 8.7*   HCT 30.4*   < > 28.9* 30.4* 31.3*   .6*  --   --  117.8*  --    MCH 34.0*  --   --  32.9  --    MCHC 28.0*  --   --  28.0*  --    RDW 18.0*  --   --  17.3*  --      --   --  145  --    MPV 10.7  --   --  10.5  --     < > = values in this interval not displayed. Chemistry:  Recent Labs     08/21/21  1150 08/22/21  1547 08/22/21  1547 08/22/21  2102 08/23/21  0337 08/24/21  0408   NA  --  138  --   --  135 133*   K  --  4.2   < > 4.2 4.0 4.2   CL  --  98  --   --  96* 95*   CO2  --  28  --   --  27 26   GLUCOSE  --  120*  --   --  84 88   BUN  --  20  --   --  26* 20   CREATININE  --  4.12*  --   --  4.67* 3.74*   MG  --  2.0  --  2.1  --   --    ANIONGAP  --  12  --   --  12 12   LABGLOM  --  11*  --   --  10* 13*   GFRAA  --  14*  --   --  12* 15*   CALCIUM  --  8.1*  --   --  8.2* 8.5*   PHOS  --  4.2  --   --   --   --    TROPHS 53*  --   --   --   --   --    MYOGLOBIN 216*  --   --   --   --   --     < > = values in this interval not displayed.      Recent Labs     08/23/21  0951 08/23/21  1132 08/23/21  1628 08/23/21 2020 08/24/21  0848 08/24/21  0915   POCGLU 72 89 90 95 64* 71     ABG:  Lab Results   Component Value Date    POCPH 7.22 06/12/2016    POCPCO2 61 06/12/2016    POCPO2 68 06/12/2016    POCHCO3 24.8 06/12/2016    NBEA 3 06/12/2016    PBEA NOT REPORTED 06/12/2016    YVZ4CRX 27 06/12/2016    ZDNT1KCW 88 06/12/2016    FIO2 NOT REPORTED 07/01/2019     Lab Results   Component Value Date/Time    SPECIAL NOT REPORTED 04/13/2017 06:15 AM     Lab Results   Component Value Date/Time    CULTURE NORMAL RESPIRATORY NITZA 04/13/2017 06:15 AM    CULTURE  04/13/2017 06:15 AM     [employer] [address] [address] [phone]       Radiology:  CT ABDOMEN PELVIS WO CONTRAST Additional Contrast? None    Addendum Date: 8/22/2021    ADDENDUM: Ventral hernia containing free fluid and possible loops of small bowel but without evidence of obstruction. Case discussed with the surgery resident at 6:15 p.m. Yuki Dutta Follow-up exam with oral contrast may be helpful. Result Date: 8/22/2021  Mild ascites and subcutaneous edema. Moderate bilateral renal atrophy. Other incidental findings as above. Limited sensitivity without IV and oral contrast.     XR ACUTE ABD SERIES CHEST 1 VW    Result Date: 8/20/2021  Possible mild CHF. Bowel gas pattern nonspecific. US GALLBLADDER RUQ    Result Date: 8/23/2021  1. Partially contracted gallbladder without evidence of cholelithiasis or cholecystitis. 2. Mildly heterogeneous appearance of the liver which is nonspecific but may represent hepatocellular disease. 3. Apparent portal vein biphasic flow which can be seen in setting of right heart failure. 4. Minimal perihepatic ascites.        Physical Examination:        General appearance:  alert, cooperative and no distress  Mental Status:  oriented to person, place and time and normal affect  Lungs:  clear to auscultation bilaterally, normal effort  Heart:  Tachy and irreg irreg rhythm, no murmur  Abdomen:  soft, nontender, nondistended, normal bowel sounds, no masses, hepatomegaly, splenomegaly; obese  Extremities:  no edema, redness, tenderness in the calves  Skin:  no gross lesions, rashes, induration    Assessment:        Hospital Problems         Last Modified POA    * (Principal) Chest pain at rest 8/24/2021 Yes    Type 2 diabetes mellitus with renal manifestations ([address] (Chronic) 8/21/2021 Yes    Current smoker 8/21/2021 Yes    Overview Signed 9/7/2015  4:44 AM by [facility]     replace inactive diagnosis         Acute diastolic congestive heart failure ([address] 8/21/2021 Yes    Morbid obesity with BMI of 45.0-49.9, adult ([address] 8/21/2021 Yes    COPD exacerbation ([address] 8/21/2021 Yes    ESRD (end stage renal disease) on dialysis ([address] 8/21/2021 Yes    Essential hypertension 8/21/2021 Yes    Absolute anemia 8/23/2021 Yes    Atrial flutter ([address] 8/24/2021 Clinically Undetermined          Plan:        Midodrine for hypotension; may need cardioversion if aflutter persists with hypotension  Had ivf bolus overnight, trying to avoid repeat bolus for hypotension in dialysis patient  Dialysis per renal; cv and gi evals ongoing  D/w lucero Mckeon Aus Blood, DO  8/24/2021  10:33 AM

## 2021-08-24 NOTE — FLOWSHEET NOTE
Tatyana NP contacted RN via telephone with order to d/c digoxin one time order, which has not been given due to patient's HR in 70-90's at this time. New order received for patient to be NPO starting now in anticipation for possible cardioversion this afternoon. RN notified patient and patient verbalizes understanding. Will continue to monitor closely.

## 2021-08-24 NOTE — FLOWSHEET NOTE
CLARITZA Joe returned call and was updated on the patient's current status and most recent vital sign recordings. New order received for digoxin 250 once (see order for details). Update: RN held digoxin due to patient's HR in the 80's.  RN notified Soco Santana NP

## 2021-08-24 NOTE — PROGRESS NOTES
Occupational Therapy  Facility/Department: [facility]  Daily Treatment Note  NAME: Jaskaran Calloway  : 1970  MRN: [id number]    Date of Service: 2021    Discharge Recommendations:  Patient would benefit from continued therapy after discharge    Due to recent hospitalization and medical condition, pt would benefit from additional therapy at time of discharge to ensure safety. Please refer to the AM-PAC score for current functional status. Assessment   Performance deficits / Impairments: Decreased functional mobility ; Decreased ADL status; Decreased strength;Decreased safe awareness;Decreased endurance;Decreased posture;Decreased balance  Assessment: Pt would benefits from continued skilled OT services to increase I and safety during functional tasks to return home at prior level of function. Prognosis: Good  OT Education: OT Role;Plan of Care;ADL Adaptive Strategies;Transfer Training;Energy Conservation  Patient Education: safety in function, fall prevention/call light use, benefits of being oob, recommendations for continued therapy, pursed lip breathing tech. Pt was given educational handouts on EC/WS tech, fall prevention and AE/DME for ADLs at home. REQUIRES OT FOLLOW UP: Yes  Activity Tolerance  Activity Tolerance: Patient Tolerated treatment well  Activity Tolerance: poor +, pt is currently limited by elevated HR  Safety Devices  Safety Devices in place: Yes  Type of devices: Gait belt;Call light within reach; All fall risk precautions in place;Nurse notified; Left in chair;Patient at risk for falls         Patient Diagnosis(es): The encounter diagnosis was Chest pain, unspecified type.       has a past medical history of Asthma, CHF (congestive heart failure) ([facility] Chronic kidney disease, COPD (chronic obstructive pulmonary disease) ([facility] Dialysis patient ([facility] Hemodialysis patient ([facility] Hyperlipidemia, Hypertension, Obesity, YONI (obstructive sleep apnea), Pneumonia, Renal failure, Type II or unspecified type diabetes mellitus without mention of complication, not stated as uncontrolled, and Ventilator dependence ([facility] has a past surgical history that includes Hysterectomy (2014);  section ([address]); and skin full graft (). Restrictions  Restrictions/Precautions  Restrictions/Precautions: General Precautions, Up as Tolerated  Position Activity Restriction  Other position/activity restrictions: telemetry,1500 ml fluid restriction, up as phani, 4L O2 per nc       Subjective   General  Chart Reviewed: Yes  Patient assessed for rehabilitation services?: Yes  Response to previous treatment: Patient with no complaints from previous session  Family / Caregiver Present: No  Subjective  Subjective: Pt up to [facility] upon writers arrival, agreeable to OT tx session. General Comment  Comments: Per KAREN Salguero pt currently on [facility] and is okay for light in room activities d/t low BP and elevated HR      Orientation  Orientation  Overall Orientation Status: Within Normal Limits       Objective    ADL  Grooming: Stand by assistance (Pt sat is recliner to complete oral care and face washing with SBA)  UE Dressing: Minimal assistance (to adjust hosp gown for increased modesty)  Toileting: Minimal assistance (Pt required Min A for clothing and line mgmt to stand and complete posterior lalo hygiene after BM on BSC)  Additional Comments: Pt is currently limited by elevated HR, low BP, and overall body habitus. Pts HR monitored throughout fluctuating from 100-160, pt sitting in chair HR elevated to 210 KAREN Robb in room.     Written and verbal edu provided to pt on safe ADL completion techniques and tips during bathing/showering, and toileting; EC/WS techniques of pacing, posturing, body mechanics, planning and organizing tasks, avoiding fatigue, and task simplification in regards to ADLs of eating, grooming, bathing/showering, dressing, and IADLs of cooking, meal cleanup, marketing and meal planning, laundry, bed making, and housework. Balance  Sitting Balance: Supervision  Standing Balance: Contact guard assistance (No AD)  Standing Balance  Time: standing phani ~ 3-4 min  Activity: functional mobility and ADL's  Functional Mobility  Functional - Mobility Device: No device  Activity:  (BSC to recliner)  Assist Level: Contact guard assistance  Functional Mobility Comments: Pt required Min verbal cues for upright posture, pacing self, slowing down movements, scanning environments and pursed lip breathing all to increase safety and reduce risk of falls. Bed mobility  Comment: Pt up to bedside commode at beginning of session and retired to recliner at end of session. Transfers  Sit to stand: Contact guard assistance  Stand to sit: Contact guard assistance  Transfer Comments: Pt required Min verbal cues for pacing self, upright posture, controlled stand to sit, awareness/assist with lines, and squaring self/AD up to surface prior to sitting all to increase safety and reduce risk of falls.                     Plan   Plan  Times per week: 4-5x/week, 1-2x/day  Current Treatment Recommendations: Strengthening, Balance Training, Functional Mobility Training, Endurance Training, Safety Education & Training, Self-Care / ADL, Positioning, Patient/Caregiver Education & Training           AM-PAC Score        AM-[facility] Inpatient Daily Activity Raw Score: 19 (08/24/21 1107)  AM-PAC Inpatient ADL T-Scale Score : 40.22 (08/24/21 1107)  ADL Inpatient CMS 0-100% Score: 42.8 (08/24/21 1107)  ADL Inpatient CMS G-Code Modifier : CK (08/24/21 1107)    Goals  Short term goals  Time Frame for Short term goals: By discharge, pt will  Short term goal 1: demo SUP for ADL transfer using AD/DME as needed with good tech  Short term goal 2: demo mod I/I for funcitonal mob short distances using AD needed and good pacing/safety to prevent falls  Short term goal 3: demo SUP for LB and mod I/I for UB ADLs using AE/DME as needed  Short term goal 4: demo SUP for bed mobility with good tech and pursed lip breathing  Short term goal 5: demo and verb good understanding on ed provided fall prevetion tech, pursed lip breathing tech, EC/WS tech, equip needs, and d/c rec  Patient Goals   Patient goals : To go home! Therapy Time   Individual Concurrent Group Co-treatment   Time In 2381         Time Out 1813         Minutes 45              Writer educates patient on creating a safe home of removing throw rugs off floor, additional lighting into bathroom and outside by stairs,purchasing non slip mat in shower, moving cords and other items off the floor, rearranging furniture for safe r/w use throughout home, using DME of reacher, shower chair and grab bars in bathroom.        Mina King, OT

## 2021-08-24 NOTE — PROGRESS NOTES
restrictions: telemetry,1500 ml fluid restriction, up as phani, 4L O2 per nc  Subjective   General  Chart Reviewed: Yes  Response To Previous Treatment: Patient with no complaints from previous session. Family / Caregiver Present: No  Subjective  Subjective: Pt reports she is feeling antsy today. General Comment  Comments: RN ok'd LIGHT therapy only this date due to HR continues to ramp up; monitored HR during therapy  Pain Screening  Patient Currently in Pain: Yes  Vital Signs  Patient Currently in Pain: Yes       Orientation  Orientation  Overall Orientation Status: Within Normal Limits    Objective   Bed mobility  Rolling to Right: Modified independent  Supine to Sit: Modified independent (in recliner)  Sit to Supine: Modified independent  Transfers  Sit to Stand: Stand by assistance  Stand to sit: Stand by assistance  Bed to Chair: Stand by assistance  Ambulation  Ambulation?: No (no ambulation due to RN request to keep PT light; HR into 115's w/ static standing)  Ambulation 1  Distance: static standing x SBA +1  Comments: O2 sats maintained     Balance  Sitting - Static: Good  Sitting - Dynamic: Good  Standing - Static: Good  Exercises  Comments: AROM turner ankles; knees; and hips sitting x 10 reps. Comment: gentle / light superficial massage to turner feet/ankles. Pressure reduction education. Pt is at risk for skin breakdown. Pt educated on pressure relief measures and was able to perform them with verbal cueing and repeat back education. Patient positioned appropriately after treatment with all high risk areas elevated or propped. Pt reports being comfortable at end of treatment. AM-PAC Score 18/24     Goals  Short term goals  Time Frame for Short term goals: 12 treatments  Short term goal 1: Independent transfers  Short term goal 2:  Independent ambulation 200' x 1 w/ SpO2 >93% without O2  Short term goal 3: Tolerate 30 min ther act  Patient Goals   Patient goals : Feel better    Plan Plan  Times per week: 1-2x/day,5-6 days/week  Specific instructions for Next Treatment: monitor vitals; progress as tolerated.   Current Treatment Recommendations: Transfer Training, Gait Training, Endurance Training  Safety Devices  Type of devices: Gait belt, Call light within reach, Nurse notified, Chair alarm in place  Restraints  Initially in place: No     Therapy Time   Individual Concurrent Group Co-treatment   Time In 1259         Time Out 1316         Minutes 17 Guerline Velasquez, PT

## 2021-08-24 NOTE — TELEPHONE ENCOUNTER
Last visit: 5/11/21  Last Med refill: 7/26/21  Does patient have enough medication for 72 hours: Yes    Next Visit Date:  No future appointments. Health Maintenance   Topic Date Due    Hepatitis C screen  Never done    Diabetic retinal exam  Never done    Hepatitis B vaccine (1 of 3 - Risk Recombivax 3-dose series) 02/25/1989    DTaP/Tdap/Td vaccine (1 - Tdap) Never done    Cervical cancer screen  09/05/2017    Annual Wellness Visit (AWV)  Never done    Breast cancer screen  02/25/2020    Shingles Vaccine (1 of 2) Never done    Diabetic foot exam  05/27/2021    Flu vaccine (1) 09/01/2021    COVID-19 Vaccine (2 - Moderna 2-dose series) 09/07/2021    A1C test (Diabetic or Prediabetic)  08/20/2022    Lipid screen  08/21/2022    Colon Cancer Screen FIT/FOBT  08/24/2022    Potassium monitoring  08/24/2022    Creatinine monitoring  08/24/2022    Pneumococcal 0-64 years Vaccine (4 of 4 - PPSV23) 02/25/2035    HIV screen  Completed    Hepatitis A vaccine  Aged Out    Hib vaccine  Aged Out    Meningococcal (ACWY) vaccine  Aged Out       Hemoglobin A1C (%)   Date Value   08/20/2021 4.4   08/19/2019 4.6   03/22/2018 5.0             ( goal A1C is < 7)   Microalb/Crt.  Ratio (mcg/mg creat)   Date Value   04/29/2014 1,828     LDL Cholesterol (mg/dL)   Date Value   08/21/2021 18   07/02/2019 33       (goal LDL is <100)   AST (U/L)   Date Value   08/21/2021 13     ALT (U/L)   Date Value   08/21/2021 10     BUN (mg/dL)   Date Value   08/24/2021 20     BP Readings from Last 3 Encounters:   08/24/21 89/61   05/11/21 124/71   05/27/20 130/80          (goal 120/80)    All Future Testing planned in [facility]  Lab Frequency Next Occurrence   POCT glucose 4 TIMES DAILY BEFORE MEALS & AT BEDTIME    HYPOGLYCEMIA TREATMENT: blood glucose less than 50 mg/dL and patient  ALERT and TOLERATING PO PRN    HYPOGLYCEMIA TREATMENT: blood glucose less than 70 mg/dL and patient ALERT and TOLERATING PO PRN    HYPOGLYCEMIA TREATMENT: blood glucose less than 70 mg/dL and patient NOT ALERT or NPO PRN    POCT Glucose PRN    Up as tolerated PRN    Intake and output EVERY 8 HOURS    Initiate Oxygen Therapy Protocol DAILY (RT)    EKG 12 lead PRN    Hgb/Hct Every 6 Hours (Lab)    MDI treatment PRN    Initiate RT Inhaler-Nebulizer Bronchodilator Protocol DAILY (RT)    Nasal Cannula Oxygen DAILY (RT)    Respiratory care evaluation only PRN    HHN Treatment PRN                Patient Active Problem List:     Asthma     YONI (obstructive sleep apnea)     Left knee pain     Hidradenitis     Current smoker     Microalbuminuria     Type 2 diabetes mellitus with renal manifestations ([facility])     CKD (chronic kidney disease) stage 4, GFR 15-29 ml/min ([facility])     Acute diastolic congestive heart failure ([facility])     Hyperkalemia     Acute systolic congestive heart failure ([facility])     Pulmonary hypertension ([facility])     Morbid obesity with BMI of 45.0-49.9, adult ([facility])     Acute on chronic respiratory failure with hypoxia ([facility])     COPD exacerbation ([facility])     Asthma exacerbation     Type 2 diabetes mellitus with diabetic nephropathy ([facility])     ESRD (end stage renal disease) on dialysis ([facility])     Bronchitis     Essential hypertension     YONI on CPAP     Hyperglycemia, drug-induced     Needs smoking cessation education     Hyperglycemia     Drowsiness     Acute on chronic respiratory failure with hypercapnia ([facility]     Mobility impaired     S/P cardiac cath-mINIMAL caD 3/15/16 - dR. MATOS     Type 2 diabetes mellitus with chronic kidney disease on chronic dialysis ([facility])     Type 2 diabetes mellitus without complication ([facility])     Congestive heart failure ([facility])     Asthma with COPD with exacerbation ([facility])     Acute exacerbation of CHF (congestive heart failure) ([facility])     Chronic obstructive pulmonary disease (HCC)     Chronic kidney disease, stage V ([facility])     Acute respiratory acidosis     Precordial pain     Gastroesophageal reflux disease without esophagitis     Pulmonary HTN [facility])     Morbid obesity due to excess calories (HCC)     Symptomatic anemia     Elevated serum creatinine     ESRD needing dialysis ([facility] [address]     Chest pain at rest     Absolute anemia     Atrial flutter (HCC)     Chest pain

## 2021-08-25 NOTE — PROGRESS NOTES
[facility]  Progress Note                   Date:   8/25/2021  Patient name: Yelena Mcnally  Date of admission:  8/20/2021  2:57 PM  MRN:   [id number]  YOB: 1970  PCP: Gustavo Blackwood MD    Reason for Admission: Chest pain [R07.9]  Chest pain, unspecified type [R07.9]    Subjective:       Clinical Changes /Abnormalities:  Patient seen and examined in HD. She denies any CP or SOB. She states that she is having some pain in her legs.      Review of Systems    Medications:   Scheduled Meds:   midodrine  5 mg Oral Once    albumin human  25 g IntraVENous Once    lactobacillus  1 tablet Oral Daily    dilTIAZem  120 mg Oral Daily    calcitRIOL  0.25 mcg Oral Once per day on Mon Wed Fri    cinacalcet  90 mg Oral Once per day on Mon Wed Fri    hydrocortisone   Rectal BID    pantoprazole  40 mg IntraVENous Daily    And    sodium chloride (PF)  10 mL IntraVENous Daily    metoprolol  2.5 mg IntraVENous Once    midodrine  10 mg Oral TID WC    Calcium Acetate (Phos Binder)  2 capsule Oral TID WC    lidocaine 1 % injection  5 mL Intradermal Once    sodium chloride flush  5-40 mL IntraVENous 2 times per day    aspirin  81 mg Oral Daily    budesonide-formoterol  2 puff Inhalation BID    [Held by provider] furosemide  40 mg Oral Daily    guaiFENesin  600 mg Oral BID    insulin glargine  15 Units Subcutaneous Nightly    [Held by provider] lisinopril  5 mg Oral Daily    sennosides-docusate sodium  1 tablet Oral Daily    atorvastatin  40 mg Oral Nightly    insulin lispro  0-12 Units Subcutaneous TID WC    insulin lispro  0-6 Units Subcutaneous Nightly    [Held by provider] heparin (porcine)  5,000 Units Subcutaneous 3 times per day     Continuous Infusions:   sodium chloride      dextrose       CBC:   Recent Labs     08/22/21  1625 08/22/21  2102 08/24/21  0408 08/24/21  0942 08/25/21  0346 08/25/21  0510 08/25/21  0835   WBC 11.3  --  11.1  --   --  10.0  --    HGB 8.5*   < > 8.5*   < > 8.2* 8.3* 8.3*     --  145  --   --  137*  --     < > = values in this interval not displayed. BMP:    Recent Labs     08/23/21  0337 08/24/21  0408 08/25/21  0835    133* 135   K 4.0 4.2 5.0   CL 96* 95* 95*   CO2 27 26 28   BUN 26* 20 30*   CREATININE 4.67* 3.74* 5.32*   GLUCOSE 84 88 76     Hepatic:No results for input(s): AST, ALT, ALB, BILITOT, ALKPHOS in the last 72 hours. Troponin: No results for input(s): TROPHS in the last 72 hours. BNP: No results for input(s): BNP in the last 72 hours. Lipids: No results for input(s): CHOL, HDL in the last 72 hours. Invalid input(s): LDLCALCU  INR: No results for input(s): INR in the last 72 hours. DIAGNOSTIC DATA    EKG: Initial EKG showed normal sinus rhythm repeat EKG today showed atrial flutter with controlled ventricular response    ECHO 8/23/2021  Mild left ventricular hypertrophy  Global left ventricular systolic function is normal  Estimated ejection fraction is 65 % . Right atrium is severely dilated . Right ventricular with severe dilatation with severely reduced systolic  function. Severe tricuspid regurgitation. Moderate to severe pulmonary hypertension. No pericardial effusion seen. Normal aortic root dimension. Echocardiogram 7/2/20/2019:  Left ventricle is mildly dilated with normal wall thickness. Overall systolic function appears mildly reduced, with an estimated EF 45%. Grade II (moderate) left ventricular diastolic dysfunction. Left atrium is moderately dilated. Mitral valve sclerosis without stenosis. Mild to moderate mitral regurgitation. Moderate tricuspid regurgitation. Estimated right ventricular systolic pressure is 27.6 mmHg, suggests mild  pulmonary HTN. Trivial pulmonic insufficiency. Trivial pericardial effusion.     CATH 3/15/2016  Angiographic Findings      Cardiac Arteries and Lesion Findings     LMCA: Normal 0% stenosis.     LAD: Mild irregularities 10-20%.     LCx: Normal 0% Asthma exacerbation     Type 2 diabetes mellitus with diabetic nephropathy (HCC)     ESRD (end stage renal disease) on dialysis ([facility] [address]     Bronchitis     Essential hypertension     YONI on CPAP     Hyperglycemia, drug-induced     Needs smoking cessation education     Hyperglycemia     Drowsiness     Acute on chronic respiratory failure with hypercapnia ([facility] [address]     Mobility impaired     S/P cardiac cath-mINIMAL caD 3/15/16 - dR. 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)     Type 2 diabetes mellitus without complication (HCC)     Congestive heart failure (HCC)     Asthma with COPD with exacerbation (HCC)     Acute exacerbation of CHF (congestive heart failure) (HCC)     Chronic obstructive pulmonary disease (HCC)     Chronic kidney disease, stage V (HCC)     Acute respiratory acidosis     Precordial pain     Gastroesophageal reflux disease without esophagitis     Pulmonary HTN (HCC)     Morbid obesity due to excess calories (HCC)     Symptomatic anemia     Elevated serum creatinine     ESRD needing dialysis (HCC)     Chest pain at rest     Absolute anemia     Atrial flutter (HCC)     Chest pain  [id number] Score for Atrial Fibrillation Stroke Risk   Risk   Factors  Component Value   C CHF Yes 1   H HTN Yes 1   A2 Age >= 76 No,  (54 y.o.) 0   D DM Yes 1   S2 Prior Stroke/TIA No 0   V Vascular Disease No 0   A Age 74-69 No,  (54 y.o.) 0   Sc Sex female 1    [id number]  Score  4   Score last updated 8/25/21 0:46 PM EDT    Click here for a link to the UpToDate guideline \"Atrial Fibrillation: Anticoagulation therapy to prevent embolization    Disclaimer: Risk Score calculation is dependent on accuracy of patient problem list and past encounter diagnosis. Plan of Treatment:   1. Atypical chest pain. No plans for ischemic work up per Dr Eduardo Kaba consult  Negative cath 2016. Continue ASA, statin.   2. HFpEF.  ECHO reviewed  LVEF improved to 65% from previous ECHO 2019

## 2021-08-25 NOTE — PROGRESS NOTES
HEMODIALYSIS PRE-TREATMENT NOTE    Patient Identifiers prior to treatment: Name, , MRN    Isolation Required: No                      Isolation Type: N/A       (please document if patient is being managed as a PUI/COVID-19 patient)        Hepatitis status:                           Date Drawn                             Result  Hepatitis B Surface Antigen 21     Negative                     Hepatitis B Surface Antibody 20 Negative        Hepatitis B Core Antibody            How was Hepatitis Status verified: Outpatient records with [facility]     Was a copy of the labs you documented provided to facility for the patient's chart: Yes    Hemodialysis orders verified: Yes    Access Within normal limits ( I.e. s/s of infection,...): AVF Left upper arm WNL     Pre-Assessment completed: Yes    Pre-dialysis report received from: Linda Salgado RN                      Time: 6425

## 2021-08-25 NOTE — PROGRESS NOTES
Renal Progress Note    Patient :  Priti Rodriguez; 46 y.o. MRN# [id number]  Location:  2034/2034-01  Attending:  Marisela Green DO  Admit Date:  8/20/2021   Hospital Day: 5      Subjective:     Patient was seen and examined. No new issues reported overnight. Scheduled for hemodialysis today. MAP more than 60. On midodrine. All antihypertensives on hold. Cultures so far negative. GI input noted. On questioning she reports no active complaints excepting had some itching overnight received Benadryl. Etiology is from Sensipar and calcitriol use.     Outpatient Medications:     Medications Prior to Admission: Calcium Acetate, Phos Binder, (PHOSLO) 667 MG CAPS, Take 2 capsules by mouth as needed (snacks)   calcitRIOL (ROCALTROL) 0.25 MCG capsule, Take 0.25 mcg by mouth three times a week  Methoxy PEG-Epoetin Beta (MIRCERA IJ), Inject 225 mcg as directed every 14 days  cinacalcet (SENSIPAR) 90 MG tablet, Take 90 mg by mouth three times a week Indications: after dialysis  sennosides-docusate sodium (SENOKOT-S) 8.6-50 MG tablet, Take 1 tablet by mouth daily as needed for Constipation  diphenhydrAMINE (DIPHEN) 25 MG tablet, Take 25 mg by mouth 2 times daily as needed for Itching   midodrine (PROAMATINE) 5 MG tablet, Take 5 mg by mouth as needed (low BP at dialysis)   polyethylene glycol (GLYCOLAX) 17 GM/SCOOP powder, Take 17 g by mouth daily as needed   Multiple Vitamins-Minerals (RENAPLEX-D) TABS, Take 1 tablet by mouth daily   lisinopril (PRINIVIL;ZESTRIL) 5 MG tablet, TAKE 1 TABLET BY MOUTH DAILY  atorvastatin (LIPITOR) 10 MG tablet, Take 1 tablet by mouth nightly  aspirin 81 MG EC tablet, TAKE 1 TABLET BY MOUTH DAILY  fluticasone-salmeterol (ADVAIR) 250-50 MCG/DOSE AEPB, INHALE 1 PUFF BY MOUTH INTO THE LUNGS TWICE DAILY **RINSE MOUTH AFTER EACH USE**  furosemide (LASIX) 40 MG tablet, Take 1 tablet by mouth daily  dilTIAZem (CARDIZEM CD) 180 MG extended release capsule, TAKE 1 CAPSULE BY MOUTH DAILY  albuterol (PROVENTIL) (2.5 MG/3ML) 0.083% nebulizer solution, INHALE THE CONTENTS OF ONE VIAL VIA NEBULIZER EVERY 6 HOURS AS NEEDED FOR SHORTNESS OF BREATH OR WHEEZING  guaiFENesin (MUCINEX) 600 MG extended release tablet, Take 1 tablet by mouth 2 times daily  LANTUS SOLOSTAR 100 UNIT/ML injection pen, INJECT 15 UNITS SUBCUTANEOUSLY DAILY AT NIGHT  Calcium Acetate, Phos Binder, 667 MG CAPS, Take 2 capsules by mouth 3 times daily (with meals)   [DISCONTINUED] furosemide (LASIX) 40 MG tablet, Take 1 tablet by mouth daily  [DISCONTINUED] nitroGLYCERIN (NITROSTAT) 0.4 MG SL tablet, Place 1 tablet under the tongue every 5 minutes as needed for Chest pain  Easy Comfort Lancets MISC, USE TO TEST BLOOD SUGAR TWICE DAILY AS DIRECTED  blood glucose test strips (ONETOUCH ULTRA) strip, USE TO TEST BLOOD SUGAR TWICE DAILY AS DIRECTED  Insulin Pen Needle (EASY COMFORT PEN NEEDLES) 31G X 5 MM MISC, USE TO INJECT INSULIN ONCE DAILY  Alcohol Swabs (ALCOHOL PADS) 70 % PADS, USE TO CLEAN TESTING AND INJECTION SITES TWICE DAILY  Skin Protectants, Misc. (MINERIN CREME) CREA, APPLY TO AFFECTED AREA TOPICALLY AS NEEDED (Patient taking differently: every other day APPLY TO AFFECTED AREA TOPICALLY AS NEEDED)  Blood Glucose Monitoring Suppl (TRUE METRIX METER) w/Device KIT, USE TO TEST BLOOD GLUCOSE  Lancets (BD LANCET ULTRAFINE 30G) MISC, TEST TWICE DAILY  Lancet Devices (SIMPLE DIAGNOSTICS LANCING DEV) MISC, USE WITH LANCETS TO TEST BLOOD GLUCOSE  Blood Glucose Calibration (RIGOBERTO DOC CONTROL) Normal SOLN, USE TO PERIODICALLY CHECK GLUCOSE MONITOR ACCORDING TO THE MANUAL  UNIFINE PENTIPS 31G X 6 MM MISC, USE WITH insulin pens ONCE daily  PHARMACIST CHOICE LANCETS MISC, USE TO TEST BLOOD GLUCOSE ONCE A DAY  [DISCONTINUED] SENNA PLUS 8.6-50 MG per tablet, TAKE 1 TABLET BY MOUTH DAILY (Patient taking differently: Take 1 tablet by mouth daily as needed )  Misc.  Devices MISC, 1 each by Does not apply route daily Electric scooter  glucose monitoring kit (FREESTYLE) monitoring kit, 1 kit by Does not apply route daily  [DISCONTINUED] SENSIPAR 30 MG tablet, Take 1 tablet by mouth daily  OXYGEN, Inhale into the lungs O2  3L  PER NASAL CANNULA NIGHTLY  [place]DIS[place]INUED[place] [facility] [place]  Devices (DIGITAL GLASS SCALE) MISC, Diagnosis: Diastolic Congestive heart failure    Current Medications:     Scheduled Meds:    midodrine  5 mg Oral Once    albumin human  25 g IntraVENous Once    lactobacillus  1 tablet Oral Daily    dilTIAZem  120 mg Oral Daily    calcitRIOL  0.25 mcg Oral Once per day on Mon Wed Fri    cinacalcet  90 mg Oral Once per day on Mon Wed Fri    hydrocortisone   Rectal BID    pantoprazole  40 mg IntraVENous Daily    And    sodium chloride (PF)  10 mL IntraVENous Daily    metoprolol  2.5 mg IntraVENous Once    midodrine  10 mg Oral TID WC    Calcium Acetate (Phos Binder)  2 capsule Oral TID WC    lidocaine 1 % injection  5 mL Intradermal Once    sodium chloride flush  5-40 mL IntraVENous 2 times per day    aspirin  81 mg Oral Daily    budesonide-formoterol  2 puff Inhalation BID    [Held by provider] furosemide  40 mg Oral Daily    guaiFENesin  600 mg Oral BID    insulin glargine  15 Units Subcutaneous Nightly    [Held by provider] lisinopril  5 mg Oral Daily    sennosides-docusate sodium  1 tablet Oral Daily    atorvastatin  40 mg Oral Nightly    insulin lispro  0-12 Units Subcutaneous TID WC    insulin lispro  0-6 Units Subcutaneous Nightly    [Held by provider] heparin (porcine)  5,000 Units Subcutaneous 3 times per day     Continuous Infusions:    sodium chloride      dextrose       PRN Meds:  albuterol sulfate HFA, albuterol, perflutren lipid microspheres, diphenhydrAMINE, sodium chloride flush, sodium chloride, fentanNYL, oxyCODONE-acetaminophen, glucose, dextrose, glucagon (rDNA), dextrose, ondansetron **OR** ondansetron, acetaminophen **OR** acetaminophen, magnesium hydroxide, nitroGLYCERIN    Input/Output:       I/O last 3 completed shifts: In: 440 [P.O.:440]  Out: - .      Patient Vitals for the past 96 hrs (Last 3 readings):   Weight   21 0600 248 lb (112.5 kg)   21 0500 250 lb 14.1 oz (113.8 kg)   21 0502 251 lb 8.7 oz (114.1 kg)       Vital Signs:   Temperature:  Temp: 96.9 °F (36.1 °C)  TMax:   Temp (24hrs), Av °F (36.1 °C), Min:96.3 °F (35.7 °C), Max:97.9 °F (36.6 °C)    Respirations:  Resp: 18  Pulse:   Pulse: 113  BP:    BP: 93/63  BP Range: Systolic (02VJO), YOF:28 , Min:71 , YAK:19       Diastolic (16OKF), ELS:82, Min:56, Max:71      Physical Examination:     General:  AAO x 3, speaking in full sentences, no accessory muscle use. HEENT: Atraumatic, normocephalic, no throat congestion, moist mucosa. Eyes:   Pupils equal, round and reactive to light, EOMI. Neck:   Supple  Chest:   Bilateral vesicular breath sounds, no rales or wheezes. Cardiac:  S1 S2 RR, no murmurs, gallops or rubs. Abdomen: Soft, non-tender, no masses or organomegaly, BS audible. :   No suprapubic or flank tenderness. Neuro:  AAO x 3, No FND. SKIN:  No rashes, good skin turgor. Extremities:  No edema. Labs:       Recent Labs     21  1625 21  2102 21  0408 21  0942 21  0346 21  0510 21  0835   WBC 11.3  --  11.1  --   --  10.0  --    RBC 2.50*  --  2.58*  --   --  2.52*  --    HGB 8.5*   < > 8.5*   < > 8.2* 8.3* 8.3*   HCT 30.4*   < > 30.4*   < > 28.3* 28.6* 29.1*   .6*  --  117.8*  --   --  113.5*  --    MCH 34.0*  --  32.9  --   --  32.9  --    MCHC 28.0*  --  28.0*  --   --  29.0  --    RDW 18.0*  --  17.3*  --   --  17.2*  --      --  145  --   --  137*  --    MPV 10.7  --  10.5  --   --  10.1  --     < > = values in this interval not displayed.       BMP:   Recent Labs     21  0337 21  0408 21  0835    133* 135   K 4.0 4.2 5.0   CL 96* 95* 95*   CO2 27 26 28   BUN 26* 20 30*   CREATININE 4.67* 3.74* 5.32*   GLUCOSE 84 88 76   CALCIUM 8.2* 8.5* 8.5* Magnesium:    Recent Labs     08/22/21  1547 08/22/21  2102   MG 2.0 2.1     BONNIE:      Lab Results   Component Value Date    BONNIE NEGATIVE 12/10/2015     SPEP:  Lab Results   Component Value Date    PROT 7.4 08/21/2021    ALBCAL 2.6 12/10/2015    ALBPCT 42 12/10/2015    LABALPH 0.3 12/10/2015    LABALPH 0.9 12/10/2015    A1PCT 4 12/10/2015    A2PCT 14 12/10/2015    LABBETA 1.3 12/10/2015    BETAPCT 21 12/10/2015    GAMGLOB 1.2 12/10/2015    GGPCT 19 12/10/2015    PATH ELECTRONICALLY SIGNED. Jerome Mancini M.D. 12/10/2015     UPEP:     Lab Results   Component Value Date    LABPE  12/10/2015     URINE PROTEIN CONCENTRATION IS ELEVATED. PROTEIN ELECTROPHORESIS DEMONSTRATES     C3:     Lab Results   Component Value Date    C3 131 12/10/2015     C4:     Lab Results   Component Value Date    C4 39 12/10/2015     MPO ANCA:     Lab Results   Component Value Date    MPO 9 12/10/2015     PR3 ANCA:     Lab Results   Component Value Date    PR3 5 12/10/2015     Hep BsAg:         Lab Results   Component Value Date    HEPBSAG NONREACTIVE 02/25/2016     Urinalysis/Chemistries:      Lab Results   Component Value Date    NITRU NEGATIVE 06/12/2016    COLORU YELLOW 06/12/2016    PHUR 5.0 06/12/2016    WBCUA 2 TO 5 06/12/2016    RBCUA 0 TO 2 06/12/2016    MUCUS NOT REPORTED 06/12/2016    TRICHOMONAS NOT REPORTED 06/12/2016    YEAST NOT REPORTED 06/12/2016    BACTERIA FEW 06/12/2016    SPECGRAV 1.013 06/12/2016    LEUKOCYTESUR NEGATIVE 06/12/2016    UROBILINOGEN Normal 06/12/2016    BILIRUBINUR NEGATIVE 06/12/2016    GLUCOSEU TRACE 06/12/2016    KETUA NEGATIVE 06/12/2016    AMORPHOUS 1+ 06/12/2016     Urine Sodium:     Lab Results   Component Value Date    ANTONIO 28 06/12/2016     Urine Osmolarity:   Lab Results   Component Value Date    OSMOU 282 06/12/2016      Urine Creatinine:     Lab Results   Component Value Date    LABCREA 22.3 12/10/2015     Radiology:     Reviewed. Assessment:     1. ESRD on Hemodialysis.  His regular HD days are MWF at [facility] hemodialysis facility using LUE AVF under Dr. Memo Sandhu. His dry weight is 106.5 kg. Admitted with ?115 kg.  2.  Chest pain -nonspecific  3. Hypotension.-Etiology ? 4. Obesity. 5.  Anemia of chronic disease  6. Secondary hyperparathyroidism  7. Cardiomyopathy ejection fraction 40% moderate tricuspid regurgitation      Plan:     #1 hemodialysis today. Orders reviewed with the nursing staff. Will give extra midodrine and albumin  #2 check cortisol along with free light chain ratio  #3 follow-up culture results  #4 we will follow    Nutrition   Please ensure that patient is on a renal diet/TF. Avoid nephrotoxic drugs/contrast exposure. We will continue to follow along with you. Barrie De Souza MD MD, MRCP Milka [facility], [facility]   8/25/2021 9:57 AM        This note is created with the assistance of a speech-recognition program. While intending to generate a document that actually reflects the content of the visit, no guarantees can be provided that every mistake has been identified and corrected by editing.

## 2021-08-25 NOTE — PROGRESS NOTES
[facility]  Office: [address], DO, Ben Pedraza, DO, [place] Macdonald, DO, Selena Bang Blood, DO, Freedom Solares MD, Monique Mukherjee MD, Yossi Quiros MD, Naye Langley MD, Edwige Yoder MD, Mansi Bunn MD, Juice Flood MD, Naga Prater, DO, Nyoka Hodgkins, MD, Dafne Lyons DO, Jadene Boast, MD,  Kat Russo DO, Jean-Claude Tillman MD, Danielle Townsend MD, Constantino Quintanilla MD, Sandy Servin MD, Kelsie Dunlap MD, Pasha Pride MD, Paula Almazan MD, Debi Gomez CNP, [facility], CNP, Jose Elias Canales, CNP, Vince Kinney, CNS, Felicia Pham, CNP, Irene Mckeon, CNP, Mikki Foot, CNP, Ariel Gamma, CNP, Gaby Loser, CNP, Beulah Wang PA-C, Jorden Shannon, SAMIA, Shay Crump, CNP, Abigail Gresham, CNP, Jazz Green, CNP, Terrance Ngo, CNP, Bashir Plummer, CNP, Stephen Norman, CNP, Mayela Patel, CNP, Godwin Robertson, [facility]    Progress Note    8/25/2021    4:12 PM    Name:   Chyna Arboleda  MRN:     [id number]     Acct:      [de-identified]   Room:   2034/2034-01  IP Day:  5  Admit Date:  8/20/2021  2:57 PM    PCP:   Juju Ansari MD  Code Status:  Full Code    Subjective:     C/C:   Chief Complaint   Patient presents with    Chest Pain    Nausea    Fatigue     Interval History Status: not changed. Still running a bit hypotensive this am (sbp 80s), also her hr spiked to 130s. Cardio contacted and digoxin ordered but ended up not being given as her hr came down on own    Denies cp/sob/n/v    Brief History:     Per my partner:   \"This is a 28-year-old female that presents to [facility] emergency room with a complaint of chest pain that started while she was in dialysis on the date of the Alysia Olszewski was located in the epigastric region and lower chest without radiation. Romero Speed dialysis treatment was aborted after approximately 2 hours due to her symptoms.  She otherwise denies any other associated symptoms. Rocio Gentile oxyCODONE-acetaminophen, glucose, dextrose, glucagon (rDNA), dextrose, ondansetron **OR** ondansetron, acetaminophen **OR** acetaminophen, magnesium hydroxide, nitroGLYCERIN    Data:     Past Medical History:   has a past medical history of Asthma, CHF (congestive heart failure) ([facility] Chronic kidney disease, COPD (chronic obstructive pulmonary disease) ([facility] Dialysis patient ([facility] Hemodialysis patient ([facility] Hyperlipidemia, Hypertension, Obesity, YONI (obstructive sleep apnea), Pneumonia, Renal failure, Type II or unspecified type diabetes mellitus without mention of complication, not stated as uncontrolled, and Ventilator dependence ([facility] Social History:   reports that she quit smoking about 4 years ago. Her smoking use included cigarettes. She quit after 7.00 years of use. She has never used smokeless tobacco. She reports that she does not drink alcohol and does not use drugs. Family History:   Family History   Problem Relation Age of Onset    Diabetes Other     Cancer Mother         BREAST    Heart Disease Father         CAD-MI    Diabetes Father     High Blood Pressure Father     Diabetes Maternal Grandfather     Diabetes Paternal Grandfather     Heart Disease Paternal Grandfather     High Blood Pressure Paternal Grandfather        Vitals:  BP 94/74   Pulse 92   Temp 97.2 °F (36.2 °C) (Temporal)   Resp 16   Ht 4' 11\" (1.499 m)   Wt 247 lb 12.8 oz (112.4 kg)   LMP 2014 (Approximate)   SpO2 100%   BMI 50.05 kg/m²   Temp (24hrs), Av.2 °F (36.2 °C), Min:96.9 °F (36.1 °C), Max:97.9 °F (36.6 °C)    Recent Labs     21  1651 21  1743 21  1934 21  0901   POCGLU 66 128* 106* 75       I/O (24Hr):     Intake/Output Summary (Last 24 hours) at 2021 1612  Last data filed at 2021 1347  Gross per 24 hour   Intake 700 ml   Output 500 ml   Net 200 ml       Labs:  Hematology:  Recent Labs     21  1625 21  2102 21  0408 21  8536 contrast may be helpful. Result Date: 8/22/2021  Mild ascites and subcutaneous edema. Moderate bilateral renal atrophy. Other incidental findings as above. Limited sensitivity without IV and oral contrast.     XR ACUTE ABD SERIES CHEST 1 VW    Result Date: 8/20/2021  Possible mild CHF. Bowel gas pattern nonspecific. US GALLBLADDER RUQ    Result Date: 8/23/2021  1. Partially contracted gallbladder without evidence of cholelithiasis or cholecystitis. 2. Mildly heterogeneous appearance of the liver which is nonspecific but may represent hepatocellular disease. 3. Apparent portal vein biphasic flow which can be seen in setting of right heart failure. 4. Minimal perihepatic ascites.        Physical Examination:        General appearance:  alert, cooperative and no distress  Mental Status:  oriented to person, place and time and normal affect  Lungs:  clear to auscultation bilaterally, normal effort  Heart:  Tachy and irreg irreg rhythm, no murmur  Abdomen:  soft, nontender, nondistended, normal bowel sounds, no masses, hepatomegaly, splenomegaly; obese  Extremities:  no edema, redness, tenderness in the calves  Skin:  no gross lesions, rashes, induration    Assessment:        Hospital Problems         Last Modified POA    * (Principal) Chest pain at rest 8/24/2021 Yes    Type 2 diabetes mellitus with renal manifestations ([address] (Chronic) 8/21/2021 Yes    Current smoker 8/21/2021 Yes    Overview Signed 9/7/2015  4:44 AM by [facility]     replace inactive diagnosis         Acute diastolic congestive heart failure ([address] 8/21/2021 Yes    Morbid obesity with BMI of 45.0-49.9, adult ([address] 8/21/2021 Yes    COPD exacerbation ([address] 8/21/2021 Yes    ESRD (end stage renal disease) on dialysis ([address] 8/21/2021 Yes    Essential hypertension 8/21/2021 Yes    Absolute anemia 8/23/2021 Yes    Atrial flutter ([address] 8/24/2021 Clinically Undetermined    Chest pain 8/24/2021 Yes          Plan:        Reduce frequency of h/h/ checks  Cortisone cream for itching of feet  Dialysis per renal; cv and gi evals ongoing  Possible scopes tomorrow for persistent gi bleeding  D/w lucero Hair P Blood, DO  8/25/2021  4:12 PM

## 2021-08-25 NOTE — PLAN OF CARE
Problem: Pain:  Goal: Pain level will decrease  Description: Pain level will decrease  Outcome: Ongoing  Note: Patient not complaining of any pain at this time

## 2021-08-25 NOTE — PROGRESS NOTES
Occupational [address]  Occupational Therapy Not Seen Note    Patient not available for Occupational Therapy due to:    [] Testing:    [x] Hemodialysis 8/25: (CX) Pt out of room for HD, will try back later as time allows.      [] Cancelled by RN:    []Refusal by Patient:    [] Surgery:     [] Intubation:     [] Pain Medication:    [] Sedation:     [] Spine Precautions :    [] Medical Instability:    [] Other:      Alejandro Tony, OT

## 2021-08-25 NOTE — PROGRESS NOTES
(PERCOCET) 5-325 MG per tablet 1 tablet, Q4H PRN  aspirin EC tablet 81 mg, Daily  budesonide-formoterol (SYMBICORT) 160-4.5 MCG/ACT inhaler 2 puff, BID  [Held by provider] furosemide (LASIX) tablet 40 mg, Daily  guaiFENesin (MUCINEX) extended release tablet 600 mg, BID  insulin glargine (LANTUS) injection vial 15 Units, Nightly  [Held by provider] lisinopril (PRINIVIL;ZESTRIL) tablet 5 mg, Daily  sennosides-docusate sodium (SENOKOT-S) 8.6-50 MG tablet 1 tablet, Daily  glucose (GLUTOSE) 40 % oral gel 15 g, PRN  dextrose 50 % IV solution, PRN  glucagon (rDNA) injection 1 mg, PRN  dextrose 5 % solution, PRN  ondansetron (ZOFRAN-ODT) disintegrating tablet 4 mg, Q8H PRN   Or  ondansetron (ZOFRAN) injection 4 mg, Q6H PRN  acetaminophen (TYLENOL) tablet 650 mg, Q6H PRN   Or  acetaminophen (TYLENOL) suppository 650 mg, Q6H PRN  magnesium hydroxide (MILK OF MAGNESIA) 400 MG/5ML suspension 30 mL, Daily PRN  atorvastatin (LIPITOR) tablet 40 mg, Nightly  nitroGLYCERIN (NITROSTAT) SL tablet 0.4 mg, Q5 Min PRN  insulin lispro (HUMALOG) injection vial 0-12 Units, TID WC  insulin lispro (HUMALOG) injection vial 0-6 Units, Nightly  [Held by provider] heparin (porcine) injection 5,000 Units, 3 times per day        Data:     Code Status:  Full Code    Family History   Problem Relation Age of Onset    Diabetes Other     Cancer Mother         BREAST    Heart Disease Father         CAD-MI    Diabetes Father     High Blood Pressure Father     Diabetes Maternal Grandfather     Diabetes Paternal Grandfather     Heart Disease Paternal Grandfather     High Blood Pressure Paternal Grandfather        Social History     Socioeconomic History    Marital status: Single     Spouse name: Not on file    Number of children: Not on file    Years of education: Not on file    Highest education level: Not on file   Occupational History    Not on file   Tobacco Use    Smoking status: Former Smoker     Years: 7.00     Types: Cigarettes Quit date: 2017     Years since quittin.4    Smokeless tobacco: Never Used    Tobacco comment: 1-2 cigs daily   Substance and Sexual Activity    Alcohol use: No    Drug use: No    Sexual activity: Not Currently     Partners: Male   Other Topics Concern    Not on file   Social History Narrative    Not on file     Social Determinants of Health     Financial Resource Strain:     Difficulty of Paying Living Expenses:    Food Insecurity:     Worried About Running Out of Food in the Last Year:     Ran Out of Food in the Last Year:    Transportation Needs:     Lack of Transportation (Medical):  Lack of Transportation (Non-Medical):    Physical Activity:     Days of Exercise per Week:     Minutes of Exercise per Session:    Stress:     Feeling of Stress :    Social Connections:     Frequency of Communication with Friends and Family:     Frequency of Social Gatherings with Friends and Family:     Attends [employer] Services:     Active Member of Clubs or Organizations:     Attends Club or Organization Meetings:     Marital Status:    Intimate Partner Violence:     Fear of Current or Ex-Partner:     Emotionally Abused:     Physically Abused:     Sexually Abused:        Vitals:  BP (!) 85/38   Pulse 93   Temp 97 °F (36.1 °C)   Resp 18   Ht 4' 11\" (1.499 m)   Wt 247 lb 12.8 oz (112.4 kg)   LMP 2014 (Approximate)   SpO2 100%   BMI 50.05 kg/m²   Temp (24hrs), Av.1 °F (36.2 °C), Min:96.3 °F (35.7 °C), Max:97.9 °F (36.6 °C)    Recent Labs     21  1651 21  1743 21  1934 21  0901   POCGLU 66 128* 106* 75       I/O (24Hr):     Intake/Output Summary (Last 24 hours) at 2021 1227  Last data filed at 2021 1645  Gross per 24 hour   Intake 200 ml   Output --   Net 200 ml       Labs:      CBC:   Lab Results   Component Value Date    WBC 10.0 2021    RBC 2.52 2021    RBC 4.24 2012    HGB 8.3 2021    HCT 29.1 2021    .5 08/25/2021    MCH 32.9 08/25/2021    MCHC 29.0 08/25/2021    RDW 17.2 08/25/2021     08/25/2021     01/16/2012    MPV 10.1 08/25/2021     CBC with Differential:    Lab Results   Component Value Date    WBC 10.0 08/25/2021    RBC 2.52 08/25/2021    RBC 4.24 01/16/2012    HGB 8.3 08/25/2021    HCT 29.1 08/25/2021     08/25/2021     01/16/2012    .5 08/25/2021    MCH 32.9 08/25/2021    MCHC 29.0 08/25/2021    RDW 17.2 08/25/2021    NRBC 2 03/18/2016    LYMPHOPCT 17 08/25/2021    MONOPCT 10 08/25/2021    BASOPCT 1 08/25/2021    MONOSABS 0.97 08/25/2021    LYMPHSABS 1.65 08/25/2021    EOSABS 0.21 08/25/2021    BASOSABS 0.05 08/25/2021    DIFFTYPE NOT REPORTED 08/25/2021     Hemoglobin/Hematocrit:    Lab Results   Component Value Date    HGB 8.3 08/25/2021    HCT 29.1 08/25/2021     CMP:    Lab Results   Component Value Date     08/25/2021    K 5.0 08/25/2021    CL 95 08/25/2021    CO2 28 08/25/2021    BUN 30 08/25/2021    CREATININE 5.32 08/25/2021    GFRAA 10 08/25/2021    LABGLOM 8 08/25/2021    GLUCOSE 76 08/25/2021    GLUCOSE 132 01/16/2012    PROT 7.4 08/21/2021    LABALBU 3.7 08/21/2021    CALCIUM 8.5 08/25/2021    BILITOT 0.46 08/21/2021    ALKPHOS 121 08/21/2021    AST 13 08/21/2021    ALT 10 08/21/2021     BMP:    Lab Results   Component Value Date     08/25/2021    K 5.0 08/25/2021    CL 95 08/25/2021    CO2 28 08/25/2021    BUN 30 08/25/2021    LABALBU 3.7 08/21/2021    CREATININE 5.32 08/25/2021    CALCIUM 8.5 08/25/2021    GFRAA 10 08/25/2021    LABGLOM 8 08/25/2021    GLUCOSE 76 08/25/2021    GLUCOSE 132 01/16/2012     PT/INR:    Lab Results   Component Value Date    PROTIME 10.7 04/13/2017    INR 1.0 04/13/2017     PTT:    Lab Results   Component Value Date    APTT 23.5 03/16/2016   [APTT}    Physical Examination:        General appearance: alert, cooperative and no distress  Mental Status: oriented to person, place and time and normal affect  Abdomen: soft, nontender, nondistended, bowel sounds present   Assessment:        Primary Problem  Chest pain at rest     Active Hospital Problems    Diagnosis Date Noted    Type 2 diabetes mellitus with renal manifestations ([facility] [E11.29] 12/08/2015     Priority: Medium    Atrial flutter ([address] [I48.92] 08/24/2021    Chest pain [R07.9]     Absolute anemia [D64.9]     Chest pain at rest [R07.9] 08/20/2021    ESRD (end stage renal disease) on dialysis ([address] [N18.6, Z99.2] 03/11/2016    Essential hypertension [I10] 03/11/2016    COPD exacerbation ([address] [J44.1] 02/22/2016    Morbid obesity with BMI of 45.0-49.9, adult ([address] [E66.01, Z68.42] 12/11/2015    Acute diastolic congestive heart failure ([address] [I50.31]     Current smoker [F17.200] 10/03/2013     Past Medical History:   Diagnosis Date    Asthma     AS A CHILD    CHF (congestive heart failure) ([address] 2000    Chronic kidney disease     COPD (chronic obstructive pulmonary disease) ([address] 2000    INHALER USE AS NEEDED    Dialysis patient ([address]     CENTRAL DIALYSIS MON, WEDS AND FRI, FLUID RESTRICTION 2L/24 HRS PER PT    Hemodialysis patient [facility])     had Dilaysis 8-17-16  M-W-F @ [facility]    Hyperlipidemia     Hypertension 2000    ON RX    Obesity     YONI (obstructive sleep apnea) 2013    SLEEP STUDY -6/2016 AWAITING MACHINE, USING O2 AT NIGHT PRESENTLY    Pneumonia 2000    HAD TO BE ON VENTILATOR 12 DAYS    Renal failure     STARTED DIALYSIS 02/25/2016    Type II or unspecified type diabetes mellitus without mention of complication, not stated as uncontrolled 2000    IDDM    Ventilator dependence ([address]     X 2 200 AND 2014. 2014 HAD TO GO ON VENT POST OP        Plan:        1. Anemia  1. Small amount BRBPR this am  2. FOBT positive x 2  3. Hgb stable  4. Trend H&H  5. Transfuse for hgb < 8, per cardiology reccs  6. RN to check with cardiology for clearance for GI investigations  2. Abdominal pain  1. Improved  2.  Probiotics

## 2021-08-25 NOTE — FLOWSHEET NOTE
Patient was sleeping.  shared in silent prayer.    leaves prayer card for possible follow up.     08/24/21 2057   Encounter Summary   Services provided to: Patient   Referral/Consult From: [facility]   (8-24-21 PT: sleeping)   Complexity of Encounter Low   Length of Encounter 15 minutes   Routine   Type Initial   Assessment Sleeping   Intervention Prayer

## 2021-08-25 NOTE — PROGRESS NOTES
HEMODIALYSIS POST TREATMENT NOTE    Treatment time ordered: 3.5 Hours    Actual treatment time: 3.5 Hours    UltraFiltration Goal: 0  UltraFiltration Removed: 0      Pre Treatment weight: 112.4 kg  Post Treatment weight: 112.4 kg  Estimated Dry Weight: 106.5 kg per outpatient clinic documents    Access used:     Central Venous Catheter:          Tunneled or Non-tunneled:            Site:           Access Flow:       Internal Access:       AV Fistula or AV Graft: AVF         Site: PEREZ       Access Flow: Good       Sign and symptoms of infection: No       If YES:     Medications or blood products given: Midodrine 15 mg at start of treatment, Midodrine 10 mg mid treatment, and Albumin 25gm 25% intra treatment. Chronic outpatient schedule: [facility]    Chronic outpatient unit: [facility]    Summary of response to treatment: Patient tolerated treatment well. BP was labile and patient received Midodrine and Albumin for BP support. Although hypotensive throughout tx, patient reported being asymptomatic. No fluid was removed this tx per orders due to hypotension. Patient has trace amounts of generalized edema noted. No SOB reported. Explain if orders NOT met, was physician notified:      Corinne Kaur faxed to patient unit/ placed in patient chart: Yes    Post assessment completed: Yes    Report given to: Becky Liz RN      * Intra-treatment documented Safety Checks include the followin) Access and face visible at all times. 2) All connections and blood lines are secure with no kinks. 3) NVL alarm engaged. 4) Hemosafe device applied (if applicable). 5) No collapse of Arterial or Venous blood chambers. 6) All blood lines / pump segments in the air detectors.

## 2021-08-26 NOTE — PROGRESS NOTES
associated symptoms\"    Review of Systems:     Constitutional:  negative for chills, fevers, sweats  Respiratory:  negative for cough, dyspnea on exertion, shortness of breath, wheezing  Cardiovascular:  negative for chest pain, chest pressure/discomfort, lower extremity edema, palpitations  Gastrointestinal:  negative for abdominal pain, constipation, diarrhea, nausea, vomiting  Neurological:  negative for dizziness, headache    Medications: Allergies:     Allergies   Allergen Reactions    Ibuprofen     Tramadol Hives    Motrin [Ibuprofen Micronized] Itching    Strawberry Extract Itching and Rash    Tape Lindia Cluck Tape] Rash     No paper tape silk only       Current Meds:   Scheduled Meds:    bisacodyl  20 mg Oral Once    polyethylene glycol  4,000 mL Oral Once    triamcinolone   Topical BID    polyethylene glycol  17 g Oral Daily    lactobacillus  1 tablet Oral Daily    dilTIAZem  120 mg Oral Daily    hydrocortisone   Rectal BID    pantoprazole  40 mg IntraVENous Daily    And    sodium chloride (PF)  10 mL IntraVENous Daily    metoprolol  2.5 mg IntraVENous Once    midodrine  10 mg Oral TID WC    Calcium Acetate (Phos Binder)  2 capsule Oral TID WC    lidocaine 1 % injection  5 mL Intradermal Once    sodium chloride flush  5-40 mL IntraVENous 2 times per day    aspirin  81 mg Oral Daily    budesonide-formoterol  2 puff Inhalation BID    [Held by provider] furosemide  40 mg Oral Daily    guaiFENesin  600 mg Oral BID    insulin glargine  15 Units Subcutaneous Nightly    [Held by provider] lisinopril  5 mg Oral Daily    sennosides-docusate sodium  1 tablet Oral Daily    atorvastatin  40 mg Oral Nightly    insulin lispro  0-12 Units Subcutaneous TID WC    insulin lispro  0-6 Units Subcutaneous Nightly    [Held by provider] heparin (porcine)  5,000 Units Subcutaneous 3 times per day     Continuous Infusions:    sodium chloride      dextrose       PRN Meds: albuterol sulfate HFA, albuterol, perflutren lipid microspheres, diphenhydrAMINE, sodium chloride flush, sodium chloride, fentanNYL, oxyCODONE-acetaminophen, glucose, dextrose, glucagon (rDNA), dextrose, ondansetron **OR** ondansetron, acetaminophen **OR** acetaminophen, magnesium hydroxide, nitroGLYCERIN    Data:     Past Medical History:   has a past medical history of Asthma, CHF (congestive heart failure) ([facility] Chronic kidney disease, COPD (chronic obstructive pulmonary disease) ([facility] Dialysis patient ([facility] Hemodialysis patient ([facility] Hyperlipidemia, Hypertension, Obesity, YONI (obstructive sleep apnea), Pneumonia, Renal failure, Type II or unspecified type diabetes mellitus without mention of complication, not stated as uncontrolled, and Ventilator dependence ([facility] Social History:   reports that she quit smoking about 4 years ago. Her smoking use included cigarettes. She quit after 7.00 years of use. She has never used smokeless tobacco. She reports that she does not drink alcohol and does not use drugs. Family History:   Family History   Problem Relation Age of Onset    Diabetes Other     Cancer Mother         BREAST    Heart Disease Father         CAD-MI    Diabetes Father     High Blood Pressure Father     Diabetes Maternal Grandfather     Diabetes Paternal Grandfather     Heart Disease Paternal Grandfather     High Blood Pressure Paternal Grandfather        Vitals:  BP 88/60   Pulse 106   Temp 97.4 °F (36.3 °C) (Temporal)   Resp 18   Ht 4' 11\" (1.499 m)   Wt 247 lb (112 kg)   LMP 2014 (Approximate)   SpO2 96%   BMI 49.89 kg/m²   Temp (24hrs), Av.6 °F (36.4 °C), Min:97 °F (36.1 °C), Max:98.5 °F (36.9 °C)    Recent Labs     21  0734 21  0758 21  0823 21  1010   POCGLU 53* 51* 85 156*       I/O (24Hr):     Intake/Output Summary (Last 24 hours) at 2021 1031  Last data filed at 2021 1347  Gross per 24 hour   Intake 500 ml   Output 500 ml   Net 0 ml Labs:  Hematology:  Recent Labs     08/24/21  0408 08/24/21  2329 08/25/21  0510 08/25/21  0835 08/25/21  2130 08/26/21  0323 08/26/21  0944   WBC 11.1  --  10.0  --   --   --   --    RBC 2.58*  --  2.52*  --   --   --   --    HGB 8.5*   < > 8.3*   < > 8.0* 8.0* 8.1*   HCT 30.4*   < > 28.6*   < > 29.1* 29.0* 29.4*   .8*  --  113.5*  --   --   --   --    MCH 32.9  --  32.9  --   --   --   --    MCHC 28.0*  --  29.0  --   --   --   --    RDW 17.3*  --  17.2*  --   --   --   --      --  137*  --   --   --   --    MPV 10.5  --  10.1  --   --   --   --     < > = values in this interval not displayed. Chemistry:  Recent Labs     08/24/21  0408 08/25/21  0835   * 135   K 4.2 5.0   CL 95* 95*   CO2 26 28   GLUCOSE 88 76   BUN 20 30*   CREATININE 3.74* 5.32*   ANIONGAP 12 12   LABGLOM 13* 8*   GFRAA 15* 10*   CALCIUM 8.5* 8.5*     Recent Labs     08/25/21  1620 08/25/21  1939 08/26/21  0734 08/26/21  0758 08/26/21  0823 08/26/21  1010   POCGLU 71 91 53* 51* 85 156*     ABG:  Lab Results   Component Value Date    POCPH 7.22 06/12/2016    POCPCO2 61 06/12/2016    POCPO2 68 06/12/2016    POCHCO3 24.8 06/12/2016    NBEA 3 06/12/2016    PBEA NOT REPORTED 06/12/2016    ZSP4BYS 27 06/12/2016    WRUY4HRR 88 06/12/2016    FIO2 NOT REPORTED 07/01/2019     Lab Results   Component Value Date/Time    SPECIAL NOT REPORTED 08/25/2021 05:09 AM     Lab Results   Component Value Date/Time    CULTURE NO GROWTH 15 HOURS 08/25/2021 05:09 AM       Radiology:  CT ABDOMEN PELVIS WO CONTRAST Additional Contrast? None    Addendum Date: 8/22/2021    ADDENDUM: Ventral hernia containing free fluid and possible loops of small bowel but without evidence of obstruction. Case discussed with the surgery resident at 6:15 p.m. Yuki Dutta Follow-up exam with oral contrast may be helpful. Result Date: 8/22/2021  Mild ascites and subcutaneous edema. Moderate bilateral renal atrophy. Other incidental findings as above.   Limited sensitivity without IV and oral contrast.     XR ACUTE ABD SERIES CHEST 1 VW    Result Date: 8/20/2021  Possible mild CHF. Bowel gas pattern nonspecific. US GALLBLADDER RUQ    Result Date: 8/23/2021  1. Partially contracted gallbladder without evidence of cholelithiasis or cholecystitis. 2. Mildly heterogeneous appearance of the liver which is nonspecific but may represent hepatocellular disease. 3. Apparent portal vein biphasic flow which can be seen in setting of right heart failure. 4. Minimal perihepatic ascites.        Physical Examination:        General appearance:  alert, cooperative and no distress  Mental Status:  oriented to person, place and time and normal affect  Lungs:  clear to auscultation bilaterally, normal effort  Heart:  Tachy and irreg irreg rhythm, no murmur  Abdomen:  soft, nontender, nondistended, normal bowel sounds, no masses, hepatomegaly, splenomegaly; obese  Extremities:  no edema, redness, tenderness in the calves  Skin:  no gross lesions, rashes, induration    Assessment:        Hospital Problems         Last Modified POA    * (Principal) Chest pain at rest 8/24/2021 Yes    Type 2 diabetes mellitus with renal manifestations ([address] (Chronic) 8/21/2021 Yes    Current smoker 8/21/2021 Yes    Overview Signed 9/7/2015  4:44 AM by [facility]     replace inactive diagnosis         Acute diastolic congestive heart failure ([address] 8/21/2021 Yes    Morbid obesity with BMI of 45.0-49.9, adult ([address] 8/21/2021 Yes    COPD exacerbation ([address] 8/21/2021 Yes    ESRD (end stage renal disease) on dialysis ([address] 8/21/2021 Yes    Essential hypertension 8/21/2021 Yes    Absolute anemia 8/23/2021 Yes    Atrial flutter ([address] 8/24/2021 Clinically Undetermined    Chest pain 8/24/2021 Yes          Plan:        Reduce frequency of h/h checks  Cortisone cream for itching of feet  Dialysis per renal  Plan for scopes tomorrow for persistent gi bleeding  D/w rn  Transfer out of icu: though hypotensive, it has

## 2021-08-26 NOTE — PROGRESS NOTES
MG tablet, Take 1 tablet by mouth daily  OXYGEN, Inhale into the lungs O2  3L  PER NASAL CANNULA NIGHTLY  [DISCONTINUED] Misc. Devices (DIGITAL GLASS SCALE) MISC, Diagnosis: Diastolic Congestive heart failure    Current Medications:     Scheduled Meds:    polyethylene glycol  238 g Oral Once    triamcinolone   Topical BID    polyethylene glycol  17 g Oral Daily    lactobacillus  1 tablet Oral Daily    dilTIAZem  120 mg Oral Daily    hydrocortisone   Rectal BID    pantoprazole  40 mg IntraVENous Daily    And    sodium chloride (PF)  10 mL IntraVENous Daily    metoprolol  2.5 mg IntraVENous Once    midodrine  10 mg Oral TID WC    Calcium Acetate (Phos Binder)  2 capsule Oral TID WC    lidocaine 1 % injection  5 mL Intradermal Once    sodium chloride flush  5-40 mL IntraVENous 2 times per day    aspirin  81 mg Oral Daily    budesonide-formoterol  2 puff Inhalation BID    [Held by provider] furosemide  40 mg Oral Daily    guaiFENesin  600 mg Oral BID    insulin glargine  15 Units Subcutaneous Nightly    [Held by provider] lisinopril  5 mg Oral Daily    sennosides-docusate sodium  1 tablet Oral Daily    atorvastatin  40 mg Oral Nightly    insulin lispro  0-12 Units Subcutaneous TID WC    insulin lispro  0-6 Units Subcutaneous Nightly    [Held by provider] heparin (porcine)  5,000 Units Subcutaneous 3 times per day     Continuous Infusions:    sodium chloride      dextrose       PRN Meds:  albuterol sulfate HFA, albuterol, perflutren lipid microspheres, diphenhydrAMINE, sodium chloride flush, sodium chloride, fentanNYL, oxyCODONE-acetaminophen, glucose, dextrose, glucagon (rDNA), dextrose, ondansetron **OR** ondansetron, acetaminophen **OR** acetaminophen, magnesium hydroxide, nitroGLYCERIN    Input/Output:       I/O last 3 completed shifts: In: 500   Out: 500 .       Patient Vitals for the past 96 hrs (Last 3 readings):   Weight   08/26/21 0400 247 lb (112 kg)   08/25/21 1347 247 lb 12.8 oz (112.4 kg)   21 0957 247 lb 12.8 oz (112.4 kg)       Vital Signs:   Temperature:  Temp: 97.2 °F (36.2 °C)  TMax:   Temp (24hrs), Av.6 °F (36.4 °C), Min:97.2 °F (36.2 °C), Max:98.5 °F (36.9 °C)    Respirations:  Resp: 16  Pulse:   Pulse: 104  BP:    BP: 87/65  BP Range: Systolic (92UTV), FPV:06 , Min:82 , NETTIE:24       Diastolic (50HOJ), KNN:51, Min:58, Max:74      Physical Examination:     General:  AAO x 3, speaking in full sentences, no accessory muscle use. HEENT: Atraumatic, normocephalic, no throat congestion, moist mucosa. Eyes:   Pupils equal, round and reactive to light, EOMI. Neck:   Supple  Chest:   Bilateral vesicular breath sounds, no rales or wheezes. Cardiac:  S1 S2 RR, no murmurs, gallops or rubs. Abdomen: Soft, non-tender, no masses or organomegaly, BS audible. :   No suprapubic or flank tenderness. Neuro:  AAO x 3, No FND. SKIN:  No rashes, good skin turgor. Extremities:  No edema. Labs:       Recent Labs     21  0408 21  0942 21  0510 21  0835 21  2130 21  0323 21  0944   WBC 11.1  --  10.0  --   --   --   --    RBC 2.58*  --  2.52*  --   --   --   --    HGB 8.5*   < > 8.3*   < > 8.0* 8.0* 8.1*   HCT 30.4*   < > 28.6*   < > 29.1* 29.0* 29.4*   .8*  --  113.5*  --   --   --   --    MCH 32.9  --  32.9  --   --   --   --    MCHC 28.0*  --  29.0  --   --   --   --    RDW 17.3*  --  17.2*  --   --   --   --      --  137*  --   --   --   --    MPV 10.5  --  10.1  --   --   --   --     < > = values in this interval not displayed.       BMP:   Recent Labs     21  0408 21  0835   * 135   K 4.2 5.0   CL 95* 95*   CO2 26 28   BUN 20 30*   CREATININE 3.74* 5.32*   GLUCOSE 88 76   CALCIUM 8.5* 8.5*      BONNIE:      Lab Results   Component Value Date    BONNIE NEGATIVE 12/10/2015     SPEP:  Lab Results   Component Value Date    PROT 7.4 2021    ALBCAL 2.6 12/10/2015    ALBPCT 42 12/10/2015    LABALPH 0.3 12/10/2015 LABALPH 0.9 12/10/2015    A1PCT 4 12/10/2015    A2PCT 14 12/10/2015    LABBETA 1.3 12/10/2015    BETAPCT 21 12/10/2015    GAMGLOB 1.2 12/10/2015    GGPCT 19 12/10/2015    PATH PENDING 08/25/2021     UPEP:     Lab Results   Component Value Date    LABPE  12/10/2015     URINE PROTEIN CONCENTRATION IS ELEVATED. PROTEIN ELECTROPHORESIS DEMONSTRATES     C3:     Lab Results   Component Value Date    C3 131 12/10/2015     C4:     Lab Results   Component Value Date    C4 39 12/10/2015     MPO ANCA:     Lab Results   Component Value Date    MPO 9 12/10/2015     PR3 ANCA:     Lab Results   Component Value Date    PR3 5 12/10/2015     Hep BsAg:         Lab Results   Component Value Date    HEPBSAG NONREACTIVE 02/25/2016     Urinalysis/Chemistries:      Lab Results   Component Value Date    NITRU NEGATIVE 06/12/2016    COLORU YELLOW 06/12/2016    PHUR 5.0 06/12/2016    WBCUA 2 TO 5 06/12/2016    RBCUA 0 TO 2 06/12/2016    MUCUS NOT REPORTED 06/12/2016    TRICHOMONAS NOT REPORTED 06/12/2016    YEAST NOT REPORTED 06/12/2016    BACTERIA FEW 06/12/2016    SPECGRAV 1.013 06/12/2016    LEUKOCYTESUR NEGATIVE 06/12/2016    UROBILINOGEN Normal 06/12/2016    BILIRUBINUR NEGATIVE 06/12/2016    GLUCOSEU TRACE 06/12/2016    KETUA NEGATIVE 06/12/2016    AMORPHOUS 1+ 06/12/2016     Urine Sodium:     Lab Results   Component Value Date    ANTONIO 28 06/12/2016     Urine Osmolarity:   Lab Results   Component Value Date    OSMOU 282 06/12/2016      Urine Creatinine:     Lab Results   Component Value Date    LABCREA 22.3 12/10/2015     Radiology:     Reviewed. Assessment:     1. ESRD on Hemodialysis. His regular HD days are MWF at [facility] hemodialysis facility using LUE AVF under Dr. Alexa Howell. His dry weight is 106.5 kg.   2.  Chest pain. 3.  Denies nausea. 4.  Fatigue. 5.  Hypotension. 6.  Abdomen pain and diarrhea. 7.  Obesity. 8.  Hypertension. 9.  Anemia of chronic disease  10. Secondary hyperparathyroidism  11. Hypertension  12. Hypotension. 13.  Bright red blood in stool. 14.  Itching. Plan:   1. No acute need for dialysis today. Will get regular dialysis as per [facility] schedule. 2.  EGD and colonoscopy in a.m. per GI. 3.  Continue midodrine to 10 mg 3 times a day. 4.  Agree with holding blood pressure meds if blood pressure less than 100.  5. BMP in AM.  6.  Will follow. Nutrition   Please ensure that patient is on a renal diet/TF. Avoid nephrotoxic drugs/contrast exposure. We will continue to follow along with you. Lobo Means MD  [facility]     This note is created with the assistance of a speech-recognition program. While intending to generate a document that actually reflects the content of the visit, no guarantees can be provided that every mistake has been identified and corrected by editing.

## 2021-08-26 NOTE — PLAN OF CARE
Problem: Pain:  Goal: Pain level will decrease  Description: Pain level will decrease  Outcome: Ongoing     Problem: Falls - Risk of:  Goal: Will remain free from falls  Description: Will remain free from falls  Outcome: Ongoing  Note: Patient is fall risk per fall scale. Hourly rounding performed. Personal belongings and call light within reach. Bed in low position.

## 2021-08-26 NOTE — PROGRESS NOTES
Writer notified of pt blood sugar of 53. Pt given oral dextrose gel. Recheck BS after 15 minutes and BS was 51. Pt refusing IV dextrose or gel. Pt given apple juice due to being on clear liquid diet. Will recheck in 15 minutes.

## 2021-08-26 NOTE — PLAN OF CARE
Nutrition Problem #1: Altered nutrition-related lab values  Intervention: Food and/or Nutrient Delivery: Continue Current Diet, Start Oral Nutrition Supplement  Nutritional Goals: PO intakes are greater than 75% at meals

## 2021-08-26 NOTE — PROGRESS NOTES
Occupational Therapy  Facility/Department: [facility] CVICU  Daily Treatment Note  NAME: Priti Rodriguez  : 1970  MRN: [id number]    Date of Service: 2021    Discharge Recommendations:  Patient would benefit from continued therapy after discharge       Assessment   Performance deficits / Impairments: Decreased functional mobility ; Decreased ADL status; Decreased strength;Decreased safe awareness;Decreased endurance;Decreased posture;Decreased balance  Assessment: Pt would benefits from continued skilled OT services to increase I and safety during functional tasks to return home at prior level of function. Prognosis: Good  OT Education: OT Role;Plan of Care  Patient Education: Disease specific edu: CHF  REQUIRES OT FOLLOW UP: Yes  Activity Tolerance  Activity Tolerance: Patient limited by fatigue  Safety Devices  Safety Devices in place: Yes  Type of devices: Call light within reach; All fall risk precautions in place;Nurse notified; Patient at risk for falls         Patient Diagnosis(es): The encounter diagnosis was Chest pain, unspecified type. has a past medical history of Asthma, CHF (congestive heart failure) ([facility] Chronic kidney disease, COPD (chronic obstructive pulmonary disease) ([facility] Dialysis patient ([facility] Hemodialysis patient ([facility] Hyperlipidemia, Hypertension, Obesity, YONI (obstructive sleep apnea), Pneumonia, Renal failure, Type II or unspecified type diabetes mellitus without mention of complication, not stated as uncontrolled, and Ventilator dependence ([facility] has a past surgical history that includes Hysterectomy (2014);  section ([address]); and skin full graft ().     Restrictions  Restrictions/Precautions  Restrictions/Precautions: General Precautions, Up as Tolerated  Position Activity Restriction  Other position/activity restrictions: telemetry,1500 ml fluid restriction, up as phani, 4L O2 per nc, clear liquids  Subjective   General  Chart Reviewed: Yes  Patient Goals   Patient goals : To go home! Therapy Time   Individual Concurrent Group Co-treatment   Time In 0940         Time Out 0098         Minutes 13           Upon writer exit, call light within reach, pt retired to sitting EOB. All lines intact and patient positioned comfortably. All patient needs addressed prior to ending therapy session. Chart reviewed prior to treatment and patient is agreeable for therapy. RN reports patient is medically stable for therapy treatment this date.          GEORGE Garcia

## 2021-08-26 NOTE — PROGRESS NOTES
Physical Therapy  Facility/Department: [facility] CVICU  Daily Treatment Note  NAME: Mikaela Gomez  : 1970  MRN: [id number]    Date of Service: 2021    Discharge Recommendations:  Patient would benefit from continued therapy after discharge        Assessment   Body structures, Functions, Activity limitations: Decreased endurance  Assessment: pt progressing toward goals  Activity Tolerance  Activity Tolerance: Treatment limited secondary to medical complications (free text)     Patient Diagnosis(es): The encounter diagnosis was Chest pain, unspecified type. has a past medical history of Asthma, CHF (congestive heart failure) ([facility] Chronic kidney disease, COPD (chronic obstructive pulmonary disease) ([facility] Dialysis patient ([facility] Hemodialysis patient ([facility] Hyperlipidemia, Hypertension, Obesity, YONI (obstructive sleep apnea), Pneumonia, Renal failure, Type II or unspecified type diabetes mellitus without mention of complication, not stated as uncontrolled, and Ventilator dependence ([facility] has a past surgical history that includes Hysterectomy (2014);  section ([address]); and skin full graft (). Restrictions  Restrictions/Precautions  Restrictions/Precautions: General Precautions, Up as Tolerated  Position Activity Restriction  Other position/activity restrictions: telemetry,1500 ml fluid restriction, up as phani, 4L O2 per nc, clear liquids  Subjective   General  Chart Reviewed: Yes  General Comment  Comments: pt just back to bed from chair agreeable to bedside ex only          Orientation     Cognition      Objective         Ambulation  Ambulation?: No     Balance  Posture: Good  Sitting - Static: Good  Sitting - Dynamic: Good  Standing - Static: Good  Standing - Dynamic: Good;-  Exercises  Comments: AROM turner ankles; knees; and hips sitting x 10 reps.          Comment: UE light resistnace tband ex x 15 reps              G-Code     OutComes Score AM-PAC Score  AM-PAC Inpatient Mobility Raw Score : 16 (08/26/21 1401)  AM-PAC Inpatient T-Scale Score : 40.78 (08/26/21 1401)  Mobility Inpatient CMS 0-100% Score: 54.16 (08/26/21 1401)  Mobility Inpatient CMS G-Code Modifier : CK (08/26/21 1401)          Goals  Short term goals  Time Frame for Short term goals: 12 treatments  Short term goal 1: Independent transfers  Short term goal 2: Independent ambulation 200' x 1 w/ SpO2 >93% without O2  Short term goal 3: Tolerate 30 min ther act  Patient Goals   Patient goals : Feel better    Plan    Plan  Times per week: 1-2x/day,5-6 days/week  Specific instructions for Next Treatment: monitor vitals; progress as tolerated.   Current Treatment Recommendations: Transfer Training, Gait Training, Endurance Training  Safety Devices  Type of devices: Gait belt, Call light within reach, Nurse notified, Chair alarm in place  Restraints  Initially in place: No     Therapy Time   Individual Concurrent Group Co-treatment   Time In  1126         Time Out  1136         Minutes  [phone], PTA

## 2021-08-26 NOTE — FLOWSHEET NOTE
RN called surgery scheduling and placed request per Dr. Green for patient to be scheduled for EGD/scope early, in order for patient to undergo dialysis later in the afternoon.

## 2021-08-26 NOTE — PROGRESS NOTES
[facility]  Progress Note                   Date:   8/26/2021  Patient name: Dio Sandoval  Date of admission:  8/20/2021  2:57 PM  MRN:   [id number]  YOB: 1970  PCP: Nehemias Adame MD    Reason for Admission: Chest pain [R07.9]  Chest pain, unspecified type [R07.9]    Subjective:       Clinical Changes /Abnormalities:  Patient seen and examined. No acute CV issues/concerns overnight. She denies any CP or SOB. Labs, vitals, & tele reviewed.   Plans for endoscopy tomorrow once prep completed    Review of Systems    Medications:   Scheduled Meds:   bisacodyl  20 mg Oral Once    polyethylene glycol  4,000 mL Oral Once    triamcinolone   Topical BID    polyethylene glycol  17 g Oral Daily    lactobacillus  1 tablet Oral Daily    dilTIAZem  120 mg Oral Daily    hydrocortisone   Rectal BID    pantoprazole  40 mg IntraVENous Daily    And    sodium chloride (PF)  10 mL IntraVENous Daily    metoprolol  2.5 mg IntraVENous Once    midodrine  10 mg Oral TID WC    Calcium Acetate (Phos Binder)  2 capsule Oral TID WC    lidocaine 1 % injection  5 mL Intradermal Once    sodium chloride flush  5-40 mL IntraVENous 2 times per day    aspirin  81 mg Oral Daily    budesonide-formoterol  2 puff Inhalation BID    [Held by provider] furosemide  40 mg Oral Daily    guaiFENesin  600 mg Oral BID    insulin glargine  15 Units Subcutaneous Nightly    [Held by provider] lisinopril  5 mg Oral Daily    sennosides-docusate sodium  1 tablet Oral Daily    atorvastatin  40 mg Oral Nightly    insulin lispro  0-12 Units Subcutaneous TID WC    insulin lispro  0-6 Units Subcutaneous Nightly    [Held by provider] heparin (porcine)  5,000 Units Subcutaneous 3 times per day     Continuous Infusions:   sodium chloride      dextrose       CBC:   Recent Labs     08/24/21  0408 08/24/21  0942 08/25/21  0510 08/25/21  0835 08/25/21  2130 08/26/21  0323 08/26/21  0944   WBC 11.1  --  10.0  --   --   -- --    HGB 8.5*   < > 8.3*   < > 8.0* 8.0* 8.1*     --  137*  --   --   --   --     < > = values in this interval not displayed. BMP:    Recent Labs     08/24/21  0408 08/25/21  0835   * 135   K 4.2 5.0   CL 95* 95*   CO2 26 28   BUN 20 30*   CREATININE 3.74* 5.32*   GLUCOSE 88 76     Hepatic:No results for input(s): AST, ALT, ALB, BILITOT, ALKPHOS in the last 72 hours. Troponin: No results for input(s): TROPHS in the last 72 hours. BNP: No results for input(s): BNP in the last 72 hours. Lipids: No results for input(s): CHOL, HDL in the last 72 hours. Invalid input(s): LDLCALCU  INR: No results for input(s): INR in the last 72 hours. DIAGNOSTIC DATA    EKG: Initial EKG showed normal sinus rhythm repeat EKG today showed atrial flutter with controlled ventricular response    ECHO 8/23/2021  Mild left ventricular hypertrophy  Global left ventricular systolic function is normal  Estimated ejection fraction is 65 % . Right atrium is severely dilated . Right ventricular with severe dilatation with severely reduced systolic  function. Severe tricuspid regurgitation. Moderate to severe pulmonary hypertension. No pericardial effusion seen. Normal aortic root dimension. Echocardiogram 7/2/20/2019:  Left ventricle is mildly dilated with normal wall thickness. Overall systolic function appears mildly reduced, with an estimated EF 45%. Grade II (moderate) left ventricular diastolic dysfunction. Left atrium is moderately dilated. Mitral valve sclerosis without stenosis. Mild to moderate mitral regurgitation. Moderate tricuspid regurgitation. Estimated right ventricular systolic pressure is 81.2 mmHg, suggests mild  pulmonary HTN. Trivial pulmonic insufficiency. Trivial pericardial effusion.     CATH 3/15/2016  Angiographic Findings      Cardiac Arteries and Lesion Findings     LMCA: Normal 0% stenosis.     LAD: Mild irregularities 10-20%.     LCx: Normal 0% stenosis.     RCA: Mild irregularities 20-30%.     Coronary Tree      Dominance: Right     LV Analysis  LV function assessed as:Normal.  Ejection Fraction  +----------------------------------------------------------------------+---+  ! Method                                                                ! EF%! +----------------------------------------------------------------------+---+  ! LV gram                                                               !50 !  +----------------------------------------------------------------------+---+       Objective:   Vitals: BP 88/60   Pulse 106   Temp 97.4 °F (36.3 °C) (Temporal)   Resp 18   Ht 4' 11\" (1.499 m)   Wt 247 lb (112 kg)   LMP 11/12/2014 (Approximate)   SpO2 96%   BMI 49.89 kg/m²   General appearance: alert and cooperative with exam  HEENT: Head: Normocephalic, no lesions, without obvious abnormality. Neck:no JVD, trachea midline, no adenopathy  Lungs: Clear to auscultation  Heart: Irregular rate and rhythm, s1/s2 auscultated, no murmurs  Abdomen: soft, non-tender, bowel sounds active  Extremities: no edema  Neurologic: not done        Assessment / Acute Cardiac Problems:   1. Atypical chest pain   2. New onset A Flutter with variable AV Block  3. Mild LV dysfunction on ECHO 2019 LVEF 45%  4. Minimal CAD on cath 2016  5. HTN  6. HLD  7. CKD on HD  8. Morbid obesity  9.  Pulmonary HTN    Patient Active Problem List:     Asthma     YONI (obstructive sleep apnea)     Left knee pain     Hidradenitis     Current smoker     Microalbuminuria     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 kidney disease) stage 4, GFR 15-29 ml/min (HCC)     Acute diastolic congestive heart failure (HCC)     Hyperkalemia     Acute systolic congestive heart failure (HCC)     Pulmonary hypertension (HCC)     Morbid obesity with BMI of 45.0-49.9, adult ([facility]     Acute on chronic respiratory failure with hypoxia (HCC)     COPD exacerbation (HCC)     Asthma exacerbation     Type 2 diabetes mellitus with diabetic nephropathy (HCC)     ESRD (end stage renal disease) on dialysis ([address]     Bronchitis     Essential hypertension     YONI on CPAP     Hyperglycemia, drug-induced     Needs smoking cessation education     Hyperglycemia     Drowsiness     Acute on chronic respiratory failure with hypercapnia ([address]     Mobility impaired     S/P cardiac cath-mINIMAL caD 3/15/16 - dR. 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)     Type 2 diabetes mellitus without complication (HCC)     Congestive heart failure (HCC)     Asthma with COPD with exacerbation (HCC)     Acute exacerbation of CHF (congestive heart failure) (HCC)     Chronic obstructive pulmonary disease (HCC)     Chronic kidney disease, stage V (HCC)     Acute respiratory acidosis     Precordial pain     Gastroesophageal reflux disease without esophagitis     Pulmonary HTN (HCC)     Morbid obesity due to excess calories (HCC)     Symptomatic anemia     Elevated serum creatinine     ESRD needing dialysis (HCC)     Chest pain at rest     Absolute anemia     Atrial flutter (HCC)     Chest pain  [id number] Score for Atrial Fibrillation Stroke Risk   Risk   Factors  Component Value   C CHF Yes 1   H HTN Yes 1   A2 Age >= 76 No,  (54 y.o.) 0   D DM Yes 1   S2 Prior Stroke/TIA No 0   V Vascular Disease No 0   A Age 74-69 No,  (54 y.o.) 0   Sc Sex female 1    [id number]  Score  4   Score last updated 8/25/21 0:85 PM EDT    Click here for a link to the UpToDate guideline \"Atrial Fibrillation: Anticoagulation therapy to prevent embolization    Disclaimer: Risk Score calculation is dependent on accuracy of patient problem list and past encounter diagnosis. Plan of Treatment:   1. Atypical chest pain. No plans for ischemic work up per Dr Saúl Bardales consult. Negative cath 2016. Continue ASA, statin. 2. HFpEF.  ECHO reviewed  LVEF improved to 65% from previous ECHO 2019.  Fluid management per nephrology Appreciate recs. 3. A Flutter rate controlled. Continue Cardizem for rate control but has been held for hypotension. 4. Recommend anticoagulation for atrial flutter when cleared by GI  Consider cardioversion as an outpatient after adequate anticoagulation. 5. Hypotension. Continue Midodrine 10 mg TID. 6. Anemia- GI workup pending. Discussed with Dr. Britney Carrasco, low risk from cardiology standpoint for GI workup   7.  Rec keep Hgb > 8.0      Electronically signed by VENTURA Gonzales CNP on 8/26/2021 at 10:34 AM  [address]  [phone]

## 2021-08-26 NOTE — PROGRESS NOTES
Comprehensive Nutrition Assessment    Type and Reason for Visit:  RD Nutrition Re-Screen/LOS (Length of stay)    Nutrition Recommendations/Plan:   1. ContinueADULT DIET; Clear Liquid; Low Fat/Low Chol/High Fiber/YESENIA; 1500 ml  2. Start Ensure High Protein 2x/day  3. Monitor p.o intakes, diet advancement and labs     Nutrition Assessment:  Patient admission is related to chest pain. Patient seen for length of stay. Patient is currently on a Clear liquid diet and will have an EGD done tomorrow (8/27). Patient reports she is tolerating the Clear liquid diet well but she is still hungry. Patient did have hemodialysis this morning. Patient is not malnourished. Will start Ensure Clear 2x/day. Monitor p.o intakes, diet advancement and labs    Malnutrition Assessment:  Malnutrition Status:  No malnutrition      Estimated Daily Nutrient Needs:  Energy (kcal):  7077-6854 kcal (11-13 kcal/kg); Weight Used for Energy Requirements:  Current     Protein (g):  78-86 gm of protein (1.8-2 gm/kg); Weight Used for Protein Requirements:  Ideal          Nutrition Related Findings:  Edema: +1 generalized edema, +1 BUE, +1 BLE. Hemodialysis. POC glucose: 156 (H), 79 (WNL)      Wounds:  None       Current Nutrition Therapies:    Adult Oral Nutrition Supplement; Clear Liquid Oral Supplement  ADULT DIET; Clear Liquid;  Low Fat/Low Chol/High Fiber/YESENIA; 1500 ml  Diet NPO    Anthropometric Measures:  · Height: 4' 11\" (149.9 cm)  · Current Body Weight: 247 lb (112 kg)   · Admission Body Weight: 240 lb (108.9 kg)    · Usual Body Weight: 241 lb (109.3 kg)     · Ideal Body Weight: 95 lbs; % Ideal Body Weight 260 %   · BMI: 49.9  · BMI Categories: Obese Class 3 (BMI 40.0 or greater)       Nutrition Diagnosis:   · Altered nutrition-related lab values related to endocrine dysfuntion, renal dysfunction as evidenced by lab values      Nutrition Interventions:   Food and/or Nutrient Delivery:  Continue Current Diet, Start Oral Nutrition Supplement  Nutrition Education/Counseling:  Education not indicated   Coordination of Nutrition Care:  Continue to monitor while inpatient    Goals:  PO intakes are greater than 75% at meals       Nutrition Monitoring and Evaluation:   Food/Nutrient Intake Outcomes:  Supplement Intake, Food and Nutrient Intake  Physical Signs/Symptoms Outcomes:  Biochemical Data, Fluid Status or Edema, Skin, Weight     Discharge Planning:     Too soon to determine     Iona LAWSONN, RDN, LDN  Lead Clinical Dietitian  RD Office Phone [phone]

## 2021-08-26 NOTE — PROGRESS NOTES
GI Progress notes    8/26/2021   10:08 AM    Name:  Maria Esther Aggarwal  MRN:    [id number]     Acct:     [de-identified]   Room:  2034/2034-01  IP Day: 6     Admit Date: 8/20/2021  2:57 PM  PCP: Otto Gallardo MD    Subjective:     C/C:   Chief Complaint   Patient presents with    Chest Pain    Nausea    Fatigue       Interval History: Status: not changed. Patient seen and examined. Sitting up on the edge of the bed. Small amount of BRB noticed in bedside commode  Hgb remains stable, 8.1  No abdominal pain, nausea, vomiting  Tolerating diet well. Patient is hypotensive  A flutter, rate controlled    ROS:  Constitutional: negative for chills, fevers and sweats  Gastrointestinal: negative for abdominal pain, constipation, diarrhea, nausea and vomiting      Medications: Allergies:    Allergies   Allergen Reactions    Ibuprofen     Tramadol Hives    Motrin [Ibuprofen Micronized] Itching    Strawberry Extract Itching and Rash    Tape [Adhesive Tape] Rash     No paper tape silk only       Current Meds: triamcinolone (KENALOG) 0.1 % cream, BID  polyethylene glycol (GLYCOLAX) packet 17 g, Daily  lactobacillus (BACID) tablet 1 tablet, Daily  dilTIAZem (CARDIZEM CD) extended release capsule 120 mg, Daily  hydrocortisone (ANUSOL-HC) 2.5 % rectal cream, BID  pantoprazole (PROTONIX) injection 40 mg, Daily   And  sodium chloride (PF) 0.9 % injection 10 mL, Daily  albuterol sulfate  (90 Base) MCG/ACT inhaler 2 puff, Q2H PRN  albuterol (PROVENTIL) nebulizer solution 2.5 mg, Q2H PRN  metoprolol (LOPRESSOR) injection 2.5 mg, Once  perflutren lipid microspheres (DEFINITY) injection 1.65 mg, ONCE PRN  diphenhydrAMINE (BENADRYL) tablet 25 mg, Q6H PRN  midodrine (PROAMATINE) tablet 10 mg, TID WC  Calcium Acetate (Phos Binder) CAPS 1,334 mg, TID WC  lidocaine PF 1 % injection 5 mL, Once  sodium chloride flush 0.9 % injection 5-40 mL, 2 times per day  sodium chloride flush 0.9 % injection 5-40 mL, PRN  0.9 % sodium chloride infusion, PRN  fentaNYL (SUBLIMAZE) injection 25 mcg, Q2H PRN  oxyCODONE-acetaminophen (PERCOCET) 5-325 MG per tablet 1 tablet, Q4H PRN  aspirin EC tablet 81 mg, Daily  budesonide-formoterol (SYMBICORT) 160-4.5 MCG/ACT inhaler 2 puff, BID  [Held by provider] furosemide (LASIX) tablet 40 mg, Daily  guaiFENesin (MUCINEX) extended release tablet 600 mg, BID  insulin glargine (LANTUS) injection vial 15 Units, Nightly  [Held by provider] lisinopril (PRINIVIL;ZESTRIL) tablet 5 mg, Daily  sennosides-docusate sodium (SENOKOT-S) 8.6-50 MG tablet 1 tablet, Daily  glucose (GLUTOSE) 40 % oral gel 15 g, PRN  dextrose 50 % IV solution, PRN  glucagon (rDNA) injection 1 mg, PRN  dextrose 5 % solution, PRN  ondansetron (ZOFRAN-ODT) disintegrating tablet 4 mg, Q8H PRN   Or  ondansetron (ZOFRAN) injection 4 mg, Q6H PRN  acetaminophen (TYLENOL) tablet 650 mg, Q6H PRN   Or  acetaminophen (TYLENOL) suppository 650 mg, Q6H PRN  magnesium hydroxide (MILK OF MAGNESIA) 400 MG/5ML suspension 30 mL, Daily PRN  atorvastatin (LIPITOR) tablet 40 mg, Nightly  nitroGLYCERIN (NITROSTAT) SL tablet 0.4 mg, Q5 Min PRN  insulin lispro (HUMALOG) injection vial 0-12 Units, TID WC  insulin lispro (HUMALOG) injection vial 0-6 Units, Nightly  [Held by provider] heparin (porcine) injection 5,000 Units, 3 times per day        Data:     Code Status:  Full Code    Family History   Problem Relation Age of Onset    Diabetes Other     Cancer Mother         BREAST    Heart Disease Father         CAD-MI    Diabetes Father     High Blood Pressure Father     Diabetes Maternal Grandfather     Diabetes Paternal Grandfather     Heart Disease Paternal Grandfather     High Blood Pressure Paternal Grandfather        Social History     Socioeconomic History    Marital status: Single     Spouse name: Not on file    Number of children: Not on file    Years of education: Not on file    Highest education level: Not on file   Occupational History    Not on file Tobacco Use    Smoking status: Former Smoker     Years: 7.00     Types: Cigarettes     Quit date: 2017     Years since quittin.4    Smokeless tobacco: Never Used    Tobacco comment: 1-2 cigs daily   Substance and Sexual Activity    Alcohol use: No    Drug use: No    Sexual activity: Not Currently     Partners: Male   Other Topics Concern    Not on file   Social History Narrative    Not on file     Social Determinants of Health     Financial Resource Strain:     Difficulty of Paying Living Expenses:    Food Insecurity:     Worried About Running Out of Food in the Last Year:     Ran Out of Food in the Last Year:    Transportation Needs:     Lack of Transportation (Medical):  Lack of Transportation (Non-Medical):    Physical Activity:     Days of Exercise per Week:     Minutes of Exercise per Session:    Stress:     Feeling of Stress :    Social Connections:     Frequency of Communication with Friends and Family:     Frequency of Social Gatherings with Friends and Family:     Attends Yazidism Services:     Active Member of Clubs or Organizations:     Attends Club or Organization Meetings:     Marital Status:    Intimate Partner Violence:     Fear of Current or Ex-Partner:     Emotionally Abused:     Physically Abused:     Sexually Abused:        Vitals:  BP 88/60   Pulse 106   Temp 97.4 °F (36.3 °C) (Temporal)   Resp 18   Ht 4' 11\" (1.499 m)   Wt 247 lb (112 kg)   LMP 2014 (Approximate)   SpO2 96%   BMI 49.89 kg/m²   Temp (24hrs), Av.6 °F (36.4 °C), Min:97 °F (36.1 °C), Max:98.5 °F (36.9 °C)    Recent Labs     21  1939 21  0734 21  0758 21  0823   POCGLU 91 53* 51* 85       I/O (24Hr):     Intake/Output Summary (Last 24 hours) at 2021 1008  Last data filed at 2021 1347  Gross per 24 hour   Intake 500 ml   Output 500 ml   Net 0 ml       Labs:      CBC:   Lab Results   Component Value Date    WBC 10.0 2021    RBC 2.52 08/25/2021    RBC 4.24 01/16/2012    HGB 8.1 08/26/2021    HCT 29.4 08/26/2021    .5 08/25/2021    MCH 32.9 08/25/2021    MCHC 29.0 08/25/2021    RDW 17.2 08/25/2021     08/25/2021     01/16/2012    MPV 10.1 08/25/2021     CBC with Differential:    Lab Results   Component Value Date    WBC 10.0 08/25/2021    RBC 2.52 08/25/2021    RBC 4.24 01/16/2012    HGB 8.1 08/26/2021    HCT 29.4 08/26/2021     08/25/2021     01/16/2012    .5 08/25/2021    MCH 32.9 08/25/2021    MCHC 29.0 08/25/2021    RDW 17.2 08/25/2021    NRBC 2 03/18/2016    LYMPHOPCT 17 08/25/2021    MONOPCT 10 08/25/2021    BASOPCT 1 08/25/2021    MONOSABS 0.97 08/25/2021    LYMPHSABS 1.65 08/25/2021    EOSABS 0.21 08/25/2021    BASOSABS 0.05 08/25/2021    DIFFTYPE NOT REPORTED 08/25/2021     Hemoglobin/Hematocrit:    Lab Results   Component Value Date    HGB 8.1 08/26/2021    HCT 29.4 08/26/2021     CMP:    Lab Results   Component Value Date     08/25/2021    K 5.0 08/25/2021    CL 95 08/25/2021    CO2 28 08/25/2021    BUN 30 08/25/2021    CREATININE 5.32 08/25/2021    GFRAA 10 08/25/2021    LABGLOM 8 08/25/2021    GLUCOSE 76 08/25/2021    GLUCOSE 132 01/16/2012    PROT 7.4 08/21/2021    LABALBU 3.7 08/21/2021    CALCIUM 8.5 08/25/2021    BILITOT 0.46 08/21/2021    ALKPHOS 121 08/21/2021    AST 13 08/21/2021    ALT 10 08/21/2021     BMP:    Lab Results   Component Value Date     08/25/2021    K 5.0 08/25/2021    CL 95 08/25/2021    CO2 28 08/25/2021    BUN 30 08/25/2021    LABALBU 3.7 08/21/2021    CREATININE 5.32 08/25/2021    CALCIUM 8.5 08/25/2021    GFRAA 10 08/25/2021    LABGLOM 8 08/25/2021    GLUCOSE 76 08/25/2021    GLUCOSE 132 01/16/2012     PT/INR:    Lab Results   Component Value Date    PROTIME 10.7 04/13/2017    INR 1.0 04/13/2017     PTT:    Lab Results   Component Value Date    APTT 23.5 03/16/2016   [APTT}    Physical Examination:        General appearance: alert, cooperative and no distress  Mental Status: oriented to person, place and time and normal affect  Abdomen: soft, nontender, nondistended, bowel sounds present    Assessment:        Primary Problem  Chest pain at rest     [facility] Problems    Diagnosis Date Noted    Type 2 diabetes mellitus with renal manifestations ([facility] [E11.29] 12/08/2015     Priority: Medium    Atrial flutter ([address] [I48.92] 08/24/2021    Chest pain [R07.9]     Absolute anemia [D64.9]     Chest pain at rest [R07.9] 08/20/2021    ESRD (end stage renal disease) on dialysis ([facility] [N18.6, Z99.2] 03/11/2016    Essential hypertension [I10] 03/11/2016    COPD exacerbation ([facility] [J44.1] 02/22/2016    Morbid obesity with BMI of 45.0-49.9, adult ([facility] [E66.01, Z68.42] 12/11/2015    Acute diastolic congestive heart failure ([facility] [I50.31]     Current smoker [F17.200] 10/03/2013     Past Medical History:   Diagnosis Date    Asthma     AS A CHILD    CHF (congestive heart failure) ([facility] 2000    Chronic kidney disease     COPD (chronic obstructive pulmonary disease) ([facility] 2000    INHALER USE AS NEEDED    Dialysis patient ([facility]     CENTRAL DIALYSIS MON, WEDS AND FRI, FLUID RESTRICTION 2L/24 HRS PER PT    Hemodialysis patient [facility])     had Dilcrystalis 8-17-16  [id number] @ [facility]    Hyperlipidemia     Hypertension 2000    ON RX    Obesity     YONI (obstructive sleep apnea) 2013    SLEEP STUDY -6/2016 AWAITING MACHINE, USING O2 AT NIGHT PRESENTLY    Pneumonia 2000    HAD TO BE ON VENTILATOR 12 DAYS    Renal failure     STARTED DIALYSIS 02/25/2016    Type II or unspecified type diabetes mellitus without mention of complication, not stated as uncontrolled 2000    IDDM    Ventilator dependence ([facility]     X 2 200 AND 2014. 2014 HAD TO GO ON VENT POST OP        Plan:           1. Anemia  1. Small amount BRBPR yesterday  2. FOBT positive x 2  3. Hgb stable  4. Trend H&H  5. Transfuse for hgb < 8, per cardiology reccs  6.  Plan for EGD/colonoscopy tomorrow. 7. Clear liquid diet today  8. Prep as ordered  9. NPO after midnight  2. Abdominal pain  1. Improved  2. Probiotics added  3.  PPI    Explained to the patient and d/W Nursing Staff  Will F/U with you  Please call or Page for any issues or change in status  Thanks    Electronically signed by VENTURA Beauchamp NP on 8/26/2021 at 10:08 AM

## 2021-08-27 PROBLEM — E16.2 HYPOGLYCEMIA: Status: ACTIVE | Noted: 2021-01-01

## 2021-08-27 NOTE — CARE COORDINATION
Discharge Planning    Confirmed with [facility] Williamson at [address] [place] [address] that pt is approved for home care. GI bleed lessening but colonoscopy 8-27. Hbg 8.2    Cardiac consult and no ischemic work up needed. Atrial flutter and will need anticoag when appropriate.

## 2021-08-27 NOTE — PROGRESS NOTES
cough, sputum reduction or other associated symptoms\"    Review of Systems:     Constitutional:  negative for chills, fevers, sweats  Respiratory:  negative for cough, dyspnea on exertion, shortness of breath, wheezing  Cardiovascular:  negative for chest pain, chest pressure/discomfort, lower extremity edema, palpitations  Gastrointestinal:  negative for abdominal pain, constipation, diarrhea, nausea, vomiting  Neurological:  negative for dizziness, headache    Medications: Allergies:     Allergies   Allergen Reactions    Ibuprofen     Tramadol Hives    Motrin [Ibuprofen Micronized] Itching    Strawberry Extract Itching and Rash    Tape Luiz [place] Tape] Rash     No paper tape silk only       Current Meds:   Scheduled Meds:    triamcinolone   Topical BID    polyethylene glycol  17 g Oral Daily    lactobacillus  1 tablet Oral Daily    dilTIAZem  120 mg Oral Daily    hydrocortisone   Rectal BID    pantoprazole  40 mg IntraVENous Daily    And    sodium chloride (PF)  10 mL IntraVENous Daily    metoprolol  2.5 mg IntraVENous Once    midodrine  10 mg Oral TID WC    Calcium Acetate (Phos Binder)  2 capsule Oral TID WC    lidocaine 1 % injection  5 mL Intradermal Once    sodium chloride flush  5-40 mL IntraVENous 2 times per day    aspirin  81 mg Oral Daily    budesonide-formoterol  2 puff Inhalation BID    [Held by provider] furosemide  40 mg Oral Daily    guaiFENesin  600 mg Oral BID    insulin glargine  15 Units Subcutaneous Nightly    [Held by provider] lisinopril  5 mg Oral Daily    sennosides-docusate sodium  1 tablet Oral Daily    atorvastatin  40 mg Oral Nightly    insulin lispro  0-12 Units Subcutaneous TID WC    insulin lispro  0-6 Units Subcutaneous Nightly    [Held by provider] heparin (porcine)  5,000 Units Subcutaneous 3 times per day     Continuous Infusions:    sodium chloride      dextrose       PRN Meds: albuterol sulfate HFA, albuterol, perflutren lipid microspheres, diphenhydrAMINE, sodium chloride flush, sodium chloride, fentanNYL, oxyCODONE-acetaminophen, glucose, dextrose, glucagon (rDNA), dextrose, ondansetron **OR** ondansetron, acetaminophen **OR** acetaminophen, magnesium hydroxide, nitroGLYCERIN    Data:     Past Medical History:   has a past medical history of Asthma, CHF (congestive heart failure) ([facility] Chronic kidney disease, COPD (chronic obstructive pulmonary disease) ([facility] Dialysis patient ([facility] Hemodialysis patient ([facility] Hyperlipidemia, Hypertension, Obesity, YONI (obstructive sleep apnea), Pneumonia, Renal failure, Type II or unspecified type diabetes mellitus without mention of complication, not stated as uncontrolled, and Ventilator dependence ([facility] Social History:   reports that she quit smoking about 4 years ago. Her smoking use included cigarettes. She quit after 7.00 years of use. She has never used smokeless tobacco. She reports that she does not drink alcohol and does not use drugs. Family History:   Family History   Problem Relation Age of Onset    Diabetes Other     Cancer Mother         BREAST    Heart Disease Father         CAD-MI    Diabetes Father     High Blood Pressure Father     Diabetes Maternal Grandfather     Diabetes Paternal Grandfather     Heart Disease Paternal Grandfather     High Blood Pressure Paternal Grandfather        Vitals:  /86   Pulse 90   Temp 97.8 °F (36.6 °C) (Temporal)   Resp 16   Ht 4' 11\" (1.499 m)   Wt 249 lb 1.9 oz (113 kg)   LMP 2014 (Approximate)   SpO2 94%   BMI 50.32 kg/m²   Temp (24hrs), Av.4 °F (36.3 °C), Min:97.2 °F (36.2 °C), Max:97.8 °F (36.6 °C)    Recent Labs     21  1637 21  1938 21  0744 21  1118   POCGLU 77 89 71 55*       I/O (24Hr):     Intake/Output Summary (Last 24 hours) at 2021 1142  Last data filed at 2021 0738  Gross per 24 hour   Intake 310 ml   Output --   Net 310 ml       Labs:  Hematology:  Recent Labs 08/25/21  0510 08/25/21  0835 08/26/21  0944 08/26/21  2124 08/27/21  0749   WBC 10.0  --   --   --   --    RBC 2.52*  --   --   --   --    HGB 8.3*   < > 8.1* 8.1* 8.2*   HCT 28.6*   < > 29.4* 28.7* 29.4*   .5*  --   --   --   --    MCH 32.9  --   --   --   --    MCHC 29.0  --   --   --   --    RDW 17.2*  --   --   --   --    *  --   --   --   --    MPV 10.1  --   --   --   --     < > = values in this interval not displayed. Chemistry:  Recent Labs     08/25/21  0835 08/27/21  0749    131*   K 5.0 4.8   CL 95* 94*   CO2 28 25   GLUCOSE 76 69*   BUN 30* 25*   CREATININE 5.32* 5.06*   ANIONGAP 12 12   LABGLOM 8* 9*   GFRAA 10* 11*   CALCIUM 8.5* 8.3*     Recent Labs     08/26/21  1137 08/26/21  1318 08/26/21  1637 08/26/21  1938 08/27/21  0744 08/27/21  1118   POCGLU 79 94 77 89 71 55*     ABG:  Lab Results   Component Value Date    POCPH 7.22 06/12/2016    POCPCO2 61 06/12/2016    POCPO2 68 06/12/2016    POCHCO3 24.8 06/12/2016    NBEA 3 06/12/2016    PBEA NOT REPORTED 06/12/2016    TLF8VNQ 27 06/12/2016    WFAX6FXU 88 06/12/2016    FIO2 NOT REPORTED 07/01/2019     Lab Results   Component Value Date/Time    SPECIAL NOT REPORTED 08/25/2021 05:09 AM     Lab Results   Component Value Date/Time    CULTURE NO GROWTH 2 DAYS 08/25/2021 05:09 AM       Radiology:  CT ABDOMEN PELVIS WO CONTRAST Additional Contrast? None    Addendum Date: 8/22/2021    ADDENDUM: Ventral hernia containing free fluid and possible loops of small bowel but without evidence of obstruction. Case discussed with the surgery resident at 6:15 p.m. Francisca Alvarado Follow-up exam with oral contrast may be helpful. Result Date: 8/22/2021  Mild ascites and subcutaneous edema. Moderate bilateral renal atrophy. Other incidental findings as above. Limited sensitivity without IV and oral contrast.     XR ACUTE ABD SERIES CHEST 1 VW    Result Date: 8/20/2021  Possible mild CHF. Bowel gas pattern nonspecific.      US GALLBLADDER RUQ    Result

## 2021-08-27 NOTE — PROGRESS NOTES
nc, clear liquids  Subjective   General  Chart Reviewed: Yes  Response To Previous Treatment: Patient with no complaints from previous session. Subjective  Subjective: Pt reports she is feeling so relaxed and sleepy because she had a pain pill and her anti itch medicine. General Comment  Comments: Pt scehduled for EGD today. Pain Screening  Patient Currently in Pain: No  Vital Signs  Patient Currently in Pain: No       Orientation  Orientation  Overall Orientation Status: Within Normal Limits  Cognition      Objective   Bed mobility  Bridging: Independent  Rolling to Right: Independent  Supine to Sit: Independent  Sit to Supine: Independent  Transfers  Sit to Stand: Stand by assistance  Stand to sit: Stand by assistance  Bed to Chair: Stand by assistance  Stand Pivot Transfers: Stand by assistance  Ambulation  Ambulation?: Yes  Ambulation 1  Surface: level tile  Device: Rolling Walker  Other Apparatus: O2 (3lpm)  Assistance: Contact guard assistance  Quality of Gait: gait safe; walker used to concern O2  Gait Deviations: Slow Susy;Decreased step length;Shuffles  Distance: 55 ft x 1 -> HR tachy w/ light activity - 102 to 134 bpm.  Comments: Hgb only 8.2  - monitored vitals throughout treatment     Balance  Posture: Good  Sitting - Static: Good  Sitting - Dynamic: Good  Standing - Static: Good  Standing - Dynamic: Good;-  Exercises  Comments: attempt sit to stand - 6 reps w/ HR limiting additional reps. Supine heel slides x 10 reps; deep breathing ex x 10 reps. Comment: Dangled bedside; repositioning; scooting ;rolling; vital sign monitoring -> pt symptomatic w/ tachy HR > 115. AM-PAC Score 19/24     Goals  Short term goals  Time Frame for Short term goals: 12 treatments  Short term goal 1: Independent transfers  Short term goal 2:  Independent ambulation 200' x 1 w/ SpO2 >93% without O2  Short term goal 3: Tolerate 30 min ther act  Patient Goals   Patient goals : Goal is to get her strength back    Plan    Plan  Times per week: 1-2x/day,5-6 days/week  Specific instructions for Next Treatment: monitor vitals; progress as tolerated.   Current Treatment Recommendations: Transfer Training, Gait Training, Endurance Training, Home Exercise Program, Safety Education & Training, Patient/Caregiver Education & Training, Positioning, Functional Mobility Training  Safety Devices  Type of devices: Call light within reach, Left in bed, All fall risk precautions in place  Restraints  Initially in place: No     Therapy Time   Individual Concurrent Group Co-treatment   Time In 1000         Time Out 1026         Minutes 26            LVADIMIR PRINGLE, PT

## 2021-08-27 NOTE — PROGRESS NOTES
Pts blood glucose was checked at 1115 and was 55. Pt was given 1/2 amp of IV dextrose. Blood glucose recheck was then 87. RN to continue to monitor patient closely.

## 2021-08-27 NOTE — PROGRESS NOTES
HEMODIALYSIS POST TREATMENT NOTE     Treatment time ordered: 3Hours    Actual treatment time: 3 Hours     UltraFiltration Goal: 1000  UltraFiltration Removed: 1000        Pre Treatment weight: 115.1kg  Post Treatment weight: 114.4 kg  Estimated Dry Weight: 106.5 kg per outpatient clinic documents     Access used:     Central Venous Catheter:           Tunneled or Non-tunneled:            Site:            Access Flow:        Internal Access:       AV Fistula or AV Graft: AVF          Site: PEREZ        Access Flow: Good        Sign and symptoms of infection: No       If YES:      Medications or blood products given: Midodrine 15 mg at start of treatment, Midodrine 10 mg mid treatment, and Albumin 25gm 25% intra treatment.      Chronic outpatient schedule: [facility]     Chronic outpatient unit: [address] [place] [address] [facility]     Summary of response to treatment: Patient tolerated treatment well. BP was labile and patient received Midodrine and Albumin for BP support. Although hypotensive throughout tx, patient reported being asymptomatic. No SOB reported.      Explain if orders NOT met, was physician notified:        ACES flowsheet faxed to patient unit/ placed in patient chart: Yes     Post assessment completed: Yes     Report given to:         * Intra-treatment documented Safety Checks include the followin) Access and face visible at all times.     2) All connections and blood lines are secure with no kinks.     3) NVL alarm engaged.     4) Hemosafe device applied (if applicable).    5) No collapse of Arterial or Venous blood chambers.     6) All blood lines / pump segments in the air detectors.

## 2021-08-27 NOTE — PROGRESS NOTES
Pt off unit for EGD/colonoscopy at this time. Will return to room 2034 following procedure. Pt is expected to have dialysis this afternoon as well.

## 2021-08-27 NOTE — PROGRESS NOTES
Patient had an upper endoscopy done    Has some drop in saturations    She has history for prolonged intubation after conscious anesthesia and anesthesia in the past    Anesthesiologist team thinks that she is not stable for colonoscopy at this time because of her poor cardio- pulmonary status    We will postpone the colonoscopy at this time until she is clinically stable to have it done

## 2021-08-27 NOTE — ANESTHESIA POSTPROCEDURE EVALUATION
Department of Anesthesiology  Postprocedure Note    Patient: Haylee Dickerson  MRN: [id number]  YOB: 1970  Date of evaluation: 8/27/2021  Time:  5:10 PM     Procedure Summary     Date: 08/27/21 Room / Location: [facility] [address] [address]    Anesthesia Start: 6962 Anesthesia Stop: 6497    Procedure: EGD BIOPSY (N/A ) Diagnosis: (BLEED)    Surgeons: Melany Covington MD Responsible Provider: Gisell Lai MD    Anesthesia Type: general, MAC ASA Status: 4          Anesthesia Type: general, MAC    Kindra Phase I: Kindra Score: 9    Kindra Phase II:      Last vitals: Reviewed and per EMR flowsheets.        Anesthesia Post Evaluation    Patient location during evaluation: PACU  Patient participation: complete - patient participated  Level of consciousness: awake  Airway patency: patent  Nausea & Vomiting: no nausea  Complications: no  Cardiovascular status: blood pressure returned to baseline  Respiratory status: acceptable  Hydration status: euvolemic

## 2021-08-27 NOTE — PROGRESS NOTES
[facility]  Progress Note                   Date:   8/27/2021  Patient name: Cami Cason  Date of admission:  8/20/2021  2:57 PM  MRN:   [id number]  YOB: 1970  PCP: Wendy Yee MD    Reason for Admission: Chest pain [R07.9]  Chest pain, unspecified type [R07.9]    Subjective:       Clinical Changes /Abnormalities:  Patient seen and examined in room lying quietly in bed. No acute CV issues/concerns overnight. She denies any CP or SOB. Labs, vitals, & tele reviewed.   Plans for endoscopy today     Review of Systems    Medications:   Scheduled Meds:   triamcinolone   Topical BID    polyethylene glycol  17 g Oral Daily    lactobacillus  1 tablet Oral Daily    dilTIAZem  120 mg Oral Daily    hydrocortisone   Rectal BID    pantoprazole  40 mg IntraVENous Daily    And    sodium chloride (PF)  10 mL IntraVENous Daily    metoprolol  2.5 mg IntraVENous Once    midodrine  10 mg Oral TID WC    Calcium Acetate (Phos Binder)  2 capsule Oral TID WC    lidocaine 1 % injection  5 mL Intradermal Once    sodium chloride flush  5-40 mL IntraVENous 2 times per day    aspirin  81 mg Oral Daily    budesonide-formoterol  2 puff Inhalation BID    [Held by provider] furosemide  40 mg Oral Daily    guaiFENesin  600 mg Oral BID    insulin glargine  15 Units Subcutaneous Nightly    [Held by provider] lisinopril  5 mg Oral Daily    sennosides-docusate sodium  1 tablet Oral Daily    atorvastatin  40 mg Oral Nightly    insulin lispro  0-12 Units Subcutaneous TID WC    insulin lispro  0-6 Units Subcutaneous Nightly    [Held by provider] heparin (porcine)  5,000 Units Subcutaneous 3 times per day     Continuous Infusions:   sodium chloride      dextrose       CBC:   Recent Labs     08/25/21  0510 08/25/21  0835 08/26/21  0944 08/26/21  2124 08/27/21  0749   WBC 10.0  --   --   --   --    HGB 8.3*   < > 8.1* 8.1* 8.2*   *  --   --   --   --     < > = values in this interval not displayed. BMP:    Recent Labs     08/25/21  0835 08/27/21  0749    131*   K 5.0 4.8   CL 95* 94*   CO2 28 25   BUN 30* 25*   CREATININE 5.32* 5.06*   GLUCOSE 76 69*     Hepatic:No results for input(s): AST, ALT, ALB, BILITOT, ALKPHOS in the last 72 hours. Troponin: No results for input(s): TROPHS in the last 72 hours. BNP: No results for input(s): BNP in the last 72 hours. Lipids: No results for input(s): CHOL, HDL in the last 72 hours. Invalid input(s): LDLCALCU  INR: No results for input(s): INR in the last 72 hours. DIAGNOSTIC DATA    EKG: Initial EKG showed normal sinus rhythm repeat EKG today showed atrial flutter with controlled ventricular response    ECHO 8/23/2021  Mild left ventricular hypertrophy  Global left ventricular systolic function is normal  Estimated ejection fraction is 65 % . Right atrium is severely dilated . Right ventricular with severe dilatation with severely reduced systolic  function. Severe tricuspid regurgitation. Moderate to severe pulmonary hypertension. No pericardial effusion seen. Normal aortic root dimension. Echocardiogram 7/2/20/2019:  Left ventricle is mildly dilated with normal wall thickness. Overall systolic function appears mildly reduced, with an estimated EF 45%. Grade II (moderate) left ventricular diastolic dysfunction. Left atrium is moderately dilated. Mitral valve sclerosis without stenosis. Mild to moderate mitral regurgitation. Moderate tricuspid regurgitation. Estimated right ventricular systolic pressure is 87.8 mmHg, suggests mild  pulmonary HTN. Trivial pulmonic insufficiency. Trivial pericardial effusion. CATH 3/15/2016  Angiographic Findings      Cardiac Arteries and Lesion Findings     LMCA: Normal 0% stenosis.     LAD: Mild irregularities 10-20%.     LCx: Normal 0% stenosis.     RCA: Mild irregularities 20-30%.       Coronary Tree      Dominance: Right     LV Analysis  LV function assessed as:Normal.  Ejection Fraction  +----------------------------------------------------------------------+---+  ! Method                                                                ! EF%! +----------------------------------------------------------------------+---+  ! LV gram                                                               !50 !  +----------------------------------------------------------------------+---+       Objective:   Vitals: /70   Pulse 99   Temp 97.9 °F (36.6 °C) (Temporal)   Resp 16   Ht 4' 11\" (1.499 m)   Wt 249 lb 1.9 oz (113 kg)   LMP 11/12/2014 (Approximate)   SpO2 97%   BMI 50.32 kg/m²   General appearance: alert and cooperative with exam  HEENT: Head: Normocephalic, no lesions, without obvious abnormality. Neck:no JVD, trachea midline, no adenopathy  Lungs: Clear to auscultation, dim bases  Heart: Irregular rate and rhythm, s1/s2 auscultated, no murmurs. Afib 90-100s  Abdomen: soft, non-tender, bowel sounds active  Extremities: no edema  Neurologic: not done        Assessment / Acute Cardiac Problems:   1. Atypical chest pain   2. New onset A Flutter with variable AV Block  3. Mild LV dysfunction on ECHO 2019 LVEF 45%  4. Minimal CAD on cath 2016  5. HTN  6. HLD  7. CKD on HD  8. Morbid obesity  9.  Pulmonary HTN    Patient Active Problem List:     Asthma     YONI (obstructive sleep apnea)     Left knee pain     Hidradenitis     Current smoker     Microalbuminuria     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 kidney disease) stage 4, GFR 15-29 ml/min (HCC)     Acute diastolic congestive heart failure (HCC)     Hyperkalemia     Acute systolic congestive heart failure (HCC)     Pulmonary hypertension (HCC)     Morbid obesity with BMI of 45.0-49.9, adult ([facility]     Acute on chronic respiratory failure with hypoxia (HCC)     COPD exacerbation (HCC)     Asthma exacerbation     Type 2 diabetes mellitus with diabetic nephropathy (HCC)     ESRD (end stage renal disease) on dialysis [facility])     Bronchitis     Essential hypertension     YONI on CPAP     Hyperglycemia, drug-induced     Needs smoking cessation education     Hyperglycemia     Drowsiness     Acute on chronic respiratory failure with hypercapnia ([address]     Mobility impaired     S/P cardiac cath-mINIMAL caD 3/15/16 - dR. 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)     Type 2 diabetes mellitus without complication (HCC)     Congestive heart failure (HCC)     Asthma with COPD with exacerbation (HCC)     Acute exacerbation of CHF (congestive heart failure) (HCC)     Chronic obstructive pulmonary disease (HCC)     Chronic kidney disease, stage V (HCC)     Acute respiratory acidosis     Precordial pain     Gastroesophageal reflux disease without esophagitis     Pulmonary HTN (HCC)     Morbid obesity due to excess calories (HCC)     Symptomatic anemia     Elevated serum creatinine     ESRD needing dialysis (HCC)     Chest pain at rest     Absolute anemia     Atrial flutter (HCC)     Chest pain  [id number] Score for Atrial Fibrillation Stroke Risk   Risk   Factors  Component Value   C CHF Yes 1   H HTN Yes 1   A2 Age >= 76 No,  (54 y.o.) 0   D DM Yes 1   S2 Prior Stroke/TIA No 0   V Vascular Disease No 0   A Age 74-69 No,  (54 y.o.) 0   Sc Sex female 1    [id number]  Score  4   Score last updated 8/25/21 5:83 PM EDT    Click here for a link to the UpToDate guideline \"Atrial Fibrillation: Anticoagulation therapy to prevent embolization    Disclaimer: Risk Score calculation is dependent on accuracy of patient problem list and past encounter diagnosis. Plan of Treatment:   1. Atypical chest pain. No plans for ischemic work up per Dr Raymundo Griffith consult. Negative cath 2016. Continue ASA, statin. 2. HFpEF.  ECHO reviewed  LVEF improved to 65% from previous ECHO 2019. Fluid management per nephrology  Appreciate recs. 3. A Flutter rate controlled .   Continue Cardizem for rate control but has been held for hypotension and today d/t being NPO. 4. Recommend anticoagulation for atrial flutter when cleared by GI       5. Hypotension. Continue Midodrine 10 mg TID.  today  6. Anemia- GI workup pending with endoscopy today.   Discussed with Dr. Susy Nguyen, low risk from cardiology standpoint for GI workup         Electronically signed by VENTURA Figueroa CNP on 8/27/2021 at 12:26 PM  [address]  [phone]

## 2021-08-27 NOTE — PROGRESS NOTES
Dr. Shamika Santoyo notified of pt BS in PACU of 65. Pt to receive 1/2am dextrose IV per Dr. Fer An orders.

## 2021-08-27 NOTE — OP NOTE
PROCEDURE NOTE    DATE OF PROCEDURE: 8/27/2021     SURGEON: Akanksha Maldonado MD    ASSISTANT: None    PREOPERATIVE DIAGNOSIS: ANEMIA  OB POSITIVE    POSTOPERATIVE DIAGNOSIS: As described below    OPERATION: Upper GI endoscopy with Biopsy    ANESTHESIA: MAC PER ANESTHESIA     ESTIMATED BLOOD LOSS: Less than 50 ml    COMPLICATIONS: None. SPECIMENS:  Was Obtained:     HISTORY: The patient is a 46y.o. year old female with history of above preop diagnosis. I recommended esophagogastroduodenoscopy with possible biopsy and I explained the risk, benefits, expected outcome, and alternatives to the procedure. Risks included but are not limited to bleeding, infection, respiratory distress, hypotension, and perforation of the esophagus, stomach, or duodenum. Patient understands and is in agreement. PROCEDURE: The patient was given IV conscious sedation. The patient's SPO2 remained above 90% throughout the procedure. The gastroscope was inserted orally and advanced under direct vision through the esophagus, through the stomach, through the pylorus, and into the descending duodenum. Findings:    Retropharyngeal area was grossly normal appearing    Esophagus: abnormal: TERTIARY CONTRACTIONS  TWO CM HIATAL HERNIA     Stomach:    Fundus: abnormal: EROSIONS AT CARDIA NO BLEEDING    Body: normal    Antrum: abnormal: EROSIVE GASTRITIS    Duodenum:     Descending: normal    Bulb: abnormal: MULTIPLE PROMINENT ERYTHEMATOUS DUODENAL GLAND POLYPS BIOPSIES AND PICTURES WERE TAKEN    The scope was removed and the patient tolerated the procedure well. Recommendations/Plan:   1. F/U Biopsies  2. F/U In Office in 3-4 weeks  3. Discussed with the family  4.  Post sedation patient was stable with stable vital signs and stable O2 saturations    Electronically signed by Akanksha Maldonado MD  on 8/27/2021 at 3:48 PM

## 2021-08-27 NOTE — ANESTHESIA PRE PROCEDURE
Department of Anesthesiology  Preprocedure Note       Name:  Chyna Arboleda   Age:  46 y.o.  :  1970                                          MRN:  [id number]         Date:  2021      Surgeon: Deangelo Ramos):  Marcos Caputo MD    Procedure: Procedure(s):  EGD DIAGNOSTIC ONLY  COLONOSCOPY DIAGNOSTIC    Medications prior to admission:   Prior to Admission medications    Medication Sig Start Date End Date Taking?  Authorizing Provider   nitroGLYCERIN (NITROSTAT) 0.4 MG SL tablet PLACE 1 TABLET UNDER THE TONGUE EVERY 5 MINUTES AS NEEDED FOR CHEST PAIN**IF NO RELIEF AFTER 3 DOSES CALL 911 OR DR** 21  Yes Maddie Esparza MD   Calcium Acetate, Phos Binder, (PHOSLO) 667 MG CAPS Take 2 capsules by mouth as needed (snacks)    Yes Historical Provider, MD   calcitRIOL (ROCALTROL) 0.25 MCG capsule Take 0.25 mcg by mouth three times a week   Yes Historical Provider, MD   Methoxy PEG-Epoetin Beta (MIRCERA IJ) Inject 225 mcg as directed every 14 days   Yes Historical Provider, MD   cinacalcet (SENSIPAR) 90 MG tablet Take 90 mg by mouth three times a week Indications: after dialysis   Yes Historical Provider, MD   sennosides-docusate sodium (SENOKOT-S) 8.6-50 MG tablet Take 1 tablet by mouth daily as needed for Constipation   Yes Historical Provider, MD   diphenhydrAMINE (DIPHEN) 25 MG tablet Take 25 mg by mouth 2 times daily as needed for Itching  21 Yes Historical Provider, MD   midodrine (PROAMATINE) 5 MG tablet Take 5 mg by mouth as needed (low BP at dialysis)  21  Yes Historical Provider, MD   polyethylene glycol (GLYCOLAX) 17 GM/SCOOP powder Take 17 g by mouth daily as needed  21  Yes Historical Provider, MD   Multiple Vitamins-Minerals (RENAPLEX-D) TABS Take 1 tablet by mouth daily  21  Yes Historical Provider, MD   lisinopril (PRINIVIL;ZESTRIL) 5 MG tablet TAKE 1 TABLET BY MOUTH DAILY 21  Yes Sil Power MD   atorvastatin (LIPITOR) 10 MG tablet Take 1 tablet by mouth nightly 6/30/21  Yes Radha Pathak MD   aspirin 81 MG EC tablet TAKE 1 TABLET BY MOUTH DAILY 6/6/21 10/4/21 Yes Dre Solis MD   fluticasone-salmeterol (ADVAIR) 250-50 MCG/DOSE AEPB INHALE 1 PUFF BY MOUTH INTO THE LUNGS TWICE DAILY **RINSE MOUTH AFTER EACH USE** 6/6/21  Yes Dre Solis MD   furosemide (LASIX) 40 MG tablet Take 1 tablet by mouth daily 5/18/21  Yes Vita Joseph MD   dilTIAZem (CARDIZEM CD) 180 MG extended release capsule TAKE 1 CAPSULE BY MOUTH DAILY 5/11/21  Yes Elisha Fuchs MD   albuterol (PROVENTIL) (2.5 MG/3ML) 0.083% nebulizer solution INHALE THE CONTENTS OF ONE VIAL VIA NEBULIZER EVERY 6 HOURS AS NEEDED FOR SHORTNESS OF BREATH OR WHEEZING 5/11/21  Yes Elisha Fuchs MD   guaiFENesin (MUCINEX) 600 MG extended release tablet Take 1 tablet by mouth 2 times daily 5/11/21  Yes Maddie Love MD   LANTUS SOLOSTAR 100 UNIT/ML injection pen INJECT 15 UNITS SUBCUTANEOUSLY DAILY AT NIGHT 12/31/20  Yes Jesse Reina MD   albuterol sulfate HFA (PROAIR HFA) 108 (90 Base) MCG/ACT inhaler Inhale 2 puffs into the lungs every 6 hours as needed for Wheezing 8/24/21   Maddie Christopher MD   Calcium Acetate, Phos Binder, 667 MG CAPS Take 2 capsules by mouth 3 times daily (with meals)  8/6/21   Historical Provider, MD   Easy Comfort Lancets MISC USE TO TEST BLOOD SUGAR TWICE DAILY AS DIRECTED 3/15/21   Vita Joseph MD   blood glucose test strips (ONETOUCH ULTRA) strip USE TO TEST BLOOD SUGAR TWICE DAILY AS DIRECTED 1/26/21   Vita Joseph MD   Insulin Pen Needle (EASY COMFORT PEN NEEDLES) 31G X 5 MM MISC USE TO INJECT INSULIN ONCE DAILY 12/1/20   Amanda Arreaga MD   Alcohol Swabs (ALCOHOL PADS) 70 % PADS USE TO CLEAN TESTING AND INJECTION SITES TWICE DAILY 12/1/20   Amanda Arreaga MD   Skin Protectants, Misc.  (MINERIN CREME) CREA APPLY TO AFFECTED AREA TOPICALLY AS NEEDED  Patient taking differently: every other day APPLY TO AFFECTED AREA TOPICALLY AS NEEDED 6/24/20   Vita Joseph MD   Blood Glucose Monitoring Suppl (TRUE METRIX METER) w/Device KIT USE TO TEST BLOOD GLUCOSE 3/17/20   Chava Vergara MD   Lancets (BD LANCET ULTRAFINE 30G) MISC TEST TWICE DAILY 3/17/20   Chava Vergara MD   Lancet Devices (SIMPLE DIAGNOSTICS LANCING DEV) MISC USE WITH LANCETS TO TEST BLOOD GLUCOSE 3/17/20   Chava Vergara MD   Blood Glucose Calibration (RIGOBERTO DOC CONTROL) Normal SOLN USE TO PERIODICALLY CHECK GLUCOSE MONITOR ACCORDING TO THE MANUAL 3/17/20   Chava Vergara MD   UNIFINE PENTIPS 31G X 6 MM MISC USE WITH insulin pens ONCE daily 9/19/19   Chava Vergara MD   PHARMACIST CHOICE LANCETS MISC USE TO TEST BLOOD GLUCOSE ONCE A DAY 7/12/19   Reno Moon MD   Misc.  Devices MISC 1 each by Does not apply route daily Electric scooter 8/15/17   Reno Moon MD   glucose monitoring kit (FREESTYLE) monitoring kit 1 kit by Does not apply route daily 5/23/17   Reno Moon MD   OXYGEN Inhale into the lungs O2  3L  PER NASAL CANNULA NIGHTLY    Historical Provider, MD       Current medications:    Current Facility-Administered Medications   Medication Dose Route Frequency Provider Last Rate Last Admin    triamcinolone (KENALOG) 0.1 % cream   Topical BID Reginaldo GARCIA Blood, DO   Given at 08/27/21 0737    polyethylene glycol (GLYCOLAX) packet 17 g  17 g Oral Daily Aleatha Hilda, APRN - NP   17 g at 08/27/21 4490    lactobacillus (BACID) tablet 1 tablet  1 tablet Oral Daily Aleatha NARA StevensN - NP   1 tablet at 08/26/21 0913    dilTIAZem (CARDIZEM CD) extended release capsule 120 mg  120 mg Oral Daily Tu Arce APRN - NP        hydrocortisone (ANUSOL-HC) 2.5 % rectal cream   Rectal BID Reginaldo P Blood, DO   Given at 08/27/21 0916    pantoprazole (PROTONIX) injection 40 mg  40 mg IntraVENous Daily Elida Median Orlop, DO   40 mg at 08/26/21 0913    And    sodium chloride (PF) 0.9 % injection 10 mL  10 mL IntraVENous Daily Elida Median Orlop, DO   10 mL at 08/26/21 0946    albuterol sulfate  (90 Base) MCG/ACT inhaler 2 puff  2 puff Inhalation Q2H PRN Jerald Heather Orlop, DO   2 puff at 08/23/21 1128    albuterol (PROVENTIL) nebulizer solution 2.5 mg  2.5 mg Nebulization Q2H PRN Jerald Heather Orlop, DO   2.5 mg at 08/23/21 1339    metoprolol (LOPRESSOR) injection 2.5 mg  2.5 mg IntraVENous Once Rosaleen Joycelyn, DO        perflutren lipid microspheres (DEFINITY) injection 1.65 mg  1.5 mL IntraVENous ONCE PRN Jerald Heather Orlop, DO        diphenhydrAMINE (BENADRYL) tablet 25 mg  25 mg Oral Q6H PRN Jerald Heather Orlop, DO   25 mg at 08/26/21 2051    midodrine (PROAMATINE) tablet 10 mg  10 mg Oral TID  Deja Hand MD   10 mg at 08/27/21 1155    Calcium Acetate (Phos Binder) CAPS 1,334 mg  2 capsule Oral TID  Deja Hand MD   1,334 mg at 08/26/21 1704    lidocaine PF 1 % injection 5 mL  5 mL Intradermal Once VENTURA Terry CNP        sodium chloride flush 0.9 % injection 5-40 mL  5-40 mL IntraVENous 2 times per day VENTURA Terry CNP   10 mL at 08/27/21 0738    sodium chloride flush 0.9 % injection 5-40 mL  5-40 mL IntraVENous PRN VENTURA Terry CNP        0.9 % sodium chloride infusion  25 mL IntraVENous PRN VENTURA Terry CNP        fentaNYL (SUBLIMAZE) injection 25 mcg  25 mcg IntraVENous Q2H PRN Jerald Heather Orlop, DO   25 mcg at 08/21/21 1055    oxyCODONE-acetaminophen (PERCOCET) 5-325 MG per tablet 1 tablet  1 tablet Oral Q4H PRN Jerald Heather Orlop, DO   1 tablet at 08/25/21 2216    aspirin EC tablet 81 mg  81 mg Oral Daily VENTURA Menjivar CNP   81 mg at 08/25/21 0853    budesonide-formoterol (SYMBICORT) 160-4.5 MCG/ACT inhaler 2 puff  2 puff Inhalation BID VENTURA Menjivar CNP   2 puff at 08/26/21 1952    [Held by provider] furosemide (LASIX) tablet 40 mg  40 mg Oral Daily VENTURA Menjivar CNP   40 mg at 08/23/21 0810    guaiFENesin (MUCINEX) extended release tablet 600 mg  600 mg Oral BID Huey Ogden, APRN - CNP   600 mg at 08/24/21 1956    insulin glargine (LANTUS) injection vial 15 Units  15 Units Subcutaneous Nightly Anamaria Grammes, APRN - CNP   15 Units at 08/22/21 2103    [Held by provider] lisinopril (PRINIVIL;ZESTRIL) tablet 5 mg  5 mg Oral Daily Pasha Cornelius DO        sennosides-docusate sodium (SENOKOT-S) 8.6-50 MG tablet 1 tablet  1 tablet Oral Daily Anamaria Grammes, APRN - CNP   1 tablet at 08/27/21 0738    glucose (GLUTOSE) 40 % oral gel 15 g  15 g Oral PRN Anamaria Grammes, APRN - CNP   15 g at 08/26/21 0741    dextrose 50 % IV solution  12.5 g IntraVENous PRN Anamaria Grammes, APRN - CNP   12.5 g at 08/27/21 1125    glucagon (rDNA) injection 1 mg  1 mg IntraMUSCular PRN Anamaria Grammes, APRN - CNP        dextrose 5 % solution  100 mL/hr IntraVENous PRN Anamaria Grammes, APRN - CNP        ondansetron (ZOFRAN-ODT) disintegrating tablet 4 mg  4 mg Oral Q8H PRN Anamaria Grammes, APRN - CNP   4 mg at 08/22/21 1634    Or    ondansetron (ZOFRAN) injection 4 mg  4 mg IntraVENous Q6H PRN Anamaria Grammes, APRN - CNP   4 mg at 08/23/21 0846    acetaminophen (TYLENOL) tablet 650 mg  650 mg Oral Q6H PRN Anamaria Grammes, APRN - CNP        Or    acetaminophen (TYLENOL) suppository 650 mg  650 mg Rectal Q6H PRN Anamaria Grammes, APRN - CNP        magnesium hydroxide (MILK OF MAGNESIA) 400 MG/5ML suspension 30 mL  30 mL Oral Daily PRN Anamaria Grammes, APRN - CNP   30 mL at 08/24/21 0419    atorvastatin (LIPITOR) tablet 40 mg  40 mg Oral Nightly Anamaria Grammes, APRN - CNP   40 mg at 08/26/21 2045    nitroGLYCERIN (NITROSTAT) SL tablet 0.4 mg  0.4 mg Sublingual Q5 Min PRN Anamaria Grammes, APRN - CNP        insulin lispro (HUMALOG) injection vial 0-12 Units  0-12 Units Subcutaneous TID WC Anamaria Grammes, APRN - CNP        insulin lispro (HUMALOG) injection vial 0-6 Units  0-6 Units Subcutaneous Nightly Hiwot Nettles, APRN - CNP        [Held by provider] heparin (porcine) injection 5,000 Units  5,000 Units Subcutaneous 3 times per day Hiwot Nettles, APRN - CNP   5,000 Units at 08/22/21 1016       Allergies: Allergies   Allergen Reactions    Ibuprofen     Tramadol Hives    Motrin [Ibuprofen Micronized] Itching    Strawberry Extract Itching and Rash    Tape [Adhesive Tape] Rash     No paper tape silk only       Problem List:    Patient Active Problem List   Diagnosis Code    Asthma J45.909    YONI (obstructive sleep apnea) G47.33    Left knee pain M25.562    Hidradenitis L73.2    Current smoker F17.200    Microalbuminuria R80.9    Type 2 diabetes mellitus with renal manifestations ([facility]) E11.29    CKD (chronic kidney disease) stage 4, GFR 15-29 ml/min ([facility]) N18.4    Acute diastolic congestive heart failure ([facility]) I50.31    Hyperkalemia U86.2    Acute systolic congestive heart failure ([facility]) I50.21    Pulmonary hypertension ([facility]) I27.20    Morbid obesity with BMI of 45.0-49.9, adult ([facility]) E66.01, Z68.42    Acute on chronic respiratory failure with hypoxia ([facility]) J96.21    COPD exacerbation ([facility]) J44.1    Asthma exacerbation J45. 0    Type 2 diabetes mellitus with diabetic nephropathy ([facility]) E11.21    ESRD (end stage renal disease) on dialysis ([facility]) N18.6, Z99.2    Bronchitis J40    Essential hypertension I10    YONI on CPAP G47.33, Z99.89    Hyperglycemia, drug-induced R73.9, T50.905A    Needs smoking cessation education F17.200    Hyperglycemia R73.9    Drowsiness R40.0    Acute on chronic respiratory failure with hypercapnia ([facility]) J96.22    Mobility impaired Z74.09    S/P cardiac cath-mINIMAL caD 3/15/16 - dR. Gertha Babinski Q46.282    Type 2 diabetes mellitus with chronic kidney disease on chronic dialysis ([facility]) E11.22, N18.6, Z99.2    Type 2 diabetes mellitus without complication ([facility]) R89.1    Congestive heart failure ([facility]) I50.9    Ast[facility] with COPD with exacerbation [facility] J44.1, J45.901    Acute exacerbation of CHF (congestive heart failure) ([facility]) I50.9    Chronic obstructive pulmonary disease ([facility]) J44.9    Chronic kidney disease, stage V ([facility]) N18.5    Acute respiratory acidosis E87.2    Precordial pain R07.2    Gastroesophageal reflux disease without esophagitis K21.9    Pulmonary HTN ([facility]) I27.20    Morbid obesity due to excess calories ([facility]) E66.01    Symptomatic anemia D64.9    Elevated serum creatinine R79.89    ESRD needing dialysis ([facility] N18.6, Z99.2    Chest pain at rest R07.9    Absolute anemia D64.9    Atrial flutter ([facility]) I48.92    Chest pain R07.9    Hypoglycemia E16.2       Past Medical History:        Diagnosis Date    Asthma     AS A CHILD    CHF (congestive heart failure) ([facility])     Chronic kidney disease     COPD (chronic obstructive pulmonary disease) ([facility]     INHALER USE AS NEEDED    Dialysis patient ([facility]     CENTRAL DIALYSIS MON,  AND FRI, FLUID RESTRICTION 2L/24 HRS PER PT    Hemodialysis patient [facility])     had Dilaysis -  M[facility]W-F @ [facility]    Hyperlipidemia     Hypertension     ON RX    Obesity     YONI (obstructive sleep apnea)     SLEEP STUDY -2016 AWAITING MACHINE, USING O2 AT NIGHT PRESENTLY    Pneumonia     HAD TO BE ON VENTILATOR 12 DAYS    Renal failure     STARTED DIALYSIS 2016    Type II or unspecified type diabetes mellitus without mention of complication, not stated as uncontrolled     IDDM    Ventilator dependence ([facility]     X 2 200 AND 2014.   HAD TO GO ON VENT POST OP       Past Surgical History:        Procedure Laterality Date    [address]    x3    HYSTERECTOMY  2014    TOTAL    SKIN FULL GRAFT  1983    CHEST-BIRTH YOU AND MOLES REMOVED       Social History:    Social History     Tobacco Use    Smoking status: Former Smoker     Years: 7.00     Types: Cigarettes     Quit date: 2017     Years since quittin.4    Smokeless tobacco: Never Used    Tobacco comment: 1-2 cigs daily   Substance Use Topics    Alcohol use: No                                Counseling given: Not Answered  Comment: 1-2 cigs daily      Vital Signs (Current):   Vitals:    08/27/21 0400 08/27/21 0734 08/27/21 0800 08/27/21 1200   BP:  114/86  113/70   Pulse:  110 90 99   Resp:  16  16   Temp: 97.5 °F (36.4 °C) 97.8 °F (36.6 °C)  97.9 °F (36.6 °C)   TempSrc: Tympanic Temporal  Temporal   SpO2:  94%  97%   Weight: 249 lb 1.9 oz (113 kg)      Height:                                                  BP Readings from Last 3 Encounters:   08/27/21 113/70   05/11/21 124/71   05/27/20 130/80       NPO Status:                                                                                 BMI:   Wt Readings from Last 3 Encounters:   08/27/21 249 lb 1.9 oz (113 kg)   05/11/21 241 lb (109.3 kg)   05/27/20 237 lb (107.5 kg)     Body mass index is 50.32 kg/m².     CBC:   Lab Results   Component Value Date    WBC 10.0 08/25/2021    RBC 2.52 08/25/2021    RBC 4.24 01/16/2012    HGB 8.2 08/27/2021    HCT 29.4 08/27/2021    .5 08/25/2021    RDW 17.2 08/25/2021     08/25/2021     01/16/2012       CMP:   Lab Results   Component Value Date     08/27/2021    K 4.8 08/27/2021    CL 94 08/27/2021    CO2 25 08/27/2021    BUN 25 08/27/2021    CREATININE 5.06 08/27/2021    GFRAA 11 08/27/2021    LABGLOM 9 08/27/2021    GLUCOSE 69 08/27/2021    GLUCOSE 132 01/16/2012    PROT 7.4 08/21/2021    CALCIUM 8.3 08/27/2021    BILITOT 0.46 08/21/2021    ALKPHOS 121 08/21/2021    AST 13 08/21/2021    ALT 10 08/21/2021       POC Tests:   Recent Labs     08/27/21  1153   POCGLU 87       Coags:   Lab Results   Component Value Date    PROTIME 10.7 04/13/2017    INR 1.0 04/13/2017    APTT 23.5 03/16/2016       HCG (If Applicable): No results found for: PREGTESTUR, PREGSERUM, HCG, HCGQUANT     ABGs: No results found for: PHART, PO2ART, JTW0NCJ, DGU3HIP, BEART, N1DSZWJI Type & Screen (If Applicable):  No results found for: LABABO, LABRH    Drug/Infectious Status (If Applicable):  No results found for: HIV, HEPCAB    COVID-19 Screening (If Applicable):   Lab Results   Component Value Date    COVID19 Not Detected 08/23/2021           Anesthesia Evaluation   history of anesthetic complications (delayed emergence): Airway: Mallampati: II        Dental:          Pulmonary:normal exam    (+) COPD (home O2, ): severe,  shortness of breath:  sleep apnea: on CPAP,  asthma:     (-) not a current smoker                           Cardiovascular:  Exercise tolerance: poor (<4 METS),   (+) hypertension:, valvular problems/murmurs:, angina (chronic, has had for 2 + years): with exertion, dysrhythmias: atrial flutter, CHF:, RUIZ:, pulmonary hypertension: severe,           Rate: normal                 ROS comment: 2019 Summary  Left ventricle is mildly dilated with normal wall thickness. Overall systolic function appears mildly reduced, with an estimated EF 45%. Grade II (moderate) left ventricular diastolic dysfunction. Left atrium is moderately dilated. Mitral valve sclerosis without stenosis. Mild to moderate mitral regurgitation. Moderate tricuspid regurgitation. Estimated right ventricular systolic pressure is 43.3 mmHg, suggests mild  pulmonary HTN. Trivial pulmonic insufficiency. Trivial pericardial effusion. ECHO 8/23/2021  Mild left ventricular hypertrophy  Global left ventricular systolic function is normal  Estimated ejection fraction is 65 % . Right atrium is severely dilated . Right ventricular with severe dilatation with severely reduced systolic  function. Severe tricuspid regurgitation. Moderate to severe pulmonary hypertension. No pericardial effusion seen. Normal aortic root dimension.      Neuro/Psych:               GI/Hepatic/Renal:   (+) GERD:, renal disease: ESRD and dialysis, bowel prep, morbid obesity          Endo/Other:    (+) Diabetes,

## 2021-08-27 NOTE — PROGRESS NOTES
Signed             Show:Clear all  [x]Manual[x]Template[]Copied    Added by:  Tierra Arevalo RN    []Hover for details     HEMODIALYSIS PRE-TREATMENT NOTE     Patient Identifiers prior to treatment: Name, , MRN     Isolation Required: No                      Isolation Type: N/A        (please document if patient is being managed as a PUI/COVID-19 patient)           Hepatitis status:                                                                     Date Drawn                             Result  Hepatitis B Surface Antigen 21     Negative                        Hepatitis B Surface Antibody 20 Negative           Hepatitis B Core Antibody                  How was Hepatitis Status verified: Outpatient records with [facility] [address]     Was a copy of the labs you documented provided to facility for the patient's chart: Yes     Hemodialysis orders verified: Yes     Access Within normal limits ( I.e. s/s of infection,...): AVF Left upper arm WNL      Pre-Assessment completed:  Yes     Pre-dialysis report received from: [address]                      Time: 1800

## 2021-08-28 NOTE — PROGRESS NOTES
Physician Progress Note      PATIENT:               Lizzie Brown  CSN #:                  [id number]  :                       1970  ADMIT DATE:       2021 2:57 PM  DISCH DATE:  RESPONDING  PROVIDER #:        Gabriela GARCIA BLOOD DO          QUERY TEXT:    Pt admitted with chest pain that came on during dialysis. If possible, please   document in progress notes and discharge summary if you are evaluating and/or   treating any of the following: The medical record reflects the following:  Risk Factors: ESRD on Dialysis, DM2  Clinical Indicators: Per ED \"complaint of chest pain that started while she   was in dialysis on the date of the . \"  Treatment: IV protonix 40 mg X1 day, Cardio consult, GI Consult    Thank you,  Prudencio Chu RN  Options provided:  -- Chest pain due to volume depletion during dialysis  -- Chest pain due to anemia  -- Chest pain due to GERD  -- Chest pain due to, Please document reason for chest pain. -- Other - I will add my own diagnosis  -- Disagree - Not applicable / Not valid  -- Disagree - Clinically unable to determine / Unknown  -- Refer to Clinical Documentation Reviewer    PROVIDER RESPONSE TEXT:    This patient has chest pain due to volume overload and possibly also due to   atrial flutter    Query created by:  Marcos Velásquez on 2021 1:32 PM      Electronically signed by:  Bogdan MAKI DO 2021 1:48 PM

## 2021-08-28 NOTE — PROGRESS NOTES
[facility]  Progress Note                   Date:   8/28/2021  Patient name: Leslie Clarke  Date of admission:  8/20/2021  2:57 PM  MRN:   [id number]  YOB: 1970  PCP: King Susan MD    Reason for Admission: Chest pain [R07.9]  Chest pain, unspecified type [R07.9]    Subjective:       Clinical Changes /Abnormalities:  Patient seen and examined in room. D/w nursing  Due to low BP not getting her cardizem. . S/p EGD- colonscopy not done.    Hgb lower this AM.     Review of Systems    Medications:   Scheduled Meds:   digoxin  250 mcg IntraVENous Once    midodrine  15 mg Oral TID WC    triamcinolone   Topical BID    polyethylene glycol  17 g Oral Daily    lactobacillus  1 tablet Oral Daily    dilTIAZem  120 mg Oral Daily    hydrocortisone   Rectal BID    pantoprazole  40 mg IntraVENous Daily    And    sodium chloride (PF)  10 mL IntraVENous Daily    metoprolol  2.5 mg IntraVENous Once    Calcium Acetate (Phos Binder)  2 capsule Oral TID WC    lidocaine 1 % injection  5 mL Intradermal Once    sodium chloride flush  5-40 mL IntraVENous 2 times per day    aspirin  81 mg Oral Daily    budesonide-formoterol  2 puff Inhalation BID    [Held by provider] furosemide  40 mg Oral Daily    guaiFENesin  600 mg Oral BID    insulin glargine  15 Units Subcutaneous Nightly    [Held by provider] lisinopril  5 mg Oral Daily    sennosides-docusate sodium  1 tablet Oral Daily    atorvastatin  40 mg Oral Nightly    insulin lispro  0-12 Units Subcutaneous TID WC    insulin lispro  0-6 Units Subcutaneous Nightly    [Held by provider] heparin (porcine)  5,000 Units Subcutaneous 3 times per day     Continuous Infusions:   sodium chloride      dextrose       CBC:   Recent Labs     08/26/21  2124 08/27/21  0749 08/27/21  2300   HGB 8.1* 8.2* 7.3*     BMP:    Recent Labs     08/25/21  0835 08/27/21  0749    131*   K 5.0 4.8   CL 95* 94*   CO2 28 25   BUN 30* 25* CREATININE 5.32* 5.06*   GLUCOSE 76 69*     Hepatic:No results for input(s): AST, ALT, ALB, BILITOT, ALKPHOS in the last 72 hours. Troponin: No results for input(s): TROPHS in the last 72 hours. BNP: No results for input(s): BNP in the last 72 hours. Lipids: No results for input(s): CHOL, HDL in the last 72 hours. Invalid input(s): LDLCALCU  INR: No results for input(s): INR in the last 72 hours. DIAGNOSTIC DATA    EKG: Initial EKG showed normal sinus rhythm repeat EKG today showed atrial flutter with controlled ventricular response    ECHO 8/23/2021  Mild left ventricular hypertrophy  Global left ventricular systolic function is normal  Estimated ejection fraction is 65 % . Right atrium is severely dilated . Right ventricular with severe dilatation with severely reduced systolic  function. Severe tricuspid regurgitation. Moderate to severe pulmonary hypertension. No pericardial effusion seen. Normal aortic root dimension. Echocardiogram 7/2/20/2019:  Left ventricle is mildly dilated with normal wall thickness. Overall systolic function appears mildly reduced, with an estimated EF 45%. Grade II (moderate) left ventricular diastolic dysfunction. Left atrium is moderately dilated. Mitral valve sclerosis without stenosis. Mild to moderate mitral regurgitation. Moderate tricuspid regurgitation. Estimated right ventricular systolic pressure is 44.4 mmHg, suggests mild  pulmonary HTN. Trivial pulmonic insufficiency. Trivial pericardial effusion. CATH 3/15/2016  Angiographic Findings      Cardiac Arteries and Lesion Findings     LMCA: Normal 0% stenosis.     LAD: Mild irregularities 10-20%.     LCx: Normal 0% stenosis.     RCA: Mild irregularities 20-30%.     Coronary Tree      Dominance: Right     LV Analysis  LV function assessed as:Normal.  Ejection Fraction  +----------------------------------------------------------------------+---+  ! Method !EF%!  +----------------------------------------------------------------------+---+  ! LV gram                                                               !50 !  +----------------------------------------------------------------------+---+       Objective:   Vitals: BP 95/63   Pulse 101   Temp 97.4 °F (36.3 °C) (Oral)   Resp 18   Ht 4' 11\" (1.499 m)   Wt 252 lb (114.3 kg)   LMP 11/12/2014 (Approximate)   SpO2 100%   BMI 50.90 kg/m²   General appearance: alert and cooperative with exam  HEENT: Head: Normocephalic, no lesions, without obvious abnormality. Neck:no JVD, trachea midline, no adenopathy  Lungs: Clear to auscultation, dim bases  Heart: Irregular rate and rhythm, s1/s2 auscultated, no murmurs. Afib 90-100s  Abdomen: soft, non-tender, bowel sounds active  Extremities: no edema  Neurologic: not done        Assessment / Acute Cardiac Problems:   1. Atypical chest pain   2. New onset A Flutter with variable AV Block  3. Mild LV dysfunction on ECHO 2019 LVEF 45%  4. Minimal CAD on cath 2016  5. HTN  6. HLD  7. CKD on HD  8. Morbid obesity  9. Pulmonary HTN      Plan of Treatment:   1. Atypical chest pain. No plans for ischemic work up per Dr Eduardo Kaba consult. Negative cath 2016. Continue ASA, statin. 2. HFpEF.  ECHO reviewed  LVEF improved to 65% from previous ECHO 2019. Fluid management per nephrology  Appreciate recs. 3. A Flutter rate controlled 110. Not getting cardizem due to low BP. Try Dig IV x 1.   4. Hypotension- increase midodrine to 15 tid. 5. Anemia- s/p EGD. Remains anemic. Keep off AC.      D/w nursing    Electronically signed by Jerod Vuong DO on 8/28/2021 at 7:02 AM  [address]  [phone]

## 2021-08-28 NOTE — PROGRESS NOTES
0.083% nebulizer solution, INHALE THE CONTENTS OF ONE VIAL VIA NEBULIZER EVERY 6 HOURS AS NEEDED FOR SHORTNESS OF BREATH OR WHEEZING  guaiFENesin (MUCINEX) 600 MG extended release tablet, Take 1 tablet by mouth 2 times daily  LANTUS SOLOSTAR 100 UNIT/ML injection pen, INJECT 15 UNITS SUBCUTANEOUSLY DAILY AT NIGHT  Calcium Acetate, Phos Binder, 667 MG CAPS, Take 2 capsules by mouth 3 times daily (with meals)   [DISCONTINUED] furosemide (LASIX) 40 MG tablet, Take 1 tablet by mouth daily  [DISCONTINUED] nitroGLYCERIN (NITROSTAT) 0.4 MG SL tablet, Place 1 tablet under the tongue every 5 minutes as needed for Chest pain  Easy Comfort Lancets MISC, USE TO TEST BLOOD SUGAR TWICE DAILY AS DIRECTED  blood glucose test strips (ONETOUCH ULTRA) strip, USE TO TEST BLOOD SUGAR TWICE DAILY AS DIRECTED  Insulin Pen Needle (EASY COMFORT PEN NEEDLES) 31G X 5 MM MISC, USE TO INJECT INSULIN ONCE DAILY  Alcohol Swabs (ALCOHOL PADS) 70 % PADS, USE TO CLEAN TESTING AND INJECTION SITES TWICE DAILY  Skin Protectants, Misc. (MINERIN CREME) CREA, APPLY TO AFFECTED AREA TOPICALLY AS NEEDED (Patient taking differently: every other day APPLY TO AFFECTED AREA TOPICALLY AS NEEDED)  Blood Glucose Monitoring Suppl (TRUE METRIX METER) w/Device KIT, USE TO TEST BLOOD GLUCOSE  Lancets (BD LANCET ULTRAFINE 30G) MISC, TEST TWICE DAILY  Lancet Devices (SIMPLE DIAGNOSTICS LANCING DEV) MISC, USE WITH LANCETS TO TEST BLOOD GLUCOSE  Blood Glucose Calibration (RIGOBERTO DOC CONTROL) Normal SOLN, USE TO PERIODICALLY CHECK GLUCOSE MONITOR ACCORDING TO THE MANUAL  UNIFINE PENTIPS 31G X 6 MM MISC, USE WITH insulin pens ONCE daily  PHARMACIST CHOICE LANCETS MISC, USE TO TEST BLOOD GLUCOSE ONCE A DAY  [DISCONTINUED] SENNA PLUS 8.6-50 MG per tablet, TAKE 1 TABLET BY MOUTH DAILY (Patient taking differently: Take 1 tablet by mouth daily as needed )  Misc.  Devices MISC, 1 each by Does not apply route daily Electric scooter  glucose monitoring kit (FREESTYLE) monitoring kit, 1 kit by Does not apply route daily  [DISCONTINUED] SENSIPAR 30 MG tablet, Take 1 tablet by mouth daily  OXYGEN, Inhale into the lungs O2  3L  PER NASAL CANNULA NIGHTLY  [DISCONTINUED] Misc. Devices (DIGITAL GLASS SCALE) MISC, Diagnosis: Diastolic Congestive heart failure    Current Medications:     Scheduled Meds:    midodrine  15 mg Oral TID     [START ON 8/29/2021] pantoprazole  40 mg Oral QAM AC    triamcinolone   Topical BID    polyethylene glycol  17 g Oral Daily    lactobacillus  1 tablet Oral Daily    dilTIAZem  120 mg Oral Daily    hydrocortisone   Rectal BID    metoprolol  2.5 mg IntraVENous Once    Calcium Acetate (Phos Binder)  2 capsule Oral TID WC    lidocaine 1 % injection  5 mL Intradermal Once    sodium chloride flush  5-40 mL IntraVENous 2 times per day    aspirin  81 mg Oral Daily    budesonide-formoterol  2 puff Inhalation BID    [Held by provider] furosemide  40 mg Oral Daily    guaiFENesin  600 mg Oral BID    insulin glargine  15 Units Subcutaneous Nightly    sennosides-docusate sodium  1 tablet Oral Daily    atorvastatin  40 mg Oral Nightly    insulin lispro  0-12 Units Subcutaneous TID WC    insulin lispro  0-6 Units Subcutaneous Nightly    [Held by provider] heparin (porcine)  5,000 Units Subcutaneous 3 times per day     Continuous Infusions:    sodium chloride      dextrose       PRN Meds:  albumin human, albuterol sulfate HFA, albuterol, perflutren lipid microspheres, diphenhydrAMINE, sodium chloride flush, sodium chloride, fentanNYL, oxyCODONE-acetaminophen, glucose, dextrose, glucagon (rDNA), dextrose, ondansetron **OR** ondansetron, acetaminophen **OR** acetaminophen, magnesium hydroxide, nitroGLYCERIN    Input/Output:       I/O last 3 completed shifts: In: 950 [P.O.:240; I.V.:110; IV Piggyback:100]  Out: 1500 .       Patient Vitals for the past 96 hrs (Last 3 readings):   Weight   08/28/21 0400 252 lb (114.3 kg)   08/27/21 2130 252 lb 3.3 oz (114.4 kg)   08/27/21 1735 253 lb 12 oz (115.1 kg)       Vital Signs:   Temperature:  Temp: 97.4 °F (36.3 °C)  TMax:   Temp (24hrs), Av.6 °F (36.4 °C), Min:96.8 °F (36 °C), Max:98.6 °F (37 °C)    Respirations:  Resp: 16  Pulse:   Pulse: 79  BP:    BP: (!) 97/58  BP Range: Systolic (52THK), JBP:08 , Min:76 , KWE:984       Diastolic (25NFE), NJO:22, Min:33, Max:125      Physical Examination:     General:  Alert and oriented x3  HEENT: Traumatic. Eyes:   Pupils equal, round and reactive to light, EOMI. Neck:   Supple  Chest:   Bilateral vesicular breath sounds, no rales or wheezes. Cardiac:  S1 S2 RR, no murmurs, gallops or rubs. Abdomen: Soft and nontender lateral abdominal wall edema was present   :   No suprapubic or flank tenderness. Neuro:  AAO x 3, No FND. SKIN:  No rashes, good skin turgor. Extremities:  Chronic skin changes    Labs:       Recent Labs     21  0749 21  2300 21  0951   HGB 8.2* 7.3* 8.0*   HCT 29.4* 26.8* 28.8*      BMP:   Recent Labs     21  0749   *   K 4.8   CL 94*   CO2 25   BUN 25*   CREATININE 5.06*   GLUCOSE 69*   CALCIUM 8.3*      Radiology:     Reviewed. Assessment:     1. ESRD on Hemodialysis. His regular HD days are MWF at [facility] hemodialysis facility using LUE AVF under Dr. Davi Lazo. His dry weight is 106.5 kg. Based on weight patient gained 2 pounds from last dialysis. 2.    Right red blood per rectum and for colonoscopy today  3. Hypertension blood pressure is under fair control 4. Fatigue. 5.  Hypotension. 6.    Obesity  7. Anemia chronic in nature due to ESRD further complicated by GI losses  Plan:   1. Next Allises Monday  2. Colonoscopy per GI service   3. We will follow with you  4. Increase ProAmatine to 15 3 times daily  5. Discontinue lisinopril  Nutrition   Please ensure that patient is on a renal diet/TF. Avoid nephrotoxic drugs/contrast exposure. We will continue to follow along with you.

## 2021-08-28 NOTE — PROGRESS NOTES
[facility]  Office: [address], DO, Christopher Bradfordasha, DO, Mreari Ryan, DO, Franco Lofton Blood, DO, Vinod Quintero MD, Isaac Foster MD, Akanksha James MD, Jose M Zuluaga MD, Nancy Parrish MD, Marleen Rosario MD, Elva Piedra MD, Randall Chavez, DO, Phoebe Rivas MD, Isidro Lee, DO, Yoshi Calvo MD,  Catie Shrestha, DO, Miriam Baez MD, Kana Harris MD, Genie Dakin, MD, Nikia Newsome MD, Bridget Banuelos MD, Lisseth Carnes MD, Eufemia Schmitz MD, Joshua Stark, [facility], [facility], CNP, Smitha Daleyr, CNP, Tobias Ledezma, CNS, Marcia Siddiqi, CNP, Wilfrid Morrissey, CNP, Lizbeth Keller, CNP, Tulio Storm, CNP, Brittany Salazar, CNP, Ezekiel Quinones PA-C, Hao Smith, [facility], Anoop Orr, CNP, Sharon Cazares, CNP, Rocio Alexander, CNP, Yonny Antonio, CNP, Polo Pickett, CNP, Ginger Prado, CNP, Jade Cueva, [facility], [facility]    Progress Note    8/28/2021    11:10 AM    Name:   Perez Burnham  MRN:     [id number]     Acct:      [de-identified]   Room:   2034/2034-01   Day:  8  Admit Date:  8/20/2021  2:57 PM    PCP:   Joni Atkins MD  Code Status:  Full Code    Subjective:     C/C:   Chief Complaint   Patient presents with    Chest Pain    Nausea    Fatigue     Interval History Status: improved. Still running a bit hypotensive this am (sbp 80s-low 90s) but map has been ok    Finally had a bm without any bleeding    Could only do egd, then she desatted and colonoscopy had to be canceled    Denies cp/sob/n/v    Brief History:     Per my partner:   \"This is a 69-year-old female that presents to [facility] emergency room with a complaint of chest pain that started while she was in dialysis on the date of the [place] was located in the epigastric region and lower chest without radiation. Roel Espinoza dialysis treatment was aborted after approximately 2 hours due to her symptoms.  She otherwise denies any other associated symptoms.  She denies any cough, sputum reduction or other associated symptoms\"    Review of Systems:     Constitutional:  negative for chills, fevers, sweats  Respiratory:  negative for cough, dyspnea on exertion, shortness of breath, wheezing  Cardiovascular:  negative for chest pain, chest pressure/discomfort, palpitations  Gastrointestinal:  negative for abdominal pain, constipation, diarrhea, nausea, vomiting  Neurological:  negative for dizziness, headache    Medications: Allergies:     Allergies   Allergen Reactions    Ibuprofen     Tramadol Hives    Motrin [Ibuprofen Micronized] Itching    Strawberry Extract Itching and Rash    Tape Loyce Matthieu Tape] Rash     No paper tape silk only       Current Meds:   Scheduled Meds:    midodrine  15 mg Oral TID WC    triamcinolone   Topical BID    polyethylene glycol  17 g Oral Daily    lactobacillus  1 tablet Oral Daily    dilTIAZem  120 mg Oral Daily    hydrocortisone   Rectal BID    pantoprazole  40 mg IntraVENous Daily    And    sodium chloride (PF)  10 mL IntraVENous Daily    metoprolol  2.5 mg IntraVENous Once    Calcium Acetate (Phos Binder)  2 capsule Oral TID WC    lidocaine 1 % injection  5 mL Intradermal Once    sodium chloride flush  5-40 mL IntraVENous 2 times per day    aspirin  81 mg Oral Daily    budesonide-formoterol  2 puff Inhalation BID    [Held by provider] furosemide  40 mg Oral Daily    guaiFENesin  600 mg Oral BID    insulin glargine  15 Units Subcutaneous Nightly    [Held by provider] lisinopril  5 mg Oral Daily    sennosides-docusate sodium  1 tablet Oral Daily    atorvastatin  40 mg Oral Nightly    insulin lispro  0-12 Units Subcutaneous TID     insulin lispro  0-6 Units Subcutaneous Nightly    [Held by provider] heparin (porcine)  5,000 Units Subcutaneous 3 times per day     Continuous Infusions:    sodium chloride      dextrose       PRN Meds: albumin human, albuterol sulfate HFA, albuterol, perflutren lipid microspheres, diphenhydrAMINE, sodium chloride flush, sodium chloride, fentanNYL, oxyCODONE-acetaminophen, glucose, dextrose, glucagon (rDNA), dextrose, ondansetron **OR** ondansetron, acetaminophen **OR** acetaminophen, magnesium hydroxide, nitroGLYCERIN    Data:     Past Medical History:   has a past medical history of Asthma, CHF (congestive heart failure) ([facility] Chronic kidney disease, COPD (chronic obstructive pulmonary disease) ([facility] Dialysis patient ([facility] Hemodialysis patient ([facility] Hyperlipidemia, Hypertension, Obesity, YONI (obstructive sleep apnea), Pneumonia, Renal failure, Type II or unspecified type diabetes mellitus without mention of complication, not stated as uncontrolled, and Ventilator dependence ([facility] Social History:   reports that she quit smoking about 4 years ago. Her smoking use included cigarettes. She quit after 7.00 years of use. She has never used smokeless tobacco. She reports that she does not drink alcohol and does not use drugs. Family History:   Family History   Problem Relation Age of Onset    Diabetes Other     Cancer Mother         BREAST    Heart Disease Father         CAD-MI    Diabetes Father     High Blood Pressure Father     Diabetes Maternal Grandfather     Diabetes Paternal Grandfather     Heart Disease Paternal Grandfather     High Blood Pressure Paternal Grandfather        Vitals:  BP (!) 76/65   Pulse 94   Temp 98 °F (36.7 °C) (Temporal)   Resp 18   Ht 4' 11\" (1.499 m)   Wt 252 lb (114.3 kg)   LMP 2014 (Approximate)   SpO2 100%   BMI 50.90 kg/m²   Temp (24hrs), Av.7 °F (36.5 °C), Min:96.8 °F (36 °C), Max:98.6 °F (37 °C)    Recent Labs     21  1606 21  1653 21  1948 21  0735   POCGLU 65 96 115* 91       I/O (24Hr):     Intake/Output Summary (Last 24 hours) at 2021 1110  Last data filed at 2021 1000  Gross per 24 hour   Intake 1280 ml   Output 1500 ml   Net -220 ml Labs:  Hematology:  Recent Labs     08/27/21  0749 08/27/21  2300 08/28/21  0951   HGB 8.2* 7.3* 8.0*   HCT 29.4* 26.8* 28.8*     Chemistry:  Recent Labs     08/27/21  0749   *   K 4.8   CL 94*   CO2 25   GLUCOSE 69*   BUN 25*   CREATININE 5.06*   ANIONGAP 12   LABGLOM 9*   GFRAA 11*   CALCIUM 8.3*     Recent Labs     08/27/21  1118 08/27/21  1153 08/27/21  1606 08/27/21  1653 08/27/21  1948 08/28/21  0735   POCGLU 55* 87 65 96 115* 91     ABG:  Lab Results   Component Value Date    POCPH 7.22 06/12/2016    POCPCO2 61 06/12/2016    POCPO2 68 06/12/2016    POCHCO3 24.8 06/12/2016    NBEA 3 06/12/2016    PBEA NOT REPORTED 06/12/2016    VHZ0GAT 27 06/12/2016    TSEB8QCU 88 06/12/2016    FIO2 NOT REPORTED 07/01/2019     Lab Results   Component Value Date/Time    SPECIAL NOT REPORTED 08/25/2021 05:09 AM     Lab Results   Component Value Date/Time    CULTURE NO GROWTH 3 DAYS 08/25/2021 05:09 AM       Radiology:  CT ABDOMEN PELVIS WO CONTRAST Additional Contrast? None    Addendum Date: 8/22/2021    ADDENDUM: Ventral hernia containing free fluid and possible loops of small bowel but without evidence of obstruction. Case discussed with the surgery resident at 6:15 p.m. Graylon Blake Follow-up exam with oral contrast may be helpful. Result Date: 8/22/2021  Mild ascites and subcutaneous edema. Moderate bilateral renal atrophy. Other incidental findings as above. Limited sensitivity without IV and oral contrast.     XR ACUTE ABD SERIES CHEST 1 VW    Result Date: 8/20/2021  Possible mild CHF. Bowel gas pattern nonspecific. US GALLBLADDER RUQ    Result Date: 8/23/2021  1. Partially contracted gallbladder without evidence of cholelithiasis or cholecystitis. 2. Mildly heterogeneous appearance of the liver which is nonspecific but may represent hepatocellular disease. 3. Apparent portal vein biphasic flow which can be seen in setting of right heart failure. 4. Minimal perihepatic ascites.        Physical Examination: General appearance:  alert, cooperative and no distress  Mental Status:  oriented to person, place and time and normal affect  Lungs:  clear to auscultation bilaterally, normal effort  Heart:  Reg rate and irreg irreg rhythm, no murmur  Abdomen:  soft, nontender, nondistended, normal bowel sounds, no masses, hepatomegaly, splenomegaly; obese  Extremities:  no redness, tenderness in the calves; 1+ ble edema  Skin:  no gross lesions, rashes, induration    Assessment:        Hospital Problems         Last Modified POA    * (Principal) Chest pain at rest 8/24/2021 Yes    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) (Chronic) 8/21/2021 Yes    Current smoker 8/21/2021 Yes    Overview Signed 9/7/2015  4:44 AM by [employer] [facility]     replace inactive diagnosis         Acute diastolic congestive heart failure ([facility] 8/21/2021 Yes    Morbid obesity with BMI of 45.0-49.9, adult ([facility] 8/21/2021 Yes    COPD exacerbation ([facility] 8/21/2021 Yes    ESRD (end stage renal disease) on dialysis ([facility] 8/21/2021 Yes    Essential hypertension 8/21/2021 Yes    Absolute anemia 8/23/2021 Yes    Atrial flutter ([address] 8/24/2021 Clinically Undetermined    Hypoglycemia 8/27/2021 No          Plan:        Only egd could be done, not the colonoscopy due to desat with egd  Anticoagulation needed for a flutter but on hold currently due to bleeding  Cortisone cream for itching of feet  Dialysis per renal  Dig given and midodrine increased  Transfer out of icu  D/w rn  Hopeful discharge tomorrow if stable  Possible outpatient colonoscopy    Rory Green DO  8/28/2021  11:10 AM

## 2021-08-28 NOTE — FLOWSHEET NOTE
Patient was sleeping.  shared in silent prayer. Follow up as needed.      08/28/21 1311   Encounter Summary   Services provided to: Patient   Referral/Consult From: [facility]   (8-28-21)   Complexity of Encounter Low   Length of Encounter 15 minutes   Routine   Type Follow up   Assessment Sleeping   Intervention Prayer

## 2021-08-28 NOTE — PROGRESS NOTES
[facility]    Gastroenterology Daily Progress Note      Patient:   Tawanda Bernal   :    1970   Facility:   [facility]  Date:     2021  Consultant:   VENTURA Vasquez CNP, CNP      SUBJECTIVE  46 y.o. female admitted 2021 with Chest pain [R07.9]  Chest pain, unspecified type [R07.9] and seen for anemia. The pt was seen and examined. She is s/p egd yesterday; results are discussed below. hgb 8. No c/o abdominal pain or nausea. Tolerating a diet. States that she feels her breathing is \"better \" than yesterday.          OBJECTIVE  Scheduled Meds:   midodrine  15 mg Oral TID WC    triamcinolone   Topical BID    polyethylene glycol  17 g Oral Daily    lactobacillus  1 tablet Oral Daily    dilTIAZem  120 mg Oral Daily    hydrocortisone   Rectal BID    pantoprazole  40 mg IntraVENous Daily    And    sodium chloride (PF)  10 mL IntraVENous Daily    metoprolol  2.5 mg IntraVENous Once    Calcium Acetate (Phos Binder)  2 capsule Oral TID WC    lidocaine 1 % injection  5 mL Intradermal Once    sodium chloride flush  5-40 mL IntraVENous 2 times per day    aspirin  81 mg Oral Daily    budesonide-formoterol  2 puff Inhalation BID    [Held by provider] furosemide  40 mg Oral Daily    guaiFENesin  600 mg Oral BID    insulin glargine  15 Units Subcutaneous Nightly    [Held by provider] lisinopril  5 mg Oral Daily    sennosides-docusate sodium  1 tablet Oral Daily    atorvastatin  40 mg Oral Nightly    insulin lispro  0-12 Units Subcutaneous TID WC    insulin lispro  0-6 Units Subcutaneous Nightly    [Held by provider] heparin (porcine)  5,000 Units Subcutaneous 3 times per day       Vital Signs:  BP (!) 76/65   Pulse 94   Temp 98 °F (36.7 °C) (Temporal)   Resp 18   Ht 4' 11\" (1.499 m)   Wt 252 lb (114.3 kg)   LMP 2014 (Approximate)   SpO2 100%   BMI 50.90 kg/m²      Physical Exam:   General Appearance: alert and oriented to person, place and time, well-developed and well-nourished, in no acute distress  Skin: warm and dry, no rash or erythema  Head: normocephalic and atraumatic  Eyes: pupils equal, round, and reactive to light, extraocular eye movements intact, conjunctivae normal  ENT: hearing grossly normal bilaterally  Neck: neck supple and non tender without mass, no thyromegaly or thyroid nodules, no cervical lymphadenopathy   Pulmonary/Chest: clear to auscultation bilaterally- no wheezes, rales or rhonchi, normal air movement, no respiratory distress  Cardiovascular: hypotensive normal rate, regular rhythm, normal S1 and S2, no murmurs, rubs, clicks or gallops, distal pulses intact, no carotid bruits  Abdomen: soft, obese non-tender, non-distended, normal bowel sounds, no masses or organomegaly  Extremities: no cyanosis, clubbing or edema  Musculoskeletal: normal range of motion, no joint swelling, deformity or tenderness  Neurologic: no cranial nerve deficit and muscle strength normal    Lab and Imaging Review     CBC  Recent Labs     08/27/21  0749 08/27/21  2300 08/28/21  0951   HGB 8.2* 7.3* 8.0*   HCT 29.4* 26.8* 28.8*       BMP  Recent Labs     08/27/21  0749   *   K 4.8   CL 94*   CO2 25   BUN 25*   CREATININE 5.06*   GLUCOSE 69*   CALCIUM 8.3*     Findings:egd dr Ramon Mckeon 8/27/21     Retropharyngeal area was grossly normal appearing     Esophagus: abnormal: TERTIARY CONTRACTIONS  TWO CM HIATAL HERNIA      Stomach:    Fundus: abnormal: EROSIONS AT CARDIA NO BLEEDING    Body: normal    Antrum: abnormal: EROSIVE GASTRITIS     Duodenum:     Descending: normal    Bulb: abnormal: MULTIPLE PROMINENT ERYTHEMATOUS DUODENAL GLAND POLYPS BIOPSIES AND PICTURES WERE TAKEN      ASSESSMENT/plan  1.  Anemia s/p egd yesterday showing erosions in the fundus and erosive gastritis, duodenal polyps  -f/u bx results  -continue ppi  -keep hgb >7  -not stable for colonoscopy at this time per anesthesia due to her pulmonary/cardiac status-cardiology following  -diet as tolerated        This plan was formulated in collaboration with Dr. Renata Bond. Electronically signed by: VENTURA Vasquez CNP on 8/28/2021 at 10:02 AM     Attending Physician Statement  I have discussed the care of [facility] and   I have examined the patient myselft independently, and taken ros and hpi , including pertinent history and exam findings,  with the author of this note . I have reviewed the key elements of all parts of the encounter with the nurse practitioner/resident.     I agree with the assessment, plan and orders as documented by the above health care provider       No sign of ongoing bleeding  Not stable for colonoscopy  Electronically signed by Radha Bennett MD

## 2021-08-29 NOTE — CARE COORDINATION
DC Planning    LM with Michelle Diego to [facility] today. Home care agency will pull data electronically. They will call pt to set up a time for start of care. JIMMY is signed.

## 2021-08-29 NOTE — PROGRESS NOTES
[facility]  Office: [address], DO, Mandie Aquinoer, DO, Per Manifold, DO, Jayson Whiteside Blood, DO, Cecelia David MD, Francisco Amaral MD, Sony Martínez MD, Conner Nguyen MD, Radha Pompa MD, Jade Barriga MD, Ene Delgado MD, Vel Nation, DO, Emil Martinez MD, Brittani Pacheco DO, Vale Galeas MD,  Kan Salmeron, DO, Norris Holter, MD, Jamin Guy MD, Catrachita Cui MD, Erna Phelps MD, Juan Ngo MD, Remi Garcia MD, Elin Padilla MD, Suzi Rey, CNP, [facility], CNP, Janae Miner, CNP, Fredi Bro, CNS, Thor Hillman, CNP, Ramana Franco, CNP, Stephanie Caldera, CNP, Ines Dan, CNP, Berta Cha, CNP, MARINO MondragonC, Antony Martino, [facility], Savanna Nettles, CNP, Vira Mccullough, CNP, Paris Zuniga, CNP, Sarah Be, CNP, Kortney Bhagat, CNP, Tanner Beavers, CNP, Joelle Nino, CNP, Lar[facility]    Progress Note    8/29/2021    10:55 AM    Name:   Ange Young  MRN:     [id number]     Acct:      [de-identified]   Room:   2034/2034-01  IP Day:  9  Admit Date:  8/20/2021  2:57 PM    PCP:   Pilar Loredo MD  Code Status:  Full Code    Subjective:     C/C:   Chief Complaint   Patient presents with    Chest Pain    Nausea    Fatigue     Interval History Status: improved. bp better today    Had another bm without any bleeding    Could only do egd, then she desatted and colonoscopy had to be canceled    Denies cp/sob/n/v    Wants an extra dialysis treatment    Brief History:     Per my partner:   \"This is a 35-year-old female that presents to [facility] emergency room with a complaint of chest pain that started while she was in dialysis on the date of the Baljeet Backer was located in the epigastric region and lower chest without radiation. Valentin Sifuentes dialysis treatment was aborted after approximately 2 hours due to her symptoms.  She otherwise denies any other associated symptoms. John Domingo denies any cough, sputum reduction or other associated symptoms\"    Review of Systems:     Constitutional:  negative for chills, fevers, sweats  Respiratory:  negative for cough, dyspnea on exertion, shortness of breath, wheezing  Cardiovascular:  negative for chest pain, chest pressure/discomfort, palpitations  Gastrointestinal:  negative for abdominal pain, constipation, diarrhea, nausea, vomiting; c/o abd bloating  Neurological:  negative for dizziness, headache    Medications: Allergies:     Allergies   Allergen Reactions    Ibuprofen     Tramadol Hives    Motrin [Ibuprofen Micronized] Itching    Strawberry Extract Itching and Rash    Tape [facility] Tape] Rash     No paper tape silk only       Current Meds:   Scheduled Meds:    metoprolol tartrate  25 mg Oral BID    midodrine  15 mg Oral TID WC    pantoprazole  40 mg Oral QAM AC    triamcinolone   Topical BID    polyethylene glycol  17 g Oral Daily    lactobacillus  1 tablet Oral Daily    hydrocortisone   Rectal BID    Calcium Acetate (Phos Binder)  2 capsule Oral TID WC    lidocaine 1 % injection  5 mL Intradermal Once    sodium chloride flush  5-40 mL IntraVENous 2 times per day    aspirin  81 mg Oral Daily    budesonide-formoterol  2 puff Inhalation BID    [Held by provider] furosemide  40 mg Oral Daily    guaiFENesin  600 mg Oral BID    insulin glargine  15 Units Subcutaneous Nightly    sennosides-docusate sodium  1 tablet Oral Daily    atorvastatin  40 mg Oral Nightly    insulin lispro  0-12 Units Subcutaneous TID WC    insulin lispro  0-6 Units Subcutaneous Nightly    [Held by provider] heparin (porcine)  5,000 Units Subcutaneous 3 times per day     Continuous Infusions:    sodium chloride      dextrose       PRN Meds: albumin human, albuterol sulfate HFA, albuterol, perflutren lipid microspheres, diphenhydrAMINE, sodium chloride flush, sodium chloride, fentanNYL, oxyCODONE-acetaminophen, glucose, dextrose, glucagon (rDNA), dextrose, ondansetron **OR** ondansetron, acetaminophen **OR** acetaminophen, magnesium hydroxide, nitroGLYCERIN    Data:     Past Medical History:   has a past medical history of Asthma, CHF (congestive heart failure) ([facility] Chronic kidney disease, COPD (chronic obstructive pulmonary disease) ([facility] Dialysis patient ([facility] Hemodialysis patient ([facility] Hyperlipidemia, Hypertension, Obesity, YONI (obstructive sleep apnea), Pneumonia, Renal failure, Type II or unspecified type diabetes mellitus without mention of complication, not stated as uncontrolled, and Ventilator dependence ([facility] Social History:   reports that she quit smoking about 4 years ago. Her smoking use included cigarettes. She quit after 7.00 years of use. She has never used smokeless tobacco. She reports that she does not drink alcohol and does not use drugs. Family History:   Family History   Problem Relation Age of Onset    Diabetes Other     Cancer Mother         BREAST    Heart Disease Father         CAD-MI    Diabetes Father     High Blood Pressure Father     Diabetes Maternal Grandfather     Diabetes Paternal Grandfather     Heart Disease Paternal Grandfather     High Blood Pressure Paternal Grandfather        Vitals:  BP 98/69   Pulse 80   Temp 97.3 °F (36.3 °C) (Temporal)   Resp 16   Ht 4' 11\" (1.499 m)   Wt 252 lb (114.3 kg)   LMP 2014 (Approximate)   SpO2 91%   BMI 50.90 kg/m²   Temp (24hrs), Av.2 °F (36.2 °C), Min:96.3 °F (35.7 °C), Max:97.5 °F (36.4 °C)    Recent Labs     21  1106 21  1612 21  1928 21  0741   POCGLU 116* 118* 139* 81       I/O (24Hr):     Intake/Output Summary (Last 24 hours) at 2021 1055  Last data filed at 2021 0818  Gross per 24 hour   Intake 130 ml   Output --   Net 130 ml       Labs:  Hematology:  Recent Labs     21  2300 21  0951 21  0506   WBC  --   --  8.5   RBC  --   --  2.45*   HGB 7.3* 8.0* 8.1*   HCT 26.8* 28.8* 29.0*   MCV  --   -- 118.4*   MCH  --   --  33.1   MCHC  --   --  27.9*   RDW  --   --  17.6*   PLT  --   --  132*   MPV  --   --  10.8     Chemistry:  Recent Labs     08/27/21  0749 08/29/21  0506   * 132*   K 4.8 4.8   CL 94* 95*   CO2 25 25   GLUCOSE 69* 97   BUN 25* 22*   CREATININE 5.06* 4.85*   ANIONGAP 12 12   LABGLOM 9* 9*   GFRAA 11* 11*   CALCIUM 8.3* 8.6     Recent Labs     08/27/21  1948 08/28/21  0735 08/28/21  1106 08/28/21  1612 08/28/21  1928 08/29/21  0741   POCGLU 115* 91 116* 118* 139* 81     ABG:  Lab Results   Component Value Date    POCPH 7.22 06/12/2016    POCPCO2 61 06/12/2016    POCPO2 68 06/12/2016    POCHCO3 24.8 06/12/2016    NBEA 3 06/12/2016    PBEA NOT REPORTED 06/12/2016    DVE0KBU 27 06/12/2016    CDOK8VEZ 88 06/12/2016    FIO2 NOT REPORTED 07/01/2019     Lab Results   Component Value Date/Time    SPECIAL NOT REPORTED 08/25/2021 05:09 AM     Lab Results   Component Value Date/Time    CULTURE NO GROWTH 4 DAYS 08/25/2021 05:09 AM       Radiology:  CT ABDOMEN PELVIS WO CONTRAST Additional Contrast? None    Addendum Date: 8/22/2021    ADDENDUM: Ventral hernia containing free fluid and possible loops of small bowel but without evidence of obstruction. Case discussed with the surgery resident at 6:15 p.m. Maria Del Rosario Hurst Follow-up exam with oral contrast may be helpful. Result Date: 8/22/2021  Mild ascites and subcutaneous edema. Moderate bilateral renal atrophy. Other incidental findings as above. Limited sensitivity without IV and oral contrast.     XR ACUTE ABD SERIES CHEST 1 VW    Result Date: 8/20/2021  Possible mild CHF. Bowel gas pattern nonspecific. US GALLBLADDER RUQ    Result Date: 8/23/2021  1. Partially contracted gallbladder without evidence of cholelithiasis or cholecystitis. 2. Mildly heterogeneous appearance of the liver which is nonspecific but may represent hepatocellular disease. 3. Apparent portal vein biphasic flow which can be seen in setting of right heart failure.  4. Minimal perihepatic ascites.        Physical Examination:        General appearance:  alert, cooperative and no distress  Mental Status:  oriented to person, place and time and normal affect  Lungs:  clear to auscultation bilaterally, normal effort  Heart:  Reg rate and irreg irreg rhythm, no murmur  Abdomen:  soft, nontender, mildly diatended, normal bowel sounds, no masses, hepatomegaly, splenomegaly; obese  Extremities:  no redness, tenderness in the calves; 1+ ble edema  Skin:  no gross lesions, rashes, induration    Assessment:        Hospital Problems         Last Modified POA    * (Principal) Chest pain at rest 8/24/2021 Yes    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) (Chronic) 8/21/2021 Yes    Current smoker 8/21/2021 Yes    Overview Signed 9/7/2015  4:44 AM by Connor Johnson     replace inactive diagnosis         Acute diastolic congestive heart failure ([facility] 8/21/2021 Yes    Morbid obesity with BMI of 45.0-49.9, adult ([facility] 8/21/2021 Yes    COPD exacerbation ([facility] 8/21/2021 Yes    ESRD (end stage renal disease) on dialysis ([facility] 8/21/2021 Yes    Essential hypertension 8/21/2021 Yes    Absolute anemia 8/23/2021 Yes    Atrial flutter ([address] 8/24/2021 Clinically Undetermined    Hypoglycemia 8/27/2021 No          Plan:        Only egd could be done, not the colonoscopy due to desat with egd  Anticoagulation needed for a flutter but on hold currently due to bleeding  Cortisone cream for itching of feet  Dialysis per renal  midodrine increased by cardio  D/w rn  Hopeful discharge today-will check with renal re: dialysis today (patient request)  Possible outpatient colonoscopy    Reginaldo Green DO  8/29/2021  10:55 AM

## 2021-08-29 NOTE — PROGRESS NOTES
[facility]    Gastroenterology Daily Progress Note      Patient:   Vero Ruiz   :    1970   [facility]   [facility]  Date:     2021  Consultant:   VENTURA Bedolla CNP,       SUBJECTIVE  46 y.o. female admitted 2021 with Chest pain [R07.9]  Chest pain, unspecified type [R07.9] and seen for anemia . The pt was seen and examined. hgb 8. 1. no BM overnight. No abdominal pain, tolerating a diet.          OBJECTIVE  Scheduled Meds:   metoprolol tartrate  25 mg Oral BID    midodrine  15 mg Oral TID WC    pantoprazole  40 mg Oral QAM AC    triamcinolone   Topical BID    polyethylene glycol  17 g Oral Daily    lactobacillus  1 tablet Oral Daily    hydrocortisone   Rectal BID    Calcium Acetate (Phos Binder)  2 capsule Oral TID WC    lidocaine 1 % injection  5 mL Intradermal Once    sodium chloride flush  5-40 mL IntraVENous 2 times per day    aspirin  81 mg Oral Daily    budesonide-formoterol  2 puff Inhalation BID    [Held by provider] furosemide  40 mg Oral Daily    guaiFENesin  600 mg Oral BID    insulin glargine  15 Units Subcutaneous Nightly    sennosides-docusate sodium  1 tablet Oral Daily    atorvastatin  40 mg Oral Nightly    insulin lispro  0-12 Units Subcutaneous TID WC    insulin lispro  0-6 Units Subcutaneous Nightly    [Held by provider] heparin (porcine)  5,000 Units Subcutaneous 3 times per day       Vital Signs:  BP 98/69   Pulse 80   Temp 97.3 °F (36.3 °C) (Temporal)   Resp 16   Ht 4' 11\" (1.499 m)   Wt 252 lb (114.3 kg)   LMP 2014 (Approximate)   SpO2 91%   BMI 50.90 kg/m²      Physical Exam:     General Appearance: alert and oriented to person, place and time, well-developed and well-nourished, in no acute distress  Skin: warm and dry, no rash or erythema  Head: normocephalic and atraumatic  Eyes: pupils equal, round, and reactive to light, extraocular eye movements intact, conjunctivae normal  ENT: hearing grossly normal bilaterally  Neck: neck supple and non tender without mass, no thyromegaly or thyroid nodules, no cervical lymphadenopathy   Pulmonary/Chest: clear to auscultation bilaterally- no wheezes, rales or rhonchi, normal air movement, no respiratory distress on nasal cannula oxygen  Cardiovascular: normal rate, regular rhythm, normal S1 and S2, no murmurs, rubs, clicks or gallops, distal pulses intact, no carotid bruits  Abdomen: soft, obese non-tender, non-distended, normal bowel sounds, no masses or organomegaly  Extremities: no cyanosis, clubbing or edema  Musculoskeletal: normal range of motion, no joint swelling, deformity or tenderness  Neurologic: no cranial nerve deficit and muscle strength normal    Lab and Imaging Review     CBC  Recent Labs     08/27/21  2300 08/28/21  0951 08/29/21  0506   WBC  --   --  8.5   HGB 7.3* 8.0* 8.1*   HCT 26.8* 28.8* 29.0*   MCV  --   --  118.4*   PLT  --   --  132*       BMP  Recent Labs     08/27/21  0749 08/29/21  0506   * 132*   K 4.8 4.8   CL 94* 95*   CO2 25 25   BUN 25* 22*   CREATININE 5.06* 4.85*   GLUCOSE 69* 97   CALCIUM 8.3* 8.6     Findings:egd dr Wendy Thao 8/27/21     Retropharyngeal area was grossly normal appearing     Esophagus: abnormal: TERTIARY CONTRACTIONS  TWO CM HIATAL HERNIA      Stomach:    Fundus: abnormal: EROSIONS AT CARDIA NO BLEEDING    Body: normal    Antrum: abnormal: EROSIVE GASTRITIS     Duodenum:     Descending: normal    Bulb: abnormal: MULTIPLE PROMINENT ERYTHEMATOUS DUODENAL GLAND POLYPS BIOPSIES AND PICTURES WERE TAKEN      ASSESSMENT/plan  1.  Anemia s/p egd  showing erosions in the fundus and erosive gastritis, duodenal polyps hgb 8.1  -bx results still pending  -continue ppi  -keep hgb >7  -not stable for colonoscopy at this time per anesthesia due to her pulmonary/cardiac status; will plan for  outpatient-cardiology following  -diet as tolerated    D/w dr Rocio Daniel gi signing off    This plan was formulated in collaboration with Dr. Rocio Daniel

## 2021-08-29 NOTE — DISCHARGE SUMMARY
[facility]  Office: [address], DO, Grisel Figueredo, DO, Lit Monaco, DO, Sandra Green, DO, Anna Hurd MD, Ade Zepeda MD, Lorena Miller MD, Neena Cadet MD, Richard Jorge MD, Ab Live MD, Cherri Carrero MD, Brisa Lopez DO, Ciro Carmona MD, Elizabet Piedra DO, Garret Bhatti MD,  Brinda Kelley DO, Vashti Calles MD, Mikey Soni MD, Sam Dasilva MD, Gilberto Victor MD, Hoa Silva MD, Tj Palacios MD, Tala Herrmann MD, Joycelyn Shipley, [facility], [facility], CNP, Kellie Funes, CNP, Sergio Christie, CNS, Obey Rizzo, CNP, Daniela Sherman, CNP, Howie Martinez, CNP, Buddy Baltazar, CNP, Abdullahi Tee, CNP, Adin Gordon PA-C, Oseas Espinoza, [facility], Zonia Leo, CNP, Tania Yaeñz, CNP, Ace Kauffman, CNP, Henry Leslie, CNP, Digna Miller, CNP, Pastor Sue, CNP, Dori Kruger, CNP, Marly Flynn, [facility]    Discharge Summary     Patient ID: Laci Johnson  :  1970   MRN: [id number]     ACCOUNT:  [de-identified]   Patient's PCP: Odilia Hollis MD  Admit Date: 2021   Discharge Date: 2021     Length of Stay: 9  Code Status:  Full Code  Admitting Physician: Liz Islas DO  Discharge Physician: Tiffani Green DO     Active Discharge Diagnoses:     Hospital Problem Lists:  Principal Problem:    Chest pain at rest  Active Problems:    Type 2 diabetes mellitus with renal manifestations [facility])    Current smoker    Acute diastolic congestive heart failure ([place] [address].)    Morbid obesity with BMI of 45.0-49.9, adult ([place] [address].)    COPD exacerbation ([place] [address].)    ESRD (end stage renal disease) on dialysis [facility])    Essential hypertension    Absolute anemia    Atrial flutter ([address]    Hypoglycemia  Resolved Problems:    * No resolved hospital problems.  *      Admission Condition:  poor     Discharged Condition: stable    Hospital Stay:     Hospital Course:  Laci Johnson is a 46 y.o. female who was 13 08/21/2021    BILITOT 0.46 08/21/2021    LABALBU 3.7 08/21/2021    ALBUMIN NOT REPORTED 08/21/2021    LABGLOM 9 08/29/2021    GFRAA 11 08/29/2021    GFR      08/29/2021    GFR NOT REPORTED 08/29/2021    PROT 7.4 08/21/2021    CALCIUM 8.6 08/29/2021        Radiology:  US GALLBLADDER RUQ    Result Date: 8/23/2021  1. Partially contracted gallbladder without evidence of cholelithiasis or cholecystitis. 2. Mildly heterogeneous appearance of the liver which is nonspecific but may represent hepatocellular disease. 3. Apparent portal vein biphasic flow which can be seen in setting of right heart failure. 4. Minimal perihepatic ascites. Consultations:    Consults:     Final Specialist Recommendations/Findings:   IP CONSULT TO NEPHROLOGY  IP CONSULT TO GENERAL SURGERY  IP CONSULT TO CARDIOLOGY  IP CONSULT TO GI      The patient was seen and examined on day of discharge and this discharge summary is in conjunction with any daily progress note from day of discharge. Discharge plan:     Disposition: Home    Physician Follow Up:     [id number]  [address] [facility] [place] [address]  [address] [facility]    Bina Sanchez MD  [address]   [address] [address]  [phone]    In 1 week      Jerry Brantley, [address] [facility] [address]  [phone]    In 2 weeks      [facility], [address] [facility]  [address] [facility]  [phone]    In 3 weeks         Requiring Further Evaluation/Follow Up POST HOSPITALIZATION/Incidental Findings: will need outpatient colonoscopy and hopefully be able to get anticoagulation after that    Diet: diabetic diet and renal diet    Activity: As tolerated    Instructions to Patient: take medications as prescribed      Discharge Medications:      Medication List      START taking these medications    hydrocortisone 2.5 % Crea rectal cream  Commonly known as: ANUSOL-HC  Apply bid to hemorrhoids     metoprolol tartrate 25 MG tablet  Commonly known as: LOPRESSOR  Take 1 tablet by mouth 2 times daily     pantoprazole 40 MG tablet  Commonly known as: PROTONIX  Take 1 tablet by mouth every morning (before breakfast)  Start taking on: August 30, 2021        CHANGE how you take these medications    atorvastatin 40 MG tablet  Commonly known as: LIPITOR  Take 1 tablet by mouth nightly  What changed:   medication strength  how much to take     guaiFENesin 600 MG extended release tablet  Commonly known as: MUCINEX  Take 1 tablet by mouth 2 times daily as needed for Congestion  What changed:   when to take this  reasons to take this     midodrine 5 MG tablet  Commonly known as: PROAMATINE  Take 3 tablets by mouth 3 times daily (with meals)  What changed:   how much to take  when to take this  reasons to take this     [address]  What changed: when to take this     nitroGLYCERIN 0.4 MG SL tablet  Commonly known as: NITROSTAT  PLACE 1 TABLET UNDER THE TONGUE EVERY 5 MINUTES AS NEEDED FOR CHEST PAIN**IF NO RELIEF AFTER 3 DOSES CALL 911 OR DR**  What changed: See the new instructions. sennosides-docusate sodium 8.6-50 MG tablet  Commonly known as: SENOKOT-S  What changed: Another medication with the same name was removed. Continue taking this medication, and follow the directions you see here.         CONTINUE taking these medications    * albuterol (2.5 MG/3ML) 0.083% nebulizer solution  Commonly known as: PROVENTIL  INHALE THE CONTENTS OF ONE VIAL VIA NEBULIZER EVERY 6 HOURS AS NEEDED FOR SHORTNESS OF BREATH OR WHEEZING     * albuterol sulfate  (90 Base) MCG/ACT inhaler  Commonly known as: ProAir HFA  Inhale 2 puffs into the lungs every 6 hours as needed for Wheezing     Alcohol Pads 70 % Pads  USE TO CLEAN TESTING AND INJECTION SITES TWICE DAILY     aspirin 81 MG EC tablet  TAKE 1 TABLET BY MOUTH DAILY     calcitRIOL 0.25 MCG capsule  Commonly known as: ROCALTROL     * PhosLo 667 MG Caps  Generic drug: Calcium Acetate (Phos Binder)     * Calcium Acetate (Phos Binder) 667 MG Caps     Diphen 25 MG tablet  Generic drug: diphenhydrAMINE     fluticasone-salmeterol 250-50 MCG/DOSE Aepb  Commonly known as: ADVAIR  INHALE 1 PUFF BY MOUTH INTO THE LUNGS TWICE DAILY **RINSE MOUTH AFTER EACH USE**     furosemide 40 MG tablet  Commonly known as: Lasix  Take 1 tablet by mouth daily     * glucose monitoring kit  1 kit by Does not apply route daily     * True Metrix Meter w/Device Kit  USE TO TEST BLOOD GLUCOSE     Lantus SoloStar 100 UNIT/ML injection pen  Generic drug: insulin glargine  INJECT 15 UNITS SUBCUTANEOUSLY DAILY AT NIGHT     MIRCERA IJ     Misc. Devices Misc  1 each by Does not apply route daily Electric scooter     OneTouch Ultra strip  Generic drug: blood glucose test strips  USE TO TEST BLOOD SUGAR TWICE DAILY AS DIRECTED     OXYGEN     * Pharmacist Choice Lancets Misc  USE TO TEST BLOOD GLUCOSE ONCE A DAY     * BD Lancet Ultrafine 30G Misc  TEST TWICE DAILY     * Easy Comfort Lancets Misc  USE TO TEST BLOOD SUGAR TWICE DAILY AS DIRECTED     polyethylene glycol 17 GM/SCOOP powder  Commonly known as: GLYCOLAX     RenaPlex-D Tabs     Sensipar 90 MG tablet  Generic drug: cinacalcet     Simple Diagnostics Lancing Dev Misc  USE WITH LANCETS TO TEST BLOOD GLUCOSE     Kt Doc Control Normal Soln  USE TO PERIODICALLY CHECK GLUCOSE MONITOR ACCORDING TO THE MANUAL     * Unifine Pentips 31G X 6 MM Misc  Generic drug: Insulin Pen Needle  USE WITH insulin pens ONCE daily     * Easy Comfort Pen Needles 31G X 5 MM Misc  Generic drug: Insulin Pen Needle  USE TO INJECT INSULIN ONCE DAILY         * This list has 11 medication(s) that are the same as other medications prescribed for you. Read the directions carefully, and ask your doctor or other care provider to review them with you.             STOP taking these medications    dilTIAZem 180 MG extended release capsule  Commonly known as: CARDIZEM CD lisinopril 5 MG tablet  Commonly known as: PRINIVIL;ZESTRIL           Where to Get Your Medications      These medications were sent to [facility], [address] [place] [address]  [address] [place] [address]    Phone: [phone]   albuterol sulfate  (90 Base) MCG/ACT inhaler  atorvastatin 40 MG tablet  hydrocortisone 2.5 % Crea rectal cream  metoprolol tartrate 25 MG tablet  midodrine 5 MG tablet  nitroGLYCERIN 0.4 MG SL tablet  pantoprazole 40 MG tablet     Information about where to get these medications is not yet available    Ask your nurse or doctor about these medications  guaiFENesin 600 MG extended release tablet         No discharge procedures on file. Time Spent on discharge is  37 mins in patient examination, evaluation, counseling as well as medication reconciliation, prescriptions for required medications, discharge plan and follow up. Electronically signed by   Anuj Green DO  8/29/2021  11:04 AM      Thank you Dr. Ashok Lincoln MD for the opportunity to be involved in this patient's care.

## 2021-08-29 NOTE — PROGRESS NOTES
[facility]  Progress Note                   Date:   8/29/2021  Patient name: Prosper Coates  Date of admission:  8/20/2021  2:57 PM  MRN:   [id number]  YOB: 1970  PCP: Aakash Macias MD    Reason for Admission: Chest pain [R07.9]  Chest pain, unspecified type [R07.9]    Subjective:       Clinical Changes /Abnormalities:  Patient seen and examined in room. D/w nursing  HR improved  Still not getting oral cardizem. Review of Systems    Medications:   Scheduled Meds:   metoprolol tartrate  25 mg Oral BID    midodrine  15 mg Oral TID WC    pantoprazole  40 mg Oral QAM AC    triamcinolone   Topical BID    polyethylene glycol  17 g Oral Daily    lactobacillus  1 tablet Oral Daily    hydrocortisone   Rectal BID    Calcium Acetate (Phos Binder)  2 capsule Oral TID WC    lidocaine 1 % injection  5 mL Intradermal Once    sodium chloride flush  5-40 mL IntraVENous 2 times per day    aspirin  81 mg Oral Daily    budesonide-formoterol  2 puff Inhalation BID    [Held by provider] furosemide  40 mg Oral Daily    guaiFENesin  600 mg Oral BID    insulin glargine  15 Units Subcutaneous Nightly    sennosides-docusate sodium  1 tablet Oral Daily    atorvastatin  40 mg Oral Nightly    insulin lispro  0-12 Units Subcutaneous TID WC    insulin lispro  0-6 Units Subcutaneous Nightly    [Held by provider] heparin (porcine)  5,000 Units Subcutaneous 3 times per day     Continuous Infusions:   sodium chloride      dextrose       CBC:   Recent Labs     08/27/21  2300 08/28/21  0951 08/29/21  0506   WBC  --   --  8.5   HGB 7.3* 8.0* 8.1*   PLT  --   --  132*     BMP:    Recent Labs     08/27/21  0749 08/29/21  0506   * 132*   K 4.8 4.8   CL 94* 95*   CO2 25 25   BUN 25* 22*   CREATININE 5.06* 4.85*   GLUCOSE 69* 97     Hepatic:No results for input(s): AST, ALT, ALB, BILITOT, ALKPHOS in the last 72 hours. Troponin: No results for input(s): TROPHS in the last 72 hours.   BNP: No results for input(s): BNP in the last 72 hours. Lipids: No results for input(s): CHOL, HDL in the last 72 hours. Invalid input(s): LDLCALCU  INR: No results for input(s): INR in the last 72 hours. DIAGNOSTIC DATA    EKG: Initial EKG showed normal sinus rhythm repeat EKG today showed atrial flutter with controlled ventricular response    ECHO 8/23/2021  Mild left ventricular hypertrophy  Global left ventricular systolic function is normal  Estimated ejection fraction is 65 % . Right atrium is severely dilated . Right ventricular with severe dilatation with severely reduced systolic  function. Severe tricuspid regurgitation. Moderate to severe pulmonary hypertension. No pericardial effusion seen. Normal aortic root dimension. Echocardiogram 7/2/20/2019:  Left ventricle is mildly dilated with normal wall thickness. Overall systolic function appears mildly reduced, with an estimated EF 45%. Grade II (moderate) left ventricular diastolic dysfunction. Left atrium is moderately dilated. Mitral valve sclerosis without stenosis. Mild to moderate mitral regurgitation. Moderate tricuspid regurgitation. Estimated right ventricular systolic pressure is 78.9 mmHg, suggests mild  pulmonary HTN. Trivial pulmonic insufficiency. Trivial pericardial effusion. CATH 3/15/2016  Angiographic Findings      Cardiac Arteries and Lesion Findings     LMCA: Normal 0% stenosis.     LAD: Mild irregularities 10-20%.     LCx: Normal 0% stenosis.     RCA: Mild irregularities 20-30%.     Coronary Tree      Dominance: Right     LV Analysis  LV function assessed as:Normal.  Ejection Fraction  +----------------------------------------------------------------------+---+  ! Method                                                                ! EF%! +----------------------------------------------------------------------+---+  ! LV gram                                                               !50 !  +----------------------------------------------------------------------+---+       Objective:   Vitals: BP 98/69   Pulse 80   Temp 97.5 °F (36.4 °C) (Oral)   Resp 16   Ht 4' 11\" (1.499 m)   Wt 252 lb (114.3 kg)   LMP 11/12/2014 (Approximate)   SpO2 91%   BMI 50.90 kg/m²   General appearance: alert and cooperative with exam  HEENT: Head: Normocephalic, no lesions, without obvious abnormality. Neck:no JVD, trachea midline, no adenopathy  Lungs: Clear to auscultation, dim bases  Heart: Irregular rate and rhythm, s1/s2 auscultated, no murmurs. Afib 90-100s  Abdomen: soft, non-tender, bowel sounds active  Extremities: no edema  Neurologic: not done        Assessment / Acute Cardiac Problems:   1. Atypical chest pain   2. New onset A Flutter with variable AV Block  3. Mild LV dysfunction on ECHO 2019 LVEF 45%  4. Minimal CAD on cath 2016  5. HTN  6. HLD  7. CKD on HD  8. Morbid obesity  9. Pulmonary HTN      Plan of Treatment:   1. Atypical chest pain. No plans for ischemic work up per Dr Anthony Townsend consult. Negative cath 2016. Continue ASA, statin. 2. HFpEF.  ECHO reviewed  LVEF improved to 65% from previous ECHO 2019. Fluid management per nephrology  Appreciate recs. 3. A Flutter rate controlled- HR 80s. Stop cardizem (was getting it due to low BP) will use lopressor 25 mg po bid instead. S/p Dig IV x 1.   4. Hypotension- improved on midodrine 15 tid. 5. Anemia- s/p EGD. Remains anemic. Keep off AC. No objection for d/c off AC due to persistent anemia, until outpatient C-Scope. Follow up in 2 weeks.      D/w nursing    Electronically signed by Lloyd Aly DO on 8/29/2021 at 7:44 AM  [address]  [phone]

## 2021-08-29 NOTE — PROGRESS NOTES
Renal Progress Note    Patient :  Ananda Curry; 46 y.o. MRN# [id number]  Location:  2034/2034-01  Attending:  Abraham Green DO  Admit Date:  8/20/2021   Hospital Day: 9      Subjective:     Patient was seen and examined. Hemoglobin stable, no more bright red blood per rectum. Colonoscopy deferred, EGD done yesterday, areas of erosion hiatal hernia found  At dialysis yesterday 1 L removed, blood pressures in the  range. Oral intake has been resumed, ProAmatine continues  Patient complaining of bloating on her mid abdominal area. Blood pressure in the 80s. Requesting additional dialysis which may not lead to improvement in ultrafiltration volume due to hypotension. She is requesting additional dose of Lasix which is acceptable. Otherwise doing well.   Outpatient Medications:     Medications Prior to Admission: Calcium Acetate, Phos Binder, (PHOSLO) 667 MG CAPS, Take 2 capsules by mouth as needed (snacks)   calcitRIOL (ROCALTROL) 0.25 MCG capsule, Take 0.25 mcg by mouth three times a week  Methoxy PEG-Epoetin Beta (MIRCERA IJ), Inject 225 mcg as directed every 14 days  cinacalcet (SENSIPAR) 90 MG tablet, Take 90 mg by mouth three times a week Indications: after dialysis  sennosides-docusate sodium (SENOKOT-S) 8.6-50 MG tablet, Take 1 tablet by mouth daily as needed for Constipation  diphenhydrAMINE (DIPHEN) 25 MG tablet, Take 25 mg by mouth 2 times daily as needed for Itching   polyethylene glycol (GLYCOLAX) 17 GM/SCOOP powder, Take 17 g by mouth daily as needed   Multiple Vitamins-Minerals (RENAPLEX-D) TABS, Take 1 tablet by mouth daily   aspirin 81 MG EC tablet, TAKE 1 TABLET BY MOUTH DAILY  fluticasone-salmeterol (ADVAIR) 250-50 MCG/DOSE AEPB, INHALE 1 PUFF BY MOUTH INTO THE LUNGS TWICE DAILY **RINSE MOUTH AFTER EACH USE**  furosemide (LASIX) 40 MG tablet, Take 1 tablet by mouth daily  albuterol (PROVENTIL) (2.5 MG/3ML) 0.083% nebulizer solution, INHALE THE CONTENTS OF ONE VIAL VIA NEBULIZER EVERY 6 DAILY (Patient taking differently: Take 1 tablet by mouth daily as needed )  Misc. Devices MISC, 1 each by Does not apply route daily Electric scooter  glucose monitoring kit (FREESTYLE) monitoring kit, 1 kit by Does not apply route daily  [DISCONTINUED] SENSIPAR 30 MG tablet, Take 1 tablet by mouth daily  OXYGEN, Inhale into the lungs O2  3L  PER NASAL CANNULA NIGHTLY  [DISCONTINUED] Misc. Devices (DIGITAL GLASS SCALE) MISC, Diagnosis: Diastolic Congestive heart failure    Current Medications:     Scheduled Meds:    metoprolol tartrate  25 mg Oral BID    furosemide  80 mg IntraVENous Once    midodrine  15 mg Oral TID WC    pantoprazole  40 mg Oral QAM AC    triamcinolone   Topical BID    polyethylene glycol  17 g Oral Daily    lactobacillus  1 tablet Oral Daily    hydrocortisone   Rectal BID    Calcium Acetate (Phos Binder)  2 capsule Oral TID WC    lidocaine 1 % injection  5 mL Intradermal Once    sodium chloride flush  5-40 mL IntraVENous 2 times per day    aspirin  81 mg Oral Daily    budesonide-formoterol  2 puff Inhalation BID    [Held by provider] furosemide  40 mg Oral Daily    guaiFENesin  600 mg Oral BID    insulin glargine  15 Units Subcutaneous Nightly    sennosides-docusate sodium  1 tablet Oral Daily    atorvastatin  40 mg Oral Nightly    insulin lispro  0-12 Units Subcutaneous TID WC    insulin lispro  0-6 Units Subcutaneous Nightly    [Held by provider] heparin (porcine)  5,000 Units Subcutaneous 3 times per day     Continuous Infusions:    sodium chloride      dextrose       PRN Meds:  albumin human, albuterol sulfate HFA, albuterol, perflutren lipid microspheres, diphenhydrAMINE, sodium chloride flush, sodium chloride, fentanNYL, oxyCODONE-acetaminophen, glucose, dextrose, glucagon (rDNA), dextrose, ondansetron **OR** ondansetron, acetaminophen **OR** acetaminophen, magnesium hydroxide, nitroGLYCERIN    Input/Output:       I/O last 3 completed shifts:   In: 26 [P.O.:440; I.V.:20]  Out: - .      Patient Vitals for the past 96 hrs (Last 3 readings):   Weight   21 0400 252 lb (114.3 kg)   21 0400 252 lb (114.3 kg)   21 2130 252 lb 3.3 oz (114.4 kg)       Vital Signs:   Temperature:  Temp: 97.7 °F (36.5 °C)  TMax:   Temp (24hrs), Av.2 °F (36.2 °C), Min:96.3 °F (35.7 °C), Max:97.7 °F (36.5 °C)    Respirations:  Resp: 16  Pulse:   Pulse: (!) 0  BP:    BP: (!) 81/53  BP Range: Systolic (21LMJ), VGK:05 , Min:81 , TGO:632       Diastolic (07WPH), QTM:99, Min:53, Max:74      Physical Examination:     General:  Alert and oriented x3  HEENT: Traumatic. Eyes:   Pupils equal, round and reactive to light, EOMI. Neck:   Supple  Chest:   Bilateral vesicular breath sounds, no rales or wheezes. Cardiac:  S1 S2 RR, no murmurs, gallops or rubs. Abdomen: Soft and nontender lateral abdominal wall edema was present   :   No suprapubic or flank tenderness. Neuro:  AAO x 3, No FND. SKIN:  No rashes, good skin turgor. Extremities:  Chronic skin changes    Labs:       Recent Labs     21  2300 21  0951 21  0506   WBC  --   --  8.5   RBC  --   --  2.45*   HGB 7.3* 8.0* 8.1*   HCT 26.8* 28.8* 29.0*   MCV  --   --  118.4*   MCH  --   --  33.1   MCHC  --   --  27.9*   RDW  --   --  17.6*   PLT  --   --  132*   MPV  --   --  10.8      BMP:   Recent Labs     21  0749 21  0506   * 132*   K 4.8 4.8   CL 94* 95*   CO2 25 25   BUN 25* 22*   CREATININE 5.06* 4.85*   GLUCOSE 69* 97   CALCIUM 8.3* 8.6      Radiology:     Reviewed. Assessment:     1. ESRD on Hemodialysis. His regular HD days are MWF at [facility] hemodialysis facility using LUE AVF under Dr. Esdras Morfin. His dry weight is 106.5 kg. Based on weight patient gained 2 pounds from last dialysis. 2.    Right red blood per rectum and for colonoscopy today  3. Hypertension blood pressure is under fair control 4. Fatigue. 5.  Hypotension. 6.    Obesity  7.   Anemia chronic in nature due to ESRD further complicated by GI losses  Plan:   1. Lasix 80 mg IV x1 dose  2. Colonoscopy per GI service, has been deferred  3. Stable for discharge  4. Increase ProAmatine to 15 3 times daily  5. Next dialysis tomorrow at the outpatient facility   Nutrition   Please ensure that patient is on a renal diet/TF. Avoid nephrotoxic drugs/contrast exposure. We will continue to follow along with you.

## 2021-08-30 NOTE — PLAN OF CARE
Problem: Pain:  Goal: Pain level will decrease  Description: Pain level will decrease  Outcome: Ongoing  Goal: Control of acute pain  Description: Control of acute pain  Outcome: Ongoing  Goal: Control of chronic pain  Description: Control of chronic pain  Outcome: Ongoing     Problem: SAFETY  Goal: Free from accidental physical injury  Outcome: Ongoing  Goal: Free from intentional harm  Outcome: Ongoing     Problem: DAILY CARE  Goal: Daily care needs are met  Outcome: Ongoing     Problem: PAIN  Goal: Patient's pain/discomfort is manageable  Outcome: Ongoing     Problem: SKIN INTEGRITY  Goal: Skin integrity is maintained or improved  Outcome: Ongoing     Problem: KNOWLEDGE DEFICIT  Goal: Patient/S.O. demonstrates understanding of disease process, treatment plan, medications, and discharge instructions.   Outcome: Ongoing     Problem: DISCHARGE BARRIERS  Goal: Patient's continuum of care needs are met  Outcome: Ongoing     Problem: Falls - Risk of:  Goal: Will remain free from falls  Description: Will remain free from falls  Outcome: Ongoing  Goal: Absence of physical injury  Description: Absence of physical injury  Outcome: Ongoing     Problem: Skin Integrity:  Goal: Will show no infection signs and symptoms  Description: Will show no infection signs and symptoms  Outcome: Ongoing  Goal: Absence of new skin breakdown  Description: Absence of new skin breakdown  Outcome: Ongoing     Problem: Nutrition  Goal: Optimal nutrition therapy  Outcome: Ongoing

## 2021-08-30 NOTE — CARE COORDINATION
Franc[facility] Transitions Initial Follow Up Call    Call within 2 business days of discharge: Yes    Patient: Sebasitan Trinh Patient : 1970   MRN: [id number]  Reason for Admission: Chest pain  Discharge Date: 21 RARS: Readmission Risk Score: 32      Last Discharge [facility]       Complaint Diagnosis Description Type Department Provider    21 Chest Pain; Nausea; Fatigue Chest pain, unspecified type . .. ED to Hosp-Admission (Discharged) (ADMITTED) [facility] Mc Morales P Blood, DO; [facility] ..           1st attempt to reach patient for Care Transitions. [facility] requesting return call. Contact information provided. [phone]    Facility: [facility]    Non-face-to-face services provided:  Obtained and reviewed discharge summary and/or continuity of care documents    Care Transitions 24 Hour Call    Care Transitions Interventions         Follow Up  No future appointments.     Jenna Bales RN

## 2021-08-30 NOTE — PROGRESS NOTES
Pt discharged with all belongings and discharge instructions at this time. Pt was wheeled to [facility] where she left in a cab for home.

## 2021-08-31 NOTE — TELEPHONE ENCOUNTER
----- Message from Leeanne Stafford sent at 8/31/2021  2:44 PM EDT -----  Subject: Message to Provider    QUESTIONS  Information for Provider? Patient states that she needs the PCP Dena Sultana to contact [facility] [phone] concerning the   potable tank that she needs. Patient states that the PCP would get the   request paperwork needed to proceed. ---------------------------------------------------------------------------  --------------  Russ YEH  What is the best way for the office to contact you? OK to leave message on   voicemail  Preferred Call Back Phone Number? [phone]  ---------------------------------------------------------------------------  --------------  SCRIPT ANSWERS  Relationship to Patient?  Self

## 2021-08-31 NOTE — CARE COORDINATION
[facility] Transitions Initial Follow Up Call    Call within 2 business days of discharge: Yes    Patient: Mary Hubbard Patient : 1970   MRN: [id number]    Discharge Date: 21 RARS: Readmission Risk Score: 32      Last Discharge [facility]       Complaint Diagnosis Description Type Department Provider    21 Chest Pain; Nausea; Fatigue Chest pain, unspecified type . .. ED to Hosp-Admission (Discharged) (ADMITTED) GABRIELA CVHOWARD GARCIA Blood, DO; [facility] [place] [facility] .. Spoke with: [address]: [facility]    Non-face-to-face services provided:  Obtained and reviewed discharge summary and/or continuity of care documents  Reviewed and followed up on pending diagnostic tests and treatments-prior to call  Education of patient/family/caregiver/guardian to support self-management-Medications, s/s to report to physician  Assessment and support for treatment adherence and medication management-Medications reviewed    Care Transitions 24 Hour Call    Care Transitions Interventions         Follow Up  No future appointments. Rachel Agosto RNTransitions of Care Initial Call    Was this an external facility discharge? No Discharge Facility: [facility] to be reviewed by the provider   Additional needs identified to be addressed with provider: No  none             Method of communication with provider : none      Advance Care Planning:   Does patient have an Advance Directive: reviewed and current, reviewed and needs to be updated, not on file; education provided, not on file, patient declined education, decision maker updated and referral to internal ACP facilitator. Was this a readmission?  No  Patient stated reason for admission: sick stomach  Patients top risk factors for readmission: functional physical ability and medical condition-oxygen dependent, REnal failure requiring dialysis    Care Transition Nurse (CTN) contacted the patient by telephone to perform post hospital discharge assessment. Verified name and  with patient as identifiers. Provided introduction to self, and explanation of the CTN role. CTN reviewed discharge instructions, medical action plan and red flags with patient who verbalized understanding. Patient given an opportunity to ask questions and does not have any further questions or concerns at this time. Were discharge instructions available to patient? Yes. Reviewed appropriate site of care based on symptoms and resources available to patient including: PCP, Specialist and When to call 911. The patient agrees to contact the PCP office for questions related to their healthcare. Medication reconciliation was performed with patient, who verbalizes understanding of administration of home medications. Advised obtaining a 90-day supply of all daily and as-needed medications. Covid Risk Education     Educated patient about risk for severe COVID-19 due to risk factors according to CDC guidelines. CTN reviewed discharge instructions, medical action plan and red flag symptoms with the patient who verbalized understanding. Discussed COVID vaccination status: Yes. Education provided on COVID-19 vaccination as appropriate. Discussed exposure protocols and quarantine with CDC Guidelines. Patient was given an opportunity to verbalize any questions and concerns and agrees to contact her health care provider for questions related to their healthcare. Reviewed and educated patient on any new and changed medications related to discharge diagnosis. Was patient discharged with a pulse oximeter? No Discussed and confirmed pulse oximeter discharge instructions and when to notify provider or seek emergency care. Plan for next call: self management-Bood sugar is tested daily, this am was 96 before breakfast.   and follow up appointment-see nephrology at dialysis, is monitored and assessed at this appointment. Id having regular BM's. Eating OK. Denies CP, SOB. [facility] arrived during call, admitting to services.

## 2021-09-07 NOTE — CARE COORDINATION
of care documents      Non-[facility] follow up appointment(s):     CTN provided contact information for future needs. Plan for follow-up call in 7-10 days based on severity of symptoms and risk factors. Plan for next call: follow up              Care Transitions Subsequent and Final Call    Subsequent and Final Calls  Do you have any ongoing symptoms?: Yes  Onset of Patient-reported symptoms: In the past 7 days  Patient-reported symptoms: Fatigue  Have your medications changed?: No  Do you have any questions related to your medications?: No  Do you currently have any active services?: Yes  Are you currently active with any services?: Home Health  Do you have any needs or concerns that I can assist you with?: No  Care Transitions Interventions  Other Interventions: Follow Up  No future appointments.     Angel Luis Oneil RN

## 2021-09-14 NOTE — CARE COORDINATION
[facility] Transitions Follow Up Call    2021    Patient: Lizzie Mckeon  Patient : 1970   MRN: [id number]  Reason for Admission: Chest pain  Discharge Date: 21 RARS: Readmission Risk Score: 32         Attempt to reach patient for Care Transitions. [facility] requesting return call. Contact information provided. [phone]    Care Transitions Subsequent and Final Call    Subsequent and Final Calls  Are you currently active with any services?: Home Health  Care Transitions Interventions  Other Interventions: Follow Up  No future appointments.     Nazia Escalante RN

## 2021-09-16 NOTE — CARE COORDINATION
[facility] Transitions Follow Up Call    2021    Patient: Santos Singleton  Patient : 1970   MRN: [id number]  Reason for Admission: Chest pain  Discharge Date: 21 RARS: Readmission Risk Score: 32         Spoke with: Santos Singleton    Was able to contact [facility] for transitional outreach. She stated that she was doing a\"ok\". She denied shortness of breath, swelling, dizziness/lightheadedness, chest pain or swelling. She did say that her hemorrhoids continue to bleed and there is blood in the toilet and when she wipes. She is taking a stool softener. She said that she has to follow up with the GI physician. Offered to call and make appointment, but she declined. Name and number of physician supplied. She had no further concerns or questions. She did say that she she does not have home care, she did not need their services. Care Transitions Follow Up Call    Needs to be reviewed by the provider   Additional needs identified to be addressed with provider: No  none             Method of communication with provider : none      Care Transition Nurse (CTN) contacted the patient by telephone to follow up after admission on 21. Verified name and  with patient as identifiers. Addressed changes since last contact: none  Discussed follow-up appointments. If no appointment was previously scheduled, appointment scheduling offered: Yes. Is follow up appointment scheduled within 7 days of discharge? No.      CTN reviewed discharge instructions, medical action plan and red flags with patient and discussed any barriers to care and/or understanding of plan of care after discharge. Discussed appropriate site of care based on symptoms and resources available to patient including: PCP, Specialist and When to call 911. The patient agrees to contact the PCP office for questions related to their healthcare.      Patients top risk factors for readmission: lack of knowledge about disease and medical condition-ESRD  Interventions to address risk factors: Obtained and reviewed discharge summary and/or continuity of care documents      Non-[facility] follow up appointment(s):     CTN provided contact information for future needs. Plan for follow-up call in 7-10 days based on severity of symptoms and risk factors. Plan for next call: follow up            Care Transitions Subsequent and Final Call    Subsequent and Final Calls  Do you have any ongoing symptoms?: Yes  Onset of Patient-reported symptoms: In the past 7 days  Patient-reported symptoms: Other  Have your medications changed?: No  Do you have any questions related to your medications?: No  Do you currently have any active services?: No  Do you have any needs or concerns that I can assist you with?: No  Care Transitions Interventions  Other Interventions: Follow Up  No future appointments.     Zora Xavier RN

## 2021-09-23 NOTE — CARE COORDINATION
[facility] Transitions Follow Up Call    2021    Patient: Norris Diaz  Patient : 1970   MRN: [id number]  Reason for Admission: Chest pain  Discharge Date: 21 RARS: Readmission Risk Score: 32       Attempt to reach patient for Care Transitions. [facility] requesting return call. Contact information provided. [phone]    Care Transitions Subsequent and Final Call    Subsequent and Final Calls  Are you currently active with any services?: Home Health  Care Transitions Interventions  Other Interventions:            Follow Up  Future Appointments   Date Time Provider Micha Farrar   10/5/2021 12:45 PM Reginaldo Mccarty MD [facility] Kai Esquivel RN

## 2021-09-24 NOTE — CARE COORDINATION
[facility] Transitions Follow Up Call    2021    Patient: Perez Burnham  Patient : 1970   MRN: [id number]  Reason for Admission: Chest pain  Discharge Date: 21 RARS: Readmission Risk Score: 32         Attempt to reach patient for Care Transitions. [facility] requesting return call. Contact information provided. [phone]    Care Transitions Subsequent and Final Call    Subsequent and Final Calls  Are you currently active with any services?: Home Health  Care Transitions Interventions  Other Interventions:            Follow Up  Future Appointments   Date Time Provider Micha Farrar   10/5/2021 12:45 PM Jerry Brantley MD [facility] Stephenie Arriola RN

## 2021-09-26 NOTE — TELEPHONE ENCOUNTER
Last visit: 05/11/21  Last Med refill: 12/01/20  Does patient have enough medication for 72 hours: No:      Next Visit Mohan e:  Future Appointments   Date Time Provider Micha Farrar   10/5/2021 12:45 PM MD Lorena Stark Maintenance   Topic Date Due    Hepatitis C screen  Never done    Diabetic retinal exam  Never done    Hepatitis B vaccine (1 of 3 - Risk Recombivax 3-dose series) 02/25/1989    DTaP/Tdap/Td vaccine (1 - Tdap) Never done    Annual Wellness Visit (AWV)  Never done    Breast cancer screen  02/25/2020    Shingles Vaccine (1 of 2) Never done    Diabetic foot exam  05/27/2021    Flu vaccine (1) 09/01/2021    COVID-19 Vaccine (2 - Moderna 2-dose series) 09/07/2021    A1C test (Diabetic or Prediabetic)  08/20/2022    Lipid screen  08/21/2022    Colon Cancer Screen FIT/FOBT  08/24/2022    Potassium monitoring  08/29/2022    Creatinine monitoring  08/29/2022    Pneumococcal 0-64 years Vaccine (4 of 4 - PPSV23) 02/25/2035    HIV screen  Completed    Hepatitis A vaccine  Aged Out    Hib vaccine  Aged Out    Meningococcal (ACWY) vaccine  Aged Out       Hemoglobin A1C (%)   Date Value   08/20/2021 4.4   08/19/2019 4.6   03/22/2018 5.0             ( goal A1C is < 7)   Microalb/Crt.  Ratio (mcg/mg creat)   Date Value   04/29/2014 1,828     LDL Cholesterol (mg/dL)   Date Value   08/21/2021 18   07/02/2019 33       (goal LDL is <100)   AST (U/L)   Date Value   08/21/2021 13     ALT (U/L)   Date Value   08/21/2021 10     BUN (mg/dL)   Date Value   08/29/2021 22 (H)     BP Readings from Last 3 Encounters:   08/29/21 (!) 150/121   08/27/21 (!) 101/35   05/11/21 124/71          (goal 120/80)    All Future Testing planned in [facility]  Lab Frequency Next Occurrence               Patient Active Problem List:     Asthma     YONI (obstructive sleep apnea)     Left knee pain     Hidradenitis     Current smoker     Microalbuminuria     Type 2 diabetes mellitus with renal manifestations ([address]     CKD (chronic kidney disease) stage 4, GFR 15-29 ml/min (HCC)     Acute diastolic congestive heart failure (HCC)     Hyperkalemia     Acute systolic congestive heart failure (HCC)     Pulmonary hypertension (HCC)     Morbid obesity with BMI of 45.0-49.9, adult (HCC)     Acute on chronic respiratory failure with hypoxia (HCC)     COPD exacerbation (HCC)     Asthma exacerbation     Type 2 diabetes mellitus with diabetic nephropathy (HCC)     ESRD (end stage renal disease) on dialysis ([address]     Bronchitis     Essential hypertension     YONI on CPAP     Hyperglycemia, drug-induced     Needs smoking cessation education     Hyperglycemia     Drowsiness     Acute on chronic respiratory failure with hypercapnia ([address]     Mobility impaired     S/P cardiac cath-mINIMAL caD 3/15/16 - dR. 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)     Type 2 diabetes mellitus without complication (HCC)     Congestive heart failure (HCC)     Asthma with COPD with exacerbation (HCC)     Acute exacerbation of CHF (congestive heart failure) (HCC)     Chronic obstructive pulmonary disease (HCC)     Chronic kidney disease, stage V (HCC)     Acute respiratory acidosis     Precordial pain     Gastroesophageal reflux disease without esophagitis     Pulmonary HTN ([address]     Morbid obesity due to excess calories (HCC)     Symptomatic anemia     Elevated serum creatinine     ESRD needing dialysis ([address]     Chest pain at rest     Absolute anemia     Atrial flutter (HCC)     Chest pain     Hypoglycemia

## 2021-09-29 NOTE — CARE COORDINATION
[facility] Transitions Follow Up Call    2021    Patient: Priti Rodriguez  Patient : 1970   MRN: [id number]  Reason for Admission: Chest pain  Discharge Date: 21 RARS: Readmission Risk Score: 32         Attempt to reach patient for Care Transitions. [facility] requesting return call. Contact information provided. [phone]    Care Transitions Subsequent and Final Call    Subsequent and Final Calls  Are you currently active with any services?: Home Health  Care Transitions Interventions  Other Interventions:            Follow Up  Future Appointments   Date Time Provider Micha Farrar   10/5/2021 12:45 PM Shanice Deng MD [facility] RENU Amos RN

## 2021-09-30 NOTE — CARE COORDINATION
[facility] 45 Transitions Follow Up Call    2021    Patient: Mary Hubbard  Patient : 1970   MRN: [id number]  Reason for Admission: Chest pain  Discharge Date: 21 RARS: Readmission Risk Score: 32         Spoke with: Mary Hubbard    Was able to contact [facility] for final outreach. She stated that she was doing \"ok\". She denied chest pain or swelling. She said that she has weakness, but she feels that it is from dialysis. She said that her blood pressures still go low at dialysis, but they give her a medication to fix it. She still has blood at times when she defecates. She said that she is taking a stool softener and that seems to help. She did not have any rectal bleeding today. She said that she will be seeing the GI physician on 10/5. She also said that the nephrologist started her on an antibiotic to be given at dialysis. She said that he told her it had something to do with a high phosphorus. Informed of final outreach. She had not further concerns or questions. She said that she has a  that she call if she needs any thing. Care Transitions Follow Up Call          Needs to be reviewed by the provider    Additional needs identified to be addressed with provider: No  none                 Method of communication with provider : none        Care Transition Nurse (CTN) contacted the patient by telephone to follow up after admission on 21. Verified name and  with patient as identifiers.     Addressed changes since last contact: none  Discussed follow-up appointments. If no appointment was previously scheduled, appointment scheduling offered: Yes. Is follow up appointment scheduled within 7 days of discharge? No.        CTN reviewed discharge instructions, medical action plan and red flags with patient and discussed any barriers to care and/or understanding of plan of care after discharge.  Discussed appropriate site of care based on symptoms and resources available to patient including: PCP, Specialist and When to call 911. The patient agrees to contact the PCP office for questions related to their healthcare.      Patients top risk factors for readmission: lack of knowledge about disease and medical condition-ESRD  Interventions to address risk factors: Obtained and reviewed discharge summary and/or continuity of care documents        Non-[facility] follow up appointment(s):      CTN provided contact information for future needs. No further outreach. Episode ended. Care Transitions Subsequent and Final Call    Subsequent and Final Calls  Do you have any ongoing symptoms?: Yes  Onset of Patient-reported symptoms: In the past 7 days  Patient-reported symptoms: Abdominal Pain  Have your medications changed?: Yes  Patient Reports: stated getting an antibiotic at dialysis  Do you have any questions related to your medications?: No  Do you currently have any active services?: Yes  Are you currently active with any services?: Outpatient/Community Services  Do you have any needs or concerns that I can assist you with?: No  Care Transitions Interventions  Other Interventions:            Follow Up  Future Appointments   Date Time Provider Micha Farrar   10/5/2021 12:45 PM Jerry Brantley MD [facility] Stephenie Arriola RN

## 2021-10-05 NOTE — TELEPHONE ENCOUNTER
Pt called stating she needs to cancel today's appt. Pt advised Dr Liberty Solares is booking out for Jan 2022; pt voices understanding and still wanted to r/s appt.

## 2021-10-08 PROBLEM — I95.3 INTRA-DIALYTIC HYPOTENSION: Status: ACTIVE | Noted: 2021-01-01

## 2021-10-08 NOTE — PROGRESS NOTES
Dialysis Safety Checks:    Patient ID Verified (Y/N) Y     -Hepatitis Test                   Date      Result  Hepatitis B Surface Antigen   21      Hepatitis B Surface Antibody 20      Hepatitis B Core Antibody        -Treatment Initiation  Blood Vol Processed Goal (Liters)  Pump Speed x Treatment Hours x 60 Minutes    Target Fluid Removal 3000 ml     * Intra-treatment documented Safety Checks include the followin) Access and face visible at all times. 2) All connections and blood lines are secure with no kinks. 3) NVL alarm engaged. 4) Hemosafe device applied (if applicable). 5) No collapse of Arterial or Venous blood chambers. 6) All blood lines / pump segments in the air detectors.   --------------------------------------------------------------------------------    Report received from [address] in [place] [address]

## 2021-10-08 NOTE — ED NOTES
Bed: 22  Expected date: 10/8/21  Expected time: 12:15 PM  Means of arrival: [facility]  Comments:  [facility]     Nathan Clark, RN  10/08/21 [id number]

## 2021-10-08 NOTE — ED PROVIDER NOTES
[address] [place] [address] ED  EMERGENCY DEPARTMENT ENCOUNTER      Pt Name: Kristian Greene  MRN: [id number]  [place] 1970  Date of evaluation: 10/8/2021  Provider: Leyla Rome MD    [address] [place] [address]       Chief Complaint   Patient presents with    Hypotension         HISTORY OF PRESENT ILLNESS  (Location/Symptom, Timing/Onset, Context/Setting, Quality, Duration, Modifying Factors, Severity.)   Kristian Greene is a 46 y.o. female who presents to the emergency department because her blood pressure was low at dialysis today. Today was her normally scheduled dialysis today and when she went there she states they told her her blood pressure was too low to do dialysis and they sent her here. She had normal dialysis treatment 2 days ago and 2 days before that. She has not had a fever or cough but she feels more short of breath and feels like she has more fluid in her than she should. Symptoms started today and are continuous. Nursing Notes were reviewed.     ALLERGIES     Ibuprofen, Tramadol, Motrin [ibuprofen micronized], Strawberry extract, and Tape [adhesive tape]    CURRENT MEDICATIONS       Previous Medications    ALBUTEROL (PROVENTIL) (2.5 MG/3ML) 0.083% NEBULIZER SOLUTION    INHALE THE CONTENTS OF ONE VIAL VIA NEBULIZER EVERY 6 HOURS AS NEEDED FOR SHORTNESS OF BREATH OR WHEEZING    ALBUTEROL SULFATE HFA (PROAIR HFA) 108 (90 BASE) MCG/ACT INHALER    Inhale 2 puffs into the lungs every 6 hours as needed for Wheezing    ALCOHOL SWABS (ULTRA-CARE ALCOHOL PREP PADS) 70 % PADS    USE TO CLEAN TESTING AND INJECTING SITES TWICE DAILY    ASPIRIN 81 MG EC TABLET    TAKE 1 TABLET BY MOUTH DAILY    ATORVASTATIN (LIPITOR) 40 MG TABLET    Take 1 tablet by mouth nightly    BLOOD GLUCOSE CALIBRATION (RIGOBERTO DOC CONTROL) NORMAL SOLN    USE TO PERIODICALLY CHECK GLUCOSE MONITOR ACCORDING TO THE MANUAL    BLOOD GLUCOSE MONITORING SUPPL (TRUE METRIX METER) W/DEVICE KIT    USE TO TEST BLOOD GLUCOSE    BLOOD GLUCOSE TEST STRIPS (ONETOUCH ULTRA) STRIP    USE TO TEST BLOOD SUGAR TWICE DAILY AS DIRECTED    CALCITRIOL (ROCALTROL) 0.25 MCG CAPSULE    Take 0.25 mcg by mouth three times a week    CALCIUM ACETATE, PHOS BINDER, (PHOSLO) 667 MG CAPS    Take 2 capsules by mouth as needed (snacks)     CALCIUM ACETATE, PHOS BINDER, 667 MG CAPS    Take 2 capsules by mouth 3 times daily (with meals)     CINACALCET (SENSIPAR) 90 MG TABLET    Take 90 mg by mouth three times a week Indications: after dialysis    DIPHENHYDRAMINE (DIPHEN) 25 MG TABLET    Take 25 mg by mouth 2 times daily as needed for Itching     EASY COMFORT LANCETS MISC    USE TO TEST BLOOD SUGAR TWICE DAILY AS DIRECTED    FLUTICASONE-SALMETEROL (ADVAIR) 250-50 MCG/DOSE AEPB    INHALE 1 PUFF BY MOUTH INTO THE LUNGS TWICE DAILY **RINSE MOUTH AFTER EACH USE**    FUROSEMIDE (LASIX) 40 MG TABLET    Take 1 tablet by mouth daily    GLUCOSE MONITORING KIT (FREESTYLE) MONITORING KIT    1 kit by Does not apply route daily    GUAIFENESIN (MUCINEX) 600 MG EXTENDED RELEASE TABLET    Take 1 tablet by mouth 2 times daily as needed for Congestion    HYDROCORTISONE (ANUSOL-HC) 2.5 % CREA RECTAL CREAM    Apply bid to hemorrhoids    INSULIN PEN NEEDLE (EASY COMFORT PEN NEEDLES) 31G X 5 MM MISC    USE TO INJECT INSULIN ONCE DAILY    LANCET DEVICES (SIMPLE DIAGNOSTICS LANCING DEV) MISC    USE WITH LANCETS TO TEST BLOOD GLUCOSE    LANCETS (BD LANCET ULTRAFINE 30G) MISC    TEST TWICE DAILY    LANTUS SOLOSTAR 100 UNIT/ML INJECTION PEN    INJECT 15 UNITS SUBCUTANEOUSLY DAILY AT NIGHT    METHOXY PEG-EPOETIN BETA (MIRCERA IJ)    Inject 225 mcg as directed every 14 days    METOPROLOL TARTRATE (LOPRESSOR) 25 MG TABLET    Take 1 tablet by mouth 2 times daily    MIDODRINE (PROAMATINE) 5 MG TABLET    Take 3 tablets by mouth 3 times daily (with meals)    MISC.  DEVICES MISC    1 each by Does not apply route daily Electric scooter    MULTIPLE VITAMINS-MINERALS (RENAPLEX-D) TABS    Take 1 tablet by mouth daily     NITROGLYCERIN (NITROSTAT) 0.4 MG SL TABLET    PLACE 1 TABLET UNDER THE TONGUE EVERY 5 MINUTES AS NEEDED FOR CHEST PAIN**IF NO RELIEF AFTER 3 DOSES CALL 911 OR DR**    OXYGEN    Inhale into the lungs O2  3L  PER NASAL CANNULA NIGHTLY    PANTOPRAZOLE (PROTONIX) 40 MG TABLET    Take 1 tablet by mouth every morning (before breakfast)    PHARMACIST CHOICE LANCETS MISC    USE TO TEST BLOOD GLUCOSE ONCE A DAY    POLYETHYLENE GLYCOL (GLYCOLAX) 17 GM/SCOOP POWDER    Take 17 g by mouth daily as needed     SENNOSIDES-DOCUSATE SODIUM (SENOKOT-S) 8.6-50 MG TABLET    Take 1 tablet by mouth daily as needed for Constipation    SKIN PROTECTANTS, MISC. (Gene Sprinkle) CREA    APPLY TO AFFECTED AREA TOPICALLY AS NEEDED    UNIFINE PENTIPS 31G X 6 MM MISC    USE WITH insulin pens ONCE daily       PAST MEDICAL HISTORY         Diagnosis Date    Asthma     AS A CHILD    [facility]congestive [facility] failure) ([facility]) 2000    Chronic kidney disease     COPD (chronic obstructive pulmonary disease) ([facility] [address] 2000    INHALER USE AS NEEDED    Dialysis patient ([facility] [facility]     CENTRAL DIALYSIS MON, WEDS AND FRI, FLUID RESTRICTION 2L/24 HRS PER PT    Hemodialysis patient [facility])     had Dilaysis 8-17-16  M[facility]W-F @ [facility]    Hyperlipidemia     Hypertension 2000    ON RX    Obesity     YONI (obstructive sleep apnea) 2013    SLEEP STUDY -6/2016 AWAITING MACHINE, USING O2 AT NIGHT PRESENTLY    Pneumonia 2000    HAD TO BE ON VENTILATOR 12 DAYS    Renal failure     STARTED DIALYSIS 02/25/2016    Type II or unspecified type diabetes mellitus without mention of complication, not stated as uncontrolled 2000    IDDM    Ventilator dependence ([facility] [address]     X 2 200 AND 2014.  2014 HAD TO GO ON VENT POST OP       SURGICAL HISTORY           Procedure Laterality Date   [facility]    x3    HYSTERECTOMY  12/12/2014    TOTAL    SKIN FULL GRAFT  1983    CHEST-BIRTH YOU AND MOLES REMOVED    UPPER GASTROINTESTINAL ENDOSCOPY  08/27/2021    UPPER GASTROINTESTINAL ENDOSCOPY N/A 2021    EGD BIOPSY performed by Georges Post MD at [place] [id number]           Problem Relation Age of Onset    Diabetes Other     Cancer Mother         BREAST    Heart Disease Father         CAD-MI    Diabetes Father     High Blood Pressure Father     Diabetes Maternal Grandfather     Diabetes Paternal Grandfather     Heart Disease Paternal Grandfather     High Blood Pressure Paternal Grandfather      Family Status   Relation Name Status    Other  (Not Specified)    Mother Valeria [place] Father Kevin Pearson     Sister AMEYA Alive    Brother Kevin Pearson Alive    MGM      MGF      PGM      PGF      Sister SUSAN Alive    [address]      reports that she quit smoking about 4 years ago. Her smoking use included cigarettes. She quit after 7.00 years of use. She has never used smokeless tobacco. She reports that she does not drink alcohol and does not use drugs. REVIEW OF SYSTEMS    (2-9 systems for level 4, 10 or more for level 5)     Review of Systems   Constitutional: Negative for chills, fatigue and fever. HENT: Negative for congestion, ear discharge and facial swelling. Eyes: Negative for discharge and redness. Respiratory: Positive for shortness of breath. Negative for cough. Cardiovascular: Negative for chest pain. Gastrointestinal: Negative for abdominal pain, constipation, diarrhea and vomiting. Genitourinary: Negative for dysuria and hematuria. Musculoskeletal: Negative for arthralgias. Skin: Negative for color change and rash. Neurological: Negative for syncope, numbness and headaches. Hematological: Negative for adenopathy. Psychiatric/Behavioral: Negative for confusion. The patient is not nervous/anxious. Except as noted above the remainder of the review of systems was reviewed and negative.      PHYSICAL EXAM    (up to 7 for level 4, 8 or more for level 5) 10/8/2021 1:04 pm COMPARISON: 07/02/2019 HISTORY: ORDERING SYSTEM PROVIDED HISTORY: Shortness of breath, dialysis patient TECHNOLOGIST PROVIDED HISTORY: Shortness of breath, dialysis patient Reason for Exam: Pt c/o SOB, dialysis patient. AP UPRIGHT PORTABLE Acuity: Acute Type of Exam: Subsequent/Follow-up FINDINGS: The lungs are without acute focal process. There is no effusion or pneumothorax. The cardiomediastinal silhouette is stable. The osseous structures are stable. No acute process. Able cardiomegaly       LABS:  Labs Reviewed   CBC WITH AUTO DIFFERENTIAL - Abnormal; Notable for the following components:       Result Value    RBC 2.93 (*)     Hemoglobin 9.0 (*)     Hematocrit 32.9 (*)     .3 (*)     MCHC 27.4 (*)     RDW 16.5 (*)     NRBC Automated 0.3 (*)     Seg Neutrophils 68 (*)     Lymphocytes 22 (*)     All other components within normal limits   BASIC METABOLIC PANEL - Abnormal; Notable for the following components:    Glucose 115 (*)     BUN 23 (*)     CREATININE 5.08 (*)     Bun/Cre Ratio 5 (*)     Calcium 7.6 (*)     Potassium 3.1 (*)     GFR Non- 9 (*)     GFR  11 (*)     All other components within normal limits   TROP/MYOGLOBIN - Abnormal; Notable for the following components:    Troponin, High Sensitivity 50 (*)     Myoglobin 169 (*)     All other components within normal limits   BRAIN NATRIURETIC PEPTIDE - Abnormal; Notable for the following components:    Pro-BNP 45,514 (*)     All other components within normal limits   TROP/MYOGLOBIN       All other labs were within normal range or not returned as of this dictation.     EMERGENCY DEPARTMENT COURSE and DIFFERENTIAL DIAGNOSIS/MDM:   Vitals:    Vitals:    10/08/21 1221 10/08/21 1319 10/08/21 1330 10/08/21 1345   BP: (!) 131/118 (!) 81/56 85/73 (!) 64/52   Pulse: 110 100     Resp: 18 16     Temp: 98.1 °F (36.7 °C)      TempSrc: Oral      SpO2: 93% 100% 92%    Weight: 255 lb 8.2 oz (115.9 kg) Height: 4' 11\" (1.499 m)          Orders Placed This Encounter   Medications    midodrine (PROAMATINE) tablet 10 mg       Medical Decision Making: Her blood pressure is running low and she was given midodrine. Potassium 3.1 and chest x-ray shows no failure. She is being admitted. Findings are discussed with the patient. CONSULTS:  IP CONSULT TO HOSPITALIST    PROCEDURES:  None    FINAL IMPRESSION      1. Hypotension, unspecified hypotension type          DISPOSITION/PLAN   DISPOSITION Decision To Admit 10/08/2021 02:07:29 PM      PATIENT REFERRED TO:   No follow-up provider specified.     DISCHARGE MEDICATIONS:     New Prescriptions    No medications on file         (Please note that portions of this note were completed with a voice recognition program.  Efforts were made to edit the dictations but occasionally words are mis-transcribed.)    Axel Brennan MD  Attending Emergency Physician            Axel Brennan MD  10/08/21 [id number]

## 2021-10-08 NOTE — H&P
[facility]  Office: [address], DO, Gary Garcia, DO, Shanelle Paul, DO, Renetta Frausto Blood, DO, Otis Figueroa MD, Dima Black MD, Charlotte Lewis MD, Oz Robledo MD, Indu Fox MD, Christiano Baker MD, Pan Sanz MD, Kiarra Singh, DO, Bret Crisostomo, DO, Abdullahi Marion MD,  Mone Drew DO, Aaliyah Patel MD, David Bueno MD, Larry Pearson MD, America Tejada MD, Oskar Tony MD, Kiko Grant MD, Gary Bell, [facility], [facility], CNP, Tala Madrid, CNP, Madison Disla, CNS, Kasi Wells, CNP, Bryant Phillips, CNP, Rick Pinzon, CNP, Syed Mckinney, CNP, Kait Kingston, CNP, Go Dahl PA-C, Marilee Power, [facility], Andi Eye, [facility], Rosario Golden, CNP, Fran Cancel, CNP, Kash Car, CNP, Heriberto Fermo, CNP, Bella Distel, CNP, Harry Phillips, CNP         [address]    HISTORY AND PHYSICAL EXAMINATION            Date:   10/8/2021  Patient name:  Camilla Plummer  Date of admission:  10/8/2021 12:20 PM  MRN:   [id number]  Account:  [de-identified]  YOB: 1970  PCP:    Ana Rosa Hughes MD  Room:   [place] [address]  Code Status:    Prior    Chief Complaint:     Chief Complaint   Patient presents with    Hypotension       History Obtained From:     patient    History of Present Illness:     Camilla Plummer is a 46 y.o. Non- / non  female who presents with Hypotension   and is admitted to the hospital for the management of Intra-dialytic hypotension. Patient is a known dialysis patient who typically requires Midodrine to maintain her blood pressure during her dialysis treatments. Patient reported to her dialysis treatment today and was found to be hypotensive with a systolic of less than 70. Patient was transported to the emergency department by life squad as opposed to receiving her Midodrine and her regularly scheduled dialysis.   Emergency department evaluation was completed and no acute abnormalities CREA rectal cream Apply bid to hemorrhoids 8/29/21   Skippy Flavin P Blood, DO   atorvastatin (LIPITOR) 40 MG tablet Take 1 tablet by mouth nightly 8/29/21   Skippy Flavin P Blood, DO   metoprolol tartrate (LOPRESSOR) 25 MG tablet Take 1 tablet by mouth 2 times daily 8/29/21   Skippy Flavin P Blood, DO   guaiFENesin (MUCINEX) 600 MG extended release tablet Take 1 tablet by mouth 2 times daily as needed for Congestion 8/29/21   Reginaldo P Blood, DO   midodrine (PROAMATINE) 5 MG tablet Take 3 tablets by mouth 3 times daily (with meals)  Patient taking differently: Take 15 mg by mouth 3 times daily (with meals) As needed for BP 8/29/21 9/28/21  Reginaldo P Blood, DO   pantoprazole (PROTONIX) 40 MG tablet Take 1 tablet by mouth every morning (before breakfast) 8/30/21   Skippy Flavin P Blood, DO   nitroGLYCERIN (NITROSTAT) 0.4 MG SL tablet PLACE 1 TABLET UNDER THE TONGUE EVERY 5 MINUTES AS NEEDED FOR CHEST PAIN**IF NO RELIEF AFTER 3 DOSES CALL 911 OR DR** 8/24/21   Maddie Hampton MD   albuterol sulfate HFA (PROAIR HFA) 108 (90 Base) MCG/ACT inhaler Inhale 2 puffs into the lungs every 6 hours as needed for Wheezing  Patient not taking: Reported on 8/31/2021 8/24/21   Maddie Hampton MD   Calcium Acetate, Phos Binder, (PHOSLO) 667 MG CAPS Take 2 capsules by mouth as needed (snacks)     Historical Provider, MD   calcitRIOL (ROCALTROL) 0.25 MCG capsule Take 0.25 mcg by mouth three times a week    Historical Provider, MD   Methoxy PEG-Epoetin Beta (MIRCERA IJ) Inject 225 mcg as directed every 14 days    Historical Provider, MD   cinacalcet (SENSIPAR) 90 MG tablet Take 90 mg by mouth three times a week Indications: after dialysis    Historical Provider, MD   sennosides-docusate sodium (SENOKOT-S) 8.6-50 MG tablet Take 1 tablet by mouth daily as needed for Constipation    Historical Provider, MD   diphenhydrAMINE (DIPHEN) 25 MG tablet Take 25 mg by mouth 2 times daily as needed for Itching  7/28/21 7/27/22  Historical Provider, MD   Calcium Acetate, Phos Binder, 667 MG CAPS Take 2 capsules by mouth 3 times daily (with meals)  8/6/21   Historical Provider, MD   polyethylene glycol (GLYCOLAX) 17 GM/SCOOP powder Take 17 g by mouth daily as needed  6/21/21   Historical Provider, MD   Multiple Vitamins-Minerals (RENAPLEX-D) TABS Take 1 tablet by mouth daily  7/14/21   Historical Provider, MD   aspirin 81 MG EC tablet TAKE 1 TABLET BY MOUTH DAILY 6/6/21 10/4/21  Naveen Warren MD   fluticasone-salmeterol (ADVAIR) 250-50 MCG/DOSE AEPB INHALE 1 PUFF BY MOUTH INTO THE LUNGS TWICE DAILY **RINSE MOUTH AFTER EACH USE** 6/6/21   Naveen Warren MD   furosemide (LASIX) 40 MG tablet Take 1 tablet by mouth daily  Patient not taking: Reported on 8/31/2021 5/18/21   Suzy Iyer MD   albuterol (PROVENTIL) (2.5 MG/3ML) 0.083% nebulizer solution INHALE THE CONTENTS OF ONE VIAL VIA NEBULIZER EVERY 6 HOURS AS NEEDED FOR SHORTNESS OF BREATH OR WHEEZING 5/11/21   Evelia Cardenas MD   Easy Comfort Lancets MISC USE TO TEST BLOOD SUGAR TWICE DAILY AS DIRECTED 3/15/21   Suzy Iyer MD   blood glucose test strips (ONETOUCH ULTRA) strip USE TO TEST BLOOD SUGAR TWICE DAILY AS DIRECTED 1/26/21   Suzy Iyer MD   LANTUS SOLOSTAR 100 UNIT/ML injection pen INJECT 15 UNITS SUBCUTANEOUSLY DAILY AT NIGHT 12/31/20   Santi James MD   Insulin Pen Needle (EASY COMFORT PEN NEEDLES) 31G X 5 MM MISC USE TO INJECT INSULIN ONCE DAILY 12/1/20   Diana Smart MD   Skin Protectants, Misc.  (MINERIN CREME) CREA APPLY TO AFFECTED AREA TOPICALLY AS NEEDED  Patient taking differently: every other day APPLY TO AFFECTED AREA TOPICALLY AS NEEDED 6/24/20   Suzy Iyer MD   Blood Glucose Monitoring Suppl (TRUE METRIX METER) w/Device KIT USE TO TEST BLOOD GLUCOSE 3/17/20   Suzy Iyer MD   Lancets (BD LANCET ULTRAFINE 30G) MISC TEST TWICE DAILY 3/17/20   Suzy Iyer MD   Lancet Devices (SIMPLE DIAGNOSTICS LANCING DEV) MISC USE WITH LANCETS TO TEST BLOOD GLUCOSE 3/17/20   Suzy Iyer MD Blood Glucose Calibration (RIGOBERTO DOC CONTROL) Normal SOLN USE TO PERIODICALLY CHECK GLUCOSE MONITOR ACCORDING TO THE MANUAL 3/17/20   Skip Mcnally MD   UNIFINE PENTIPS 31G X 6 MM MISC USE WITH insulin pens ONCE daily 9/19/19   Skip Mcnally MD   PHARMACIST CHOICE LANCETS MISC USE TO TEST BLOOD GLUCOSE ONCE A DAY 7/12/19   Ruben Dunlap MD   Misc. Devices MISC 1 each by Does not apply route daily Electric scooter 8/15/17   Ruben Dunlap MD   glucose monitoring kit (FREESTYLE) monitoring kit 1 kit by Does not apply route daily 5/23/17   Ruben Dunlap MD   OXYGEN Inhale into the lungs O2  3L  PER NASAL CANNULA NIGHTLY    Historical Provider, MD        Allergies:     Ibuprofen, Tramadol, Motrin [ibuprofen micronized], Strawberry extract, and Tape [adhesive tape]    Social History:     Tobacco:    reports that she quit smoking about 4 years ago. Her smoking use included cigarettes. She quit after 7.00 years of use. She has never used smokeless tobacco.  Alcohol:      reports no history of alcohol use. Drug Use:  reports no history of drug use. Family History:     Family History   Problem Relation Age of Onset    Diabetes Other     Cancer Mother         BREAST    Heart Disease Father         CAD-MI    Diabetes Father     High Blood Pressure Father     Diabetes Maternal Grandfather     Diabetes Paternal Grandfather     Heart Disease Paternal Grandfather     High Blood Pressure Paternal Grandfather        Review of Systems:     Positive and Negative as described in HPI. Review of Systems   Constitutional: Negative for activity change, fatigue and unexpected weight change. HENT: Negative for congestion, sinus pressure and sinus pain. Eyes: Negative for photophobia and visual disturbance. Respiratory: Negative for shortness of breath and wheezing. Cardiovascular: Negative for chest pain and palpitations. Gastrointestinal: Positive for abdominal distention (chronic).  Negative for diarrhea and nausea. Endocrine: Negative for cold intolerance and heat intolerance. Genitourinary: Negative for difficulty urinating, frequency and urgency. Musculoskeletal: Negative for arthralgias and myalgias. Skin: Negative for color change and rash. Allergic/Immunologic: Negative for environmental allergies and immunocompromised state. Neurological: Positive for dizziness (chronic) and weakness (chronic). Negative for syncope. Hematological: Negative for adenopathy. Does not bruise/bleed easily. Psychiatric/Behavioral: Negative for agitation, confusion and self-injury. The patient is not hyperactive. Physical Exam:   BP (!) 82/27   Pulse 100   Temp 98.1 °F (36.7 °C) (Oral)   Resp 16   Ht 4' 11\" (1.499 m)   Wt 255 lb 8.2 oz (115.9 kg)   LMP 2014 (Approximate)   SpO2 97%   BMI 51.61 kg/m²   Temp (24hrs), Av.1 °F (36.7 °C), Min:98.1 °F (36.7 °C), Max:98.1 °F (36.7 °C)    No results for input(s): POCGLU in the last 72 hours. No intake or output data in the 24 hours ending 10/08/21 1634    Physical Exam  Constitutional:       Appearance: Normal appearance. She is normal weight. She is not toxic-appearing. HENT:      Head: Normocephalic and atraumatic. Mouth/Throat:      Mouth: Mucous membranes are dry. Eyes:      Pupils: Pupils are equal, round, and reactive to light. Cardiovascular:      Rate and Rhythm: Regular rhythm. Tachycardia present. Pulses: Normal pulses. Heart sounds: Normal heart sounds. No murmur heard. Pulmonary:      Effort: Pulmonary effort is normal. No respiratory distress. Breath sounds: Normal breath sounds. Abdominal:      General: Abdomen is flat. Bowel sounds are normal. There is no distension. Palpations: Abdomen is soft. Tenderness: There is no abdominal tenderness. Musculoskeletal:         General: No swelling, tenderness or deformity. Normal range of motion. Cervical back: Neck supple. No rigidity or tenderness. Lymphadenopathy:      Cervical: No cervical adenopathy. Skin:     General: Skin is warm and dry. Capillary Refill: Capillary refill takes less than 2 seconds. Coloration: Skin is not jaundiced. Findings: No bruising. Neurological:      General: No focal deficit present. Mental Status: She is alert and oriented to person, place, and time. Mental status is at baseline. Cranial Nerves: No cranial nerve deficit. Motor: No weakness.    Psychiatric:         Mood and Affect: Mood normal.         Investigations:      Laboratory Testing:  Recent Results (from the past 24 hour(s))   CBC Auto Differential    Collection Time: 10/08/21 12:37 PM   Result Value Ref Range    WBC 7.1 3.5 - 11.3 k/uL    RBC 2.93 (L) 3.95 - 5.11 m/uL    Hemoglobin 9.0 (L) 11.9 - 15.1 g/dL    Hematocrit 32.9 (L) 36.3 - 47.1 %    .3 (H) 82.6 - 102.9 fL    MCH 30.7 25.2 - 33.5 pg    MCHC 27.4 (L) 28.4 - 34.8 g/dL    RDW 16.5 (H) 11.8 - 14.4 %    Platelets 030 605 - 685 k/uL    MPV 11.0 8.1 - 13.5 fL    NRBC Automated 0.3 (H) 0.0 per 100 WBC    Differential Type NOT REPORTED     WBC Morphology NOT REPORTED     RBC Morphology NOT REPORTED     Platelet Estimate NOT REPORTED     Seg Neutrophils 68 (H) 36 - 65 %    Lymphocytes 22 (L) 24 - 43 %    Monocytes 9 3 - 12 %    Eosinophils % 1 1 - 4 %    Basophils 0 0 - 2 %    Immature Granulocytes 0 0 %    Segs Absolute 4.83 1.50 - 8.10 k/uL    Absolute Lymph # 1.56 1.10 - 3.70 k/uL    Absolute Mono # 0.64 0.10 - 1.20 k/uL    Absolute Eos # 0.07 0.00 - 0.44 k/uL    Basophils Absolute 0.00 0.00 - 0.20 k/uL    Absolute Immature Granulocyte 0.00 0.00 - 0.30 k/uL    Morphology HYPOCHROMIA PRESENT     Morphology MACROCYTOSIS PRESENT    Basic Metabolic Panel    Collection Time: 10/08/21 12:37 PM   Result Value Ref Range    Glucose 115 (H) 70 - 99 mg/dL    BUN 23 (H) 6 - 20 mg/dL    CREATININE 5.08 (HH) 0.50 - 0.90 mg/dL    Bun/Cre Ratio 5 (L) 9 - 20    Calcium 7.6 (L) 8.6 - 10.4 mg/dL    Sodium 144 135 - 144 mmol/L    Potassium 3.1 (L) 3.7 - 5.3 mmol/L    Chloride 100 98 - 107 mmol/L    CO2 29 20 - 31 mmol/L    Anion Gap 15 9 - 17 mmol/L    GFR Non-African American 9 (L) >60 mL/min    GFR  11 (L) >60 mL/min    GFR Comment          GFR Staging NOT REPORTED    TROP/MYOGLOBIN    Collection Time: 10/08/21 12:37 PM   Result Value Ref Range    Troponin, High Sensitivity 50 (H) 0 - 14 ng/L    Troponin T NOT REPORTED <0.03 ng/mL    Troponin Interp NOT REPORTED     Myoglobin 169 (H) 25 - 58 ng/mL   Brain Natriuretic Peptide    Collection Time: 10/08/21 12:37 PM   Result Value Ref Range    Pro-BNP 68,888 (H) <300 pg/mL    BNP Interpretation Pro-BNP Reference Range:    EKG 12 Lead    Collection Time: 10/08/21 12:37 PM   Result Value Ref Range    Ventricular Rate 92 BPM    Atrial Rate 276 BPM    QRS Duration 62 ms    Q-T Interval 284 ms    QTc Calculation (Bazett) 351 ms    P Axis 78 degrees    R Axis 29 degrees    T Axis 174 degrees       Imaging/Diagnostics:  XR CHEST PORTABLE    Result Date: 10/8/2021  No acute process.  Able cardiomegaly       Assessment :      Hospital Problems         Last Modified POA    * (Principal) Intra-dialytic hypotension 10/8/2021 Yes    CKD (chronic kidney disease) stage 4, GFR 15-29 ml/min ([facility]) (Chronic) 10/8/2021 Yes    Pulmonary hypertension ([address] (Chronic) 10/8/2021 Yes    Morbid obesity with BMI of 45.0-49.9, adult ([address] 10/8/2021 Yes    Essential hypertension 10/8/2021 Yes    Type 2 diabetes mellitus with chronic kidney disease on chronic dialysis ([address] 10/8/2021 Yes    Chronic obstructive pulmonary disease ([address] 10/8/2021 Yes    Gastroesophageal reflux disease without esophagitis 10/8/2021 Yes    ESRD needing dialysis ([address] 10/8/2021 Yes          Plan:     Patient status observation in the  Med/Surge    Chronic kidney disease on dialysis with intradialytic hypotension  Blood pressure stable receiving ProAmatine  Nephrology consultation and dialysis now  Personal history of type 2 diabetes  Glycemic control as ordered  Restart oral regiment  COPD with pulmonary hypertension  Respiratory treatments as ordered  Supplemental oxygen to maintain sats greater than 93%  GERD with morbid obesity  PPI, education on the need for weight loss    Consultations:   IP CONSULT TO HOSPITALIST        VENTURA Watts NP  10/8/2021  4:34 PM    Copy sent to Dr. Alyssa Morris MD

## 2021-10-08 NOTE — ED TRIAGE NOTES
Pt presents to the ED by medic from dialysis. Pt got to dialysis and BP was too low to receive treatment. Pt also 15-20 lbs over what she normally is. Pt endorses an increase in SOB. Pt on 3L NC which is her home dose. Pt able to speak in full complete sentences. NAD. RR even and unlabored.

## 2021-10-09 PROBLEM — I48.91 ATRIAL FIBRILLATION WITH RVR (HCC): Status: ACTIVE | Noted: 2021-01-01

## 2021-10-09 PROBLEM — I95.9 HYPOTENSION: Status: ACTIVE | Noted: 2021-01-01

## 2021-10-09 NOTE — PROGRESS NOTES
0830: Molly Morfin NP rounding, informed of patient condition and transfer order. Ahmed Adjutant gives verbal order to hold transfer until he assesses patient as her HR is 87.   1010: transfer order cancelled  1033: Dr. Lorie Turner. Yifan notified of consult for new onset a flutter, informed HR is steady 80s-90s, orders amiodarone gtt. See orders. 1043: transfer to intermediate care ordered.

## 2021-10-09 NOTE — CONSULTS
Renal Consult Note    Patient :  Jeniffer Carter; 46 y.o. MRN# [id number]  Location:  2028/2028-01  Attending:  Cady Gill MD  Admit Date:  10/8/2021   Hospital Day: 0    Reason for Consult:     Asked by Dr Cady Gill MD to see for JAYLIN/Elevated Creatinine. History Obtained From:     patient    HD Access:     previous  Left AV fistula    HD Unit:     Central    Nephrologist:     Cortez    Dry Weight:     106    Admission Weight:     116    History of Present Illness:     Jeniffer Carter; 46 y.o. female with past medical history as mentioned below presented to the hospital with the chief complaint of hypotension on dialysis. Known history of ESRD on hemodialysis Monday Wednesday Friday at the central dialysis unit via left AV fistula under Dr. Eduardo Novoa. 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 Has skin thickening tenderness and discoloration although no necrotic changes yet. She replaced in sodium thiosulfate. She has a long history of noncompliance both with medications dialysis treatments fluid restriction and phosphate binders. Her phosphorus run very high according to Dr. Eduardo Novoa. Is my understanding that yesterday on dialysis she had a systolic pressure around 70. This is an uncommon for her however she also was significantly fluid overloaded so she was sent to the hospital.  On presentation ProAmatine was given, blood pressures improved she did undergo dialysis had about 2 kg removed. She did did receive sodium thiosulfate. Subsequently she was on the floor and became hypotensive with systolic pressure dropping into the 70s. She is now transferred to the ICU. No pressors have been used fluid boluses were given to her that has helped her blood pressure. She overall feels better. Denies any chest pain nausea vomiting. No fever chills. No open wounds or ulcers. Past History/Allergies? Social History:     Past Medical History: Diagnosis Date    Asthma     AS A CHILD    [facility]est[facility] heart failure[facility] ([facility])     Chronic kidney disease     COPD (chronic obstructive pulmonary disease) ([facility] 2000    INHALER USE AS NEEDED    Dialysis patient ([facility]     CENTRAL DIALYSIS MON, WEDS AND FRI, FLUID RESTRICTION 2L/24 HRS PER PT    Hemodialysis patient [facility])     had Dilaysis 8--  M[facility]W-[id number] @ [facility]    Hyperlipidemia     Hypertension     ON RX    Obesity     YONI (obstructive sleep apnea)     SLEEP STUDY -2016 AWAITING MACHINE, USING O2 AT NIGHT PRESENTLY    Pneumonia     HAD TO BE ON VENTILATOR 12 DAYS    Renal failure     STARTED DIALYSIS 2016    Type II or unspecified type diabetes mellitus without mention of complication, not stated as uncontrolled     IDDM    Ventilator dependence ([facility]     X 2 200 AND .   HAD TO GO ON VENT POST OP       Allergies   Allergen Reactions    Ibuprofen     Tramadol Hives    Motrin [Ibuprofen Micronized] Itching    Strawberry Extract Itching and Rash    Tape [Adhesive Tape] Rash     No paper tape silk only       Social History     Socioeconomic History    Marital status: Single     Spouse name: Not on file    Number of children: Not on file    Years of education: Not on file    Highest education level: Not on file   Occupational History    Not on file   Tobacco Use    Smoking status: Former Smoker     Years: 7.00     Types: Cigarettes     Quit date: 2017     Years since quittin.6    Smokeless tobacco: Never Used    Tobacco comment: 1-2 cigs daily   Substance and Sexual Activity    Alcohol use: No    Drug use: No    Sexual activity: Not Currently     Partners: Male   Other Topics Concern    Not on file   Social History Narrative    Not on file     Social Determinants of Health     Financial Resource Strain:     Difficulty of Paying Living Expenses:    Food Insecurity:     Worried About Running Out of Food in the Last Year:     Ran Out of Food in the Last Year:    Transportation Needs:     Lack of Transportation (Medical):      Lack of Transportation (Non-Medical):    Physical Activity:     Days of Exercise per Week:     Minutes of Exercise per Session:    Stress:     Feeling of Stress :    Social Connections:     Frequency of Communication with Friends and Family:     Frequency of Social Gatherings with Friends and Family:     Attends Buddhism Services:     Active Member of Clubs or Organizations:     Attends Club or Organization Meetings:     Marital Status:    Intimate Partner Violence:     Fear of Current or Ex-Partner:     Emotionally Abused:     Physically Abused:     Sexually Abused:        Family History:        Family History   Problem Relation Age of Onset    Diabetes Other     Cancer Mother         BREAST    Heart Disease Father         CAD-MI    Diabetes Father     High Blood Pressure Father     Diabetes Maternal Grandfather     Diabetes Paternal Grandfather     Heart Disease Paternal Grandfather     High Blood Pressure Paternal Grandfather        Outpatient Medications:     Medications Prior to Admission: atorvastatin (LIPITOR) 40 MG tablet, Take 1 tablet by mouth nightly  metoprolol tartrate (LOPRESSOR) 25 MG tablet, Take 1 tablet by mouth 2 times daily  pantoprazole (PROTONIX) 40 MG tablet, Take 1 tablet by mouth every morning (before breakfast)  Calcium Acetate, Phos Binder, (PHOSLO) 667 MG CAPS, Take 2 capsules by mouth as needed (snacks)   diphenhydrAMINE (DIPHEN) 25 MG tablet, Take 25 mg by mouth 2 times daily as needed for Itching   Calcium Acetate, Phos Binder, 667 MG CAPS, Take 2 capsules by mouth 3 times daily (with meals)   fluticasone-salmeterol (ADVAIR) 250-50 MCG/DOSE AEPB, INHALE 1 PUFF BY MOUTH INTO THE LUNGS TWICE DAILY **RINSE MOUTH AFTER EACH USE**  LANTUS SOLOSTAR 100 UNIT/ML injection pen, INJECT 15 UNITS SUBCUTANEOUSLY DAILY AT NIGHT  OXYGEN, Inhale into the lungs O2  3L  PER NASAL X 5 MM MISC, USE TO INJECT INSULIN ONCE DAILY  Skin Protectants, Misc. (MINERIN CREME) CREA, APPLY TO AFFECTED AREA TOPICALLY AS NEEDED (Patient taking differently: every other day APPLY TO AFFECTED AREA TOPICALLY AS NEEDED)  Blood Glucose Monitoring Suppl (TRUE METRIX METER) w/Device KIT, USE TO TEST BLOOD GLUCOSE  Lancets (BD LANCET ULTRAFINE 30G) MISC, TEST TWICE DAILY  Lancet Devices (SIMPLE DIAGNOSTICS LANCING DEV) MISC, USE WITH LANCETS TO TEST BLOOD GLUCOSE  Blood Glucose Calibration (RIGOBERTO DOC CONTROL) Normal SOLN, USE TO PERIODICALLY CHECK GLUCOSE MONITOR ACCORDING TO THE MANUAL  UNIFINE PENTIPS 31G X 6 MM MISC, USE WITH insulin pens ONCE daily  PHARMACIST CHOICE LANCETS MISC, USE TO TEST BLOOD GLUCOSE ONCE A DAY  Misc.  Devices MISC, 1 each by Does not apply route daily Electric scooter  glucose monitoring kit (FREESTYLE) monitoring kit, 1 kit by Does not apply route daily    Current Medications:     Scheduled Meds:    Hydrocortisone-Aloe Vera   Topical TID    amiodarone  200 mg Oral BID    sodium chloride  1,000 mL IntraVENous Once    midodrine  15 mg Oral 4x Daily    pantoprazole  40 mg Oral QAM AC    [Held by provider] metoprolol tartrate  25 mg Oral BID    furosemide  40 mg Oral Daily    budesonide-formoterol  2 puff Inhalation BID    cinacalcet  90 mg Oral Once per day on Mon Wed Fri    calcitRIOL  0.25 mcg Oral Once per day on Mon Wed Fri    atorvastatin  40 mg Oral Nightly    aspirin  81 mg Oral Daily    sodium chloride flush  5-40 mL IntraVENous 2 times per day    potassium chloride  40 mEq Oral Once    senna  2 tablet Oral Nightly     Continuous Infusions:    heparin (PORCINE) Infusion 8.5 Units/kg/hr (10/09/21 1057)    sodium chloride      dextrose       PRN Meds:  heparin (porcine), heparin (porcine), guaiFENesin, albuterol, sodium chloride flush, sodium chloride, ondansetron **OR** ondansetron, polyethylene glycol, acetaminophen **OR** acetaminophen, sodium thiosulfate IVPB, 7. 1   RBC 2.93* 3.04* 2.84*   HGB 9.0* 9.3* 8.8*   HCT 32.9* 34.0* 32.4*   .3* 111.8* 114.1*   MCH 30.7 30.6 31.0   MCHC 27.4* 27.4* 27.2*   RDW 16.5* 16.4* 16.3*    150 131*   MPV 11.0 10.6 11.0      BMP:   Recent Labs     10/08/21  1237 10/09/21  0314    146*   K 3.1* 3.8    102   CO2 29 27   BUN 23* 13   CREATININE 5.08* 3.65*   GLUCOSE 115* 46*   CALCIUM 7.6* 7.8*      Phosphorus:   No results for input(s): PHOS in the last 72 hours. Magnesium:  No results for input(s): MG in the last 72 hours. Albumin:  No results for input(s): LABALBU in the last 72 hours. BNP:    No results found for: BNP  PTH:   No results found for: IPTH  Blood cultures:  No results found for: [facility]    Urinalysis/Chemistries:      Lab Results   Component Value Date    NITRU NEGATIVE 06/12/2016    COLORU YELLOW 06/12/2016    PHUR 5.0 06/12/2016    WBCUA 2 TO 5 06/12/2016    RBCUA 0 TO 2 06/12/2016    MUCUS NOT REPORTED 06/12/2016    TRICHOMONAS NOT REPORTED 06/12/2016    YEAST NOT REPORTED 06/12/2016    BACTERIA FEW 06/12/2016    SPECGRAV 1.013 06/12/2016    LEUKOCYTESUR NEGATIVE 06/12/2016    UROBILINOGEN Normal 06/12/2016    BILIRUBINUR NEGATIVE 06/12/2016    GLUCOSEU TRACE 06/12/2016    KETUA NEGATIVE 06/12/2016    AMORPHOUS 1+ 06/12/2016       Radiology:     CXR:     Assessment:     1. ESRD on Hemodialysis. His regular HD days are Monday Wednesday Friday at [facility] facility using left AV fistula under Toro. His dry weight is 106 kg. Admitted with 116 kg  2. Anemia of chronic disease  3. Secondary hyperparathyroidism  4. Hypertension  5. Hypotension which is chronic some of that is related to vascular disease, systemic inflammation from calciphylaxis versus inability to take an accurate blood pressure. Patient is asymptomatic given systolic pressure of 70. Some aggravation related to fluid removal yesterday and from A. Fib.   6.  Calcific uremic arteriopathy  Plan:   1. HD on Monday as per schedule. We will could continue sodium thiosulfate  2. Strict Input and Output, Daily weigh and document in the chart. 3. Low Potassium, Low phosphorus and low salt diet. Fluids to be restricted to 1500ml/day. 4. IV Aranesp/Epogen for anemia of chronic disease with HD weekly. 5. IV Zemplar per protocol for secondary hyperparathyroidism with HD thrice a week. 6.  Increase ProAmatine to 15 mg 4 times a day     Nutrition   Please ensure that patient is on a renal diet/TF. Thank you for the consultation. Please do not hesitate to contact us for any further questions/concerns. We will continue to follow along with you.

## 2021-10-09 NOTE — RT PROTOCOL NOTE
RT Inhaler-Nebulizer Bronchodilator Protocol Note    There is a bronchodilator order in the chart from a provider indicating to follow the RT Bronchodilator Protocol and there is an Initiate RT Inhaler-Nebulizer Bronchodilator Protocol order as well (see protocol at bottom of note). CXR Findings:  XR CHEST PORTABLE    Result Date: 10/8/2021  No acute process. Able cardiomegaly       The findings from the last RT Protocol Assessment were as follows:   History Pulmonary Disease: Smoker 15 pack years or more  Respiratory Pattern: Dyspnea on exertion or RR 21-25 bpm  Breath Sounds: Slightly diminished and/or crackles  Cough: Strong, spontaneous, non-productive  Indication for Bronchodilator Therapy: On home bronchodilators, Decreased or absent breath sounds  Bronchodilator Assessment Score: 5    Aerosolized bronchodilator medication orders have been revised according to the RT Inhaler-Nebulizer Bronchodilator Protocol below. Respiratory Therapist to perform RT Therapy Protocol Assessment initially then follow the protocol. Repeat RT Therapy Protocol Assessment PRN for score 0-3 or on second treatment, BID, and PRN for scores above 3. No Indications - adjust the frequency to every 6 hours PRN wheezing or bronchospasm, if no treatments needed after 48 hours then discontinue using Per Protocol order mode. If indication present, adjust the RT bronchodilator orders based on the Bronchodilator Assessment Score as indicated below. Use Inhaler orders unless patient has one or more of the following: on home nebulizer, not able to hold breath for 10 seconds, is not alert and oriented, cannot activate and use MDI correctly, or respiratory rate 25 breaths per minute or more, then use the equivalent nebulizer order(s) with same Frequency and PRN reasons based on the score. If a patient is on this medication at home then do not decrease Frequency below that used at home.     0-3 - enter or revise RT bronchodilator order(s) to equivalent RT Bronchodilator order with Frequency of every 4 hours PRN for wheezing or increased work of breathing using Per Protocol order mode. 4-6 - enter or revise RT Bronchodilator order(s) to two equivalent RT bronchodilator orders with one order with BID Frequency and one order with Frequency of every 4 hours PRN wheezing or increased work of breathing using Per Protocol order mode. 7-10 - enter or revise RT Bronchodilator order(s) to two equivalent RT bronchodilator orders with one order with TID Frequency and one order with Frequency of every 4 hours PRN wheezing or increased work of breathing using Per Protocol order mode. 11-13 - enter or revise RT Bronchodilator order(s) to one equivalent RT bronchodilator order with QID Frequency and an Albuterol order with Frequency of every 4 hours PRN wheezing or increased work of breathing using Per Protocol order mode. Greater than 13 - enter or revise RT Bronchodilator order(s) to one equivalent RT bronchodilator order with every 4 hours Frequency and an Albuterol order with Frequency of every 2 hours PRN wheezing or increased work of breathing using Per Protocol order mode. RT to enter RT Home Evaluation for COPD & MDI Assessment order using Per Protocol order mode.     Electronically signed by Jaxon Wilcox RCP on 10/9/2021 at 12:15 AM

## 2021-10-09 NOTE — FLOWSHEET NOTE
10/09/21 1555   Provider Notification   Reason for Communication Review case   Provider Name Dr. Reyes    Provider Notification Physician   Method of Communication Call   Response See orders   Notification Time 1550    Reviewed with Dr. Reyes  that Dr. Hernandez Eddy would like to hold off on PICC line, pt having chest pain after IV amiodarone bolus and ongoing hypotension . New order to cancel PICC line at this time, D/C IV amiodarone and start 200 mg BID PO amiodarone.

## 2021-10-09 NOTE — PROGRESS NOTES
Ultrasound IV infiltrated. IV bolus given in remaining IV while heparin gtt paused. Dr. Marlene Urbina paged. Orders received for PICC line and to hold the amiodarone gtt until a line can be placed. Heparin gtt to continue. Dr Caty Urbina ordered another 150 mg IV amiodarone bolus in 8 hours if Amiodarone gtt has not been started.

## 2021-10-09 NOTE — PROGRESS NOTES
HEMODIALYSIS POST TREATMENT NOTE    Treatment time ordered: 180 minutes    Actual treatment time: 180 minutes    UltraFiltration Goal: 3000 ml  UltraFiltration Removed: 2400 ml      Pre Treatment weight: 116. 9KG  Post Treatment weight: 114.5KG   Estimated Dry Weight: 106.5 KG    Access used: Internal Access:       AV Fistula           Site: Lt. Upper arm       Access Flow: good , achieved 450 BFR       Sign and symptoms of infection: No       If YES:     Medications or blood products given: Sodium Thiosulfate 25% 25 GM over last hour of dialysis, Benadryl 25 mg PO for itching    Chronic outpatient schedule: [facility]    Chronic outpatient unit: [address] [place]    Summary of response to treatment: Tolerated fairly well, unable to remove 3 KG due to asymptomatic hypotension     Explain if orders NOT met, was physician notified      ACES flowsheet faxed to patient unit/ placed in patient chart: N/A , Epic charting    Post assessment completed: Yes    Report given to: Carlos Larkin RN      * Intra-treatment documented Safety Checks include the followin) Access and face visible at all times. 2) All connections and blood lines are secure with no kinks. 3) NVL alarm engaged. 4) Hemosafe device applied (if applicable). 5) No collapse of Arterial or Venous blood chambers. 6) All blood lines / pump segments in the air detectors.

## 2021-10-09 NOTE — PROGRESS NOTES
Pt reporting chest pain , dizziness , and increased SOB after Amiodarone bolus. Pt HR 55-75. BP 78/48. Dr Caty beltran.

## 2021-10-09 NOTE — RT PROTOCOL NOTE
RT Nebulizer Bronchodilator Protocol Note    RT called to bedside because patient was nervous about the flutter in her chest and takes nebulizer's at home. There is a bronchodilator order in the chart from a provider indicating to follow the RT Bronchodilator Protocol and there is an Initiate RT Bronchodilator Protocol order as well (see protocol at bottom of note). CXR Findings:  XR CHEST PORTABLE    Result Date: 10/9/2021  No acute process. Stable cardiomegaly     XR CHEST PORTABLE    Result Date: 10/8/2021  No acute process. Able cardiomegaly       The findings from the last RT Protocol Assessment were as follows:  Smoking: Smoker 15 pack years or more  Respiratory Pattern: Regular pattern and RR 12-20 bpm  Breath Sounds: Slightly diminished and/or crackles  Cough: Strong, spontaneous, non-productive  Indication for Bronchodilator Therapy: On home bronchodilators  Bronchodilator Assessment Score: 3    Aerosolized bronchodilator medication orders have been revised according to the RT Nebulizer Bronchodilator Protocol below. Respiratory Therapist to perform RT Therapy Protocol Assessment initially then follow the protocol. Repeat RT Therapy Protocol Assessment PRN for score 0-3 or on second treatment, BID, and PRN for scores above 3. No Indications - adjust the frequency to every 6 hours PRN wheezing or bronchospasm, if no treatments needed after 48 hours then discontinue using Per Protocol order mode. If indication present, adjust the RT bronchodilator orders based on the Bronchodilator Assessment Score as indicated below. If a patient is on this medication at home then do not decrease Frequency below that used at home. 0-3 - enter or revise RT bronchodilator order(s) to equivalent RT Bronchodilator order with Frequency of every 4 hours PRN for wheezing or increased work of breathing using Per Protocol order mode.        4-6 - enter or revise RT Bronchodilator order(s) to two equivalent RT bronchodilator orders with one order with BID Frequency and one order with Frequency of every 4 hours PRN wheezing or increased work of breathing using Per Protocol order mode. 7-10 - enter or revise RT Bronchodilator order(s) to two equivalent RT bronchodilator orders with one order with TID Frequency and one order with Frequency of every 4 hours PRN wheezing or increased work of breathing using Per Protocol order mode. 11-13 - enter or revise RT Bronchodilator order(s) to one equivalent RT bronchodilator order with QID Frequency and an Albuterol order with Frequency of every 4 hours PRN wheezing or increased work of breathing using Per Protocol order mode. Greater than 13 - enter or revise RT Bronchodilator order(s) to one equivalent RT bronchodilator order with every 4 hours Frequency and an Albuterol order with Frequency of every 2 hours PRN wheezing or increased work of breathing using Per Protocol order mode. RT to enter RT Home Evaluation for COPD & MDI Assessment order using Per Protocol order mode.     Electronically signed by Edgard Posadas RCP on 10/9/2021 at 3:07 PM

## 2021-10-09 NOTE — CARE COORDINATION
Case Management Initial Discharge Plan  [facility],             Met with:patient to discuss discharge plans. Information verified: address, contacts, phone number, , insurance Yes  Insurance Provider: Tex Plascencia dual    Emergency Contact/Next of Kin name & number: [place]/mother   [phone]  Who are involved in patient's support system? Self and family    PCP: Evelina Pastor MD  Date of last visit: 2 months ago      Discharge Planning    Living Arrangements:  Family Members     Home has 1 stories  0 stairs to climb to get into front door, 0stairs to climb to reach second floor  Location of bedroom/bathroom in home main    Patient able to perform ADL's:Independent    Current Services (outpatient & in home) none  DME equipment: [place], raised TS, electric scooter, O2  DME provider: [facility]    Is patient receiving oral anticoagulation therapy? No    If indicated:   Physician managing anticoagulation treatment:   Where does patient obtain lab work for ATC treatment? Potential Assistance Needed:  N/A    Patient agreeable to home care: Yes  Freedom of choice provided:  yes    Prior SNF/Rehab Placement and Facility: [address] [place] [address] to SNF/Rehab: Yes  [place] of choice provided: yes     Evaluation: no    Expected Discharge date:  10/09/21    Patient expects to be discharged to: If home: is the family and/or caregiver wiling & able to provide support at home? yes  Who will be providing this support? Self and mother    Follow Up Appointment: Best Day/ Time:      Transportation provider: family  Transportation arrangements needed for discharge: No    Readmission Risk              Risk of Unplanned Readmission:  29             Does patient have a readmission risk score greater than 14?: Yes  If yes, follow-up appointment must be made within 7 days of discharge.      Goals of Care:       Educated patient on transitional options, provided freedom of choice and are agreeable with plan      Discharge Plan: Intra-dialytic hypotension  Patient lives alone in a 1 story home with 0 steps to enter. [email or web address] the past and has been to [facility]. DME is home O2 through [facility], electric scooter, walker, and raised TS. ACP was done on last admission. Pharmacy is [facility]  Cardiology consulted for new A-Flutter. Continue to follow for any needs. Plan is HC vs SNF.           Electronically signed by Josselyn Castañeda RN on 10/9/21 at 10:16 AM EDT

## 2021-10-09 NOTE — PROGRESS NOTES
[facility]  Office: [address], DO, Yaniv Micro, DO, Wayne Ingram, DO, Riley Snyder Blood, DO, Edy Hoover MD, Radha Bello MD, Mana Servin MD, Leeann Wong MD, Harish Blanco MD, Patricia Pepper MD, Keyla De La Cruz MD, Blair Ellington, DO, Olivia Almaraz, DO, Bridger Galaviz MD,  Radha Zaragoza, DO, Michaela Wu MD, Cristal Amaral MD, Alayna Aguilar MD, John Quintana MD, Devi Chu MD, Calvin Oakley MD, Jessica Sanders [facility], [facility], CNP, Kellie Sam, CNP, Corin Jack, CNS, Gerda Galvan, CNP, Isrrael Chatterjee, CNP, Parul Richards, CNP, Micah Prater, CNP, Radha Love, CNP, Amanda Beck PA-C, Charmel Osgood, [facility], Criselda Haas, CNP, Aida Lebron, CNP, Gabby Miller, CNP, Rose Zaragoza, CNP, Tiney Schilder, CNP, Marlyne Osgood, CNP, Mauricio Garcia [facility]    Progress Note    10/9/2021    10:44 AM    Name:   Pablo Jiménez  MRN:     [id number]     Acct:      [de-identified]   Room:   2002/2002-02   Day:  0  Admit Date:  10/8/2021 12:20 PM    PCP:   Carmina Abad MD  Code Status:  Full Code    Subjective:     C/C:   Chief Complaint   Patient presents with    Hypotension     Interval History Status: mixed     Patient condition is mixed. Patient completed dialysis yesterday afternoon and reports significantly less shortness of breath and fatigue following dialysis. Patient's blood pressure has been low again this morning and she did receive her ProAmatine. Patient was incidentally identified to be in atrial flutter with a rate greater than 120 overnight. Patient rate did self regulate without intervention and at the time my exam patient is in RSR with a rate of 80. Cardiology has been consulted and has recommended amiodarone drip as well as heparin pending full evaluation. Patient is also experienced several episodes of hypoglycemia throughout the morning.     Brief History:     10/8 - Patient is a known agitation, confusion and self-injury. The patient is not hyperactive. Medications: Allergies: Allergies   Allergen Reactions    Ibuprofen     Tramadol Hives    Motrin [Ibuprofen Micronized] Itching    Strawberry Extract Itching and Rash    Tape [place] Alia Tape] Rash     No paper tape silk only       Current Meds:   Scheduled Meds:    heparin (porcine)  4,000 Units IntraVENous Once    amiodarone bolus  150 mg IntraVENous Once    pantoprazole  40 mg Oral QAM AC    midodrine  15 mg Oral TID WC    [Held by provider] metoprolol tartrate  25 mg Oral BID    furosemide  40 mg Oral Daily    budesonide-formoterol  2 puff Inhalation BID    cinacalcet  90 mg Oral Once per day on Mon Wed Fri    calcitRIOL  0.25 mcg Oral Once per day on Mon Wed Fri    atorvastatin  40 mg Oral Nightly    aspirin  81 mg Oral Daily    sodium chloride flush  5-40 mL IntraVENous 2 times per day    potassium chloride  40 mEq Oral Once    senna  2 tablet Oral Nightly     Continuous Infusions:    heparin (PORCINE) Infusion      amiodarone 450mg/250ml D5W infusion      sodium chloride      dextrose       PRN Meds: heparin (porcine), heparin (porcine), guaiFENesin, albuterol, sodium chloride flush, sodium chloride, ondansetron **OR** ondansetron, polyethylene glycol, acetaminophen **OR** acetaminophen, sodium thiosulfate IVPB, glucose, dextrose, glucagon (rDNA), dextrose    Data:     Past Medical History:   has a past medical history of Asthma, CHF (congestive heart failure) ([facility] Chronic kidney disease, COPD (chronic obstructive pulmonary disease) ([facility] [facility] Dialysis patient ([facility] [facility] Hemodialysis patient ([facility] Hyperlipidemia, Hypertension, Obesity, YONI (obstructive sleep apnea), Pneumonia, Renal failure, Type II or unspecified type diabetes mellitus without mention of complication, not stated as uncontrolled, and Ventilator dependence ([facility] [facility] Social History:   reports that she quit smoking about 4 years ago.  Her smoking use included cigarettes. She quit after 7.00 years of use. She has never used smokeless tobacco. She reports that she does not drink alcohol and does not use drugs. Family History:   Family History   Problem Relation Age of Onset    Diabetes Other     Cancer Mother         BREAST    Heart Disease Father         CAD-MI    Diabetes Father     High Blood Pressure Father     Diabetes Maternal Grandfather     Diabetes Paternal Grandfather     Heart Disease Paternal Grandfather     High Blood Pressure Paternal Grandfather        Vitals:  BP (!) 76/53   Pulse 92   Temp 97.5 °F (36.4 °C) (Oral)   Resp 18   Ht 4' 11\" (1.499 m)   Wt 257 lb 15 oz (117 kg)   LMP 2014 (Approximate)   SpO2 100%   BMI 52.10 kg/m²   Temp (24hrs), Av.8 °F (36.6 °C), Min:97.2 °F (36.2 °C), Max:98.3 °F (36.8 °C)    Recent Labs     10/09/21  0809 10/09/21  0855 10/09/21  1007 10/09/21  1038   POCGLU 83 386* 53* 65       I/O (24Hr):   No intake or output data in the 24 hours ending 10/09/21 1044    Labs:  Hematology:  Recent Labs     10/08/21  1237 10/09/21  0314 10/09/21  0848   WBC 7.1 7.6 7.1   RBC 2.93* 3.04* 2.84*   HGB 9.0* 9.3* 8.8*   HCT 32.9* 34.0* 32.4*   .3* 111.8* 114.1*   MCH 30.7 30.6 31.0   MCHC 27.4* 27.4* 27.2*   RDW 16.5* 16.4* 16.3*    150 131*   MPV 11.0 10.6 11.0   INR  --   --  1.3     Chemistry:  Recent Labs     10/08/21  1237 10/09/21  0314    146*   K 3.1* 3.8    102   CO2 29 27   GLUCOSE 115* 46*   BUN 23* 13   CREATININE 5.08* 3.65*   ANIONGAP 15 17   LABGLOM 9* 13*   GFRAA 11* 16*   CALCIUM 7.6* 7.8*   PROBNP 68,888*  --    TROPHS 50*  --    MYOGLOBIN 169*  --      Recent Labs     10/09/21  0648 10/09/21  0742 10/09/21  0809 10/09/21  0855 10/09/21  1007 10/09/21  1038   POCGLU 74 56* 83 386* 53* 65     ABG:  Lab Results   Component Value Date    POCPH 7.22 2016    POCPCO2 61 2016    POCPO2 68 2016    POCHCO3 24.8 2016    NBEA 3 2016    PBEA NOT hypotension 10/8/2021 Yes    CKD (chronic kidney disease) stage 4, GFR 15-29 ml/min ([facility]) (Chronic) 10/8/2021 Yes    Pulmonary hypertension ([facility] (Chronic) 10/8/2021 Yes    Morbid obesity with BMI of 45.0-49.9, adult ([facility] [address] 10/8/2021 Yes    Essential hypertension 10/8/2021 Yes    Type 2 diabetes mellitus with chronic kidney disease on chronic dialysis ([address] 10/8/2021 Yes    Chronic obstructive pulmonary disease ([address] 10/8/2021 Yes    Gastroesophageal reflux disease without esophagitis 10/8/2021 Yes    ESRD needing dialysis ([facility] [address] 10/8/2021 Yes    Atrial flutter ([facility] [address] 10/9/2021 Yes          Plan:        Chronic kidney disease on dialysis with intradialytic hypotension  Blood pressure low - ProAmatine  Nephrology consultation and dialysis planned again for Monday  Recurrent hypoglycemia  Hold insulin  Received oral glucose multiple times and has already eaten breakfast  May require D10 infusion pending glucose levels  Patient will be receiving heparin and amiodarone IV drips both inD5, anticipate this to help stabilize her recurrent hypoglycemia.   New onset a flutter with tachycardia and hypotension  Cardiology consultation, amiodarone at their discretion  Heparin drip for now, appreciate cardiology recommendations on anticoagulation  Continue ProAmatine as ordered      VENTURA Murphy NP  10/9/2021  10:44 AM

## 2021-10-09 NOTE — PROGRESS NOTES
Access Rn requires nephrologist to approve placement of the line. Dr. Anne Vick sent a perfetserve and would like to avoid line if possible.

## 2021-10-09 NOTE — PROGRESS NOTES
Rn made two attempts at second IV for pt, to start Amiodarone gtt. RN called house supervisor for ultrasound IV placement.

## 2021-10-09 NOTE — PROGRESS NOTES
Pt admitted at this time. Pt vitals HR , Bp 96/55 RR 18 SP02 >92 % on 3 L oxygen . Pt alert and oriented . Pt noted to be in Afib. Will continue to monitor.

## 2021-10-10 PROBLEM — N18.9 ANEMIA DUE TO CHRONIC KIDNEY DISEASE: Status: ACTIVE | Noted: 2021-01-01

## 2021-10-10 PROBLEM — D63.1 ANEMIA DUE TO CHRONIC KIDNEY DISEASE: Status: ACTIVE | Noted: 2021-01-01

## 2021-10-10 NOTE — PROGRESS NOTES
Renal Progress Note    Patient :  Denver Baker; 46 y.o. MRN# [id number]  Location:  2028/2028-01  Attending:  Alton Dodson MD  Admit Date:  10/8/2021   Hospital Day: 1      Subjective:     Oral intake good. Feels well. Hemodynamically stable off pressors. Continues on oral amiodarone for A. fib. Rate controlled. Blood pressures in the 59-90 systolic range. Plan for colonoscopy by the GI service as patient is being requested for clearance from their service for anticoagulation. Cultures negative so far. Primatene continues. Due for dialysis tomorrow left AV fistula works well.   Complaining of constipation  Outpatient Medications:     Medications Prior to Admission: atorvastatin (LIPITOR) 40 MG tablet, Take 1 tablet by mouth nightly  metoprolol tartrate (LOPRESSOR) 25 MG tablet, Take 1 tablet by mouth 2 times daily  pantoprazole (PROTONIX) 40 MG tablet, Take 1 tablet by mouth every morning (before breakfast)  Calcium Acetate, Phos Binder, (PHOSLO) 667 MG CAPS, Take 2 capsules by mouth as needed (snacks)   diphenhydrAMINE (DIPHEN) 25 MG tablet, Take 25 mg by mouth 2 times daily as needed for Itching   Calcium Acetate, Phos Binder, 667 MG CAPS, Take 2 capsules by mouth 3 times daily (with meals)   fluticasone-salmeterol (ADVAIR) 250-50 MCG/DOSE AEPB, INHALE 1 PUFF BY MOUTH INTO THE LUNGS TWICE DAILY **RINSE MOUTH AFTER EACH USE**  LANTUS SOLOSTAR 100 UNIT/ML injection pen, INJECT 15 UNITS SUBCUTANEOUSLY DAILY AT NIGHT  OXYGEN, Inhale into the lungs O2  3L  PER NASAL CANNULA NIGHTLY  Alcohol Swabs (ULTRA-CARE ALCOHOL PREP PADS) 70 % PADS, USE TO CLEAN TESTING AND INJECTING SITES TWICE DAILY  hydrocortisone (ANUSOL-HC) 2.5 % CREA rectal cream, Apply bid to hemorrhoids  guaiFENesin (MUCINEX) 600 MG extended release tablet, Take 1 tablet by mouth 2 times daily as needed for Congestion  midodrine (PROAMATINE) 5 MG tablet, Take 3 tablets by mouth 3 times daily (with meals) (Patient taking differently: Take 15 mg by mouth 3 times daily (with meals) As needed for BP)  nitroGLYCERIN (NITROSTAT) 0.4 MG SL tablet, PLACE 1 TABLET UNDER THE TONGUE EVERY 5 MINUTES AS NEEDED FOR CHEST PAIN**IF NO RELIEF AFTER 3 DOSES CALL 911 OR DR**  albuterol sulfate HFA (PROAIR HFA) 108 (90 Base) MCG/ACT inhaler, Inhale 2 puffs into the lungs every 6 hours as needed for Wheezing (Patient not taking: Reported on 8/31/2021)  calcitRIOL (ROCALTROL) 0.25 MCG capsule, Take 0.25 mcg by mouth three times a week  Methoxy PEG-Epoetin Beta (MIRCERA IJ), Inject 225 mcg as directed every 14 days  cinacalcet (SENSIPAR) 90 MG tablet, Take 90 mg by mouth three times a week Indications: after dialysis  sennosides-docusate sodium (SENOKOT-S) 8.6-50 MG tablet, Take 1 tablet by mouth daily as needed for Constipation  polyethylene glycol (GLYCOLAX) 17 GM/SCOOP powder, Take 17 g by mouth daily as needed   Multiple Vitamins-Minerals (RENAPLEX-D) TABS, Take 1 tablet by mouth daily   aspirin 81 MG EC tablet, TAKE 1 TABLET BY MOUTH DAILY  furosemide (LASIX) 40 MG tablet, Take 1 tablet by mouth daily (Patient not taking: Reported on 8/31/2021)  albuterol (PROVENTIL) (2.5 MG/3ML) 0.083% nebulizer solution, INHALE THE CONTENTS OF ONE VIAL VIA NEBULIZER EVERY 6 HOURS AS NEEDED FOR SHORTNESS OF BREATH OR WHEEZING  Easy Comfort Lancets MISC, USE TO TEST BLOOD SUGAR TWICE DAILY AS DIRECTED  blood glucose test strips (ONETOUCH ULTRA) strip, USE TO TEST BLOOD SUGAR TWICE DAILY AS DIRECTED  Insulin Pen Needle (EASY COMFORT PEN NEEDLES) 31G X 5 MM MISC, USE TO INJECT INSULIN ONCE DAILY  Skin Protectants, Misc.  (MINERIN CREME) CREA, APPLY TO AFFECTED AREA TOPICALLY AS NEEDED (Patient taking differently: every other day APPLY TO AFFECTED AREA TOPICALLY AS NEEDED)  Blood Glucose Monitoring Suppl (TRUE METRIX METER) w/Device KIT, USE TO TEST BLOOD GLUCOSE  Lancets (BD LANCET ULTRAFINE 30G) MISC, TEST TWICE DAILY  Lancet Devices (SIMPLE DIAGNOSTICS LANCING DEV) MISC, USE WITH LANCETS TO TEST BLOOD GLUCOSE  Blood Glucose Calibration (RIGOBERTO DOC CONTROL) Normal SOLN, USE TO PERIODICALLY CHECK GLUCOSE MONITOR ACCORDING TO THE MANUAL  UNIFINE PENTIPS 31G X 6 MM MISC, USE WITH insulin pens ONCE daily  PHARMACIST CHOICE LANCETS MISC, USE TO TEST BLOOD GLUCOSE ONCE A DAY  Misc. Devices MISC, 1 each by Does not apply route daily Electric scooter  glucose monitoring kit (FREESTYLE) monitoring kit, 1 kit by Does not apply route daily    Current Medications:     Scheduled Meds:    Hydrocortisone-Aloe Vera   Topical TID    amiodarone  200 mg Oral BID    midodrine  15 mg Oral 4x Daily    Calcium Acetate (Phos Binder)  2 capsule Oral TID WC    pantoprazole  40 mg Oral QAM AC    [Held by provider] metoprolol tartrate  25 mg Oral BID    furosemide  40 mg Oral Daily    budesonide-formoterol  2 puff Inhalation BID    cinacalcet  90 mg Oral Once per day on Mon Wed Fri    calcitRIOL  0.25 mcg Oral Once per day on Mon Wed Fri    atorvastatin  40 mg Oral Nightly    aspirin  81 mg Oral Daily    sodium chloride flush  5-40 mL IntraVENous 2 times per day    potassium chloride  40 mEq Oral Once    senna  2 tablet Oral Nightly     Continuous Infusions:    heparin (PORCINE) Infusion 10.5 Units/kg/hr (10/10/21 0843)    sodium chloride      dextrose       PRN Meds:  heparin (porcine), heparin (porcine), diphenhydrAMINE, Calcium Acetate (Phos Binder), guaiFENesin, albuterol, sodium chloride flush, sodium chloride, ondansetron **OR** ondansetron, polyethylene glycol, acetaminophen **OR** acetaminophen, sodium thiosulfate IVPB, glucose, dextrose, glucagon (rDNA), dextrose    Input/Output:       I/O last 3 completed shifts: In: 599 [P.O.:240;  I.V.:359]  Out: - .      Patient Vitals for the past 96 hrs (Last 3 readings):   Weight   10/09/21 0402 257 lb 15 oz (117 kg)   10/08/21 2040 252 lb 6.8 oz (114.5 kg)   10/08/21 1726 257 lb 11.5 oz (116.9 kg)       Vital Signs:   Temperature:  Temp: 97.4 °F (36.3 °C)  TMax: Temp (24hrs), Av.4 °F (36.3 °C), Min:97 °F (36.1 °C), Max:97.6 °F (36.4 °C)    Respirations:  Resp: 16  Pulse:   Pulse: 90  BP:    BP: 95/79  BP Range: Systolic (78EUN), PZN:22 , Min:58 , BET:647       Diastolic (33IWR), MLM:93, Min:36, Max:99      Physical Examination:     General:  AAO x 3, speaking in full sentences, no accessory muscle use. HEENT: Atraumatic, normocephalic, no throat congestion, moist mucosa. Eyes:   Pupils equal, round and reactive to light, EOMI. Neck:   No JVD, no thyromegaly, no lymphadenopathy. Chest:  Bilateral vesicular breath sounds, no rales or wheezes. Cardiac:  S1 S2 RR, no murmurs, gallops or rubs, JVP not raised. Abdomen: Anterior abdominal wall has some discoloration and tenderness with skin thickening consistent with RIKKI. :   No suprapubic or flank tenderness. Neuro:  AAO x 3, No FND. SKIN:  No rashes, good skin turgor. Extremities:  No edema, palpable peripheral pulses, no calf tenderness. Left AV fistula has good thrill and bruit  Labs:       Recent Labs     10/09/21  0314 10/09/21  0848 10/10/21  0928   WBC 7.6 7.1 8.5   RBC 3.04* 2.84* 2.90*   HGB 9.3* 8.8* 9.0*   HCT 34.0* 32.4* 33.3*   .8* 114.1* 114.8*   MCH 30.6 31.0 31.0   MCHC 27.4* 27.2* 27.0*   RDW 16.4* 16.3* 16.5*    131* 145   MPV 10.6 11.0 11.4      BMP:   Recent Labs     10/08/21  1237 10/09/21  0314    146*   K 3.1* 3.8    102   CO2 29 27   BUN 23* 13   CREATININE 5.08* 3.65*   GLUCOSE 115* 46*   CALCIUM 7.6* 7.8*      Phosphorus:   No results for input(s): PHOS in the last 72 hours. Magnesium:  No results for input(s): MG in the last 72 hours. Albumin:  No results for input(s): LABALBU in the last 72 hours.   BNP:    No results found for: BNP  BONNIE:      Lab Results   Component Value Date    BONNIE NEGATIVE 12/10/2015     SPEP:  Lab Results   Component Value Date    PROT 7.4 2021    ALBCAL 2.6 12/10/2015    ALBPCT 42 12/10/2015    LABALPH 0.3 12/10/2015    LABALPH 0.9 12/10/2015    A1PCT 4 12/10/2015    A2PCT 14 12/10/2015    LABBETA 1.3 12/10/2015    BETAPCT 21 12/10/2015    GAMGLOB 1.2 12/10/2015    GGPCT 19 12/10/2015    MARTIN Gutierrez M.D. 08/25/2021     UPEP:     Lab Results   Component Value Date    LABPE  12/10/2015     URINE PROTEIN CONCENTRATION IS ELEVATED. PROTEIN ELECTROPHORESIS DEMONSTRATES     C3:     Lab Results   Component Value Date    C3 131 12/10/2015     C4:     Lab Results   Component Value Date    C4 39 12/10/2015     MPO ANCA:     Lab Results   Component Value Date    MPO 9 12/10/2015     PR3 ANCA:     Lab Results   Component Value Date    PR3 5 12/10/2015     Anti-GBM:   No results found for: GBMABIGG  Hep BsAg:         Lab Results   Component Value Date    HEPBSAG NONREACTIVE 02/25/2016     Hep C AB:        No results found for: HEPCAB    Urinalysis/Chemistries:      Lab Results   Component Value Date    NITRU NEGATIVE 06/12/2016    COLORU YELLOW 06/12/2016    PHUR 5.0 06/12/2016    WBCUA 2 TO 5 06/12/2016    RBCUA 0 TO 2 06/12/2016    MUCUS NOT REPORTED 06/12/2016    TRICHOMONAS NOT REPORTED 06/12/2016    YEAST NOT REPORTED 06/12/2016    BACTERIA FEW 06/12/2016    SPECGRAV 1.013 06/12/2016    LEUKOCYTESUR NEGATIVE 06/12/2016    UROBILINOGEN Normal 06/12/2016    BILIRUBINUR NEGATIVE 06/12/2016    GLUCOSEU TRACE 06/12/2016    KETUA NEGATIVE 06/12/2016    AMORPHOUS 1+ 06/12/2016     Urine Sodium:     Lab Results   Component Value Date    ANTONIO 28 06/12/2016     Urine Potassium:  No results found for: KUR  Urine Chloride:  No results found for: CLUR  Urine Osmolarity:   Lab Results   Component Value Date    OSMOU 282 06/12/2016     Urine Protein:   No components found for: TOTALPROTEIN, URINE   Urine Creatinine:     Lab Results   Component Value Date    LABCREA 22.3 12/10/2015     Urine Eosinophils:  No components found for: UEOS    Radiology:     CXR:     Assessment:     1.   ESRD on hemodialysis Monday Wednesday Friday at the central dialysis unit via left AV fistula under Dr. Scott Ovalle dry weight 106 admitted with 116. Hypertension makes it harder to achieve adequate ultrafiltration  2. 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 Hypotension which is chronic, some of that is related vascular disease, systemic inflammation and inability to take an accurate pressure  4. Anemia of chronic disease  5. A. fib with controlled response need for anticoagulation    Plan:   1. Consider stool softeners  2. Await GI plans  3. Hemodialysis the morning  4. Continue ProAmatine at current dose    Nutrition   Please ensure that patient is on a renal diet/TF. Avoid nephrotoxic drugs/contrast exposure. We will continue to follow along with you.

## 2021-10-10 NOTE — PROGRESS NOTES
Alan Buitrago from Dr. Natasha Garcia, he stated that lovenox is ok to be given for patient receiving dialysis as long as it is dosed daily but to find out if Dr. Shanika Read is ok with lovenox being dosed for a. Fib. Informed Dr. Shanika Read on what Dr. Natasha Garcia said and MD stated she is ok with lovenox being dosed for a.fib and would order. Asked Dr. Shanika Read if it is ok that the patient remains without a peripheral IV and she stated ok for today and to attempt a peripheral IV tomorrow (10/11).

## 2021-10-10 NOTE — PROGRESS NOTES
1430:  Loss of Pt peripheral IV in R arm. Spoke with Dr. Sofia Lin regarding loss of IV access and asking if midline or PICC could be placed as patient was an unsuccessful U/S guided IV start. MD does not want to placed PICC or midline but asked if pt could be switched from Heparin gtt to lovenox, eliquis or xarelto. Informed him that I would send a page to Dr. Georges Spear. 1445:  Spoke with Dr. Georges Spear regarding anticoagulation. She stated patient cannot be on lovenox d/t dialysis but she would ask the pharmacist if a low dose lovenox was an option. Eliquis or Xarelto are not an option as GI would like to scope the patient this week. MD asked if another attempt at a peripheral could be done and if unsuccessful,to message Dr. Sofia Lin about the placement of a midline. 1530:  Message send to Dr. Sofia Lin asking if a midline insertion could be an option for this patient. Awaiting response at this time.

## 2021-10-10 NOTE — PLAN OF CARE
Patient  has  very difficult iv access. Nephrology recommending to avoid insertion of picc or midline. Discussed with pharmacy and recommendations noted from nephrology, will switch to patient to lovenox 1 mg/kg daily, renally dosed lovenox for afib. Lovenox has low renal clearance, watch for bleeding, hypotension. Monitor closely. Switch to oral anticoagulation as able to asap post colonoscopy.     Dori Mc MD  [facility]  [facility]  10/10/2021,4:20 PM

## 2021-10-10 NOTE — PLAN OF CARE
Problem: Infection:  Goal: Will remain free from infection  Description: Will remain free from infection  Outcome: Ongoing     Problem: Daily Care:  Goal: Daily care needs are met  Description: Daily care needs are met  Outcome: Ongoing     Problem: Pain:  Goal: Patient's pain/discomfort is manageable  Description: Patient's pain/discomfort is manageable  Outcome: Ongoing     Problem: Skin Integrity:  Goal: Skin integrity will stabilize  Description: Skin integrity will stabilize  Outcome: Ongoing     Problem: Discharge Planning:  Goal: Patients continuum of care needs are met  Description: Patients continuum of care needs are met  Outcome: Ongoing     Problem: Falls - Risk of:  Goal: Will remain free from falls  Description: Will remain free from falls  Outcome: Ongoing  Goal: Absence of physical injury  Description: Absence of physical injury  Outcome: Ongoing

## 2021-10-10 NOTE — PROGRESS NOTES
[facility]  Office: [address], DO, Marry Lang, DO, Shaniqua White, DO, Ajay Arguello Blood, DO, Rika Baer MD, Kermit Rodriguez MD, Maura Ash MD, Victor Manuel Gonzalez MD, Bi Castle MD, Olya Shoemaker MD, Tiffany Herndon MD, Mayte Tee, DO, Malini Martínez, DO, Michelle Ramirez MD,  Jose Alfredo Aguayo, DO, Uma Garcia MD, Navjot Avila MD, Patrick Alfaro MD, Raf Cole MD, Kait Treviño MD, Angeline Love MD, Di Koroma, Patel Bynum, CNP, Ladonna Lee, CNP, Harlan Montoya, CNS, Lindsey Putnam, CNP, Mary Smith, CNP, Virgil Ni, CNP, Ede Chang, CNP, Emiliano Green, CNP, MARINO WallC, Sherri Bennett, [facility], Nelly Ward, CNP, Vega Toro, CNP, Jayce Enriquez, CNP, Martin Ryan, CNP, Margareth Shukla, CNP, Josefa Beach, CNP, Nancy Sena, [facility]    Progress Note    10/10/2021    3:14 PM    Name:   Marcia Sam  MRN:     [id number]     Acct:      [de-identified]   Room:   2028/2028-01   Day:  1  Admit Date:  10/8/2021 12:20 PM    PCP:   Maikel Sanders MD  Code Status:  Full Code    Subjective:     C/C:   Chief Complaint   Patient presents with    Hypotension     Interval History Status: not changed. Patient complains of constipation  Continues with atrial flutter  On heparin drip. No chest pain or shortness of breath    Brief History:      51-year-old female with known medical history of hypertension on dialysis, admitted for diabetes presented to the hospital after she was sent from dialysis for blood pressure of systolic less than 70. Patient states that she was feeling fatigued in the last few days. After she received her Midrin dose her blood pressure stabilized. 10/9 patient developed atrial flutter, heart rate was elevated up to 120s. Blood pressure continued to be low 47N systolic. Cardiology was consulted. Patient was started on amiodarone and heparin.   Patient noted to have recent EGD showing duodenal polyps, and was sent to colonoscopy outpatient patient did not follow-up. Patient will need GI clearance for anticoagulation .       Review of Systems:     Constitutional:  negative for chills, fevers, sweats  Respiratory:  negative for cough, dyspnea on exertion, shortness of breath, wheezing  Cardiovascular:  negative for chest pain, chest pressure/discomfort, lower extremity edema, palpitations  Gastrointestinal:  negative for abdominal pain, constipation, diarrhea, nausea, vomiting  Neurological:  negative for dizziness, headache    Medications: Allergies:     Allergies   Allergen Reactions    Ibuprofen     Tramadol Hives    Motrin [Ibuprofen Micronized] Itching    Strawberry Extract Itching and Rash    Tape Dietra Brighton Tape] Rash     No paper tape silk only       Current Meds:   Scheduled Meds:    sennosides-docusate sodium  2 tablet Oral BID    Hydrocortisone-Aloe Vera   Topical TID    amiodarone  200 mg Oral BID    midodrine  15 mg Oral 4x Daily    Calcium Acetate (Phos Binder)  2 capsule Oral TID WC    pantoprazole  40 mg Oral QAM AC    [Held by provider] metoprolol tartrate  25 mg Oral BID    furosemide  40 mg Oral Daily    budesonide-formoterol  2 puff Inhalation BID    cinacalcet  90 mg Oral Once per day on Mon Wed Fri    calcitRIOL  0.25 mcg Oral Once per day on Mon Wed Fri    atorvastatin  40 mg Oral Nightly    aspirin  81 mg Oral Daily    sodium chloride flush  5-40 mL IntraVENous 2 times per day    potassium chloride  40 mEq Oral Once     Continuous Infusions:    heparin (PORCINE) Infusion 10.5 Units/kg/hr (10/10/21 0843)    sodium chloride      dextrose       PRN Meds: heparin (porcine), heparin (porcine), diphenhydrAMINE, Calcium Acetate (Phos Binder), guaiFENesin, albuterol, sodium chloride flush, sodium chloride, ondansetron **OR** ondansetron, polyethylene glycol, acetaminophen **OR** acetaminophen, sodium thiosulfate IVPB, glucose, dextrose, glucagon (rDNA), dextrose    Data:     Past Medical History:   has a past medical history of Asthma, CHF (congestive heart failure) ([facility] Chronic kidney disease, COPD (chronic obstructive pulmonary disease) ([facility] Dialysis patient ([facility] Hemodialysis patient ([facility] Hyperlipidemia, Hypertension, Obesity, YONI (obstructive sleep apnea), Pneumonia, Renal failure, Type II or unspecified type diabetes mellitus without mention of complication, not stated as uncontrolled, and Ventilator dependence ([facility] Social History:   reports that she quit smoking about 4 years ago. Her smoking use included cigarettes. She quit after 7.00 years of use. She has never used smokeless tobacco. She reports that she does not drink alcohol and does not use drugs. Family History:   Family History   Problem Relation Age of Onset    Diabetes Other     Cancer Mother         BREAST    Heart Disease Father         CAD-MI    Diabetes Father     High Blood Pressure Father     Diabetes Maternal Grandfather     Diabetes Paternal Grandfather     Heart Disease Paternal Grandfather     High Blood Pressure Paternal Grandfather        Vitals:  BP (!) 80/50   Pulse 105   Temp 97.7 °F (36.5 °C) (Oral)   Resp (!) 116   Ht 4' 11\" (1.499 m)   Wt 257 lb 15 oz (117 kg)   LMP 2014 (Approximate)   SpO2 98%   BMI 52.10 kg/m²   Temp (24hrs), Av.4 °F (36.3 °C), Min:97 °F (36.1 °C), Max:97.7 °F (36.5 °C)    Recent Labs     10/09/21  1930 10/10/21  0029 10/10/21  0750 10/10/21  1205   POCGLU 97 98 91 94       I/O (24Hr):     Intake/Output Summary (Last 24 hours) at 10/10/2021 1514  Last data filed at 10/10/2021 1300  Gross per 24 hour   Intake 1099 ml   Output --   Net 1099 ml       Labs:  Hematology:  Recent Labs     10/09/21  0314 10/09/21  0848 10/10/21  0928   WBC 7.6 7.1 8.5   RBC 3.04* 2.84* 2.90*   HGB 9.3* 8.8* 9.0*   HCT 34.0* 32.4* 33.3*   .8* 114.1* 114.8*   MCH 30.6 31.0 31.0   MCHC 27.4* 27.2* 27.0*   RDW 16.4* 16.3* 16.5*    131* 145   MPV 10.6 11.0 11.4   INR  --  1.3  --      Chemistry:  Recent Labs     10/08/21  1237 10/09/21  0314 10/09/21  1136    146*  --    K 3.1* 3.8  --     102  --    CO2 29 27  --    GLUCOSE 115* 46*  --    BUN 23* 13  --    CREATININE 5.08* 3.65*  --    ANIONGAP 15 17  --    LABGLOM 9* 13*  --    GFRAA 11* 16*  --    CALCIUM 7.6* 7.8*  --    CAION  --   --  1.06*   PROBNP 68,888*  --   --    TROPHS 50*  --   --    MYOGLOBIN 169*  --   --      Recent Labs     10/09/21  1440 10/09/21  1649 10/09/21  1930 10/10/21  0029 10/10/21  0750 10/10/21  1205   POCGLU 124* 88 97 98 91 94     ABG:  Lab Results   Component Value Date    POCPH 7.22 06/12/2016    POCPCO2 61 06/12/2016    POCPO2 68 06/12/2016    POCHCO3 24.8 06/12/2016    NBEA 3 06/12/2016    PBEA NOT REPORTED 06/12/2016    RHR3XSG 27 06/12/2016    ZVBW7LTM 88 06/12/2016    FIO2 NOT REPORTED 07/01/2019     Lab Results   Component Value Date/Time    SPECIAL RT WRIST 5ML 10/09/2021 12:15 PM     Lab Results   Component Value Date/Time    CULTURE NO GROWTH 21 HOURS 10/09/2021 12:15 PM       Radiology:  XR CHEST PORTABLE    Result Date: 10/9/2021  No acute process. Stable cardiomegaly     XR CHEST PORTABLE    Result Date: 10/8/2021  No acute process.  Able cardiomegaly       Physical Examination:        General appearance:  alert, cooperative and no distress, morbidly obese  Mental Status:  oriented to person, place and time and normal affect  Lungs:  clear to auscultation bilaterally, normal effort  Heart:  irregular rate and rhythm, in flutter  Abdomen:  soft, nontender, nondistended, normal bowel sounds, no masses, hepatomegaly, splenomegaly  Extremities: trace edema, redness, tenderness in the calves  Skin:  no gross lesions, rashes, induration    Assessment:        Hospital Problems         Last Modified POA    * (Principal) Hypotension 10/9/2021 Yes    CKD (chronic kidney disease) stage 4, GFR 15-29 ml/min (HCC) (Chronic) 10/8/2021 Yes    Pulmonary hypertension ([facility] (Chronic) 10/8/2021 Yes    Morbid obesity with BMI of 45.0-49.9, adult ([facility] 10/8/2021 Yes    Essential hypertension 10/8/2021 Yes    Type 2 diabetes mellitus with chronic kidney disease on chronic dialysis ([facility] 10/8/2021 Yes    Chronic obstructive pulmonary disease ([facility] 10/8/2021 Yes    Gastroesophageal reflux disease without esophagitis 10/8/2021 Yes    ESRD needing dialysis ([facility] 10/8/2021 Yes    Atrial flutter with rapid [facility] 10/10/2021 Yes    Hematochezia 10/10/2021 Yes          Plan:        Atrial flutter with rvr with hypotension-on heparin drip, on amiodarone 200 mg twice daily, patient has difficulty obtaining IV access, avoiding midline or PICC line per nephrology, will need GI clearance for oral anticoagulation.   Plan for colonoscopy noted likely Tuesday  Change to clear liquid diet  Nephrology following for dialysis  Blood pressure stable on midodrine, hold Lopressor  Chronic anemia- recent EGD reviewed, colonoscopy planned this admission  Cardiology cleared for colonoscopy  Septic work-up negative  Type 2 diabetes mellitus- poct blood sugar ac, hs, add sliding scale as needed  Stool softeners added for constipation  Lifestyle modifications for weight loss  Rest of the medications as ordered    Patrick Alfaro MD  10/10/2021  3:14 PM

## 2021-10-10 NOTE — PROGRESS NOTES
0945: Dr. Desmond Phelps rounding on patient d/t new consult placed by Dr. Luh Jones. MD asked writer to contact cardiology to see if pt is cleared per cardiology for colonoscopy. 1000:  Dr. Faye Reid on unit and updated on patient status. Informed MD that Dr. Desmond Phelps was asking if patient's heart was healthy enough for colonoscopy. ECHO from 8/21 reviewed and Dr. Faye Reid stated the patient was cleared per cardiology for an colonoscopy. 1030: Message sent to Dr. Desmond Phelps regarding Dr. Chris Hoover cardiology clearance for procedure.

## 2021-10-10 NOTE — CONSULTS
[facility]   Consult Note         Today's Date: 10/10/2021  Patient Name: Chandrakant Patel  Date of admission: 10/8/2021 12:20 PM  Patient's age: 46 y.o., 1970  Admission Dx: Intra-dialytic hypotension [I95.3]  Hypotension, unspecified hypotension type [I95.9]  Atrial flutter ([address] [I48.92]    Reason for Consult:  Cardiac evaluation    Requesting Physician: Bishop Purdy MD    REASON FOR CONSULT:  Afib/flutter, cardiac clearance    History Obtained From:  Patient, chart, staff, records    HISTORY OF PRESENT ILLNESS:      The patient is a 46 y.o. female who is admitted to the hospital for hypotension on dialysis. Noted to be in afib/flutter. Called cardiology. Has been hypotensive and no iv access, making management overnight more difficult. Systolic was 70 during dialysis. Was given midodrine and they did HD and removed 2kg fluid. Past Medical History:   has a past medical history of Asthma, CHF (congestive heart failure) ([address] Chronic kidney disease, COPD (chronic obstructive pulmonary disease) ([address] Dialysis patient ([address] Hemodialysis patient ([address] Hyperlipidemia, Hypertension, Obesity, YONI (obstructive sleep apnea), Pneumonia, Renal failure, Type II or unspecified type diabetes mellitus without mention of complication, not stated as uncontrolled, and Ventilator dependence ([address] Past Surgical History:   has a past surgical history that includes Hysterectomy (2014);  section ( [address]); skin full graft (); Upper gastrointestinal endoscopy (2021); and Upper gastrointestinal endoscopy (N/A, 2021). Home Medications:    Prior to Admission medications    Medication Sig Start Date End Date Taking?  Authorizing Provider   atorvastatin (LIPITOR) 40 MG tablet Take 1 tablet by mouth nightly 21  Yes Reginaldo GARCIA Blood, DO   metoprolol tartrate (LOPRESSOR) 25 MG tablet Take 1 tablet by mouth 2 times daily 21  Yes Reginaldo GARCIA Blood, DO pantoprazole (PROTONIX) 40 MG tablet Take 1 tablet by mouth every morning (before breakfast) 8/30/21  Yes Reginaldo Green DO   Calcium Acetate, Phos Binder, (PHOSLO) 667 MG CAPS Take 2 capsules by mouth as needed (snacks)    Yes Historical Provider, MD   diphenhydrAMINE (DIPHEN) 25 MG tablet Take 25 mg by mouth 2 times daily as needed for Itching  7/28/21 7/27/22 Yes Historical Provider, MD   Calcium Acetate, Phos Binder, 667 MG CAPS Take 2 capsules by mouth 3 times daily (with meals)  8/6/21  Yes Historical Provider, MD   fluticasone-salmeterol (ADVAIR) 250-50 MCG/DOSE AEPB INHALE 1 PUFF BY MOUTH INTO THE LUNGS TWICE DAILY **RINSE MOUTH AFTER EACH USE** 6/6/21  Yes Nickie Simon MD   LANTUS SOLOSTAR 100 UNIT/ML injection pen INJECT [id number] [place] [address] Road AT NIGHT 12/31/20  Yes Jennifer Anderson MD   OXYGEN Inhale into the lungs O2  3L  PER NASAL CANNULA NIGHTLY   Yes Historical Provider, MD   Alcohol Swabs (ULTRA-CARE ALCOHOL PREP PADS) 70 % PADS USE TO CLEAN TESTING AND INJECTING SITES TWICE DAILY 9/26/21   Suzanne Lockett MD   hydrocortisone (ANUSOL-HC) 2.5 % CREA rectal cream Apply bid to hemorrhoids 8/29/21   Gilma Green DO   guaiFENesin (MUCINEX) 600 MG extended release tablet Take 1 tablet by mouth 2 times daily as needed for Congestion 8/29/21   Reginaldo Green DO   midodrine (PROAMATINE) 5 MG tablet Take 3 tablets by mouth 3 times daily (with meals)  Patient taking differently: Take 15 mg by mouth 3 times daily (with meals) As needed for BP 8/29/21 9/28/21  Reginaldo Geren DO   nitroGLYCERIN (NITROSTAT) 0.4 MG SL tablet PLACE 1 TABLET UNDER THE TONGUE EVERY 5 MINUTES AS NEEDED FOR CHEST PAIN**IF NO RELIEF AFTER 3 DOSES CALL 911 OR ** 8/24/21   Maddie Zafar MD   albuterol sulfate HFA (PROAIR HFA) 108 (90 Base) MCG/ACT inhaler Inhale 2 puffs into the lungs every 6 hours as needed for Wheezing  Patient not taking: Reported on 8/31/2021 8/24/21   Iveth Singer MD   calcitRIOL (ROCALTROL) 0.25 MCG capsule Take 0.25 mcg by mouth three times a week    Historical Provider, MD   Methoxy PEG-Epoetin Beta (MIRCERA IJ) Inject 225 mcg as directed every 14 days    Historical Provider, MD   cinacalcet (SENSIPAR) 90 MG tablet Take 90 mg by mouth three times a week Indications: after dialysis    Historical Provider, MD   sennosides-docusate sodium (SENOKOT-S) 8.6-50 MG tablet Take 1 tablet by mouth daily as needed for Constipation    Historical Provider, MD   polyethylene glycol (GLYCOLAX) 17 GM/SCOOP powder Take 17 g by mouth daily as needed  6/21/21   Historical Provider, MD   Multiple Vitamins-Minerals (RENAPLEX-D) TABS Take 1 tablet by mouth daily  7/14/21   Historical Provider, MD   aspirin 81 MG EC tablet TAKE 1 TABLET BY MOUTH DAILY 6/6/21 10/4/21  Maye Askew MD   furosemide (LASIX) 40 MG tablet Take 1 tablet by mouth daily  Patient not taking: Reported on 8/31/2021 5/18/21   Pk Parmar MD   albuterol (PROVENTIL) (2.5 MG/3ML) 0.083% nebulizer solution INHALE THE CONTENTS OF ONE VIAL VIA NEBULIZER EVERY 6 HOURS AS NEEDED FOR SHORTNESS OF BREATH OR WHEEZING 5/11/21   Tamiko Swain MD   Easy Comfort Lancets MISC USE TO TEST BLOOD SUGAR TWICE DAILY AS DIRECTED 3/15/21   Pk Parmar MD   blood glucose test strips (ONETOUCH ULTRA) strip USE TO TEST BLOOD SUGAR TWICE DAILY AS DIRECTED 1/26/21   Pk Parmar MD   Insulin Pen Needle (EASY COMFORT PEN NEEDLES) 31G X 5 MM MISC USE TO INJECT INSULIN ONCE DAILY 12/1/20   Melinda Beck MD   Skin Protectants, Misc.  (MINERIN CREME) CREA APPLY TO AFFECTED AREA TOPICALLY AS NEEDED  Patient taking differently: every other day APPLY TO AFFECTED AREA TOPICALLY AS NEEDED 6/24/20   Pk Parmar MD   Blood Glucose Monitoring Suppl (TRUE METRIX METER) w/Device KIT USE TO TEST BLOOD GLUCOSE 3/17/20   Pk Parmar MD   Lancets (BD LANCET ULTRAFINE 30G) MISC TEST TWICE DAILY 3/17/20   Pk Parmar MD   Lancet Devices (SIMPLE DIAGNOSTICS LANCING DEV) MISC USE WITH LANCETS TO TEST BLOOD GLUCOSE 3/17/20   Carissa Sebastian MD   Blood Glucose Calibration (RIGOBERTO DOC CONTROL) Normal SOLN USE TO PERIODICALLY CHECK GLUCOSE MONITOR ACCORDING TO THE MANUAL 3/17/20   Cairssa Sebastian MD   UNIFINE PENTIPS 31G X 6 MM MISC USE WITH insulin pens ONCE daily 9/19/19   Carissa Sebastian MD   PHARMACIST CHOICE LANCETS MISC USE TO TEST BLOOD GLUCOSE ONCE A DAY 7/12/19   Haley Denton MD   Misc. Devices MISC 1 each by Does not apply route daily Electric scooter 8/15/17   Haley Denton MD   glucose monitoring kit (FREESTYLE) monitoring kit 1 kit by Does not apply route daily 5/23/17   Haley Denton MD     [facility] SURGERY, , Topical, TID    amiodarone, 200 mg, Oral, BID    midodrine, 15 mg, Oral, 4x Daily    Calcium Acetate (Phos Binder), 2 capsule, Oral, TID WC    pantoprazole, 40 mg, Oral, QAM AC    [Held by provider] metoprolol tartrate, 25 mg, Oral, BID    furosemide, 40 mg, Oral, Daily    budesonide-formoterol, 2 puff, Inhalation, BID    cinacalcet, 90 mg, Oral, Once per day on Mon Wed Fri    calcitRIOL, 0.25 mcg, Oral, Once per day on Mon Wed Fri    atorvastatin, 40 mg, Oral, Nightly    aspirin, 81 mg, Oral, Daily    sodium chloride flush, 5-40 mL, IntraVENous, 2 times per day    potassium chloride, 40 mEq, Oral, Once    senna, 2 tablet, Oral, Nightly      Allergies:  Ibuprofen, Tramadol, Motrin [ibuprofen micronized], Strawberry extract, and Tape [adhesive tape]    Social History:   reports that she quit smoking about 4 years ago. Her smoking use included cigarettes. She quit after 7.00 years of use. She has never used smokeless tobacco. She reports that she does not drink alcohol and does not use drugs.      Family History: family history includes Cancer in her mother; Diabetes in her father, maternal grandfather, paternal grandfather, and another family member; Heart Disease in her father and paternal grandfather; High Blood Pressure in her father and paternal grandfather. No h/o sudden cardiac death. REVIEW OF SYSTEMS:    · Constitutional: there has been no unanticipated weight loss. There's been No change in energy level, No change in activity level. · Eyes: No visual changes or diplopia. No scleral icterus. · ENT: No Headaches, hearing loss or vertigo. No mouth sores or sore throat. · Cardiovascular: per HPI  · Respiratory: per HPI  · Gastrointestinal: No abdominal pain, appetite loss, blood in stools. No change in bowel or bladder habits. · Genitourinary: No dysuria, trouble voiding, or hematuria. · Musculoskeletal:  No gait disturbance, No weakness or joint complaints. · Integumentary: No rash or pruritis. · Neurological: No headache, diplopia, change in muscle strength, numbness or tingling. No change in gait, balance, coordination, mood, affect, memory, mentation, behavior. · Psychiatric: No anxiety, or depression. · Endocrine: No temperature intolerance. No excessive thirst, fluid intake, or urination. No tremor. · Hematologic/Lymphatic: No abnormal bruising or bleeding, blood clots or swollen lymph nodes. · Allergic/Immunologic: No nasal congestion or hives. PHYSICAL EXAM:      BP 95/79   Pulse 90   Temp 97.4 °F (36.3 °C) (Temporal)   Resp 16   Ht 4' 11\" (1.499 m)   Wt 257 lb 15 oz (117 kg)   LMP 11/12/2014 (Approximate)   SpO2 98%   BMI 52.10 kg/m²    Constitutional and General Appearance: alert, cooperative, no distress and appears stated age  [de-identified]: PERRL, no cervical lymphadenopathy. No masses palpable. Normal oral mucosa  Respiratory:  · Normal excursion and expansion without use of accessory muscles  · Resp Auscultation: Good respiratory effort. No for increased work of breathing.  On auscultation: clear to auscultation bilaterally  Cardiovascular:  · The apical impulse is not displaced  · Heart tones are crisp and normal. Irregular S1 and S2.  · Jugular venous pulsation Normal  · The carotid upstroke is normal in amplitude and contour without delay or bruit  · Peripheral pulses are symmetrical and full   Abdomen:   · No masses or tenderness  · Bowel sounds present  Extremities:  ·  No Cyanosis or Clubbing  ·  Lower extremity edema: No  ·  Skin: Warm and dry  Neurological:  · Alert and oriented. · Moves all extremities well  · No abnormalities of mood, affect, memory, mentation, or behavior are noted        EKG:    Date: 10/10/21  Reading: No acute ischemia      LAST ECHO:  Date: 8/23/21  Findings Summary:  Mild left ventricular hypertrophy  Global left ventricular systolic function is normal  Estimated ejection fraction is 65 % . Right atrium is severely dilated . Right ventricular with severe dilatation with severely reduced systolic  function. Severe tricuspid regurgitation. Moderate to severe pulmonary hypertension. No pericardial effusion seen. Normal aortic root dimension. Echo 2019  Left ventricle is mildly dilated with normal wall thickness. Overall systolic function appears mildly reduced, with an estimated EF 45%. Grade II (moderate) left ventricular diastolic dysfunction. Left atrium is moderately dilated. Mitral valve sclerosis without stenosis. Mild to moderate mitral regurgitation. Moderate tricuspid regurgitation. Estimated right ventricular systolic pressure is 04.9 mmHg, suggests mild  pulmonary HTN. Trivial pulmonic insufficiency. Trivial pericardial effusion. LAST Stress Test:   Date of last ST:  Major Findings:    LAST Cardiac Angiography:.  Date:  Findings:      Labs:     CBC:   Recent Labs     10/09/21  0848 10/10/21  0928   WBC 7.1 8.5   HGB 8.8* 9.0*   HCT 32.4* 33.3*   * 145     BMP:   Recent Labs     10/08/21  1237 10/09/21  0314    146*   K 3.1* 3.8   CO2 29 27   BUN 23* 13   CREATININE 5.08* 3.65*   LABGLOM 9* 13*   GLUCOSE 115* 46*     BNP: No results for input(s): BNP in the last 72 hours.   PT/INR:   Recent Labs     10/09/21  0848   PROTIME 16.0*   INR 1.3     APTT:  Recent Labs     10/09/21  0848   APTT 30.2     CARDIAC ENZYMES:No results for input(s): CKTOTAL, CKMB, CKMBINDEX, TROPONINI in the last 72 hours. FASTING LIPID PANEL:  Lab Results   Component Value Date    HDL 32 08/21/2021    TRIG 50 08/21/2021     LIVER PROFILE:No results for input(s): AST, ALT, LABALBU in the last 72 hours. Troponins: Invalid input(s): TROPONIN     Other Current Problems  Patient Active Problem List   Diagnosis    Asthma    YONI (obstructive sleep apnea)    Left knee pain    Hidradenitis    Current smoker    Microalbuminuria    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)    CKD (chronic kidney disease) stage 4, GFR 15-29 ml/min ([facility])    Acute diastolic congestive heart failure (HCC)    Hyperkalemia    Acute systolic congestive heart failure (HCC)    Pulmonary hypertension ([address]    Morbid obesity with BMI of 45.0-49.9, adult ([address]    Acute on chronic respiratory failure with hypoxia (HCC)    COPD exacerbation (HCC)    Asthma exacerbation    Type 2 diabetes mellitus with diabetic nephropathy ([address]    ESRD (end stage renal disease) on dialysis ([address]    Bronchitis    Essential hypertension    YONI on CPAP    Hyperglycemia, drug-induced    Needs smoking cessation education    Hyperglycemia    Drowsiness    Acute on chronic respiratory failure with hypercapnia ([address]    Mobility impaired    S/P cardiac cath-mINIMAL caD 3/15/16 - dR. Shamika Phoenix    Type 2 diabetes mellitus with chronic kidney disease on chronic dialysis ([address]    Type 2 diabetes mellitus without complication (HCC)    Congestive heart failure ([address]    Asthma with COPD with exacerbation (HCC)    Acute exacerbation of CHF (congestive heart failure) (HCC)    Chronic obstructive pulmonary disease (HCC)    Chronic kidney disease, stage V (HCC)    Acute respiratory acidosis    Precordial pain    Gastroesophageal reflux disease without esophagitis    Pulmonary HTN ([address]    Morbid obesity due to excess calories (HCC)    Symptomatic anemia    Elevated serum creatinine    ESRD [facility]    Chest pain at rest    Absolute anemia    Atrial flutter with rapid ventricular response (HCC)    Chest pain    Hypoglycemia    Hypotension    Atrial fibrillation with RVR (HCC)           IMPRESSION & Recommendations:      Afib stable- receiving iv bolus amio and po amio. Only one iv access, for heparin drip. Could not run amio drip due to no second access. Receiving PO amio. Can switch to PO NOAC after colonoscopy or other procedures if needed. Preserved EF on echo from 8/23/21- if no improvement, can recheck echo for wall motion and function. Hypotension- improved with midodrine and some fluids. Nephro helping  [id number] [address] for colonoscopy  ESRD- on HD, nephro managing  HTN- not currently an issue  Hypotension- see nephro notes: \"Hypotension which is chronic some of that is related to vascular disease, systemic inflammation from calciphylaxis versus inability to take an accurate blood pressure. Patient is asymptomatic given systolic pressure of 70. Some aggravation related to fluid removal yesterday and from A. Fib. \"      Discussed with patient, family, and Nurse. Electronically signed by Cony Fairchild DO on 10/10/2021 at 10:47 AM    Cony Fairchild, [id number] [facility]  [email or web address]  [phone]

## 2021-10-10 NOTE — PLAN OF CARE
PRE CONSULT ROUNDING NOTE  HPI  46year old female with pmh of CHF copd ckd on dialysis asthma dmt2 htn hyperlipidemia who presented to the ED for hypotension found during dialysis. She is being treated for intra-dialytic hypotension and atrial fib/flutter. Our service is consulted for anticogulation clearance. She was seen in august of this year for anemia. egd is discussed below. At that admission a colonoscopy was not completed -anesthesia recommended that the procedure be held due to her cardiac dysfunction. She is currently on a heparin drip for her cardiac rhythm and notes occasional bright red blood per rectum with wiping. hgb 9 today. She has not had melena hematemesis or abdominal pain. She remains tachycardic in the 120's during this consult. Alk phos 121 inr 1.3 yesterday. Iron studies in august of this year showed adequate iron stores. No abdominal imaging has been done this admission. She denies dysphagia weight loss fevers chills change in the bowel habits. Reports skin itching.      Endoscopy 8/27/21 egd (f [place]) erosions at [facility] erosive gastritis hh duodenal gland polyps bx negative for malignancy  Family reports no hx of liver pancreatic colon or stomach cancer no uc/crohns  Social quit smoking no etoh or illicit drugs   BP 40/28   Pulse 90   Temp 97.4 °F (36.3 °C) (Temporal)   Resp 16   Ht 4' 11\" (1.499 m)   Wt 257 lb 15 oz (117 kg)   LMP 11/12/2014 (Approximate)   SpO2 98%   BMI 52.10 kg/m²     ROS as above meds labs imaging and past medical records were reviewed    Exam  General Appearance: alert and oriented to person, place and time, well-developed and well-nourished, in no acute distress  Skin: warm and dry, no rash or erythema  Head: normocephalic and atraumatic  Eyes: pupils equal, round, and reactive to light, extraocular eye movements intact, conjunctivae normal  ENT: hearing grossly normal bilaterally  Neck: neck supple and non tender without mass, no thyromegaly or thyroid nodules, no cervical lymphadenopathy   Pulmonary/Chest: clear to auscultation bilaterally- no wheezes, rales or rhonchi, normal air movement, no respiratory distress  Cardiovascular: tachycardic rate, regular rhythm, normal S1 and S2, no murmurs, rubs, clicks or gallops, distal pulses intact, no carotid bruits  Abdomen: soft, obese non tender, non-distended, normal bowel sounds, no masses or organomegaly no ascites  Extremities: no cyanosis, clubbing or edema  Musculoskeletal: normal range of motion, no joint swelling, deformity or tenderness  Neurologic: no cranial nerve deficit and muscle strength normal    Assessment  Anemia  Hematochezia  ESRD on HD  Hypotension with afib/flutter    Plan  Case d/w md, would like to do colonoscopy prior to starting oral AC given her anemia and reported hematochezia, would need cardiology clearance and permission to have the heparin drip stopped 5-6 hours prior to procedure. The pt also ate regular food today, if approved for colonoscopy it would likely be Tuesday, will make diet clear for tomorrow  Trend hh and keep hgb >7  Nephrology following  Formal gi consult to follow  . Lucita Tovar, APRN - CNP

## 2021-10-11 PROBLEM — L29.9 ITCHING: Status: ACTIVE | Noted: 2021-01-01

## 2021-10-11 NOTE — CARE COORDINATION
DC Planning    Pt does wear 02 at home thru [facility]-she wears 02 3lnc as needed-she relays her pcp was working on portable 02 for her. She goes to [facility] [employer] chair time 1[address]-[employer] cab transports thru her insurance. Will need to follow for NOAC-price should be $0-will need to call when cardio/attending decide on dosing after colonoscopy.

## 2021-10-11 NOTE — PROGRESS NOTES
Dialysis Safety Checks:    Patient ID Verified (Y/N) [id number]     -Hepatitis Test                   Date      Result  Hepatitis B Surface Antigen   21 neg     Hepatitis B Surface Antibody 20 neg     Hepatitis B Core Antibody        -Treatment Initiation  Blood Vol Processed Goal (Liters)  Pump Speed x Treatment Hours x 60 Minutes    Target Fluid Removal 2kilo as tolerated     * Intra-treatment documented Safety Checks include the followin) Access and face visible at all times. 2) All connections and blood lines are secure with no kinks. 3) NVL alarm engaged. 4) Hemosafe device applied (if applicable). 5) No collapse of Arterial or Venous blood chambers. 6) All blood lines / pump segments in the air detectors.   --------------------------------------------------------------------------------

## 2021-10-11 NOTE — CONSULTS
Gastroenterology Consult Note      Patient: Ignacia Zelaya  : 1970  Acct#:  [de-identified]     Date:  10/11/2021    Subjective:       History of Present Illness  Patient is a 46 y.o.  female admitted with Intra-dialytic hypotension [I95.3]  Hypotension, unspecified hypotension type [I95.9]  Atrial flutter ([facility] [address] [I48.92] who is seen in consult for *anemia hematochezia    79-year-old lady with multiple medical issues including congestive heart failure COPD renal failure on dialysis in addition to multiple other issues  Admitted at this time with hypotension and atrial fibrillation/flutter  She is in need for anticoagulation for which a consultation was placed for us because of her anemia  Last visit we want to do colonoscopy but she was not stable cardiac wise and not cleared.     Currently she is on heparin drip and she is not showing any sign of bleeding she just had a bowel movement which was normal in color  She did not have any hematemesis  She denied melena hematochezia or hematuria emesis in the last few days  But occasionally she does see some bright blood per rectum  Her hemoglobin is stable at 9 without any need for anticoagulation  She denied any abdominal pain  She is short of breath  She still tachycardic at 120  Her labs showed alkaline phosphatase 121  The rest of the labs are contributing except for the alkaline phosphatase being elevated as mentioned, the anemia, and the creatinine of five-point     Endoscopy 21 egd (f ahmad) erosions at cardia erosive gastritis hh duodenal gland polyps bx negative for malignancy      Past Medical History:   Diagnosis Date    Asthma     AS A CHILD    CHF (congestive heart failure) ([facility] [address]     Chronic kidney disease     COPD (chronic obstructive pulmonary disease) ([facility] [address]     INHALER USE AS NEEDED    Dialysis patient ([facility] [address]     CENTRAL DIALYSIS MON, WEDS AND FRI, FLUID RESTRICTION 2L/24 HRS PER PT    Hemodialysis patient ([facility] [address] whitney Brumfield 8-17-16  RIO-W-F @ [facility]    Hyperlipidemia     Hypertension 2000    ON RX    Obesity     YONI (obstructive sleep apnea) 2013    SLEEP STUDY -6/2016 AWAITING MACHINE, USING O2 AT NIGHT PRESENTLY    Pneumonia 2000    HAD TO BE ON VENTILATOR 12 DAYS    Renal failure     STARTED DIALYSIS 02/25/2016    Type II or unspecified type diabetes mellitus without mention of complication, not stated as uncontrolled 2000    IDDM    Ventilator dependence ([address]     X 2 200 AND 2014. 2014 HAD TO GO ON VENT POST OP      Past Surgical History:   Procedure Laterality Date    [address]    x3    HYSTERECTOMY  12/12/2014    TOTAL    SKIN FULL GRAFT  1983    CHEST-BIRTH YOU AND MOLES REMOVED    UPPER GASTROINTESTINAL ENDOSCOPY  08/27/2021    UPPER GASTROINTESTINAL ENDOSCOPY N/A 8/27/2021    EGD BIOPSY performed by Lashae Schultz MD at [facility]      Past Endoscopic History as above    Admission Meds  No current facility-administered medications on file prior to encounter.      Current Outpatient Medications on File Prior to Encounter   Medication Sig Dispense Refill    atorvastatin (LIPITOR) 40 MG tablet Take 1 tablet by mouth nightly 30 tablet 3    metoprolol tartrate (LOPRESSOR) 25 MG tablet Take 1 tablet by mouth 2 times daily 60 tablet 3    pantoprazole (PROTONIX) 40 MG tablet Take 1 tablet by mouth every morning (before breakfast) 30 tablet 3    Calcium Acetate, Phos Binder, (PHOSLO) 667 MG CAPS Take 2 capsules by mouth as needed (snacks)       diphenhydrAMINE (DIPHEN) 25 MG tablet Take 25 mg by mouth 2 times daily as needed for Itching       Calcium Acetate, Phos Binder, 667 MG CAPS Take 2 capsules by mouth 3 times daily (with meals)       fluticasone-salmeterol (ADVAIR) 250-50 MCG/DOSE AEPB INHALE 1 PUFF BY MOUTH INTO THE LUNGS TWICE DAILY **RINSE MOUTH AFTER EACH USE** 60 each 5    LANTUS SOLOSTAR 100 UNIT/ML injection pen INJECT 15 UNITS SUBCUTANEOUSLY DAILY AT NIGHT 15 mL 5    OXYGEN Inhale into the lungs O2  3L  PER NASAL CANNULA NIGHTLY      Alcohol Swabs (ULTRA-CARE ALCOHOL PREP PADS) 70 % PADS USE TO CLEAN TESTING AND INJECTING SITES TWICE DAILY 1 each 1    hydrocortisone (ANUSOL-HC) 2.5 % CREA rectal cream Apply bid to hemorrhoids 1 Tube 0    guaiFENesin (MUCINEX) 600 MG extended release tablet Take 1 tablet by mouth 2 times daily as needed for Congestion 30 tablet 0    midodrine (PROAMATINE) 5 MG tablet Take 3 tablets by mouth 3 times daily (with meals) (Patient taking differently: Take 15 mg by mouth 3 times daily (with meals) As needed for BP) 270 tablet 1    nitroGLYCERIN (NITROSTAT) 0.4 MG SL tablet PLACE 1 TABLET UNDER THE TONGUE EVERY 5 MINUTES AS NEEDED FOR CHEST PAIN**IF NO RELIEF AFTER 3 DOSES CALL 911 OR DR** 25 tablet 5    albuterol sulfate HFA (PROAIR HFA) 108 (90 Base) MCG/ACT inhaler Inhale 2 puffs into the lungs every 6 hours as needed for Wheezing (Patient not taking: Reported on 8/31/2021) 1 Inhaler 3    calcitRIOL (ROCALTROL) 0.25 MCG capsule Take 0.25 mcg by mouth three times a week      Methoxy PEG-Epoetin Beta (MIRCERA IJ) Inject 225 mcg as directed every 14 days      cinacalcet (SENSIPAR) 90 MG tablet Take 90 mg by mouth three times a week Indications: after dialysis      sennosides-docusate sodium (SENOKOT-S) 8.6-50 MG tablet Take 1 tablet by mouth daily as needed for Constipation      polyethylene glycol (GLYCOLAX) 17 GM/SCOOP powder Take 17 g by mouth daily as needed       Multiple Vitamins-Minerals (RENAPLEX-D) TABS Take 1 tablet by mouth daily       aspirin 81 MG EC tablet TAKE 1 TABLET BY MOUTH DAILY 30 tablet 3    furosemide (LASIX) 40 MG tablet Take 1 tablet by mouth daily (Patient not taking: Reported on 8/31/2021) 30 tablet 3    albuterol (PROVENTIL) (2.5 MG/3ML) 0.083% nebulizer solution INHALE THE CONTENTS OF ONE VIAL VIA NEBULIZER EVERY 6 HOURS AS NEEDED FOR SHORTNESS OF BREATH OR WHEEZING 360 mL 3    Easy Comfort Lancets MISC USE TO TEST BLOOD SUGAR TWICE DAILY AS DIRECTED 100 each 3    blood glucose test strips (ONETOUCH ULTRA) strip USE TO TEST BLOOD SUGAR TWICE DAILY AS DIRECTED 300 each 1    Insulin Pen Needle (EASY COMFORT PEN NEEDLES) 31G X 5 MM MISC USE TO INJECT INSULIN ONCE DAILY 100 each 3    Skin Protectants, Misc. (MINERIN CREME) CREA APPLY TO AFFECTED AREA TOPICALLY AS NEEDED (Patient taking differently: every other day APPLY TO AFFECTED AREA TOPICALLY AS NEEDED) 454 g 3    Blood Glucose Monitoring Suppl (TRUE METRIX METER) w/Device KIT USE TO TEST BLOOD GLUCOSE 1 kit 0    Lancets (BD LANCET ULTRAFINE 30G) MISC TEST TWICE DAILY 100 each 3    Lancet Devices ([employer] LANCING DEV) MISC USE WITH LANCETS TO TEST BLOOD GLUCOSE 1 each 0    Blood Glucose Calibration (RIGOBERTO DOC CONTROL) Normal SOLN USE TO PERIODICALLY CHECK GLUCOSE MONITOR ACCORDING TO THE MANUAL 1 each 0    UNIFINE PENTIPS 31G X 6 MM MISC USE WITH insulin pens ONCE daily 100 each 0    PHARMACIST CHOICE LANCETS MISC USE TO TEST BLOOD GLUCOSE ONCE A  each 0    Misc.  Devices MISC 1 each by Does not apply route daily Electric scooter 1 Device 0    glucose monitoring kit (FREESTYLE) monitoring kit 1 kit by Does not apply route daily 1 kit 0       Patient   Does Use ASA, NSAID No  Allergies  Allergies   Allergen Reactions    Ibuprofen     Tramadol Hives    Motrin [Ibuprofen Micronized] Itching    Strawberry Extract Itching and Rash    Tape Nancey Brine Tape] Rash     No paper tape silk only        Social   Social History     Tobacco Use    Smoking status: Former Smoker     Years: 7.00     Types: Cigarettes     Quit date: 2017     Years since quittin.6    Smokeless tobacco: Never Used    Tobacco comment: 1-2 cigs daily   Substance Use Topics    Alcohol use: No        PSYCH HISTORY:  Depression No  Anxiety No  Suicide No       Family History   Problem Relation Age of Onset    Diabetes Other     Cancer Mother BREAST    Heart Disease Father         CAD-MI   [facility] Diabetes Father     High Blood Pressure Father     Diabetes Maternal Grandfather     Diabetes Paternal Grandfather     Heart Disease Paternal Grandfather     High Blood Pressure Paternal Grandfather       No family history of colon cancer, Crohn's disease, or ulcerative colitis. Review of Systems  Constitutional: negative  Eyes: negative  Ears, nose, mouth, throat, and face: negative  Respiratory: negative  Cardiovascular: negative  Gastrointestinal: negative  Genitourinary:negative  Integument/breast: negative  Hematologic/lymphatic: negative  Musculoskeletal:negative  Endocrine: negative           Physical Exam  Blood pressure (!) 84/38, pulse 88, temperature 97.2 °F (36.2 °C), temperature source Temporal, resp. rate 18, height 4' 11\" (1.499 m), weight 257 lb 15 oz (117 kg), last menstrual period 11/12/2014, SpO2 96 %, not currently breastfeeding.          General Appearance: alert and oriented to person, place and time, well-developed and well-nourished, in no acute distress  Skin: warm and dry, no rash or erythema  Head: normocephalic and atraumatic  Eyes: pupils equal, round, and reactive to light, extraocular eye movements intact, conjunctivae normal  ENT: hearing grossly normal bilaterally  Neck: neck supple and non tender without mass, no thyromegaly or thyroid nodules, no cervical lymphadenopathy   Pulmonary/Chest: clear to auscultation bilaterally- no wheezes, rales or rhonchi, normal air movement, no respiratory distress  Cardiovascular: normal rate, regular rhythm, normal S1 and S2, no murmurs, rubs, clicks or gallops, distal pulses intact, no carotid bruits  Abdomen: soft, non-tender, non-distended, normal bowel sounds, no masses or organomegaly  Extremities: no cyanosis, clubbing or edema  Musculoskeletal: normal range of motion, no joint swelling, deformity or tenderness  Neurologic: no cranial nerve deficit and muscle strength normal    Data Review:    Recent Labs     10/09/21  0848 10/10/21  0928 10/11/21  0343   WBC 7.1 8.5 7.5   HGB 8.8* 9.0* 8.5*   HCT 32.4* 33.3* 30.3*   .1* 114.8* 111.4*   * 145 149     Recent Labs     10/08/21  1237 10/09/21  0314 10/11/21  0343    146* 139   K 3.1* 3.8 5.0    102 96*   CO2 29 27 23   BUN 23* 13 21*   CREATININE 5.08* 3.65* 5.20*     No results for input(s): AST, ALT, ALB, BILIDIR, BILITOT, ALKPHOS in the last 72 hours. No results for input(s): LIPASE, AMYLASE in the last 72 hours. Recent Labs     10/09/21  0848   PROTIME 16.0*   INR 1.3     No results for input(s): PTT in the last 72 hours. No results for input(s): OCCULTBLD in the last 72 hours. CEA:  No results found for: CEA  Ca 125:  No results found for:   Ca 19-9:  No results found for:   Ca 15-3:  No results found for:   AFP:  No components found for: AFAFP  Beta HCG:  No components found for: BHCG  Neuron Specific Enolase:  No results found for: NSE  Imaging Studies:                           All appropriate imaging studies and reports reviewed: Yes                 Assessment:     Principal Problem:    Hypotension  Active Problems:    CKD (chronic kidney disease) stage 4, GFR 15-29 ml/min (HCC)    Pulmonary hypertension (HCC)    Morbid obesity with BMI of 45.0-49.9, adult (HCC)    Essential hypertension    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  *    Anemia  Mild hematochezia probably hemorrhoids  End-stage renal disease on dialysis  Hypotension with A. fib/a flutter  Patient already on heparin drip and she is not showing any sign of active bleeding    Recommendations:   Because of the patient already anticoagulated and she is showing no signs of bleeding, we recommend if we can do a colonoscopy on her and take advantage of being on the heparin drip before she was started on any p.o. anticoagulation and we can proceed with that but she would need a cardiac clearance for that    If not stable cardiac wise for colonoscopy she could be continue with anticoagulation and switch to oral anticoagulation with very close monitoring    PPI  H&H check  Iron checked and see if she needs any replacement                      Thank you for allowing me to participate in the care of your patient. Please feel free to contact me with any questions or concerns.      Robert Hughes MD

## 2021-10-11 NOTE — FLOWSHEET NOTE
Joel Peña NP arrived to the patient's bedside for evaluation and was updated on the patient's current status via RN. Patient has had a couple of episodes of emesis today and continues low BP. Plan for possible colonoscopy on Wednesday instead of tomorrow.

## 2021-10-11 NOTE — PROGRESS NOTES
Renal Progress Note    Patient :  Mary Subramanian; 46 y.o. MRN# [id number]  Location:  2028/2028-01  Attending:  Satya Allison MD  Admit Date:  10/8/2021   Hospital Day: 2      Subjective:     Patient was seen and examined on HD. Tolerating the pressure well. No new issues reported overnight. Patient was complaining of neuropathy as well as generalized itchiness. 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 Patient to get colonoscopy this admission per GI. On midodrine.     Outpatient Medications:     Medications Prior to Admission: atorvastatin (LIPITOR) 40 MG tablet, Take 1 tablet by mouth nightly  metoprolol tartrate (LOPRESSOR) 25 MG tablet, Take 1 tablet by mouth 2 times daily  pantoprazole (PROTONIX) 40 MG tablet, Take 1 tablet by mouth every morning (before breakfast)  Calcium Acetate, Phos Binder, (PHOSLO) 667 MG CAPS, Take 2 capsules by mouth as needed (snacks)   diphenhydrAMINE (DIPHEN) 25 MG tablet, Take 25 mg by mouth 2 times daily as needed for Itching   Calcium Acetate, Phos Binder, 667 MG CAPS, Take 2 capsules by mouth 3 times daily (with meals)   fluticasone-salmeterol (ADVAIR) 250-50 MCG/DOSE AEPB, INHALE 1 PUFF BY MOUTH INTO THE LUNGS TWICE DAILY **RINSE MOUTH AFTER EACH USE**  LANTUS SOLOSTAR 100 UNIT/ML injection pen, INJECT 15 UNITS SUBCUTANEOUSLY DAILY AT NIGHT  OXYGEN, Inhale into the lungs O2  3L  PER NASAL CANNULA NIGHTLY  Alcohol Swabs (ULTRA-CARE ALCOHOL PREP PADS) 70 % PADS, USE TO CLEAN TESTING AND INJECTING SITES TWICE DAILY  hydrocortisone (ANUSOL-HC) 2.5 % CREA rectal cream, Apply bid to hemorrhoids  guaiFENesin (MUCINEX) 600 MG extended release tablet, Take 1 tablet by mouth 2 times daily as needed for Congestion  midodrine (PROAMATINE) 5 MG tablet, Take 3 tablets by mouth 3 times daily (with meals) (Patient taking differently: Take 15 mg by mouth 3 times daily (with meals) As needed for BP)  nitroGLYCERIN (NITROSTAT) 0.4 MG SL tablet, PLACE 1 TABLET UNDER THE TONGUE EVERY 5 MINUTES AS NEEDED FOR CHEST PAIN**IF NO RELIEF AFTER 3 DOSES CALL 911 OR DR**  albuterol sulfate HFA (PROAIR HFA) 108 (90 Base) MCG/ACT inhaler, Inhale 2 puffs into the lungs every 6 hours as needed for Wheezing (Patient not taking: Reported on 8/31/2021)  calcitRIOL (ROCALTROL) 0.25 MCG capsule, Take 0.25 mcg by mouth three times a week  Methoxy PEG-Epoetin Beta (MIRCERA IJ), Inject 225 mcg as directed every 14 days  cinacalcet (SENSIPAR) 90 MG tablet, Take 90 mg by mouth three times a week Indications: after dialysis  sennosides-docusate sodium (SENOKOT-S) 8.6-50 MG tablet, Take 1 tablet by mouth daily as needed for Constipation  polyethylene glycol (GLYCOLAX) 17 GM/SCOOP powder, Take 17 g by mouth daily as needed   Multiple Vitamins-Minerals (RENAPLEX-D) TABS, Take 1 tablet by mouth daily   aspirin 81 MG EC tablet, TAKE 1 TABLET BY MOUTH DAILY  furosemide (LASIX) 40 MG tablet, Take 1 tablet by mouth daily (Patient not taking: Reported on 8/31/2021)  albuterol (PROVENTIL) (2.5 MG/3ML) 0.083% nebulizer solution, INHALE THE CONTENTS OF ONE VIAL VIA NEBULIZER EVERY 6 HOURS AS NEEDED FOR SHORTNESS OF BREATH OR WHEEZING  Easy Comfort Lancets MISC, USE TO TEST BLOOD SUGAR TWICE DAILY AS DIRECTED  blood glucose test strips (ONETOUCH ULTRA) strip, USE TO TEST BLOOD SUGAR TWICE DAILY AS DIRECTED  Insulin Pen Needle (EASY COMFORT PEN NEEDLES) 31G X 5 MM MISC, USE TO INJECT INSULIN ONCE DAILY  Skin Protectants, Misc.  (Keener Lambing) CREA, APPLY TO AFFECTED AREA TOPICALLY AS NEEDED (Patient taking differently: every other day APPLY TO AFFECTED AREA TOPICALLY AS NEEDED)  Blood Glucose Monitoring Suppl (TRUE METRIX METER) w/Device KIT, USE TO TEST BLOOD GLUCOSE  Lancets (BD LANCET ULTRAFINE 30G) MISC, TEST TWICE DAILY  Lancet Devices ([employer] DIAGNOSTICS LANCING DEV) MISC, USE WITH LANCETS TO TEST BLOOD GLUCOSE  Blood Glucose Calibration (RIGOBERTO DOC CONTROL) Normal SOLN, USE TO PERIODICALLY CHECK GLUCOSE MONITOR ACCORDING TO THE MANUAL  UNIFINE PENTIPS 31G X 6 MM MISC, USE WITH insulin pens ONCE daily  PHARMACIST CHOICE LANCETS MISC, USE TO TEST BLOOD GLUCOSE ONCE A DAY  Misc. Devices MISC, 1 each by Does not apply route daily Electric scooter  glucose monitoring kit (FREESTYLE) monitoring kit, 1 kit by Does not apply route daily    Current Medications:     Scheduled Meds:    midodrine  10 mg Oral Once in dialysis    sennosides-docusate sodium  2 tablet Oral BID    enoxaparin  1 mg/kg SubCUTAneous Q24H    Hydrocortisone-Aloe Vera   Topical TID    amiodarone  200 mg Oral BID    midodrine  15 mg Oral 4x Daily    Calcium Acetate (Phos Binder)  2 capsule Oral TID WC    pantoprazole  40 mg Oral QAM AC    [Held by provider] metoprolol tartrate  25 mg Oral BID    furosemide  40 mg Oral Daily    budesonide-formoterol  2 puff Inhalation BID    cinacalcet  90 mg Oral Once per day on     calcitRIOL  0.25 mcg Oral Once per day on     atorvastatin  40 mg Oral Nightly    aspirin  81 mg Oral Daily    sodium chloride flush  5-40 mL IntraVENous 2 times per day    potassium chloride  40 mEq Oral Once     Continuous Infusions:    sodium chloride      dextrose Stopped (10/10/21 1415)     PRN Meds:  hydrOXYzine, heparin (porcine), heparin (porcine), diphenhydrAMINE, Calcium Acetate (Phos Binder), guaiFENesin, albuterol, sodium chloride flush, sodium chloride, ondansetron **OR** ondansetron, polyethylene glycol, acetaminophen **OR** acetaminophen, sodium thiosulfate IVPB, glucose, dextrose, glucagon (rDNA), dextrose    Input/Output:       I/O last 3 completed shifts:   In: 992 [P.O.:992]  Out: - .      Patient Vitals for the past 96 hrs (Last 3 readings):   Weight   10/09/21 0402 257 lb 15 oz (117 kg)   10/08/21 2040 252 lb 6.8 oz (114.5 kg)   10/08/21 1726 257 lb 11.5 oz (116.9 kg)       Vital Signs:   Temperature:  Temp: 97.2 °F (36.2 °C)  TMax:   Temp (24hrs), Av.7 °F (36.5 °C), Min:97.2 °F (36.2 °C), Max:98.1 °F (36.7 °C)    Respirations:  Resp: 18  Pulse:   Pulse: 88  BP:    BP: (!) 84/38  BP Range: Systolic (27GJR), VAP:71 , Min:76 , OCZ:168       Diastolic (32VVT), FQU:58, Min:38, Max:67      Physical Examination:     General:  AAO x 3, speaking in full sentences, no accessory muscle use. HEENT: Atraumatic, normocephalic, no throat congestion, moist mucosa. Eyes:   Pupils equal, round and reactive to light, EOMI. Neck:   Supple  Chest:   Bilateral vesicular breath sounds, no rales or wheezes. Cardiac:  S1 S2 RR, no murmurs, gallops or rubs. Abdomen: Soft, obese, non-tender, no masses or organomegaly, BS audible, positive lower abdomen wall skin discoloration as well as some generalized tenderness and skin thickening. :   No suprapubic or flank tenderness. Neuro:  AAO x 3, No FND. SKIN:  No rashes, good skin turgor. Extremities:  Trace edema.     Labs:       Recent Labs     10/09/21  0848 10/10/21  0928 10/11/21  0343   WBC 7.1 8.5 7.5   RBC 2.84* 2.90* 2.72*   HGB 8.8* 9.0* 8.5*   HCT 32.4* 33.3* 30.3*   .1* 114.8* 111.4*   MCH 31.0 31.0 31.3   MCHC 27.2* 27.0* 28.1*   RDW 16.3* 16.5* 16.2*   * 145 149   MPV 11.0 11.4 11.4      BMP:   Recent Labs     10/08/21  1237 10/09/21  0314 10/11/21  0343    146* 139   K 3.1* 3.8 5.0    102 96*   CO2 29 27 23   BUN 23* 13 21*   CREATININE 5.08* 3.65* 5.20*   GLUCOSE 115* 46* 66*   CALCIUM 7.6* 7.8* 7.9*     BONNIE:      Lab Results   Component Value Date    BONNIE NEGATIVE 12/10/2015     SPEP:  Lab Results   Component Value Date    PROT 7.4 08/21/2021    ALBCAL 2.6 12/10/2015    ALBPCT 42 12/10/2015    LABALPH 0.3 12/10/2015    LABALPH 0.9 12/10/2015    A1PCT 4 12/10/2015    A2PCT 14 12/10/2015    LABBETA 1.3 12/10/2015    BETAPCT 21 12/10/2015    GAMGLOB 1.2 12/10/2015    GGPCT 19 12/10/2015    MARTIN Parra M.D. 08/25/2021     UPEP:     Lab Results   Component Value Date    LABPE  12/10/2015     URINE PROTEIN CONCENTRATION IS ELEVATED. PROTEIN ELECTROPHORESIS DEMONSTRATES     C3:     Lab Results   Component Value Date    C3 131 12/10/2015     C4:     Lab Results   Component Value Date    C4 39 12/10/2015     MPO ANCA:     Lab Results   Component Value Date    MPO 9 12/10/2015     PR3 ANCA:     Lab Results   Component Value Date    PR3 5 12/10/2015     Hep BsAg:         Lab Results   Component Value Date    HEPBSAG NONREACTIVE 02/25/2016     Urinalysis/Chemistries:      Lab Results   Component Value Date    NITRU NEGATIVE 06/12/2016    COLORU YELLOW 06/12/2016    PHUR 5.0 06/12/2016    WBCUA 2 TO 5 06/12/2016    RBCUA 0 TO 2 06/12/2016    MUCUS NOT REPORTED 06/12/2016    TRICHOMONAS NOT REPORTED 06/12/2016    YEAST NOT REPORTED 06/12/2016    BACTERIA FEW 06/12/2016    SPECGRAV 1.013 06/12/2016    LEUKOCYTESUR NEGATIVE 06/12/2016    UROBILINOGEN Normal 06/12/2016    BILIRUBINUR NEGATIVE 06/12/2016    GLUCOSEU TRACE 06/12/2016    KETUA NEGATIVE 06/12/2016    AMORPHOUS 1+ 06/12/2016     Urine Sodium:     Lab Results   Component Value Date    ANTONIO 28 06/12/2016     Urine Osmolarity:   Lab Results   Component Value Date    OSMOU 282 06/12/2016     Urine Creatinine:     Lab Results   Component Value Date    LABCREA 22.3 12/10/2015     Radiology:     Reviewed. Assessment:     1. ESRD on HD on MWF schedule at [facility] hemodialysis unit under Dr. Meena James via left AV fistula. Dry weight 106 kg.  2.  Hypotension. 3.  Calcific uremic arthropathy-getting sodium thiosulfate. 4.  Anemia current disease. 5.  A. fib. 6.  Diabetes type 2.  7.  Constipation. 8.  Hematochezia. 9.  Diabetic neuropathy. Plan:   1. Patient was seen and examined on HD at bedside. Orders were confirmed with the HD nurse. 2.  Sodium thiosulfate ordered with dialysis. 3.  Continue ProAmatine. 4.  Will check phosphorus levels due to generalized itchiness. 5.  Okay to start gabapentin for diabetic neuropathy.   6.  Colonoscopy planned for this admission per GI.  7.  BMP in AM.  8.  Will follow. Nutrition   Please ensure that patient is on a renal diet/TF. Avoid nephrotoxic drugs/contrast exposure. We will continue to follow along with you. Lobo Escobar MD  [facility]     This note is created with the assistance of a speech-recognition program. While intending to generate a document that actually reflects the content of the visit, no guarantees can be provided that every mistake has been identified and corrected by editing.

## 2021-10-11 NOTE — PROGRESS NOTES
[facility]   Progress Note                   Date:   10/11/2021  Patient name: Camilla Plummer  Date of admission:  10/8/2021 12:20 PM  MRN:   [id number]  YOB: 1970  PCP: Ellen Solomon MD    Reason for Admission: Hypotension and A.fib    Subjective:       Clinical Changes / Abnormalities: Complaining of nausea, no chest pain, back in NSR and sinus bradycardia. Medications:   Scheduled Meds:   gabapentin  100 mg Oral TID    sennosides-docusate sodium  2 tablet Oral BID    enoxaparin  1 mg/kg SubCUTAneous Q24H    Hydrocortisone-Aloe Vera   Topical TID    amiodarone  200 mg Oral BID    midodrine  15 mg Oral 4x Daily    Calcium Acetate (Phos Binder)  2 capsule Oral TID WC    pantoprazole  40 mg Oral QAM AC    [Held by provider] metoprolol tartrate  25 mg Oral BID    budesonide-formoterol  2 puff Inhalation BID    cinacalcet  90 mg Oral Once per day on Mon Wed Fri    calcitRIOL  0.25 mcg Oral Once per day on Mon Wed Fri    atorvastatin  40 mg Oral Nightly    aspirin  81 mg Oral Daily    sodium chloride flush  5-40 mL IntraVENous 2 times per day    potassium chloride  40 mEq Oral Once     Continuous Infusions:   sodium chloride      dextrose Stopped (10/10/21 1415)     CBC:   Recent Labs     10/09/21  0848 10/10/21  0928 10/11/21  0343   WBC 7.1 8.5 7.5   HGB 8.8* 9.0* 8.5*   * 145 149     BMP:    Recent Labs     10/09/21  0314 10/11/21  0343   * 139   K 3.8 5.0    96*   CO2 27 23   BUN 13 21*   CREATININE 3.65* 5.20*   GLUCOSE 46* 66*     INR:   Recent Labs     10/09/21  0848   INR 1.3       Objective:   Vitals: BP (!) 91/59   Pulse 62   Temp 97.3 °F (36.3 °C) (Temporal)   Resp 18   Ht 4' 11\" (1.499 m)   Wt 257 lb 15 oz (117 kg)   LMP 11/12/2014 (Approximate)   SpO2 96%   BMI 52.10 kg/m²   General appearance: alert and cooperative with exam  HEENT: Head: Normocephalic, no lesions, without obvious abnormality. Neck: No JVD, supple.   Lungs: clear to auscultation bilaterally  Heart: regular rate and rhythm, S1, S2 normal, no murmur, click, rub or gallop  Abdomen: soft, non-tender; bowel sounds normal; no masses,  no organomegaly  Extremities: extremities normal, atraumatic, no cyanosis or edema  Neurologic: Mental status: Alert, oriented, thought content appropriate    Patient Active Problem List:     Asthma     YONI (obstructive sleep apnea)     Left knee pain     Hidradenitis     Current smoker     Microalbuminuria     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 kidney disease) stage 4, GFR 15-29 ml/min (HCC)     Acute diastolic congestive heart failure (HCC)     Hyperkalemia     Acute systolic congestive heart failure (HCC)     Pulmonary hypertension (HCC)     Morbid obesity with BMI of 45.0-49.9, adult (HCC)     Acute on chronic respiratory failure with hypoxia (HCC)     COPD exacerbation (HCC)     Asthma exacerbation     Type 2 diabetes mellitus with diabetic nephropathy (HCC)     ESRD (end stage renal disease) on dialysis ([address]     Bronchitis     Essential hypertension     YONI on CPAP     Hyperglycemia, drug-induced     Needs smoking cessation education     Hyperglycemia     Drowsiness     Acute on chronic respiratory failure with hypercapnia ([facility])     Mobility impaired     S/P cardiac cath-mINIMAL caD 3/15/16 - dR. 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)     Type 2 diabetes mellitus without complication (HCC)     Congestive heart failure (HCC)     Asthma with COPD with exacerbation (HCC)     Acute exacerbation of CHF (congestive heart failure) (HCC)     Chronic obstructive pulmonary disease (HCC)     Chronic kidney disease, stage V (HCC)     Acute respiratory acidosis     Precordial pain     Gastroesophageal reflux disease without esophagitis     Pulmonary HTN ([address]     Morbid obesity due to excess calories (HCC)     Symptomatic anemia     Elevated serum creatinine ESRD needing dialysis ([address]     Chest pain at rest     Absolute anemia     Atrial flutter with rapid ventricular response (HCC)     Chest pain     Hypoglycemia     Hypotension     Atrial fibrillation with RVR ([address]     Hematochezia     Anemia due to chronic kidney disease     Itching    Echo 08/20/2021:  Mild left ventricular hypertrophy  Global left ventricular systolic function is normal  Estimated ejection fraction is 65 % . Right atrium is severely dilated . Right ventricular with severe dilatation with severely reduced systolic  function. Severe tricuspid regurgitation. Moderate to severe pulmonary hypertension. No pericardial effusion seen. Normal aortic root dimension. Assessment / Acute Cardiac Problems:     1. PAF now back in NSR on Amiodarone PO, Lovenox renal dosing for anticoagulation. 2. Preserved LV systolic function. 3. Hypotension during dialysis, improved on Midodrine   4. History of HTN  5. DM  6. HLP  7. YONI  8. ESRD on HD  9. Anemia and GI bleed. 10. Obesity    Plan of Treatment:     1. Continue Amiodarone and BB PO  2. Continue anticoagulation with Lovenox renal dosing. 3. Awaiting GI clearance for oral anticoagulant, OK to proceed with colonoscopy.    4. Continue ASA, Statins    Electronically signed by Rosas Allison MD on 10/11/2021 at 3:47 PM      [facility]  [phone]

## 2021-10-11 NOTE — PROGRESS NOTES
Dialysis Post Treatment Report:     -Access Assessment  wnl     -Ultrafiltration   UF Goal 1800   Prime (-) 400   NS Flush (-)    Other (-/+)    Total UF Removed 1400     -Medications / Blood Administration  Medications Given (Y/N) Albumin, midodirne   Blood Products (Y/N)      Narrative: Albumin and midodrine given for bp support. Pt completed hd tx.

## 2021-10-11 NOTE — FLOWSHEET NOTE
RN notified Claudia Espino NP via perfect serve that patient continues to have episodes of emesis and is demanding solid foods. Awaiting response at this time.

## 2021-10-11 NOTE — PROGRESS NOTES
[facility]  Office: [address], DO, Migdalia Hughes, DO, Josue Warner, DO, Charito Nolen Blood, DO, Yamilet Guy MD, Rimma Taveras MD, Lashae Luna MD, Arlene Paredes MD, Ember Stafford MD, Leticia Paredes MD, Eryn Jordan MD, Chalino Parra, DO, Benjie Mccarthy, DO, Keli Hale MD,  Pasha Ventura, DO, Constantino Murphy MD, Isabelle Powell MD, Alan Flores MD, Lieutenant Geoff MD, Roberto Dobbs MD, Shanique Broussard MD, Reji Cheatham, [facility], [facility], CNP, Praveen Haley, CNP, Ct Seay, CNS, Miguel Orr, CNP, Ramírez Garcia, CNP, Yassine Johnson, CNP, Zora Soria, CNP, Steven Han, CNP, Bernardo Chase PA-C, Analilia Osullivan, [facility], Stuart Huerta, CNP, Adelso Fairbanks, CNP, Rodney Crawley, CNP, Claribel Mckeon, CNP, Audra Baez, CNP, Torres Le, CNP, Margi Platt, [facility]    Progress Note    10/11/2021    1:30 PM    Name:   Nikolay Beasley  MRN:     [id number]     Acct:      [de-identified]   Room:   2028/2028-01   Day:  2  Admit Date:  10/8/2021 12:20 PM    PCP:   Jennifer Rogers MD  Code Status:  Full Code    Subjective:     C/C:   Chief Complaint   Patient presents with    Hypotension     Interval History Status: not changed. Patient complains of severe itching abdominal lower extremities  She had 1 episode of vomiting this morning  States that she does not do well with clear liquid diet  Had 3 bowel movements yesterday  Dialysis today    Brief History:      59-year-old female with known medical history of hypertension on dialysis, admitted for diabetes presented to the hospital after she was sent from dialysis for blood pressure of systolic less than 70. Patient states that she was feeling fatigued in the last few days. After she received her Midrin dose her blood pressure stabilized. 10/9 patient developed atrial flutter, heart rate was elevated up to 120s.   Blood pressure continued to be low 80s systolic. Cardiology was consulted. Patient was started on amiodarone and heparin. Patient noted to have recent EGD showing duodenal polyps, and was sent to colonoscopy outpatient patient did not follow-up. Patient will need GI clearance for anticoagulation .       Review of Systems:     Constitutional:  negative for chills, fevers, sweats  Respiratory:  negative for cough, dyspnea on exertion, shortness of breath, wheezing  Cardiovascular:  negative for chest pain, chest pressure/discomfort, lower extremity edema, palpitations  Gastrointestinal:  negative for abdominal pain, constipation, diarrhea, positive for vomiting and nausea   neurological:  negative for dizziness, headache  Complains of itching all over the body    Medications: Allergies:     Allergies   Allergen Reactions    Ibuprofen     Tramadol Hives    Motrin [Ibuprofen Micronized] Itching    Strawberry Extract Itching and Rash    Tape Tad Boss Tape] Rash     No paper tape silk only       Current Meds:   Scheduled Meds:    gabapentin  100 mg Oral TID    sennosides-docusate sodium  2 tablet Oral BID    enoxaparin  1 mg/kg SubCUTAneous Q24H    Hydrocortisone-Aloe Vera   Topical TID    amiodarone  200 mg Oral BID    midodrine  15 mg Oral 4x Daily    Calcium Acetate (Phos Binder)  2 capsule Oral TID WC    pantoprazole  40 mg Oral QAM AC    [Held by provider] metoprolol tartrate  25 mg Oral BID    furosemide  40 mg Oral Daily    budesonide-formoterol  2 puff Inhalation BID    cinacalcet  90 mg Oral Once per day on Mon Wed Fri    calcitRIOL  0.25 mcg Oral Once per day on Mon Wed Fri    atorvastatin  40 mg Oral Nightly    aspirin  81 mg Oral Daily    sodium chloride flush  5-40 mL IntraVENous 2 times per day    potassium chloride  40 mEq Oral Once     Continuous Infusions:    sodium chloride      dextrose Stopped (10/10/21 1415)     PRN Meds: hydrOXYzine, diphenhydrAMINE, Calcium Acetate (Phos Binder), guaiFENesin, albuterol, sodium chloride flush, sodium chloride, ondansetron **OR** ondansetron, polyethylene glycol, acetaminophen **OR** acetaminophen, sodium thiosulfate IVPB, glucose, dextrose, glucagon (rDNA), dextrose    Data:     Past Medical History:   has a past medical history of Asthma, CH[facility]congestive heart failure) [facility] Chronic kidney disease, COPD (chronic obstructive pulmonary disease) ([facility] Dialysis patient ([facility] Hemodialysis patient ([facility] Hyperlipidemia, Hypertension, Obesity, YONI (obstructive sleep apnea), Pneumonia, Renal failure, Type II or unspecified type diabetes mellitus without mention of complication, not stated as uncontrolled, and Ventilator dependence ([facility] Social History:   reports that she quit smoking about 4 years ago. Her smoking use included cigarettes. She quit after 7.00 years of use. She has never used smokeless tobacco. She reports that she does not drink alcohol and does not use drugs. Family History:   Family History   Problem Relation Age of Onset    Diabetes Other     Cancer Mother         BREAST    Heart Disease Father         CAD-MI    Diabetes Father     High Blood Pressure Father     Diabetes Maternal Grandfather     Diabetes Paternal Grandfather     Heart Disease Paternal Grandfather     High Blood Pressure Paternal Grandfather        Vitals:  BP (!) 85/50   Pulse 50   Temp 97.3 °F (36.3 °C) (Temporal)   Resp 18   Ht 4' 11\" (1.499 m)   Wt 257 lb 15 oz (117 kg)   LMP 2014 (Approximate)   SpO2 96%   BMI 52.10 kg/m²   Temp (24hrs), Av.7 °F (36.5 °C), Min:97.2 °F (36.2 °C), Max:98.1 °F (36.7 °C)    Recent Labs     10/11/21  0614 10/11/21  0703 10/11/21  0744 10/11/21  1109   POCGLU 59* 60* 78 74       I/O (24Hr):     Intake/Output Summary (Last 24 hours) at 10/11/2021 1330  Last data filed at 10/11/2021 1224  Gross per 24 hour   Intake 892 ml   Output 750 ml   Net 142 ml       Labs:  Hematology:  Recent Labs     10/09/21  0848 10/10/21  9738 10/11/21  0344 WBC 7.1 8.5 7.5   RBC 2.84* 2.90* 2.72*   HGB 8.8* 9.0* 8.5*   HCT 32.4* 33.3* 30.3*   .1* 114.8* 111.4*   MCH 31.0 31.0 31.3   MCHC 27.2* 27.0* 28.1*   RDW 16.3* 16.5* 16.2*   * 145 149   MPV 11.0 11.4 11.4   INR 1.3  --   --      Chemistry:  Recent Labs     10/09/21  0314 10/09/21  1136 10/11/21  0343   *  --  139   K 3.8  --  5.0     --  96*   CO2 27  --  23   GLUCOSE 46*  --  66*   BUN 13  --  21*   CREATININE 3.65*  --  5.20*   ANIONGAP 17  --  20*   LABGLOM 13*  --  9*   GFRAA 16*  --  11*   CALCIUM 7.8*  --  7.9*   CAION  --  1.06*  --    PHOS  --   --  4.1     Recent Labs     10/10/21  1653 10/10/21  2239 10/11/21  0614 10/11/21  0703 10/11/21  0744 10/11/21  1109   POCGLU 102 90 59* 60* 78 74     ABG:  Lab Results   Component Value Date    POCPH 7.22 06/12/2016    POCPCO2 61 06/12/2016    POCPO2 68 06/12/2016    POCHCO3 24.8 06/12/2016    NBEA 3 06/12/2016    PBEA NOT REPORTED 06/12/2016    YMM3FDU 27 06/12/2016    BUEL8FUN 88 06/12/2016    FIO2 NOT REPORTED 07/01/2019     Lab Results   Component Value Date/Time    SPECIAL RT WRIST 5ML 10/09/2021 12:15 PM     Lab Results   Component Value Date/Time    CULTURE NO GROWTH 2 DAYS 10/09/2021 12:15 PM       Radiology:  XR CHEST PORTABLE    Result Date: 10/9/2021  No acute process. Stable cardiomegaly     XR CHEST PORTABLE    Result Date: 10/8/2021  No acute process.  Able cardiomegaly       Physical Examination:        General appearance:  alert, cooperative and MILD distress, morbidly obese  Mental Status:  oriented to person, place and time and normal affect  Lungs:  clear to auscultation bilaterally, normal effort  Heart:  irregular rate and rhythm, in flutter  Abdomen:  soft, nontender, nondistended, normal bowel sounds, no masses, hepatomegaly, splenomegaly  Extremities: trace edema, NO redness, tenderness in the calves  Skin: Chronic skin changes lower extremity, skin rash on abdomen    Assessment:        Hospital Problems Last Modified POA    * (Principal) Hypotension 10/9/2021 Yes    CKD (chronic kidney disease) stage 4, GFR 15-29 ml/min (HCC) (Chronic) 10/8/2021 Yes    Pulmonary hypertension ([address] (Chronic) 10/8/2021 Yes    Morbid obesity with BMI of 45.0-49.9, adult ([address] 10/8/2021 Yes    Essential hypertension 10/8/2021 Yes    Type 2 diabetes mellitus with chronic kidney disease on chronic dialysis ([address] 10/8/2021 Yes    Chronic obstructive pulmonary disease ([address] 10/8/2021 Yes    Gastroesophageal reflux disease without esophagitis 10/8/2021 Yes    ESRD needing dialysis ([address] 10/8/2021 Yes    Atrial flutter with rapid ventricular response ([address] 10/10/2021 Yes    Hematochezia 10/10/2021 Yes    Anemia due to chronic kidney disease 10/10/2021 Yes          Plan:        Atrial flutter with rvr with hypotension-Heparin drip changed to Lovenox renally dosed, on amiodarone 200 mg twice daily, retry obtaining IV line, will need GI clearance for oral anticoagulation.   Plan for colonoscopy noted likely Tuesday  Change to clear liquid diet, n.p.o. at midnight   nephrology following for dialysis  Itching- give hydroxyzine, check phosphorus levels  continue neurontin  blood pressure stable on midodrine, hold Lopressor  Chronic anemia- recent EGD reviewed, colonoscopy planned this admission  Cardiology cleared for colonoscopy  Septic work-up negative  Type 2 diabetes mellitus- poct blood sugar ac, hs, add sliding scale as needed  Stool softeners added for constipation, improving  Lifestyle modifications for weight loss  Rest of the medications as ordered    Satya Allison MD  10/11/2021  1:30 PM

## 2021-10-11 NOTE — PROGRESS NOTES
[facility]    Gastroenterology Daily Progress Note      Patient:   Best Solo   :    1970   [facility]ility[facility]   Date:     10/11/2021  Consultant:   VENTURA Calle CNP, CNP      SUBJECTIVE  46 y.o. female admitted 10/8/2021 with Intra-dialytic hypotension [I95.3]  Hypotension, unspecified hypotension type [I95.9]  Atrial flutter ([address] [I48.92] and seen for anemia and hematochezia. The pt was seen and examined. hgb 8. 5. no c/o abdominal pain but reports nausea with non bloody emesis. States that the pills are causing her emesis. Off of the heparin drip and now on lovenox. cardiology clearance has been obtained for colonoscopy but she has no IV access.          OBJECTIVE  Scheduled Meds:   gabapentin  100 mg Oral TID    sennosides-docusate sodium  2 tablet Oral BID    enoxaparin  1 mg/kg SubCUTAneous Q24H    Hydrocortisone-Aloe Vera   Topical TID    amiodarone  200 mg Oral BID    midodrine  15 mg Oral 4x Daily    Calcium Acetate (Phos Binder)  2 capsule Oral TID WC    pantoprazole  40 mg Oral QAM AC    [Held by provider] metoprolol tartrate  25 mg Oral BID    budesonide-formoterol  2 puff Inhalation BID    cinacalcet  90 mg Oral Once per day on     calcitRIOL  0.25 mcg Oral Once per day on     atorvastatin  40 mg Oral Nightly    aspirin  81 mg Oral Daily    sodium chloride flush  5-40 mL IntraVENous 2 times per day    potassium chloride  40 mEq Oral Once       Vital Signs:  BP (!) 91/59   Pulse 62   Temp 97.3 °F (36.3 °C) (Temporal)   Resp 18   Ht 4' 11\" (1.499 m)   Wt 257 lb 15 oz (117 kg)   LMP 2014 (Approximate)   SpO2 96%   BMI 52.10 kg/m²      Physical Exam:   General Appearance: alert and oriented to person, place and time, well-developed and well-nourished, in no acute distress  Skin: warm and dry, no rash or erythema  Head: normocephalic and atraumatic  Eyes: pupils equal, round, and reactive to light, extraocular eye movements intact, conjunctivae normal  ENT: hearing grossly normal bilaterally  Neck: neck supple and non tender without mass, no thyromegaly or thyroid nodules, no cervical lymphadenopathy   Pulmonary/Chest: clear to auscultation bilaterally- no wheezes, rales or rhonchi, normal air movement, no respiratory distress  Cardiovascular: normal rate, regular rhythm, normal S1 and S2, no murmurs, rubs, clicks or gallops, distal pulses intact, no carotid bruits  Abdomen: soft, obese non-tender, non-distended, normal bowel sounds, no masses or organomegaly  Extremities: no cyanosis, clubbing or edema  Musculoskeletal: normal range of motion, no joint swelling, deformity or tenderness  Neurologic: no cranial nerve deficit and muscle strength normal    Lab and Imaging Review     CBC  Recent Labs     10/09/21  0848 10/10/21  0928 10/11/21  0343   WBC 7.1 8.5 7.5   HGB 8.8* 9.0* 8.5*   HCT 32.4* 33.3* 30.3*   .1* 114.8* 111.4*   * 145 149       BMP  Recent Labs     10/09/21  0314 10/11/21  0343   * 139   K 3.8 5.0    96*   CO2 27 23   BUN 13 21*   CREATININE 3.65* 5.20*   GLUCOSE 46* 66*   CALCIUM 7.8* 7.9*         PT/INR  Recent Labs     10/09/21  0848   PROTIME 16.0*   INR 1.3           ASSESSMENT/plan  1. Anemia with hematochezia, need clearance for oral AC  -will plan to order the colonoscopy prep once the pt has IV access, d/w RN who is working on obtaining this  -ok for low fiber diet today  -trend hh and keep hgb >7  -continue ppi  -d/w md    2. ESRD on HD    This plan was formulated in collaboration with Dr. Cuong Wilson.     Electronically signed by: VENTURA Medel - CNP on 10/11/2021 at 3:05 PM

## 2021-10-12 NOTE — FLOWSHEET NOTE
10/11/21 4439   Provider Notification   Reason for Communication Review case   Provider Name Dr. Mitchell Hong   Provider Notification Physician   Method of Communication Call   Response No new orders   Notification Time    Notified Dr. Mitchell Hong of increase in pauses with sinus rhythm. No new orders at this time. We will continue monitoring at this time.

## 2021-10-12 NOTE — PROGRESS NOTES
Report called to [facility], RN on progressive   Patient will be moved to 1014

## 2021-10-12 NOTE — PROGRESS NOTES
auscultation bilaterally  Heart: regular rate and rhythm, S1, S2 normal, no murmur, click, rub or gallop  Abdomen: soft, non-tender; bowel sounds normal; no masses,  no organomegaly  Extremities: extremities normal, atraumatic, no cyanosis or edema  Neurologic: Mental status: Alert, oriented, thought content appropriate    Patient Active Problem List:     Asthma     YONI (obstructive sleep apnea)     Left knee pain     Hidradenitis     Current smoker     Microalbuminuria     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 kidney disease) stage 4, GFR 15-29 ml/min (HCC)     Acute diastolic congestive heart failure (HCC)     Hyperkalemia     Acute systolic congestive heart failure (HCC)     Pulmonary hypertension (HCC)     Morbid obesity with BMI of 45.0-49.9, adult (HCC)     Acute on chronic respiratory failure with hypoxia (HCC)     COPD exacerbation (HCC)     Asthma exacerbation     Type 2 diabetes mellitus with diabetic nephropathy (HCC)     ESRD (end stage renal disease) on dialysis ([address]     Bronchitis     Essential hypertension     YONI on CPAP     Hyperglycemia, drug-induced     Needs smoking cessation education     Hyperglycemia     Drowsiness     Acute on chronic respiratory failure with hypercapnia ([facility])     Mobility impaired     S/P cardiac cath-mINIMAL caD 3/15/16 - dR. 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)     Type 2 diabetes mellitus without complication (HCC)     Congestive heart failure (HCC)     Asthma with COPD with exacerbation (HCC)     Acute exacerbation of CHF (congestive heart failure) (HCC)     Chronic obstructive pulmonary disease (HCC)     Chronic kidney disease, stage V (HCC)     Acute respiratory acidosis     Precordial pain     Gastroesophageal reflux disease without esophagitis     Pulmonary HTN ([address]     Morbid obesity due to excess calories (HCC)     Symptomatic anemia     Elevated serum creatinine     ESRD needing dialysis ([address]     Chest pain at rest     Absolute anemia     Atrial flutter with rapid ventricular response (HCC)     Chest pain     Hypoglycemia     Hypotension     Atrial fibrillation with RVR ([address]     Hematochezia     Anemia due to chronic kidney disease     Itching    Echo 08/20/2021:  Mild left ventricular hypertrophy  Global left ventricular systolic function is normal  Estimated ejection fraction is 65 % . Right atrium is severely dilated . Right ventricular with severe dilatation with severely reduced systolic  function. Severe tricuspid regurgitation. Moderate to severe pulmonary hypertension. No pericardial effusion seen. Normal aortic root dimension. Assessment / Acute Cardiac Problems:     1. PAF now back in NSR on Amiodarone PO, Lovenox renal dosing for anticoagulation. 2. Preserved LV systolic function. 3. Hypotension during dialysis, improved on Midodrine   4. History of HTN  5. DM  6. HLP  7. YONI  8. ESRD on HD  9. Anemia and GI bleed. 10. Obesity    Plan of Treatment:     1. PAF. Continue Amiodarone and BB PO  2. Continue anticoagulation with Lovenox renal dosing. 3. Awaiting GI clearance for oral anticoagulant, OK to proceed with colonoscopy. Plan for tomorrow   4.  Continue ASA, Statins    Electronically signed by VENTURA Jason CNP on 10/12/2021 at 9:38 [facility]  [phone]

## 2021-10-12 NOTE — FLOWSHEET NOTE
Patient was sleeping as writer entered the room (no family members present). Writer provided a silent prayer of healing, comfort, and rest during her stay. Spiritual care will follow up as needed or requested.      10/12/21 5166   Encounter Summary   Services provided to: Patient   Referral/Consult From: [facility]   (10/12/2021 Sleeping)   Complexity of Encounter Low   Length of Encounter 15 minutes   Routine   Type Initial   Assessment Sleeping   Intervention Prayer

## 2021-10-12 NOTE — PROGRESS NOTES
Renal Progress Note    Patient :  Marcia Sam; 46 y.o. MRN# [id number]  Location:  2028/2028-01  Attending:  Patrick Alfaro MD  Admit Date:  10/8/2021   Hospital Day: 3      Subjective:     Patient was seen and examined. No new issues reported overnight. Patient did have regular dialysis done yesterday and got about 1.4 L removed. 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 Phosphorus level was 4.1. Patient to get colonoscopy this admission per GI. Patient was complaining of some neuropathy and was started on gabapentin yesterday.     Outpatient Medications:     Medications Prior to Admission: atorvastatin (LIPITOR) 40 MG tablet, Take 1 tablet by mouth nightly  metoprolol tartrate (LOPRESSOR) 25 MG tablet, Take 1 tablet by mouth 2 times daily  pantoprazole (PROTONIX) 40 MG tablet, Take 1 tablet by mouth every morning (before breakfast)  Calcium Acetate, Phos Binder, (PHOSLO) 667 MG CAPS, Take 2 capsules by mouth as needed (snacks)   diphenhydrAMINE (DIPHEN) 25 MG tablet, Take 25 mg by mouth 2 times daily as needed for Itching   Calcium Acetate, Phos Binder, 667 MG CAPS, Take 2 capsules by mouth 3 times daily (with meals)   fluticasone-salmeterol (ADVAIR) 250-50 MCG/DOSE AEPB, INHALE 1 PUFF BY MOUTH INTO THE LUNGS TWICE DAILY **RINSE MOUTH AFTER EACH USE**  LANTUS SOLOSTAR 100 UNIT/ML injection pen, INJECT 15 UNITS SUBCUTANEOUSLY DAILY AT NIGHT  OXYGEN, Inhale into the lungs O2  3L  PER NASAL CANNULA NIGHTLY  Alcohol Swabs (ULTRA-CARE ALCOHOL PREP PADS) 70 % PADS, USE TO CLEAN TESTING AND INJECTING SITES TWICE DAILY  hydrocortisone (ANUSOL-HC) 2.5 % CREA rectal cream, Apply bid to hemorrhoids  guaiFENesin (MUCINEX) 600 MG extended release tablet, Take 1 tablet by mouth 2 times daily as needed for Congestion  midodrine (PROAMATINE) 5 MG tablet, Take 3 tablets by mouth 3 times daily (with meals) (Patient taking differently: Take 15 mg by mouth 3 times daily (with meals) As needed for BP)  nitroGLYCERIN (NITROSTAT) 0.4 MG SL tablet, PLACE 1 TABLET UNDER THE TONGUE EVERY 5 MINUTES AS NEEDED FOR CHEST PAIN**IF NO RELIEF AFTER 3 DOSES CALL 911 OR DR**  albuterol sulfate HFA (PROAIR HFA) 108 (90 Base) MCG/ACT inhaler, Inhale 2 puffs into the lungs every 6 hours as needed for Wheezing (Patient not taking: Reported on 8/31/2021)  calcitRIOL (ROCALTROL) 0.25 MCG capsule, Take 0.25 mcg by mouth three times a week  Methoxy PEG-Epoetin Beta (MIRCERA IJ), Inject 225 mcg as directed every 14 days  cinacalcet (SENSIPAR) 90 MG tablet, Take 90 mg by mouth three times a week Indications: after dialysis  sennosides-docusate sodium (SENOKOT-S) 8.6-50 MG tablet, Take 1 tablet by mouth daily as needed for Constipation  polyethylene glycol (GLYCOLAX) 17 GM/SCOOP powder, Take 17 g by mouth daily as needed   Multiple Vitamins-Minerals (RENAPLEX-D) TABS, Take 1 tablet by mouth daily   aspirin 81 MG EC tablet, TAKE 1 TABLET BY MOUTH DAILY  furosemide (LASIX) 40 MG tablet, Take 1 tablet by mouth daily (Patient not taking: Reported on 8/31/2021)  albuterol (PROVENTIL) (2.5 MG/3ML) 0.083% nebulizer solution, INHALE THE CONTENTS OF ONE VIAL VIA NEBULIZER EVERY 6 HOURS AS NEEDED FOR SHORTNESS OF BREATH OR WHEEZING  Easy Comfort Lancets MISC, USE TO TEST BLOOD SUGAR TWICE DAILY AS DIRECTED  blood glucose test strips (ONETOUCH ULTRA) strip, USE TO TEST BLOOD SUGAR TWICE DAILY AS DIRECTED  Insulin Pen Needle (EASY COMFORT PEN NEEDLES) 31G X 5 MM MISC, USE TO INJECT INSULIN ONCE DAILY  Skin Protectants, Misc.  (MINERIN CREME) CREA, APPLY TO AFFECTED AREA TOPICALLY AS NEEDED (Patient taking differently: every other day APPLY TO AFFECTED AREA TOPICALLY AS NEEDED)  Blood Glucose Monitoring Suppl (TRUE METRIX METER) w/Device KIT, USE TO TEST BLOOD GLUCOSE  Lancets (BD LANCET ULTRAFINE 30G) MISC, TEST TWICE DAILY  Lancet Devices (SIMPLE DIAGNOSTICS LANCING DEV) MISC, USE WITH LANCETS TO TEST BLOOD GLUCOSE  Blood Glucose Calibration (RIGOBERTO DOC CONTROL) Normal SOLN, USE TO PERIODICALLY CHECK GLUCOSE MONITOR ACCORDING TO THE MANUAL  UNIFINE PENTIPS 31G X 6 MM MISC, USE WITH insulin pens ONCE daily  PHARMACIST CHOICE LANCETS MISC, USE TO TEST BLOOD GLUCOSE ONCE A DAY  Misc. Devices MISC, 1 each by Does not apply route daily Electric scooter  glucose monitoring kit (FREESTYLE) monitoring kit, 1 kit by Does not apply route daily    Current Medications:     Scheduled Meds:    gabapentin  100 mg Oral TID    sennosides-docusate sodium  2 tablet Oral BID    enoxaparin  1 mg/kg SubCUTAneous Q24H    Hydrocortisone-Aloe Vera   Topical TID    amiodarone  200 mg Oral BID    midodrine  15 mg Oral 4x Daily    Calcium Acetate (Phos Binder)  2 capsule Oral TID WC    pantoprazole  40 mg Oral QAM AC    [Held by provider] metoprolol tartrate  25 mg Oral BID    budesonide-formoterol  2 puff Inhalation BID    cinacalcet  90 mg Oral Once per day on     calcitRIOL  0.25 mcg Oral Once per day on     atorvastatin  40 mg Oral Nightly    aspirin  81 mg Oral Daily    sodium chloride flush  5-40 mL IntraVENous 2 times per day    potassium chloride  40 mEq Oral Once     Continuous Infusions:    sodium chloride      dextrose Stopped (10/10/21 1415)     PRN Meds:  hydrOXYzine, diphenhydrAMINE, Calcium Acetate (Phos Binder), guaiFENesin, albuterol, sodium chloride flush, sodium chloride, ondansetron **OR** ondansetron, polyethylene glycol, acetaminophen **OR** acetaminophen, sodium thiosulfate IVPB, glucose, dextrose, glucagon (rDNA), dextrose    Input/Output:       I/O last 3 completed shifts: In: 790 [P.O.:790]  Out: 950 [Emesis/NG output:950].       Patient Vitals for the past 96 hrs (Last 3 readings):   Weight   10/12/21 0547 261 lb 1.6 oz (118.4 kg)   10/09/21 0402 257 lb 15 oz (117 kg)   10/08/21 204 252 lb 6.8 oz (114.5 kg)       Vital Signs:   Temperature:  Temp: 97.1 °F (36.2 °C)  TMax:   Temp (24hrs), Av.4 °F (36.3 °C), Min:97 °F (36.1 °C), Max:97.9 °F (36.6 °C)    Respirations:  Resp: 18  Pulse:   Pulse: 69  BP:    BP: (!) 73/45  BP Range: Systolic (96SFG), BDR:03 , Min:67 , VQC:27       Diastolic (38AYJ), CMJ:70, Min:32, Max:67      Physical Examination:     General:  AAO x 3, speaking in full sentences, no accessory muscle use. HEENT: Atraumatic, normocephalic, no throat congestion, moist mucosa. Eyes:   Pupils equal, round and reactive to light, EOMI. Neck:   Supple  Chest:   Bilateral vesicular breath sounds, no rales or wheezes. Cardiac:  S1 S2 RR, no murmurs, gallops or rubs. Abdomen: Soft, obese, non-tender, no masses or organomegaly, BS audible, positive lower abdomen wall skin discoloration as well as some generalized tenderness and skin thickening. :   No suprapubic or flank tenderness. Neuro:  AAO x 3, No FND. SKIN:  No rashes, good skin turgor. Extremities:  Trace edema. Labs:       Recent Labs     10/10/21  0928 10/11/21  0343 10/12/21  0439   WBC 8.5 7.5 7.1   RBC 2.90* 2.72* 2.64*   HGB 9.0* 8.5* 8.2*   HCT 33.3* 30.3* 29.7*   .8* 111.4* 112.5*   MCH 31.0 31.3 31.1   MCHC 27.0* 28.1* 27.6*   RDW 16.5* 16.2* 16.4*    149 128*   MPV 11.4 11.4 11.8      BMP:   Recent Labs     10/11/21  0343      K 5.0   CL 96*   CO2 23   BUN 21*   CREATININE 5.20*   GLUCOSE 66*   CALCIUM 7.9*     BONNIE:      Lab Results   Component Value Date    BONNIE NEGATIVE 12/10/2015     SPEP:  Lab Results   Component Value Date    PROT 7.4 08/21/2021    ALBCAL 2.6 12/10/2015    ALBPCT 42 12/10/2015    LABALPH 0.3 12/10/2015    LABALPH 0.9 12/10/2015    A1PCT 4 12/10/2015    A2PCT 14 12/10/2015    LABBETA 1.3 12/10/2015    BETAPCT 21 12/10/2015    GAMGLOB 1.2 12/10/2015    GGPCT 19 12/10/2015    PATH Yazan Harrison M.D. 08/25/2021     UPEP:     Lab Results   Component Value Date    LABPE  12/10/2015     URINE PROTEIN CONCENTRATION IS ELEVATED.   PROTEIN ELECTROPHORESIS DEMONSTRATES     C3:     Lab Results   Component Value MD  [facility]     This note is created with the assistance of a speech-recognition program. While intending to generate a document that actually reflects the content of the visit, no guarantees can be provided that every mistake has been identified and corrected by editing.

## 2021-10-12 NOTE — PROGRESS NOTES
[facility]  Office: [address], DO, Cornelio Julio, DO, Gabino Pastor, DO, Bayleekailyn Alvarado Blood, DO, Mery Ramos MD, Radha Montez MD, Holly Mendoza MD, Philly Peraza MD, Rosa Crawford MD, Lo Fink MD, Jay Leigh MD, Barbara Allen, DO, Frank Simmonds, DO, Simba Barr MD,  Hue Pineda, DO, Renate Collier MD, Thony Lizama MD, Madeline Flowers MD, Hubert Cortez MD, Marciano Bernheim, MD, Thomas Alex MD, Kellen Salmeron, Marika Silva, CNP, Shi Pandey, CNP, Dom Swain, CNS, Alberto Jung, CNP, Acacia You, CNP, Anna Zepeda, CNP, Michela Sandoval, CNP, Merrick Haji, CNP, Mark Pierce PA-C, Teresa Lozano, [facility], Kan Feliciano, CNP, Lc Mcdaniel, CNP, Woodward Brittle, CNP, Jennifer Pradhan, CNP, Laya Scott, CNP, Korina Doyle, CNP, Dima [facility]    Progress Note    10/12/2021    1:57 PM    Name:   Jr Luna  MRN:     [id number]     Acct:      [de-identified]   Room:   2028/2028-01   Day:  3  Admit Date:  10/8/2021 12:20 PM    PCP:   Hermann Sal MD  Code Status:  Full Code    Subjective:     C/C:   Chief Complaint   Patient presents with    Hypotension     Interval History Status: not changed. Patient  itching is much better today  No fever chest  Bowel prep was not done  Had apneic episode overnight while sleeping, patient has known history of sleep apnea. Was asymptomatic. She is noncompliant with her CPAP. Brief History:      59-year-old female with known medical history of hypertension on dialysis, admitted for diabetes presented to the hospital after she was sent from dialysis for blood pressure of systolic less than 70. Patient states that she was feeling fatigued in the last few days. After she received her Midrin dose her blood pressure stabilized. 10/9 patient developed atrial flutter, heart rate was elevated up to 120s.   Blood pressure continued to be low 80s systolic. Cardiology was consulted. Patient was started on amiodarone and heparin. Patient noted to have recent EGD showing duodenal polyps, and was sent to colonoscopy outpatient patient did not follow-up. Patient will need GI clearance for anticoagulation .       Review of Systems:     Constitutional:  negative for chills, fevers, sweats  Respiratory:  negative for cough, dyspnea on exertion, shortness of breath, wheezing  Cardiovascular:  negative for chest pain, chest pressure/discomfort, lower extremity edema, palpitations  Gastrointestinal:  negative for abdominal pain, constipation, diarrhea, positive for vomiting and nausea   neurological:  negative for dizziness, headache  Complains of itching all over the body    Medications: Allergies:     Allergies   Allergen Reactions    Ibuprofen     Tramadol Hives    Motrin [Ibuprofen Micronized] Itching    Strawberry Extract Itching and Rash    Tape Gareth Hooper Tape] Rash     No paper tape silk only       Current Meds:   Scheduled Meds:    gabapentin  100 mg Oral TID    sennosides-docusate sodium  2 tablet Oral BID    enoxaparin  1 mg/kg SubCUTAneous Q24H    Hydrocortisone-Aloe Vera   Topical TID    amiodarone  200 mg Oral BID    midodrine  15 mg Oral 4x Daily    Calcium Acetate (Phos Binder)  2 capsule Oral TID WC    pantoprazole  40 mg Oral QAM AC    [Held by provider] metoprolol tartrate  25 mg Oral BID    budesonide-formoterol  2 puff Inhalation BID    cinacalcet  90 mg Oral Once per day on Mon Wed Fri    calcitRIOL  0.25 mcg Oral Once per day on Mon Wed Fri    atorvastatin  40 mg Oral Nightly    aspirin  81 mg Oral Daily    sodium chloride flush  5-40 mL IntraVENous 2 times per day    potassium chloride  40 mEq Oral Once     Continuous Infusions:    sodium chloride      dextrose Stopped (10/10/21 1415)     PRN Meds: hydrOXYzine, diphenhydrAMINE, Calcium Acetate (Phos Binder), guaiFENesin, albuterol, sodium chloride flush, sodium chloride, ondansetron **OR** ondansetron, polyethylene glycol, acetaminophen **OR** acetaminophen, sodium thiosulfate IVPB, glucose, dextrose, glucagon (rDNA), dextrose    Data:     Past Medical History:   has a past medical history of Asthma, CHF (congestive heart failure) ([facility] Chronic kidney disease, COPD (chronic obstructive pulmonary disease) ([facility] Dialysis patient ([facility] Hemodialysis patient ([facility] Hyperlipidemia, Hypertension, Obesity, YONI (obstructive sleep apnea), Pneumonia, Renal failure, Type II or unspecified type diabetes mellitus without mention of complication, not stated as uncontrolled, and Ventilator dependence ([facility] Social History:   reports that she quit smoking about 4 years ago. Her smoking use included cigarettes. She quit after 7.00 years of use. She has never used smokeless tobacco. She reports that she does not drink alcohol and does not use drugs. Family History:   Family History   Problem Relation Age of Onset    Diabetes Other     Cancer Mother         BREAST    Heart Disease Father         CAD-MI    Diabetes Father     High Blood Pressure Father     Diabetes Maternal Grandfather     Diabetes Paternal Grandfather     Heart Disease Paternal Grandfather     High Blood Pressure Paternal Grandfather        Vitals:  /76   Pulse 69   Temp 97.2 °F (36.2 °C) (Temporal)   Resp 18   Ht 4' 11\" (1.499 m)   Wt 261 lb 1.6 oz (118.4 kg)   LMP 2014 (Approximate)   SpO2 97%   BMI 52.74 kg/m²   Temp (24hrs), Av.4 °F (36.3 °C), Min:97 °F (36.1 °C), Max:97.9 °F (36.6 °C)    Recent Labs     10/12/21  0818 10/12/21  0906 10/12/21  0953 10/12/21  1154   POCGLU 68 68 108* 92       I/O (24Hr):     Intake/Output Summary (Last 24 hours) at 10/12/2021 1357  Last data filed at 10/12/2021 0300  Gross per 24 hour   Intake 390 ml   Output 200 ml   Net 190 ml       Labs:  Hematology:  Recent Labs     10/10/21  0928 10/11/21  0343 10/12/21  0439   WBC 8.5 7.5 7.1   RBC 2.90* 2.72* 2.64*   HGB 9.0* 8.5* 8.2*   HCT 33.3* 30.3* 29.7*   .8* 111.4* 112.5*   MCH 31.0 31.3 31.1   MCHC 27.0* 28.1* 27.6*   RDW 16.5* 16.2* 16.4*    149 128*   MPV 11.4 11.4 11.8     Chemistry:  Recent Labs     10/11/21  0343      K 5.0   CL 96*   CO2 23   GLUCOSE 66*   BUN 21*   CREATININE 5.20*   ANIONGAP 20*   LABGLOM 9*   GFRAA 11*   CALCIUM 7.9*   PHOS 4.1     Recent Labs     10/12/21  0238 10/12/21  0718 10/12/21  0818 10/12/21  0906 10/12/21  0953 10/12/21  1154   POCGLU 71 66 68 68 108* 92     ABG:  Lab Results   Component Value Date    POCPH 7.22 06/12/2016    POCPCO2 61 06/12/2016    POCPO2 68 06/12/2016    POCHCO3 24.8 06/12/2016    NBEA 3 06/12/2016    PBEA NOT REPORTED 06/12/2016    VBM2LTG 27 06/12/2016    BOUS2GYI 88 06/12/2016    FIO2 NOT REPORTED 07/01/2019     Lab Results   Component Value Date/Time    SPECIAL RT WRIST 5ML 10/09/2021 12:15 PM     Lab Results   Component Value Date/Time    CULTURE NO GROWTH 3 DAYS 10/09/2021 12:15 PM       Radiology:  XR CHEST PORTABLE    Result Date: 10/9/2021  No acute process. Stable cardiomegaly     XR CHEST PORTABLE    Result Date: 10/8/2021  No acute process.  Able cardiomegaly       Physical Examination:        General appearance:  alert, cooperative and MILD distress, morbidly obese  Mental Status:  oriented to person, place and time and normal affect  Lungs:  clear to auscultation bilaterally, normal effort  Heart:  irregular rate and rhythm, in flutter  Abdomen:  soft, nontender, nondistended, normal bowel sounds, no masses, hepatomegaly, splenomegaly  Extremities: trace edema, NO redness, tenderness in the calves  Skin: Chronic skin changes lower extremity, skin rash on abdomen    Assessment:        Hospital Problems         Last Modified POA    * (Principal) Hypotension 10/9/2021 Yes    CKD (chronic kidney disease) stage 4, GFR 15-29 ml/min (HCC) (Chronic) 10/8/2021 Yes    Pulmonary hypertension ([address] (Chronic) 10/8/2021 Yes    Morbid obesity with BMI of 45.0-49.9, adult ([facility] 10/8/2021 Yes    Essential hypertension 10/8/2021 Yes    YONI on CPAP 10/11/2021 Yes    Type 2 diabetes mellitus with chronic kidney disease on chronic dialysis ([facility] 10/8/2021 Yes    Chronic obstructive pulmonary disease ([facility] 10/8/2021 Yes    Gastroesophageal reflux disease without esophagitis 10/8/2021 Yes    ESRD needing dialysis ([facility] 10/8/2021 Yes    Atrial flutter with rapid ventricular response ([facility] 10/10/2021 Yes    Hematochezia 10/10/2021 Yes    Anemia due to chronic kidney disease 10/10/2021 Yes    Itching 10/11/2021 Yes          Plan:        Atrial flutter with rvr with hypotension-improving, continue renally dosed Lovenox, continue on amiodarone 200 mg twice daily, will need GI clearance for oral anticoagulation. Plan for colonoscopy tomorrow as prep was not done today.   Change to clear liquid diet, n.p.o. at midnight   nephrology following for dialysis  Itching-continue hydroxyzine as needed,  sodium thiosulfate added by nephrology  continue neurontin  blood pressure stable on midodrine, hold Lopressor  Chronic anemia- recent EGD reviewed, colonoscopy planned tomorrow  Cardiology cleared for colonoscopy  Type 2 diabetes mellitus- poct blood sugar ac, hs, add sliding scale as needed  Resume CPAP for sleep apnea  Stool softeners added for constipation, improving  Lifestyle modifications for weight loss  Rest of the medications as ordered    Brandyn Merrill MD  10/12/2021  1:57 PM

## 2021-10-12 NOTE — PROGRESS NOTES
Patient is currently napping and has not started her bowl prep yet   Education provided   Will continue to reinforce need to drink prep

## 2021-10-12 NOTE — PROGRESS NOTES
Pt transferred to room #1014 from [facility]  Oriented to room and call light/tv controls. Bed in lowest position, wheels locked, 2/4 side rails up  Call light in reach, room free of clutter, adequate lighting provided.

## 2021-10-12 NOTE — PROGRESS NOTES
[facility]    Gastroenterology Daily Progress Note      Patient:   Alverto Jorge   :    1970   Facility:   [facility]  Date:     10/12/2021  Consultant:   VENTURA Kirkpatrick CNP, CNP      SUBJECTIVE  46 y.o. female admitted 10/8/2021 with Intra-dialytic hypotension [I95.3]  Hypotension, unspecified hypotension type [I95.9]  Atrial flutter ([address] [I48.92] and seen for anemia with hematochezia. The pt was seen and examined. She now has a IV. No c/o abdominal pain or nausea. No BM overnight. hgb 8.2.         OBJECTIVE  Scheduled Meds:   gabapentin  100 mg Oral TID    sennosides-docusate sodium  2 tablet Oral BID    enoxaparin  1 mg/kg SubCUTAneous Q24H    Hydrocortisone-Aloe Vera   Topical TID    amiodarone  200 mg Oral BID    midodrine  15 mg Oral 4x Daily    Calcium Acetate (Phos Binder)  2 capsule Oral TID WC    pantoprazole  40 mg Oral QAM AC    [Held by provider] metoprolol tartrate  25 mg Oral BID    budesonide-formoterol  2 puff Inhalation BID    cinacalcet  90 mg Oral Once per day on     calcitRIOL  0.25 mcg Oral Once per day on     atorvastatin  40 mg Oral Nightly    aspirin  81 mg Oral Daily    sodium chloride flush  5-40 mL IntraVENous 2 times per day    potassium chloride  40 mEq Oral Once       Vital Signs:  /76   Pulse 69   Temp 97.2 °F (36.2 °C) (Temporal)   Resp 18   Ht 4' 11\" (1.499 m)   Wt 261 lb 1.6 oz (118.4 kg)   LMP 2014 (Approximate)   SpO2 97%   BMI 52.74 kg/m²      Physical Exam:   General Appearance: alert and oriented to person, place and time, well-developed and well-nourished, in no acute distress  Skin: warm and dry, no rash or erythema  Head: normocephalic and atraumatic  Eyes: pupils equal, round, and reactive to light, extraocular eye movements intact, conjunctivae normal  ENT: hearing grossly normal bilaterally  Neck: neck supple and non tender without mass, no thyromegaly or thyroid nodules, no cervical lymphadenopathy   Pulmonary/Chest: clear to auscultation bilaterally- no wheezes, rales or rhonchi, normal air movement, no respiratory distress  Cardiovascular: normal rate, regular rhythm, normal S1 and S2, no murmurs, rubs, clicks or gallops, distal pulses intact, no carotid bruits  Abdomen: soft, obese non-tender, non-distended, normal bowel sounds, no masses or organomegaly  Extremities: no cyanosis, clubbing or edema  Musculoskeletal: normal range of motion, no joint swelling, deformity or tenderness  Neurologic: no cranial nerve deficit and muscle strength normal    Lab and Imaging Review     CBC  Recent Labs     10/10/21  0928 10/11/21  0343 10/12/21  0439   WBC 8.5 7.5 7.1   HGB 9.0* 8.5* 8.2*   HCT 33.3* 30.3* 29.7*   .8* 111.4* 112.5*    149 128*       BMP  Recent Labs     10/11/21  0343      K 5.0   CL 96*   CO2 23   BUN 21*   CREATININE 5.20*   GLUCOSE 66*   CALCIUM 7.9*         ASSESSMENT/plan  1. Anemia with hematochezia, need clearance for oral AC  -will plan for colonoscopy tomorrow now that the pt has a patent IV  1/2 prep today and 1/2 tomorrow morning then npo after 10  Am  -clear diet today  -trend hh and keep hgb >7  -continue ppi       2. ESRD on HD      This plan was formulated in collaboration with .     Electronically signed by: VENTURA Arce - CNP on 10/12/2021 at 12:41 PM

## 2021-10-13 NOTE — PROGRESS NOTES
[facility]  Office: [address], DO, Fauzia Kaba, DO, Kenyatta Gunn, DO, Liudmila Abhishek Blood, DO, Diane Washburn MD, Coleen Yeboah MD, Keyon Grier MD, David Calhoun MD, Ralf Colindres MD, Kristine Ngo MD, Telma Oviedo MD, Meseret Chavira, DO, Kelsi Faulkner, DO, Xiomara Byers MD,  Shayne Hernandez, DO, Tammi Botello MD, Eunice Valderrama MD, Laurie Gunn MD, Yaz Hawthorne MD, Josiane Almaraz MD, Severiano Border, MD, Monty Katz [facility], [facility], [facility], Reece Chowdhury, [facility], Tu Bhatti, CNS, Jo Davis, CNP, Nelly Feliciano, CNP, Abdoulaye Conley, CNP, Woody Caballero, CNP, Ellen Lemons, CNP, Kizzy Levy PA-C, Joaquin Friedman [facility], Janna Stock, CNP, Juliana Yen, CNP, Jimmy Kapadia, CNP, Nasreen Barillas, CNP, Cristobal Faulkner, CNP, Bassam Taveras, CNP, Am[facility]    Progress Note    10/13/2021    11:39 AM    Name:   Randy Moore  MRN:     [id number]     Acct:      [de-identified]   Room:   [address] [place] [address] Day:  4  Admit Date:  10/8/2021 12:20 PM    PCP:   Romeo Solis MD  Code Status:  Full Code    Subjective:     C/C:   Chief Complaint   Patient presents with    Hypotension     Interval History Status: not changed. Patient feels better  Colon prep last night and in the morning  Wants colonoscopy today  Vitals have been stable  Itching has been much better    Brief History:      71-year-old female with known medical history of hypertension on dialysis, admitted for diabetes presented to the hospital after she was sent from dialysis for blood pressure of systolic less than 70. Patient states that she was feeling fatigued in the last few days. After she received her Midrin dose her blood pressure stabilized. 10/9 patient developed atrial flutter, heart rate was elevated up to 120s. Blood pressure continued to be low 58T systolic. Cardiology was consulted. Patient was started on amiodarone and heparin. Patient noted to have recent EGD showing duodenal polyps, and was sent to colonoscopy outpatient patient did not follow-up. Patient will need GI clearance for anticoagulation .       Review of Systems:     Constitutional:  negative for chills, fevers, sweats  Respiratory:  negative for cough, dyspnea on exertion, shortness of breath, wheezing  Cardiovascular:  negative for chest pain, chest pressure/discomfort, lower extremity edema, palpitations  Gastrointestinal:  negative for abdominal pain, constipation, diarrhea, positive for vomiting and nausea   neurological:  negative for dizziness, headache  Complains of itching all over the body    Medications: Allergies:     Allergies   Allergen Reactions    Ibuprofen     Tramadol Hives    Motrin [Ibuprofen Micronized] Itching    Strawberry Extract Itching and Rash    Tape Kahlil Blackbird Tape] Rash     No paper tape silk only       Current Meds:   Scheduled Meds:    polyethylene glycol  2,000 mL Oral Once    Hydrocortisone-Aloe Vera   Topical BID    gabapentin  100 mg Oral TID    sennosides-docusate sodium  2 tablet Oral BID    [Held by provider] enoxaparin  1 mg/kg SubCUTAneous Q24H    amiodarone  200 mg Oral BID    midodrine  15 mg Oral 4x Daily    Calcium Acetate (Phos Binder)  2 capsule Oral TID WC    pantoprazole  40 mg Oral QAM AC    [Held by provider] metoprolol tartrate  25 mg Oral BID    budesonide-formoterol  2 puff Inhalation BID    cinacalcet  90 mg Oral Once per day on Mon Wed Fri    calcitRIOL  0.25 mcg Oral Once per day on Mon Wed Fri    atorvastatin  40 mg Oral Nightly    [Held by provider] aspirin  81 mg Oral Daily    sodium chloride flush  5-40 mL IntraVENous 2 times per day    potassium chloride  40 mEq Oral Once     Continuous Infusions:    sodium chloride 60 mL/hr at 10/13/21 0211    sodium chloride      dextrose Stopped (10/10/21 1415)     PRN Meds: hydrOXYzine, diphenhydrAMINE, Calcium Acetate (Phos Binder), guaiFENesin, albuterol, sodium chloride flush, sodium chloride, ondansetron **OR** ondansetron, polyethylene glycol, acetaminophen **OR** acetaminophen, sodium thiosulfate IVPB, glucose, dextrose, glucagon (rDNA), dextrose    Data:     Past Medical History:   has a past medical history of Asthma, CHF (congestive heart failure) ([facility] Chronic kidney disease, COPD (chronic obstructive pulmonary disease) ([facility] Dialysis patient ([facility] Hemodialysis patient ([facility] Hyperlipidemia, Hypertension, Obesity, YONI (obstructive sleep apnea), Pneumonia, Renal failure, Type II or unspecified type diabetes mellitus without mention of complication, not stated as uncontrolled, and Ventilator dependence ([facility] Social History:   reports that she quit smoking about 4 years ago. Her smoking use included cigarettes. She quit after 7.00 years of use. She has never used smokeless tobacco. She reports that she does not drink alcohol and does not use drugs. Family History:   Family History   Problem Relation Age of Onset    Diabetes Other     Cancer Mother         BREAST    Heart Disease Father         CAD-MI    Diabetes Father     High Blood Pressure Father     Diabetes Maternal Grandfather     Diabetes Paternal Grandfather     Heart Disease Paternal Grandfather     High Blood Pressure Paternal Grandfather        Vitals:  /81   Pulse 71   Temp 97.3 °F (36.3 °C) (Oral)   Resp 16   Ht 4' 11\" (1.499 m)   Wt 261 lb 6 oz (118.6 kg)   LMP 2014 (Approximate)   SpO2 92%   BMI 52.79 kg/m²   Temp (24hrs), Av.3 °F (36.3 °C), Min:97 °F (36.1 °C), Max:97.5 °F (36.4 °C)    Recent Labs     10/12/21  2007 10/13/21  0719 10/13/21  0942 10/13/21  1021   POCGLU 109* 64* 62* 84       I/O (24Hr):     Intake/Output Summary (Last 24 hours) at 10/13/2021 1139  Last data filed at 10/13/2021 0739  Gross per 24 hour   Intake 2328 ml   Output --   Net 2328 ml       Labs:  Hematology:  Recent Labs     10/11/21  0343 10/12/21  0439 10/13/21  0545 WBC 7.5 7.1 7.4   RBC 2.72* 2.64* 2.86*   HGB 8.5* 8.2* 8.8*   HCT 30.3* 29.7* 30.2*   .4* 112.5* 105.6*   MCH 31.3 31.1 30.8   MCHC 28.1* 27.6* 29.1   RDW 16.2* 16.4* 16.2*    128* 133*   MPV 11.4 11.8 12.3     Chemistry:  Recent Labs     10/11/21  0343 10/13/21  0843    134*   K 5.0 5.4*   CL 96* 94*   CO2 23 16*   GLUCOSE 66* 83   BUN 21* 19   CREATININE 5.20* 5.04*   ANIONGAP 20* 24*   LABGLOM 9* 9*   GFRAA 11* 11*   CALCIUM 7.9* 7.6*   PHOS 4.1 4.1     Recent Labs     10/12/21  1154 10/12/21  1600 10/12/21  2007 10/13/21  0719 10/13/21  0843 10/13/21  0942 10/13/21  1021   LABALBU  --   --   --   --  3.4*  --   --    POCGLU 92 172* 109* 64*  --  62* 84     ABG:  Lab Results   Component Value Date    POCPH 7.22 06/12/2016    POCPCO2 61 06/12/2016    POCPO2 68 06/12/2016    POCHCO3 24.8 06/12/2016    NBEA 3 06/12/2016    PBEA NOT REPORTED 06/12/2016    ZAG1LDC 27 06/12/2016    WAZO8XPQ 88 06/12/2016    FIO2 NOT REPORTED 07/01/2019     Lab Results   Component Value Date/Time    SPECIAL RT WRIST 5ML 10/09/2021 12:15 PM     Lab Results   Component Value Date/Time    CULTURE NO GROWTH 4 DAYS 10/09/2021 12:15 PM       Radiology:  XR CHEST PORTABLE    Result Date: 10/9/2021  No acute process. Stable cardiomegaly     XR CHEST PORTABLE    Result Date: 10/8/2021  No acute process.  Able cardiomegaly       Physical Examination:        General appearance:  alert, cooperative and no distress, morbidly obese  Mental Status:  oriented to person, place and time and normal affect  Lungs:  clear to auscultation bilaterally, normal effort  Heart:  irregular rate and rhythm, in flutter  Abdomen:  soft, nontender, nondistended, normal bowel sounds, no masses, hepatomegaly, splenomegaly  Extremities: trace edema, no redness, tenderness in the calves  Skin: Chronic skin changes lower extremity, skin rash on abdomen    Assessment:        Hospital Problems         Last Modified POA    * (Principal) Hypotension 10/9/2021 Yes    CKD (chronic kidney disease) stage 4, GFR 15-29 ml/min ([facility]) (Chronic) 10/8/2021 Yes    Pulmonary hypertension ([facility] (Chronic) 10/8/2021 Yes    Morbid obesity with BMI of 45.0-49.9, adult ([address] 10/8/2021 Yes    Essential hypertension 10/8/2021 Yes    YONI on CPAP 10/11/2021 Yes    Type 2 diabetes mellitus with chronic kidney disease on chronic dialysis ([address] 10/8/2021 Yes    Chronic obstructive pulmonary disease ([address] 10/8/2021 Yes    Gastroesophageal reflux disease without esophagitis 10/8/2021 Yes    ESRD needing dialysis ([address] 10/8/2021 Yes    Atrial flutter with rapid ventricular response ([address] 10/10/2021 Yes    Hematochezia 10/10/2021 Yes    Anemia due to chronic kidney disease 10/10/2021 Yes    Itching 10/11/2021 Yes          Plan:        Atrial flutter with rvr with hypotension -rate controlled, continue renally dosed Lovenox, continue on amiodarone 200 mg twice daily, will need GI clearance for oral anticoagulation. Colonoscopy today.   ESRD- Nephrology following for dialysis  Calciphylaxis-continue hydroxyzine as needed, sodium thiosulfate with dialysis  Neuropathy- continue neurontin  Hypotension-blood pressure stable on midodrine, hold Lopressor  Chronic anemia- recent EGD reviewed, colonoscopy planned  Type 2 diabetes mellitus- poct blood sugar ac, hs  CPAP for sleep apnea  Stool softeners for constipation  Lifestyle modifications for weight loss  Rest of the medications as ordered  Anticoagulant and antiplatelet held this a.m. in preparation for colonoscopy    Ronit Muhammad MD  10/13/2021  11:39 AM

## 2021-10-13 NOTE — PROGRESS NOTES
Pt returned to room. Colonoscopy not able to be completed d/t change in patient's condition per anesthesia. Dr. Josh Nassar notified, via perfect serve, EKG results. Waiting for response. 1535 UPDATE: Dialysis notified of above. Page out to Dr. Pao Vick (nephrology) to update. 1546 UPDATE: NNO at this time per Dr. Pao Vick. Notify cardiology to determine if OK to proceed with dialysis. Page out to cardiology, Dr. Fatou Sifuentes. [address]: Return call from Dr. Fatou Sifuentes. Will review EKG's once uploaded into Epic.     1632 UPDATE: EKG's faxed per Dr. Martinez Heads request. [phone] UPDATE: Call back from Dr. Fatou Sifuentes. Pt did NOT have a MI. Pt is cleared to have dialysis. Page out to Dr. Pao Vick to update. [address]: Dr. Pao Vick updated. Orders to give Albumin with dialysis. Pt currently in dialysis. Labs drawn and sent.

## 2021-10-13 NOTE — CARE COORDINATION
Discharge Planning:    Patient down to have colonoscopy, however colonoscopy was held d/t patient having a change in condition per anesthesia. There was concern that patient had an MI, however after Dr. Den Gilliland reviewed EKGs, the patient did not have an MI. Patient to have dialysis yet today and unsure of when colonoscopy will be rescheduled at this time.

## 2021-10-13 NOTE — PROGRESS NOTES
Interval Follow-Up Note     Subjective:  Main problem anemia. She was planned for colonoscopy today. Her blood pressures in preop area was low. She was reporting chest tightness. We had to cancel the procedure due to that. OBJECTIVE:   BP (!) 80/46   Pulse 72   Temp 97.7 °F (36.5 °C)   Resp 14   Ht 4' 11\" (1.499 m)   Wt 261 lb 6 oz (118.6 kg)   LMP 11/12/2014 (Approximate)   SpO2 96%   BMI 52.79 kg/m²   Body mass index is 52.79 kg/m². GEN: A O x 3 in mild distress   HEENT: Conjunctivae clear. Eyelids normal. No lymphadenopathy. No thyroid mass. CV: s1s2, RRR, no rubs. PULM: No accessory muscle use. Normal breath sounds bilaterally. ABD/GI: Soft NT ND +BS  EXT: No edema or rash. Warm skin. No joint swelling. Limited neuro exam intact.      Lab Results   Component Value Date    WBC 7.4 10/13/2021    HGB 8.8 (L) 10/13/2021    HCT 30.2 (L) 10/13/2021    .6 (H) 10/13/2021     (L) 10/13/2021     (L) 10/13/2021    K 5.4 (H) 10/13/2021    CL 94 (L) 10/13/2021    CO2 16 (L) 10/13/2021    BUN 19 10/13/2021    CREATININE 5.04 (HH) 10/13/2021    LABALBU 3.4 (L) 10/13/2021    BILITOT 0.46 08/21/2021    ALKPHOS 121 (H) 08/21/2021    AST 13 08/21/2021    ALT 10 08/21/2021      Lab Results   Component Value Date    RBC 2.86 (L) 10/13/2021    HGB 8.8 (L) 10/13/2021    .6 (H) 10/13/2021    MCH 30.8 10/13/2021    MCHC 29.1 10/13/2021    RDW 16.2 (H) 10/13/2021    MPV 12.3 10/13/2021    BASOPCT 0 10/13/2021    LYMPHSABS 1.63 10/13/2021    MONOSABS 0.89 (H) 10/13/2021    NEUTROABS 4.74 10/13/2021    EOSABS 0.07 10/13/2021    BASOSABS 0.00 10/13/2021        Past Medical History:   Diagnosis Date    Asthma     AS A CHILD    CHF [facility]congestive heart failure) ([facility] 2000    Chronic kidney disease     COPD (chronic obstructive pulmonary disease) ([facility]) 2000    INHALER USE AS NEEDED    Dialysis patient ([facility]     CENTRAL DIALYSIS MON, WEDS AND FRI, FLUID RESTRICTION 2L/24 HRS PER PT    Hemodialysis patient [facility])     had Dilaysis 8-17-16  M[facility]W-F @ [facility]    Hyperlipidemia     Hypertension 2000    ON RX    Obesity     YONI (obstructive sleep apnea) 2013    SLEEP STUDY -6/2016 AWAITING MACHINE, USING O2 AT NIGHT PRESENTLY    Pneumonia 2000    HAD TO BE ON VENTILATOR 12 DAYS    Renal failure     STARTED DIALYSIS 02/25/2016    Type II or unspecified type diabetes mellitus without mention of complication, not stated as uncontrolled 2000    IDDM    Ventilator dependence ([address]     X 2 200 AND 2014.  2014 HAD TO GO ON VENT POST OP          Current Facility-Administered Medications:     0.9 % sodium chloride infusion, , IntraVENous, Continuous, Erik Hoff MD, Last Rate: 60 mL/hr at 10/13/21 1609, New Bag at 10/13/21 1609    albumin human 25 % IV solution 25 g, 25 g, IntraVENous, Once, Asha Beltran MD    polyethylene glycol (GoLYTELY) solution 2,000 mL, 2,000 mL, Oral, Once, Elsie Snellen, MD    Hydrocortisone-Aloe Vera 1 % CREA, , Topical, BID, Elsie Snellen, MD, Given at 10/13/21 0746    gabapentin (NEURONTIN) capsule 100 mg, 100 mg, Oral, TID, Erik Hoff MD, 100 mg at 10/12/21 2057    sennosides-docusate sodium (SENOKOT-S) 8.6-50 MG tablet 2 tablet, 2 tablet, Oral, BID, Erik Hoff MD, 2 tablet at 10/12/21 2057    enoxaparin (LOVENOX) injection 120 mg, 1 mg/kg, SubCUTAneous, Q24H, Erik Hoff MD, 120 mg at 10/11/21 1631    hydrOXYzine (ATARAX) tablet 25 mg, 25 mg, Oral, TID PRN, Elsie Snellen, MD, 25 mg at 10/11/21 2004    amiodarone (CORDARONE) tablet 200 mg, 200 mg, Oral, BID, Erik Hoff MD, 200 mg at 10/13/21 0744    midodrine (PROAMATINE) tablet 15 mg, 15 mg, Oral, 4x Daily, Erik Hoff MD, 15 mg at 10/13/21 1609    diphenhydrAMINE (BENADRYL) tablet 25 mg, 25 mg, Oral, Q6H PRN, Erik Hoff MD, 25 mg at 10/11/21 0144    Calcium Acetate (Phos Binder) CAPS 1,334 mg, 2 capsule, Oral, TID WC, Erik Hoff MD, 1,334 mg at 10/12/21 1701   Calcium Acetate (Phos Binder) CAPS 667 mg, 1 capsule, Oral, PRN, Sudheer Hannah MD    pantoprazole (PROTONIX) tablet 40 mg, 40 mg, Oral, QAM AC, Erik Hoff MD, 40 mg at 10/13/21 0744    [Held by provider] metoprolol tartrate (LOPRESSOR) tablet 25 mg, 25 mg, Oral, BID, Brett Bone, APRN - NP, 25 mg at 10/08/21 2224    guaiFENesin (MUCINEX) extended release tablet 600 mg, 600 mg, Oral, BID PRN, Sudheer Hannah MD    budesonide-formoterol (SYMBICORT) 160-4.5 MCG/ACT inhaler 2 puff, 2 puff, Inhalation, BID, Sudheer Hannah MD, 2 puff at 10/13/21 1010    cinacalcet (SENSIPAR) tablet 90 mg, 90 mg, Oral, Once per day on Mon Wed Fri, Sudheer Hannah MD, 90 mg at 10/11/21 1410    calcitRIOL (ROCALTROL) capsule 0.25 mcg, 0.25 mcg, Oral, Once per day on Mon Wed Fri, Sudheer Hannah MD, 0.25 mcg at 10/08/21 2224    atorvastatin (LIPITOR) tablet 40 mg, 40 mg, Oral, Nightly, Erik Hoff MD, 40 mg at 10/12/21 2057    aspirin EC tablet 81 mg, 81 mg, Oral, Daily, Erik Hoff MD, 81 mg at 10/12/21 0822    albuterol (PROVENTIL) nebulizer solution 2.5 mg, 2.5 mg, Nebulization, Q4H PRN, Erik Hoff MD, 2.5 mg at 10/12/21 0800    sodium chloride flush 0.9 % injection 5-40 mL, 5-40 mL, IntraVENous, 2 times per day, Sudheer Hannah MD, 10 mL at 10/12/21 2058    sodium chloride flush 0.9 % injection 10 mL, 10 mL, IntraVENous, PRN, Erik Hoff MD    0.9 % sodium chloride infusion, 25 mL, IntraVENous, PRN, Sudheer Hannah MD    ondansetron (ZOFRAN-ODT) disintegrating tablet 4 mg, 4 mg, Oral, Q8H PRN, 4 mg at 10/11/21 0930 **OR** ondansetron (ZOFRAN) injection 4 mg, 4 mg, IntraVENous, Q6H PRN, Erik Hoff MD, 4 mg at 10/09/21 1721    polyethylene glycol (GLYCOLAX) packet 17 g, 17 g, Oral, Daily PRN, Erik Hoff MD, 17 g at 10/09/21 1002    acetaminophen (TYLENOL) tablet 650 mg, 650 mg, Oral, Q6H PRN **OR** acetaminophen (TYLENOL) suppository 650 mg, 650 mg, Rectal, Q6H PRN, Sudheer Hannah MD  [employer] potassium chloride (KLOR-CON M) extended release tablet 40 mEq, 40 mEq, Oral, Once, Kellee Rebolledo MD    sodium thiosulfate 25 g in sodium chloride 0.9 % 150 mL IVPB, 25 g, IntraVENous, As Directed RT PRN, Kellee Rebolledo MD, Stopped at 10/11/21 1410    glucose (GLUTOSE) 40 % oral gel 15 g, 15 g, Oral, PRN, Erik Hoff MD, 15 g at 10/09/21 1017    dextrose 50 % IV solution, 12.5 g, IntraVENous, PRN, Erik Hoff MD, 12.5 g at 10/13/21 1200    glucagon (rDNA) injection 1 mg, 1 mg, IntraMUSCular, PRN, Kellee Rebolledo MD    dextrose 5 % solution, 100 mL/hr, IntraVENous, PRN, Kellee Rebolledo MD, Stopped at 10/10/21 1415     XR CHEST PORTABLE    Result Date: 10/9/2021  EXAMINATION: ONE XRAY VIEW OF THE CHEST 10/9/2021 1:25 pm COMPARISON: 10/08/2021 HISTORY: ORDERING SYSTEM PROVIDED HISTORY: sob TECHNOLOGIST PROVIDED HISTORY: sob Reason for Exam: Hypotension. AP UPRIGHT PORTABLE Acuity: Acute Type of Exam: Subsequent/Follow-up FINDINGS: The lungs are without acute focal process. There is no effusion or pneumothorax. The cardiomediastinal silhouette is stable. The osseous structures are stable. No acute process. Stable cardiomegaly     XR CHEST PORTABLE    Result Date: 10/8/2021  EXAMINATION: ONE XRAY VIEW OF THE CHEST 10/8/2021 1:04 pm COMPARISON: 07/02/2019 HISTORY: ORDERING SYSTEM PROVIDED HISTORY: Shortness of breath, dialysis patient TECHNOLOGIST PROVIDED HISTORY: Shortness of breath, dialysis patient Reason for Exam: Pt c/o SOB, dialysis patient. AP UPRIGHT PORTABLE Acuity: Acute Type of Exam: Subsequent/Follow-up FINDINGS: The lungs are without acute focal process. There is no effusion or pneumothorax. The cardiomediastinal silhouette is stable. The osseous structures are stable. No acute process.  Able cardiomegaly          Intake/Output Summary (Last 24 hours) at 10/13/2021 1721  Last data filed at 10/13/2021 1619  Gross per 24 hour   Intake 2847 ml   Output --   Net 2847 ml IMPRESSION / RECOMMENDATIONS: Ms. Memo Arango is a 46 y.o. female followed for management of anemia. She was planned for colonoscopy to exclude any bleeding lesion prior to starting anticoagulation. She had hypertension and was complaining of chest tightness in the preop area. The procedure was canceled.        MD Kelechi Khan Ends

## 2021-10-13 NOTE — PROGRESS NOTES
Renal Progress Note    Patient :  Jeniffer Carter; 46 y.o. MRN# [id number]  Location:  8636/3795-49  Attending:  Cady Gill MD  Admit Date:  10/8/2021   Hospital Day: 4      Subjective:     Patient was seen and examined. No new issues reported overnight. Patient will be going for colonoscopy today and dialysis after that. 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 Reports that she feels little better, states that her abdomen pain from the calciphylaxis site is also improving.     Outpatient Medications:     Medications Prior to Admission: atorvastatin (LIPITOR) 40 MG tablet, Take 1 tablet by mouth nightly  metoprolol tartrate (LOPRESSOR) 25 MG tablet, Take 1 tablet by mouth 2 times daily  pantoprazole (PROTONIX) 40 MG tablet, Take 1 tablet by mouth every morning (before breakfast)  Calcium Acetate, Phos Binder, (PHOSLO) 667 MG CAPS, Take 2 capsules by mouth as needed (snacks)   diphenhydrAMINE (DIPHEN) 25 MG tablet, Take 25 mg by mouth 2 times daily as needed for Itching   Calcium Acetate, Phos Binder, 667 MG CAPS, Take 2 capsules by mouth 3 times daily (with meals)   fluticasone-salmeterol (ADVAIR) 250-50 MCG/DOSE AEPB, INHALE 1 PUFF BY MOUTH INTO THE LUNGS TWICE DAILY **RINSE MOUTH AFTER EACH USE**  LANTUS SOLOSTAR 100 UNIT/ML injection pen, INJECT 15 UNITS SUBCUTANEOUSLY DAILY AT NIGHT  OXYGEN, Inhale into the lungs O2  3L  PER NASAL CANNULA NIGHTLY  Alcohol Swabs (ULTRA-CARE ALCOHOL PREP PADS) 70 % PADS, USE TO CLEAN TESTING AND INJECTING SITES TWICE DAILY  hydrocortisone (ANUSOL-HC) 2.5 % CREA rectal cream, Apply bid to hemorrhoids  guaiFENesin (MUCINEX) 600 MG extended release tablet, Take 1 tablet by mouth 2 times daily as needed for Congestion  midodrine (PROAMATINE) 5 MG tablet, Take 3 tablets by mouth 3 times daily (with meals) (Patient taking differently: Take 15 mg by mouth 3 times daily (with meals) As needed for BP)  nitroGLYCERIN (NITROSTAT) 0.4 MG SL tablet, PLACE 1 TABLET UNDER THE TONGUE EVERY 5 MINUTES AS NEEDED FOR CHEST PAIN**IF NO RELIEF AFTER 3 DOSES CALL 911 OR DR**  albuterol sulfate HFA (PROAIR HFA) 108 (90 Base) MCG/ACT inhaler, Inhale 2 puffs into the lungs every 6 hours as needed for Wheezing (Patient not taking: Reported on 8/31/2021)  calcitRIOL (ROCALTROL) 0.25 MCG capsule, Take 0.25 mcg by mouth three times a week  Methoxy PEG-Epoetin Beta (MIRCERA IJ), Inject 225 mcg as directed every 14 days  cinacalcet (SENSIPAR) 90 MG tablet, Take 90 mg by mouth three times a week Indications: after dialysis  sennosides-docusate sodium (SENOKOT-S) 8.6-50 MG tablet, Take 1 tablet by mouth daily as needed for Constipation  polyethylene glycol (GLYCOLAX) 17 GM/SCOOP powder, Take 17 g by mouth daily as needed   Multiple Vitamins-Minerals (RENAPLEX-D) TABS, Take 1 tablet by mouth daily   aspirin 81 MG EC tablet, TAKE 1 TABLET BY MOUTH DAILY  furosemide (LASIX) 40 MG tablet, Take 1 tablet by mouth daily (Patient not taking: Reported on 8/31/2021)  albuterol (PROVENTIL) (2.5 MG/3ML) 0.083% nebulizer solution, INHALE THE CONTENTS OF ONE VIAL VIA NEBULIZER EVERY 6 HOURS AS NEEDED FOR SHORTNESS OF BREATH OR WHEEZING  Easy Comfort Lancets MISC, USE TO TEST BLOOD SUGAR TWICE DAILY AS DIRECTED  blood glucose test strips (ONETOUCH ULTRA) strip, USE TO TEST BLOOD SUGAR TWICE DAILY AS DIRECTED  Insulin Pen Needle (EASY COMFORT PEN NEEDLES) 31G X 5 MM MISC, USE TO INJECT INSULIN ONCE DAILY  Skin Protectants, Misc.  (Luci Modest) CREA, APPLY TO AFFECTED AREA TOPICALLY AS NEEDED (Patient taking differently: every other day APPLY TO AFFECTED AREA TOPICALLY AS NEEDED)  Blood Glucose Monitoring Suppl (TRUE METRIX METER) w/Device KIT, USE TO TEST BLOOD GLUCOSE  Lancets (BD LANCET ULTRAFINE 30G) MISC, TEST TWICE DAILY  Lancet Devices (SIMPLE DIAGNOSTICS LANCING DEV) MISC, USE WITH LANCETS TO TEST BLOOD GLUCOSE  Blood Glucose Calibration (RIGOBERTO DOC CONTROL) Normal SOLN, USE TO PERIODICALLY CHECK GLUCOSE MONITOR ACCORDING TO THE MANUAL  UNIFINE PENTIPS 31G X 6 MM MISC, USE WITH insulin pens ONCE daily  PHARMACIST CHOICE LANCETS MISC, USE TO TEST BLOOD GLUCOSE ONCE A DAY  Misc. Devices MISC, 1 each by Does not apply route daily Electric scooter  glucose monitoring kit (FREESTYLE) monitoring kit, 1 kit by Does not apply route daily    Current Medications:     Scheduled Meds:    polyethylene glycol  2,000 mL Oral Once    Hydrocortisone-Aloe Vera   Topical BID    gabapentin  100 mg Oral TID    sennosides-docusate sodium  2 tablet Oral BID    [Held by provider] enoxaparin  1 mg/kg SubCUTAneous Q24H    amiodarone  200 mg Oral BID    midodrine  15 mg Oral 4x Daily    Calcium Acetate (Phos Binder)  2 capsule Oral TID WC    pantoprazole  40 mg Oral QAM AC    [Held by provider] metoprolol tartrate  25 mg Oral BID    budesonide-formoterol  2 puff Inhalation BID    cinacalcet  90 mg Oral Once per day on     calcitRIOL  0.25 mcg Oral Once per day on     atorvastatin  40 mg Oral Nightly    [Held by provider] aspirin  81 mg Oral Daily    sodium chloride flush  5-40 mL IntraVENous 2 times per day    potassium chloride  40 mEq Oral Once     Continuous Infusions:    sodium chloride 60 mL/hr at 10/13/21 0211    sodium chloride      dextrose Stopped (10/10/21 1415)     PRN Meds:  hydrOXYzine, diphenhydrAMINE, Calcium Acetate (Phos Binder), guaiFENesin, albuterol, sodium chloride flush, sodium chloride, ondansetron **OR** ondansetron, polyethylene glycol, acetaminophen **OR** acetaminophen, sodium thiosulfate IVPB, glucose, dextrose, glucagon (rDNA), dextrose    Input/Output:       I/O last 3 completed shifts:   In:  [Other:]  Out: - .      Patient Vitals for the past 96 hrs (Last 3 readings):   Weight   10/13/21 0605 261 lb 6 oz (118.6 kg)   10/12/21 0547 261 lb 1.6 oz (118.4 kg)       Vital Signs:   Temperature:  Temp: 97.3 °F (36.3 °C)  TMax:   Temp (24hrs), Av.3 °F (36.3 °C), Min:97 °F (36.1 °C), Max:97.5 °F (36.4 °C)    Respirations:  Resp: 16  Pulse:   Pulse: 73  BP:    BP: (!) 149/98  BP Range: Systolic (00MFU), SQY:52 , Min:70 , EQB:651       Diastolic (61CVH), YWN:52, Min:27, Max:98      Physical Examination:     General:  AAO x 3, speaking in full sentences, no accessory muscle use. HEENT: Atraumatic, normocephalic, no throat congestion, moist mucosa. Eyes:   Pupils equal, round and reactive to light, EOMI. Neck:   Supple  Chest:   Bilateral vesicular breath sounds, no rales or wheezes. Cardiac:  S1 S2 RR, no murmurs, gallops or rubs. Abdomen: Soft, obese, non-tender, no masses or organomegaly, BS audible, positive lower abdomen wall skin discoloration as well as some generalized tenderness and skin thickening. :   No suprapubic or flank tenderness. Neuro:  AAO x 3, No FND. SKIN:  No rashes, good skin turgor. Extremities:  1+ edema. Labs:       Recent Labs     10/11/21  0343 10/12/21  0439 10/13/21  0545   WBC 7.5 7.1 7.4   RBC 2.72* 2.64* 2.86*   HGB 8.5* 8.2* 8.8*   HCT 30.3* 29.7* 30.2*   .4* 112.5* 105.6*   MCH 31.3 31.1 30.8   MCHC 28.1* 27.6* 29.1   RDW 16.2* 16.4* 16.2*    128* 133*   MPV 11.4 11.8 12.3      BMP:   Recent Labs     10/11/21  0343 10/13/21  0843    134*   K 5.0 5.4*   CL 96* 94*   CO2 23 16*   BUN 21* 19   CREATININE 5.20* 5.04*   GLUCOSE 66* 83   CALCIUM 7.9* 7.6*     BONNIE:      Lab Results   Component Value Date    BONNIE NEGATIVE 12/10/2015     SPEP:  Lab Results   Component Value Date    PROT 7.4 08/21/2021    ALBCAL 2.6 12/10/2015    ALBPCT 42 12/10/2015    LABALPH 0.3 12/10/2015    LABALPH 0.9 12/10/2015    A1PCT 4 12/10/2015    A2PCT 14 12/10/2015    LABBETA 1.3 12/10/2015    BETAPCT 21 12/10/2015    GAMGLOB 1.2 12/10/2015    GGPCT 19 12/10/2015    PATH Bailey Villeda M.D. 08/25/2021     UPEP:     Lab Results   Component Value Date    LABPE  12/10/2015     URINE PROTEIN CONCENTRATION IS ELEVATED.   PROTEIN ELECTROPHORESIS DEMONSTRATES     C3:     Lab Results   Component Value Date    C3 131 12/10/2015     C4:     Lab Results   Component Value Date    C4 39 12/10/2015     MPO ANCA:     Lab Results   Component Value Date    MPO 9 12/10/2015     PR3 ANCA:     Lab Results   Component Value Date    PR3 5 12/10/2015     Hep BsAg:         Lab Results   Component Value Date    HEPBSAG NONREACTIVE 02/25/2016     Urinalysis/Chemistries:      Lab Results   Component Value Date    NITRU NEGATIVE 06/12/2016    COLORU YELLOW 06/12/2016    PHUR 5.0 06/12/2016    WBCUA 2 TO 5 06/12/2016    RBCUA 0 TO 2 06/12/2016    MUCUS NOT REPORTED 06/12/2016    TRICHOMONAS NOT REPORTED 06/12/2016    YEAST NOT REPORTED 06/12/2016    BACTERIA FEW 06/12/2016    SPECGRAV 1.013 06/12/2016    LEUKOCYTESUR NEGATIVE 06/12/2016    UROBILINOGEN Normal 06/12/2016    BILIRUBINUR NEGATIVE 06/12/2016    GLUCOSEU TRACE 06/12/2016    KETUA NEGATIVE 06/12/2016    AMORPHOUS 1+ 06/12/2016     Urine Sodium:     Lab Results   Component Value Date    ANTONIO 28 06/12/2016     Urine Osmolarity:   Lab Results   Component Value Date    OSMOU 282 06/12/2016     Urine Creatinine:     Lab Results   Component Value Date    LABCREA 22.3 12/10/2015     Radiology:     Reviewed. Assessment:     1. ESRD on HD on MWF schedule at [facility] hemodialysis unit under Dr. Martinez Daily via left AV fistula. Dry weight 106 kg.  2.  Hypotension. 3.  Calcific uremic arthropathy-getting sodium thiosulfate. 4.  Anemia current disease. 5.  A. fib. 6.  Diabetes type 2.  7.  Constipation. 8.  Hematochezia. 9.  Diabetic neuropathy. Plan:   1. Patient will get regular dialysis today as per MWF schedule. Orders were confirmed with the dialysis nurse. 2.  Sodium thiosulfate ordered with dialysis. 3.  Continue ProAmatine. 4.  Colonoscopy today per GI.  5.  Will follow. Nutrition   Please ensure that patient is on a renal diet/TF. Avoid nephrotoxic drugs/contrast exposure.     We will continue to follow along with you. Lobo Gomez MD  [facility]     This note is created with the assistance of a speech-recognition program. While intending to generate a document that actually reflects the content of the visit, no guarantees can be provided that every mistake has been identified and corrected by editing.

## 2021-10-13 NOTE — PROGRESS NOTES
Pt BP low; per orders from Amparo Childers NP, will administer 1x dose of ProAmatine and watch pressures.

## 2021-10-14 NOTE — PROGRESS NOTES
Pts BS 50; new US guided IV inserted by House Sup @ 1508, infiltrated. Hence removed. Only approx. 1g of D%) successfully administered. House Super called to try another IV. Will repeat BS in 15min.

## 2021-10-14 NOTE — PLAN OF CARE
Case d/w dr Kaela Joseph will plan for colonoscopy tomorrow afternoon if she is stable . Nicole Escalante, APRN - CNP

## 2021-10-14 NOTE — PLAN OF CARE
Problem: Infection:  Goal: Will remain free from infection  Description: Will remain free from infection  Outcome: Ongoing     Problem: Daily Care:  Goal: Daily care needs are met  Description: Daily care needs are met  Outcome: Ongoing     Problem: Pain:  Goal: Patient's pain/discomfort is manageable  Description: Patient's pain/discomfort is manageable  Outcome: Ongoing     Problem: Skin Integrity:  Goal: Skin integrity will stabilize  Description: Skin integrity will stabilize  Outcome: Ongoing  Note: Turn and reposition in bed, pressure reducing mattress, monitoring skin with every assessment and prn. Problem: Discharge Planning:  Goal: Patients continuum of care needs are met  Description: Patients continuum of care needs are met  Outcome: Ongoing     Problem: Falls - Risk of:  Goal: Will remain free from falls  Description: Will remain free from falls  Outcome: Ongoing  Note: Siderails up x 2  Hourly rounding. Call light in reach. Instructed to call for assist before attempting out of bed. Remains free from falls and accidental injury at this time. Floor free from obstacles, and bed is locked and in lowest position. Adequate lighting provided. Bed alarm on. Fall sticker on wristband.  Fall Sign posted in doorway

## 2021-10-14 NOTE — PROGRESS NOTES
[facility]   Progress Note                   Date:   10/14/2021  Patient name: Meghann Pablo  Date of admission:  10/8/2021 12:20 PM  MRN:   [id number]  YOB: 1970  PCP: Claudette Finch, MD    Reason for Admission:     Subjective:       Clinical Changes / Abnormalities: Discussed with nurse. Endoscopy today cancelled today d/t hypotension & chest \"pressure\" this AM. Labs, vitals, & tele reviewed. Medications:   Scheduled Meds:   polyethylene glycol  2,000 mL Oral Once    Hydrocortisone-Aloe Vera   Topical BID    gabapentin  100 mg Oral TID    sennosides-docusate sodium  2 tablet Oral BID    enoxaparin  1 mg/kg SubCUTAneous Q24H    amiodarone  200 mg Oral BID    midodrine  15 mg Oral 4x Daily    Calcium Acetate (Phos Binder)  2 capsule Oral TID WC    pantoprazole  40 mg Oral QAM AC    [Held by provider] metoprolol tartrate  25 mg Oral BID    budesonide-formoterol  2 puff Inhalation BID    cinacalcet  90 mg Oral Once per day on Mon Wed Fri    calcitRIOL  0.25 mcg Oral Once per day on Mon Wed Fri    atorvastatin  40 mg Oral Nightly    aspirin  81 mg Oral Daily    sodium chloride flush  5-40 mL IntraVENous 2 times per day    potassium chloride  40 mEq Oral Once     Continuous Infusions:   dextrose 50 mL/hr at 10/14/21 0809    [Held by provider] sodium chloride 60 mL/hr at 10/13/21 1609    sodium chloride Stopped (10/13/21 1945)    dextrose Stopped (10/10/21 1415)     CBC:   Recent Labs     10/12/21  0439 10/13/21  0545 10/14/21  0549   WBC 7.1 7.4 6.5   HGB 8.2* 8.8* 8.5*   * 133* 132*     BMP:    Recent Labs     10/13/21  0843 10/13/21  1645 10/14/21  0549   * 132* 135   K 5.4* 4.6 3.9   CL 94* 91* 95*   CO2 16* 21 24   BUN 19 20 13   CREATININE 5.04* 5.35* 3.88*   GLUCOSE 83 83 69*     Hepatic: No results for input(s): AST, ALT, ALB, BILITOT, ALKPHOS in the last 72 hours. Troponin: No results for input(s): TROPONINI in the last 72 hours.   BNP: No results for input(s): BNP in the last 72 hours. Lipids: No results for input(s): CHOL, HDL in the last 72 hours. Invalid input(s): LDLCALCU  INR: No results for input(s): INR in the last 72 hours. Objective:   Vitals: BP 94/60   Pulse 75   Temp 98.2 °F (36.8 °C) (Oral)   Resp 18   Ht 4' 11\" (1.499 m)   Wt 260 lb (117.9 kg)   LMP 11/12/2014 (Approximate)   SpO2 95%   BMI 52.51 kg/m²   General appearance: alert and cooperative with exam  HEENT: Head: Normocephalic, no lesions, without obvious abnormality. Neck: no adenopathy, no carotid bruit, no JVD, supple, symmetrical, trachea midline and thyroid not enlarged, symmetric, no tenderness/mass/nodules  Lungs: clear to auscultation bilaterally  Heart: regular rate and rhythm, S1, S2 normal, no murmur, click, rub or gallop  Abdomen: soft, non-tender; bowel sounds normal; no masses,  no organomegaly  Extremities: extremities normal, atraumatic, no cyanosis or edema  Neurologic: Mental status: Alert, oriented, thought content appropriate    2D ECHO( 8/20/2021)  Mild left ventricular hypertrophy  Global left ventricular systolic function is normal  Estimated ejection fraction is 65 % . Right atrium is severely dilated . Right ventricular with severe dilatation with severely reduced systolic  function. Severe tricuspid regurgitation. Moderate to severe pulmonary hypertension. No pericardial effusion seen. Normal aortic root dimension. Assessment / Acute Cardiac Problems:   1. PAF now back in NSR on Amiodarone PO, Lovenox renal dosing for anticoagulation. 2. Preserved LV systolic function. 3. Hypotension during dialysis, improved on Midodrine   4. History of HTN  5. DM  6. HLP  7. YONI  8. ESRD on HD  9. Anemia and GI bleed.   10. Obesity    Patient Active Problem List:     Asthma     YONI (obstructive sleep apnea)     Left knee pain     Hidradenitis     Current smoker     Microalbuminuria     Type 2 diabetes mellitus with renal manifestations ([address]     CKD (chronic kidney disease) stage 4, GFR 15-29 ml/min (HCC)     Acute diastolic congestive heart failure (HCC)     Hyperkalemia     Acute systolic congestive heart failure (HCC)     Pulmonary hypertension (HCC)     Morbid obesity with BMI of 45.0-49.9, adult (HCC)     Acute on chronic respiratory failure with hypoxia (HCC)     COPD exacerbation (HCC)     Asthma exacerbation     Type 2 diabetes mellitus with diabetic nephropathy (HCC)     ESRD (end stage renal disease) on dialysis ([address]     Bronchitis     Essential hypertension     YONI on CPAP     Hyperglycemia, drug-induced     Needs smoking cessation education     Hyperglycemia     Drowsiness     Acute on chronic respiratory failure with hypercapnia ([address]     Mobility impaired     S/P cardiac cath-mINIMAL caD 3/15/16 - dR. 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)     Type 2 diabetes mellitus without complication (HCC)     Congestive heart failure (HCC)     Asthma with COPD with exacerbation (HCC)     Acute exacerbation of CHF (congestive heart failure) (HCC)     Chronic obstructive pulmonary disease (HCC)     Chronic kidney disease, stage V (HCC)     Acute respiratory acidosis     Precordial pain     Gastroesophageal reflux disease without esophagitis     Pulmonary HTN ([address]     Morbid obesity due to excess calories (HCC)     Symptomatic anemia     Elevated serum creatinine     ESRD needing dialysis (HCC)     Chest pain at rest     Absolute anemia     Atrial flutter with rapid ventricular response (HCC)     Chest pain     Hypoglycemia     Hypotension     Atrial fibrillation with RVR ([address]     Hematochezia     Anemia due to chronic kidney disease     Itching      Plan of Treatment:   1. PAF-remains SR 64 currently. Continue amiodarone 200 mg po bid. Will stop BB as has been on hold since 10/9. Continue Midodrine at 15mg 4x/daily. She has chronic hypotension despite this.    2. Continue anticoagulation with Lovenox renal dosing. Will consider NOAC on discharge pending GI evaluation. If unable to complete would consider starting renal dose NOAC and monitoring closely. 3. Hypotensive this AM along with some chest \"pressure\" which she states has resolved. No acute ECG changes and troponin 45. She is feeling better now. No plans for further CV intervention at this time. 4. OK for endoscopy from CV standpoint. Would recommend to do today if still NPO.      Electronically signed by VENTURA Antoine CNP on 10/14/2021 at 11:34 AM  [facility]  [phone]

## 2021-10-14 NOTE — PROGRESS NOTES
[facility]   Progress Note                   Date:   10/13/2021  Patient name: Mally Brown  Date of admission:  10/8/2021 12:20 PM  MRN:   [id number]  YOB: 1970  PCP: Skip Mcnally MD    Reason for Admission:     Subjective:       Clinical Changes / Abnormalities:  Resting comfortably; remaining in sinus rhythm with HR 72 bpm.      Medications:   Scheduled Meds:   polyethylene glycol  2,000 mL Oral Once    Hydrocortisone-Aloe Vera   Topical BID    gabapentin  100 mg Oral TID    sennosides-docusate sodium  2 tablet Oral BID    enoxaparin  1 mg/kg SubCUTAneous Q24H    amiodarone  200 mg Oral BID    midodrine  15 mg Oral 4x Daily    Calcium Acetate (Phos Binder)  2 capsule Oral TID WC    pantoprazole  40 mg Oral QAM AC    [Held by provider] metoprolol tartrate  25 mg Oral BID    budesonide-formoterol  2 puff Inhalation BID    cinacalcet  90 mg Oral Once per day on Mon Wed Fri    calcitRIOL  0.25 mcg Oral Once per day on Mon Wed Fri    atorvastatin  40 mg Oral Nightly    aspirin  81 mg Oral Daily    sodium chloride flush  5-40 mL IntraVENous 2 times per day    potassium chloride  40 mEq Oral Once     Continuous Infusions:   sodium chloride 60 mL/hr at 10/13/21 1609    sodium chloride Stopped (10/13/21 1945)    dextrose Stopped (10/10/21 1415)     CBC:   Recent Labs     10/11/21  0343 10/12/21  0439 10/13/21  0545   WBC 7.5 7.1 7.4   HGB 8.5* 8.2* 8.8*    128* 133*     BMP:    Recent Labs     10/11/21  0343 10/13/21  0843 10/13/21  1645    134* 132*   K 5.0 5.4* 4.6   CL 96* 94* 91*   CO2 23 16* 21   BUN 21* 19 20   CREATININE 5.20* 5.04* 5.35*   GLUCOSE 66* 83 83     Hepatic: No results for input(s): AST, ALT, ALB, BILITOT, ALKPHOS in the last 72 hours. Troponin: No results for input(s): TROPONINI in the last 72 hours. BNP: No results for input(s): BNP in the last 72 hours. Lipids: No results for input(s): CHOL, HDL in the last 72 hours.     Invalid input(s): LDLCALCU  INR: No results for input(s): INR in the last 72 hours. Objective:   Vitals: BP 87/68   Pulse 127   Temp 97.3 °F (36.3 °C) (Oral)   Resp 20   Ht 4' 11\" (1.499 m)   Wt 259 lb 4.2 oz (117.6 kg)   LMP 11/12/2014 (Approximate)   SpO2 99%   BMI 52.36 kg/m²   General appearance: alert and cooperative with exam  HEENT: Head: Normocephalic, no lesions, without obvious abnormality. Neck: no adenopathy, no carotid bruit, no JVD, supple, symmetrical, trachea midline and thyroid not enlarged, symmetric, no tenderness/mass/nodules  Lungs: clear to auscultation bilaterally  Heart: regular rate and rhythm, S1, S2 normal, no murmur, click, rub or gallop  Abdomen: soft, non-tender; bowel sounds normal; no masses,  no organomegaly  Extremities: extremities normal, atraumatic, no cyanosis or edema  Neurologic: Mental status: Alert, oriented, thought content appropriate    2D ECHO( 8/20/2021)  Mild left ventricular hypertrophy  Global left ventricular systolic function is normal  Estimated ejection fraction is 65 % . Right atrium is severely dilated . Right ventricular with severe dilatation with severely reduced systolic  function. Severe tricuspid regurgitation. Moderate to severe pulmonary hypertension. No pericardial effusion seen. Normal aortic root dimension. Assessment / Acute Cardiac Problems:   1. PAF now back in NSR on Amiodarone PO, Lovenox renal dosing for anticoagulation. 2. Preserved LV systolic function. 3. Hypotension during dialysis, improved on Midodrine   4. History of HTN  5. DM  6. HLP  7. YONI  8. ESRD on HD  9. Anemia and GI bleed.   10. Obesity    Patient Active Problem List:     Asthma     YONI (obstructive sleep apnea)     Left knee pain     Hidradenitis     Current smoker     Microalbuminuria     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 kidney disease) stage 4, GFR 15-29 ml/min ([facility])     Acute diastolic congestive heart failure ([address]     Hyperkalemia     Acute systolic congestive heart failure ([address]     Pulmonary hypertension (HCC)     Morbid obesity with BMI of 45.0-49.9, adult (HCC)     Acute on chronic respiratory failure with hypoxia (HCC)     COPD exacerbation (HCC)     Asthma exacerbation     Type 2 diabetes mellitus with diabetic nephropathy (HCC)     ESRD (end stage renal disease) on dialysis ([address]     Bronchitis     Essential hypertension     YONI on CPAP     Hyperglycemia, drug-induced     Needs smoking cessation education     Hyperglycemia     Drowsiness     Acute on chronic respiratory failure with hypercapnia ([address]     Mobility impaired     S/P cardiac cath-mINIMAL caD 3/15/16 - dR. 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)     Type 2 diabetes mellitus without complication (HCC)     Congestive heart failure (HCC)     Asthma with COPD with exacerbation (HCC)     Acute exacerbation of CHF (congestive heart failure) (HCC)     Chronic obstructive pulmonary disease (HCC)     Chronic kidney disease, stage V (HCC)     Acute respiratory acidosis     Precordial pain     Gastroesophageal reflux disease without esophagitis     Pulmonary HTN ([address]     Morbid obesity due to excess calories (HCC)     Symptomatic anemia     Elevated serum creatinine     ESRD needing dialysis (HCC)     Chest pain at rest     Absolute anemia     Atrial flutter with rapid ventricular response (HCC)     Chest pain     Hypoglycemia     Hypotension     Atrial fibrillation with RVR ([address]     Hematochezia     Anemia due to chronic kidney disease     Itching      Plan of Treatment:   1. PAF. Continue amiodarone 200 mg po bid  2. Continue anticoagulation with Lovenox renal dosing. 3. Awaiting GI clearance for oral anticoagulant, OK to proceed with colonoscopy planned for later today   4.  Continue ECASA 81 mg daily and atorvastatin    Electronically signed by Kaiden Soni MD on 10/13/2021 at 9:42 Ashby Marii

## 2021-10-14 NOTE — PROGRESS NOTES
Per orders from NP, NPO pt administered oral glucagon. Post BS results 70.  Per NP orders D50 administered at 12.5gm; no D5 started at this time per NP req; D5 held

## 2021-10-14 NOTE — FLOWSHEET NOTE
10/13/21 2107   Vital Signs   /72   Temp 98.7 °F (37.1 °C)   Pulse 83   Resp 14   Weight 259 lb 4.2 oz (117.6 kg)   Percent Weight Change -0.84   Post-Hemodialysis Assessment   Post-Treatment Procedures Blood returned; Access bleeding time < 10 minutes   Machine Disinfection Process Acid/Vinegar Clean;Heat Disinfect; Exterior Machine Disinfection   Rinseback Volume (ml) 300 ml   Total Liters Processed (l/min) 60.9 l/min   Dialyzer Clearance Lightly streaked   Duration of Treatment (minutes) 210 minutes   Hemodialysis Output (ml) 1300 ml   NET Removed (ml) 1000 ml   Tolerated Treatment Fair   Bilateral Breath Sounds Diminished   Edema Right lower extremity; Left lower extremity   RLE Edema +3;Pitting   LLE Edema +3;Pitting   completed 3.5 hr HD TX and removed 1 Liter of fluid. pt tolerated HD TX fair. pt hypotensive during HD TX. Albumin 25g given for BP support. pt received Sodium Thiosulfate 25g during HD TX. pt left access arm dressing is clean, dry and intact.  report given to primary nurse, Piotr James RN

## 2021-10-14 NOTE — PROGRESS NOTES
[facility]    Gastroenterology Daily Progress Note      Patient:   Denver Baker   :    1970   Facility:   [facility]  Date:     10/14/2021  Consultant:   VENTURA Finley CNP, CNP      SUBJECTIVE  46 y.o. female admitted 10/8/2021 with Intra-dialytic hypotension [I95.3]  Hypotension, unspecified hypotension type [I95.9]  Atrial flutter ([address] [I48.92] and seen for anemia, need for anticoagulation clearance. The pt was seen and examined. Pt alert, no c/o chest pain or shortness of breath. Remains hypotensive this am. hgb 8.5..         OBJECTIVE  Scheduled Meds:   polyethylene glycol  2,000 mL Oral Once    Hydrocortisone-Aloe Vera   Topical BID    gabapentin  100 mg Oral TID    sennosides-docusate sodium  2 tablet Oral BID    enoxaparin  1 mg/kg SubCUTAneous Q24H    amiodarone  200 mg Oral BID    midodrine  15 mg Oral 4x Daily    Calcium Acetate (Phos Binder)  2 capsule Oral TID WC    pantoprazole  40 mg Oral QAM AC    [Held by provider] metoprolol tartrate  25 mg Oral BID    budesonide-formoterol  2 puff Inhalation BID    cinacalcet  90 mg Oral Once per day on     calcitRIOL  0.25 mcg Oral Once per day on     atorvastatin  40 mg Oral Nightly    aspirin  81 mg Oral Daily    sodium chloride flush  5-40 mL IntraVENous 2 times per day    potassium chloride  40 mEq Oral Once       Vital Signs:  BP (!) 79/46   Pulse 75   Temp 98.2 °F (36.8 °C) (Oral)   Resp 18   Ht 4' 11\" (1.499 m)   Wt 260 lb (117.9 kg)   LMP 2014 (Approximate)   SpO2 98%   BMI 52.51 kg/m²      Physical Exam:   General Appearance: alert and oriented to person, place and time, well-developed and well-nourished, in no acute distress  Skin: warm and dry, no rash or erythema  Head: normocephalic and atraumatic  Eyes: pupils equal, round, and reactive to light, extraocular eye movements intact, conjunctivae normal  ENT: hearing grossly normal bilaterally  Neck: neck supple and non tender without mass, no thyromegaly or thyroid nodules, no cervical lymphadenopathy   Pulmonary/Chest: clear to auscultation bilaterally- no wheezes, rales or rhonchi, normal air movement, no respiratory distress  Cardiovascular: hypotensive normal rate, regular rhythm, normal S1 and S2, no murmurs, rubs, clicks or gallops, distal pulses intact, no carotid bruits  Abdomen: soft, obese non-tender, non-distended, normal bowel sounds, no masses or organomegaly  Extremities: no cyanosis, clubbing or edema  Musculoskeletal: normal range of motion, no joint swelling, deformity or tenderness  Neurologic: no cranial nerve deficit and muscle strength normal    Lab and Imaging Review     CBC  Recent Labs     10/12/21  0439 10/13/21  0545 10/14/21  0549   WBC 7.1 7.4 6.5   HGB 8.2* 8.8* 8.5*   HCT 29.7* 30.2* 30.0*   .5* 105.6* 108.3*   * 133* 132*       BMP  Recent Labs     10/13/21  0843 10/13/21  1645 10/14/21  0549   * 132* 135   K 5.4* 4.6 3.9   CL 94* 91* 95*   CO2 16* 21 24   BUN 19 20 13   CREATININE 5.04* 5.35* 3.88*   GLUCOSE 83 83 69*   CALCIUM 7.6* 7.6* 7.6*       LFTS  Recent Labs     10/13/21  0843   LABALBU 3.4*       ASSESSMENT/plan  1. Anemia, need for clearance for [facility]  -d/w dr Alexx Martins and dr Ayan Aguilar, clear diet today will need cardiology approval once again for colonoscopy given her hypotension and c/o chest pressure/pain yesterday  -trend hh and keep hgb >7      This plan was formulated in collaboration with  .     Electronically signed by: VENTURA Quezada - CNP on 10/14/2021 at 8:55 AM

## 2021-10-14 NOTE — FLOWSHEET NOTE
Patient is out of the room. No family is present. Writer offers a prayer for the patient and leaves a note of support on the tray table. Spiritual Care will follow as needed. 10/13/21 2045   Encounter Summary   Services provided to: Patient   Referral/Consult From: Anthony   Continue Visiting   (10/13/21 Pt.  Out of room)   Complexity of Encounter Low   Length of Encounter 15 minutes   Routine   Type Follow up   Assessment Unable to respond   Intervention Prayer;Sustaining presence/ Ministry of presence   Outcome Did not respond

## 2021-10-14 NOTE — PROGRESS NOTES
[facility]  Office: [address], DO, Dennis Buck, DO, Bessy [facility], DO, Robertashlie Green, DO, Brisa Villafana MD, Sejal Comer MD, Pedro Koehler MD, Mono Goyal MD, Kirit Salinas MD, Sona Mayo MD, Cherelle Villarreal MD, Jannette Banks, DO, Jaydon Kim DO, Isidra Falk MD,  Teddy Joel DO, Samuel Call MD, Nick Faye MD, Dimas Person MD, Solitario Bello MD, Claudeen Hook, MD, Bob Whitfield MD, Derek Wood [facility], [facility], [facility], Julia Mosley, CNP, Marylene Bucy, CNS, Marleen Ryan, CNP, Gareth Juarez, CNP, Tiny Quiñones, CNP, Jason Godinez, CNP, Emanuel Rubio, CNP, MARINO BravoC, Bev Kc, [facility], George Bentley, CNP, John Ricks, CNP, Ronit Crowell, CNP, José Toussaint, CNP, Radha Brown, CNP, Milagro Bob, CNP, Jelani Lee [facility]    Progress Note    10/14/2021    4:29 PM    Name:   Meghann Pablo  MRN:     [id number]     Acct:      [de-identified]   Room:   [address] [place] [address] Day:  5  Admit Date:  10/8/2021 12:20 PM    PCP:   Kim Salazar MD  Code Status:  Full Code    Subjective:     C/C:   Chief Complaint   Patient presents with    Hypotension     Interval History Status: not changed. Patient blood pressure was low, 70 systolic this morning, increase up to 94 systolic with midodrine dose. Colonoscopy was again canceled today because of hypotension. Cardiology cleared the patient for colonoscopy. Given patient has chronic hypotension being a dialysis patient, on high dose of midodrine patient continues to be in blood pressure range of 90 systolic. Even if cardiology clears patient for colonoscopy unsure if anesthesia would be comfortable going ahead with her low blood pressure. Discussed with the patient, she is at high risk of stroke given A. Fib, if patient does not get colonoscopy tomorrow will start Eliquis and monitor for 24 hours with frequet cbc.  Patent verbalises understanding. Brief History:      71-year-old female with known medical history of hypertension on dialysis, admitted for diabetes presented to the hospital after she was sent from dialysis for blood pressure of systolic less than 70. Patient states that she was feeling fatigued in the last few days. After she received her Midrin dose her blood pressure stabilized. 10/9 patient developed atrial flutter, heart rate was elevated up to 120s. Blood pressure continued to be low 77V systolic. Cardiology was consulted. Patient was started on amiodarone and heparin. Patient noted to have recent EGD showing duodenal polyps, and was sent to colonoscopy outpatient patient did not follow-up. Patient will need GI clearance for anticoagulation .       Review of Systems:     Constitutional:  negative for chills, fevers, sweats  Respiratory:  negative for cough, dyspnea on exertion, shortness of breath, wheezing  Cardiovascular:  negative for chest pain, chest pressure/discomfort, lower extremity edema, palpitations  Gastrointestinal:  negative for abdominal pain, constipation, diarrhea, positive for vomiting and nausea   neurological:  negative for dizziness, headache    Medications: Allergies:     Allergies   Allergen Reactions    Ibuprofen     Tramadol Hives    Motrin [Ibuprofen Micronized] Itching    Strawberry Extract Itching and Rash    Tape Kallie Wolf Tape] Rash     No paper tape silk only       Current Meds:   Scheduled Meds:    [START ON 10/15/2021] polyethylene glycol  2,000 mL Oral Once    polyethylene glycol  2,000 mL Oral Once    Hydrocortisone-Aloe Vera   Topical BID    gabapentin  100 mg Oral TID    sennosides-docusate sodium  2 tablet Oral BID    [Held by provider] enoxaparin  1 mg/kg SubCUTAneous Q24H    amiodarone  200 mg Oral BID    midodrine  15 mg Oral 4x Daily    Calcium Acetate (Phos Binder)  2 capsule Oral TID WC    pantoprazole  40 mg Oral QAM AC    budesonide-formoterol  2 puff Inhalation BID    cinacalcet  90 mg Oral Once per day on Mon Wed Fri    calcitRIOL  0.25 mcg Oral Once per day on Mon Wed Fri    atorvastatin  40 mg Oral Nightly    [Held by provider] aspirin  81 mg Oral Daily    sodium chloride flush  5-40 mL IntraVENous 2 times per day    potassium chloride  40 mEq Oral Once     Continuous Infusions:    dextrose 50 mL/hr at 10/14/21 0809    [Held by provider] sodium chloride 60 mL/hr at 10/13/21 1609    sodium chloride Stopped (10/13/21 1945)    dextrose Stopped (10/10/21 1415)     PRN Meds: hydrOXYzine, diphenhydrAMINE, Calcium Acetate (Phos Binder), guaiFENesin, albuterol, sodium chloride flush, sodium chloride, ondansetron **OR** ondansetron, polyethylene glycol, acetaminophen **OR** acetaminophen, sodium thiosulfate IVPB, glucose, dextrose, glucagon (rDNA), dextrose    Data:     Past Medical History:   has a past medical history of Asthma, CHF (congestive heart failure) ([facility] Chronic kidney disease, COPD (chronic obstructive pulmonary disease) ([facility] Dialysis patient ([facility]od[facility] patient ([facility] Hyperlipidemia, Hypertension, Obesity, YONI (obstructive sleep apnea), Pneumonia, Renal failure, Type II or unspecified type diabetes mellitus without mention of complication, not stated as uncontrolled, and Ventilator dependence ([facility] Social History:   reports that she quit smoking about 4 years ago. Her smoking use included cigarettes. She quit after 7.00 years of use. She has never used smokeless tobacco. She reports that she does not drink alcohol and does not use drugs.      Family History:   Family History   Problem Relation Age of Onset    Diabetes Other     Cancer Mother         BREAST    Heart Disease Father         CAD-MI    Diabetes Father     High Blood Pressure Father     Diabetes Maternal Grandfather     Diabetes Paternal Grandfather     Heart Disease Paternal Grandfather     High Blood Pressure Paternal Grandfather        Vitals:  BP (!) 74/49   Pulse 60   Temp 97.9 °F (36.6 °C) (Oral)   Resp 16   Ht 4' 11\" (1.499 m)   Wt 260 lb (117.9 kg)   LMP 2014 (Approximate)   SpO2 98%   BMI 52.51 kg/m²   Temp (24hrs), Av.9 °F (36.6 °C), Min:97.3 °F (36.3 °C), Max:98.7 °F (37.1 °C)    Recent Labs     10/14/21  0715 10/14/21  0852 10/14/21  1055 10/14/21  1235   POCGLU 75 74 96 94       I/O (24Hr): Intake/Output Summary (Last 24 hours) at 10/14/2021 1629  Last data filed at 10/13/2021 2107  Gross per 24 hour   Intake --   Output 1300 ml   Net -1300 ml       Labs:  Hematology:  Recent Labs     10/12/21  0439 10/13/21  0545 10/14/21  0549   WBC 7.1 7.4 6.5   RBC 2.64* 2.86* 2.77*   HGB 8.2* 8.8* 8.5*   HCT 29.7* 30.2* 30.0*   .5* 105.6* 108.3*   MCH 31.1 30.8 30.7   MCHC 27.6* 29.1 28.3*   RDW 16.4* 16.2* 16.0*   * 133* 132*   MPV 11.8 12.3 11.7     Chemistry:  Recent Labs     10/13/21  0843 10/13/21  1545 10/13/21  1645 10/14/21  0549   *  --  132* 135   K 5.4*  --  4.6 3.9   CL 94*  --  91* 95*   CO2 16*  --  21 24   GLUCOSE 83  --  83 69*   BUN 19  --  20 13   CREATININE 5.04*  --  5.35* 3.88*   MG  --   --  1.9  --    ANIONGAP 24*  --  20* 16   LABGLOM 9*  --  8* 12*   GFRAA 11*  --  10* 15*   CALCIUM 7.6*  --  7.6* 7.6*   PHOS 4.1  --   --   --    TROPHS  --  45*  --   --      Recent Labs     10/13/21  0843 10/13/21  0942 10/14/21  0521 10/14/21  0614 10/14/21  0715 10/14/21  0852 10/14/21  1055 10/14/21  1235   LABALBU 3.4*  --   --   --   --   --   --   --    POCGLU  --    < > 62* 61* 75 74 96 94    < > = values in this interval not displayed.      ABG:  Lab Results   Component Value Date    POCPH 7.22 2016    POCPCO2 61 2016    POCPO2 68 2016    POCHCO3 24.8 2016    NBEA 3 2016    PBEA NOT REPORTED 2016    EPG9BFT 27 2016    EACR7LQS 88 2016    FIO2 NOT REPORTED 2019     Lab Results   Component Value Date/Time    SPECIAL RT WRIST 5ML 10/09/2021 12:15 PM     Lab Results   Component Value Date/Time    CULTURE NO GROWTH 5 DAYS 10/09/2021 12:15 PM       Radiology:  XR CHEST PORTABLE    Result Date: 10/9/2021  No acute process. Stable cardiomegaly     XR CHEST PORTABLE    Result Date: 10/8/2021  No acute process. Able cardiomegaly       Physical Examination:        General appearance:  alert, cooperative and no distress, morbidly obese  Mental Status:  oriented to person, place and time and normal affect  Lungs:  clear to auscultation bilaterally, normal effort  Heart:  irregular rate and rhythm, in flutter  Abdomen:  soft, nontender, nondistended, normal bowel sounds, no masses, hepatomegaly, splenomegaly  Extremities: trace edema, no redness, tenderness in the calves  Skin: Chronic skin changes lower extremity, skin rash on abdomen    Assessment:        Hospital Problems         Last Modified POA    * (Principal) Hypotension 10/9/2021 Yes    CKD (chronic kidney disease) stage 4, GFR 15-29 ml/min ([facility]) (Chronic) 10/8/2021 Yes    Pulmonary hypertension ([address] (Chronic) 10/8/2021 Yes    Morbid obesity with BMI of 45.0-49.9, adult ([address] 10/8/2021 Yes    Essential hypertension 10/8/2021 Yes    YONI on CPAP 10/11/2021 Yes    Type 2 diabetes mellitus with chronic kidney disease on chronic dialysis ([address] 10/8/2021 Yes    Chronic obstructive pulmonary disease ([address] 10/8/2021 Yes    Gastroesophageal reflux disease without esophagitis 10/8/2021 Yes    ESRD needing dialysis ([address] 10/8/2021 Yes    Atrial flutter with rapid ventricular response ([address] 10/10/2021 Yes    Hematochezia 10/10/2021 Yes    Anemia due to chronic kidney disease 10/10/2021 Yes    Itching 10/11/2021 Yes          Plan:        Atrial flutter with rvr with hypotension -rate controlled, continue renally dosed Lovenox, continue on amiodarone 200 mg twice daily, will need GI clearance for oral anticoagulation. Colonoscopy again delayed given low pressure.   ESRD- Nephrology following for dialysis  Calciphylaxis-continue

## 2021-10-14 NOTE — PROGRESS NOTES
Renal Progress Note    Patient :  Pablo Jiménez; 46 y.o. MRN# [id number]  Location:  [id number]/9636-37  Attending:  Alayna Aguilar MD  Admit Date:  10/8/2021   Hospital Day: 5      Subjective:     Patient currently n.p.o. for GI procedure which has been deferred due to hypotension. She also had some chest pain. Cardiology has cleared her for the procedure no further intervention planned. Hypertension is chronic and her, likely from her vascular disease. She is on ProAmatine at max dose. Last dialysis yesterday was uneventful, about a liter of fluid was removed. Patient quite frustrated about slow progress. Currently continues on D10 for hypoglycemia.   Outpatient Medications:     Medications Prior to Admission: atorvastatin (LIPITOR) 40 MG tablet, Take 1 tablet by mouth nightly  metoprolol tartrate (LOPRESSOR) 25 MG tablet, Take 1 tablet by mouth 2 times daily  pantoprazole (PROTONIX) 40 MG tablet, Take 1 tablet by mouth every morning (before breakfast)  Calcium Acetate, Phos Binder, (PHOSLO) 667 MG CAPS, Take 2 capsules by mouth as needed (snacks)   diphenhydrAMINE (DIPHEN) 25 MG tablet, Take 25 mg by mouth 2 times daily as needed for Itching   Calcium Acetate, Phos Binder, 667 MG CAPS, Take 2 capsules by mouth 3 times daily (with meals)   fluticasone-salmeterol (ADVAIR) 250-50 MCG/DOSE AEPB, INHALE 1 PUFF BY MOUTH INTO THE LUNGS TWICE DAILY **RINSE MOUTH AFTER EACH USE**  LANTUS SOLOSTAR 100 UNIT/ML injection pen, INJECT 15 UNITS SUBCUTANEOUSLY DAILY AT NIGHT  OXYGEN, Inhale into the lungs O2  3L  PER NASAL CANNULA NIGHTLY  Alcohol Swabs (ULTRA-CARE ALCOHOL PREP PADS) 70 % PADS, USE TO CLEAN TESTING AND INJECTING SITES TWICE DAILY  hydrocortisone (ANUSOL-HC) 2.5 % CREA rectal cream, Apply bid to hemorrhoids  guaiFENesin (MUCINEX) 600 MG extended release tablet, Take 1 tablet by mouth 2 times daily as needed for Congestion  midodrine (PROAMATINE) 5 MG tablet, Take 3 tablets by mouth 3 times daily (with meals) (Patient taking differently: Take 15 mg by mouth 3 times daily (with meals) As needed for BP)  nitroGLYCERIN (NITROSTAT) 0.4 MG SL tablet, PLACE 1 TABLET UNDER THE TONGUE EVERY 5 MINUTES AS NEEDED FOR CHEST PAIN**IF NO RELIEF AFTER 3 DOSES CALL 911 OR DR**  albuterol sulfate HFA (PROAIR HFA) 108 (90 Base) MCG/ACT inhaler, Inhale 2 puffs into the lungs every 6 hours as needed for Wheezing (Patient not taking: Reported on 8/31/2021)  calcitRIOL (ROCALTROL) 0.25 MCG capsule, Take 0.25 mcg by mouth three times a week  Methoxy PEG-Epoetin Beta (MIRCERA IJ), Inject 225 mcg as directed every 14 days  cinacalcet (SENSIPAR) 90 MG tablet, Take 90 mg by mouth three times a week Indications: after dialysis  sennosides-docusate sodium (SENOKOT-S) 8.6-50 MG tablet, Take 1 tablet by mouth daily as needed for Constipation  polyethylene glycol (GLYCOLAX) 17 GM/SCOOP powder, Take 17 g by mouth daily as needed   Multiple Vitamins-Minerals (RENAPLEX-D) TABS, Take 1 tablet by mouth daily   aspirin 81 MG EC tablet, TAKE 1 TABLET BY MOUTH DAILY  furosemide (LASIX) 40 MG tablet, Take 1 tablet by mouth daily (Patient not taking: Reported on 8/31/2021)  albuterol (PROVENTIL) (2.5 MG/3ML) 0.083% nebulizer solution, INHALE THE CONTENTS OF ONE VIAL VIA NEBULIZER EVERY 6 HOURS AS NEEDED FOR SHORTNESS OF BREATH OR WHEEZING  Easy Comfort Lancets MISC, USE TO TEST BLOOD SUGAR TWICE DAILY AS DIRECTED  blood glucose test strips (ONETOUCH ULTRA) strip, USE TO TEST BLOOD SUGAR TWICE DAILY AS DIRECTED  Insulin Pen Needle (EASY COMFORT PEN NEEDLES) 31G X 5 MM MISC, USE TO INJECT INSULIN ONCE DAILY  Skin Protectants, Misc.  (MINERIN CREME) CREA, APPLY TO AFFECTED AREA TOPICALLY AS NEEDED (Patient taking differently: every other day APPLY TO AFFECTED AREA TOPICALLY AS NEEDED)  Blood Glucose Monitoring Suppl (TRUE METRIX METER) w/Device KIT, USE TO TEST BLOOD GLUCOSE  Lancets (BD LANCET ULTRAFINE 30G) MISC, TEST TWICE DAILY  Lancet Devices ([employer] LANCING DEV) MISC, USE WITH LANCETS TO TEST BLOOD GLUCOSE  Blood Glucose Calibration (RIGOBERTO DOC CONTROL) Normal SOLN, USE TO PERIODICALLY CHECK GLUCOSE MONITOR ACCORDING TO THE MANUAL  UNIFINE PENTIPS 31G X 6 MM MISC, USE WITH insulin pens ONCE daily  PHARMACIST CHOICE LANCETS MISC, USE TO TEST BLOOD GLUCOSE ONCE A DAY  Misc. Devices MISC, 1 each by Does not apply route daily Electric scooter  glucose monitoring kit (FREESTYLE) monitoring kit, 1 kit by Does not apply route daily    Current Medications:     Scheduled Meds:    polyethylene glycol  2,000 mL Oral Once    Hydrocortisone-Aloe Vera   Topical BID    gabapentin  100 mg Oral TID    sennosides-docusate sodium  2 tablet Oral BID    enoxaparin  1 mg/kg SubCUTAneous Q24H    amiodarone  200 mg Oral BID    midodrine  15 mg Oral 4x Daily    Calcium Acetate (Phos Binder)  2 capsule Oral TID WC    pantoprazole  40 mg Oral QAM AC    budesonide-formoterol  2 puff Inhalation BID    cinacalcet  90 mg Oral Once per day on Mon Wed Fri    calcitRIOL  0.25 mcg Oral Once per day on Mon Wed Fri    atorvastatin  40 mg Oral Nightly    aspirin  81 mg Oral Daily    sodium chloride flush  5-40 mL IntraVENous 2 times per day    potassium chloride  40 mEq Oral Once     Continuous Infusions:    dextrose 50 mL/hr at 10/14/21 0809    [Held by provider] sodium chloride 60 mL/hr at 10/13/21 1609    sodium chloride Stopped (10/13/21 1945)    dextrose Stopped (10/10/21 1415)     PRN Meds:  hydrOXYzine, diphenhydrAMINE, Calcium Acetate (Phos Binder), guaiFENesin, albuterol, sodium chloride flush, sodium chloride, ondansetron **OR** ondansetron, polyethylene glycol, acetaminophen **OR** acetaminophen, sodium thiosulfate IVPB, glucose, dextrose, glucagon (rDNA), dextrose    Input/Output:       I/O last 3 completed shifts: In: 266 [I.V.:847]  Out: 1300 .       Patient Vitals for the past 96 hrs (Last 3 readings):   Weight   10/14/21 0554 260 lb (117.9 kg)   10/13/21 2107 259 lb 4.2 oz (117.6 kg)   10/13/21 1715 261 lb 7.5 oz (118.6 kg)       Vital Signs:   Temperature:  Temp: 97.4 °F (36.3 °C)  TMax:   Temp (24hrs), Av.9 °F (36.6 °C), Min:97.3 °F (36.3 °C), Max:98.7 °F (37.1 °C)    Respirations:  Resp: 18  Pulse:   Pulse: 70  BP:    BP: (!) 76/50  BP Range: Systolic (22NYK), KUK:41 , Min:75 , YPK:717       Diastolic (37YYP), UFY:67, Min:46, Max:112      Physical Examination:     General:  AAO x 3, speaking in full sentences, no accessory muscle use. HEENT: Atraumatic, normocephalic, no throat congestion, moist mucosa. Eyes:   Pupils equal, round and reactive to light, EOMI. Neck:   No JVD, no thyromegaly, no lymphadenopathy. Chest:  Bilateral vesicular breath sounds, no rales or wheezes. Cardiac:  S1 S2 RR, no murmurs, gallops or rubs, JVP not raised. Abdomen: Calciphylaxis anterior abdominal wall. :   No suprapubic or flank tenderness. Neuro:  AAO x 3, No FND. SKIN:  No rashes, good skin turgor. Extremities:  No edema, palpable peripheral pulses, no calf tenderness.     Labs:       Recent Labs     10/12/21  0439 10/13/21  0545 10/14/21  0549   WBC 7.1 7.4 6.5   RBC 2.64* 2.86* 2.77*   HGB 8.2* 8.8* 8.5*   HCT 29.7* 30.2* 30.0*   .5* 105.6* 108.3*   MCH 31.1 30.8 30.7   MCHC 27.6* 29.1 28.3*   RDW 16.4* 16.2* 16.0*   * 133* 132*   MPV 11.8 12.3 11.7      BMP:   Recent Labs     10/13/21  0843 10/13/21  1645 10/14/21  0549   * 132* 135   K 5.4* 4.6 3.9   CL 94* 91* 95*   CO2 16* 21 24   BUN 19 20 13   CREATININE 5.04* 5.35* 3.88*   GLUCOSE 83 83 69*   CALCIUM 7.6* 7.6* 7.6*      Phosphorus:     Recent Labs     10/13/21  0843   PHOS 4.1     Magnesium:    Recent Labs     10/13/21  1645   MG 1.9     Albumin:    Recent Labs     10/13/21  0843   LABALBU 3.4*     BNP:    No results found for: BNP  BONNIE:      Lab Results   Component Value Date    BONNIE NEGATIVE 12/10/2015     SPEP:  Lab Results   Component Value Date    PROT 7.4 2021    ALBCAL 2.6 12/10/2015    ALBPCT 42 12/10/2015    LABALPH 0.3 12/10/2015    LABALPH 0.9 12/10/2015    A1PCT 4 12/10/2015    A2PCT 14 12/10/2015    LABBETA 1.3 12/10/2015    BETAPCT 21 12/10/2015    GAMGLOB 1.2 12/10/2015    GGPCT 19 12/10/2015    MARTIN Plummer M.D. 08/25/2021     UPEP:     Lab Results   Component Value Date    LABPE  12/10/2015     URINE PROTEIN CONCENTRATION IS ELEVATED. PROTEIN ELECTROPHORESIS DEMONSTRATES     C3:     Lab Results   Component Value Date    C3 131 12/10/2015     C4:     Lab Results   Component Value Date    C4 39 12/10/2015     MPO ANCA:     Lab Results   Component Value Date    MPO 9 12/10/2015     PR3 ANCA:     Lab Results   Component Value Date    PR3 5 12/10/2015     Anti-GBM:   No results found for: GBMABIGG  Hep BsAg:         Lab Results   Component Value Date    HEPBSAG NONREACTIVE 10/13/2021     Hep C AB:        No results found for: HEPCAB    Urinalysis/Chemistries:      Lab Results   Component Value Date    NITRU NEGATIVE 06/12/2016    COLORU YELLOW 06/12/2016    PHUR 5.0 06/12/2016    WBCUA 2 TO 5 06/12/2016    RBCUA 0 TO 2 06/12/2016    MUCUS NOT REPORTED 06/12/2016    TRICHOMONAS NOT REPORTED 06/12/2016    YEAST NOT REPORTED 06/12/2016    BACTERIA FEW 06/12/2016    SPECGRAV 1.013 06/12/2016    LEUKOCYTESUR NEGATIVE 06/12/2016    UROBILINOGEN Normal 06/12/2016    BILIRUBINUR NEGATIVE 06/12/2016    GLUCOSEU TRACE 06/12/2016    KETUA NEGATIVE 06/12/2016    AMORPHOUS 1+ 06/12/2016     Urine Sodium:     Lab Results   Component Value Date    ANTONIO 28 06/12/2016     Urine Potassium:  No results found for: KUR  Urine Chloride:  No results found for: CLUR  Urine Osmolarity:   Lab Results   Component Value Date    OSMOU 282 06/12/2016     Urine Protein:   No components found for: TOTALPROTEIN, URINE   Urine Creatinine:     Lab Results   Component Value Date    LABCREA 22.3 12/10/2015     Urine Eosinophils:  No components found for: UEOS    Radiology:     CXR:     Assessment:     1.   ESRD on hemodialysis Monday Wednesday Friday at the central unit via left AV fistula under Dr. Lamonte Mckeon:  2. Chronic hypertension from vascular disease  3. Calciphylaxis on sodium thiosulfate  4. Suspected GI bleed based on low hemoglobin and dark stools, awaiting GI work-up  5. Paroxysmal atrial fibrillation on amiodarone rate controlled    Plan:   1. Hemodialysis the morning  2. Consider discontinuing D10 as soon as patient is allowed oral intake  3. Continue ProAmatine  4. Continue sodium thiosulfate with dialysis  5. Prognosis guarded    Nutrition   Please ensure that patient is on a renal diet/TF. Avoid nephrotoxic drugs/contrast exposure. We will continue to follow along with you.

## 2021-10-15 NOTE — CARE COORDINATION
Discharge planning    Patient chart reviewed. Appreciate prior CM assessment and notes. Colonoscopy was held yesterday due to chest pressure. Cleared per cardiology and plan is for it to be completed today. Per epic Pt does wear 02 at home thru [facility]-she wears 02 3lnc as needed-she relays her pcp was working on portable 02 for her. She also has walker, electric scooter. She goes to [employer] chair time [address] thru her insurance. CM   1. Need therapy ordered will discuss with [facility] possible home care vs snf. 2. Follow up on DOAC for a/c, may need PA  3. Follow up on portable concentrator thru [facility]. CARD 10/14  Plan of Treatment:   1. PAF-remains SR 64 currently.  Continue amiodarone 200 mg po bid. Will stop BB as has been on hold since 10/9. Continue Midodrine at 15mg 4x/daily. She has chronic hypotension despite this. 2. Continue anticoagulation with Lovenox renal dosing. Will consider NOAC on discharge pending GI evaluation. If unable to complete would consider starting renal dose NOAC and monitoring closely. 3. Hypotensive this AM along with some chest \"pressure\" which she states has resolved. No acute ECG changes and troponin 45. She is feeling better now. No plans for further CV intervention at this time.

## 2021-10-15 NOTE — PLAN OF CARE
Problem: Pain:  Goal: Patient's pain/discomfort is manageable  Description: Patient's pain/discomfort is manageable  10/14/2021 2232 by Ronald Roy RN  Outcome: Ongoing     Problem: Physical Regulation:  Goal: Ability to maintain clinical measurements within normal limits will improve  Description: Ability to maintain clinical measurements within normal limits will improve  Outcome: Ongoing     Problem: Physical Regulation:  Goal: Complications related to the disease process, condition or treatment will be avoided or minimized  Description: Complications related to the disease process, condition or treatment will be avoided or minimized  Outcome: Ongoing

## 2021-10-15 NOTE — PROGRESS NOTES
hospital after she was sent from dialysis for blood pressure of systolic less than 70. Patient states that she was feeling fatigued in the last few days. After she received her Midrin dose her blood pressure stabilized. 10/9 patient developed atrial flutter, heart rate was elevated up to 120s. Blood pressure continued to be low 32B systolic. Cardiology was consulted. Patient was started on amiodarone and heparin. Patient noted to have recent EGD showing duodenal polyps, and was sent to colonoscopy outpatient patient did not follow-up. Patient will need GI clearance for anticoagulation .       Review of Systems:     Constitutional:  negative for chills, fevers, sweats  Respiratory:  negative for cough, dyspnea on exertion, shortness of breath, wheezing  Cardiovascular:  negative for chest pain, chest pressure/discomfort, lower extremity edema, palpitations  Gastrointestinal:  negative for abdominal pain, constipation, diarrhea, positive for vomiting and nausea   neurological:  negative for dizziness, headache    Medications: Allergies:     Allergies   Allergen Reactions    Ibuprofen     Tramadol Hives    Motrin [Ibuprofen Micronized] Itching    Strawberry Extract Itching and Rash    Tape Raguel Gulling Tape] Rash     No paper tape silk only       Current Meds:   Scheduled Meds:    sodium thiosulfate IVPB  25 g IntraVENous Once per day on Mon Wed Fri    apixaban  5 mg Oral BID    polyethylene glycol  2,000 mL Oral Once    Hydrocortisone-Aloe Vera   Topical BID    gabapentin  100 mg Oral TID    sennosides-docusate sodium  2 tablet Oral BID    amiodarone  200 mg Oral BID    midodrine  15 mg Oral 4x Daily    Calcium Acetate (Phos Binder)  2 capsule Oral TID WC    pantoprazole  40 mg Oral QAM AC    budesonide-formoterol  2 puff Inhalation BID    cinacalcet  90 mg Oral Once per day on Mon Wed Fri    calcitRIOL  0.25 mcg Oral Once per day on Mon Wed Fri    atorvastatin  40 mg Oral Nightly    aspirin  81 mg Oral Daily    sodium chloride flush  5-40 mL IntraVENous 2 times per day     Continuous Infusions:    sodium chloride Stopped (10/13/21 194)    dextrose Stopped (10/10/21 1415)     PRN Meds: hydrOXYzine, diphenhydrAMINE, Calcium Acetate (Phos Binder), guaiFENesin, albuterol, sodium chloride flush, sodium chloride, ondansetron **OR** ondansetron, polyethylene glycol, acetaminophen **OR** acetaminophen, glucose, dextrose, glucagon (rDNA), dextrose    Data:     Past Medical History:   has a past medical history of Asthma, CHF (congestive heart failure) ([facility] Chronic kidney disease, COPD (chronic obstructive pulmonary disease) ([facility] Dialysis patient ([facility] Hyperlipidemia, Hypertension, Obesity, YONI (obstructive sleep apnea), Pneumonia, Renal failure, Type II or unspecified type diabetes mellitus without mention of complication, not stated as uncontrolled, and Ventilator dependence ([facility] Social History:   reports that she quit smoking about 4 years ago. Her smoking use included cigarettes. She quit after 7.00 years of use. She has never used smokeless tobacco. She reports that she does not drink alcohol and does not use drugs.      Family History:   Family History   Problem Relation Age of Onset    Diabetes Other     Cancer Mother         BREAST    Heart Disease Father         CAD-MI    Diabetes Father     High Blood Pressure Father     Diabetes Maternal Grandfather     Diabetes Paternal Grandfather     Heart Disease Paternal Grandfather     High Blood Pressure Paternal Grandfather        Vitals:  /83   Pulse 76   Temp 97.3 °F (36.3 °C) (Oral)   Resp 20   Ht 4' 11\" (1.499 m)   Wt 259 lb 14.8 oz (117.9 kg)   LMP 2014 (Approximate)   SpO2 94%   BMI 52.50 kg/m²   Temp (24hrs), Av.6 °F (36.4 °C), Min:97.3 °F (36.3 °C), Max:98.1 °F (36.7 °C)    Recent Labs     10/14/21  1636 10/14/21  2032 10/15/21  0753 10/15/21  1230   POCGLU 136* 91 79 101 I/O (24Hr): No intake or output data in the 24 hours ending 10/15/21 1444    Labs:  Hematology:  Recent Labs     10/13/21  0545 10/14/21  0549 10/15/21  0546   WBC 7.4 6.5 8.3   RBC 2.86* 2.77* 2.91*   HGB 8.8* 8.5* 9.1*   HCT 30.2* 30.0* 32.6*   .6* 108.3* 112.0*   MCH 30.8 30.7 31.3   MCHC 29.1 28.3* 27.9*   RDW 16.2* 16.0* 16.1*   * 132* 131*   MPV 12.3 11.7 11.0     Chemistry:  Recent Labs     10/13/21  0843 10/13/21  1545 10/13/21  1645 10/14/21  0549   *  --  132* 135   K 5.4*  --  4.6 3.9   CL 94*  --  91* 95*   CO2 16*  --  21 24   GLUCOSE 83  --  83 69*   BUN 19  --  20 13   CREATININE 5.04*  --  5.35* 3.88*   MG  --   --  1.9  --    ANIONGAP 24*  --  20* 16   LABGLOM 9*  --  8* 12*   GFRAA 11*  --  10* 15*   CALCIUM 7.6*  --  7.6* 7.6*   PHOS 4.1  --   --   --    TROPHS  --  45*  --   --      Recent Labs     10/13/21  0843 10/13/21  0942 10/14/21  1055 10/14/21  1235 10/14/21  1636 10/14/21  2032 10/15/21  0753 10/15/21  1230   LABALBU 3.4*  --   --   --   --   --   --   --    POCGLU  --    < > 96 94 136* 91 79 101    < > = values in this interval not displayed. ABG:  Lab Results   Component Value Date    POCPH 7.22 06/12/2016    POCPCO2 61 06/12/2016    POCPO2 68 06/12/2016    POCHCO3 24.8 06/12/2016    NBEA 3 06/12/2016    PBEA NOT REPORTED 06/12/2016    MKT8LQI 27 06/12/2016    WGFY7CSS 88 06/12/2016    FIO2 NOT REPORTED 07/01/2019     Lab Results   Component Value Date/Time    SPECIAL RT WRIST 5ML 10/09/2021 12:15 PM     Lab Results   Component Value Date/Time    CULTURE NO GROWTH 6 DAYS 10/09/2021 12:15 PM       Radiology:  XR CHEST PORTABLE    Result Date: 10/9/2021  No acute process. Stable cardiomegaly     XR CHEST PORTABLE    Result Date: 10/8/2021  No acute process.  Able cardiomegaly       Physical Examination:        General appearance:  alert, cooperative and mild distress, morbidly obese  Mental Status:  oriented to person, place and time and normal affect  Lungs:  clear to auscultation bilaterally, normal effort  Heart:  irregular rhythm, in flutter, rate controlled  Abdomen:  soft, nontender, nondistended, normal bowel sounds, no masses, hepatomegaly, splenomegaly  Extremities: trace edema, no redness, tenderness in the calves  Skin: Chronic skin changes lower extremity, skin rash on abdomen    Assessment:        Hospital Problems         Last Modified POA    * (Principal) Hypotension 10/9/2021 Yes    CKD (chronic kidney disease) stage 4, GFR 15-29 ml/min (HCC) (Chronic) 10/8/2021 Yes    Pulmonary hypertension ([address] (Chronic) 10/8/2021 Yes    Morbid obesity with BMI of 45.0-49.9, adult ([address] 10/8/2021 Yes    Essential hypertension 10/8/2021 Yes    YONI on CPAP 10/11/2021 Yes    Type 2 diabetes mellitus with chronic kidney disease on chronic dialysis ([address] 10/8/2021 Yes    Chronic obstructive pulmonary disease ([address] 10/8/2021 Yes    Gastroesophageal reflux disease without esophagitis 10/8/2021 Yes    ESRD needing dialysis ([address] 10/8/2021 Yes    Atrial flutter with rapid ventricular response ([address] 10/10/2021 Yes    Hematochezia 10/10/2021 Yes    Anemia due to chronic kidney disease 10/10/2021 Yes    Itching 10/11/2021 Yes          Plan:        Atrial flutter with rvr with hypotension -rate controlled, start eliquis 5 mg bid, continue on amiodarone 200 mg twice daily, GI cancelled colonoscopy given hypotension. Check h/h q12. If patient hb remains stable in next 24 hrs will plan on discharge with close outpatient follow up.   ESRD- Nephrology following for dialysis  Calciphylaxis-continue hydroxyzine as needed, sodium thiosulfate with dialysis  Neuropathy- continue neurontin  Hypotension-blood pressure fluctuating, on midodrine, stop lopressor on discharge  Chronic anemia- recent EGD reviewed, colonoscopy could not be done  Type 2 diabetes mellitus- poct blood sugar ac, hs  CPAP for sleep apnea  Stool softeners for constipation  Lifestyle modifications for weight loss  Rest of the medications as ordered      Delayed discharge given multiple co morbidities and difficulty in decision making.       Chyna Manley MD  10/15/2021  2:44 PM

## 2021-10-15 NOTE — PROGRESS NOTES
HEMODIALYSIS PRE-TREATMENT NOTE    Patient Identifiers prior to treatment: name    Isolation Required: no                      Isolation Type: na       (please document if patient is being managed as a PUI/COVID-19 patient)        Hepatitis status:                           Date Drawn                             Result  Hepatitis B Surface Antigen 11/09/2020     neg                     Hepatitis B Surface Antibody 11/09/2020 neg        Hepatitis B Core Antibody 03/02/2016 neg          How was Hepatitis Status verified: labs     Was a copy of the labs you documented provided to facility for the patient's chart: yes    Hemodialysis orders verified: yes    Access Within normal limits ( I.e. s/s of infection,...): yes     Pre-Assessment completed: yes    Pre-dialysis report received from: [address] [place] [address]                      Time: 9409

## 2021-10-15 NOTE — PROGRESS NOTES
Patient's blood pressure while setting up for HD in the systolic 30'K. Cuff placed on right arm and bilateral legs (AV fistula LT arm). Patient states only feeling shaky in hands, no loss of consciousness however. Dr. Kait Sebastian notified, AM midodrine given with sip of water. Per nephro patient to continue HD at this time.

## 2021-10-15 NOTE — PROGRESS NOTES
CLINICAL PHARMACY NOTE: MEDS TO BEDS    Total # of Prescriptions Filled: 1   The following medications were delivered to the patient:  · eliquis 5mg    Additional Documentation:

## 2021-10-15 NOTE — PROGRESS NOTES
Attempted to start new IV due to patient's current IV not flushing. Patient has history of IV's infiltrating in short time, and only has access on RT arm due to LT AV fistula. Dr. Bhargavi Duckworth notified.  Okay per MD to leave IV out at this time

## 2021-10-15 NOTE — PROGRESS NOTES
[facility]    Gastroenterology Daily Progress Note      Patient:   Mary Subramanian   :    1970   Facility:   [facility]  Date:     10/15/2021  Consultant:   VENTURA Adkins CNP, CNP      SUBJECTIVE  46 y.o. female admitted 10/8/2021 with Intra-dialytic hypotension [I95.3]  Hypotension, unspecified hypotension type [I95.9]  Atrial flutter ([address] [I48.92] and seen for anemia and need for AC clearance. The pt was seen and examined. Remains hypotensive and now has no IV access. hgb 9.8, no abdominal pain melena or hematochezia/hematemesis.         OBJECTIVE  Scheduled Meds:   sodium thiosulfate IVPB  25 g IntraVENous Once per day on     polyethylene glycol  2,000 mL Oral Once    Hydrocortisone-Aloe Vera   Topical BID    gabapentin  100 mg Oral TID    sennosides-docusate sodium  2 tablet Oral BID    enoxaparin  1 mg/kg SubCUTAneous Q24H    amiodarone  200 mg Oral BID    midodrine  15 mg Oral 4x Daily    Calcium Acetate (Phos Binder)  2 capsule Oral TID WC    pantoprazole  40 mg Oral QAM AC    budesonide-formoterol  2 puff Inhalation BID    cinacalcet  90 mg Oral Once per day on     calcitRIOL  0.25 mcg Oral Once per day on     atorvastatin  40 mg Oral Nightly    aspirin  81 mg Oral Daily    sodium chloride flush  5-40 mL IntraVENous 2 times per day       Vital Signs:  BP (!) 75/44   Pulse 64   Temp 97.3 °F (36.3 °C) (Oral)   Resp 20   Ht 4' 11\" (1.499 m)   Wt 260 lb (117.9 kg)   LMP 2014 (Approximate)   SpO2 94%   BMI 52.51 kg/m²      Physical Exam:     General Appearance: alert and oriented to person, place and time, well-developed and well-nourished, in no acute distress  Skin: warm and dry, no rash or erythema  Head: normocephalic and atraumatic  Eyes: pupils equal, round, and reactive to light, extraocular eye movements intact, conjunctivae normal  ENT: hearing grossly normal bilaterally  Neck: neck supple and non tender without mass, no thyromegaly or thyroid nodules, no cervical lymphadenopathy   Pulmonary/Chest: clear to auscultation bilaterally- no wheezes, rales or rhonchi, normal air movement, no respiratory distress  Cardiovascular: normal rate, regular rhythm, normal S1 and S2, no murmurs, rubs, clicks or gallops, distal pulses intact, no carotid bruits  Abdomen: soft, obese non-tender, non-distended, normal bowel sounds, no masses or organomegaly  Extremities: no cyanosis, clubbing or edema  Musculoskeletal: normal range of motion, no joint swelling, deformity or tenderness  Neurologic: no cranial nerve deficit and muscle strength normal    Lab and Imaging Review     CBC  Recent Labs     10/13/21  0545 10/14/21  0549 10/15/21  0546   WBC 7.4 6.5 8.3   HGB 8.8* 8.5* 9.1*   HCT 30.2* 30.0* 32.6*   .6* 108.3* 112.0*   * 132* 131*       BMP  Recent Labs     10/13/21  0843 10/13/21  1645 10/14/21  0549   * 132* 135   K 5.4* 4.6 3.9   CL 94* 91* 95*   CO2 16* 21 24   BUN 19 20 13   CREATININE 5.04* 5.35* 3.88*   GLUCOSE 83 83 69*   CALCIUM 7.6* 7.6* 7.6*       LFTS  Recent Labs     10/13/21  0843   LABALBU 3.4*           ASSESSMENT/plan  1. Anemia, need for clearance for [facility]  D/w md now has no IV access and remains hypotensive unsure if she would be able to tolerate a colonoscopy, unable to do procedure with no IV access, may need to trial [facility] without scope    trend hh and keep hgb >7          This plan was formulated in collaboration with .     Electronically signed by: VENTURA Flores CNP on 10/15/2021 at 12:58 PM

## 2021-10-15 NOTE — PROGRESS NOTES
HEMODIALYSIS POST TREATMENT NOTE    Treatment time ordered: 210     Actual treatment time: 49 mins    UltraFiltration Goal: 500  UltraFiltration Removed: 32`      Pre Treatment weight: 117.9  Post Treatment weight: 117.9  Estimated Dry Weight: na    Access used:     Central Venous Catheter:          Tunneled or Non-tunneled: na           Site: na          Access Flow: na      Internal Access:       AV Fistula or AV Graft: fistula         Site: left upper arm       Access Flow: good       Sign and symptoms of infection: no       If YES: na    Medications or blood products given: midodrine    Chronic outpatient schedule:[facility]    Chronic outpatient unit: [facility]    Summary of response to treatment: pt came off treatment for low b/p after only 49 mins per Dr Lefty Hayes if orders NOT met, was physician notified:yes      ACES flowsheet faxed to patient unit/ placed in patient chart: yes    Post assessment completed: yes    Report given to: Antonette Elizondo 888 documented Safety Checks include the followin) Access and face visible at all times. 2) All connections and blood lines are secure with no kinks. 3) NVL alarm engaged. 4) Hemosafe device applied (if applicable). 5) No collapse of Arterial or Venous blood chambers. 6) All blood lines / pump segments in the air detectors.

## 2021-10-15 NOTE — DISCHARGE INSTR - COC
E66.01, Z68.42    Acute on chronic respiratory failure with hypoxia ([facility]) J96.21    COPD exacerbation ([facility]) J44.1    Asthma exacerbation J45. 0    Type 2 diabetes mellitus with diabetic nephropathy ([facility]) E11.21    ESRD (end stage renal disease) on dialysis ([facility]) N18.6, Z99.2    Bronchitis J40    Essential hypertension I10    YONI on CPAP G47.33, Z99.89    Hyperglycemia, drug-induced R73.9, T50.905A    Needs smoking cessation education F17.200    Hyperglycemia R73.9    Drowsiness R40.0    Acute on chronic respiratory failure with hypercapnia ([facility]) J96.22    Mobility impaired Z74.09    S/P cardiac cath-mINIMAL caD 3/15/16 - dR. Prema Bullard B35.902    Type 2 diabetes mellitus with chronic kidney disease on chronic dialysis ([facility]) E11.22, N18.6, Z99.2    Type 2 diabetes mellitus without complication ([facility]) U08.2    Congestive heart failure ([facility]) I50.9    Asthma with COPD with exacerbation ([facility]) J44.1, J45.901    Acute exacerbation of CHF (congestive heart failure) ([facility]) I50.9    Chronic obstructive pulmonary disease ([facility]) J44.9    Chronic kidney disease, stage V ([facility]) N18.5    Acute respiratory acidosis E87.2    Precordial pain R07.2    Gastroesophageal reflux disease without esophagitis K21.9    Pulmonary HTN ([facility] I27.20    Morbid obesity due to excess calories ([facility]) E66.01    Symptomatic anemia D64.9    Elevated serum creatinine R79.89    ESRD needing dialysis ([facility]) N18.6, Z99.2    Chest pain at rest R07.9    Absolute anemia D64.9    Atrial flutter with rapid ventricular response ([facility]) I48.92    Chest pain R07.9    Hypoglycemia E16.2    Hypotension I95.9    Atrial fibrillation with RVR ([facility]) I48.91    Hematochezia K92.1    Anemia due to chronic kidney disease N18.9, D63.1    Itching L29.9       Isolation/Infection:   Isolation          No Isolation        Patient Infection Status     Infection Onset Added Last Indicated Last Indicated By Review Planned Expiration Resolved Resolved By None active    Resolved    C-diff Rule Out 21 C DIFF TOXIN/ANTIGEN (Ordered)   21 Rule-Out Test Resulted          Nurse Assessment:  Last Vital Signs: BP (!) 92/49   Pulse 76   Temp 97.7 °F (36.5 °C) (Oral)   Resp 14   Ht 4' 11\" (1.499 m)   Wt 259 lb 14.8 oz (117.9 kg)   LMP 2014 (Approximate)   SpO2 93%   BMI 52.50 kg/m²     Last documented pain score (0-10 scale): Pain Level: 0  Last Weight:   Wt Readings from Last 1 Encounters:   10/15/21 259 lb 14.8 oz (117.9 kg)     Mental Status:  {IP PT MENTAL STATUS:03557}    IV Access:  { JIMMY IV ACCESS:741984064}    Nursing Mobility/ADLs:  Walking   {CHP DME URTX:109819585}  Transfer  {CHP DME LQG}  Bathing  {CHP DME LFKY:776914440}  Dressing  {CHP DME MPGA:747057181}  Toileting  {CHP DME ZUJV:270547325}  Feeding  {P DME KBRK:145519001}  Med Admin  {P DME JIYK:559889486}  Med Delivery   { JIMMY MED Delivery:892483745}    Wound Care Documentation and Therapy:        Elimination:  Continence:   · Bowel: {YES / OX:58686}  · Bladder: {YES / RN:23343}  Urinary Catheter: {Urinary Catheter:087948892}   Colostomy/Ileostomy/Ileal Conduit: {YES / UD:87973}       Date of Last BM: ***  No intake or output data in the 24 hours ending 10/15/21 1530  No intake/output data recorded. Safety Concerns:     [address] [employer] Safety Concerns:195431816}    Impairments/Disabilities:      [address] [employer] Impairments/Disabilities:447757948}    Nutrition Therapy:  Current Nutrition Therapy:   [address] [employer] Diet List:176880385}    Routes of Feeding: {CHP DME Other Feedings:774388103}  Liquids: {Slp liquid thickness:37357}  Daily Fluid Restriction: {CHP DME Yes amt example:201793746}  Last Modified Barium Swallow with Video (Video Swallowing Test): {Done Not Done FZZI:550431467}    Treatments at the Time of Hospital Discharge:   Respiratory Treatments:  See mar   Oxygen Therapy:  is on oxygen at 3 L/min per nasal cannula.  at rest and 4 on exertion Ventilator:    - No ventilator support    Rehab Therapies:    Weight Bearing Status/Restrictions: No weight bearing restirctions  Other Medical Equipment (for information only, NOT a DME order):  walker and scooter   Other Treatments:   Skilled assessment   Medication reconciliation     Patient's personal belongings (please select all that are sent with patient):  {P DME Belongings:985212162}    RN SIGNATURE:  {Esignature:620392184}    CASE MANAGEMENT/SOCIAL WORK SECTION    Inpatient Status Date: 10/9    Readmission Risk Assessment Score:  Readmission Risk              Risk of Unplanned Readmission:  35           Discharging to Facility/ Agency   · Name: [facility]   · Phone: [phone]  · Fax:  [phone]    Dialysis Facility (if applicable)   · Name:  · Address:  · Dialysis Schedule:  · Phone:  · Fax:    / signature: Electronically signed by German Velez RN on 10/15/21 at 3:32 PM EDT    PHYSICIAN SECTION    Prognosis: {Prognosis:5245772003}    Condition at Discharge: [address] [place] [address] Patient Condition:081740280}    Rehab Potential (if transferring to Rehab): {Prognosis:3748883986}    Recommended Labs or Other Treatments After Discharge: ***    Physician Certification: I certify the above information and transfer of Jr Luna  is necessary for the continuing treatment of the diagnosis listed and that she requires {Admit to Appropriate Level of Care:20273} for {GREATER/LESS:586710642} 30 days.      Update Admission H&P: {CHP DME Changes in RRQOA:309667949}    PHYSICIAN SIGNATURE:  {Esignature:060300602}

## 2021-10-15 NOTE — PROGRESS NOTES
[facility]   Progress Note                   Date:   10/15/2021  Patient name: Marcia Sam  Date of admission:  10/8/2021 12:20 PM  MRN:   [id number]  YOB: 1970  PCP: Maikel Sanders MD    Reason for Admission:     Subjective:       Clinical Changes / Abnormalities: Patient seen and examined in room. Endoscopy was cancelled for today due to hypotension during dialysis. Denies current chest pain or SOB. Labs, vitals, & tele reviewed. SR on tele HR 81    Medications:   Scheduled Meds:   sodium thiosulfate IVPB  25 g IntraVENous Once per day on Mon Wed Fri    polyethylene glycol  2,000 mL Oral Once    Hydrocortisone-Aloe Vera   Topical BID    gabapentin  100 mg Oral TID    sennosides-docusate sodium  2 tablet Oral BID    enoxaparin  1 mg/kg SubCUTAneous Q24H    amiodarone  200 mg Oral BID    midodrine  15 mg Oral 4x Daily    Calcium Acetate (Phos Binder)  2 capsule Oral TID WC    pantoprazole  40 mg Oral QAM AC    budesonide-formoterol  2 puff Inhalation BID    cinacalcet  90 mg Oral Once per day on Mon Wed Fri    calcitRIOL  0.25 mcg Oral Once per day on Mon Wed Fri    atorvastatin  40 mg Oral Nightly    aspirin  81 mg Oral Daily    sodium chloride flush  5-40 mL IntraVENous 2 times per day     Continuous Infusions:   dextrose 20 mL/hr at 10/14/21 1710    [Held by provider] sodium chloride 60 mL/hr at 10/13/21 1609    sodium chloride Stopped (10/13/21 1945)    dextrose Stopped (10/10/21 1415)     CBC:   Recent Labs     10/13/21  0545 10/14/21  0549 10/15/21  0546   WBC 7.4 6.5 8.3   HGB 8.8* 8.5* 9.1*   * 132* 131*     BMP:    Recent Labs     10/13/21  0843 10/13/21  1645 10/14/21  0549   * 132* 135   K 5.4* 4.6 3.9   CL 94* 91* 95*   CO2 16* 21 24   BUN 19 20 13   CREATININE 5.04* 5.35* 3.88*   GLUCOSE 83 83 69*     Hepatic: No results for input(s): AST, ALT, ALB, BILITOT, ALKPHOS in the last 72 hours.   Troponin: No results for input(s): TROPONINI in the last 72 hours. BNP: No results for input(s): BNP in the last 72 hours. Lipids: No results for input(s): CHOL, HDL in the last 72 hours. Invalid input(s): LDLCALCU  INR: No results for input(s): INR in the last 72 hours. Objective:   Vitals: BP (!) 75/44   Pulse 64   Temp 97.3 °F (36.3 °C) (Oral)   Resp 20   Ht 4' 11\" (1.499 m)   Wt 260 lb (117.9 kg)   LMP 11/12/2014 (Approximate)   SpO2 94%   BMI 52.51 kg/m²   General appearance: alert and cooperative with exam  HEENT: Head: Normocephalic, no lesions, without obvious abnormality. Neck: no adenopathy, no carotid bruit, no JVD, supple, symmetrical, trachea midline and thyroid not enlarged, symmetric, no tenderness/mass/nodules  Lungs: clear to auscultation bilaterally  Heart: regular rate and rhythm, S1, S2 normal, no murmur, click, rub or gallop  SR on tele HR 81  Abdomen: soft, non-tender; bowel sounds normal; no masses,  no organomegaly  Extremities: extremities normal, atraumatic, no cyanosis or edema  Neurologic: Mental status: Alert, oriented, thought content appropriate    2D ECHO( 8/20/2021)  Mild left ventricular hypertrophy  Global left ventricular systolic function is normal  Estimated ejection fraction is 65 % . Right atrium is severely dilated . Right ventricular with severe dilatation with severely reduced systolic  function. Severe tricuspid regurgitation. Moderate to severe pulmonary hypertension. No pericardial effusion seen. Normal aortic root dimension. Assessment / Acute Cardiac Problems:   1. PAF now back in NSR on Amiodarone PO, Lovenox renal dosing for anticoagulation. 2. Preserved LV systolic function. 3. Hypotension during dialysis, improved on Midodrine   4. History of HTN  5. DM  6. HLP  7. YONI  8. ESRD on HD  9. Anemia and GI bleed.   10. Obesity    Patient Active Problem List:     Asthma     YONI (obstructive sleep apnea)     Left knee pain     Hidradenitis     Current smoker     Microalbuminuria anticoagulation with Lovenox renal dosing. Will consider NOAC on discharge pending GI evaluation. If unable to complete would consider starting renal dose NOAC and monitoring closely. 3. Hypotensive this AM during dialysis. No plans for further CV intervention at this time. 4. Anemia followed by GI. OK for endoscopy from CV standpoint when able. 5. ESRD on HD  followed by Nephrology.      Electronically signed by VENTURA Lindsey NP on 10/15/2021 at 1:19 PM  [facility]  [phone]

## 2021-10-15 NOTE — PLAN OF CARE
Problem: Infection:  Goal: Will remain free from infection  Description: Will remain free from infection  Outcome: Ongoing     Problem: Skin Integrity:  Goal: Skin integrity will stabilize  Description: Skin integrity will stabilize  Outcome: Ongoing     Problem:  Activity:  Goal: Fatigue will decrease  Description: Fatigue will decrease  Outcome: Ongoing     Problem: Fluid Volume:  Goal: Will show no signs or symptoms of fluid imbalance  Description: Will show no signs or symptoms of fluid imbalance  Outcome: Ongoing     Problem: Cardiac Output - Decreased:  Goal: Cardiac output within specified parameters  Description: Cardiac output within specified parameters  Outcome: Ongoing     Problem: Tissue Perfusion, Altered:  Goal: Circulatory function within specified parameters  Description: Circulatory function within specified parameters  Outcome: Ongoing

## 2021-10-15 NOTE — CARE COORDINATION
Discharge planning    Patient to go on eliquis 5 mg bid . Sent RN to [facility] outpatient to run if need PA. If does not they can fill for patient prior to weekend. Met with patient to update on above and discuss home care. She is agreeable. . placed referral to catrachito mendoza. Sent referral to Gary at [facility] await confirmation if can accept. JIMMY updated. Call to [facility] and there portable concentrator are heavier than the tanks she is being issued and at her liter flow her battery life will only be 1.5 hours. Will discuss again with patient     [phone]  Call from gary they have accepted per catrachito mendzoa.

## 2021-10-15 NOTE — PLAN OF CARE
Today's blood pressure readings discussed with dr Dm Sethi she remains hypotensive in the 70's pt's colonoscopy is cancelled. Ok to eat from a GI standpoint this was discussed with the pt;'s primary RN. Donna Sharma, APRN - CNP

## 2021-10-15 NOTE — PROGRESS NOTES
Writer called into HD room by dialysis tech. Patient's blood pressure in the systolic of 07'A. Patient complains of \"chest pain\" and \"heart rate fluttering\". Heart rate on telemetry monitoring showing HR in 70's. Dr. Yovanny Ruff notified who stated to stop HD treatment now. Dr. Stephanie Judd called to HD room. Orders for EKG which did not show new rhythm changes. Patient will return to room shortly, Dr. Stephanie Judd would like update on blood pressures.

## 2021-10-16 NOTE — PROGRESS NOTES
Nephrology Progress Note        Subjective: Patient continues to have a low blood pressure. Her dialysis was just for 45 minutes because of low blood pressure and patient was also complaining of lightheadedness or dizziness. Talking to the floor nurse she said that patient can be completely asymptomatic even with a blood pressure of 99/19 systolic. And every time to take blood pressure she did get different reading and it is due to measurement error. Patient denies any lightheadedness or dizziness. Patient states that she feels extremely bloated and has swelling over her abdominal as well as lower extremities. .      Objective:  CURRENT TEMPERATURE:  Temp: 97.5 °F (36.4 °C)  MAXIMUM TEMPERATURE OVER 24HRS:  Temp (24hrs), Av.6 °F (36.4 °C), Min:97.3 °F (36.3 °C), Max:98.1 °F (36.7 °C)    CURRENT RESPIRATORY RATE:  Resp: 18  CURRENT PULSE:  Pulse: 87  CURRENT BLOOD PRESSURE:  BP: (!) 80/54  24HR BLOOD PRESSURE RANGE:  Systolic (92DYC), WKT:47 , Min:58 , SRU:553   ; Diastolic (68SRJ), ZKS:45, Min:36, Max:94    24HR INTAKE/OUTPUT:  No intake or output data in the 24 hours ending 10/16/21 1054  Patient Vitals for the past 96 hrs (Last 3 readings):   Weight   10/15/21 1155 259 lb 14.8 oz (117.9 kg)   10/15/21 1102 259 lb 14.8 oz (117.9 kg)   10/14/21 0554 260 lb (117.9 kg)         Physical Exam:  General appearance: Alert and awake x3.   Skin: Warm to touch  Eyes: Conjunctiva was pink  ENT: No thrush  Neck: To assess JVD   pulmonary: Bilateral air entry and clear  Cardiovascular: S1 and S2 audible  Abdomen: soft nontender, bowel sounds present, no organomegaly,  no ascites   Extremities: 2+ edema  Access:  previous  Left AV fistula    Current Medications:    sodium thiosulfate 25 g in sodium chloride 0.9 % 150 mL IVPB, Once per day on   apixaban (ELIQUIS) tablet 5 mg, BID  albuterol sulfate  (90 Base) MCG/ACT inhaler 2 puff, Q4H PRN  polyethylene glycol (GoLYTELY) solution 2,000 mL, 10/13/21  1645   MG 1.9     Albumin:   No results for input(s): LABALBU in the last 72 hours. Dialysis bath: Dialysis Bath  K+ (Potassium): 2  Ca+ (Calcium): 2.5  Na+ (Sodium): 140  HCO3 (Bicarb): 35    Radiology:  Reviewed as available. Assessment:  1 ESRD:dialysis: Yesterday dialysis was discontinued because of low blood pressure. However patient remains alert and awake. She was getting anxious and complaining of not feeling well that is why she was taking off of dialysis after 49 minutes. Today she states she is feeling very bloated. She is completely asymptomatic. 2.HTN: Hypertension patient is completely asymptomatic even with low readings in last 24 hours. 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 Patient is on sodium thiosulfate with dialysis  4 EDEMA : Has a component of chronic  lower extremity edema  5.paroxysmal atrial fibrillation on amiodarone, heart rate controlled in the 80s:   6.suspected GI bleed , enterology on is on consult    Plan:  1. Dialysis today  2. ProAmatine 15 mg every 4 hours to keep blood pressure on the higher side  3. Decision regarding colonoscopy as per anesthesia  4. Continue to give her sodium thiosulfate at the end of dialysis. Please do not hesitate to call with questions.     Electronically signed by Laura Miller MD on 10/16/2021 at 10:54 AM

## 2021-10-16 NOTE — PROGRESS NOTES
GI Progress notes    10/16/2021   12:48 PM    Name:  Best Solo  MRN:    [id number]     Acct:     [de-identified]   Room:  [address] [place] [address] Day: 7     Admit Date: 10/8/2021 12:20 PM  PCP: Pastor Eng MD    Subjective:     C/C:   Chief Complaint   Patient presents with    Hypotension       Interval History: Status: not changed. Patient seen and examined. No acute events overnight. Globin stable. No abdominal pain, melena, hematochezia, hematemesis. Patient has no IV access. Was restarted on Eliquis. Complains of back pain, bilateral lower extremity pain. ROS:  Constitutional: negative for chills, fevers and sweats  Gastrointestinal: negative for abdominal pain, constipation, diarrhea, nausea and vomiting      Medications: Allergies:    Allergies   Allergen Reactions    Ibuprofen     Tramadol Hives    Motrin [Ibuprofen Micronized] Itching    Strawberry Extract Itching and Rash    Tape [Adhesive Tape] Rash     No paper tape silk only       Current Meds: sodium thiosulfate 25 g in sodium chloride 0.9 % 150 mL IVPB, Once per day on Mon Wed Fri  apixaban (ELIQUIS) tablet 5 mg, BID  albuterol sulfate  (90 Base) MCG/ACT inhaler 2 puff, Q4H PRN  polyethylene glycol (GoLYTELY) solution 2,000 mL, Once  Hydrocortisone-Aloe Vera 1 % CREA, BID  gabapentin (NEURONTIN) capsule 100 mg, TID  sennosides-docusate sodium (SENOKOT-S) 8.6-50 MG tablet 2 tablet, BID  hydrOXYzine (ATARAX) tablet 25 mg, TID PRN  amiodarone (CORDARONE) tablet 200 mg, BID  midodrine (PROAMATINE) tablet 15 mg, 4x Daily  diphenhydrAMINE (BENADRYL) tablet 25 mg, Q6H PRN  Calcium Acetate (Phos Binder) CAPS 1,334 mg, TID WC  Calcium Acetate (Phos Binder) CAPS 667 mg, PRN  pantoprazole (PROTONIX) tablet 40 mg, QAM AC  guaiFENesin (MUCINEX) extended release tablet 600 mg, BID PRN  budesonide-formoterol (SYMBICORT) 160-4.5 MCG/ACT inhaler 2 puff, BID  cinacalcet (SENSIPAR) tablet 90 mg, Once per day on Mon Wed Fri  calcitRIOL (ROCALTROL) capsule 0.25 mcg, Once per day on   atorvastatin (LIPITOR) tablet 40 mg, Nightly  aspirin EC tablet 81 mg, Daily  sodium chloride flush 0.9 % injection 5-40 mL, 2 times per day  sodium chloride flush 0.9 % injection 10 mL, PRN  0.9 % sodium chloride infusion, PRN  ondansetron (ZOFRAN-ODT) disintegrating tablet 4 mg, Q8H PRN   Or  ondansetron (ZOFRAN) injection 4 mg, Q6H PRN  polyethylene glycol (GLYCOLAX) packet 17 g, Daily PRN  acetaminophen (TYLENOL) tablet 650 mg, Q6H PRN   Or  acetaminophen (TYLENOL) suppository 650 mg, Q6H PRN  glucose (GLUTOSE) 40 % oral gel 15 g, PRN  dextrose 50 % IV solution, PRN  glucagon (rDNA) injection 1 mg, PRN  dextrose 5 % solution, PRN        Data:     Code Status:  Full Code    Family History   Problem Relation Age of Onset    Diabetes Other     Cancer Mother         BREAST    Heart Disease Father         CAD-MI    Diabetes Father     High Blood Pressure Father     Diabetes Maternal Grandfather     Diabetes Paternal Grandfather     Heart Disease Paternal Grandfather     High Blood Pressure Paternal Grandfather        Social History     Socioeconomic History    Marital status: Single     Spouse name: Not on file    Number of children: Not on file    Years of education: Not on file    Highest education level: Not on file   Occupational History    Not on file   Tobacco Use    Smoking status: Former Smoker     Years: 7.00     Types: Cigarettes     Quit date: 2017     Years since quittin.6    Smokeless tobacco: Never Used    Tobacco comment: 1-2 cigs daily   Substance and Sexual Activity    Alcohol use: No    Drug use: No    Sexual activity: Not Currently     Partners: Male   Other Topics Concern    Not on file   Social History Narrative    Not on file     Social Determinants of Health     Financial Resource Strain:     Difficulty of Paying Living Expenses:    Food Insecurity:     Worried About Running Out of Food in the Last Year:  Ran Out of Food in the Last Year:    Transportation Needs:     Lack of Transportation (Medical):  Lack of Transportation (Non-Medical):    Physical Activity:     Days of Exercise per Week:     Minutes of Exercise per Session:    Stress:     Feeling of Stress :    Social Connections:     Frequency of Communication with Friends and Family:     Frequency of Social Gatherings with Friends and Family:     Attends Jainism Services:     Active Member of Clubs or Organizations:     Attends Club or Organization Meetings:     Marital Status:    Intimate Partner Violence:     Fear of Current or Ex-Partner:     Emotionally Abused:     Physically Abused:     Sexually Abused:        Vitals:  BP (!) 90/56   Pulse 65   Temp 97.5 °F (36.4 °C) (Oral)   Resp 20   Ht 4' 11\" (1.499 m)   Wt 259 lb 14.8 oz (117.9 kg)   LMP 2014 (Approximate)   SpO2 94%   BMI 52.50 kg/m²   Temp (24hrs), Av.7 °F (36.5 °C), Min:97.5 °F (36.4 °C), Max:98.1 °F (36.7 °C)    Recent Labs     10/15/21  1703 10/15/21  2026 10/16/21  0719 10/16/21  1119   POCGLU 113* 130* 115* 103       I/O (24Hr):   No intake or output data in the 24 hours ending 10/16/21 1248    Labs:      CBC:   Lab Results   Component Value Date    WBC 8.3 10/15/2021    RBC 2.91 10/15/2021    RBC 4.24 2012    HGB 8.5 10/16/2021    HCT 30.4 10/16/2021    .0 10/15/2021    MCH 31.3 10/15/2021    MCHC 27.9 10/15/2021    RDW 16.1 10/15/2021     10/15/2021     2012    MPV 11.0 10/15/2021     CBC with Differential:    Lab Results   Component Value Date    WBC 8.3 10/15/2021    RBC 2.91 10/15/2021    RBC 4.24 2012    HGB 8.5 10/16/2021    HCT 30.4 10/16/2021     10/15/2021     2012    .0 10/15/2021    MCH 31.3 10/15/2021    MCHC 27.9 10/15/2021    RDW 16.1 10/15/2021    NRBC 2 2016    LYMPHOPCT 16 10/15/2021    MONOPCT 15 10/15/2021    BASOPCT 1 10/15/2021    MONOSABS 1.25 10/15/2021 LYMPHSABS 1.33 10/15/2021    EOSABS 0.17 10/15/2021    BASOSABS 0.08 10/15/2021    DIFFTYPE NOT REPORTED 10/15/2021     Hemoglobin/Hematocrit:    Lab Results   Component Value Date    HGB 8.5 10/16/2021    HCT 30.4 10/16/2021     CMP:    Lab Results   Component Value Date     10/16/2021    K 4.0 10/16/2021    CL 93 10/16/2021    CO2 25 10/16/2021    BUN 19 10/16/2021    CREATININE 5.37 10/16/2021    GFRAA 10 10/16/2021    LABGLOM 8 10/16/2021    GLUCOSE 112 10/16/2021    GLUCOSE 132 01/16/2012    PROT 7.4 08/21/2021    LABALBU 3.4 10/13/2021    CALCIUM 7.4 10/16/2021    BILITOT 0.46 08/21/2021    ALKPHOS 121 08/21/2021    AST 13 08/21/2021    ALT 10 08/21/2021     BMP:    Lab Results   Component Value Date     10/16/2021    K 4.0 10/16/2021    CL 93 10/16/2021    CO2 25 10/16/2021    BUN 19 10/16/2021    LABALBU 3.4 10/13/2021    CREATININE 5.37 10/16/2021    CALCIUM 7.4 10/16/2021    GFRAA 10 10/16/2021    LABGLOM 8 10/16/2021    GLUCOSE 112 10/16/2021    GLUCOSE 132 01/16/2012     PT/INR:    Lab Results   Component Value Date    PROTIME 16.0 10/09/2021    INR 1.3 10/09/2021     PTT:    Lab Results   Component Value Date    APTT 30.2 10/09/2021   [APTT}    Physical Examination:        General appearance: alert, cooperative and no distress  Mental Status: oriented to person, place and time and normal affect  Abdomen: soft, nontender, nondistended, bowel sounds present   Assessment:        Primary Problem  Hypotension     Active Hospital Problems    Diagnosis Date Noted    Pulmonary hypertension ([facility] [I27.20] 12/10/2015     Priority: Medium    CKD (chronic kidney disease) stage 4, GFR 15-29 ml/min ([facility] [N18.4] 12/08/2015     Priority: Medium    Itching [L29.9] 10/11/2021    Anemia due to chronic kidney disease [N18.9, D63.1] 10/10/2021    Hematochezia [K92.1]     Hypotension [I95.9] 10/08/2021    Atrial flutter with rapid ventricular response ([address] [I48.92] 08/24/2021    ESRD needing dialysis ([facility] [N18.6, Z99.2] 07/01/2019    Chronic obstructive pulmonary disease ([facility] [J44.9] 06/12/2016    Gastroesophageal reflux disease without esophagitis [K21.9] 06/12/2016    Type 2 diabetes mellitus with chronic kidney disease on chronic dialysis ([facility] [E11.22, N18.6, Z99.2]     Essential hypertension [I10] 03/11/2016    YONI on CPAP [G47.33, Z99.89] 03/11/2016    Morbid obesity with BMI of 45.0-49.9, adult ([facility] [E66.01, Z68.42] 12/11/2015     Past Medical History:   Diagnosis Date    Asthma     AS A CHILD    CHF [facility]congestive heart failure) ([facility] 2000    Chronic kidney disease     COPD (chronic obstructive pulmonary disease) ([facility] 2000    INHALER USE AS NEEDED    Dialysis patient ([facility]     CENTRAL DIALYSIS MON, WEDS AND FRI, FLUID RESTRICTION 2L/24 HRS PER PT    Hemodialysis patient [facility])     had Dilcrystalis 8-17-16  M-W-F @ [facility]    Hyperlipidemia     Hypertension 2000    ON RX    Obesity     YONI (obstructive sleep apnea) 2013    SLEEP STUDY -6/2016 AWAITING MACHINE, USING O2 AT NIGHT PRESENTLY    Pneumonia 2000    HAD TO BE ON VENTILATOR 12 DAYS    Renal failure     STARTED DIALYSIS 02/25/2016    Type II or unspecified type diabetes mellitus without mention of complication, not stated as uncontrolled 2000    IDDM    Ventilator dependence ([facility]     X 2 200 AND 2014. 2014 HAD TO GO ON VENT POST OP        Plan:        1. Anemia  1. Patient remains hypotensive, no IV access. Likely unable to tolerate colonoscopy  2. Eliquis resumed  3. No overt GI bleeding  4. Hemoglobin stable  5. Trend H&H  6. Monitor for bleeding  7.  Transfuse for hemoglobin less than 7    Explained to the patient and d/W Nursing Staff  Will F/U with you  Please call or Page for any issues or change in status  Thanks    Electronically signed by VENTURA Kilgore NP on 10/16/2021 at 12:48 PM

## 2021-10-16 NOTE — PROGRESS NOTES
[facility]  Office: [address], DO, Juli Joanna, DO, Ivan Hernandez, DO, Breanna Aleman Blood, DO, Janet Rowley MD, Stephan Mendes MD, Angélica Montana MD, Augusto Melissa MD, Zofia Echols MD, Lissette Whitney MD, Ludwin Yeboah MD, Misty Meza, DO, Aure Parnell, DO, Yasmin Fan MD,  Angelica Gardiner DO, Dietrich Halsted, MD, Maranda Oconnell MD, Teresa Raphael MD, Nga Padron MD, Lisa Dejesus MD, Wayne Sebastian MD, Blair Graham, Jazmin Sprague, CNP, Terrence Corrales, CNP, Jennifer Sena, CNS, Cyril Burton, CNP, Cassidy Tapia, CNP, Martín Jimenez, CNP, Renny Mccann, CNP, Angelita Wiley, CNP, Yamilet Puga, PA-C, Xander Hanks, [facility], Morro Castrejon, [facility], Natali Rivera, CNP, Gordon Garrett, CNP, Ada Aguilar, CNP, Karo Torres, CNP, Mohini Branham, CNP, Jaqueline Jackson, [facility]    Progress Note    10/16/2021    1:21 PM    Name:   Dioni Rangel  MRN:     [id number]     Acct:      [de-identified]   Room:   [address] [place] [address] Day:  7  Admit Date:  10/8/2021 12:20 PM    PCP:   Maximino Watts MD  Code Status:  Full Code    Subjective:     C/C:   Chief Complaint   Patient presents with    Hypotension     Interval History Status: not changed. Patient was at bedside, feels weak and tired, his abdominal is distended. Dialysis could not be completed on 10/15 plan for dialysis again today. Eliquis was started, hemoglobin went from 9-8.5, patient denies any black stools, nausea, vomiting, hematemesis. Blood pressure has been at baseline. Brief History:      26-year-old female with known medical history of hypertension on dialysis, admitted for diabetes presented to the hospital after she was sent from dialysis for blood pressure of systolic less than 70. Patient states that she was feeling fatigued in the last few days. After she received her Midrin dose her blood pressure stabilized.   10/9 patient developed atrial flutter, heart rate was elevated up to 120s. Blood pressure continued to be low 26Y systolic. Cardiology was consulted. Patient was started on amiodarone and heparin. Patient noted to have recent EGD showing duodenal polyps, and was sent to colonoscopy outpatient patient did not follow-up. Patient will need GI clearance for anticoagulation .       Review of Systems:     Constitutional:  negative for chills, fevers, sweats  Respiratory:  negative for cough, dyspnea on exertion, shortness of breath, wheezing  Cardiovascular:  negative for chest pain, chest pressure/discomfort, lower extremity edema, palpitations  Gastrointestinal: Positive for abdominal distention, in, constipation, diarrhea, positive for vomiting and nausea   neurological:  negative for dizziness, headache    Medications: Allergies:     Allergies   Allergen Reactions    Ibuprofen     Tramadol Hives    Motrin [Ibuprofen Micronized] Itching    Strawberry Extract Itching and Rash    Tape Alberto  Tape] Rash     No paper tape silk only       Current Meds:   Scheduled Meds:    sodium thiosulfate IVPB  25 g IntraVENous Once per day on Mon Wed Fri    apixaban  5 mg Oral BID    polyethylene glycol  2,000 mL Oral Once    Hydrocortisone-Aloe Vera   Topical BID    gabapentin  100 mg Oral TID    sennosides-docusate sodium  2 tablet Oral BID    amiodarone  200 mg Oral BID    midodrine  15 mg Oral 4x Daily    Calcium Acetate (Phos Binder)  2 capsule Oral TID WC    pantoprazole  40 mg Oral QAM AC    budesonide-formoterol  2 puff Inhalation BID    cinacalcet  90 mg Oral Once per day on Mon Wed Fri    calcitRIOL  0.25 mcg Oral Once per day on Mon Wed Fri    atorvastatin  40 mg Oral Nightly    aspirin  81 mg Oral Daily    sodium chloride flush  5-40 mL IntraVENous 2 times per day     Continuous Infusions:    sodium chloride Stopped (10/13/21 1945)    dextrose Stopped (10/10/21 1415)     PRN Meds: albuterol sulfate HFA, hydrOXYzine, diphenhydrAMINE, Calcium Acetate (Phos Binder), guaiFENesin, sodium chloride flush, sodium chloride, ondansetron **OR** ondansetron, polyethylene glycol, acetaminophen **OR** acetaminophen, glucose, dextrose, glucagon (rDNA), dextrose    Data:     Past Medical History:   has a past medical history of Asthma, CHF (congestive heart failure) ([facility] Chronic kidney disease, COPD (chronic obstructive pulmonary disease) ([facility] Dialysis patient ([facility] Hemodialysis patient ([facility] Hyperlipidemia, Hypertension, Obesity, YONI (obstructive sleep apnea), Pneumonia, Renal failure, Type II or unspecified type diabetes mellitus without mention of complication, not stated as uncontrolled, and Ventilator dependence ([facility] Social History:   reports that she quit smoking about 4 years ago. Her smoking use included cigarettes. She quit after 7.00 years of use. She has never used smokeless tobacco. She reports that she does not drink alcohol and does not use drugs. Family History:   Family History   Problem Relation Age of Onset    Diabetes Other     Cancer Mother         BREAST    Heart Disease Father         CAD-MI    Diabetes Father     High Blood Pressure Father     Diabetes Maternal Grandfather     Diabetes Paternal Grandfather     Heart Disease Paternal Grandfather     High Blood Pressure Paternal Grandfather        Vitals:  BP (!) 90/56   Pulse 65   Temp 97.5 °F (36.4 °C) (Oral)   Resp 20   Ht 4' 11\" (1.499 m)   Wt 259 lb 14.8 oz (117.9 kg)   LMP 2014 (Approximate)   SpO2 94%   BMI 52.50 kg/m²   Temp (24hrs), Av.7 °F (36.5 °C), Min:97.5 °F (36.4 °C), Max:98.1 °F (36.7 °C)    Recent Labs     10/15/21  1703 10/15/21  2026 10/16/21  0719 10/16/21  1119   POCGLU 113* 130* 115* 103       I/O (24Hr):   No intake or output data in the 24 hours ending 10/16/21 1321    Labs:  Hematology:  Recent Labs     10/14/21  0549 10/15/21  0546 10/16/21  0809   WBC 6.5 8.3  --    RBC 2.77* 2.91*  --    HGB 8.5* 9.1* 8.5*   HCT 30.0* 32.6* 30.4*   .3* 112.0*  --    MCH 30.7 31.3  --    MCHC 28.3* 27.9*  --    RDW 16.0* 16.1*  --    * 131*  --    MPV 11.7 11.0  --      Chemistry:  Recent Labs     10/13/21  1545 10/13/21  1645 10/14/21  0549 10/16/21  0809   NA  --  132* 135 132*   K  --  4.6 3.9 4.0   CL  --  91* 95* 93*   CO2  --  21 24 25   GLUCOSE  --  83 69* 112*   BUN  --  20 13 19   CREATININE  --  5.35* 3.88* 5.37*   MG  --  1.9  --   --    ANIONGAP  --  20* 16 14   LABGLOM  --  8* 12* 8*   GFRAA  --  10* 15* 10*   CALCIUM  --  7.6* 7.6* 7.4*   TROPHS 45*  --   --   --      Recent Labs     10/15/21  0753 10/15/21  1230 10/15/21  1703 10/15/21  2026 10/16/21  0719 10/16/21  1119   POCGLU 79 101 113* 130* 115* 103     ABG:  Lab Results   Component Value Date    POCPH 7.22 06/12/2016    POCPCO2 61 06/12/2016    POCPO2 68 06/12/2016    POCHCO3 24.8 06/12/2016    NBEA 3 06/12/2016    PBEA NOT REPORTED 06/12/2016    IBP2HNN 27 06/12/2016    DDGP3ASO 88 06/12/2016    FIO2 NOT REPORTED 07/01/2019     Lab Results   Component Value Date/Time    SPECIAL RT WRIST 5ML 10/09/2021 12:15 PM     Lab Results   Component Value Date/Time    CULTURE NO GROWTH 6 DAYS 10/09/2021 12:15 PM       Radiology:  XR CHEST PORTABLE    Result Date: 10/9/2021  No acute process. Stable cardiomegaly     XR CHEST PORTABLE    Result Date: 10/8/2021  No acute process.  Able cardiomegaly       Physical Examination:        General appearance:  alert, cooperative and mild distress, morbidly obese  Mental Status:  oriented to person, place and time and normal affect  Lungs:  clear to auscultation bilaterally, normal effort  Heart:  irregular rhythm, in flutter, rate controlled  Abdomen:  soft, nontender, nondistended, normal bowel sounds, no masses, hepatomegaly, splenomegaly  Extremities: 1+ edema, no redness, tenderness in the calves  Skin: Chronic skin changes lower extremity, skin rash on abdomen    Assessment:        Hospital Problems         Last Modified POA * (Principal) Hypotension 10/9/2021 Yes    CKD (chronic kidney disease) stage 4, GFR 15-29 ml/min ([facility]) (Chronic) 10/8/2021 Yes    Pulmonary hypertension ([facility] (Chronic) 10/8/2021 Yes    Morbid obesity with BMI of 45.0-49.9, adult ([facility] 10/8/2021 Yes    Essential hypertension 10/8/2021 Yes    YONI on CPAP 10/11/2021 Yes    Type 2 diabetes mellitus with chronic kidney disease on chronic dialysis ([address] 10/8/2021 Yes    Chronic obstructive pulmonary disease ([facility] 10/8/2021 Yes    Gastroesophageal reflux disease without esophagitis 10/8/2021 Yes    ESRD needing dialysis ([facility] 10/8/2021 Yes    Atrial flutter with rapid ventricular response ([facility] 10/10/2021 Yes    Hematochezia 10/10/2021 Yes    Anemia due to chronic kidney disease 10/10/2021 Yes    Itching 10/11/2021 Yes          Plan:        Atrial flutter with rvr with hypotension -rate controlled, continue eliquis 5 mg bid, continue on amiodarone 200 mg twice daily, monitor H&H, hemoglobin went from 9 to 8.5, check H&H every 6, plan to discharge tomorrow if hemoglobin is stable. ESRD- Nephrology following for dialysis, dialysis today  Calciphylaxis-continue hydroxyzine as needed, sodium thiosulfate with dialysis  Neuropathy- continue neurontin  Hypotension-blood pressure fluctuating, on midodrine, stop lopressor on discharge  Chronic anemia- recent EGD reviewed, colonoscopy could not be done  Type 2 diabetes mellitus- poct blood sugar ac, hs  CPAP for sleep apnea  Stool softeners for constipation  Lifestyle modifications for weight loss  Rest of the medications as ordered    Plan for discharge tomorrow if hemoglobin stays stable. Will need CBC outpatient and close monitoring for bleeding. Delayed discharge given multiple co morbidities and difficulty in decision making.       Patrick Alfaro MD  10/16/2021  1:21 PM

## 2021-10-16 NOTE — PROGRESS NOTES
HEMODIALYSIS PRE-TREATMENT NOTE    Patient Identifiers prior to treatment:name, birthdate and band    Isolation Required: no                      Isolation Type: na       (please document if patient is being managed as a PUI/COVID-19 patient)        Hepatitis status:                           Date Drawn                             Result  Hepatitis B Surface Antigen 10/13/2021     neg                     Hepatitis B Surface Antibody 10/13/2021 neg        Hepatitis B Core Antibody 10/13/2021 neg          How was Hepatitis Status verified: labs     Was a copy of the labs you documented provided to facility for the patient's chart: yes    Hemodialysis orders verified: yes    Access Within normal limits ( I.e. s/s of infection,...): yes     Pre-Assessment completed: yes    Pre-dialysis report received from: Rn                      Time: 1500

## 2021-10-16 NOTE — PROGRESS NOTES
HEMODIALYSIS POST TREATMENT NOTE    Treatment time ordered:210  Actual treatment time: 210    UltraFiltration Goal: 1500  UltraFiltration Removed: 1000      Pre Treatment weight: 117.9  Post Treatment weight: 116.9  Estimated Dry Weight: na    Access used:     Central Venous Catheter:          Tunneled or Non-tunneled: na           Site: na          Access Flow: na      Internal Access:       AV Fistula or AV Graft: fistula         Site: left upper arm       Access Flow: good       Sign and symptoms of infection: no       If YES: na    Medications or blood products given: yes    Chronic outpatient schedule: MWF     Chronic outpatient unit: [facility]    Summary of response to treatment: pt did well on treatment    Explain if orders NOT met, was physician notified:zander      ACES flowsheet faxed to patient unit/ placed in patient chart: yes    Post assessment completed: yes    Report given to: Luisa Chan documented Safety Checks include the followin) Access and face visible at all times. 2) All connections and blood lines are secure with no kinks. 3) NVL alarm engaged. 4) Hemosafe device applied (if applicable). 5) No collapse of Arterial or Venous blood chambers. 6) All blood lines / pump segments in the air detectors.

## 2021-10-16 NOTE — PROGRESS NOTES
Occupational Therapy   Occupational Therapy Initial Assessment  Date: 10/16/2021   Patient Name: Ignacia Hawley  MRN: [id number]     : 1970    RN Vladimir Fraga reports patient is medically stable for therapy treatment this date. Chart reviewed prior to treatment and patient is agreeable for therapy. All lines intact and patient positioned comfortably at end of treatment. All patient needs addressed prior to ending therapy session. Pt currently functioning below baseline. Would suggest additional therapy at time of discharge to maximize long term outcomes and prevent re-admission. Please refer to AM-PAC score for current level of function. Date of Service: 10/16/2021    Discharge Recommendations:  Patient would benefit from continued therapy after discharge  OT Equipment Recommendations  Equipment Needed: Yes (continue to assess as needed)  Mobility Devices: Sherrell Hanly; ADL Assistive Devices  Walker: Rolling  ADL Assistive Devices: Toileting - 3-in-1 Commode;Reacher;Sock-Aid Soft;Long-handled Shoe Horn;Long-handled Sponge;Emergency Alert System    Assessment   Performance deficits / Impairments: Decreased functional mobility ; Decreased safe awareness;Decreased balance;Decreased coordination;Decreased ADL status; Decreased cognition;Decreased vision/visual deficit; Decreased posture;Decreased endurance;Decreased high-level IADLs;Decreased strength;Decreased sensation;Decreased fine motor control  Assessment: Pt requires assist fo 2 staff when up with RW and is a high fall risk at this time. Skilled OT is recommended to increase overall I and safety with ADL and functional tasks to return home with assist as needed.   Prognosis: Fair  Decision Making: High Complexity  OT Education: OT Role;Plan of Care;Energy Conservation;Transfer Training  Patient Education: safety in function, call light use/fall prevention, pursed lip breathing, edema mgt tech, proper bed mob tech, recommendations for continued therapy, benefits of being up OOB as able  Barriers to Learning: memory issues  REQUIRES OT FOLLOW UP: Yes  Activity Tolerance  Activity Tolerance: Patient limited by fatigue;Patient limited by pain  Activity Tolerance: poor plus  Safety Devices  Safety Devices in place: Yes  Type of devices: Call light within reach; Chair alarm in place; Left in chair;Patient at risk for falls;Gait belt;Nurse notified (BLE's elevated on stool/pillow under and pt was edu on benefits of elevation and B ankle AROM/pumping as able to reduce swelling. Pt with good understanding.)           Patient Diagnosis(es): The primary encounter diagnosis was Pre-op chest exam. A diagnosis of Hypotension, unspecified hypotension type was also pertinent to this visit. has a past medical history of Asthma, CHF (congestive heart failure) ([facility] [address] Chronic kidney disease, COPD (chronic obstructive pulmonary disease) ([facility] [address] Dialysis patient ([facility] [address] Hemodialysis patient ([facility] [address] Hyperlipidemia, Hypertension, Obesity, YONI (obstructive sleep apnea), Pneumonia, Renal failure, Type II or unspecified type diabetes mellitus without mention of complication, not stated as uncontrolled, and Ventilator dependence ([facility] [address] has a past surgical history that includes Hysterectomy (2014);  section ([address]); skin full graft (); Upper gastrointestinal endoscopy (2021); and Upper gastrointestinal endoscopy (N/A, 2021). PER H&P: Robbin Client is a 46 y.o.  Non- / non  female who presents with Hypotension   and is admitted to the hospital for the management of Intra-dialytic hypotension.        Restrictions  Restrictions/Precautions  Restrictions/Precautions: Up as Tolerated, General Precautions, Fall Risk, Modified Diet  Position Activity Restriction  Other position/activity restrictions: L UE fistula, HD(M, W, F), 1800 ml fluid restriction diet, up with assist, 4L O2, alarms, low BP per chart and RN states she is asymptomatic/readings not accuarate    Subjective   General  Chart Reviewed: Yes  Patient assessed for rehabilitation services?: Yes  Family / Caregiver Present: No  Patient Currently in Pain: Yes  Pre Treatment Pain Screening  Intervention List: Nurse/Physician notified  Comments / Details: Pt states she has 9/10 chronic back pain and in BLE's. Vital Signs  Patient Currently in Pain: Yes    Social/Functional History  Social/Functional History  Lives With: Family (cousin)  Type of Home: Apartment  Home Layout: One level  Home Access: Level entry (1st floor apt.   Takes elevator to do laundry with motorized scooter)  Bathroom Shower/Tub: Tub/Shower unit  Bathroom Toilet: Standard  Bathroom Equipment: Grab bars in shower, Toilet raiser, Grab bars around toilet  Home Equipment: Rolling walker, Cane, Electric scooter, Oxygen (pt states her walker is old)  Receives Help From: Family (pt states she has a good support system locally)  ADL Assistance: Independent  Homemaking Assistance: Independent (Pt states she uses scooter and elevator to do own laundr; does light housework and cooking;  cousin does heavier cleaning)  Homemaking Responsibilities: Yes  Ambulation Assistance: Independent (RW or cane in apt; uses motorized scooter in community)  Transfer Assistance: Independent  Active : No  Mode of Transportation: Cab (for [employer]  Occupation: On disability  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  Additional Comments: Pt states her Raghavendra Ramos will \"give out\" on her- 2 recent falls       Objective   Vision: Impaired  Vision Exceptions:  (Pt states she needs glasses and needs to see an eye MD)  Hearing: Exceptions to [facility]  Hearing Exceptions: Hard of hearing/hearing concerns (Pt states she feels she is underwater in regards to her hearing--feels it is related to her low BP)    Orientation  Overall Orientation Status: Impaired (slow and dealyed processing)  Orientation Level: Oriented to place; Disoriented to time;Oriented to situation;Oriented to person (pt alert to month and not year)  Observation/Palpation  Posture: Fair (with RW)  Observation: laying in bed on 4L O2 and agreeable to eval  Edema: edema BLEs, abdomen, RUE (Rt. >L in hands)  Balance  Sitting Balance: Contact guard assistance (CG to SBA)  Standing Balance: Moderate assistance (x2 with RW)  Standing Balance  Time: stand phani < 1 min with RW; pt states she doesn't feel dizzy just weak when up OOB  Functional Mobility  Functional - Mobility Device: Rolling Walker  Activity:  (bed to recliner with increased time)  Assist Level: Moderate assistance (x2 for safety/balance support)  Functional Mobility Comments: MOD verbal instruction/tactile assist for upright posture, pacing, pursed lip breathing, RW safety/mgt, keeping B eyes open, scanning and awareness/assist with lines to increase safety/reduce falls. Toilet Transfers  Toilet Transfers Comments: N/T and pt with no needs during session     ADL  Feeding: Setup  Grooming: Setup;Minimal assistance (seated)  UE Bathing: Setup;Minimal assistance  LE Bathing: Setup;Dependent/Total  UE Dressing: Setup; Moderate assistance (with hosp gown)  LE Dressing: Setup;Dependent/Total;Maximum assistance (with B socks; x2 staff to stand for clothing mgt)  Toileting: Dependent/Total  Additional Comments: *pt is DEP with 2 staff assist when up with RW for safety/balance support. Tone RUE  RUE Tone: Normotonic  Tone LUE  LUE Tone: Normotonic  Coordination  Movements Are Fluid And Coordinated: No  Coordination and Movement description: Fine motor impairments;Tremors (pt states she gets tremors in B hands at times with activity)     Bed mobility  Supine to Sit: Moderate assistance;2 Person assistance (with increased time)  Sit to Supine: Unable to assess  Scooting:  Moderate assistance;2 Person assistance  Comment: MAX-MOD verbal instruction/tactile assist for hand placement on bed rail to assist, pursed lip breathing and proper log rolling tech. Transfers  Stand Step Transfers: Moderate assistance;2 Person assistance (with RW)  Sit to stand: 2 Person assistance; Moderate assistance  Stand to sit: Moderate assistance;2 Person assistance  Transfer Comments: MAX verbal instruction/tactile assist for B hand placement pushing from surface seated on as well as reaching back, nose over toes tech, upright posture, scanning, pursed lip breathing, RW safety/mgt, squaring self/AD up to surface prior to sitting and controlled as well as awareness/assist with lines to increase overall safety. Cognition  Overall Cognitive Status: Exceptions  Arousal/Alertness: Delayed responses to stimuli  Following Commands: Follows multistep commands with increased time; Follows multistep commands with repitition  Attention Span: Attends with cues to redirect  Memory: Decreased short term memory  Safety Judgement: Decreased awareness of need for assistance;Decreased awareness of need for safety  Problem Solving: Assistance required to generate solutions;Assistance required to identify errors made;Assistance required to implement solutions;Assistance required to correct errors made;Decreased awareness of errors  Insights: Decreased awareness of deficits  Initiation: Requires cues for some  Sequencing: Requires cues for some  Perception  Overall Perceptual Status: WFL     Sensation  Overall Sensation Status: Impaired (pt c/o constant paresthesias in B hands/feet)        LUE AROM (degrees)  LUE AROM : WFL  RUE AROM (degrees)  RUE AROM : WFL  LUE Strength  Gross LUE Strength: WFL  LUE Strength Comment: BUE strength grossly 4/5  RUE Strength  Gross RUE Strength: WFL                   Plan   Plan  Times per week: 4-5x/week 1x/day as phani  Current Treatment Recommendations: Strengthening, Endurance Training, Neuromuscular Re-education, Self-Care / ADL, Equipment Evaluation, Education, & procurement, Pain Management, Home Management Training, Balance Training, Functional Mobility Training, Safety Education & Training, Positioning, Cognitive/Perceptual Training, Cognitive Reorientation                                                  AM-PAC Score   12    Co-treatment with PT warranted secondary to decreased safety and independence requiring 2 skilled therapy professionals to address individual discipline's goals. OT addressing preparation for ADL transfer, sitting and standing balance for increased ADL performance, sitting/activity tolerance, functional reaching, environmental safety/scanning, fall prevention, proper bed mob tech, functional mobility for ADL transfers, ability to sequence and follow directions and functional UE strength. Goals  Short term goals  Time Frame for Short term goals: by discharge, pt to demo  Short term goal 1: bed mob tasks with use of rail as needed to min assist of 1. Short term goal 2: increase BUE strength by a 1/2 grade to assist with ADL's and be I with BUE HEP with use of handouts. Short term goal 3: UB ADL to set up and LB ADL to mod assist of 1 and use of AD/AE. Short term goal 4: toileting tasks with use of grab bar/AD or BSC to mod assist of 1. Short term goal 5: ADL transfers and functional mob with AD to min-mod assist of 1. Long term goals  Long term goal 1: Pt to stand with CG and AD phani > 5 mins as able to reduce falls in function. Long term goal 2: Pt/family to be I with EC/WS and fall prevention tech, proper positioning and edema mgt, DME/AE and AD recommendations with use of handouts. Patient Goals   Patient goals : Pt states she wants to have some of this fluid off so she feels better!        Therapy Time   Individual Concurrent Group Co-treatment   Time In 2251         Time Out 1228         Minutes 53         Timed Code Treatment Minutes: [place], [place]

## 2021-10-16 NOTE — PROGRESS NOTES
212Physical Therapy    Facility/Department: [facility] PROGRESSIVE CARE  Initial Assessment    NAME: Yessica Calvillo  : 1970  MRN: [id number]    Date of Service: 10/16/2021    Discharge Recommendations:    Pt currently functioning below baseline. Would suggest additional therapy at time of discharge to maximize long term outcomes and prevent re-admission. Please refer to AM-PAC score for current level of function. Yessica Calvillo is a 46 y.o. Non- / non  female who presents with Hypotension   and is admitted to the hospital for the management of Intra-dialytic hypotension.     Patient is a known dialysis patient who typically requires Midodrine to maintain her blood pressure during her dialysis treatments. Patient reported to her dialysis treatment today and was found to be hypotensive with a systolic of less than 70. Patient was transported to the emergency department by life squad as opposed to receiving her Midodrine and her regularly scheduled dialysis. Emergency department evaluation was completed and no acute abnormalities were found. Patient did receive her Midodrine dosage and her blood pressure quickly stabilized. At the time my exam patient has no complaints or concerns of any kind. Patient would like to know if she can eat her supper in the hospital as she understands she will now have to remain in the hospital for dialysis. Assessment   Body structures, Functions, Activity limitations: Decreased functional mobility ; Decreased safe awareness;Decreased balance;Decreased ROM; Decreased strength;Decreased endurance  Assessment: Pt. is very deconditioned at this time. Gross edema LEs/abdomen limiting ROM/ mobility. Will continue to progress as able this admission.   Prognosis: Excellent  Decision Making: High Complexity  PT Education: Goals;PT Role;Plan of Care;General Safety  Patient Education: walker safety, impt. of OOB  Barriers to Learning: see Cognition  REQUIRES PT FOLLOW UP: Yes  Activity Tolerance  Activity Tolerance: Patient limited by endurance; Patient limited by pain;Treatment limited secondary to medical complications (free text)       Patient Diagnosis(es): The primary encounter diagnosis was Pre-op chest exam. A diagnosis of Hypotension, unspecified hypotension type was also pertinent to this visit. has a past medical history of Asthma, CHF (congestive heart failure) ([facility] [address] Chronic kidney disease, COPD (chronic obstructive pulmonary disease) ([facility] [address] Dialysis patient ([facility] [address] Hemodialysis patient ([facility] [address] Hyperlipidemia, Hypertension, Obesity, YONI (obstructive sleep apnea), Pneumonia, Renal failure, Type II or unspecified type diabetes mellitus without mention of complication, not stated as uncontrolled, and [facility]ator dependence ([facility] has a past surgical history that includes Hysterectomy (2014);  section ([address]); skin full graft (); Upper gastrointestinal endoscopy (2021); and Upper gastrointestinal endoscopy (N/A, 2021). Restrictions  Restrictions/Precautions  Restrictions/Precautions: Up as Tolerated, General Precautions, Fall Risk, Modified Diet  Position Activity Restriction  Other position/activity restrictions: L UE fistula, HD(M, W, F), 1800 ml fluid restriction diet, up with assist, 4L O2, alarms, low BP per chart and RN states she is asymptomatic/readings not accuarate  Vision/Hearing  Vision: Impaired  Vision Exceptions:  (needs glasses)  Hearing: Exceptions to [facility]  Hearing Exceptions: Hard of hearing/hearing concerns (feels she is underwater in regards to her hearing--feels it is related to her low BP)     Subjective  General  Chart Reviewed: Yes  Patient assessed for rehabilitation services?: Yes  Additional Pertinent Hx: CHF, COPD. dialysis 3days/wk  Family / Caregiver Present: No  Follows Commands: Within Functional Limits  Subjective  Subjective: \"I'm so full of fluid I feel like a beach ball.   I have a bulging disc in required to correct errors made;Decreased awareness of errors  Insights: Decreased awareness of deficits  Initiation: Requires cues for some  Sequencing: Requires cues for some    Objective     Observation/Palpation  Observation: laying in bed on 4L O2 and agreeable to eval  Edema: edema LEs, abdomen, UEs (Rt. >L in hands)    AROM RLE (degrees)  RLE AROM: WFL  RLE General AROM: edema/soft tissue restrictions bilat  AROM LLE (degrees)  LLE AROM : WFL  Strength RLE  Comment: + SLR bialt. (minimal)  Strength Other  Other: * See OT eval for UE ROM/MMT       Sensation  Overall Sensation Status: Impaired (constant N/T hands/feet)  Bed mobility  Supine to Sit: Moderate assistance;2 Person assistance (with increased time)  Sit to Supine: Unable to assess  Scooting: Moderate assistance;2 Person assistance  Comment: Max-Mod verbal instruction / tactile assist for hand placement on bed rail to assist, pursed lip breathing and proper log rolling tech. Transfers  Sit to Stand: Moderate Assistance;2 Person Assistance  Stand to sit: Moderate Assistance;2 Person Assistance  Bed to Chair: Moderate assistance;2 Person Assistance  Comment: Pt. with difficulty following commands for wt. shifting/ steps in place at bedside to assess LE strength. (H/O knee buckling)  Able to transfer pt. bed to chair with RW, mod x2. Ambulation  Ambulation?: Yes  Ambulation 1  Device: Rolling Walker  Assistance: Moderate assistance;2 Person assistance  Quality of Gait: bed to chair, small steps/ unsteady;  reports overall feeling tired, but asymptomatic in standing  Distance: 5ft. Comments: up to chair with chair alarm; RN aware     Balance  Posture: Fair  Sitting - Static: Fair  Sitting - Dynamic: Fair  Standing - Static: Poor  Exercises  Comments: Discussed AROM of LEs and UEs and what motions are available to do in bed/chair. Pt with good demo and understanding.      Plan   Plan  Times per week: 1-2x/day; 5-6days/wk  Current Treatment Recommendations: Strengthening, Transfer Training, ROM, Balance Training, Functional Mobility Training, Gait Training, Safety Education & Training, Patient/Caregiver Education & Training  Safety Devices  Type of devices: All fall risk precautions in place, Gait belt, Patient at risk for falls, Nurse notified, Call light within reach, Chair alarm in place, Left in chair    G-Code       OutComes Score                                                  AM-PAC Score  AM-PAC Inpatient Mobility Raw Score : 10 (10/16/21 1308)  AM-PAC Inpatient T-Scale Score : 32.29 (10/16/21 1308)  Mobility Inpatient CMS 0-100% Score: 76.75 (10/16/21 1308)  Mobility Inpatient CMS G-Code Modifier : CL (10/16/21 1308)       Functional Outcome Measure-   Single Leg Stance Test: 0  sec. (<5 sec.= fall risk)      Goals  Short term goals  Time Frame for Short term goals: 12 visits:  Short term goal 1: Pt. to require min A for bed mob. Short term goal 2: Pt. to require min A for sit to stand transfer  Short term goal 3: Pt. to require min A for transfer bed to chair with RW  Short term goal 4: Pt. to amb. 25ft. with RW, min A, with approp. O2. Patient Goals   Patient goals : get rid of fluid       Therapy Time   Individual Concurrent Group Co-treatment   Time In 3335         Time Out 1236         Minutes 51               Treatment time:  41min. Co-treatment with OT warranted secondary to decreased safety and independence requiring 2 skilled therapy professionals to address individual discipline's goals.      Jacqueline Burciaga, PT

## 2021-10-17 PROBLEM — E87.70 FLUID OVERLOAD: Status: ACTIVE | Noted: 2021-01-01

## 2021-10-17 NOTE — CARE COORDINATION
Social Work-Met with patient to discuss dc options. Patient would like to go to a SNF that she would be able to receive dialysis on site. Discussed that there are only 2 in the area. Her first choice is [facility].  Sent referral. Marielena Barron

## 2021-10-17 NOTE — PROGRESS NOTES
[facility]  Office: [address], DO, Ashleighlenora Finley, DO, Trinidad Funmi, DO, Melanie Daryl Green, DO, Taylor Peterson MD, Bi Gomez MD, Tremaine Pfeiffer MD, Kushal Jones MD, Hailey Darling MD, Corin Fink MD, Jens Napier MD, Ramesh Wilson, DO, Thais Pitt DO, Manide Melton MD,  Ronit Vann, DO, Rosy Schirmer, MD, Tanner Lawler MD, Ashli Asif MD, Beatriz Boyd MD, Oksana Ramos MD, Hugh Jennings MD, Chio Rubi, CNP, Reinier العلي, CNP, Simba Dillon, CNS, Ras Cui, CNP, Мария Veloz, CNP, Shelby Gillespie, CNP, Lashell Mc, CNP, Maikel Perales, CNP, Natalie Magaña PA-C, Alana Tyler, [facility], Alyssa Wilde, CNP, Agnieszka Combs, CNP, Connie Heredia, CNP, Martha Hartman, CNP, Jose Bowling, CNP, Praveena Calderon, [facility], [facility]    Progress Note    10/17/2021    1:58 PM    Name:   Shama Laura  MRN:     [id number]     Acct:      [de-identified]   Room:   [address] [place] [address] Day:  8  Admit Date:  10/8/2021 12:20 PM    PCP:   Vikas Hoover MD  Code Status:  Full Code    Subjective:     C/C:   Chief Complaint   Patient presents with    Hypotension     Interval History Status: not changed. Patient feels that her abdomen has distended  Difficult to ambulate around, weight has increased, requesting dialysis today  Blood pressure has been stable at her baseline  No chest pain or shortness of breath  Brief History:      45-year-old female with known medical history of hypertension on dialysis, admitted for diabetes presented to the hospital after she was sent from dialysis for blood pressure of systolic less than 70. Patient states that she was feeling fatigued in the last few days. After she received her Midrin dose her blood pressure stabilized. 10/9 patient developed atrial flutter, heart rate was elevated up to 120s. Blood pressure continued to be low 29N systolic.   Cardiology was consulted. Patient was started on amiodarone and heparin. Patient noted to have recent EGD showing duodenal polyps, and was sent to colonoscopy outpatient patient did not follow-up. Patient will need GI clearance for anticoagulation .       Review of Systems:     Constitutional:  negative for chills, fevers, sweats  Respiratory:  negative for cough, dyspnea on exertion, shortness of breath, wheezing  Cardiovascular:  negative for chest pain, chest pressure/discomfort, lower extremity edema, palpitations  Gastrointestinal: Positive for abdominal distention, in, constipation, diarrhea, positive for vomiting and nausea   neurological:  negative for dizziness, headache    Medications: Allergies:     Allergies   Allergen Reactions    Ibuprofen     Tramadol Hives    Motrin [Ibuprofen Micronized] Itching    Strawberry Extract Itching and Rash    Tape Claude Erm Tape] Rash     No paper tape silk only       Current Meds:   Scheduled Meds:    sodium thiosulfate IVPB  25 g IntraVENous Once per day on Mon Wed Fri    apixaban  5 mg Oral BID    polyethylene glycol  2,000 mL Oral Once    Hydrocortisone-Aloe Vera   Topical BID    gabapentin  100 mg Oral TID    sennosides-docusate sodium  2 tablet Oral BID    amiodarone  200 mg Oral BID    midodrine  15 mg Oral 4x Daily    Calcium Acetate (Phos Binder)  2 capsule Oral TID WC    pantoprazole  40 mg Oral QAM AC    budesonide-formoterol  2 puff Inhalation BID    cinacalcet  90 mg Oral Once per day on Mon Wed Fri    calcitRIOL  0.25 mcg Oral Once per day on Mon Wed Fri    atorvastatin  40 mg Oral Nightly    aspirin  81 mg Oral Daily    sodium chloride flush  5-40 mL IntraVENous 2 times per day     Continuous Infusions:    sodium chloride Stopped (10/13/21 1945)    dextrose Stopped (10/10/21 1415)     PRN Meds: albuterol sulfate HFA, hydrOXYzine, diphenhydrAMINE, Calcium Acetate (Phos Binder), guaiFENesin, sodium chloride flush, sodium chloride, ondansetron **OR** ondansetron, polyethylene glycol, acetaminophen **OR** acetaminophen, glucose, dextrose, glucagon (rDNA), dextrose    Data:     Past Medical History:   has a past medical history of Asthma, CHF (congestive heart failure) ([facility] Chronic kidney disease, COPD (chronic obstructive pulmonary disease) ([facility] Dialysis patient ([facility] Hemodialysis patient ([facility] Hyperlipidemia, Hypertension, Obesity, YONI (obstructive sleep apnea), Pneumonia, Renal failure, Type II or unspecified type diabetes mellitus without mention of complication, not stated as uncontrolled, and Ventilator dependence ([facility] Social History:   reports that she quit smoking about 4 years ago. Her smoking use included cigarettes. She quit after 7.00 years of use. She has never used smokeless tobacco. She reports that she does not drink alcohol and does not use drugs. Family History:   Family History   Problem Relation Age of Onset    Diabetes Other     Cancer Mother         BREAST    Heart Disease Father         CAD-MI    Diabetes Father     High Blood Pressure Father     Diabetes Maternal Grandfather     Diabetes Paternal Grandfather     Heart Disease Paternal Grandfather     High Blood Pressure Paternal Grandfather        Vitals:  BP (!) 76/44   Pulse 65   Temp 97.5 °F (36.4 °C) (Oral)   Resp 16   Ht 4' 11\" (1.499 m)   Wt 273 lb 5 oz (124 kg)   LMP 2014 (Approximate)   SpO2 97%   BMI 55.20 kg/m²   Temp (24hrs), Av.6 °F (36.4 °C), Min:97.3 °F (36.3 °C), Max:98.6 °F (37 °C)    Recent Labs     10/16/21  1645 10/16/21  2020 10/17/21  0716 10/17/21  1116   POCGLU 137* 195* 120* 112*       I/O (24Hr):     Intake/Output Summary (Last 24 hours) at 10/17/2021 1358  Last data filed at 10/17/2021 1158  Gross per 24 hour   Intake 840 ml   Output --   Net 840 ml       Labs:  Hematology:  Recent Labs     10/15/21  0546 10/16/21  0809 10/16/21  2101 10/17/21  0502 10/17/21  1116   WBC 8.3  --   --   --   --    RBC 2.91* --   --   --   --    HGB 9.1*   < > 8.7* 8.5* 8.6*   HCT 32.6*   < > 31.0* 30.6* 31.0*   .0*  --   --   --   --    MCH 31.3  --   --   --   --    MCHC 27.9*  --   --   --   --    RDW 16.1*  --   --   --   --    *  --   --   --   --    MPV 11.0  --   --   --   --     < > = values in this interval not displayed. Chemistry:  Recent Labs     10/16/21  0809   *   K 4.0   CL 93*   CO2 25   GLUCOSE 112*   BUN 19   CREATININE 5.37*   ANIONGAP 14   LABGLOM 8*   GFRAA 10*   CALCIUM 7.4*     Recent Labs     10/16/21  0719 10/16/21  1119 10/16/21  1645 10/16/21  2020 10/17/21  0716 10/17/21  1116   POCGLU 115* 103 137* 195* 120* 112*     ABG:  Lab Results   Component Value Date    POCPH 7.22 06/12/2016    POCPCO2 61 06/12/2016    POCPO2 68 06/12/2016    POCHCO3 24.8 06/12/2016    NBEA 3 06/12/2016    PBEA NOT REPORTED 06/12/2016    RNZ8AUN 27 06/12/2016    BKXU1ZNL 88 06/12/2016    FIO2 NOT REPORTED 07/01/2019     Lab Results   Component Value Date/Time    SPECIAL RT WRIST 5ML 10/09/2021 12:15 PM     Lab Results   Component Value Date/Time    CULTURE NO GROWTH 6 DAYS 10/09/2021 12:15 PM       Radiology:  XR CHEST PORTABLE    Result Date: 10/9/2021  No acute process. Stable cardiomegaly     XR CHEST PORTABLE    Result Date: 10/8/2021  No acute process.  Able cardiomegaly       Physical Examination:        General appearance:  alert, cooperative and mild distress, morbidly obese  Mental Status:  oriented to person, place and time and normal affect  Lungs:  clear to auscultation bilaterally, normal effort  Heart:  irregular rhythm, in flutter, rate controlled  Abdomen:  soft, nontender, distended, normal bowel sounds, no masses, hepatomegaly, splenomegaly  Extremities: 1+ edema, no redness, tenderness in the calves  Skin: Chronic skin changes lower extremity, skin rash on abdomen    Assessment:        Hospital Problems         Last Modified POA    * (Principal) Hypotension 10/9/2021 Yes    CKD (chronic kidney disease) stage 4, GFR 15-29 ml/min ([facility]) (Chronic) 10/8/2021 Yes    Pulmonary hypertension ([facility] (Chronic) 10/8/2021 Yes    Morbid obesity with BMI of 45.0-49.9, adult ([address] 10/8/2021 Yes    Essential hypertension 10/8/2021 Yes    YONI on CPAP 10/11/2021 Yes    Type 2 diabetes mellitus with chronic kidney disease on chronic dialysis ([address] 10/8/2021 Yes    Chronic obstructive pulmonary disease ([address] 10/8/2021 Yes    Gastroesophageal reflux disease without esophagitis 10/8/2021 Yes    ESRD needing dialysis ([facility] 10/8/2021 Yes    Atrial flutter with rapid ventricular response ([facility] 10/10/2021 Yes    Hematochezia 10/10/2021 Yes    Anemia due to chronic kidney disease 10/10/2021 Yes    Itching 10/11/2021 Yes    Fluid overload 10/17/2021 Yes    Pre-op chest exam 10/17/2021 Yes          Plan:        Atrial flutter with rvr with hypotension -rate controlled, continue eliquis 5 mg bid, continue on amiodarone 200 mg twice daily, monitor H&H, hemoglobin stable. Abdominal distention-CT scan ordered by GI, evaluate for paracentesis, plan for dialysis tomorrow a.m., patient might need frequent dialysis 4-5 times a week  ESRD- Nephrology following for dialysis  Calciphylaxis-continue hydroxyzine as needed, sodium thiosulfate with dialysis  Neuropathy- continue neurontin  Hypotension-blood pressure fluctuating, on midodrine, stop lopressor on discharge  Chronic anemia- recent EGD reviewed, colonoscopy could not be done  Type 2 diabetes mellitus- poct blood sugar ac, hs  CPAP for sleep apnea  Stool softeners for constipation  Lifestyle modifications for weight loss  Rest of the medications as ordered    Patient not willing to go to SNF after working with physical therapy. Requesting facility where dialysis is onsite. Delayed discharge given multiple co morbidities and difficulty in decision making.       Alton Dodson MD  10/17/2021  1:58 PM

## 2021-10-17 NOTE — PLAN OF CARE
Nutrition Problem #1: Increased nutrient needs  Intervention: Food and/or Nutrient Delivery: Continue Current Diet  Nutritional Goals: PO intakes to provide >75% of estimated nutritional needs

## 2021-10-17 NOTE — PROGRESS NOTES
GI Progress notes    10/17/2021   12:19 PM    Name:  Reginaldo Martínez  MRN:    [id number]     Acct:     [de-identified]   Room:  [facility] Day: 8     Admit Date: 10/8/2021 12:20 PM  PCP: Prateek Alva MD    Subjective:     C/C:   Chief Complaint   Patient presents with    Hypotension       Interval History: Status: not changed. Patient seen and examined. No acute events overnight. Continues to be hypotensive. C/o abdominal swelling, BLE edema, BLE pain, back pain. Resumed on Eliquis. No overt GI bleeding. Hgb stable. ROS:  Constitutional: negative for chills, fevers and sweats  Gastrointestinal: negative for constipation, diarrhea, nausea and vomiting      Medications: Allergies:    Allergies   Allergen Reactions    Ibuprofen     Tramadol Hives    Motrin [Ibuprofen Micronized] Itching    Strawberry Extract Itching and Rash    Tape [Adhesive Tape] Rash     No paper tape silk only       Current Meds: sodium thiosulfate 25 g in sodium chloride 0.9 % 150 mL IVPB, Once per day on Mon Wed Fri  apixaban (ELIQUIS) tablet 5 mg, BID  albuterol sulfate  (90 Base) MCG/ACT inhaler 2 puff, Q4H PRN  polyethylene glycol (GoLYTELY) solution 2,000 mL, Once  Hydrocortisone-Aloe Vera 1 % CREA, BID  gabapentin (NEURONTIN) capsule 100 mg, TID  sennosides-docusate sodium (SENOKOT-S) 8.6-50 MG tablet 2 tablet, BID  hydrOXYzine (ATARAX) tablet 25 mg, TID PRN  amiodarone (CORDARONE) tablet 200 mg, BID  midodrine (PROAMATINE) tablet 15 mg, 4x Daily  diphenhydrAMINE (BENADRYL) tablet 25 mg, Q6H PRN  Calcium Acetate (Phos Binder) CAPS 1,334 mg, TID WC  Calcium Acetate (Phos Binder) CAPS 667 mg, PRN  pantoprazole (PROTONIX) tablet 40 mg, QAM AC  guaiFENesin (MUCINEX) extended release tablet 600 mg, BID PRN  budesonide-formoterol (SYMBICORT) 160-4.5 MCG/ACT inhaler 2 puff, BID  cinacalcet (SENSIPAR) tablet 90 mg, Once per day on Mon Wed Fri  calcitRIOL (ROCALTROL) capsule 0.25 mcg, Once per day on Mon Wed Fri  atorvastatin (LIPITOR) tablet 40 mg, Nightly  aspirin EC tablet 81 mg, Daily  sodium chloride flush 0.9 % injection 5-40 mL, 2 times per day  sodium chloride flush 0.9 % injection 10 mL, PRN  0.9 % sodium chloride infusion, PRN  ondansetron (ZOFRAN-ODT) disintegrating tablet 4 mg, Q8H PRN   Or  ondansetron (ZOFRAN) injection 4 mg, Q6H PRN  polyethylene glycol (GLYCOLAX) packet 17 g, Daily PRN  acetaminophen (TYLENOL) tablet 650 mg, Q6H PRN   Or  acetaminophen (TYLENOL) suppository 650 mg, Q6H PRN  glucose (GLUTOSE) 40 % oral gel 15 g, PRN  dextrose 50 % IV solution, PRN  glucagon (rDNA) injection 1 mg, PRN  dextrose 5 % solution, PRN        Data:     Code Status:  Full Code    Family History   Problem Relation Age of Onset    Diabetes Other     Cancer Mother         BREAST    Heart Disease Father         CAD-MI    Diabetes Father     High Blood Pressure Father     Diabetes Maternal Grandfather     Diabetes Paternal Grandfather     Heart Disease Paternal Grandfather     High Blood Pressure Paternal Grandfather        Social History     Socioeconomic History    Marital status: Single     Spouse name: Not on file    Number of children: Not on file    Years of education: Not on file    Highest education level: Not on file   Occupational History    Not on file   Tobacco Use    Smoking status: Former Smoker     Years: 7.00     Types: Cigarettes     Quit date: 2017     Years since quittin.6    Smokeless tobacco: Never Used    Tobacco comment: 1-2 cigs daily   Substance and Sexual Activity    Alcohol use: No    Drug use: No    Sexual activity: Not Currently     Partners: Male   Other Topics Concern    Not on file   Social History Narrative    Not on file     Social Determinants of Health     Financial Resource Strain:     Difficulty of Paying Living Expenses:    Food Insecurity:     Worried About Running Out of Food in the Last Year:     Ran Out of Food in the Last Year: Transportation Needs:     Lack of Transportation (Medical):  Lack of Transportation (Non-Medical):    Physical Activity:     Days of Exercise per Week:     Minutes of Exercise per Session:    Stress:     Feeling of Stress :    Social Connections:     Frequency of Communication with Friends and Family:     Frequency of Social Gatherings with Friends and Family:     Attends [employer] Services:     Active Member of Clubs or Organizations:     Attends Club or Organization Meetings:     Marital Status:    Intimate Partner Violence:     Fear of Current or Ex-Partner:     Emotionally Abused:     Physically Abused:     Sexually Abused:        Vitals:  BP (!) 76/44   Pulse 65   Temp 97.5 °F (36.4 °C) (Oral)   Resp 16   Ht 4' 11\" (1.499 m)   Wt 273 lb 5 oz (124 kg)   LMP 2014 (Approximate)   SpO2 97%   BMI 55.20 kg/m²   Temp (24hrs), Av.6 °F (36.4 °C), Min:97.3 °F (36.3 °C), Max:98.6 °F (37 °C)    Recent Labs     10/16/21  1645 10/16/21  2020 10/17/21  0716 10/17/21  1116   POCGLU 137* 195* 120* 112*       I/O (24Hr):     Intake/Output Summary (Last 24 hours) at 10/17/2021 1219  Last data filed at 10/17/2021 1158  Gross per 24 hour   Intake 840 ml   Output --   Net 840 ml       Labs:      CBC:   Lab Results   Component Value Date    WBC 8.3 10/15/2021    RBC 2.91 10/15/2021    RBC 4.24 2012    HGB 8.6 10/17/2021    HCT 31.0 10/17/2021    .0 10/15/2021    MCH 31.3 10/15/2021    MCHC 27.9 10/15/2021    RDW 16.1 10/15/2021     10/15/2021     2012    MPV 11.0 10/15/2021     CBC with Differential:    Lab Results   Component Value Date    WBC 8.3 10/15/2021    RBC 2.91 10/15/2021    RBC 4.24 2012    HGB 8.6 10/17/2021    HCT 31.0 10/17/2021     10/15/2021     2012    .0 10/15/2021    MCH 31.3 10/15/2021    MCHC 27.9 10/15/2021    RDW 16.1 10/15/2021    NRBC 2 2016    LYMPHOPCT 16 10/15/2021    MONOPCT 15 10/15/2021 BASOPCT 1 10/15/2021    MONOSABS 1.25 10/15/2021    LYMPHSABS 1.33 10/15/2021    EOSABS 0.17 10/15/2021    BASOSABS 0.08 10/15/2021    DIFFTYPE NOT REPORTED 10/15/2021     Hemoglobin/Hematocrit:    Lab Results   Component Value Date    HGB 8.6 10/17/2021    HCT 31.0 10/17/2021     CMP:    Lab Results   Component Value Date     10/16/2021    K 4.0 10/16/2021    CL 93 10/16/2021    CO2 25 10/16/2021    BUN 19 10/16/2021    CREATININE 5.37 10/16/2021    GFRAA 10 10/16/2021    LABGLOM 8 10/16/2021    GLUCOSE 112 10/16/2021    GLUCOSE 132 01/16/2012    PROT 7.4 08/21/2021    LABALBU 3.4 10/13/2021    CALCIUM 7.4 10/16/2021    BILITOT 0.46 08/21/2021    ALKPHOS 121 08/21/2021    AST 13 08/21/2021    ALT 10 08/21/2021     BMP:    Lab Results   Component Value Date     10/16/2021    K 4.0 10/16/2021    CL 93 10/16/2021    CO2 25 10/16/2021    BUN 19 10/16/2021    LABALBU 3.4 10/13/2021    CREATININE 5.37 10/16/2021    CALCIUM 7.4 10/16/2021    GFRAA 10 10/16/2021    LABGLOM 8 10/16/2021    GLUCOSE 112 10/16/2021    GLUCOSE 132 01/16/2012     PT/INR:    Lab Results   Component Value Date    PROTIME 16.0 10/09/2021    INR 1.3 10/09/2021     PTT:    Lab Results   Component Value Date    APTT 30.2 10/09/2021   [APTT}    Physical Examination:        General appearance: alert, cooperative and no distress  Mental Status: oriented to person, place and time and normal affect  Abdomen: soft, tender, distended, anasaraca, bowel sounds present     Assessment:        Primary Problem  Hypotension     Active Hospital Problems    Diagnosis Date Noted    Pulmonary hypertension ([facility] [I27.20] 12/10/2015     Priority: Medium    CKD (chronic kidney disease) stage 4, GFR 15-29 ml/min ([facility] [N18.4] 12/08/2015     Priority: Medium    Fluid overload [E87.70] 10/17/2021    Itching [L29.9] 10/11/2021    Anemia due to chronic kidney disease [N18.9, D63.1] 10/10/2021    Hematochezia [K92.1]     Hypotension [I95.9] 10/08/2021    Atrial flutter with rapid ventricular response ([facility] [I48.92] 08/24/2021    ESRD needing dialysis ([facility] [N18.6, Z99.2] 07/01/2019    Chronic obstructive pulmonary disease ([facility] [J44.9] 06/12/2016    Gastroesophageal reflux disease without esophagitis [K21.9] 06/12/2016    Type 2 diabetes mellitus with chronic kidney disease on chronic dialysis ([facility] [E11.22, N18.6, Z99.2]     Essential hypertension [I10] 03/11/2016    YONI on CPAP [G47.33, Z99.89] 03/11/2016    Morbid obesity with BMI of 45.0-49.9, adult ([facility] [E66.01, Z68.42] 12/11/2015     Past Medical History:   Diagnosis Date    Asthma     AS A CHILD    [facility]congest[facility]) [facility]    Chronic kidney disease     COPD (chronic obstructive pulmonary disease) ([facility] 2000    INHALER USE AS NEEDED    Dialysis patient ([facility]     CENTRAL DIALYSIS MON, WEDS AND FRI, FLUID RESTRICTION 2L/24 HRS PER PT    Hemodialysis patient [facility])     had Dilaysis 8-17-16  M[facility]W-F [facility]    Hyperlipidemia     Hypertension 2000    ON RX    Obesity     YONI (obstructive sleep apnea) 2013    SLEEP STUDY -6/2016 AWAITING MACHINE, USING O2 AT NIGHT PRESENTLY    Pneumonia 2000    HAD TO BE ON VENTILATOR 12 DAYS    Renal failure     STARTED DIALYSIS 02/25/2016    Type II or unspecified type diabetes mellitus without mention of complication, not stated as uncontrolled 2000    IDDM    Ventilator dependence ([facility]     X 2 200 AND 2014. 2014 HAD TO GO ON VENT POST OP        Plan:        1. Anemia  1. Patient remains hypotensive. Likely unable to tolerate colonoscopy  2. Eliquis resumed  3. No overt GI bleeding  4. Hemoglobin stable  5. Trend H&H  6. Monitor for bleeding  7. Transfuse for hemoglobin less than 7  2. Abdominal pain, anasarca  1. CT abd/pelvis without contrast  2.  May need paracentesis, will review CT    Explained to the patient and d/W Nursing Staff  Will F/U with you  Please call or Page for any issues or change in status  Thanks    Electronically signed by VENTURA Lara NP on 10/17/2021 at 12:19 PM

## 2021-10-17 NOTE — PLAN OF CARE
Problem: Infection:  Goal: Will remain free from infection  Description: Will remain free from infection  Outcome: Ongoing     Problem: Daily Care:  Goal: Daily care needs are met  Description: Daily care needs are met  Outcome: Ongoing  Note: Patient sat at bathroom sink and washed up. Teeth brushed, hair combed and inter dry applied. Will continue to monitor for needs.      Problem: Pain:  Goal: Patient's pain/discomfort is manageable  Description: Patient's pain/discomfort is manageable  Outcome: Ongoing     Problem: Skin Integrity:  Goal: Skin integrity will stabilize  Description: Skin integrity will stabilize  Outcome: Ongoing     Problem: Discharge Planning:  Goal: Patients continuum of care needs are met  Description: Patients continuum of care needs are met  Outcome: Ongoing

## 2021-10-17 NOTE — PLAN OF CARE
Problem: Pain:  Goal: Patient's pain/discomfort is manageable  Description: Patient's pain/discomfort is manageable  10/16/2021 1807 by Fei Howe RN  Pt with c/o of chronic back pain, able to rate pain appropriately using pain scale. Denies any need for pain meds- states her gabapentin helps with all her pain . Continue to assess pain with rounding      Problem: Falls - Risk of:  Goal: Will remain free from falls  Description: Will remain free from falls  10/17/2021 0511 by China Hatfield RN  Outcome: OngoingFall risk assessment done, bed locked in low position, call light in reach at all times and encouraged to use for assist. Pathway to bathroom clutter-free and non skid socks on. Pt using walker to aide with ambulation Continue with hourly rounding, monitor for change. Pt remains free of falls/injury .  Moves well/ steady gait noted

## 2021-10-17 NOTE — PROGRESS NOTES
[facility] FOLLOW UP NOTE  Víctor Jordan MD        2020 8:50 AM     Patient: Thierry Moore   MRN: [id number]   : 1937     Vitals:  Blood pressure 127/57, pulse 85, temperature 96.6 °F (35.9 °C), temperature source Temporal, resp. rate 13, height 4' 11\" (1.499 m), weight 50 kg (110 lb 3.7 oz), SpO2 100 %.    Referring Provider:  Patient Care Team:  Dhaval Pearson MD as PCP - General    Allergies:  ALLERGIES:  No Known Allergies    RECENT HISTORY  Patient transferred to [facility] with evidence of respiratory and hemodynamic compromise    Found to have tension right pneumothorax subsequent also found to have staph aureus sepsis    Right chest tube placed    Currently remains on full vent support no significant pressor requirements    SYMPTOMS  Review of Systems   Unable to perform ROS: Acuity of condition       PHYSICAL EXAM  Patient remains mildly sedated with very low-dose fentanyl on full vent support    Spontaneous breathing trial this morning associated with apnea was discontinued    Opens eyes does appear to track somewhat    Left mandible postoperative change with minimal amount of drainage in that area    Chest tube in place on the right side with no air leak and no significant drainage dressing changed    Hemodynamic stable    MEDICATIONS  Current Facility-Administered Medications   Medication Dose Route Frequency Provider Last Rate Last Dose   • cefepime (MAXIPIME) 1,000 mg in sodium chloride 0.9 % 100 mL IVPB  1,000 mg Intravenous Daily Anna Marx  mL/hr at 20 0823 1,000 mg at 20 0823   • potassium CHLORIDE (KLOR-CON) packet 40 mEq  40 mEq Per NG tube Once Anna Marx MD       • potassium phosphate 20 mmol in dextrose 5 % 250 mL IVPB  20 mmol Intravenous Once Delmy Lala DO       • metroNIDAZOLE (FLAGYL) IVPB 500 mg  500 mg Intravenous 3 times per day Anna Marx  mL/hr at 20 0601 500 mg at 20 0601   • sodium chloride 0.9 % flush  Comprehensive Nutrition Assessment    Type and Reason for Visit:  Initial, RD Nutrition Re-Screen/LOS    Nutrition Recommendations/Plan:   1. Continue current diet and add Ensure Clear/ Magic Cup as patient wants (refused ONS at this time)  2. Monitor po intakes, GI status, and labs    Nutrition Assessment:  Patient admission related to Atrial flutter with rvr with hypotension. Hx: ESRD receiving dialysis. Dialysis could not be completed 10/15 due to hypotension. Per EHR- [facility] had recent  EGD showing duodenal polyps, and was sent to colonoscopy but pt did not f/u as OP- she is likely not able to tolerate colonoscopy at this time. Last BM 10/15- hemorrhoids. Patient reports weight gain and abdominal distension- reports volume overload. She reports first time consuming % of meals- does not want ONS at this time. Will continue current diet and monitor need for ONS. Will monitor po intakes and labs. Malnutrition Assessment:  Malnutrition Status:  No malnutrition    Context:  Acute Illness     Findings of the 6 clinical characteristics of malnutrition:  Energy Intake:  1 - 75% or less of estimated energy requirements for 7 or more days  Weight Loss:  Unable to assess     Body Fat Loss:  Unable to assess     Muscle Mass Loss:  Unable to assess    Fluid Accumulation:  7 - Moderate to Severe Extremities   Strength:  Not Performed    Estimated Daily Nutrient Needs:  Energy (kcal):  1760 kcal (MSJ 1.0); Weight Used for Energy Requirements:        Protein (g):  86-95 gm (2.0-2.2 g/kg); Weight Used for Protein Requirements:  Current        Fluid (ml/day):  1800 mL fluid restriction; Method Used for Fluid Requirements:  Other (Comment)      Nutrition Related Findings:  Active bowel sounds. Edema: BLE +3 pitting. Dialysis. Wounds:  None       Current Nutrition Therapies:    ADULT DIET; Regular; 4 carb choices (60 gm/meal); Low Sodium (2 gm); Low Potassium (Less than 3000 mg/day);  Low Phosphorus (Less than 1000 mg); 1800 ml    Anthropometric Measures:  · Height: 4' 11\" (149.9 cm)  · Current Body Weight: 273 lb 5 oz (124 kg)   · Admission Body Weight: 255 lb (115.7 kg) (stated)    · Ideal Body Weight: 95 lbs; % Ideal Body Weight 287.7 %   · BMI: 55.2  · Adjusted Body Weight:  ; No Adjustment   · BMI Categories: Obese Class 3 (BMI 40.0 or greater)       Nutrition Diagnosis:   · Increased nutrient needs related to increase demand for energy/nutrients as evidenced by dialysis    · Inadequate protein-energy intake related to inadequate protein-energy intake as evidenced by intake 51-75%, nausea, vomiting      Nutrition Interventions:   Food and/or Nutrient Delivery:  Continue Current Diet  Nutrition Education/Counseling:  Education not indicated   Coordination of Nutrition Care:  Continue to monitor while inpatient    Goals:  PO intakes to provide >75% of estimated nutritional needs       Nutrition Monitoring and Evaluation:   Behavioral-Environmental Outcomes:  None Identified   Food/Nutrient Intake Outcomes:  Food and Nutrient Intake  Physical Signs/Symptoms Outcomes:  Biochemical Data, Constipation, GI Status, Nausea or Vomiting, Fluid Status or Edema, Weight     Discharge Planning:    Continue current diet     Serafina Brittle, 66 [address]  Office Number: [phone] bag 25 mL  25 mL Intravenous PRN Paradise Meléndez, DO       • sodium chloride (PF) 0.9 % injection 2 mL  2 mL Intracatheter 2 times per day Paradise Meléndez, DO   2 mL at 06/29/20 0835   • sodium chloride 0.9% infusion   Intravenous Continuous PRN Paradise Meléndez, DO       • sodium chloride 0.9% infusion   Intravenous Continuous PRN [facility] Meléndez, DO       • chlorhexidine gluconate (PERIDEX) 0.12 % solution 15 mL  15 mL Swish & Spit 2 times per day Paradise Meléndez, DO   15 mL at 06/29/20 0826    And   • chlorhexidine gluconate (PERIDEX) 0.12 % solution 15 mL  15 mL Swish & Spit PRN [facility] Meléndez, DO       • petrolatum (white)-mineral oil ophthalmic ointment 1 application  1 application Both Eyes 6 times per day [facility] Meléndez, DO   1 application at 06/29/20 0849   • heparin (porcine) injection 5,000 Units  5,000 Units Subcutaneous 3 times per day [facility], DO   5,000 Units at 06/29/20 0600   • fentaNYL (SUBLIMAZE) 2,500 mcg/250 mL in sodium chloride 0.9 % infusion  0-150 mcg/hr Intravenous Continuous PRN [facility] Meléndez, DO 5 mL/hr at 06/27/20 1959 50 mcg/hr at 06/27/20 1959    And   • fentaNYL bolus from bag  mcg   mcg Intravenous Q15 Min PRN [facility] Meléndez, DO       • NORepinephrine (LEVOPHED) 8 mg/250 mL in sodium chloride 0.9 % infusion  0-30 mcg/min Intravenous Continuous [facility] Meléndez, DO       • VANCOMYCIN - PHARMACIST MONITORED   Does not apply See Admin Instructions [facility], DO       • calcium carbonate-vitamin D (CALTRATE+D) 600-400 MG-UNIT tablet 1 tablet  1 tablet Oral Daily with breakfast [facility] Meléndez, DO   1 tablet at 06/29/20 0826   • donepezil (ARICEPT) tablet 5 mg  5 mg Per NG tube Nightly [facility], DO   5 mg at 06/28/20 2148   • folic acid (FOLATE) tablet 1 mg  1 mg Per NG tube Daily [facility], DO   1 mg at 06/29/20 0826   • memantine (NAMENDA) tablet 5 mg  5 mg Per NG tube Daily Paradise Meléndez DO   5 mg at 06/29/20 0826   • aspirin chewable 81 mg  81 mg Per NG tube Daily Paradise Meléndez DO   81  mg at 06/29/20 0826   • pantoprazole (PROTONIX) 40 MG/20ML (compounded) suspension 40 mg  40 mg Per NG tube Daily Paradisetoni Meléndez    40 mg at 06/29/20 0826   • vancomycin (VANCOCIN) 1,000 mg in sodium chloride 0.9 % 250 mL IVPB  1,000 mg Intravenous Every Other Day Paradise DcaiDO       • atorvastatin (LIPITOR) tablet 20 mg  20 mg Per NG tube Nightly Paradisetoni Meléndez    20 mg at 06/28/20 2148       INTAKE/OUTPUT  Date 06/28/20 0700 - 06/29/20 0659 06/29/20 0700 - 06/30/20 0659   Shift 5386-6406 7146-0517 5949-5109 24 Hour Total 7619-6683 7959-6237 2212-3835 24 Hour Total   INTAKE   I.V. 780.2 127.9 40 948.1       NG/        IV Piggyback 276.3 55.8 177.6 509.7       Shift Total 1176.5 883.7 892.6 2952.8       OUTPUT   Urine 130 222 190 542       Shift Total 130 222 190 542       Weight (kg) 50 50 50 50 50 50 50 50         LABS  Recent Labs   Lab 06/29/20  0500 06/28/20  2134 06/28/20  1000 06/28/20  0240  06/27/20  1815 06/27/20  1353 06/27/20  1209  06/27/20  1053   WBC 6.5  --   --  8.9  --   --   --   --   --  9.2   HCT 26.8*  --   --  28.9*  --   --   --   --   --  41.1   HGB 8.3*  --   --  9.2*  --   --   --   --   --  12.7   PLT 88*  --   --  111*  --   --   --   --   --  169   INR  --   --   --  1.1  --   --   --   --   --  1.0   SODIUM 149* 152* 155* 154*   < > 156*  --  155*   < >  --    POTASSIUM 3.3* 3.5 3.9 3.7   < > 4.0  --  4.0   < >  --    CHLORIDE 120* 125* 126* 127*   < > 130*  --  126*   < >  --    CO2 24 23 25 23   < > 19*  --  19*   < >  --    GLUCOSE 150* 131* 116* 162*   < > 173*  --  207*   < >  --    BUN 53* 58* 63* 65*   < > 66*  --  63*   < >  --    CREATININE 1.23* 1.36* 1.52* 1.68*   < > 1.74*  --  1.69*   < >  --    CALCIUM 7.4* 7.6* 7.5* 7.9*   < > 8.5  --  9.1   < >  --    ALBUMIN 1.9*  --   --  2.2*  --   --   --  3.1*  --   --    MG 1.8  --   --  1.9  --   --   --   --   --   --    BILIRUBIN 0.9  --   --  1.0  --   --   --  1.3*  --   --    ALKPT 63  --   --  52  --    --   --  68  --   --    LACTA  --   --   --   --   --  1.7 5.3* 4.1*  --   --    AST 20  --   --  16  --   --   --  20  --   --    GPT 14  --   --  15  --   --   --  18  --   --    PHOS 1.6*  --   --  2.4  --   --   --   --   --   --     < > = values in this interval not displayed.        IMAGING  Us Kidney Bilateral    Result Date: 6/28/2020  EXAM: US KIDNEY BILATERAL CLINICAL INDICATION: Acute on Chronic ? kidney disease COMPARISON: CT chest FINDINGS: This is a limited portable study.  The right kidney is about 8 cm in length.  There is no hydronephrosis.  The left kidney is about 7 cm in length and there is no hydronephrosis.   No definite renal stone or mass is seen.     Bilateral renal atrophy suggesting chronic kidney disease.  There is no hydronephrosis. Electronically Signed by: RASHEEDA ALVARES M.D. Signed on: 6/28/2020 5:08 PM     Xr Chest Pa Or Ap 1 View    Result Date: 6/29/2020  EXAM: XR CHEST PA OR AP 1 VIEW CLINICAL INDICATION: Dyspnea COMPARISON: 06/28/2020 FINDINGS: Endotracheal tube and nasogastric tube are in satisfactory positions.  Opacities in left lower lobe are stable.  There is minimal atelectasis in the right lung base.  This is stable.  No pleural effusion is seen.     Stable left lower lobe opacities Electronically Signed by: RASHEEDA ALVARES M.D. Signed on: 6/29/2020 7:35 AM        Assessment / Plan    Patient is an 82-year-old presented with staph aureus sepsis and tension pneumothorax    Chest tube in place chest x-ray today shows good expansion of the right lung    No air leak    Plan: We will keep chest tube on -20 cm of suction for now when patient able to be weaned from the vent will likely perform clamp trial and remove chest tube.  As noted previously there is no extensive bullous disease on chest CT    Víctor Jordan MD

## 2021-10-17 NOTE — PROGRESS NOTES
Nephrology Progress Note        Subjective: Patient was seen and examined. She was dialyzed yesterday and 1 kg was removed due to low blood pressure. Patient has significant right heart failure and may require frequent dialysis to remove fluid. I explained to her in length that she will be benefited with dialysis 4-5 times a week. Patient is complaining of abdominal distention and being volume overload. Objective:  CURRENT TEMPERATURE:  Temp: 97.5 °F (36.4 °C)  MAXIMUM TEMPERATURE OVER 24HRS:  Temp (24hrs), Av.6 °F (36.4 °C), Min:97.3 °F (36.3 °C), Max:98.6 °F (37 °C)    CURRENT RESPIRATORY RATE:  Resp: 16  CURRENT PULSE:  Pulse: 65  CURRENT BLOOD PRESSURE:  BP: (!) 76/44  24HR BLOOD PRESSURE RANGE:  Systolic (13POX), OXX:53 , Min:76 , SMT:201   ; Diastolic (94UDA), LJA:25, Min:22, Max:90    24HR INTAKE/OUTPUT:      Intake/Output Summary (Last 24 hours) at 10/17/2021 1249  Last data filed at 10/17/2021 1158  Gross per 24 hour   Intake 840 ml   Output --   Net 840 ml     Patient Vitals for the past 96 hrs (Last 3 readings):   Weight   10/17/21 0543 273 lb 5 oz (124 kg)   10/16/21 1907 257 lb 11.5 oz (116.9 kg)   10/16/21 1517 259 lb 14.8 oz (117.9 kg)         Physical Exam:  General appearance: Alert and awake x3 sitting comfortably skin: Warm to touch  Eyes: Conjunctiva was pink  ENT: No thrush  Neck: Hard to assess JVD  pulmonary: Bilateral air entry and clear  Cardiovascular: S1 and S2 audible  Abdomen: soft nontender, bowel sounds present, no organomegaly,  no ascites.   Patient has 2+ to 3+ abdominal wall edema   Extremities: 2+ edema  Access:  previous  Left AV fistula    Current Medications:    sodium thiosulfate 25 g in sodium chloride 0.9 % 150 mL IVPB, Once per day on   apixaban (ELIQUIS) tablet 5 mg, BID  albuterol sulfate  (90 Base) MCG/ACT inhaler 2 puff, Q4H PRN  polyethylene glycol (GoLYTELY) solution 2,000 mL, Once  Hydrocortisone-Aloe Vera 1 % CREA, BID  gabapentin (NEURONTIN) capsule 100 mg, TID  sennosides-docusate sodium (SENOKOT-S) 8.6-50 MG tablet 2 tablet, BID  hydrOXYzine (ATARAX) tablet 25 mg, TID PRN  amiodarone (CORDARONE) tablet 200 mg, BID  midodrine (PROAMATINE) tablet 15 mg, 4x Daily  diphenhydrAMINE (BENADRYL) tablet 25 mg, Q6H PRN  Calcium Acetate (Phos Binder) CAPS 1,334 mg, TID WC  Calcium Acetate (Phos Binder) CAPS 667 mg, PRN  pantoprazole (PROTONIX) tablet 40 mg, QAM AC  guaiFENesin (MUCINEX) extended release tablet 600 mg, BID PRN  budesonide-formoterol (SYMBICORT) 160-4.5 MCG/ACT inhaler 2 puff, BID  cinacalcet (SENSIPAR) tablet 90 mg, Once per day on Mon Wed Fri  calcitRIOL (ROCALTROL) capsule 0.25 mcg, Once per day on Mon Wed Fri  atorvastatin (LIPITOR) tablet 40 mg, Nightly  aspirin EC tablet 81 mg, Daily  sodium chloride flush 0.9 % injection 5-40 mL, 2 times per day  sodium chloride flush 0.9 % injection 10 mL, PRN  0.9 % sodium chloride infusion, PRN  ondansetron (ZOFRAN-ODT) disintegrating tablet 4 mg, Q8H PRN   Or  ondansetron (ZOFRAN) injection 4 mg, Q6H PRN  polyethylene glycol (GLYCOLAX) packet 17 g, Daily PRN  acetaminophen (TYLENOL) tablet 650 mg, Q6H PRN   Or  acetaminophen (TYLENOL) suppository 650 mg, Q6H PRN  glucose (GLUTOSE) 40 % oral gel 15 g, PRN  dextrose 50 % IV solution, PRN  glucagon (rDNA) injection 1 mg, PRN  dextrose 5 % solution, PRN      Labs:   CBC:   Recent Labs     10/15/21  0546 10/16/21  0809 10/16/21  2101 10/17/21  0502 10/17/21  1116   WBC 8.3  --   --   --   --    RBC 2.91*  --   --   --   --    HGB 9.1*   < > 8.7* 8.5* 8.6*   HCT 32.6*   < > 31.0* 30.6* 31.0*   *  --   --   --   --    MPV 11.0  --   --   --   --     < > = values in this interval not displayed. BMP:   Recent Labs     10/16/21  0809   *   K 4.0   CL 93*   CO2 25   BUN 19   CREATININE 5.37*   GLUCOSE 112*   CALCIUM 7.4*        Radiology:  Reviewed as available.     Assessment:  1 ESRD:dialysis: Further complicated by volume overload and hypotension. We will dialyze her again tomorrow and remove some fluid. Patient will be benefited from more aggressive dialysis for five times a week. 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 4 EDEMA : Has a component of chronic  lower extremity edema  5.paroxysmal atrial fibrillation on amiodarone, heart rate controlled in the 80s:   6.suspected GI bleed , enterology on is on consult    Plan:  1. Dialysis in a.m.  2.  Use sodium thiosulfate for calciphylaxis  3. Consider discharge after dialysis  4. Patient was advised to talk to her dialysis unit regarding more frequent dialysis, for a goal times per week. Please do not hesitate to call with questions.     Electronically signed by Shukri Graves MD on 10/17/2021 at 12:49 PM

## 2021-10-18 NOTE — PROGRESS NOTES
Occupational Therapy  Facility/Department: [facility] PROGRESSIVE CARE  Daily Treatment Note  NAME: Henry Carey  : 1970  MRN: [id number]    Date of Service: 10/18/2021     RN Nona Maldonado reports patient is medically stable for therapy treatment this date. Chart reviewed prior to treatment and patient is agreeable for therapy. All lines intact and patient positioned comfortably at end of treatment. All patient needs addressed prior to ending therapy session. Pt currently functioning below baseline. Would suggest additional therapy at time of discharge to maximize long term outcomes and prevent re-admission. Please refer to AM-PAC score for current level of function. Discharge Recommendations:  Patient would benefit from continued therapy after discharge  OT Equipment Recommendations  Walker: Rolling  ADL Assistive Devices: Toileting - 3-in-1 Commode;Reacher;Sock-Aid Soft;Long-handled Shoe Horn;Long-handled Sponge;Emergency Alert System    Assessment   Performance deficits / Impairments: Decreased functional mobility ; Decreased safe awareness;Decreased balance;Decreased coordination;Decreased ADL status; Decreased cognition;Decreased vision/visual deficit; Decreased posture;Decreased endurance;Decreased high-level IADLs;Decreased strength;Decreased sensation;Decreased fine motor control  Assessment: Pt progressing but is still limited by decreased safety awareness, low BP, and weakness. Pt still requiring Mod x2 for some transfers and during ambulation. Skilled OT is recommended to increase overall IND and safety with ADL and functional tasks to return home with assist as needed.   Prognosis: Fair  OT Education: OT Role;Plan of Care;Energy Conservation;Transfer Training  Patient Education: safety in function, call light use/fall prevention, pursed lip breathing, edema mgt tech, proper bed mob tech, recommendations for continued therapy, benefits of being up OOB as able, RW safety/mgmt  REQUIRES OT FOLLOW UP: Yes  Activity Tolerance  Activity Tolerance: Patient limited by fatigue;Patient limited by pain  Activity Tolerance: poor plus  Safety Devices  Safety Devices in place: Yes  Type of devices: Call light within reach; Chair alarm in place; Left in chair;Patient at risk for falls;Gait belt;Nurse notified         Patient Diagnosis(es): The primary encounter diagnosis was Pre-op chest exam. A diagnosis of Hypotension, unspecified hypotension type was also pertinent to this visit. has a past medical history of Asthma, CHF (congestive heart failure) ([facility] Chronic kidney disease, COPD (chronic obstructive pulmonary disease) ([facility] Dialysis patient ([facility] Hemodialysis patient ([facility] Hyperlipidemia, Hypertension, Obesity, YONI (obstructive sleep apnea), Pneumonia, Renal failure, Type II or unspecified type diabetes mellitus without mention of complication, not stated as uncontrolled, and [facility] dependence ([facility] has a past surgical history that includes Hysterectomy (2014);  section ([address]); skin full graft (); Upper gastrointestinal endoscopy (2021); and Upper gastrointestinal endoscopy (N/A, 2021).     Restrictions  Restrictions/Precautions  Restrictions/Precautions: Up as Tolerated, General Precautions, Fall Risk, Modified Diet  Required Braces or Orthoses?: No  Position Activity Restriction  Other position/activity restrictions: L UE fistula, HD(M, W, F), 1800 ml fluid restriction diet, up with assist, 4L O2, alarms, low BP per chart and RN states she is asymptomatic/readings not accuarate  Subjective   General  Chart Reviewed: Yes  Patient assessed for rehabilitation services?: Yes  Response to previous treatment: Patient with no complaints from previous session  Family / Caregiver Present: No  Subjective  Subjective: Pt c/o of R hip pain reporting it is from her stomach bearing down on it  Vital Signs  Patient Currently in Pain: Yes   Orientation  Orientation  Overall Orientation Status: Impaired  Orientation Level: Oriented to place; Disoriented to time;Oriented to situation;Oriented to person  Objective    ADL  Grooming: Setup;Contact guard assistance (Pt washed face and brushed teeth standing at sink in bathroom w/RW. Pt leaned AYSE elbows on sink d/t decreased balance/fatigue.)  Toileting: Moderate assistance (Pt Mod A x2 for stand to sit on bathroom toilet. Able to complete hygiene on own and performed sit to stand announced w/o assistance.)  Additional Comments: Pt required Mod vc's on RW use/mgmt, pursed lip breathing, squaring self up, slowing down when walking, pushing up from seated surfaces/reaching back, and importance of assistance from writer when performing transfers d/t pt's decreased balance/weakness. After finishing at sink pt reported feeling dizzy and was instructed by writer to sit down and rest. Pt BP taken and was 74/61 MAP (67). After finishing toileting pt retired to chair and nurse was notified/in room at end of session. Balance  Sitting Balance: Stand by assistance  Standing Balance: Contact guard assistance to mod assist of 2 with RW   Standing Balance  Time: ~ stand phani 2-3  Activity: Ambulating to and from the bathroom and standing during facial grooming/oral care  Functional Mobility  Functional - Mobility Device: Rolling Walker  Assist Level: Moderate assistance x2  Functional Mobility Comments: Pt required Mod vc's on RW use/mgmt, pursed lip breathing, slowing down when walking, pushing up from seated surfaces/reaching back, and importance of assistance from writer when performing transfers d/t pt's decreased balance/weakness. Mod x2 needed during ambulation d/t pt's increased speed and decreased balance/safety. Toilet Transfers  Equipment Used: Standard toilet  Toilet Transfer: 2 Person assistance; Moderate assistance  Toilet Transfers Comments: Mod A x2 needed for safety d/t pt's decreased safety/balance.  Min vc's required for squaring self up to toilet and reaching back for grabbars. Bed mobility  Supine to Sit: Unable to assess  Sit to Supine: Unable to assess  Transfers  Sit to stand: 2 Person assistance; Moderate assistance  Stand to sit: Moderate assistance;2 Person assistance  Transfer Comments: Pt required Mod vc's on RW use/mgmt, pursed lip breathing, slowing down when walking, pushing up from seated surfaces/reaching back, and importance of assistance from writer when performing transfers d/t pt's decreased balance/weakness. Cognition  Overall Cognitive Status: Exceptions  Arousal/Alertness: Appropriate responses to stimuli  Following Commands:  Follows multistep commands with repitition  Attention Span: Attends with cues to redirect  Memory: Decreased short term memory  Safety Judgement: Decreased awareness of need for assistance;Decreased awareness of need for safety  Problem Solving: Assistance required to generate solutions;Assistance required to identify errors made;Assistance required to implement solutions;Assistance required to correct errors made;Decreased awareness of errors  Insights: Decreased awareness of deficits  Initiation: Does not require cues  Sequencing: Requires cues for some                                         Plan   Plan  Times per week: 4-5x/week 1x/day as phani  Current Treatment Recommendations: Strengthening, Endurance Training, Neuromuscular Re-education, Self-Care / ADL, Equipment Evaluation, Education, & procurement, Pain Management, Home Management Training, Balance Training, Functional Mobility Training, Safety Education & Training, Positioning, Cognitive/Perceptual Training, Cognitive Reorientation                                                  AM-PAC Score        AM-[facility] Inpatient Daily Activity Raw Score: 15 (10/18/21 1148)  AM-PAC Inpatient ADL T-Scale Score : 34.69 (10/18/21 1148)  ADL Inpatient CMS 0-100% Score: 56.46 (10/18/21 1148)  ADL Inpatient CMS G-Code Modifier : CK (10/18/21 1148)    Goals  Short term goals  Time Frame for Short term goals: by discharge, pt to demo  Short term goal 1: bed mob tasks with use of rail as needed to min assist of 1. Short term goal 2: increase BUE strength by a 1/2 grade to assist with ADL's and be I with BUE HEP with use of handouts. Short term goal 3: UB ADL to set up and LB ADL to mod assist of 1 and use of AD/AE. Short term goal 4: toileting tasks with use of grab bar/AD or BSC to mod assist of 1. Short term goal 5: ADL transfers and functional mob with AD to min-mod assist of 1. Long term goals  Long term goal 1: Pt to stand with CG and AD phani > 5 mins as able to reduce falls in function. Long term goal 2: Pt/family to be I with EC/WS and fall prevention tech, proper positioning and edema mgt, DME/AE and AD recommendations with use of handouts. Patient Goals   Patient goals : Pt states she wants to have some of this fluid off so she feels better! Therapy Time   Individual Concurrent Group Co-treatment   Time In 0946         Time Out 1029         Minutes 43                 Upon writer exit, call light within reach, pt retired to chair. All lines intact and patient positioned comfortably. Chair alarm in place. All patient needs addressed prior to ending therapy session. Chart reviewed prior to treatment and patient is agreeable for therapy. RN reports patient is medically stable for therapy treatment this date.     Madalynn Hodgkin II, GEORGE

## 2021-10-18 NOTE — PROGRESS NOTES
results for input(s): INR in the last 72 hours. DIAGNOSTIC DATA  EKG Sinus rhythm with occasional Premature ventricular complexes  Low voltage QRS  Septal infarct (cited on or before 20-AUG-2021)  Possible Lateral infarct (cited on or before 20-AUG-2021)    ECHO PENDING    ECHO 2D ECHO( 8/20/2021)  Mild left ventricular hypertrophy  Global left ventricular systolic function is normal  Estimated ejection fraction is 65 % . Right atrium is severely dilated . Right ventricular with severe dilatation with severely reduced systolic  function. Severe tricuspid regurgitation. Moderate to severe pulmonary hypertension. No pericardial effusion seen. Normal aortic root dimension. CT ABDOMEN PELVIS WO CONTRAST  Pericardial effusion, only partially imaged.  This measures up to 1.8 cm   along the right-sided heart.  Consider echocardiogram for further evaluation,   especially if concern for decreased cardiac function as a result.       Only small amount of ascites, mostly in the right upper quadrant.       Severe diffuse body wall edema, increased from August 2021       Small bilateral pleural effusions. Objective:   Vitals: BP (!) 84/57   Pulse 67   Temp 97.7 °F (36.5 °C) (Oral)   Resp 16   Ht 4' 11\" (1.499 m)   Wt 274 lb 8 oz (124.5 kg)   LMP 11/12/2014 (Approximate)   SpO2 95%   BMI 55.44 kg/m²   General appearance: alert and cooperative with exam  HEENT: Head: Normocephalic, no lesions, without obvious abnormality. Neck: no adenopathy, no carotid bruit, no JVD, supple, symmetrical, trachea midline and thyroid not enlarged, symmetric, no tenderness/mass/nodules  Lungs: clear to auscultation bilaterally  Heart: regular rate and rhythm, S1, S2 normal, no murmur, click, rub or gallop  SR on tele HR 75  Abdomen: abdominal distension.    Extremities: extremities normal, atraumatic, no cyanosis or edema  Neurologic: Mental status: Alert, oriented, thought content appropriate      Assessment / Acute Cardiac Problems:   1. PAF now back in NSR on Amiodarone PO, Lovenox renal dosing for anticoagulation. 2. Preserved LV systolic function. 3. Hypotension during dialysis, on Midodrine   4. History of HTN  5. DM  6. HLP  7. YONI  8. ESRD on HD  9. Anemia and GI bleed. 10. Obesity    Patient Active Problem List:     Asthma     YONI (obstructive sleep apnea)     Left knee pain     Hidradenitis     Current smoker     Microalbuminuria     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 kidney disease) stage 4, GFR 15-29 ml/min (HCC)     Acute diastolic congestive heart failure (HCC)     Hyperkalemia     Acute systolic congestive heart failure (HCC)     Pulmonary hypertension (HCC)     Morbid obesity with BMI of 45.0-49.9, adult ([address]     Acute on chronic respiratory failure with hypoxia ([facility])     COPD exacerbation (HCC)     Asthma exacerbation     Type 2 diabetes mellitus with diabetic nephropathy (HCC)     ESRD (end stage renal disease) on dialysis ([address]     Bronchitis     Essential hypertension     YONI on CPAP     Hyperglycemia, drug-induced     Needs smoking cessation education     Hyperglycemia     Drowsiness     Acute on chronic respiratory failure with hypercapnia ([address]     Mobility impaired     S/P cardiac cath-mINIMAL caD 3/15/16 - dR. 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)     Type 2 diabetes mellitus without complication (HCC)     Congestive heart failure (HCC)     Asthma with COPD with exacerbation (HCC)     Acute exacerbation of CHF (congestive heart failure) (HCC)     Chronic obstructive pulmonary disease (HCC)     Chronic kidney disease, stage V (HCC)     Acute respiratory acidosis     Precordial pain     Gastroesophageal reflux disease without esophagitis     Pulmonary HTN ([address]     Morbid obesity due to excess calories (HCC)     Symptomatic anemia     Elevated serum creatinine     ESRD needing dialysis ([address]     Chest pain at rest     Absolute anemia     Atrial flutter with rapid ventricular response (HCC)     Chest pain     Hypoglycemia     Hypotension     Atrial fibrillation with RVR ([address]     Hematochezia     Anemia due to chronic kidney disease     Itching      Plan of Treatment:   1. PAF-remains SR 81 currently. Continue amiodarone 200 mg po bid and Eliquis. 2. Hypotensive. Continue Midodrine at 15mg 4x/daily. She has chronic hypotension despite this. 3. Pericardial effusion on CT 10/17/2021. Awaiting ECHO  4. Keep K > 4.0 and Mag > 2.0  5. Anemia followed by GI and Primary team.   6. ESRD on HD  followed by Nephrology.    7. Abdominal distension per Primary Team.  8. Rest of care managed per Primary Team.     Electronically signed by VENTURA Patel NP on 10/18/2021 at 9:28 Ros[facility] Carter [facility] Cardiology  [phone]

## 2021-10-18 NOTE — CARE COORDINATION
Social work: [facility] approved patient for admission. Medical clearance pending for dialysis spot at [facility]. UPDATE: Patient medically approved for dialysis at [facility], needs covid test to admission. Gabe Chavez Spoke with Ruma/KAREN, states nephrology informed patient she is not candidate for dialysis anymore, family requesting second opinion.

## 2021-10-18 NOTE — PROGRESS NOTES
Dr. Pablo Turner is at the bedside. HE is discussing with the pt her treatment options. The pt is attempting to reach out to her mother to have Dr. Pablo Turner speak with her.

## 2021-10-18 NOTE — PROGRESS NOTES
GI Progress notes    10/18/2021   12:45 PM    Name:  Denver Baker  MRN:    [id number]     Acct:     [de-identified]   Room:  [address] [place] [address] Day: 9     Admit Date: 10/8/2021 12:20 PM  PCP: Joyce Powell MD    Subjective:     C/C:   Chief Complaint   Patient presents with    Hypotension       Interval History: Status: not changed. Patient seen and examined. No acute events overnight. Tolerating diet well  No nausea, vomiting  Continues to have severe anasarca. CT abdomen did not reveal any significant ascites. C/o abdominal heaviness, BLE pain, back pain. Plans for dialysis today. No overt GI bleeding  Hgb stable. ROS:  Constitutional: negative for chills, fevers and sweats  Gastrointestinal: negative for constipation, diarrhea, nausea and vomiting      Medications: Allergies:    Allergies   Allergen Reactions    Ibuprofen     Tramadol Hives    Motrin [Ibuprofen Micronized] Itching    Strawberry Extract Itching and Rash    Tape [Adhesive Tape] Rash     No paper tape silk only       Current Meds: sodium thiosulfate 25 g in sodium chloride 0.9 % 150 mL IVPB, Once per day on Mon Wed Fri  apixaban (ELIQUIS) tablet 5 mg, BID  albuterol sulfate  (90 Base) MCG/ACT inhaler 2 puff, Q4H PRN  polyethylene glycol (GoLYTELY) solution 2,000 mL, Once  Hydrocortisone-Aloe Vera 1 % CREA, BID  gabapentin (NEURONTIN) capsule 100 mg, TID  sennosides-docusate sodium (SENOKOT-S) 8.6-50 MG tablet 2 tablet, BID  hydrOXYzine (ATARAX) tablet 25 mg, TID PRN  amiodarone (CORDARONE) tablet 200 mg, BID  midodrine (PROAMATINE) tablet 15 mg, 4x Daily  diphenhydrAMINE (BENADRYL) tablet 25 mg, Q6H PRN  Calcium Acetate (Phos Binder) CAPS 1,334 mg, TID WC  Calcium Acetate (Phos Binder) CAPS 667 mg, PRN  pantoprazole (PROTONIX) tablet 40 mg, QAM AC  guaiFENesin (MUCINEX) extended release tablet 600 mg, BID PRN  budesonide-formoterol (SYMBICORT) 160-4.5 MCG/ACT inhaler 2 puff, BID  cinacalcet (SENSIPAR) tablet 90 mg, Once per day on   calcitRIOL (ROCALTROL) capsule 0.25 mcg, Once per day on   atorvastatin (LIPITOR) tablet 40 mg, Nightly  aspirin EC tablet 81 mg, Daily  sodium chloride flush 0.9 % injection 5-40 mL, 2 times per day  sodium chloride flush 0.9 % injection 10 mL, PRN  0.9 % sodium chloride infusion, PRN  ondansetron (ZOFRAN-ODT) disintegrating tablet 4 mg, Q8H PRN   Or  ondansetron (ZOFRAN) injection 4 mg, Q6H PRN  polyethylene glycol (GLYCOLAX) packet 17 g, Daily PRN  acetaminophen (TYLENOL) tablet 650 mg, Q6H PRN   Or  acetaminophen (TYLENOL) suppository 650 mg, Q6H PRN  glucose (GLUTOSE) 40 % oral gel 15 g, PRN  dextrose 50 % IV solution, PRN  glucagon (rDNA) injection 1 mg, PRN  dextrose 5 % solution, PRN        Data:     Code Status:  Full Code    Family History   Problem Relation Age of Onset    Diabetes Other     Cancer Mother         BREAST    Heart Disease Father         CAD-MI    Diabetes Father     High Blood Pressure Father     Diabetes Maternal Grandfather     Diabetes Paternal Grandfather     Heart Disease Paternal Grandfather     High Blood Pressure Paternal Grandfather        Social History     Socioeconomic History    Marital status: Single     Spouse name: Not on file    Number of children: Not on file    Years of education: Not on file    Highest education level: Not on file   Occupational History    Not on file   Tobacco Use    Smoking status: Former Smoker     Years: 7.00     Types: Cigarettes     Quit date: 2017     Years since quittin.6    Smokeless tobacco: Never Used    Tobacco comment: 1-2 cigs daily   Substance and Sexual Activity    Alcohol use: No    Drug use: No    Sexual activity: Not Currently     Partners: Male   Other Topics Concern    Not on file   Social History Narrative    Not on file     Social Determinants of Health     Financial Resource Strain:     Difficulty of Paying Living Expenses:    Food Insecurity:     Worried About Running Out of Food in the Last Year:    [address] in the Last Year:    Transportation Needs:     Lack of Transportation (Medical):  Lack of Transportation (Non-Medical):    Physical Activity:     Days of Exercise per Week:     Minutes of Exercise per Session:    Stress:     Feeling of Stress :    Social Connections:     Frequency of Communication with Friends and Family:     Frequency of Social Gatherings with Friends and Family:     Attends Sikhism Services:     Active Member of Clubs or Organizations:     Attends Club or Organization Meetings:     Marital Status:    Intimate Partner Violence:     Fear of Current or Ex-Partner:     Emotionally Abused:     Physically Abused:     Sexually Abused:        Vitals:  BP (!) 70/49   Pulse 76   Temp 97.7 °F (36.5 °C) (Oral)   Resp 18   Ht 4' 11\" (1.499 m)   Wt 274 lb 8 oz (124.5 kg)   LMP 2014 (Approximate)   SpO2 98%   BMI 55.44 kg/m²   Temp (24hrs), Av.7 °F (36.5 °C), Min:97.5 °F (36.4 °C), Max:97.9 °F (36.6 °C)    Recent Labs     10/17/21  1116 10/17/21  1616 10/17/21  1940 10/18/21  0738   POCGLU 112* 111* 128* 103       I/O (24Hr):     Intake/Output Summary (Last 24 hours) at 10/18/2021 1246  Last data filed at 10/18/2021 7978  Gross per 24 hour   Intake 540 ml   Output --   Net 540 ml       Labs:      CBC:   Lab Results   Component Value Date    WBC 8.3 10/15/2021    RBC 2.91 10/15/2021    RBC 4.24 2012    HGB 8.5 10/18/2021    HCT 30.6 10/18/2021    .0 10/15/2021    MCH 31.3 10/15/2021    MCHC 27.9 10/15/2021    RDW 16.1 10/15/2021     10/15/2021     2012    MPV 11.0 10/15/2021     CBC with Differential:    Lab Results   Component Value Date    WBC 8.3 10/15/2021    RBC 2.91 10/15/2021    RBC 4.24 2012    HGB 8.5 10/18/2021    HCT 30.6 10/18/2021     10/15/2021     2012    .0 10/15/2021    MCH 31.3 10/15/2021    MCHC 27.9 10/15/2021    RDW 16.1 10/15/2021 NRBC 2 03/18/2016    LYMPHOPCT 16 10/15/2021    MONOPCT 15 10/15/2021    BASOPCT 1 10/15/2021    MONOSABS 1.25 10/15/2021    LYMPHSABS 1.33 10/15/2021    EOSABS 0.17 10/15/2021    BASOSABS 0.08 10/15/2021    DIFFTYPE NOT REPORTED 10/15/2021     Hemoglobin/Hematocrit:    Lab Results   Component Value Date    HGB 8.5 10/18/2021    HCT 30.6 10/18/2021     CMP:    Lab Results   Component Value Date     10/18/2021    K 4.4 10/18/2021    CL 96 10/18/2021    CO2 24 10/18/2021    BUN 23 10/18/2021    CREATININE 5.05 10/18/2021    GFRAA 11 10/18/2021    LABGLOM 9 10/18/2021    GLUCOSE 99 10/18/2021    GLUCOSE 132 01/16/2012    PROT 7.4 08/21/2021    LABALBU 3.4 10/13/2021    CALCIUM 7.6 10/18/2021    BILITOT 0.46 08/21/2021    ALKPHOS 121 08/21/2021    AST 13 08/21/2021    ALT 10 08/21/2021     BMP:    Lab Results   Component Value Date     10/18/2021    K 4.4 10/18/2021    CL 96 10/18/2021    CO2 24 10/18/2021    BUN 23 10/18/2021    LABALBU 3.4 10/13/2021    CREATININE 5.05 10/18/2021    CALCIUM 7.6 10/18/2021    GFRAA 11 10/18/2021    LABGLOM 9 10/18/2021    GLUCOSE 99 10/18/2021    GLUCOSE 132 01/16/2012     PT/INR:    Lab Results   Component Value Date    PROTIME 16.0 10/09/2021    INR 1.3 10/09/2021     PTT:    Lab Results   Component Value Date    APTT 30.2 10/09/2021   [APTT}    Physical Examination:        General appearance: alert, cooperative and no distress  Mental Status: oriented to person, place and time and normal affect  Abdomen: soft, nontender, distended, bowel sounds present, obese, cell wall edema    Assessment:        Primary Problem  Hypotension     Active Hospital Problems    Diagnosis Date Noted    Pulmonary hypertension ([facility] [I27.20] 12/10/2015     Priority: Medium    CKD (chronic kidney disease) stage 4, GFR 15-29 ml/min ([facility] [N18.4] 12/08/2015     Priority: Medium    Fluid overload [E87.70] 10/17/2021    Pre-op chest exam [L13.308]     Itching [L29.9] 10/11/2021    Anemia

## 2021-10-18 NOTE — CONSULTS
..    Palliative Care Inpatient Consult    NAME:  Shanice Smith  MEDICAL RECORD NUMBER:  [id number]  AGE: 46 y.o. GENDER: female  : 1970  TODAY'S DATE:  10/18/2021    Reasons for Consultation:    Provision of information regarding PC and/or hospice philosophies  Complex, time-intensive communication and interdisciplinary psychosocial support  Clarification of goals of care and/or assistance with difficult decision-making  Guidance in regards to resources and transition(s)    Code Status: Full Code    Members of PC team contributing to this consultation are :  Ladonna Chiang RN    History of Present Illness     The patient is a 46 y.o. Non- / non  female who presents with Hypotension    Referred to Palliative Care by   [x] Physician   [] Nursing  [] Family Request   [] Other:       She was admitted to the primary service for Intra-dialytic hypotension [I95.3]  Hypotension, unspecified hypotension type [I95.9]  Atrial flutter ([address] [I48.92]. Her hospital course has been associated with Hypotension.  The patient has a complicated medical history and has been hospitalized since 10/8/2021 12:20 PM.    Active Hospital Problems    Diagnosis Date Noted    Pulmonary hypertension ([address] [I27.20] 12/10/2015     Priority: Medium    CKD (chronic kidney disease) stage 4, GFR 15-29 ml/min ([address] [N18.4] 2015     Priority: Medium    Fluid overload [E87.70] 10/17/2021    Pre-op chest exam [G33.556]     Itching [L29.9] 10/11/2021    Anemia due to chronic kidney disease [N18.9, D63.1] 10/10/2021    Hematochezia [K92.1]     Hypotension [I95.9] 10/08/2021    Atrial flutter with rapid ventricular response ([address] [I48.92] 2021    ESRD needing dialysis ([address] [N18.6, Z99.2] 2019    Chronic obstructive pulmonary disease ([address] [J44.9] 2016    Gastroesophageal reflux disease without esophagitis [K21.9] 2016    Type 2 diabetes mellitus with chronic kidney disease on chronic dialysis extensive disease; total care; mouth care only; drowsy/coma  ___0       Death       Risk Assessments:    Florencio Risk Score: [unfilled]    Readmission Risk Score: 37        1 Year Mortality Risk Score: @1YEARMORTALITYRISK@     Plan        Palliative Interaction: Update rec'd from bedside RN and also Primary service. Pt has been unable to tolerate dialysis. Pt has excess water weight and blood pressure is too low to tolerate dialysis, even on midodrine. Nephrologist had a long discussion with patient and states he can no longer offer dialysis due to her low BP. I introduced myself and the palliative role. Pt's mother is at bedside. Pt tearful and states that she is \"not ready to die\". Emotional support offered to patient. She states she has been on dialysis for 4 years but her body cannot tolerate it because of her blood pressure. Pt and mother have asked for a second opinion and a new nephrology group has been consulted per pt request.   Much emotional support offered to patient. Pt has met with hospital  today and also states her twin sister is coming in from out of town tomorrow to see her. Pt reminisces about her life and is tearful, stating she has 6 Grandchildren. I did not feel patient could tolerate a code status discussion as she is still in disbelief about the news she received today. I offered emotional support and will come back after the other nephrology group rounds tomorrow to discuss further options with patient.      Education/support to staff  Education/support to family  Education/support to patient  Discharge planning/helping to coordinate care  Communications with primary service  Providing support for coping/adaptation/distress of family  Providing support for coping/adaptation/distress of patient  Discussing meaning/purpose   Continue with current plan of care  Clarification of medical condition to patient and family  Code status clarified: Full Code  Validating patient/family distress  Continued communication updates  Will continue to follow patient and discuss comfort care programs as well as code status once patient gets a second nephrology consult. Principle Problem/Diagnosis:  Intra-dialytic hypotension [I95.3]  Hypotension, unspecified hypotension type [I95.9]  Atrial flutter (HCC) [I48.92]    Goals of care evaluation:  The patient goals of care are live longer, improve or maintain function/quality of life and provide comfort care/support/palliation/relieve suffering   Goals of care discussed with:    [] Patient independently    [x] Patient and Family    [] Family or Healthcare DPOA independently    [] Unable to discuss with patient, family/DPOA not present    Code Status  Full Code    Other recommendations:  Please call with any palliative questions or concerns. Palliative Care Team is available via perfect serve or via phone - [phone]. Palliative Care will continue to follow Ms. Newton Betancourt care as needed. Thank you for allowing Palliative Care to participate in the care of Ms. Dolly Jimenez .     Electronically signed by   Estefanía Brown RN  Palliative Care Team  on 10/18/2021 at 4:12 PM

## 2021-10-18 NOTE — PROGRESS NOTES
Nephrology Progress Note        Subjective: Patient was seen and examined. She continues to be so hypotensive the dialysis cannot be safely completed. Systolic blood pressure was 70 mm of mercury at the time of my evaluation. .  Patient has significant right heart failure and also has calciphylaxis which likely explain the degree of shock. She has not responded much to 4 times a day midodrine as far as her hypotension is concerned. The patient continues to feel fluid overloaded with abdominal swelling/distention.      Objective:  CURRENT TEMPERATURE:  Temp: 97.5 °F (36.4 °C)  MAXIMUM TEMPERATURE OVER 24HRS:  Temp (24hrs), Av.6 °F (36.4 °C), Min:97.5 °F (36.4 °C), Max:97.9 °F (36.6 °C)    CURRENT RESPIRATORY RATE:  Resp: 18  CURRENT PULSE:  Pulse: 60  CURRENT BLOOD PRESSURE:  BP: (!) 74/40  24HR BLOOD PRESSURE RANGE:  Systolic (13NDX), NAW:27 , Min:70 , ITL:69   ; Diastolic (80HBH), BLF:42, Min:40, Max:60    24HR INTAKE/OUTPUT:      Intake/Output Summary (Last 24 hours) at 10/18/2021 1613  Last data filed at 10/18/2021 0809  Gross per 24 hour   Intake 540 ml   Output --   Net 540 ml     Patient Vitals for the past 96 hrs (Last 3 readings):   Weight   10/18/21 0553 274 lb 8 oz (124.5 kg)   10/17/21 0543 273 lb 5 oz (124 kg)   10/16/21 1907 257 lb 11.5 oz (116.9 kg)         Physical Exam:  General appearance: Alert and awake x3 relatively comfortable in bed  Sskin: Warm to touch  ENT: No thrush  Pulmonary: Bilateral air entry and clear  Cardiovascular: Regular rate and rhythm with ectopy, with positive S1 and S2 and no rubs  Abdomen: obese, somewhat distended, positive abdominal wall edema with 2+ to 3+ abdominal wall edema   Extremities: 2+ edema  Access:  previous  Left AV fistula    Current Medications:    sodium thiosulfate 25 g in sodium chloride 0.9 % 150 mL IVPB, Once per day on   apixaban (ELIQUIS) tablet 5 mg, BID  albuterol sulfate  (90 Base) MCG/ACT inhaler 2 puff, Q4H PRN  polyethylene glycol (GoLYTELY) solution 2,000 mL, Once  Hydrocortisone-Aloe Vera 1 % CREA, BID  gabapentin (NEURONTIN) capsule 100 mg, TID  sennosides-docusate sodium (SENOKOT-S) 8.6-50 MG tablet 2 tablet, BID  hydrOXYzine (ATARAX) tablet 25 mg, TID PRN  amiodarone (CORDARONE) tablet 200 mg, BID  midodrine (PROAMATINE) tablet 15 mg, 4x Daily  diphenhydrAMINE (BENADRYL) tablet 25 mg, Q6H PRN  Calcium Acetate (Phos Binder) CAPS 1,334 mg, TID WC  Calcium Acetate (Phos Binder) CAPS 667 mg, PRN  pantoprazole (PROTONIX) tablet 40 mg, QAM AC  guaiFENesin (MUCINEX) extended release tablet 600 mg, BID PRN  budesonide-formoterol (SYMBICORT) 160-4.5 MCG/ACT inhaler 2 puff, BID  cinacalcet (SENSIPAR) tablet 90 mg, Once per day on Mon Wed Fri  calcitRIOL (ROCALTROL) capsule 0.25 mcg, Once per day on Mon Wed Fri  atorvastatin (LIPITOR) tablet 40 mg, Nightly  aspirin EC tablet 81 mg, Daily  sodium chloride flush 0.9 % injection 5-40 mL, 2 times per day  sodium chloride flush 0.9 % injection 10 mL, PRN  0.9 % sodium chloride infusion, PRN  ondansetron (ZOFRAN-ODT) disintegrating tablet 4 mg, Q8H PRN   Or  ondansetron (ZOFRAN) injection 4 mg, Q6H PRN  polyethylene glycol (GLYCOLAX) packet 17 g, Daily PRN  acetaminophen (TYLENOL) tablet 650 mg, Q6H PRN   Or  acetaminophen (TYLENOL) suppository 650 mg, Q6H PRN  glucose (GLUTOSE) 40 % oral gel 15 g, PRN  dextrose 50 % IV solution, PRN  glucagon (rDNA) injection 1 mg, PRN  dextrose 5 % solution, PRN      Labs:   CBC:   Recent Labs     10/17/21  0502 10/17/21  1116 10/18/21  0710   HGB 8.5* 8.6* 8.5*   HCT 30.6* 31.0* 30.6*      BMP:   Recent Labs     10/16/21  0809 10/18/21  0710   * 134*   K 4.0 4.4   CL 93* 96*   CO2 25 24   BUN 19 23*   CREATININE 5.37* 5.05*   GLUCOSE 112* 99   CALCIUM 7.4* 7.6*        Radiology:  Reviewed as available. Assessment:  1 ESRD:dialysis: Further complicated by volume overload and hypotension.   We are unable dialyze the patient secondary to high risk of stroke, myocardial infarction and cardiac arrest secondary to her hypotension  2. Shock likely secondary to right heart failure and possibly a degree of LV dysfunction and calciphylaxis  3. Likely calciphylaxis involving abdominal wall. 4 EDEMA : Has a component of chronic  lower extremity edema and abdominal wall edema  5.paroxysmal atrial fibrillation on amiodarone   6.suspected GI bleed , enterology on is on consult    Plan:  1. Able to perform dialysis safely secondary to severity of hypotension despite high-dose midodrine use  2. Palliative care/hospice and no further dialysis    My thoughts were discussed directly with the patient in the room today in the presence of her floor nurse and my nurse practitioner who was rounding with me today. The patient is quite tearful and distraught, but I do not believe we can safely dialyze her anymore. Also spoke with the patient's mother by phone at the request of the patient directly. The patient's mother is also distraught with the fact that we do not believe we can safely dialyze her daughter anymore. She is requesting the hospitalist look into transferring the patient to the [facility] where she believe she will get better care. Our nephrology group does not run a [facility] so if she is transferred she would need to be taken care of by a different nephrology group at the facility. I did explain this to the patient's mother, phone today. I spoke with the patient's mother nearly 30 min. After speaking to the patient for a significant period of time in the room today. My thoughts and the content and my conversations with the patient and her mother were expressed directly by telephone today with Dr. Miguel Fuchs, the hospitalist.    Please do not hesitate to call with questions.     Electronically signed by Fadia White MD on 10/18/2021 at 4:13 PM

## 2021-10-18 NOTE — PROGRESS NOTES
Physical Therapy  Facility/Department: [facility]  Daily Treatment Note  NAME: Oscar Mack  : 1970  MRN: [id number]    Date of Service: 10/18/2021   RN reports patient is medically stable for therapy treatment this date. Chart reviewed prior to treatment and patient is agreeable for therapy. All lines intact and patient positioned comfortably at end of treatment. All patient needs addressed prior to ending therapy session. Discharge Recommendations:    Pt currently functioning below baseline. Would suggest additional therapy at time of discharge to maximize long term outcomes and prevent re-admission. Please refer to AM-PAC score for current level of function. Assessment   Body structures, Functions, Activity limitations: Decreased functional mobility ; Decreased safe awareness;Decreased balance;Decreased ROM; Decreased strength;Decreased endurance; Increased pain;Decreased posture  Assessment: Pt tolerated session fair. Pt presenting w/ global weakness and deconditioning. Activity limited most by pain and endurance. Pt has poor safety awareness and currently requires 2 person skilled assist for safe mobility at this time. Pt would benefit from continued skilled physical therapy to address these deficts and return to PLOF. Prognosis: Good  Decision Making: Medium Complexity  PT Education: Goals;PT Role;Plan of Care;General Safety; Energy Conservation;Gait Training;Transfer Training  Patient Education: Pt educated on: importance of continued mobility throughout admission, pursed lip breathing throughout activity, general safety awareness, proper hand placement throughout transfers w/ RW, and PT POC. Pt w/ fair return demo, would benefit from continued reinforcement of safety education. REQUIRES PT FOLLOW UP: Yes  Activity Tolerance  Activity Tolerance: Patient limited by endurance; Patient limited by fatigue;Patient limited by pain     Patient Diagnosis(es): The primary encounter diagnosis was Pre-op chest exam. A diagnosis of Hypotension, unspecified hypotension type was also pertinent to this visit. has a past medical history of Asthma, CHF (congestive heart failure) ([facility] Chronic kidney disease, COPD (chronic obstructive pulmonary disease) ([facility] patient ([facility] Hemodialysis patient ([facility] Hyperlipidemia, Hypertension, Obesity, YONI (obstructive sleep apnea), Pneumonia, Renal failure, Type II or unspecified type diabetes mellitus without mention of complication, not stated as uncontrolled, and Ventilator dependence ([facility] has a past surgical history that includes Hysterectomy (2014);  section ( [address]); skin full graft (); Upper gastrointestinal endoscopy (2021); and Upper gastrointestinal endoscopy (N/A, 2021). Restrictions  Restrictions/Precautions  Restrictions/Precautions: Up as Tolerated, General Precautions, Fall Risk, Modified Diet  Required Braces or Orthoses?: No  Position Activity Restriction  Other position/activity restrictions: L UE fistula, HD(M, W, F), 1800 ml fluid restriction diet, up with assist, 4L O2, alarms, low BP per chart and RN states she is asymptomatic/readings not accuarate  Subjective   General  Response To Previous Treatment: Patient with no complaints from previous session. Family / Caregiver Present: No  Subjective  Subjective: Pt reporting feeling very bloated states \"I feel like a beach ball\". Also reporting has not been able to get comfortable, slept in the bedside chair last night. General Comment  Comments: RN and pt agreeable to therapy. Pt seated in bedside chair upon arrival.  Pt pleasant and cooperative throughout. Orientation  Orientation  Overall Orientation Status: Within Normal Limits  Cognition   Cognition  Overall Cognitive Status: Exceptions  Arousal/Alertness: Appropriate responses to stimuli  Following Commands:  Follows multistep commands with repitition  Attention Span: Attends with cues to redirect  Memory: Decreased short term memory  Safety Judgement: Decreased awareness of need for assistance;Decreased awareness of need for safety  Problem Solving: Assistance required to generate solutions;Assistance required to identify errors made;Assistance required to implement solutions;Assistance required to correct errors made;Decreased awareness of errors  Insights: Decreased awareness of deficits  Initiation: Does not require cues  Sequencing: Requires cues for some  Objective   Bed mobility  Comment: Not assessed this date. Pt seated in bedside chair upon writer's arrival and exit. Transfers  Sit to Stand: Moderate Assistance;2 Person Assistance  Stand to sit: Moderate Assistance;2 Person Assistance  Comment: Pt performed 3 STS transfers this date throughout session, from bedside chair and toilet. Pt w/ significant difficulty throughout requiring mod verbal cueing for proper hand placement and safety w/ RW throughout w/ fair return demo. Ambulation  Ambulation?: Yes  More Ambulation?: No  Ambulation 1  Surface: level tile  Device: Rolling Walker  Assistance: Moderate assistance;2 Person assistance  Gait Deviations: Increased EDISON; Decreased step length;Decreased step height;Shuffles;Decreased head and trunk rotation  Distance: 20ft x 2  Comments: Pt ambulating w/ quick pace this date, demonstrating poor safety awareness throughout. Pt requiring mod verbal cueing for upright posture throughout w/ poor return demo. Pt also requiring mod verbal cueing to maintain EDISON within RW w/ poor return demo.   Stairs/Curb  Stairs?: No     Balance  Posture: Fair  Sitting - Static: Good;-  Sitting - Dynamic: Fair;+  Standing - Static: Fair;-  Standing - Dynamic: Poor;+  Comments: Standing balance assessed w/ RW  Exercises  Knee Long Arc Quad: x 10 BLE  Ankle Pumps: x 15 BLE  Comments: Pt attempted seated ther ex this date, reporting pain in BLE throughout d/t weight of abdomen on hips, pt unable to tolerate furhter exercises. Comment: Pt assisted to bathroom for ADLs w/ poor standing tolerance and reporting mild dizziness. Pt's BP assessed and was 76/55 mmHg (map 67). Pt's BP has been running low and RN aware. Dizziness subsided and pt assisted to toilet and then back to chair. AM-PAC Score  AM-PAC Inpatient Mobility Raw Score : 12 (10/18/21 1200)  AM-PAC Inpatient T-Scale Score : 35.33 (10/18/21 1200)  Mobility Inpatient CMS 0-100% Score: 68.66 (10/18/21 1200)  Mobility Inpatient CMS G-Code Modifier : CL (10/18/21 1200)       Functional Outcome Measure-   Single Leg Stance Test:  0 sec. (<5 sec.= fall risk)    Goals  Short term goals  Time Frame for Short term goals: 12 visits:  Short term goal 1: Pt. to require min A for bed mob. Short term goal 2: Pt. to require min A for sit to stand transfer  Short term goal 3: Pt. to require min A for transfer bed to chair with RW  Short term goal 4: Pt. to amb. 25ft. with RW, min A, with approp. O2. Patient Goals   Patient goals : get rid of fluid    Plan    Plan  Times per week: 1-2x/day; 5-6days/wk  Current Treatment Recommendations: Strengthening, Transfer Training, ROM, Balance Training, Functional Mobility Training, Gait Training, Safety Education & Training, Patient/Caregiver Education & Training, Endurance Training, Home Exercise Program, Equipment Evaluation, Education, & procurement  Safety Devices  Type of devices: Call light within reach, Chair alarm in place, Gait belt, Nurse notified, Left in chair  Restraints  Initially in place: No     Therapy Time   Individual Concurrent Group Co-treatment   Time In [phone]         Time Out 1028         Minutes 39            Co-treatment with OT warranted secondary to decreased safety and independence requiring 2 skilled therapy professionals to address individual discipline's goals.      Katelyn Arias, PT

## 2021-10-18 NOTE — FLOWSHEET NOTE
Writer to 1014 per Perfect Serve from patient's RN Leopold Artis). Per RN, patient has just received news that she is not a candidate for dialysis anymore. Writer speaks to patient who is approachable and engages in conversation. Patient reports doris and states that she attends [address]. Patient states that her clergy is visiting her. Patient has family support from her mother and a twin sister. Patient is hopeful that another solution will be found. Patient denies any spiritual or emotional concerns. Writer speaks to RN before and after the visit. Patient expresses appreciation for the visit and appears to be coping adequately. Spiritual care will follow as needed.        10/18/21 1415   Encounter Summary   Services provided to: Patient   Referral/Consult From: Nurse   Support System Parent   Place of On license of [facility] No   Continue Visiting   (10/18/21)   Complexity of Encounter Moderate   Length of Encounter 30 minutes   Spiritual Assessment Completed Yes   Routine   Type Follow up   Assessment Approachable   Intervention Active listening;Explored feelings, thoughts, concerns;Explored coping resources;Nurtured hope   Outcome Expressed gratitude

## 2021-10-18 NOTE — PLAN OF CARE
Discussed with Dr Trip Hernadez, patient has calciphylaxis, dangerous to dialyze her with low blood pressure of 70 systolic despite midodrine. There is risk of stroke and heart failure. Dr. Renetta Page does not want to go ahead with the dialysis and advised hospice. I discussed with the patient, she verbalizes understanding but wants to wait for her mom. Patient's mother wanted patient to be transferred to [facility], I called access line however [facility] is not taking any transfers for acute care even including [facility] and [facility].    Patient wants second opinion here. Will consult nephrology group. Multiple phone calls attempted to patient's mother without answer. Patient has very poor prognosis  Palliative care consulted.

## 2021-10-18 NOTE — PLAN OF CARE
Problem:  Activity:  Goal: Fatigue will decrease  Description: Fatigue will decrease  Outcome: Ongoing  Goal: Risk for activity intolerance will decrease  Description: Risk for activity intolerance will decrease  Outcome: Ongoing     Problem: Coping:  Goal: Ability to cope will improve  Description: Ability to cope will improve  Outcome: Ongoing     Problem: Fluid Volume:  Goal: Will show no signs or symptoms of fluid imbalance  Description: Will show no signs or symptoms of fluid imbalance  Outcome: Ongoing

## 2021-10-18 NOTE — PLAN OF CARE
Problem: Infection:  Goal: Will remain free from infection  Description: Will remain free from infection  10/17/2021 2253 by Becky Peraza RN  Outcome: Ongoing  10/17/2021 1525 by Frankie Coyne RN  Outcome: Ongoing     Problem: Daily Care:  Goal: Daily care needs are met  Description: Daily care needs are met  10/17/2021 2253 by Becky Peraza RN  Outcome: Ongoing  10/17/2021 1525 by Frankie Coyne RN  Outcome: Ongoing  Note: Patient sat at bathroom sink and washed up. Teeth brushed, hair combed and inter dry applied. Will continue to monitor for needs. Problem: Pain:  Goal: Patient's pain/discomfort is manageable  Description: Patient's pain/discomfort is manageable  10/17/2021 2253 by Becky Peraza RN  Outcome: Ongoing  10/17/2021 1525 by Frankie Coyne RN  Outcome: Ongoing  Goal: Pain level will decrease  Description: Pain level will decrease  Outcome: Ongoing  Goal: Control of acute pain  Description: Control of acute pain  Outcome: Ongoing  Goal: Control of chronic pain  Description: Control of chronic pain  Outcome: Ongoing     Problem: Skin Integrity:  Goal: Skin integrity will stabilize  Description: Skin integrity will stabilize  10/17/2021 2253 by Becky Peraza RN  Outcome: Ongoing  10/17/2021 1525 by Frankie Coyne RN  Outcome: Ongoing     Problem: Discharge Planning:  Goal: Patients continuum of care needs are met  Description: Patients continuum of care needs are met  10/17/2021 2253 by Becky Peraza RN  Outcome: Ongoing  10/17/2021 1525 by Frankie Coyne RN  Outcome: Ongoing     Problem: Falls - Risk of:  Goal: Will remain free from falls  Description: Will remain free from falls  Outcome: Ongoing  Goal: Absence of physical injury  Description: Absence of physical injury  Outcome: Ongoing     Problem: ABCDS Injury Assessment  Goal: Absence of physical injury  Outcome: Ongoing     Problem:  Activity:  Goal: Fatigue will decrease  Description: Fatigue will decrease  Outcome: Ongoing  Goal: Risk for activity intolerance will decrease  Description: Risk for activity intolerance will decrease  Outcome: Ongoing     Problem: Coping:  Goal: Ability to cope will improve  Description: Ability to cope will improve  Outcome: Ongoing     Problem: Fluid Volume:  Goal: Will show no signs or symptoms of fluid imbalance  Description: Will show no signs or symptoms of fluid imbalance  Outcome: Ongoing     Problem: Health Behavior:  Goal: Ability to manage health-related needs will improve  Description: Ability to manage health-related needs will improve  Outcome: Ongoing  Goal: Identification of resources available to assist in meeting health care needs will improve  Description: Identification of resources available to assist in meeting health care needs will improve  Outcome: Ongoing     Problem: Nutritional:  Goal: Ability to identify appropriate dietary choices will improve  Description: Ability to identify appropriate dietary choices will improve  Outcome: Ongoing     Problem: Physical Regulation:  Goal: Ability to maintain clinical measurements within normal limits will improve  Description: Ability to maintain clinical measurements within normal limits will improve  Outcome: Ongoing  Goal: Complications related to the disease process, condition or treatment will be avoided or minimized  Description: Complications related to the disease process, condition or treatment will be avoided or minimized  Outcome: Ongoing     Problem: Sensory:  Goal: General experience of comfort will improve  Description: General experience of comfort will improve  Outcome: Ongoing     Problem: Skin Integrity:  Goal: Status of oral mucous membranes will improve  Description: Status of oral mucous membranes will improve  Outcome: Ongoing  Goal: Skin integrity will be maintained  Description: Skin integrity will be maintained  Outcome: Ongoing     Problem: Discharge Planning:  Goal: Discharged to appropriate level of care  Description: Discharged to appropriate level of care  Outcome: Ongoing     Problem: Cardiac Output - Decreased:  Goal: Avoidance of environmental tobacco smoke  Description: Absence of orthostatic hypotension  Outcome: Ongoing  Goal: Cardiac output within specified parameters  Description: Cardiac output within specified parameters  Outcome: Ongoing  Goal: Hemodynamic stability will improve  Description: Hemodynamic stability will improve  Outcome: Ongoing     Problem: Fluid Volume - Imbalance:  Goal: Absence of imbalanced fluid volume signs and symptoms  Description: Absence of imbalanced fluid volume signs and symptoms  Outcome: Ongoing     Problem: Tissue Perfusion, Altered:  Goal: Circulatory function within specified parameters  Description: Circulatory function within specified parameters  Outcome: Ongoing     Problem: Tissue Perfusion - Cardiopulmonary, Altered:  Goal: Absence of angina  Description: Absence of angina  Outcome: Ongoing  Goal: Hemodynamic stability will improve  Description: Hemodynamic stability will improve  Outcome: Ongoing     Problem: IP BALANCE  Goal: LTG - patient will maintain standing balance to allow for completion of daily activities  Outcome: Ongoing     Problem: Nutrition  Goal: Optimal nutrition therapy  Outcome: Ongoing

## 2021-10-18 NOTE — PROGRESS NOTES
dose her blood pressure stabilized. 10/9 patient developed atrial flutter, heart rate was elevated up to 120s. Blood pressure continued to be low 11N systolic. Cardiology was consulted. Patient was started on amiodarone and heparin. Patient noted to have recent EGD showing duodenal polyps, and was sent to colonoscopy outpatient patient did not follow-up. Patient will need GI clearance for anticoagulation . Despite multiple attempts for colonoscopy, colonoscopy could not be done given patient's chronic hypotension. Anesthesia was not willing to go ahead for the procedure. Eliquis was started and patient's hemoglobin was monitored for 2 days. Plan was to discharge with patient had severe abdominal distention, nephrology recommended dialysis. Dialysis could not be done on Sunday as dialysis nurse was not available. Now patient requesting SNF placement with dialysis in house    Discharge planning delayed given multiple complexities    Review of Systems:     Constitutional:  negative for chills, fevers, sweats  Respiratory:  negative for cough, dyspnea on exertion, shortness of breath, wheezing  Cardiovascular:  negative for chest pain, chest pressure/discomfort, trace lower extremity edema, no palpitations  Gastrointestinal: Positive for abdominal distention, no constipation, diarrhea, no vomiting and nausea   neurological:  negative for dizziness, headache    Medications: Allergies:     Allergies   Allergen Reactions    Ibuprofen     Tramadol Hives    Motrin [Ibuprofen Micronized] Itching    Strawberry Extract Itching and Rash    Tape Gracia Rogel Tape] Rash     No paper tape silk only       Current Meds:   Scheduled Meds:    sodium thiosulfate IVPB  25 g IntraVENous Once per day on Mon Wed Fri    apixaban  5 mg Oral BID    polyethylene glycol  2,000 mL Oral Once    Hydrocortisone-Aloe Vera   Topical BID    gabapentin  100 mg Oral TID    sennosides-docusate sodium  2 tablet Oral BID    amiodarone  200 mg Oral BID    midodrine  15 mg Oral 4x Daily    Calcium Acetate (Phos Binder)  2 capsule Oral TID WC    pantoprazole  40 mg Oral QAM AC    budesonide-formoterol  2 puff Inhalation BID    cinacalcet  90 mg Oral Once per day on Mon Wed Fri    calcitRIOL  0.25 mcg Oral Once per day on Mon Wed Fri    atorvastatin  40 mg Oral Nightly    aspirin  81 mg Oral Daily    sodium chloride flush  5-40 mL IntraVENous 2 times per day     Continuous Infusions:    sodium chloride Stopped (10/13/21 1945)    dextrose Stopped (10/10/21 1415)     PRN Meds: albuterol sulfate HFA, hydrOXYzine, diphenhydrAMINE, Calcium Acetate (Phos Binder), guaiFENesin, sodium chloride flush, sodium chloride, ondansetron **OR** ondansetron, polyethylene glycol, acetaminophen **OR** acetaminophen, glucose, dextrose, glucagon (rDNA), dextrose    Data:     Past Medical History:   has a past medical history of Asthma, CHF (congestive heart failure) ([facility] Chronic kidney disease, COPD (chronic obstructive pulmonary disease) ([facility] [facility] Dialysis patient ([facility] ([facility] Hyperlipidemia, Hypertension, Obesity, YONI (obstructive sleep apnea), Pneumonia, Renal failure, Type II or unspecified type diabetes mellitus without mention of complication, not stated as uncontrolled, and Ventilator dependence ([facility] Social History:   reports that she quit smoking about 4 years ago. Her smoking use included cigarettes. She quit after 7.00 years of use. She has never used smokeless tobacco. She reports that she does not drink alcohol and does not use drugs.      Family History:   Family History   Problem Relation Age of Onset    Diabetes Other     Cancer Mother         BREAST    Heart Disease Father         CAD-MI    Diabetes Father     High Blood Pressure Father     Diabetes Maternal Grandfather     Diabetes Paternal Grandfather     Heart Disease Paternal Grandfather     High Blood Pressure Paternal Grandfather Vitals:  BP (!) 84/57   Pulse 67   Temp 97.7 °F (36.5 °C) (Oral)   Resp 18   Ht 4' 11\" (1.499 m)   Wt 274 lb 8 oz (124.5 kg)   LMP 2014 (Approximate)   SpO2 94%   BMI 55.44 kg/m²   Temp (24hrs), Av.6 °F (36.4 °C), Min:97.5 °F (36.4 °C), Max:97.9 °F (36.6 °C)    Recent Labs     10/17/21  1116 10/17/21  1616 10/17/21  1940 10/18/21  0738   POCGLU 112* 111* 128* 103       I/O (24Hr): Intake/Output Summary (Last 24 hours) at 10/18/2021 1129  Last data filed at 10/18/2021 3212  Gross per 24 hour   Intake 780 ml   Output --   Net 780 ml       Labs:  Hematology:  Recent Labs     10/17/21  0502 10/17/21  1116 10/18/21  0710   HGB 8.5* 8.6* 8.5*   HCT 30.6* 31.0* 30.6*     Chemistry:  Recent Labs     10/16/21  0809 10/18/21  0710   * 134*   K 4.0 4.4   CL 93* 96*   CO2 25 24   GLUCOSE 112* 99   BUN 19 23*   CREATININE 5.37* 5.05*   MG  --  2.0   ANIONGAP 14 14   LABGLOM 8* 9*   GFRAA 10* 11*   CALCIUM 7.4* 7.6*     Recent Labs     10/16/21  2020 10/17/21  0716 10/17/21  1116 10/17/21  1616 10/17/21  1940 10/18/21  0738   POCGLU 195* 120* 112* 111* 128* 103     ABG:  Lab Results   Component Value Date    POCPH 7.22 2016    POCPCO2 61 2016    POCPO2 68 2016    POCHCO3 24.8 2016    NBEA 3 2016    PBEA NOT REPORTED 2016    QMM1UJV 27 2016    ICBP3NTV 88 2016    FIO2 NOT REPORTED 2019     Lab Results   Component Value Date/Time    SPECIAL RT WRIST 5ML 10/09/2021 12:15 PM     Lab Results   Component Value Date/Time    CULTURE NO GROWTH 6 DAYS 10/09/2021 12:15 PM       Radiology:  XR CHEST PORTABLE    Result Date: 10/9/2021  No acute process. Stable cardiomegaly     XR CHEST PORTABLE    Result Date: 10/8/2021  No acute process.  Able cardiomegaly       Physical Examination:        General appearance:  alert, cooperative and mild distress, morbidly obese  Mental Status:  oriented to person, place and time and normal affect  Lungs: Decreased bilateral breath sounds given body habitus, normal effort  Heart:  irregular rhythm, in flutter, rate controlled  Abdomen:  soft, nontender, distended, normal bowel sounds, no masses, hepatomegaly, splenomegaly  Extremities: 1+ edema, no redness, tenderness in the calves  Skin: Chronic skin changes lower extremity, skin rash on abdomen    Assessment:        Hospital Problems         Last Modified POA    * (Principal) Hypotension 10/9/2021 Yes    CKD (chronic kidney disease) stage 4, GFR 15-29 ml/min (HCC) (Chronic) 10/8/2021 Yes    Pulmonary hypertension ([address] (Chronic) 10/8/2021 Yes    Morbid obesity with BMI of 45.0-49.9, adult ([address] 10/8/2021 Yes    Essential hypertension 10/8/2021 Yes    YONI on CPAP 10/11/2021 Yes    Type 2 diabetes mellitus with chronic kidney disease on chronic dialysis ([address] 10/8/2021 Yes    Chronic obstructive pulmonary disease ([address] 10/8/2021 Yes    Gastroesophageal reflux disease without esophagitis 10/8/2021 Yes    ESRD needing dialysis ([address] 10/8/2021 Yes    Atrial flutter with rapid ventricular response ([address] 10/10/2021 Yes    Hematochezia 10/10/2021 Yes    Anemia due to chronic kidney disease 10/10/2021 Yes    Itching 10/11/2021 Yes    Fluid overload 10/17/2021 Yes    Pre-op chest exam 10/17/2021 Yes          Plan:        Atrial flutter with rvr with hypotension -rate controlled, continue eliquis 5 mg bid, continue on amiodarone 200 mg twice daily, monitor H&H, hemoglobin stable. Abdominal distention-CT scan did not show any large fluid for paracentesis.  But there was concern for pericardial effusion, echo limited ordered, plan for dialysis today, patient might need frequent dialysis 4-5 times a week  ESRD- Nephrology following for dialysis  Calciphylaxis-continue hydroxyzine as needed, sodium thiosulfate with dialysis  Neuropathy- continue neurontin  Hypotension-blood pressure fluctuating, on midodrine, stop lopressor on discharge  Chronic anemia- recent EGD reviewed, colonoscopy could not be done due to hypotension  Type 2 diabetes mellitus- poct blood sugar ac, hs  CPAP for sleep apnea  Stool softeners for constipation  Lifestyle modifications for weight loss  Rest of the medications as ordered    Patient now willing to go to SNF after working with physical therapy. Requesting facility where dialysis is onsite. Delayed discharge given multiple medical complexities.       Chyna Manley MD  10/18/2021  11:29 AM

## 2021-10-19 NOTE — PROGRESS NOTES
Physical Therapy  Facility/Department: [facility] PROGRESSIVE CARE  Daily Treatment Note  NAME: Mary Subramanian  : 1970  MRN: [id number]    Date of Service: 10/19/2021    Discharge Recommendations:  Patient would benefit from continued therapy after discharge     Pt currently functioning below baseline. Would suggest additional therapy at time of discharge to maximize long term outcomes and prevent re-admission. Please refer to AM-PAC score for current level of function. Assessment   Body structures, Functions, Activity limitations: Decreased functional mobility ; Decreased safe awareness;Decreased balance;Decreased ROM; Decreased strength;Decreased endurance; Increased pain;Decreased posture  Assessment: Pt tolerated session fair. Pt presenting w/ global weakness and deconditioning. Activity limited most by pain and endurance. Pt has poor safety awareness and currently requires 2 person skilled assist for safe mobility at this time. Pt would benefit from continued skilled physical therapy to address these deficts and return to PLOF. Prognosis: Good  PT Education: Goals;PT Role;Plan of Care;General Safety; Energy Conservation;Gait Training;Transfer Training; Injury Prevention;Home Exercise Program  Patient Education: Written seated and supine HEP; Patient will likely need further reviewed on progression of reps and monitoring activity tolerance during exercises next visit. REQUIRES PT FOLLOW UP: Yes  Activity Tolerance  Activity Tolerance: Patient limited by endurance; Patient limited by fatigue;Patient limited by pain     Patient Diagnosis(es): The primary encounter diagnosis was Pre-op chest exam. A diagnosis of Hypotension, unspecified hypotension type was also pertinent to this visit.      has a past medical history of Asthma, CHF (congestive heart failure) ([facility] Chronic kidney disease, COPD (chronic obstructive pulmonary disease) ([facility] [address] [facility] Dialysis patient [facility] Hemodialysis patient ([facility] [facility] Hyperlipidemia, Hypertension, Obesity, YONI (obstructive sleep apnea), Pneumonia, Renal failure, Type II or unspecified type diabetes mellitus without mention of complication, not stated as uncontrolled, and Ventilator dependence ([facility] has a past surgical history that includes Hysterectomy (2014);  section ([address]); skin full graft (); Upper gastrointestinal endoscopy (2021); and Upper gastrointestinal endoscopy (N/A, 2021). Restrictions  Restrictions/Precautions  Restrictions/Precautions: Up as Tolerated, General Precautions, Fall Risk, Modified Diet  Required Braces or Orthoses?: No  Position Activity Restriction  Other position/activity restrictions: L UE fistula, HD(M, W, F), 1800 ml fluid restriction diet, up with assist, 4L O2, alarms, low BP per chart and RN states she is asymptomatic/readings not accuarate  Subjective   General  Chart Reviewed: Yes  Additional Pertinent Hx: CHF, COPD. dialysis 3days/wk  Response To Previous Treatment: Patient with no complaints from previous session. Family / Caregiver Present: No  Subjective  Subjective: Patient up in chair upon arrival, reports she did not feel well, recieved bad news about her BP being too low to continue HD and was waiting to speaking with Dr. Green about options. Patient somewhat emotional during discussion and given emotional support. Patient reported she did not have to use the rest room and wanted to stay up in the chair where she is more comfortable. Dropped off written seated and supine HEP for patient to perfrom later as tolerated. General Comment  Comments: RN, Ivet Davis reported paitent recieved poor news today and she might not feel up to therapy today. Orientation  Orientation  Overall Orientation Status: Within Normal Limits  Cognition   Cognition  Overall Cognitive Status: Exceptions  Arousal/Alertness: Appropriate responses to stimuli  Following Commands:  Follows multistep commands with

## 2021-10-19 NOTE — CARE COORDINATION
Discharge planning    Patient chart reviewed. Patient is ESRD and was informed per nephrology that she is unable to perform HD safely secondary to severity of hypotension despite high-dose midodrine use he is recommending palliative care/hospice and no further dialysis    Family is requesting second opinion and possible transfer to [facility] but they are not taking any acute transfers. Await second opinion from nephrology. Will also discuss with attending if would want to consider any further outlying facilities. Palliative care has seen and discussion with patient and will continue to follow. Patient was new to [facility] this admission and had [facility] had accepted patient on 10/15    At this time will follow closely for either transfer vs hospice    Spoke with attending and read nephrology note. Will transfer patient back to ICU and see if pressors assists with her HD status. Palliative care is following for likely need for hospice as her prognosis is poor.

## 2021-10-19 NOTE — BRIEF OP NOTE
Brief Postoperative Note    Jr Luna  YOB: 1970  [id number]    Pre-operative Diagnosis: Chronic Renal Failure      Post-operative Diagnosis: Same    Procedure: Tunneled Dialysis Catheter    Medication Given: none    Anesthesia: 2%Lidocaine Local Injection     Surgeons/Assistants: Quintin Primrose, MD    Estimated Blood Loss: Minimal    Complications: none    12 Fr x 16 cm non tunneled Mahurkar trialysis catheter placed Site:  Right Internal Jugular Vein. Dressing applied. May use HD cath for dialysis. Vital signs were reviewed and were stable after the procedure. Discharged to floor.     Electronically signed by Quintin Primrose, MD on 10/19/2021 at 4:27 PM

## 2021-10-19 NOTE — PROGRESS NOTES
Occupational Therapy  Facility/Department: [facility] PROGRESSIVE CARE  Daily Treatment Note  NAME: Marcia Sam  : 1970  MRN: [id number]    Date of Service: 10/19/2021     KAREN Gallo reports patient is medically stable for therapy treatment this date. Chart reviewed prior to treatment and patient is agreeable for therapy. All lines intact and patient positioned comfortably at end of treatment. All patient needs addressed prior to ending therapy session. Pt currently functioning below baseline. Would suggest additional therapy at time of discharge to maximize long term outcomes and prevent re-admission. Please refer to AM-PAC score for current level of function. Discharge Recommendations:  Patient would benefit from continued therapy after discharge       Assessment   Performance deficits / Impairments: Decreased functional mobility ; Decreased safe awareness;Decreased balance;Decreased coordination;Decreased ADL status; Decreased cognition;Decreased vision/visual deficit; Decreased posture;Decreased endurance;Decreased high-level IADLs;Decreased strength;Decreased sensation;Decreased fine motor control  Assessment: Pt limited by decreased safety awareness, low BP, and weakness. Pt still requiring Mod x2 for some transfers and during ambulation. Pt appearing sad/depressed d/t news from doctors. Pt given emotional support and UE HEP/activities to keep busy/active in the mean time. Skilled OT is recommended to increase overall IND and safety with ADL and functional tasks to return home with assist as needed.   Prognosis: Fair  OT Education: OT Role;Plan of Care;Energy Conservation;Transfer Training  Patient Education: safety in function, call light use/fall prevention, pursed lip breathing, edema mgt tech, proper bed mob tech, recommendations for continued therapy, benefits of being up OOB as able, RW safety/mgmt  REQUIRES OT FOLLOW UP: Yes  Safety Devices  Safety Devices in place: Yes  Type of devices: Call light within reach; Chair alarm in place; Left in chair;Patient at risk for falls;Gait belt;Nurse notified         Patient Diagnosis(es): The primary encounter diagnosis was Pre-op chest exam. A diagnosis of Hypotension, unspecified hypotension type was also pertinent to this visit. has a past medical history of Asthma, CHF (congestive heart failure) ([facility] [address] Chronic kidney disease, COPD (chronic obstructive pulmonary disease) ([facility] [address] Dialysis patient ([facility] [address] Hemodialysis patient ([facility] [address] Hyperlipidemia, Hypertension, Obesity, YONI (obstructive sleep apnea), Pneumonia, Renal failure, Type II or unspecified type diabetes mellitus without mention of complication, not stated as uncontrolled, and Ventilator dependence ([facility] [address] has a past surgical history that includes Hysterectomy (2014);  section ([address]); skin full graft (); Upper gastrointestinal endoscopy (2021); and Upper gastrointestinal endoscopy (N/A, 2021). Restrictions  Restrictions/Precautions  Restrictions/Precautions: Up as Tolerated, General Precautions, Fall Risk, Modified Diet  Required Braces or Orthoses?: No  Position Activity Restriction  Other position/activity restrictions: L UE fistula, HD(M, W, F), 1800 ml fluid restriction diet, up with assist, 4L O2, alarms, low BP per chart and RN states she is asymptomatic/readings not accuarate  Subjective   General  Chart Reviewed: Yes  Patient assessed for rehabilitation services?: Yes  Response to previous treatment: Patient with no complaints from previous session  Family / Caregiver Present: No  Subjective  Subjective: Pt upset about with news from doctors by not having clear options going forward with her treatment for recovery. Orientation     Objective       Access Code: [facility]  URL: [email or web address]  Date: 10/19/2021  Prepared by:  Va Shank II    Exercises  Seated Single Arm Forearm Supination Pronation with Dumbbell - 1 x daily - 7 x weekly - 3 sets - 10 reps  Seated Wrist Extension with Dumbbell - 1 x daily - 7 x weekly - 3 sets - 10 reps  Seated Single Arm Bicep Curls Supinated with Dumbbell - 1 x daily - 7 x weekly - 3 sets - 10 reps  Seated Single Arm Shoulder Flexion with Dumbbell - 1 x daily - 7 x weekly - 3 sets - 10 reps  Seated Single Arm Shoulder Abduction with Dumbbell - 1 x daily - 7 x weekly - 3 sets - 10 reps  Seated Single Arm Chest Press with Dumbbell - 1 x daily - 7 x weekly - 3 sets - 10 reps                                   Cognition  Overall Cognitive Status: Exceptions  Arousal/Alertness: Appropriate responses to stimuli  Following Commands:  Follows multistep commands with repitition  Attention Span: Attends with cues to redirect  Memory: Decreased short term memory  Safety Judgement: Decreased awareness of need for assistance;Decreased awareness of need for safety  Problem Solving: Assistance required to generate solutions;Assistance required to identify errors made;Assistance required to implement solutions;Assistance required to correct errors made;Decreased awareness of errors  Insights: Decreased awareness of deficits  Initiation: Does not require cues  Sequencing: Requires cues for some                                         Plan   Plan  Times per week: 4-5x/week 1x/day as phani  Current Treatment Recommendations: Strengthening, Endurance Training, Neuromuscular Re-education, Self-Care / ADL, Equipment Evaluation, Education, & procurement, Pain Management, Home Management Training, Balance Training, Functional Mobility Training, Safety Education & Training, Positioning, Cognitive/Perceptual Training, Cognitive Reorientation                                               AM-PAC Score        AM-PAC Inpatient Daily Activity Raw Score: 15 (10/19/21 1312)  AM-PAC Inpatient ADL T-Scale Score : 34.69 (10/19/21 1312)  ADL Inpatient CMS 0-100% Score: 56.46 (10/19/21 1312)  ADL Inpatient CMS G-Code Modifier : CK (10/19/21 1312)    Goals  Short term goals  Time Frame for Short term goals: by discharge, pt to demo  Short term goal 1: bed mob tasks with use of rail as needed to min assist of 1. Short term goal 2: increase BUE strength by a 1/2 grade to assist with ADL's and be I with BUE HEP with use of handouts. Short term goal 3: UB ADL to set up and LB ADL to mod assist of 1 and use of AD/AE. Short term goal 4: toileting tasks with use of grab bar/AD or BSC to mod assist of 1. Short term goal 5: ADL transfers and functional mob with AD to min-mod assist of 1. Long term goals  Long term goal 1: Pt to stand with CG and AD phani > 5 mins as able to reduce falls in function. Long term goal 2: Pt/family to be I with EC/WS and fall prevention tech, proper positioning and edema mgt, DME/AE and AD recommendations with use of handouts. Patient Goals   Patient goals : Pt states she wants to have some of this fluid off so she feels better! Therapy Time   Individual Concurrent Group Co-treatment   Time In 12         Time Out 1120         Minutes 12                 Co-treatment with PT warranted secondary to decreased safety and independence requiring 2 skilled therapy professionals to address individual discipline's goals. OT addressing preparation for ADL transfer, sitting balance for increased ADL performance, sitting/activity tolerance, environmental safety/scanning, fall prevention, functional mobility for ADL transfers, ability to sequence and follow directions and functional UE strength. Upon writer exit, call light within reach, pt retired to chair. All lines intact and patient positioned comfortably. Chair alarm in place. All patient needs addressed prior to ending therapy session. Chart reviewed prior to treatment and patient is agreeable for therapy. RN reports patient is medically stable for therapy treatment this date.         GEORGE Charles

## 2021-10-19 NOTE — PROGRESS NOTES
LE edema    Data/  Recent Labs     10/18/21  0710 10/18/21  1907 10/19/21  0338   HGB 8.5* 8.7* 8.5*   HCT 30.6* 31.1* 29.9*     Recent Labs     10/18/21  0710 10/19/21  0338   * 132*   K 4.4 4.8   CL 96* 92*   CO2 24 23   GLUCOSE 99 81   MG 2.0  --    BUN 23* 30*   CREATININE 5.05* 5.59*   LABGLOM 9* 8*   GFRAA 11* 10*         Assessment/   1. End-stage renal disease on hemodialysis currently 15 kg above her admission weight with volume overload and significant hypotension limiting dialysis treatment. Last dialysis 10/16/2021 with 1 kg removed and discontinued due to hypotension. 2.  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 Persistent hypotension  4. Edema, chronic left abdominal wall edema  5. Paroxysmal atrial fibrillation on amiodarone    Plan/     I discussed with patient at bedside and her mother by phone call. It is my opinion that she stands high risk of complications with dialysis due to her persistent low blood pressure in the 70s. Despite measures to manage hypotension with high doses of midodrine, this has not been successful. There is possibly an element of shock, vascular disease, inflammation, right-sided heart failure and calciphylaxis contributing to her hypotension. It will be futile to perform dialysis currently because of high risk for stroke, MI and  cardiac arrest. The patient is quite tearful and emotional during the discussion. She may be considered for ICU transfer for management of hypotension with vasopressors  and limited trial of CVVH or daily frequent dialysis if her blood pressure remains safe to perform dialysis. However I do not think these measures may improve her long-term prognosis which is poor. Palliative care has been consulted. I discussed the case with Dr. Evelyn Márquez who is the primary attending.       Juan Manuel Dahl MD

## 2021-10-19 NOTE — PROGRESS NOTES
[facility]  Office: [address], DO, David Squireslico, DO, Edithliliya Richardson, DO, Luiz Hardy Blood, DO, Cordella Holstein, MD, Abdoulaye Baker MD, Myles Delgado MD, Leighton Valderrama MD, Renella Boeck, MD, Liset Donis MD, Aruna Haywood MD, Anjelica Peterson MD, Liudmila White, DO, Kathy Quinn, DO, Carina Kaufman MD,  Demarcus Humphrey DO, Cayetano Schultz MD, Camila Morgan MD, Geoff Lara MD, Neha Bowen MD, Ruben Dunlap MD, Horacio Whitehead MD, Sierra Aguilera, [facility], [facility], CNP, [facility] العلي, CNP, Lashae Ruiz, CNS, Miguel Hopkins, CNP, Alvina Allen, CNP, Antoinette Zelaya, CNP, Ryan Ocasio, CNP, Sherwin Chinchilla, [facility], Otoniel Hernandez, CNP, Hilton Miner, PA-C, Rocky Kaur, [facility], Amauri Skinner, CNP, Martha Ibarra, CNP, Silas Gutierrez, CNP, Bhanu Lerma, CNP, Kalen Schuster, CNP, Tanner Valentine, CNP, Tee Disla, [facility]    Progress Note    10/19/2021    12:02 PM    Name:   Mally Brown  MRN:     [id number]     Acct:      [de-identified]   Room:   [address] [place] [address] Day:  10  Admit Date:  10/8/2021 12:20 PM    PCP:   Skip Mcnally MD  Code Status:  Full Code    Subjective:     C/C:   Chief Complaint   Patient presents with    Hypotension     Interval History Status: not changed. Remains hypotensive-bp too low to safely do dialysis  Denies cp/sob/n/v    Mainly c/o abd distention and ble swelling    Brief History:     Per my partner:  \"46year-old female with known medical history of hypertension on dialysis, admitted for diabetes presented to the hospital after she was sent from dialysis for blood pressure of systolic less than 70.  Patient states that she was feeling fatigued in the last few days.  After she received her Midrin dose her blood pressure stabilized.  10/9 patient developed atrial flutter, heart rate was elevated up to 120s.  Blood pressure continued to be low 81J systolic.   Cardiology was consulted.    Patient was Fri    calcitRIOL  0.25 mcg Oral Once per day on     atorvastatin  40 mg Oral Nightly    aspirin  81 mg Oral Daily    sodium chloride flush  5-40 mL IntraVENous 2 times per day     Continuous Infusions:    sodium chloride Stopped (10/13/21 1945)    dextrose Stopped (10/10/21 141)     PRN Meds: albuterol sulfate HFA, hydrOXYzine, diphenhydrAMINE, Calcium Acetate (Phos Binder), guaiFENesin, sodium chloride flush, sodium chloride, ondansetron **OR** ondansetron, polyethylene glycol, acetaminophen **OR** acetaminophen, glucose, dextrose, glucagon (rDNA), dextrose    Data:     Past Medical History:   has a past medical history of Asthma, CH[facility]congestive [facility] failure) [facility] Chronic kidney disease, COPD (chronic obstructive pulmonary disease) ([facility] Dialysis patient ([facility] Hemodialysis patient ([facility] Hyperlipidemia, Hypertension, Obesity, YONI (obstructive sleep apnea), Pneumonia, Renal failure, Type II or unspecified type diabetes mellitus without mention of complication, not stated as uncontrolled, and Ventilator dependence ([facility] Social History:   reports that she quit smoking about 4 years ago. Her smoking use included cigarettes. She quit after 7.00 years of use. She has never used smokeless tobacco. She reports that she does not drink alcohol and does not use drugs.      Family History:   Family History   Problem Relation Age of Onset    Diabetes Other     Cancer Mother         BREAST    Heart Disease Father         CAD-MI    Diabetes Father     High Blood Pressure Father     Diabetes Maternal Grandfather     Diabetes Paternal Grandfather     Heart Disease Paternal Grandfather     High Blood Pressure Paternal Grandfather        Vitals:  BP (!) 72/53   Pulse 75   Temp 97.3 °F (36.3 °C) (Axillary)   Resp 18   Ht 4' 11\" (1.499 m)   Wt 274 lb 8 oz (124.5 kg)   LMP 2014 (Approximate)   SpO2 98%   BMI 55.44 kg/m²   Temp (24hrs), Av.5 °F (36.4 °C), Min:97.3 °F (36.3 °C), Max:98.1 °F (36.7 °C)    Recent Labs     10/18/21  1640 10/18/21  2042 10/19/21  0828 10/19/21  1104   POCGLU 106* 95 73 155*       I/O (24Hr): No intake or output data in the 24 hours ending 10/19/21 1202    Labs:  Hematology:  Recent Labs     10/18/21  0710 10/18/21  1907 10/19/21  0338   HGB 8.5* 8.7* 8.5*   HCT 30.6* 31.1* 29.9*     Chemistry:  Recent Labs     10/18/21  0710 10/19/21  0338   * 132*   K 4.4 4.8   CL 96* 92*   CO2 24 23   GLUCOSE 99 81   BUN 23* 30*   CREATININE 5.05* 5.59*   MG 2.0  --    ANIONGAP 14 17   LABGLOM 9* 8*   GFRAA 11* 10*   CALCIUM 7.6* 8.2*     Recent Labs     10/18/21  0738 10/18/21  1226 10/18/21  1640 10/18/21  2042 10/19/21  0828 10/19/21  1104   POCGLU 103 92 106* 95 73 155*     ABG:  Lab Results   Component Value Date    POCPH 7.22 06/12/2016    POCPCO2 61 06/12/2016    POCPO2 68 06/12/2016    POCHCO3 24.8 06/12/2016    NBEA 3 06/12/2016    PBEA NOT REPORTED 06/12/2016    SIE9YWD 27 06/12/2016    RKEV6JDY 88 06/12/2016    FIO2 NOT REPORTED 07/01/2019     Lab Results   Component Value Date/Time    SPECIAL RT WRIST 5ML 10/09/2021 12:15 PM     Lab Results   Component Value Date/Time    CULTURE NO GROWTH 6 DAYS 10/09/2021 12:15 PM       Radiology:  CT ABDOMEN PELVIS WO CONTRAST Additional Contrast? Radiologist Recommendation    Result Date: 10/17/2021  Pericardial effusion, only partially imaged. This measures up to 1.8 cm along the right-sided heart. Consider echocardiogram for further evaluation, especially if concern for decreased cardiac function as a result. Only small amount of ascites, mostly in the right upper quadrant. Severe diffuse body wall edema, increased from August 2021 Small bilateral pleural effusions.        Physical Examination:        General appearance:  alert, cooperative and no distress  Mental Status:  oriented to person, place and time and normal affect  Lungs:  clear to auscultation bilaterally, normal effort  Heart:  regular rate and rhythm, no murmur  Abdomen:  nontender, distended, normal bowel sounds, no masses, hepatomegaly, splenomegaly; obese  Extremities:  no redness, tenderness in the calves; 1-2+ ble edema  Skin:  no gross lesions, rashes, induration    Assessment:        Hospital Problems         Last Modified POA    * (Principal) Hypotension 10/9/2021 Yes    CKD (chronic kidney disease) stage 4, GFR 15-29 ml/min (HCC) (Chronic) 10/8/2021 Yes    Pulmonary hypertension ([address] (Chronic) 10/8/2021 Yes    Morbid obesity with BMI of 45.0-49.9, adult ([address] 10/8/2021 Yes    Essential hypertension 10/8/2021 Yes    YONI on CPAP 10/11/2021 Yes    Type 2 diabetes mellitus with chronic kidney disease on chronic dialysis ([address] 10/8/2021 Yes    Chronic obstructive pulmonary disease ([address] 10/8/2021 Yes    Gastroesophageal reflux disease without esophagitis 10/8/2021 Yes    ESRD needing dialysis ([address] 10/8/2021 Yes    Atrial flutter with rapid ventricular response ([address] 10/10/2021 Yes    Hematochezia 10/10/2021 Yes    Anemia due to chronic kidney disease 10/10/2021 Yes    Itching 10/11/2021 Yes    Fluid overload 10/17/2021 Yes    Pre-op chest exam 10/17/2021 Yes          Plan:        D/w Dr Yaa Stratton, the nephrology 2nd opinion: the only other thing he suggests we could try is placing her on pressors and doing dialysis  I d/w her this option and she would like to try that.   She understands that this would really not solve the problem, just a temporary fix  Get central line placed, transfer to icu and start pressors  Poor prognosis    Blenda Boards Blood, DO  10/19/2021  12:02 PM

## 2021-10-19 NOTE — PLAN OF CARE
Problem: Pain:  Goal: Patient's pain/discomfort is manageable  Description: Patient's pain/discomfort is manageable  Outcome: Ongoing  Goal: Pain level will decrease  Description: Pain level will decrease  Outcome: Ongoing  Goal: Control of acute pain  Description: Control of acute pain  Outcome: Ongoing  Goal: Control of chronic pain  Description: Control of chronic pain  Outcome: Ongoing     Problem: Falls - Risk of:  Goal: Will remain free from falls  Description: Will remain free from falls  Outcome: Ongoing  Goal: Absence of physical injury  Description: Absence of physical injury  Outcome: Ongoing     Problem:  Activity:  Goal: Fatigue will decrease  Description: Fatigue will decrease  10/18/2021 1345 by Gerardo Swain RN  Outcome: Ongoing  Goal: Risk for activity intolerance will decrease  Description: Risk for activity intolerance will decrease  10/18/2021 1345 by Gerardo Swain RN  Outcome: Ongoing     Problem: Fluid Volume:  Goal: Will show no signs or symptoms of fluid imbalance  Description: Will show no signs or symptoms of fluid imbalance  10/18/2021 1345 by Gerardo Swain RN  Outcome: Ongoing     Problem: Health Behavior:  Goal: Ability to manage health-related needs will improve  Description: Ability to manage health-related needs will improve  Outcome: Ongoing  Goal: Identification of resources available to assist in meeting health care needs will improve  Description: Identification of resources available to assist in meeting health care needs will improve  Outcome: Ongoing

## 2021-10-19 NOTE — PROGRESS NOTES
Spoke with nurse regarding orders for central line and nontunneled dialysis catheter. Nurse checked with Dr. Green if trialysis catheter ok. Shade Sawyer RN stated that Dr. Green said ok to put in the trialysis catheter.

## 2021-10-19 NOTE — PROGRESS NOTES
.. PALLIATIVE CARE NURSING ASSESSMENT    Patient: Dioni Rangel  Room: 8291/3431-04    Reason For Consult   Goals of care evaluation  Distress management  Guidance and support  Facilitate communications  Assistance in coordinating care    Code Status: Full Code      Impression: Dioni Rangel is a 46y.o. year old female  has a past medical history of Asthma, CHF (congestive heart failure) ([facility] Chronic kidney disease, COPD (chronic obstructive pulmonary disease) ([facility] Dialysis patient ([facility] Hemodialysis patient ([facility] Hyperlipidemia, Hypertension, Obesity, YONI (obstructive sleep apnea), Pneumonia, Renal failure, Type II or unspecified type diabetes mellitus without mention of complication, not stated as uncontrolled, and Ventilator dependence ([facility] .  Currently hospitalized for the management of hypotension. The Palliative Care Team is following to assist with support/goals of care. Vital Signs  Blood pressure (!) 72/53, pulse 75, temperature 97.3 °F (36.3 °C), temperature source Axillary, resp. rate 18, height 4' 11\" (1.499 m), weight 274 lb 8 oz (124.5 kg), last menstrual period 11/12/2014, SpO2 98 %, not currently breastfeeding.     Patient Active Problem List   Diagnosis    Asthma    YONI (obstructive sleep apnea)    Left knee pain    Hidradenitis    Current smoker    Microalbuminuria    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)    CKD (chronic kidney disease) stage 4, GFR 15-29 ml/min (HCC)    Acute diastolic congestive heart failure (HCC)    Hyperkalemia    Acute systolic congestive heart failure (HCC)    Pulmonary hypertension ([facility]    Morbid obesity with BMI of 45.0-49.9, adult ([facility]    Acute on chronic respiratory failure with hypoxia (HCC)    COPD exacerbation (HCC)    Asthma exacerbation    Type 2 diabetes mellitus with diabetic nephropathy ([facility]    ESRD (end stage renal disease) on dialysis ([facility]    Bronchitis    Essential hypertension    YONI on CPAP    Hyperglycemia, drug-induced    Needs smoking cessation education    Hyperglycemia    Drowsiness    Acute on chronic respiratory failure with hypercapnia (HCC)    Mobility impaired    S/P cardiac cath-mINIMAL caD 3/15/16 - dR. Laverne Bullock    Type 2 diabetes mellitus with chronic kidney disease on chronic dialysis ([facility]    Type 2 diabetes mellitus without complication (HCC)    Congestive heart failure (HCC)    Asthma with COPD with exacerbation (HCC)    Acute exacerbation of CHF (congestive heart failure) (HCC)    Chronic obstructive pulmonary disease (HCC)    Chronic kidney disease, stage V (HCC)    Acute respiratory acidosis    Precordial pain    Gastroesophageal reflux disease without esophagitis    Pulmonary HTN ([facility]    Morbid obesity due to excess calories (HCC)    Symptomatic anemia    Elevated serum creatinine    ESRD needing dialysis ([facility]    Chest pain at rest    Absolute anemia    Atrial flutter with rapid ventricular response (HCC)    Chest pain    Hypoglycemia    Hypotension    Atrial fibrillation with RVR (HCC)    Hematochezia    Anemia due to chronic kidney disease    Itching    Fluid overload    Pre-op chest exam       Palliative Interaction: Pt sitting in chair, finishing lunch. Pt is alert and oriented and does not have any visitors present. I re-introduced myself to her from yesterday and she remembers me. She tells me a new nephrologist came to see her today and she has decided to \"get transferred to ICU to get blood pressure medicine to keep her blood pressure up during dialysis\". Pt states, \"I have to try something, otherwise i'm going to die\". Pt states she is going to have an \"IV put in my neck\" and started on blood pressure medicine. Pt states, \"I hope they can get some of this fluid off my belly, it's wearing me out\". Pt talks of 2 other times she has been on life support and \"survived\" it. Emotional support offered.      Goals/Plan of care  Education/support to patient  Discharge planning/helping to coordinate care  Communications with primary service  Providing support for coping/adaptation/distress of patient  Discussing meaning/purpose   Continue with current plan of care  Clarification of medical condition to patient and family  Code status clarified: Full Code  continue talks of goals of care with patient.  follow for support      Palliative Care Coordinator  KENNETH [facility] KAREN [place], [address] [facility] Office: [address] [facility] Office: [phone]    For [facility] Management [facility] scheduling please call [phone]

## 2021-10-19 NOTE — PLAN OF CARE
Problem: Infection:  Goal: Will remain free from infection  Description: Will remain free from infection  Outcome: Ongoing     Problem: Daily Care:  Goal: Daily care needs are met  Description: Daily care needs are met  Outcome: Ongoing     Problem: Pain:  Goal: Patient's pain/discomfort is manageable  Description: Patient's pain/discomfort is manageable  10/19/2021 1044 by Jose Whitehead RN  Outcome: Ongoing  10/19/2021 0322 by Sudhir Putnam RN  Outcome: Ongoing  Goal: Pain level will decrease  Description: Pain level will decrease  10/19/2021 1044 by Jose Whitehead RN  Outcome: Ongoing  10/19/2021 0322 by Sudhir Putnam RN  Outcome: Ongoing  Goal: Control of acute pain  Description: Control of acute pain  10/19/2021 1044 by Jose Whitehead RN  Outcome: Ongoing  10/19/2021 0322 by Sudhir Putnam RN  Outcome: Ongoing  Goal: Control of chronic pain  Description: Control of chronic pain  10/19/2021 1044 by Jose Whitehead RN  Outcome: Ongoing  10/19/2021 0322 by Sudhir Putnam RN  Outcome: Ongoing     Problem: Discharge Planning:  Goal: Patients continuum of care needs are met  Description: Patients continuum of care needs are met  Outcome: Ongoing     Problem: Falls - Risk of:  Goal: Will remain free from falls  Description: Will remain free from falls  10/19/2021 1044 by Jose Whitehead RN  Outcome: Ongoing  10/19/2021 0322 by Sudhir Putnam RN  Outcome: Ongoing  Goal: Absence of physical injury  Description: Absence of physical injury  10/19/2021 1044 by Jose Whitehead RN  Outcome: Ongoing  10/19/2021 0322 by Sudhir Putnam RN  Outcome: Ongoing     Problem: ABCDS Injury Assessment  Goal: Absence of physical injury  Outcome: Ongoing     Problem:  Activity:  Goal: Fatigue will decrease  Description: Fatigue will decrease  Outcome: Ongoing  Goal: Risk for activity intolerance will decrease  Description: Risk for activity intolerance will decrease  Outcome: Ongoing     Problem: Coping:  Goal: Ability to cope will improve  Description: Ability to cope will improve  Outcome: Ongoing     Problem: Fluid Volume:  Goal: Will show no signs or symptoms of fluid imbalance  Description: Will show no signs or symptoms of fluid imbalance  Outcome: Ongoing     Problem: Health Behavior:  Goal: Ability to manage health-related needs will improve  Description: Ability to manage health-related needs will improve  10/19/2021 1044 by Geovanni Tobar RN  Outcome: Ongoing  10/19/2021 0322 by Kim Thomas RN  Outcome: Ongoing  Goal: Identification of resources available to assist in meeting health care needs will improve  Description: Identification of resources available to assist in meeting health care needs will improve  10/19/2021 1044 by Geovanni Tobar RN  Outcome: Ongoing  10/19/2021 0322 by Kim Thomas RN  Outcome: Ongoing     Problem: Cardiac Output - Decreased:  Goal: Avoidance of environmental tobacco smoke  Description: Absence of orthostatic hypotension  Outcome: Ongoing  Goal: Cardiac output within specified parameters  Description: Cardiac output within specified parameters  Outcome: Ongoing  Goal: Hemodynamic stability will improve  Description: Hemodynamic stability will improve  Outcome: Ongoing

## 2021-10-19 NOTE — PROGRESS NOTES
[facility]   Progress Note                   Date:   10/19/2021  Patient name: Mary Subramanian  Date of admission:  10/8/2021 12:20 PM  MRN:   [id number]  YOB: 1970  PCP: Ashli Jacob MD    Reason for Admission:     Subjective:       Clinical Changes / Abnormalities:  Pt seen and examined. BP low. Therefore has not been able to get HD. She is s/p echo- results as below. Denies any CP. Continues to have SOB, abdominal fullness. Medications:   Scheduled Meds:   sodium thiosulfate IVPB  25 g IntraVENous Once per day on Mon Wed Fri    apixaban  5 mg Oral BID    polyethylene glycol  2,000 mL Oral Once    Hydrocortisone-Aloe Vera   Topical BID    gabapentin  100 mg Oral TID    sennosides-docusate sodium  2 tablet Oral BID    amiodarone  200 mg Oral BID    midodrine  15 mg Oral 4x Daily    Calcium Acetate (Phos Binder)  2 capsule Oral TID WC    pantoprazole  40 mg Oral QAM AC    budesonide-formoterol  2 puff Inhalation BID    cinacalcet  90 mg Oral Once per day on Mon Wed Fri    calcitRIOL  0.25 mcg Oral Once per day on Mon Wed Fri    atorvastatin  40 mg Oral Nightly    aspirin  81 mg Oral Daily    sodium chloride flush  5-40 mL IntraVENous 2 times per day     Continuous Infusions:   sodium chloride Stopped (10/13/21 1945)    dextrose Stopped (10/10/21 1415)     CBC:   Recent Labs     10/18/21  0710 10/18/21  1907 10/19/21  0338   HGB 8.5* 8.7* 8.5*     BMP:    Recent Labs     10/18/21  0710 10/19/21  0338   * 132*   K 4.4 4.8   CL 96* 92*   CO2 24 23   BUN 23* 30*   CREATININE 5.05* 5.59*   GLUCOSE 99 81     Hepatic: No results for input(s): AST, ALT, ALB, BILITOT, ALKPHOS in the last 72 hours. Troponin: No results for input(s): TROPONINI in the last 72 hours. BNP: No results for input(s): BNP in the last 72 hours. Lipids: No results for input(s): CHOL, HDL in the last 72 hours.     Invalid input(s): LDLCALCU  INR: No results for input(s): INR in the last 72 hours. DIAGNOSTIC DATA  EKG Sinus rhythm with occasional Premature ventricular complexes  Low voltage QRS  Septal infarct (cited on or before 20-AUG-2021)  Possible Lateral infarct (cited on or before 20-AUG-2021)    ECHO PENDING    ECHO 2D ECHO( 8/20/2021)  Mild left ventricular hypertrophy  Global left ventricular systolic function is normal  Estimated ejection fraction is 65 % . Right atrium is severely dilated . Right ventricular with severe dilatation with severely reduced systolic  function. Severe tricuspid regurgitation. Moderate to severe pulmonary hypertension. No pericardial effusion seen. Normal aortic root dimension. CT ABDOMEN PELVIS WO CONTRAST  Pericardial effusion, only partially imaged.  This measures up to 1.8 cm   along the right-sided heart.  Consider echocardiogram for further evaluation,   especially if concern for decreased cardiac function as a result.       Only small amount of ascites, mostly in the right upper quadrant.       Severe diffuse body wall edema, increased from August 2021       Small bilateral pleural effusions. Limited Echo 10/18/21:  Technically difficult study  Left ventricle is normal in size  Global left ventricular systolic function is moderately reduced EF 40-45%  Abnormal septal wall motions  Leftward compression of inter-ventricular septum (\"D-sign\") indicating RV  pressure overload. Right atrium is moderate to severely dilated . Right ventricular dilatation with reduced systolic function. AV, MV structure appears normal.  Severe tricuspid regurgitation. Moderate pulmonary hypertension with an estimated right ventricular systolic  pressure of 52 mmHg. Trivial pericardial effusion.     Objective:   Vitals: BP (!) 72/53   Pulse 75   Temp 97.3 °F (36.3 °C) (Axillary)   Resp 18   Ht 4' 11\" (1.499 m)   Wt 274 lb 8 oz (124.5 kg)   LMP 11/12/2014 (Approximate)   SpO2 98%   BMI 55.44 kg/m²   General appearance: alert and cooperative with exam  HEENT: Head: Normocephalic, no lesions, without obvious abnormality. Neck: no adenopathy, no carotid bruit, no JVD, supple, symmetrical, trachea midline and thyroid not enlarged, symmetric, no tenderness/mass/nodules  Lungs: clear to auscultation bilaterally  Heart: regular rate and rhythm, S1, S2 normal, no murmur, click, rub or gallop  SR on tele HR 75  Abdomen: abdominal distension. Extremities: extremities normal, atraumatic, no cyanosis or edema  Neurologic: Mental status: Alert, oriented, thought content appropriate      Assessment / Acute Cardiac Problems:   1. Acute on chronic combined CHF- mostly R CHF / Cor Pulmonale from severe obesity/YONI/ESRD on HD. She if 40 # above her dry weight  2. Mildly reduced LVEF Echo 10/18/21- 45% - last echo normal EF- however echos in past showed EF 45%  3. Pericardial effusion- trivial on Echo 10/18/21  4. PAF now back in NSR on Amiodarone PO  5. Hypotension - unable to tolerate HD  6. DM  7. HLP  8. YONI  9. ESRD on HD  10. Anemia and GI bleed. 11. Obesity    Plan of Treatment:   1. There is plan for tx to ICU and proceed with HD with pressor support- per nephro, I support this   2. Ultimately she will need ischemia evaluation for LVEF 45%, however, she is extremely volume overloaded, so will consider once more euvolemic   3. Continue amiodarone 200 mg po bid and Eliquis. 4. Anemia followed by GI and Primary team.   5. ESRD on HD  - Nephrology. Discussed with patient and nursing in detail. Thank you for allowing me to participate in the care of this patient, please do not hesitate to call if you have any questions. Marleen Zafar DO, [facility] [place], [address], [address], [place] [address] [facility]  [facility]. [facility]  [phone]

## 2021-10-20 NOTE — PROGRESS NOTES
RN contacted Dr. Sudhir Shepard to update him regarding Rt IJ being re-sutured with plan to place new HD catheter in the morning. Dr. Sudhir Shepard expressed that new line should be placed tonight due to patient condition and cannot wait until the morning. RN called IR and spoke with Dr. Chris Gates and explained the position of Dr. Sudhir Shepard.  Dr. Chris Gates asked RN to call Dr. Sudhir Shepard back and explain that no IR room was available for at least an hour and that since the catheter is functional, even if it is still oozing blood slightly, that a new line could be placed tomorrow morning first thing. RN again spoke with Dr. Sudhir Shepard and explained Dr. Kassie Low position. As RN was speaking with Dr. Sudhir Shepard the IR department called and stated that they were clearing out their room and that they would be placing a new line tonight. Will continue to monitor.

## 2021-10-20 NOTE — PROGRESS NOTES
Writer paged Dr. Bret Simmons with pt's current condition and most recent labs for initiation of CRRT. Dr. Bret Simmons stated the patient will not begin CRRT tonight as we are going to focus on stabilizing her BP with pressors & her labs are stable.  Pt will be reevaluated in the morning by the nephrology team.

## 2021-10-20 NOTE — PROGRESS NOTES
Pt alert & oriented, vital signs stable with the exception of hypotension. Levophed started at 2 mcg/min. Pt brought over on 5L NC, respiratory decreased to 3L. Will continue to monitor.

## 2021-10-20 NOTE — PROGRESS NOTES
Patient transferred to ICU room 1107 via bed per doctor's orders. Report given to [facility]. All personal belongings accounted for by patient and family.

## 2021-10-20 NOTE — PROGRESS NOTES
[facility]   Progress Note                   Date:   10/20/2021  Patient name: Shanice Smith  Date of admission:  10/8/2021 12:20 PM  MRN:   [id number]  YOB: 1970  PCP: La Garcia MD    Reason for Admission:     Subjective:       Clinical Changes / Abnormalities:  Pt seen and examined in room with RN. Eating lunch, states she is fatigued and tired. Tx to ICU yesterday. Tunneled cath placed. Started CRRT about 1 hour ago. Remains on IV Levo. Denies chest pain. Medications:   Scheduled Meds:   sodium thiosulfate IVPB  25 g IntraVENous Once per day on Mon Wed Fri    apixaban  5 mg Oral BID    polyethylene glycol  2,000 mL Oral Once    Hydrocortisone-Aloe Vera   Topical BID    gabapentin  100 mg Oral TID    sennosides-docusate sodium  2 tablet Oral BID    amiodarone  200 mg Oral BID    midodrine  15 mg Oral 4x Daily    pantoprazole  40 mg Oral QAM AC    budesonide-formoterol  2 puff Inhalation BID    cinacalcet  90 mg Oral Once per day on Mon Wed Fri    atorvastatin  40 mg Oral Nightly    aspirin  81 mg Oral Daily    sodium chloride flush  5-40 mL IntraVENous 2 times per day     Continuous Infusions:   sodium chloride 100 mL/hr at 10/20/21 1101    prismaSATE BGK 2/0      norepinephrine 12 mcg/min (10/20/21 1039)    sodium chloride Stopped (10/13/21 1945)    dextrose Stopped (10/10/21 1415)     CBC:   Recent Labs     10/19/21  1245 10/19/21  1916 10/20/21  0422   WBC 7.4  --  8.5   HGB 8.6* 8.3* 8.6*   *  --  148     BMP:    Recent Labs     10/18/21  0710 10/19/21  0338 10/20/21  0422   * 132* 131*   K 4.4 4.8 5.7*   CL 96* 92* 93*   CO2 24 23 24   BUN 23* 30* 44*   CREATININE 5.05* 5.59* 6.60*   GLUCOSE 99 81 122*     Hepatic:   Recent Labs     10/20/21  0422   AST 24   ALT 15   BILITOT 0.47   ALKPHOS 164*     Troponin: No results for input(s): TROPONINI in the last 72 hours. BNP: No results for input(s): BNP in the last 72 hours.   Lipids: No results for input(s): CHOL, HDL in the last 72 hours. Invalid input(s): LDLCALCU  INR:   Recent Labs     10/19/21  1245   INR 1.8       DIAGNOSTIC DATA  EKG Sinus rhythm with occasional Premature ventricular complexes  Low voltage QRS  Septal infarct (cited on or before 20-AUG-2021)  Possible Lateral infarct (cited on or before 20-AUG-2021)    ECHO PENDING    ECHO 2D ECHO( 8/20/2021)  Mild left ventricular hypertrophy  Global left ventricular systolic function is normal  Estimated ejection fraction is 65 % . Right atrium is severely dilated . Right ventricular with severe dilatation with severely reduced systolic  function. Severe tricuspid regurgitation. Moderate to severe pulmonary hypertension. No pericardial effusion seen. Normal aortic root dimension. CT ABDOMEN PELVIS WO CONTRAST  Pericardial effusion, only partially imaged.  This measures up to 1.8 cm   along the right-sided heart.  Consider echocardiogram for further evaluation,   especially if concern for decreased cardiac function as a result.       Only small amount of ascites, mostly in the right upper quadrant.       Severe diffuse body wall edema, increased from August 2021       Small bilateral pleural effusions. Limited Echo 10/18/21:  Technically difficult study  Left ventricle is normal in size  Global left ventricular systolic function is moderately reduced EF 40-45%  Abnormal septal wall motions  Leftward compression of inter-ventricular septum (\"D-sign\") indicating RV  pressure overload. Right atrium is moderate to severely dilated . Right ventricular dilatation with reduced systolic function. AV, MV structure appears normal.  Severe tricuspid regurgitation. Moderate pulmonary hypertension with an estimated right ventricular systolic  pressure of 52 mmHg. Trivial pericardial effusion.     Objective:   Vitals: /63   Pulse 61   Temp 97.9 °F (36.6 °C) (Temporal)   Resp 26   Ht 4' 11\" (1.499 m)   Wt 285 lb 9.6 oz (129.5 kg)   LMP 11/12/2014 (Approximate)   SpO2 93%   BMI 57.68 kg/m²   General appearance: alert and cooperative with exam  HEENT: Head: Normocephalic, no lesions, without obvious abnormality. Neck: no adenopathy, no carotid bruit, no JVD, supple, symmetrical, trachea midline and thyroid not enlarged, symmetric, no tenderness/mass/nodules  Lungs: clear to auscultation bilaterally  Heart: regular rate and rhythm, S1, S2 normal, no murmur, click, rub or gallop  SR on tele HR 75  Abdomen: abdominal distension. Extremities: extremities normal, atraumatic, no cyanosis or edema  Neurologic: Mental status: Alert, oriented, thought content appropriate      Assessment / Acute Cardiac Problems:   1. Acute on chronic combined CHF- mostly R CHF / Cor Pulmonale from severe obesity/YONI/ESRD on HD. She if 40 # above her dry weight  2. Mildly reduced LVEF Echo 10/18/21- 45% - last echo normal EF- however echos in past showed EF 45%  3. Pericardial effusion- trivial on Echo 10/18/21  4. PAF now back in NSR on Amiodarone PO  5. Hypotension - unable to tolerate HD  6. DM  7. HLP  8. YONI  9. ESRD on HD  10. Anemia and GI bleed. 11. Obesity    Plan of Treatment:   1. Stable from CV standpoint. Levo to keep MAP >65 and/or SBP >90mmHg   2. Ultimately she will need ischemia evaluation for LVEF 45%, however given present volume overload and renal disease will plan once acute issues stabilize. 3. Continue amiodarone 200 mg po bid and Eliquis. 4. Anemia followed by GI and Primary team.   5. ESRD on HD  - Nephrology.      VENTURA Negrete - CNP

## 2021-10-20 NOTE — PROGRESS NOTES
Nephrology Progress Note    Subjective/   46y.o. year old female who we are seeing as  a second opinion at patients  Request.    Patient was admitted 10/8/2021 with volume overload, hypotension and difficulty removing fluid in her outpatient unit. She has been diagnosed with calciphylaxis and was receiving treatments in the outpatient. She has been unable to undergo dialysis due to persistent hypotension. Systolic blood pressures have been in the 70s over the last 2 to 3 days. Patient has multiple medical conditions with significant right heart failure, calciphylaxis, vascular disease and possibly some degree of shock. Her last dialysis was on 10/16/2021which was terminated due to hypotension. I discussed with the patient that with a systolic blood pressure of 73/49 and persistent hypotension, she stands fairly high risk for cardiac arrest, myocardial infarction and stroke if dialysis is attempted. This had been further explained to the patient by her primary nephrologist.  The patient had requested to transfer to [facility] for second opinion but there are no beds available. Hence we have been consulted for a second opinion. Interval hx:  Patient seen and examined in ICU,  Patient c/o worsening edema  And fluid retention, breathing is stable. K 5.7-- Insulin d50 ordered. SBP averaging 90's-100's with 10 mcg/min of levophed. I have explained the patient that because of hemodynamic instability we will attempt CRRT and she would like to try it.       Objective/     Vitals:    10/20/21 0500 10/20/21 0619 10/20/21 0630 10/20/21 0830   BP: (!) 117/91 113/71 115/63    Pulse: 77  61    Resp: 23  26    Temp:    97.9 °F (36.6 °C)   TempSrc:    Temporal   SpO2: (!) 89%  90%    Weight: 285 lb 9.6 oz (129.5 kg)      Height:         24HR INTAKE/OUTPUT:      Intake/Output Summary (Last 24 hours) at 10/20/2021 0910  Last data filed at 10/20/2021 0518  Gross per 24 hour   Intake 78 ml   Output --   Net 78 ml     Patient Vitals for the past 96 hrs (Last 3 readings):   Weight   10/20/21 0500 285 lb 9.6 oz (129.5 kg)   10/18/21 0553 274 lb 8 oz (124.5 kg)   10/17/21 0543 273 lb 5 oz (124 kg)       Constitutional:  Alert, awake, no apparent distress  Cardiovascular:  S1, S2 without m/r/g  Respiratory:  CTA B without w/r/r, diminished BS bases  Abdomen: +bs, from significant abdominal distention body wall edema  Ext: 2+ LE edema    Data/  Recent Labs     10/19/21  1245 10/19/21  1916 10/20/21  0422   WBC 7.4  --  8.5   HGB 8.6* 8.3* 8.6*   HCT 30.7* 29.5* 30.7*   .7*  --  107.0*   *  --  148     Recent Labs     10/18/21  0710 10/19/21  0338 10/20/21  0422   * 132* 131*   K 4.4 4.8 5.7*   CL 96* 92* 93*   CO2 24 23 24   GLUCOSE 99 81 122*   MG 2.0  --   --    BUN 23* 30* 44*   CREATININE 5.05* 5.59* 6.60*   LABGLOM 9* 8* 7*   GFRAA 11* 10* 8*         Assessment/   1. End-stage renal disease on hemodialysis currently 15 kg above her admission weight with volume overload and significant hypotension limiting dialysis treatment. Last dialysis 10/16/2021   2. 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 Persistent hypotension  4. Edema, chronic left abdominal wall edema  5. Paroxysmal atrial fibrillation on amiodarone  6. Hyperkalemia  7. Anemia of chronic kidney disease. Plan/     I discussed with patient at bedside  Patient is on levophed 9 mcg/min ggt, SBP in 90's. We will attempt CRRT. Insulin d50. Palliative care has been consulted. Continue Levophed to keep MAP above 65 mm hg or SBP above 90 mm hg.  MARYAM Sorensen MD  Nephrology Attending.

## 2021-10-20 NOTE — PROGRESS NOTES
[facility]  Office: [address], DO, Maribell Din, DO, Ilya Acron, DO, Colby Dempsey Blood, DO, Emigdio Fagan MD, Josse Hough MD, Nona Prater MD, Akosua Mckeon MD, Belle Diez MD, Higinio Black MD, Bentley Santoyo MD, Lori Chavez MD, Seymour Kramer, DO, Balbir Ho, DO, Vangie Garcia MD,  Minor Oneil, DO, Tim Griffiths MD, Laura Damon MD, Evelina Pabon MD, Anthony Hernandez MD, Shaun Lopez MD, Radha Caba MD, Sarah Thompson, [facility], [facility], CNP, Margarette Mckinney, CNP, Mary Marquez, CNS, Myles Ramirez, CNP, Karen Stock, CNP, Adelia Alpers, CNP, Nathaniel Wood, CNP, Amanda Polo, CNP, Josephine Ramirez, CNP, Thomas Gaitan PA-C, Janene Balbuena, [facility], Eric Awan, CNP, Theo Scott, CNP, Lenin Briggs, CNP, Aidee Robison, CNP, Haley Mcbride, CNP, Kan Kunz, [facility], [facility]    Progress Note    10/20/2021    9:29 AM    Name:   Jessica Chino  MRN:     [id number]     Acct:      [de-identified]   Room:   [address] [place] [address] Day:  11  Admit Date:  10/8/2021 12:20 PM    PCP:   Telma Cota MD  Code Status:  Full Code    Subjective:     C/C:   Chief Complaint   Patient presents with    Hypotension     Interval History Status: not changed. Remains hypotensive- now on levophed bp is high enough for dialysis  Denies cp/sob/n/v    Mainly c/o abd distention and ble swelling    Brief History:     Per my partner:  \"46year-old female with known medical history of hypertension on dialysis, admitted for diabetes presented to the hospital after she was sent from dialysis for blood pressure of systolic less than 70.  Patient states that she was feeling fatigued in the last few days.  After she received her Midrin dose her blood pressure stabilized.  10/9 patient developed atrial flutter, heart rate was elevated up to 120s.  Blood pressure continued to be low 63T systolic.   Cardiology was consulted.   Patient was started on amiodarone and heparin. Patient noted to have recent EGD showing duodenal polyps, and was sent to colonoscopy outpatient patient did not follow-up. Patient will need GI clearance for anticoagulation . Despite multiple attempts for colonoscopy, colonoscopy could not be done given patient's chronic hypotension. Anesthesia was not willing to go ahead for the procedure. Eliquis was started and patient's hemoglobin was monitored for 2 days. Plan was to discharge with patient had severe abdominal distention, nephrology recommended dialysis. Dialysis could not be done on Sunday as dialysis nurse was not available.     Now patient requesting SNF placement with dialysis in house     Discharge planning delayed given multiple complexities\"    Review of Systems:     Constitutional:  negative for chills, fevers, sweats  Respiratory:  negative for cough, dyspnea on exertion, shortness of breath, wheezing  Cardiovascular:  negative for chest pain, chest pressure/discomfort, palpitations  Gastrointestinal:  negative for abdominal pain, constipation, diarrhea, nausea, vomiting  Neurological:  negative for dizziness, headache    Medications: Allergies:     Allergies   Allergen Reactions    Ibuprofen     Tramadol Hives    Motrin [Ibuprofen Micronized] Itching    Strawberry Extract Itching and Rash    Tape [Adhesive Tape] Rash     No paper tape silk only       Current Meds:   Scheduled Meds:    insulin regular  10 Units IntraVENous Once    dextrose  25 g IntraVENous Once    sodium thiosulfate IVPB  25 g IntraVENous Once per day on Mon Wed Fri    apixaban  5 mg Oral BID    polyethylene glycol  2,000 mL Oral Once    Hydrocortisone-Aloe Vera   Topical BID    gabapentin  100 mg Oral TID    sennosides-docusate sodium  2 tablet Oral BID    amiodarone  200 mg Oral BID    midodrine  15 mg Oral 4x Daily    Calcium Acetate (Phos Binder)  2 capsule Oral TID WC    pantoprazole  40 mg Oral QAM AC    budesonide-formoterol  2 puff Inhalation BID    cinacalcet  90 mg Oral Once per day on Mon Wed Fri    calcitRIOL  0.25 mcg Oral Once per day on Mon Wed Fri    atorvastatin  40 mg Oral Nightly    aspirin  81 mg Oral Daily    sodium chloride flush  5-40 mL IntraVENous 2 times per day     Continuous Infusions:    norepinephrine 10 mcg/min (10/20/21 0035)    sodium chloride Stopped (10/13/21 1945)    dextrose Stopped (10/10/21 1415)     PRN Meds: HYDROcodone 5 mg - acetaminophen, acetaminophen, albuterol sulfate HFA, hydrOXYzine, diphenhydrAMINE, Calcium Acetate (Phos Binder), guaiFENesin, sodium chloride flush, sodium chloride, ondansetron **OR** ondansetron, polyethylene glycol, [DISCONTINUED] acetaminophen **OR** acetaminophen, glucose, dextrose, glucagon (rDNA), dextrose    Data:     Past Medical History:   has a past medical history of Asthma, CHF (congestive heart failure) ([address] Chronic kidney disease, COPD (chronic obstructive pulmonary disease) ([facility] Dialysis patient ([facility] Hemodialysis patient ([facility] Hyperlipidemia, Hypertension, Obesity, YONI (obstructive sleep apnea), Pneumonia, Renal failure, Type II or unspecified type diabetes mellitus without mention of complication, not stated as uncontrolled, and Ventilator dependence ([facility] Social History:   reports that she quit smoking about 4 years ago. Her smoking use included cigarettes. She quit after 7.00 years of use. She has never used smokeless tobacco. She reports that she does not drink alcohol and does not use drugs.      Family History:   Family History   Problem Relation Age of Onset    Diabetes Other     Cancer Mother         BREAST    Heart Disease Father         CAD-MI    Diabetes Father     High Blood Pressure Father     Diabetes Maternal Grandfather     Diabetes Paternal Grandfather     Heart Disease Paternal Grandfather     High Blood Pressure Paternal Grandfather        Vitals:  /63   Pulse 61   Temp 97.9 °F (36.6 °C) to allow dialysis  Poor prognosis    Zoey Lopez Blood, DO  10/20/2021  9:29 AM

## 2021-10-20 NOTE — PROGRESS NOTES
Patient placed on bedpan and had very large, very loose black stool with thalia blood. GI NP Alanna Shepard notified. Awaiting response.

## 2021-10-20 NOTE — PLAN OF CARE
Problem: Infection:  Goal: Will remain free from infection  Description: Will remain free from infection  10/19/2021 2350 by Anna Denney RN  Outcome: Ongoing     Problem: Daily Care:  Goal: Daily care needs are met  Description: Daily care needs are met  10/19/2021 2350 by Anna Denney RN  Outcome: Ongoing     Problem: Pain:  Goal: Control of chronic pain  Description: Control of chronic pain  10/19/2021 2350 by Anna Denney RN  Outcome: Ongoing     Problem: Falls - Risk of:  Goal: Will remain free from falls  Description: Will remain free from falls  10/19/2021 2350 by Anna Denney RN  Outcome: Ongoing     Problem: Nutritional:  Goal: Ability to identify appropriate dietary choices will improve  Description: Ability to identify appropriate dietary choices will improve  10/19/2021 2350 by Anna Denney RN  Outcome: Ongoing

## 2021-10-20 NOTE — PROGRESS NOTES
Patient up to [facility] for another BM and again Rt IJ site bleeding through dressing. RN observed patient straining extremely hard for BM which worsened bleeding. Again patient assisted back to bed and RN changed central line dressing.

## 2021-10-20 NOTE — PROGRESS NOTES
Patient has one large episode of emesis. During emesis patient's Rt IJ again bleeding through dressing. Dressing reinforced with pressure dressing and IR contacted. Will continue to monitor.

## 2021-10-20 NOTE — PROGRESS NOTES
CRRT CVVHD started at 1315. Patient did experience bradycardic episode with HR in the 30's for less than 3 seconds upon initiation of CRRT. RN to work with patient 1:1 and will continue to monitor.

## 2021-10-20 NOTE — PROGRESS NOTES
Physical Therapy  DATE: 10/20/2021    NAME: Yusef Balbuena  MRN: [id number]   : 1970    Patient not seen this date for Physical Therapy due to:      [x] Cancel by RN or physician due to: (Patient not medically appropriate today. Attempting to get BP uncontrol with pressors and when it is controlled patient will begin CRRT. Will check back 10/21)    [] Hemodialysis    [] Critical Lab Value Level     [] Blood transfusion in progress    [] Acute or unstable cardiovascular status   _MAP < 55 or more than >115  _HR < 40 or > 130    [] Acute or unstable pulmonary status   -FiO2 > 60%   _RR < 5 or >40    _O2 sats < 85%    [] Strict Bedrest    [] Off Unit for surgery or procedure    [] Off Unit for testing       [] Pending imaging to R/O fracture    [] Refusal by Patient      [] Other      [] PT being discontinued at this time. Patient independent. No further needs. [] PT being discontinued at this time as the patient has been transferred to hospice care. No further needs.       Pagan Holes, PTA

## 2021-10-20 NOTE — PROGRESS NOTES
IR called to inform RN that they will not be able to bring patient down to IR department due to medical emergency in the IR department. IR physician to come to bedside to examine IJ site and potentially add sutures to decrease bleeding.

## 2021-10-20 NOTE — PROGRESS NOTES
CRRT stopped for change of central line via IR. Blood returned. Patient tolerated well. Will continue to monitor.

## 2021-10-20 NOTE — BRIEF OP NOTE
Brief Postoperative Note    Jr Luna  YOB: 1970  [id number]    Pre-operative Diagnosis: Bleeding right IJ tempcath. Post-operative Diagnosis: Same    Procedure: New pursestring suture placement. Medications Given: none    Anesthesia: Local    Surgeons/Assistants: Quintin Primrose, MD    Estimated Blood Loss: minimal    Complications: none    Specimens: were not obtained    Findings: Existing pursestring suture removed and new suture placed.  Will remove and upsize to tempcath only, and place left IJ central line in am.      Electronically signed by Quintin Primrose, MD on 10/20/2021 at 5:12 PM

## 2021-10-21 NOTE — FLOWSHEET NOTE
Pt sleeping during attempted visit. Writer says a prayer at the door. Chaplains will remain available to offer spiritual and emotional support as needed.        10/21/21 9949   Encounter Summary   Services provided to: Patient   Referral/Consult From: [facility];Palliative Care   Continue Visiting   (10/21/21 sleeping)   Complexity of Encounter Low   Routine   Type Follow up   Assessment Sleeping   Intervention Prayer     Electronically signed by Kirsty Gomez, on 10/21/2021 at 4:56 PM.  Gui  [phone]

## 2021-10-21 NOTE — PROGRESS NOTES
Nephrology Progress Note    Subjective/   46y.o. year old female who we are seeing as  a second opinion at patients  Request.    Patient was admitted 10/8/2021 with volume overload, hypotension and difficulty removing fluid in her outpatient unit. She has been diagnosed with calciphylaxis and was receiving treatments in the outpatient. She has been unable to undergo dialysis due to persistent hypotension. Systolic blood pressures have been in the 70s over the last 2 to 3 days. Patient has multiple medical conditions with significant right heart failure, calciphylaxis, vascular disease and possibly some degree of shock. Her last dialysis was on 10/16/2021which was terminated due to hypotension. I discussed with the patient that with a systolic blood pressure of 73/49 and persistent hypotension, she stands fairly high risk for cardiac arrest, myocardial infarction and stroke if dialysis is attempted. This had been further explained to the patient by her primary nephrologist.  The patient had requested to transfer to [facility] for second opinion but there are no beds available. Hence we have been consulted for a second opinion. Interval hx:  Patient dialysis catheter changed due to bleeding  Patient is on CRRT, levophed requirement increased 55 mcg/min. Labs pending  SBP averaging 90's-100's with 55 mcg/min of levophed.         Objective/     Vitals:    10/21/21 0845 10/21/21 0900 10/21/21 0945 10/21/21 1000   BP: 118/73 116/76 (!) 156/79 (!) 148/80   Pulse: 101 93 82 91   Resp: 19 17 18 22   Temp:       TempSrc:       SpO2: 96% 95% (!) 86% 99%   Weight:       Height:         24HR INTAKE/OUTPUT:      Intake/Output Summary (Last 24 hours) at 10/21/2021 1021  Last data filed at 10/21/2021 1000  Gross per 24 hour   Intake 10 ml   Output 1991 ml   Net -1981 ml     Patient Vitals for the past 96 hrs (Last 3 readings):   Weight   10/20/21 0500 285 lb 9.6 oz (129.5 kg)   10/18/21 0553 274 lb 8 oz

## 2021-10-21 NOTE — PROGRESS NOTES
[facility]   Progress Note                   Date:   10/21/2021  Patient name: Pablo Jiménez  Date of admission:  10/8/2021 12:20 PM  MRN:   [id number]  YOB: 1970  PCP: Carmina Abad MD    Reason for Admission:     Subjective:       Clinical Changes / Abnormalities:   Patient seen and examined in room with RN at bedside. Denies chest pain. Reports SOB on NC Oxygen. Reports palpitations and lightheadedness. Hypotensive. On Levo drip  SR on tele HR 74  . Medications:   Scheduled Meds:   sodium thiosulfate IVPB  25 g IntraVENous Once per day on Mon Wed Fri    apixaban  5 mg Oral BID    polyethylene glycol  2,000 mL Oral Once    Hydrocortisone-Aloe Vera   Topical BID    gabapentin  100 mg Oral TID    sennosides-docusate sodium  2 tablet Oral BID    amiodarone  200 mg Oral BID    midodrine  15 mg Oral 4x Daily    pantoprazole  40 mg Oral QAM AC    budesonide-formoterol  2 puff Inhalation BID    cinacalcet  90 mg Oral Once per day on Mon Wed Fri    atorvastatin  40 mg Oral Nightly    aspirin  81 mg Oral Daily    sodium chloride flush  5-40 mL IntraVENous 2 times per day     Continuous Infusions:   sodium chloride 100 mL/hr at 10/21/21 1404    prismaSATE BGK 2/0 2,000 mL/hr at 10/21/21 1308    norepinephrine 65 mcg/min (10/21/21 1416)    sodium chloride Stopped (10/13/21 1945)    dextrose Stopped (10/10/21 1415)     CBC:   Recent Labs     10/19/21  1245 10/19/21  1916 10/20/21  0005 10/20/21  0422 10/20/21  1603   WBC 7.4  --   --  8.5  --    HGB 8.6*   < > 8.2* 8.6* 7.9*   *  --   --  148  --     < > = values in this interval not displayed.      BMP:    Recent Labs     10/20/21  0005 10/20/21  0422 10/21/21  1004   * 131* 132*   K 4.7 5.7* 4.3   CL 92* 93* 95*   CO2 23 24 23   BUN 42* 44* 34*   CREATININE 5.63* 6.60* 4.10*   GLUCOSE 195* 122* 211*     Hepatic:   Recent Labs     10/20/21  0005 10/20/21  0422 10/21/21  1004   AST 23 24 23   ALT 14 15 13 BILITOT 0.48 0.47 0.48   ALKPHOS 144* 164* 151*     Troponin: No results for input(s): TROPONINI in the last 72 hours. BNP: No results for input(s): BNP in the last 72 hours. Lipids: No results for input(s): CHOL, HDL in the last 72 hours. Invalid input(s): LDLCALCU  INR:   Recent Labs     10/19/21  1245   INR 1.8       DIAGNOSTIC DATA  EKG Sinus rhythm with occasional Premature ventricular complexes  Low voltage QRS  Septal infarct (cited on or before 20-AUG-2021)  Possible Lateral infarct (cited on or before 20-AUG-2021)    ECHO 10/8/2021  Limited echo  Technically difficult study  Left ventricle is normal in size  Global left ventricular systolic function is moderately reduced EF 40-45%  Abnormal septal wall motions  Leftward compression of inter-ventricular septum (\"D-sign\") indicating RV  pressure overload. Right atrium is moderate to severely dilated . Right ventricular dilatation with reduced systolic function. AV, MV structure appears normal.  Severe tricuspid regurgitation. Moderate pulmonary hypertension with an estimated right ventricular systolic  pressure of 52 mmHg. Trivial pericardial effusion. ECHO 2D ECHO( 8/20/2021)  Mild left ventricular hypertrophy  Global left ventricular systolic function is normal  Estimated ejection fraction is 65 % . Right atrium is severely dilated . Right ventricular with severe dilatation with severely reduced systolic  function. Severe tricuspid regurgitation. Moderate to severe pulmonary hypertension. No pericardial effusion seen. Normal aortic root dimension.     CT ABDOMEN PELVIS WO CONTRAST  Pericardial effusion, only partially imaged.  This measures up to 1.8 cm   along the right-sided heart.  Consider echocardiogram for further evaluation,   especially if concern for decreased cardiac function as a result.       Only small amount of ascites, mostly in the right upper quadrant.       Severe diffuse body wall edema, increased from August 2021     Small bilateral pleural effusions. Limited Echo 10/18/21:  Technically difficult study  Left ventricle is normal in size  Global left ventricular systolic function is moderately reduced EF 40-45%  Abnormal septal wall motions  Leftward compression of inter-ventricular septum (\"D-sign\") indicating RV  pressure overload. Right atrium is moderate to severely dilated . Right ventricular dilatation with reduced systolic function. AV, MV structure appears normal.  Severe tricuspid regurgitation. Moderate pulmonary hypertension with an estimated right ventricular systolic  pressure of 52 mmHg. Trivial pericardial effusion. Objective:   Vitals: BP (!) 83/56   Pulse 81   Temp 96 °F (35.6 °C) (Temporal)   Resp 11   Ht 4' 11\" (1.499 m)   Wt 285 lb 9.6 oz (129.5 kg)   LMP 11/12/2014 (Approximate)   SpO2 99%   BMI 57.68 kg/m²   General appearance: alert and cooperative with exam  HEENT: Head: Normocephalic, no lesions, without obvious abnormality. Neck: no adenopathy, no carotid bruit, no JVD, supple, symmetrical, trachea midline and thyroid not enlarged, symmetric, no tenderness/mass/nodules  Lungs: clear to auscultation bilaterally  Heart: regular rate and rhythm, S1, S2 normal, no murmur, click, rub or gallop  SR on tele HR 74  Abdomen: abdominal distension. Extremities: extremities normal, atraumatic, no cyanosis or +1 edema  Neurologic: Mental status: Alert, oriented, thought content appropriate      Assessment / Acute Cardiac Problems:   1. Acute on chronic combined CHF- mostly R CHF / Cor Pulmonale from severe obesity/YONI/ESRD on HD. She if 40 # above her dry weight  2. Mildly reduced LVEF Echo 10/18/21- 45% - last echo normal EF- however echos in past showed EF 45%  3. Pericardial effusion- trivial on Echo 10/18/21  4. PAF now back in NSR on Amiodarone PO  5. Hypotension - unable to tolerate HD  6. DM  7. HLP  8. YONI  9. ESRD on HD  10. Anemia and GI bleed.   11. Obesity    Plan of Treatment: 1. Hypotensive on Levo to keep MAP >65 and/or SBP >90mmHg   2. Ultimately she will need ischemia evaluation for LVEF 45%, however given present volume overload and renal disease will plan once acute issues stabilize. 3. PAF Continue amiodarone 200 mg po bid and Eliquis. 4. Recommend compression stockings for LE edema. 5. Anemia followed by GI and Primary team. Plan for colonoscopy when clinically stable. 6. ESRD on HD  - Nephrology. 7. Recs to keep K  > 4.0 and Mag > 2.0  K 4.3 and Mag 1.9 today.    8. Rest of care managed per Primary Team.     VENTURA Kay - NP

## 2021-10-21 NOTE — PROGRESS NOTES
[facility]  Office: [address], DO, Yaniv China, DO, Wayne Ingram, DO, Riley Snyder Blood, DO, Edy Hoover MD, Radha Bello MD, Mana Servin MD, Leeann Wong MD, Kyler Bell MD, Harish Blanco MD, Baylee Huynh MD, Keyla De La Cruz MD, Blair Ellington, DO, Olivia Almaraz, DO, Bridger Galaviz MD,  Radha Zaragoza, DO, Michaela Wu MD, Cristal Amaral MD, Alayna Aguilar MD, John Quintana MD, Devi Chu MD, Calvin Oakley MD, Jessica Sanders, [facility], [facility], CNP, Kellie Sam, CNP, Corin Jack, CNS, Gerda Galvan, CNP, Alina Vaughan, CNP, Isrrael Chatterjee, CNP, Parul Richards, CNP, Micah Prater, CNP, Radha Love, CNP, Amanda Beck PA-C, Charmel Osgood, [facility], Criselda Haas, CNP, Aida Lebron, CNP, Gabby Miller, CNP, Rose Zaragoza, CNP, Tiney Schilder, CNP, Marlyne Osgood, CNP, Mauricio [facility]    Progress Note    10/21/2021    9:51 AM    Name:   Pablo Jiménez  MRN:     [id number]     Acct:      [de-identified]   Room:   [address] [place] [address] Day:  12  Admit Date:  10/8/2021 12:20 PM    PCP:   Carmina Abad MD  Code Status:  Full Code    Subjective:     C/C:   Chief Complaint   Patient presents with    Hypotension     Interval History Status: not changed. Remains hypotensive- now on levophed   On CRRT  Denies cp/sob/n/v    Mainly c/o abd distention and ble swelling though better today    Brief History:     Per my partner:  \"46year-old female with known medical history of hypertension on dialysis, admitted for diabetes presented to the hospital after she was sent from dialysis for blood pressure of systolic less than 70.  Patient states that she was feeling fatigued in the last few days.  After she received her Midrin dose her blood pressure stabilized.  10/9 patient developed atrial flutter, heart rate was elevated up to 120s.  Blood pressure continued to be low 96J systolic.   Cardiology was consulted.   Patient was started on amiodarone and heparin. Patient noted to have recent EGD showing duodenal polyps, and was sent to colonoscopy outpatient patient did not follow-up. Patient will need GI clearance for anticoagulation . Despite multiple attempts for colonoscopy, colonoscopy could not be done given patient's chronic hypotension. Anesthesia was not willing to go ahead for the procedure. Eliquis was started and patient's hemoglobin was monitored for 2 days. Plan was to discharge with patient had severe abdominal distention, nephrology recommended dialysis. Dialysis could not be done on Sunday as dialysis nurse was not available.     Now patient requesting SNF placement with dialysis in house     Discharge planning delayed given multiple complexities\"    Review of Systems:     Constitutional:  negative for chills, fevers, sweats  Respiratory:  negative for cough, dyspnea on exertion, shortness of breath, wheezing  Cardiovascular:  negative for chest pain, chest pressure/discomfort, palpitations  Gastrointestinal:  negative for abdominal pain, diarrhea, nausea, vomiting  Neurological:  negative for dizziness, headache    Medications: Allergies:     Allergies   Allergen Reactions    Ibuprofen     Tramadol Hives    Motrin [Ibuprofen Micronized] Itching    Strawberry Extract Itching and Rash    Tape Veda Constant Tape] Rash     No paper tape silk only       Current Meds:   Scheduled Meds:    sodium thiosulfate IVPB  25 g IntraVENous Once per day on Mon Wed Fri    apixaban  5 mg Oral BID    polyethylene glycol  2,000 mL Oral Once    Hydrocortisone-Aloe Vera   Topical BID    gabapentin  100 mg Oral TID    sennosides-docusate sodium  2 tablet Oral BID    amiodarone  200 mg Oral BID    midodrine  15 mg Oral 4x Daily    pantoprazole  40 mg Oral QAM AC    budesonide-formoterol  2 puff Inhalation BID    cinacalcet  90 mg Oral Once per day on Mon Wed Fri    atorvastatin  40 mg Oral Nightly    aspirin  81 mg Oral Daily    sodium chloride flush  5-40 mL IntraVENous 2 times per day     Continuous Infusions:    sodium chloride 100 mL/hr at 10/21/21 0502    prismaSATE BGK 2/0 2,000 mL/hr at 10/21/21 0751    norepinephrine 55.04 mcg/min (10/21/21 0939)    sodium chloride Stopped (10/13/21 1945)    dextrose Stopped (10/10/21 1415)     PRN Meds: potassium chloride **OR** potassium alternative oral replacement **OR** potassium chloride, potassium chloride, potassium chloride, sodium phosphate IVPB **OR** sodium phosphate IVPB **OR** sodium phosphate IVPB **OR** sodium phosphate IVPB, calcium gluconate **OR** calcium gluconate **OR** calcium gluconate **OR** calcium gluconate, magnesium sulfate, potassium chloride, HYDROcodone 5 mg - acetaminophen, acetaminophen, albuterol sulfate HFA, hydrOXYzine, diphenhydrAMINE, Calcium Acetate (Phos Binder), guaiFENesin, sodium chloride flush, sodium chloride, ondansetron **OR** ondansetron, polyethylene glycol, [DISCONTINUED] acetaminophen **OR** acetaminophen, glucose, dextrose, glucagon (rDNA), dextrose    Data:     Past Medical History:   has a past medical history of Asthma, CHF (congestive heart failure) ([facility] Chronic kidney disease, COPD (chronic obstructive pulmonary disease) ([facility] [address] [facility] Hemodialysis patient ([facility] Hyperlipidemia, Hypertension, Obesity, YONI (obstructive sleep apnea), Pneumonia, Renal failure, Type II or unspecified type diabetes mellitus without mention of complication, not stated as uncontrolled, and Ventilator dependence ([facility] Social History:   reports that she quit smoking about 4 years ago. Her smoking use included cigarettes. She quit after 7.00 years of use. She has never used smokeless tobacco. She reports that she does not drink alcohol and does not use drugs.      Family History:   Family History   Problem Relation Age of Onset    Diabetes Other     Cancer Mother         BREAST    Heart Disease Father         CAD-MI   [facility] Diabetes Father     High Blood Pressure Father     Diabetes Maternal Grandfather     Diabetes Paternal Grandfather     Heart Disease Paternal Grandfather     High Blood Pressure Paternal Grandfather        Vitals:  /76   Pulse 93   Temp 96 °F (35.6 °C) (Temporal)   Resp 17   Ht 4' 11\" (1.499 m)   Wt 285 lb 9.6 oz (129.5 kg)   LMP 2014 (Approximate)   SpO2 95%   BMI 57.68 kg/m²   Temp (24hrs), Av.4 °F (36.3 °C), Min:96 °F (35.6 °C), Max:97.7 °F (36.5 °C)    Recent Labs     10/20/21  1754 10/20/21  1925 10/21/21  0039 10/21/21  0751   POCGLU 98 85 191* 182*       I/O (24Hr): Intake/Output Summary (Last 24 hours) at 10/21/2021 0951  Last data filed at 10/21/2021 0900  Gross per 24 hour   Intake 10 ml   Output 1826 ml   Net -1816 ml       Labs:  Hematology:  Recent Labs     10/19/21  1245 10/19/21  1916 10/20/21  0005 10/20/21  0422 10/20/21  1603   WBC 7.4  --   --  8.5  --    RBC 2.85*  --   --  2.87*  --    HGB 8.6*   < > 8.2* 8.6* 7.9*   HCT 30.7*   < > 29.9* 30.7* 28.3*   .7*  --   --  107.0*  --    MCH 30.2  --   --  30.0  --    MCHC 28.0*  --   --  28.0*  --    RDW 15.5*  --   --  15.3*  --    *  --   --  148  --    MPV 11.7  --   --  11.1  --    INR 1.8  --   --   --   --     < > = values in this interval not displayed.      Chemistry:  Recent Labs     10/19/21  0338 10/20/21  0005 10/20/21  0422 10/20/21  1559   * 129* 131*  --    K 4.8 4.7 5.7*  --    CL 92* 92* 93*  --    CO2 23 23 24  --    GLUCOSE 81 195* 122*  --    BUN 30* 42* 44*  --    CREATININE 5.59* 5.63* 6.60*  --    MG  --  1.9 2.1  --    ANIONGAP 17 14 14  --    LABGLOM 8* 8* 7*  --    GFRAA 10* 10* 8*  --    CALCIUM 8.2* 6.8* 8.2*  --    CAION  --   --   --  1.10*   PHOS  --  3.1 3.7  --      Recent Labs     10/20/21  0005 10/20/21  0422 10/20/21  1225 10/20/21  1652 10/20/21  1717 10/20/21  1754 10/20/21  1925 10/21/21  0039 10/21/21  0751   PROT 6.6 6.4  --   --   --   --   --   --   -- LABALBU 3.3* 3.5  --   --   --   --   --   --   --    AST 23 24  --   --   --   --   --   --   --    ALT 14 15  --   --   --   --   --   --   --    ALKPHOS 144* 164*  --   --   --   --   --   --   --    BILITOT 0.48 0.47  --   --   --   --   --   --   --    BILIDIR  --  0.27  --   --   --   --   --   --   --    POCGLU  --   --    < > 76 87 98 85 191* 182*    < > = values in this interval not displayed. ABG:  Lab Results   Component Value Date    POCPH 7.22 06/12/2016    POCPCO2 61 06/12/2016    POCPO2 68 06/12/2016    POCHCO3 24.8 06/12/2016    NBEA 3 06/12/2016    PBEA NOT REPORTED 06/12/2016    OWL3XIE 27 06/12/2016    AODT1OUW 88 06/12/2016    FIO2 NOT REPORTED 07/01/2019     Lab Results   Component Value Date/Time    SPECIAL RT WRIST 5ML 10/09/2021 12:15 PM     Lab Results   Component Value Date/Time    CULTURE NO GROWTH 6 DAYS 10/09/2021 12:15 PM       Radiology:  CT ABDOMEN PELVIS WO CONTRAST Additional Contrast? Radiologist Recommendation    Result Date: 10/17/2021  Pericardial effusion, only partially imaged. This measures up to 1.8 cm along the right-sided heart. Consider echocardiogram for further evaluation, especially if concern for decreased cardiac function as a result. Only small amount of ascites, mostly in the right upper quadrant. Severe diffuse body wall edema, increased from August 2021 Small bilateral pleural effusions.        Physical Examination:        General appearance:  alert, cooperative and no distress  Mental Status:  oriented to person, place and time and normal affect  Lungs:  clear to auscultation bilaterally, normal effort  Heart:  regular rate and rhythm, no murmur  Abdomen:  nontender, distended, normal bowel sounds, no masses, hepatomegaly, splenomegaly; obese  Extremities:  no redness, tenderness in the calves; 1-2+ ble edema  Skin:  no gross lesions, rashes, induration    Assessment:        Hospital Problems         Last Modified POA    * (Principal) Hypotension 10/20/2021 Yes    Overview Signed 10/20/2021  9:28 AM by Sisto Lesch Blood, DO     chronic         CKD (chronic kidney disease) stage 4, GFR 15-29 ml/min ([facility]) (Chronic) 10/8/2021 Yes    Pulmonary hypertension ([address] (Chronic) 10/8/2021 Yes    Morbid obesity with BMI of 45.0-49.9, adult ([address] 10/8/2021 Yes    Essential hypertension 10/8/2021 Yes    YONI on CPAP 10/11/2021 Yes    Type 2 diabetes mellitus with chronic kidney disease on chronic dialysis ([address] 10/8/2021 Yes    Chronic obstructive pulmonary disease ([address] 10/8/2021 Yes    Gastroesophageal reflux disease without esophagitis 10/8/2021 Yes    ESRD needing dialysis ([address] 10/8/2021 Yes    Atrial flutter with rapid ventricular response ([address] 10/10/2021 Yes    Hematochezia 10/10/2021 Yes    Anemia due to chronic kidney disease 10/10/2021 Yes    Itching 10/11/2021 Yes    Fluid overload 10/17/2021 Yes    Pre-op chest exam 10/17/2021 Yes          Plan:        Central line placed, now on levophed at 55mcg and on CRRT  Reiterated to her that when we stop the pressors, it is likely her bp again will be too low to allow dialysis  Treat constipation  Poor prognosis    Sisto Lesch Blood, DO  10/21/2021  9:51 AM

## 2021-10-21 NOTE — PLAN OF CARE
Problem: Infection:  Goal: Will remain free from infection  Description: Will remain free from infection  Outcome: Ongoing     Problem: Daily Care:  Goal: Daily care needs are met  Description: Daily care needs are met  Outcome: Ongoing     Problem: Pain:  Goal: Patient's pain/discomfort is manageable  Description: Patient's pain/discomfort is manageable  Outcome: Ongoing  Goal: Pain level will decrease  Description: Pain level will decrease  Outcome: Ongoing  Goal: Control of acute pain  Description: Control of acute pain  Outcome: Ongoing  Goal: Control of chronic pain  Description: Control of chronic pain  Outcome: Ongoing     Problem: Skin Integrity:  Goal: Skin integrity will stabilize  Description: Skin integrity will stabilize  Outcome: Ongoing     Problem: Discharge Planning:  Goal: Patients continuum of care needs are met  Description: Patients continuum of care needs are met  Outcome: Ongoing     Problem: Falls - Risk of:  Goal: Will remain free from falls  Description: Will remain free from falls  Outcome: Ongoing  Goal: Absence of physical injury  Description: Absence of physical injury  Outcome: Ongoing     Problem: ABCDS Injury Assessment  Goal: Absence of physical injury  Outcome: Ongoing     Problem:  Activity:  Goal: Fatigue will decrease  Description: Fatigue will decrease  Outcome: Ongoing  Goal: Risk for activity intolerance will decrease  Description: Risk for activity intolerance will decrease  Outcome: Ongoing     Problem: Coping:  Goal: Ability to cope will improve  Description: Ability to cope will improve  Outcome: Ongoing     Problem: Fluid Volume:  Goal: Will show no signs or symptoms of fluid imbalance  Description: Will show no signs or symptoms of fluid imbalance  Outcome: Ongoing     Problem: Health Behavior:  Goal: Ability to manage health-related needs will improve  Description: Ability to manage health-related needs will improve  Outcome: Ongoing  Goal: Identification of resources available to assist in meeting health care needs will improve  Description: Identification of resources available to assist in meeting health care needs will improve  Outcome: Ongoing     Problem: Nutritional:  Goal: Ability to identify appropriate dietary choices will improve  Description: Ability to identify appropriate dietary choices will improve  Outcome: Ongoing     Problem: Physical Regulation:  Goal: Ability to maintain clinical measurements within normal limits will improve  Description: Ability to maintain clinical measurements within normal limits will improve  Outcome: Ongoing  Goal: Complications related to the disease process, condition or treatment will be avoided or minimized  Description: Complications related to the disease process, condition or treatment will be avoided or minimized  Outcome: Ongoing     Problem: Sensory:  Goal: General experience of comfort will improve  Description: General experience of comfort will improve  Outcome: Ongoing     Problem: Skin Integrity:  Goal: Status of oral mucous membranes will improve  Description: Status of oral mucous membranes will improve  Outcome: Ongoing  Goal: Skin integrity will be maintained  Description: Skin integrity will be maintained  Outcome: Ongoing     Problem: Discharge Planning:  Goal: Discharged to appropriate level of care  Description: Discharged to appropriate level of care  Outcome: Ongoing     Problem: Cardiac Output - Decreased:  Goal: Avoidance of environmental tobacco smoke  Description: Absence of orthostatic hypotension  Outcome: Ongoing  Goal: Cardiac output within specified parameters  Description: Cardiac output within specified parameters  Outcome: Ongoing  Goal: Hemodynamic stability will improve  Description: Hemodynamic stability will improve  Outcome: Ongoing     Problem: Fluid Volume - Imbalance:  Goal: Absence of imbalanced fluid volume signs and symptoms  Description: Absence of imbalanced fluid volume signs and symptoms  Outcome: Ongoing     Problem: Tissue Perfusion, Altered:  Goal: Circulatory function within specified parameters  Description: Circulatory function within specified parameters  Outcome: Ongoing     Problem: Tissue Perfusion - Cardiopulmonary, Altered:  Goal: Absence of angina  Description: Absence of angina  Outcome: Ongoing  Goal: Hemodynamic stability will improve  Description: Hemodynamic stability will improve  Outcome: Ongoing     Problem: IP BALANCE  Goal: LTG - patient will maintain standing balance to allow for completion of daily activities  Outcome: Ongoing     Problem: Nutrition  Goal: Optimal nutrition therapy  Outcome: Ongoing

## 2021-10-21 NOTE — PROGRESS NOTES
Physical Therapy  DATE: 10/21/2021    NAME: Meghann Pablo  MRN: [id number]   : 1970    Patient not seen this date for Physical Therapy due to:      [] Cancel by RN or physician due to:    [] Hemodialysis    [] Critical Lab Value Level     [] Blood transfusion in progress    [] Acute or unstable cardiovascular status   _MAP < 55 or more than >115  _HR < 40 or > 130    [] Acute or unstable pulmonary status   -FiO2 > 60%   _RR < 5 or >40    _O2 sats < 85%    [] Strict Bedrest    [] Off Unit for surgery or procedure    [] Off Unit for testing       [] Pending imaging to R/O fracture    [] Refusal by Patient      [x] Other: Patient hypotensive (83/56) on levophed      [] PT being discontinued at this time. Patient independent. No further needs. [] PT being discontinued at this time as the patient has been transferred to hospice care. No further needs.       Gladys Zuluaga, PT

## 2021-10-21 NOTE — PROGRESS NOTES
(CHRONULAC) 10 GM/15ML solution 20 g, TID PRN  HYDROcodone-acetaminophen (NORCO) 5-325 MG per tablet 1 tablet, Q6H PRN  0.9 % sodium chloride infusion, Continuous  prismaSATE BGK 2/0 dialysis solution, Continuous  norepinephrine (LEVOPHED) 16 mg in dextrose 5% 250 mL infusion, Continuous  acetaminophen (TYLENOL) tablet 650 mg, Q4H PRN  sodium thiosulfate 25 g in sodium chloride 0.9 % 150 mL IVPB, Once per day on Mon Wed Fri  apixaban (ELIQUIS) tablet 5 mg, BID  albuterol sulfate  (90 Base) MCG/ACT inhaler 2 puff, Q4H PRN  polyethylene glycol (GoLYTELY) solution 2,000 mL, Once  Hydrocortisone-Aloe Vera 1 % CREA, BID  gabapentin (NEURONTIN) capsule 100 mg, TID  sennosides-docusate sodium (SENOKOT-S) 8.6-50 MG tablet 2 tablet, BID  hydrOXYzine (ATARAX) tablet 25 mg, TID PRN  amiodarone (CORDARONE) tablet 200 mg, BID  midodrine (PROAMATINE) tablet 15 mg, 4x Daily  diphenhydrAMINE (BENADRYL) tablet 25 mg, Q6H PRN  Calcium Acetate (Phos Binder) CAPS 667 mg, PRN  pantoprazole (PROTONIX) tablet 40 mg, QAM AC  guaiFENesin (MUCINEX) extended release tablet 600 mg, BID PRN  budesonide-formoterol (SYMBICORT) 160-4.5 MCG/ACT inhaler 2 puff, BID  cinacalcet (SENSIPAR) tablet 90 mg, Once per day on Mon Wed Fri  atorvastatin (LIPITOR) tablet 40 mg, Nightly  aspirin EC tablet 81 mg, Daily  sodium chloride flush 0.9 % injection 5-40 mL, 2 times per day  sodium chloride flush 0.9 % injection 10 mL, PRN  0.9 % sodium chloride infusion, PRN  ondansetron (ZOFRAN-ODT) disintegrating tablet 4 mg, Q8H PRN   Or  ondansetron (ZOFRAN) injection 4 mg, Q6H PRN  polyethylene glycol (GLYCOLAX) packet 17 g, Daily PRN  acetaminophen (TYLENOL) suppository 650 mg, Q6H PRN  glucose (GLUTOSE) 40 % oral gel 15 g, PRN  dextrose 50 % IV solution, PRN  glucagon (rDNA) injection 1 mg, PRN  dextrose 5 % solution, PRN        Data:     Code Status:  Full Code    Family History   Problem Relation Age of Onset    Diabetes Other     Cancer Mother BREAST    Heart Disease Father         CAD-MI   [facility] Diabetes Father     High Blood Pressure Father     Diabetes Maternal Grandfather     Diabetes Paternal Grandfather     Heart Disease Paternal Grandfather     High Blood Pressure Paternal Grandfather        Social History     Socioeconomic History    Marital status: Single     Spouse name: Not on file    Number of children: Not on file    Years of education: Not on file    Highest education level: Not on file   Occupational History    Not on file   Tobacco Use    Smoking status: Former Smoker     Years: 7.00     Types: Cigarettes     Quit date: 2017     Years since quittin.6    Smokeless tobacco: Never Used    Tobacco comment: 1-2 cigs daily   Substance and Sexual Activity    Alcohol use: No    Drug use: No    Sexual activity: Not Currently     Partners: Male   Other Topics Concern    Not on file   Social History Narrative    Not on file     Social Determinants of Health     Financial Resource Strain:     Difficulty of Paying Living Expenses:    Food Insecurity:     Worried About Running Out of Food in the Last Year:     Ran Out of Food in the Last Year:    Transportation Needs:     Lack of Transportation (Medical):      Lack of Transportation (Non-Medical):    Physical Activity:     Days of Exercise per Week:     Minutes of Exercise per Session:    Stress:     Feeling of Stress :    Social Connections:     Frequency of Communication with Friends and Family:     Frequency of Social Gatherings with Friends and Family:     Attends Jainism Services:     Active Member of Clubs or Organizations:     Attends Club or Organization Meetings:     Marital Status:    Intimate Partner Violence:     Fear of Current or Ex-Partner:     Emotionally Abused:     Physically Abused:     Sexually Abused:        Vitals:  BP 97/71   Pulse 81   Temp 96 °F (35.6 °C) (Temporal)   Resp 19   Ht 4' 11\" (1.499 m)   Wt 285 lb 9.6 oz (129.5 kg) LMP 2014 (Approximate)   SpO2 99%   BMI 57.68 kg/m²   Temp (24hrs), Av.3 °F (36.3 °C), Min:96 °F (35.6 °C), Max:97.7 °F (36.5 °C)    Recent Labs     10/20/21  1925 10/21/21  0039 10/21/21  0751 10/21/21  1135   POCGLU 85 191* 182* 189*       I/O (24Hr):     Intake/Output Summary (Last 24 hours) at 10/21/2021 1202  Last data filed at 10/21/2021 1100  Gross per 24 hour   Intake 10 ml   Output 2151 ml   Net -2141 ml       Labs:      CBC:   Lab Results   Component Value Date    WBC 8.5 10/20/2021    RBC 2.87 10/20/2021    RBC 4.24 2012    HGB 7.9 10/20/2021    HCT 28.3 10/20/2021    .0 10/20/2021    MCH 30.0 10/20/2021    MCHC 28.0 10/20/2021    RDW 15.3 10/20/2021     10/20/2021     2012    MPV 11.1 10/20/2021     CBC with Differential:    Lab Results   Component Value Date    WBC 8.5 10/20/2021    RBC 2.87 10/20/2021    RBC 4.24 2012    HGB 7.9 10/20/2021    HCT 28.3 10/20/2021     10/20/2021     2012    .0 10/20/2021    MCH 30.0 10/20/2021    MCHC 28.0 10/20/2021    RDW 15.3 10/20/2021    NRBC 2 2016    LYMPHOPCT 20 10/20/2021    MONOPCT 13 10/20/2021    BASOPCT 1 10/20/2021    MONOSABS 1.11 10/20/2021    LYMPHSABS 1.70 10/20/2021    EOSABS 0.09 10/20/2021    BASOSABS 0.09 10/20/2021    DIFFTYPE NOT REPORTED 10/20/2021     Hemoglobin/Hematocrit:    Lab Results   Component Value Date    HGB 7.9 10/20/2021    HCT 28.3 10/20/2021     CMP:    Lab Results   Component Value Date     10/21/2021    K 4.3 10/21/2021    CL 95 10/21/2021    CO2 23 10/21/2021    BUN 34 10/21/2021    CREATININE 4.10 10/21/2021    GFRAA 14 10/21/2021    LABGLOM 11 10/21/2021    GLUCOSE 211 10/21/2021    GLUCOSE 132 2012    PROT 6.3 10/21/2021    LABALBU 3.4 10/21/2021    CALCIUM 5.5 10/21/2021    BILITOT 0.48 10/21/2021    ALKPHOS 151 10/21/2021    AST 23 10/21/2021    ALT 13 10/21/2021     BMP:    Lab Results   Component Value Date     10/21/2021 K 4.3 10/21/2021    CL 95 10/21/2021    CO2 23 10/21/2021    BUN 34 10/21/2021    LABALBU 3.4 10/21/2021    CREATININE 4.10 10/21/2021    CALCIUM 5.5 10/21/2021    GFRAA 14 10/21/2021    LABGLOM 11 10/21/2021    GLUCOSE 211 10/21/2021    GLUCOSE 132 01/16/2012     PT/INR:    Lab Results   Component Value Date    PROTIME 20.6 10/19/2021    INR 1.8 10/19/2021     PTT:    Lab Results   Component Value Date    APTT 36.1 10/19/2021   [APTT}    Physical Examination:        General appearance: alert, cooperative and no distress  Mental Status: oriented to person, place and time and normal affect  Abdomen: soft, nontender, nondistended, bowel sounds present, anasarca    Assessment:        Primary Problem  Hypotension     Active Hospital Problems    Diagnosis Date Noted    Pulmonary hypertension ([facility] [address] [I27.20] 12/10/2015     Priority: Medium    CKD (chronic kidney disease) stage 4, GFR 15-29 ml/min ([address] [N18.4] 12/08/2015     Priority: Medium    Fluid overload [E87.70] 10/17/2021    Pre-op chest exam [K64.625]     Itching [L29.9] 10/11/2021    Anemia due to chronic kidney disease [N18.9, D63.1] 10/10/2021    Hematochezia [K92.1]     Hypotension [I95.9] 10/08/2021    Atrial flutter with rapid ventricular response ([address] [I48.92] 08/24/2021    ESRD needing dialysis ([address] [N18.6, Z99.2] 07/01/2019    Chronic obstructive pulmonary disease ([address] [J44.9] 06/12/2016    Gastroesophageal reflux disease without esophagitis [K21.9] 06/12/2016    Type 2 diabetes mellitus with chronic kidney disease on chronic dialysis ([address] [E11.22, N18.6, Z99.2]     Essential hypertension [I10] 03/11/2016    YONI on CPAP [G47.33, Z99.89] 03/11/2016    Morbid obesity with BMI of 45.0-49.9, adult ([facility] [E66.01, Z68.42] 12/11/2015     Past Medical History:   Diagnosis Date    Asthma     AS A CHILD    CHF (congestive heart failure) ([facility]) 2000    Chronic kidney disease     COPD (chronic obstructive pulmonary disease) ([facility] 2000 INHALER USE AS NEEDED    Dialysis patient [facility])     CENTRAL DIALYSIS MON, WEDS AND FRI, FLUID RESTRICTION 2L/24 HRS PER PT    Hemodialysis patient [facility])     had Brissa 8-17-16  M-W-F @ [facility]    Hyperlipidemia     Hypertension 2000    ON RX    Obesity     YONI (obstructive sleep apnea) 2013    SLEEP STUDY -6/2016 AWAITING MACHINE, USING O2 AT NIGHT PRESENTLY    Pneumonia 2000    HAD TO BE ON VENTILATOR 12 DAYS    Renal failure     STARTED DIALYSIS 02/25/2016    Type II or unspecified type diabetes mellitus without mention of complication, not stated as uncontrolled 2000    IDDM    Ventilator dependence ([facility] [address]     X 2 200 AND 2014. 2014 HAD TO GO ON VENT POST OP        Plan:        1. Anemia  1. Patient remains hypotensive.  Likely unable to tolerate colonoscopy  2. Had rectal bleeding again  3. No bleeding today  4. May need bleeding scan  5. Hemoglobin stable  6. Trend H&H  7. Monitor for bleeding  8.  Transfuse for hemoglobin less than 7        Explained to the patient and d/W Nursing Staff  Will F/U with you  Please call or Page for any issues or change in status  Thanks    Electronically signed by VENTURA Theodore - NP on 10/21/2021 at 12:02 PM

## 2021-10-21 NOTE — PROGRESS NOTES
Pt had new dialysis cath placed in IR at 1900 along with a triple lumen CVC. CRRT restarted at 2145, will continue to monitor both sites for bleeding.

## 2021-10-21 NOTE — PROGRESS NOTES
Pt's stool sample came back positive blood occult, RN notified Dr. Jennifer De La Cruz, he stated there is nothing we can do at this time & to continue to monitor closely for bleeding. If bleeding increases we may do a blood scan.

## 2021-10-22 NOTE — PROGRESS NOTES
Physician Progress Note      PATIENT:               Naga Mendiola  CSN #:                  [id number]  :                       1970  ADMIT DATE:       10/8/2021 12:20 PM  DISCH DATE:  RESPONDING  PROVIDER #:        Alma Delia GARCIA BLOOD DO        QUERY TEXT:    Type of Shock: Please provide further specificity, if known. Clinical indicators include: blood, hgb, hct, myocardial infarction, cardiac   arrest, shock, gi bleed  Options provided:  -- Cardiogenic shock  -- Septic shock  -- Hypovolemic shock  -- Hemorrhagic shock due to trauma  -- Hemorrhagic shock related to surgery  -- Anaphylactic shock  -- Other - I will add my own diagnosis  -- Disagree - Not applicable / Not valid  -- Disagree - Clinically Unable to determine / Unknown        PROVIDER RESPONSE TEXT:    Provider was unable to determine a response for this query. QUERY TEXT:    Pt admitted with hypotension and has CHF documented. If possible, please   document in progress notes and discharge summary further specificity regarding   the type and acuity of CHF:    The medical record reflects the following:  Risk Factors: ESRD, HTN, Afib  Clinical Indicators: 2D echo from 10/8 EF 40-45%, right ventricular dilatation   with reduced systolic function, noted right heart failure  Treatment: HD when able  Options provided:  -- Acute on Chronic Systolic CHF/HFrEF  -- Acute on Chronic Diastolic CHF/HFpEF  -- Acute on Chronic Systolic and Diastolic CHF  -- Chronic Systolic CHF/HFrEF  -- Chronic Diastolic CHF/HFpEF  -- Chronic Systolic and Diastolic CHF  -- Other - I will add my own diagnosis  -- Disagree - Not applicable / Not valid  -- Disagree - Clinically unable to determine / Unknown  -- Refer to Clinical Documentation Reviewer    PROVIDER RESPONSE TEXT:    This patient is in acute on chronic systolic CHF/HFrEF.     Query created by: Marissa Garcia on 10/19/2021 12:47 PM      Electronically signed by:  Tony Toussaint BLOOD DO 10/22/2021 8:49 AM

## 2021-10-22 NOTE — PROGRESS NOTES
Insert Arterial Line  Date/Time:  10/21/21, 10:45 PM  Performed by: Rosas Plascencia RCP    Patient identity confirmed: arm band and provided demographic data   Time out: Immediately prior to procedure a \"time out\" was called to verify the correct patient, procedure, equipment, support staff. Preparation: Patient was prepped and draped in the usual sterile fashion.     Location:right radial    Jaun's test normal: yes  Needle gauge: 20     Number of attempts: 1  Post-procedure: transparent dressing applied and line secured    Patient tolerance: well

## 2021-10-22 NOTE — PROGRESS NOTES
Nephrology Progress Note    Subjective/   46y.o. year old female who we are seeing as  a second opinion at patients  Request.    Patient was admitted 10/8/2021 with volume overload, hypotension and difficulty removing fluid in her outpatient unit. She has been diagnosed with calciphylaxis and was receiving treatments in the outpatient. She has been unable to undergo dialysis due to persistent hypotension. Systolic blood pressures have been in the 70s over the last 2 to 3 days. Patient has multiple medical conditions with significant right heart failure, calciphylaxis, vascular disease and possibly some degree of shock. Her last dialysis was on 10/16/2021which was terminated due to hypotension. I discussed with the patient that with a systolic blood pressure of 73/49 and persistent hypotension, she stands fairly high risk for cardiac arrest, myocardial infarction and stroke if dialysis is attempted. This had been further explained to the patient by her primary nephrologist.  The patient had requested to transfer to [facility] for second opinion but there are no beds available. Hence we have been consulted for a second opinion. Interval hx:  Patient seen and examined in ICU on CRRT. C/o SOB   levophed requirement increased 95 mcg/min. Hb stable.         Objective/     Vitals:    10/22/21 0500 10/22/21 0515 10/22/21 0530 10/22/21 0545   BP:       Pulse: 70 75 81 83   Resp: 12 16 8 8   Temp:       TempSrc:       SpO2: 96% 99% 98% 99%   Weight:       Height:         24HR INTAKE/OUTPUT:      Intake/Output Summary (Last 24 hours) at 10/22/2021 0932  Last data filed at 10/22/2021 6815  Gross per 24 hour   Intake --   Output 5174 ml   Net -5174 ml     Patient Vitals for the past 96 hrs (Last 3 readings):   Weight   10/20/21 0500 285 lb 9.6 oz (129.5 kg)       Constitutional:  Alert, awake, no apparent distress  Cardiovascular:  S1, S2 without m/r/g  Respiratory:   diminished BS bases , no accessory muscle use. Abdomen: +bs, from significant abdominal distention body wall edema  Ext: 2+ LE edema    Data/  Recent Labs     10/19/21  1245 10/19/21  1916 10/20/21  0422 10/20/21  1603 10/22/21  0415   WBC 7.4  --  8.5  --   --    HGB 8.6*   < > 8.6* 7.9* 7.8*   HCT 30.7*   < > 30.7* 28.3* 27.8*   .7*  --  107.0*  --   --    *  --  148  --   --     < > = values in this interval not displayed. Recent Labs     10/20/21  0422 10/20/21  0422 10/21/21  1004 10/21/21  1004 10/21/21  1545 10/21/21  2215 10/22/21  0415   *   < > 132*   < > 132* 133* 132*   K 5.7*   < > 4.3   < > 4.1 4.0 4.0   CL 93*   < > 95*   < > 95* 96* 96*   CO2 24   < > 23   < > 26 24 24   GLUCOSE 122*   < > 211*   < > 198* 147* 141*   PHOS 3.7  --  2.7  --   --  2.8  --    MG 2.1  --  1.9  --   --  1.7  --    BUN 44*   < > 34*   < > 30* 27* 25*   CREATININE 6.60*   < > 4.10*   < > 4.23* 3.66* 3.39*   LABGLOM 7*   < > 11*   < > 11* 13* 14*   GFRAA 8*   < > 14*   < > 13* 16* 17*    < > = values in this interval not displayed. Assessment/   1. End-stage renal disease on hemodialysis currently 15 kg above her admission weight with volume overload and significant hypotension limiting dialysis treatment. Last dialysis 10/16/2021 , intiated on CRRT    2. 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 Persistent hypotension  4. Edema, chronic left abdominal wall edema  5. Paroxysmal atrial fibrillation on amiodarone  6. CHF with reduced EF 40-45%, Echo showing D sign, RV pressure overload, severe TR, moderate Pulmonary hypertension. 6. Hyperkalemia  7. Anemia of chronic kidney disease. Plan/     Continue CRRT at current prescription. BMP q 6 hours. Replace electrolytes, replace calcium. Continue Levophed increased to 95 mcg/min to keep MAP above 65 mm hg or SBP above 90 mm hg.  Likely will need second pressor soon. UF 25 ml/hr  Iron studies. Ferritin level.   Start TORRI/Retacrit. Prognosis guarded. Condition critical.    Malathi Baker MD  Nephrology Attending.

## 2021-10-22 NOTE — PROGRESS NOTES
Occupational Therapy    DATE: 10/22/2021    NAME: Denver Baker  MRN: [id number]   : 1970    Patient not seen this date for Occupational Therapy due to:      [x] Cancel by RN or physician due to: RN stating pt's blood pressure and hemoglobin too low for therapy to this date. Check back 10/23    [] Hemodialysis    [] Critical Lab Value Level     [] Blood transfusion in progress    [] Acute or unstable cardiovascular status   _MAP < 55 or more than >115  _HR < 40 or > 130    [] Acute or unstable pulmonary status   -FiO2 > 60%   _RR < 5 or >40    _O2 sats < 85%    [] Strict Bedrest    [] Off Unit for surgery or procedure    [] Off Unit for testing       [] Pending imaging to R/O fracture    [] Refusal by Patient      [] Other      [] PT being discontinued at this time. Patient independent. No further needs. [] PT being discontinued at this time as the patient has been transferred to hospice care. No further needs.       Lacy Cobian GEORGE

## 2021-10-22 NOTE — PLAN OF CARE
Problem: Infection:  Goal: Will remain free from infection  Description: Will remain free from infection  Outcome: Ongoing     Problem: Daily Care:  Goal: Daily care needs are met  Description: Daily care needs are met  Outcome: Ongoing     Problem: Pain:  Goal: Patient's pain/discomfort is manageable  Description: Patient's pain/discomfort is manageable  Outcome: Ongoing  Goal: Pain level will decrease  Description: Pain level will decrease  Outcome: Ongoing  Goal: Control of acute pain  Description: Control of acute pain  Outcome: Ongoing  Goal: Control of chronic pain  Description: Control of chronic pain  Outcome: Ongoing     Problem: Skin Integrity:  Goal: Skin integrity will stabilize  Description: Skin integrity will stabilize  Outcome: Ongoing     Problem: Discharge Planning:  Goal: Patients continuum of care needs are met  Description: Patients continuum of care needs are met  Outcome: Ongoing     Problem: Falls - Risk of:  Goal: Will remain free from falls  Description: Will remain free from falls  Outcome: Ongoing  Goal: Absence of physical injury  Description: Absence of physical injury  Outcome: Ongoing     Problem:  Activity:  Goal: Fatigue will decrease  Description: Fatigue will decrease  Outcome: Ongoing  Goal: Risk for activity intolerance will decrease  Description: Risk for activity intolerance will decrease  Outcome: Ongoing     Problem: Coping:  Goal: Ability to cope will improve  Description: Ability to cope will improve  Outcome: Ongoing     Problem: Fluid Volume:  Goal: Will show no signs or symptoms of fluid imbalance  Description: Will show no signs or symptoms of fluid imbalance  Outcome: Ongoing     Problem: Health Behavior:  Goal: Ability to manage health-related needs will improve  Description: Ability to manage health-related needs will improve  Outcome: Ongoing  Goal: Identification of resources available to assist in meeting health care needs will improve  Description: Identification of resources available to assist in meeting health care needs will improve  Outcome: Ongoing     Problem: Nutritional:  Goal: Ability to identify appropriate dietary choices will improve  Description: Ability to identify appropriate dietary choices will improve  Outcome: Ongoing     Problem: Physical Regulation:  Goal: Ability to maintain clinical measurements within normal limits will improve  Description: Ability to maintain clinical measurements within normal limits will improve  Outcome: Ongoing  Goal: Complications related to the disease process, condition or treatment will be avoided or minimized  Description: Complications related to the disease process, condition or treatment will be avoided or minimized  Outcome: Ongoing     Problem: Sensory:  Goal: General experience of comfort will improve  Description: General experience of comfort will improve  Outcome: Ongoing     Problem: Skin Integrity:  Goal: Status of oral mucous membranes will improve  Description: Status of oral mucous membranes will improve  Outcome: Ongoing  Goal: Skin integrity will be maintained  Description: Skin integrity will be maintained  Outcome: Ongoing     Problem: Discharge Planning:  Goal: Discharged to appropriate level of care  Description: Discharged to appropriate level of care  Outcome: Ongoing     Problem: Cardiac Output - Decreased:  Goal: Avoidance of environmental tobacco smoke  Description: Absence of orthostatic hypotension  Outcome: Ongoing  Goal: Cardiac output within specified parameters  Description: Cardiac output within specified parameters  Outcome: Ongoing  Goal: Hemodynamic stability will improve  Description: Hemodynamic stability will improve  Outcome: Ongoing     Problem: Fluid Volume - Imbalance:  Goal: Absence of imbalanced fluid volume signs and symptoms  Description: Absence of imbalanced fluid volume signs and symptoms  Outcome: Ongoing     Problem: Tissue Perfusion, Altered:  Goal: Circulatory function within specified parameters  Description: Circulatory function within specified parameters  Outcome: Ongoing     Problem: Tissue Perfusion - Cardiopulmonary, Altered:  Goal: Absence of angina  Description: Absence of angina  Outcome: Ongoing  Goal: Hemodynamic stability will improve  Description: Hemodynamic stability will improve  Outcome: Ongoing

## 2021-10-22 NOTE — PROGRESS NOTES
[facility]  Office: [address], DO, Julio C Glez, DO, Michael Hillman, DO, Ofelia Vasquez Blood, DO, Heide Purcell MD, Pinky Douglas MD, Elizabeth Man MD, Imelda Gleason MD, Elisa Doan MD, Mandeep Lynn MD, Efren Velasco MD, Dillon Abraham MD, Servando Hobson, DO, Di Dorsey, DO, Chula Cantrell MD,  Arleen Gutierres DO, David Catalan MD, Moise Dominique MD, Tom Green MD, Marie Gómez MD, Tu Anderson MD, Dima Hi MD, Luke Pickard, [facility], [facility], CNP, Lu Sauceda, CNP, Jovany Lopez, CNS, Ailene Lesches, CNP, Tabitha Moran, CNP, Oralia Smith, CNP, Antonia Lindquist, CNP, Laura Price, CNP, Domenica Brenner, CNP, Juan Jennings PA-C, Sabra Billings, [facility], Ryan Prince, CNP, Bobby Aguila, CNP, Leslie Oswald, CNP, Figueroa Fong, CNP, Skyler Neumann, CNP, Kina Rice, [facility], [facility]    Progress Note    10/22/2021    10:35 AM    Name:   Alverto Jorge  MRN:     [id number]     Acct:      [de-identified]   Room:   [address] [place] [address] Day:  15  Admit Date:  10/8/2021 12:20 PM    PCP:   Damon Velásquez MD  Code Status:  Full Code    Subjective:     C/C:   Chief Complaint   Patient presents with    Hypotension     Interval History Status: not changed.      Remains hypotensive- now on levophed basically maxed out    On CRRT  Denies cp/sob/n/v    Mainly c/o abd distention and ble swelling though better today    Brief History:     Per my partner:  \"46year-old female with known medical history of hypertension on dialysis, admitted for diabetes presented to the hospital after she was sent from dialysis for blood pressure of systolic less than 70.  Patient states that she was feeling fatigued in the last few days.  After she received her Midrin dose her blood pressure stabilized.  10/9 patient developed atrial flutter, heart rate was elevated up to 120s.  Blood pressure continued to be low 80s systolic. Cardiology was consulted.    Patient was started on amiodarone and heparin. Patient noted to have recent EGD showing duodenal polyps, and was sent to colonoscopy outpatient patient did not follow-up. Patient will need GI clearance for anticoagulation . Despite multiple attempts for colonoscopy, colonoscopy could not be done given patient's chronic hypotension. Anesthesia was not willing to go ahead for the procedure. Eliquis was started and patient's hemoglobin was monitored for 2 days. Plan was to discharge with patient had severe abdominal distention, nephrology recommended dialysis. Dialysis could not be done on Sunday as dialysis nurse was not available.     Now patient requesting SNF placement with dialysis in house     Discharge planning delayed given multiple complexities\"    Review of Systems:     Constitutional:  negative for chills, fevers, sweats  Respiratory:  negative for cough, dyspnea on exertion, shortness of breath, wheezing  Cardiovascular:  negative for chest pain, chest pressure/discomfort, palpitations  Gastrointestinal:  negative for abdominal pain, diarrhea, nausea, vomiting  Neurological:  negative for dizziness, headache    Medications: Allergies:     Allergies   Allergen Reactions    Ibuprofen     Tramadol Hives    Motrin [Ibuprofen Micronized] Itching    Strawberry Extract Itching and Rash    Tape Ruy Danika Tape] Rash     No paper tape silk only       Current Meds:   Scheduled Meds:    fluticasone  2 spray Each Nostril Daily    epoetin lyudmila-epbx  2,000 Units SubCUTAneous Once per day on Mon Wed Fri    And    epoetin lyudmila-epbx  3,000 Units SubCUTAneous Once per day on Mon Wed Fri    sodium thiosulfate IVPB  25 g IntraVENous Once per day on Mon Wed Fri    apixaban  5 mg Oral BID    polyethylene glycol  2,000 mL Oral Once    Hydrocortisone-Aloe Vera   Topical BID    gabapentin  100 mg Oral TID    sennosides-docusate sodium  2 tablet Oral BID    amiodarone  200 mg Oral BID    midodrine  15 mg Oral 4x Daily    pantoprazole  40 mg Oral QAM AC    budesonide-formoterol  2 puff Inhalation BID    cinacalcet  90 mg Oral Once per day on Mon Wed Fri    atorvastatin  40 mg Oral Nightly    aspirin  81 mg Oral Daily    sodium chloride flush  5-40 mL IntraVENous 2 times per day     Continuous Infusions:    CRRT dialysis builder 2,000 mL/hr (10/22/21 3003)    vasopressin (Septic Shock) infusion      sodium chloride 100 mL/hr at 10/21/21 2357    norepinephrine 100 mcg/min (10/22/21 0948)    sodium chloride Stopped (10/13/21 1945)    dextrose Stopped (10/10/21 1415)     PRN Meds: potassium chloride **OR** potassium alternative oral replacement **OR** potassium chloride, potassium chloride, sodium phosphate IVPB **OR** sodium phosphate IVPB **OR** sodium phosphate IVPB **OR** sodium phosphate IVPB, calcium gluconate **OR** calcium gluconate **OR** calcium gluconate **OR** calcium gluconate, magnesium sulfate, potassium chloride, lactulose, HYDROcodone 5 mg - acetaminophen, acetaminophen, albuterol sulfate HFA, hydrOXYzine, diphenhydrAMINE, Calcium Acetate (Phos Binder), guaiFENesin, sodium chloride flush, sodium chloride, ondansetron **OR** ondansetron, polyethylene glycol, [DISCONTINUED] acetaminophen **OR** acetaminophen, glucose, dextrose, glucagon (rDNA), dextrose    Data:     Past Medical History:   has a past medical history of Asthma, [facility]congestive [facility] failure) [facility] Chronic kidney disease, COPD (chronic obstructive pulmonary disease) ([facility] Dialysis patient ([facility] Hemodialysis patient ([facility] Hyperlipidemia, Hypertension, Obesity, YONI (obstructive sleep apnea), Pneumonia, Renal failure, Type II or unspecified type diabetes mellitus without mention of complication, not stated as uncontrolled, and Ventilator dependence ([facility] Social History:   reports that she quit smoking about 4 years ago. Her smoking use included cigarettes. She quit after 7.00 years of use. She has never used smokeless tobacco. She reports that she does not drink alcohol and does not use drugs. Family History:   Family History   Problem Relation Age of Onset    Diabetes Other     Cancer Mother         BREAST    Heart Disease Father         CAD-MI    Diabetes Father     High Blood Pressure Father     Diabetes Maternal Grandfather     Diabetes Paternal Grandfather     Heart Disease Paternal Grandfather     High Blood Pressure Paternal Grandfather        Vitals:  BP (!) 101/54   Pulse 83   Temp 96.9 °F (36.1 °C) (Temporal)   Resp 13   Ht 4' 11\" (1.499 m)   Wt 285 lb 9.6 oz (129.5 kg)   LMP 2014 (Approximate)   SpO2 95%   BMI 57.68 kg/m²   Temp (24hrs), Av.5 °F (35.8 °C), Min:96 °F (35.6 °C), Max:96.9 °F (36.1 °C)    Recent Labs     10/21/21  1135 10/21/21  1558 10/21/21  1923 10/22/21  0812   POCGLU 189* 179* 155* 129*       I/O (24Hr): Intake/Output Summary (Last 24 hours) at 10/22/2021 1035  Last data filed at 10/22/2021 1000  Gross per 24 hour   Intake --   Output 5465 ml   Net -5465 ml       Labs:  Hematology:  Recent Labs     10/19/21  1245 10/19/21  1916 10/20/21  0422 10/20/21  1603 10/22/21  0415   WBC 7.4  --  8.5  --   --    RBC 2.85*  --  2.87*  --   --    HGB 8.6*   < > 8.6* 7.9* 7.8*   HCT 30.7*   < > 30.7* 28.3* 27.8*   .7*  --  107.0*  --   --    MCH 30.2  --  30.0  --   --    MCHC 28.0*  --  28.0*  --   --    RDW 15.5*  --  15.3*  --   --    *  --  148  --   --    MPV 11.7  --  11.1  --   --    INR 1.8  --   --   --   --     < > = values in this interval not displayed.      Chemistry:  Recent Labs     10/20/21  0422 10/20/21  0422 10/20/21  1559 10/21/21  1004 10/21/21  1004 10/21/21  1545 10/21/21  2215 10/22/21  0415   *   < >  --  132*   < > 132* 133* 132*   K 5.7*   < >  --  4.3   < > 4.1 4.0 4.0   CL 93*   < >  --  95*   < > 95* 96* 96*   CO2 24   < >  --  23   < > 26 24 24   GLUCOSE 122*   < >  --  211*   < > 198* 147* 141*   BUN amount of ascites, mostly in the right upper quadrant. Severe diffuse body wall edema, increased from August 2021 Small bilateral pleural effusions.        Physical Examination:        General appearance:  alert, cooperative and no distress  Mental Status:  oriented to person, place and time and normal affect  Lungs:  clear to auscultation bilaterally, normal effort  Heart:  regular rate and rhythm, no murmur  Abdomen:  nontender, distended, normal bowel sounds, no masses, hepatomegaly, splenomegaly; obese  Extremities:  no redness, tenderness in the calves; 1-2+ ble edema  Skin:  no gross lesions, rashes, induration    Assessment:        Hospital Problems         Last Modified POA    * (Principal) Hypotension 10/20/2021 Yes    Overview Signed 10/20/2021  9:28 AM by Margarita Green, DO     chronic         CKD (chronic kidney disease) stage 4, GFR 15-29 ml/min ([facility]) (Chronic) 10/8/2021 Yes    Pulmonary hypertension ([address] (Chronic) 10/8/2021 Yes    Morbid obesity with BMI of 45.0-49.9, adult ([address] 10/8/2021 Yes    Essential hypertension 10/8/2021 Yes    YONI on CPAP 10/11/2021 Yes    Type 2 diabetes mellitus with chronic kidney disease on chronic dialysis ([address] 10/8/2021 Yes    Chronic obstructive pulmonary disease ([address] 10/8/2021 Yes    Gastroesophageal reflux disease without esophagitis 10/8/2021 Yes    ESRD needing dialysis ([address] 10/8/2021 Yes    Atrial flutter with rapid ventricular response ([address] 10/10/2021 Yes    Hematochezia 10/10/2021 Yes    Anemia due to chronic kidney disease 10/10/2021 Yes    Itching 10/11/2021 Yes    Fluid overload 10/17/2021 Yes    Pre-op chest exam 10/17/2021 Yes          Plan:        Central line placed, now on levophed at 95mcg, was at 55mcg yesterday; likely will need 2nd pressor soon; her hypotension was refractory to midodrine-even on midodrine her bp was too low for dialysis  CRRT continues  Reiterated to her that when we stop the pressors, it is likely her bp again will be too low to allow dialysis and explained to her there is really nothing we can do  Poor prognosis  pall care eval-this appears to be a futile effort    Antonio Barron Blood, DO  10/22/2021  10:35 AM

## 2021-10-22 NOTE — PROGRESS NOTES
GI Progress notes    10/22/2021   2:02 PM    Name:  Nikolay Beasley  MRN:    [id number]     Acct:     [de-identified]   Room:  [address] [place] [address] Day: 15     Admit Date: 10/8/2021 12:20 PM  PCP: Jennifer Rogers MD    Subjective:     C/C:   Chief Complaint   Patient presents with    Hypotension       Interval History: Status: not changed. Patient seen and examined. No acute events overnight. RN at bedside. Denies any overt GI bleeding over the last 2 days. Remains hypotensive  On levo and vasopressin drips  Hemoglobin 7.8    ROS:  Constitutional: negative for chills, fevers and sweats  Gastrointestinal: negative for abdominal pain, constipation, diarrhea, nausea and vomiting      Medications: Allergies:    Allergies   Allergen Reactions    Ibuprofen     Tramadol Hives    Motrin [Ibuprofen Micronized] Itching    Strawberry Extract Itching and Rash    Tape Merwyn Valentine Tape] Rash     No paper tape silk only       Current Meds: fluticasone (FLONASE) 50 MCG/ACT nasal spray 2 spray, Daily  epoetin lyudmila-epbx (RETACRIT) injection 2,000 Units, Once per day on Mon Wed Fri   And  epoetin lyudmila-epbx (RETACRIT) injection 3,000 Units, Once per day on Mon Wed Fri  potassium chloride (KLOR-CON M) extended release tablet 40 mEq, PRN   Or  potassium bicarb-citric acid (EFFER-K) effervescent tablet 40 mEq, PRN   Or  potassium chloride 10 mEq/100 mL IVPB (Peripheral Line), PRN  potassium chloride 10 mEq/100 mL IVPB (Peripheral Line), PRN  sodium phosphate 6 mmol in dextrose 5 % 250 mL IVPB, PRN   Or  sodium phosphate 12 mmol in dextrose 5 % 250 mL IVPB, PRN   Or  sodium phosphate 18 mmol in dextrose 5 % 500 mL IVPB, PRN   Or  sodium phosphate 24 mmol in dextrose 5 % 500 mL IVPB, PRN  calcium gluconate 1,000 mg in dextrose 5 % 100 mL IVPB, PRN   Or  calcium gluconate 2,000 mg in dextrose 5 % 100 mL IVPB, PRN   Or  calcium gluconate 3,000 mg in dextrose 5 % 100 mL IVPB, PRN   Or  calcium gluconate 4,000 mg in dextrose 5 % 100 mL IVPB, PRN  magnesium sulfate 1000 mg in dextrose 5% 100 mL IVPB, PRN  potassium chloride 20 mEq/50 mL IVPB (Central Line), PRN  lactulose (CHRONULAC) 10 GM/15ML solution 20 g, TID PRN  prismaSATE BGK 2/0 with calcium chloride 2.5 mEq/L solution, Continuous  vasopressin 20 Units in dextrose 5 % 100 mL infusion, Continuous  HYDROcodone-acetaminophen (NORCO) 5-325 MG per tablet 1 tablet, Q6H PRN  0.9 % sodium chloride infusion, Continuous  norepinephrine (LEVOPHED) 16 mg in dextrose 5% 250 mL infusion, Continuous  acetaminophen (TYLENOL) tablet 650 mg, Q4H PRN  sodium thiosulfate 25 g in sodium chloride 0.9 % 150 mL IVPB, Once per day on Mon Wed Fri  apixaban (ELIQUIS) tablet 5 mg, BID  albuterol sulfate  (90 Base) MCG/ACT inhaler 2 puff, Q4H PRN  polyethylene glycol (GoLYTELY) solution 2,000 mL, Once  Hydrocortisone-Aloe Vera 1 % CREA, BID  gabapentin (NEURONTIN) capsule 100 mg, TID  sennosides-docusate sodium (SENOKOT-S) 8.6-50 MG tablet 2 tablet, BID  hydrOXYzine (ATARAX) tablet 25 mg, TID PRN  amiodarone (CORDARONE) tablet 200 mg, BID  midodrine (PROAMATINE) tablet 15 mg, 4x Daily  diphenhydrAMINE (BENADRYL) tablet 25 mg, Q6H PRN  Calcium Acetate (Phos Binder) CAPS 667 mg, PRN  pantoprazole (PROTONIX) tablet 40 mg, QAM AC  guaiFENesin (MUCINEX) extended release tablet 600 mg, BID PRN  budesonide-formoterol (SYMBICORT) 160-4.5 MCG/ACT inhaler 2 puff, BID  cinacalcet (SENSIPAR) tablet 90 mg, Once per day on Mon Wed Fri  atorvastatin (LIPITOR) tablet 40 mg, Nightly  aspirin EC tablet 81 mg, Daily  sodium chloride flush 0.9 % injection 5-40 mL, 2 times per day  sodium chloride flush 0.9 % injection 10 mL, PRN  0.9 % sodium chloride infusion, PRN  ondansetron (ZOFRAN-ODT) disintegrating tablet 4 mg, Q8H PRN   Or  ondansetron (ZOFRAN) injection 4 mg, Q6H PRN  polyethylene glycol (GLYCOLAX) packet 17 g, Daily PRN  acetaminophen (TYLENOL) suppository 650 mg, Q6H PRN  glucose (GLUTOSE) 40 % oral gel 15 g, 01/16/2012    PROT 7.1 10/22/2021    LABALBU 3.5 10/22/2021    CALCIUM 6.6 10/22/2021    BILITOT 0.51 10/22/2021    ALKPHOS 168 10/22/2021    AST 27 10/22/2021    ALT 15 10/22/2021     BMP:    Lab Results   Component Value Date     10/22/2021    K 3.8 10/22/2021    CL 90 10/22/2021    CO2 23 10/22/2021    BUN 22 10/22/2021    LABALBU 3.5 10/22/2021    CREATININE 2.96 10/22/2021    CALCIUM 6.6 10/22/2021    GFRAA 20 10/22/2021    LABGLOM 17 10/22/2021    GLUCOSE 160 10/22/2021    GLUCOSE 132 01/16/2012     PT/INR:    Lab Results   Component Value Date    PROTIME 20.6 10/19/2021    INR 1.8 10/19/2021     PTT:    Lab Results   Component Value Date    APTT 36.1 10/19/2021   [APTT}    Physical Examination:        General appearance: alert, cooperative and no distress  Mental Status: oriented to person, place and time and normal affect  Abdomen: soft, nontender, nondistended, bowel sounds present     Assessment:        Primary Problem  Hypotension     Active Hospital Problems    Diagnosis Date Noted    Pulmonary hypertension ([facility] [I27.20] 12/10/2015     Priority: Medium    CKD (chronic kidney disease) stage 4, GFR 15-29 ml/min ([facility] [N18.4] 12/08/2015     Priority: Medium    Fluid overload [E87.70] 10/17/2021    Pre-op chest exam [S69.204]     Itching [L29.9] 10/11/2021    Anemia due to chronic kidney disease [N18.9, D63.1] 10/10/2021    Hematochezia [K92.1]     Hypotension [I95.9] 10/08/2021    Atrial flutter with rapid ventricular response ([address] [I48.92] 08/24/2021    ESRD needing dialysis ([address] [N18.6, Z99.2] 07/01/2019    Chronic obstructive pulmonary disease ([address] [J44.9] 06/12/2016    Gastroesophageal reflux disease without esophagitis [K21.9] 06/12/2016    Type 2 diabetes mellitus with chronic kidney disease on chronic dialysis ([facility] [E11.22, N18.6, Z99.2]     Essential hypertension [I10] 03/11/2016    YONI on CPAP [G47.33, Z99.89] 03/11/2016    Morbid obesity with BMI of 45.0-49.9, adult Still blood pressure is running low and patient is on vasopressors.     Once the patient is stable and off from vasopressors, will reevaluate regarding further work-up

## 2021-10-22 NOTE — PROGRESS NOTES
Physical Therapy  DATE: 10/22/2021    NAME: Denver Baker  MRN: [id number]   : 1970    Patient not seen this date for Physical Therapy due to:      [x] Cancel by RN or physician due to:  100% levo, CRRT, HBG trending down and BP still low. [] Hemodialysis    [] Critical Lab Value Level     [] Blood transfusion in progress    [] Acute or unstable cardiovascular status   _MAP < 55 or more than >115  _HR < 40 or > 130    [] Acute or unstable pulmonary status   -FiO2 > 60%   _RR < 5 or >40    _O2 sats < 85%    [] Strict Bedrest    [] Off Unit for surgery or procedure    [] Off Unit for testing       [] Pending imaging to R/O fracture    [] Refusal by Patient      [] Other      [] PT being discontinued at this time. Patient independent. No further needs. [] PT being discontinued at this time as the patient has been transferred to hospice care. No further needs.       Harlow Canavan, PTA

## 2021-10-22 NOTE — PROGRESS NOTES
Patient on CRRT, has significant pitting edema, fistula in left arm, and cuff BP has been inaccurate, patient close to being maxed on levo. Baron Lesch, NP with [facility] contacted, and writer requested an order for art line for more accurate BP. Order placed by Bianca Sharp RT placed right radial art line.

## 2021-10-22 NOTE — RT PROTOCOL NOTE
RT Inhaler-Nebulizer Bronchodilator Protocol Note    There is a bronchodilator order in the chart from a provider indicating to follow the RT Bronchodilator Protocol and there is an Initiate RT Inhaler-Nebulizer Bronchodilator Protocol order as well (see protocol at bottom of note). CXR Findings:  No results found. The findings from the last RT Protocol Assessment were as follows:   History Pulmonary Disease: Smoker 15 pack years or more  Respiratory Pattern: Regular pattern and RR 12-20 bpm  Breath Sounds: Slightly diminished and/or crackles  Cough: Strong, spontaneous, non-productive  Indication for Bronchodilator Therapy: On home bronchodilators  Bronchodilator Assessment Score: 3    Aerosolized bronchodilator medication orders have been revised according to the RT Inhaler-Nebulizer Bronchodilator Protocol below. Respiratory Therapist to perform RT Therapy Protocol Assessment initially then follow the protocol. Repeat RT Therapy Protocol Assessment PRN for score 0-3 or on second treatment, BID, and PRN for scores above 3. No Indications - adjust the frequency to every 6 hours PRN wheezing or bronchospasm, if no treatments needed after 48 hours then discontinue using Per Protocol order mode. If indication present, adjust the RT bronchodilator orders based on the Bronchodilator Assessment Score as indicated below. Use Inhaler orders unless patient has one or more of the following: on home nebulizer, not able to hold breath for 10 seconds, is not alert and oriented, cannot activate and use MDI correctly, or respiratory rate 25 breaths per minute or more, then use the equivalent nebulizer order(s) with same Frequency and PRN reasons based on the score. If a patient is on this medication at home then do not decrease Frequency below that used at home.     0-3 - enter or revise RT bronchodilator order(s) to equivalent RT Bronchodilator order with Frequency of every 4 hours PRN for wheezing or increased work of breathing using Per Protocol order mode. 4-6 - enter or revise RT Bronchodilator order(s) to two equivalent RT bronchodilator orders with one order with BID Frequency and one order with Frequency of every 4 hours PRN wheezing or increased work of breathing using Per Protocol order mode. 7-10 - enter or revise RT Bronchodilator order(s) to two equivalent RT bronchodilator orders with one order with TID Frequency and one order with Frequency of every 4 hours PRN wheezing or increased work of breathing using Per Protocol order mode. 11-13 - enter or revise RT Bronchodilator order(s) to one equivalent RT bronchodilator order with QID Frequency and an Albuterol order with Frequency of every 4 hours PRN wheezing or increased work of breathing using Per Protocol order mode. Greater than 13 - enter or revise RT Bronchodilator order(s) to one equivalent RT bronchodilator order with every 4 hours Frequency and an Albuterol order with Frequency of every 2 hours PRN wheezing or increased work of breathing using Per Protocol order mode. RT to enter RT Home Evaluation for COPD & MDI Assessment order using Per Protocol order mode.     Electronically signed by John Mei RCP on 10/22/2021 at 5:06 PM

## 2021-10-22 NOTE — PROGRESS NOTES
[facility]   Progress Note                   Date:   10/22/2021  Patient name: Pablo Jiménez  Date of admission:  10/8/2021 12:20 PM  MRN:   [id number]  YOB: 1970  PCP: Edith Grant MD    Reason for Admission:     Subjective:       Clinical Changes / Abnormalities:   Patient seen and examined in room with RN at bedside. Denies chest pain. Reports SOB on NC Oxygen. Reports palpitations and lightheadedness. Hypotensive. On Levo and vasopressin drips  SR on tele HR 84  . I/O -5268 ml yesterday and -2119 ml  since admission.     Medications:   Scheduled Meds:   fluticasone  2 spray Each Nostril Daily    epoetin lyudmila-epbx  2,000 Units SubCUTAneous Once per day on Mon Wed Fri    And    epoetin lyudmila-epbx  3,000 Units SubCUTAneous Once per day on Mon Wed Fri    sodium thiosulfate IVPB  25 g IntraVENous Once per day on Mon Wed Fri    apixaban  5 mg Oral BID    polyethylene glycol  2,000 mL Oral Once    Hydrocortisone-Aloe Vera   Topical BID    gabapentin  100 mg Oral TID    sennosides-docusate sodium  2 tablet Oral BID    amiodarone  200 mg Oral BID    midodrine  15 mg Oral 4x Daily    pantoprazole  40 mg Oral QAM AC    budesonide-formoterol  2 puff Inhalation BID    cinacalcet  90 mg Oral Once per day on Mon Wed Fri    atorvastatin  40 mg Oral Nightly    aspirin  81 mg Oral Daily    sodium chloride flush  5-40 mL IntraVENous 2 times per day     Continuous Infusions:   CRRT dialysis builder 2,000 mL/hr (10/22/21 1232)    vasopressin (Septic Shock) infusion      sodium chloride 100 mL/hr at 10/21/21 2357    norepinephrine 100 mcg/min (10/22/21 1349)    sodium chloride Stopped (10/13/21 1945)    dextrose Stopped (10/10/21 1415)     CBC:   Recent Labs     10/20/21  0422 10/20/21  1603 10/22/21  0415   WBC 8.5  --   --    HGB 8.6* 7.9* 7.8*     --   --      BMP:    Recent Labs     10/21/21  2215 10/22/21  0415 10/22/21  1040   * 132* 125*   K 4.0 4.0 3.8 further evaluation,   especially if concern for decreased cardiac function as a result.       Only small amount of ascites, mostly in the right upper quadrant.       Severe diffuse body wall edema, increased from August 2021       Small bilateral pleural effusions. Limited Echo 10/18/21:  Technically difficult study  Left ventricle is normal in size  Global left ventricular systolic function is moderately reduced EF 40-45%  Abnormal septal wall motions  Leftward compression of inter-ventricular septum (\"D-sign\") indicating RV  pressure overload. Right atrium is moderate to severely dilated . Right ventricular dilatation with reduced systolic function. AV, MV structure appears normal.  Severe tricuspid regurgitation. Moderate pulmonary hypertension with an estimated right ventricular systolic  pressure of 52 mmHg. Trivial pericardial effusion. Objective:   Vitals: BP (!) 101/54   Pulse 83   Temp 96.9 °F (36.1 °C) (Temporal)   Resp 13   Ht 4' 11\" (1.499 m)   Wt 285 lb 9.6 oz (129.5 kg)   LMP 11/12/2014 (Approximate)   SpO2 95%   BMI 57.68 kg/m²   General appearance: alert and cooperative with exam  HEENT: Head: Normocephalic, no lesions, without obvious abnormality. Neck: no adenopathy, no carotid bruit, no JVD, supple, symmetrical, trachea midline and thyroid not enlarged, symmetric, no tenderness/mass/nodules  Lungs: clear to auscultation bilaterally  Heart: regular rate and rhythm, S1, S2 normal, no murmur, click, rub or gallop  SR on tele HR 84  Abdomen: abdominal distension. Extremities: extremities normal, atraumatic, no cyanosis or +1 edema  Neurologic: Mental status: Alert, oriented, thought content appropriate      Assessment / Acute Cardiac Problems:   1. Acute on chronic combined CHF- mostly R CHF / Cor Pulmonale from severe obesity/YONI/ESRD on HD. She if 40 # above her dry weight  2.  Mildly reduced LVEF Echo 10/18/21- 45% - last echo normal EF- however echos in past showed EF

## 2021-10-22 NOTE — PROGRESS NOTES
Patient getting close to maximum dosage of levophed, going at 85 mcg/min. Dr Makayla Bocanegra contacted and writer requested another pressor, and he said to contact intermed, as crit care was not on case. Sakshi Alvarado NP, ordered vaso if needed. Have not had to start vaso, will continue to monitor.

## 2021-10-22 NOTE — PROGRESS NOTES
[facility] PALLIATIVE CARE NURSING ASSESSMENT    Patient: Denver Baker  Room: [facility]7/1107-    Reason For Consult   Goals of care evaluation  Distress management  Guidance and support  Facilitate communications  Assistance in coordinating care    Code Status: Full Code      Impression: Denver Baker is a 46y.o. year old female  has a past medical history of Asthma, CHF (congestive heart failure) ([facility] Chronic kidney disease, COPD (chronic obstructive pulmonary disease) ([facility] Dialysis patient ([facility] Hemodialysis patient ([facility] Hyperlipidemia, Hypertension, Obesity, YONI (obstructive sleep apnea), Pneumonia, Renal failure, Type II or unspecified type diabetes mellitus without mention of complication, not stated as uncontrolled, and Ventilator dependence ([facility] .  Currently hospitalized for the management of hypotension. The Palliative Care Team is following to assist with goals of care/support. Vital Signs  Blood pressure (!) 101/54, pulse 83, temperature 96.9 °F (36.1 °C), temperature source Temporal, resp. rate 13, height 4' 11\" (1.499 m), weight 285 lb 9.6 oz (129.5 kg), last menstrual period 11/12/2014, SpO2 95 %, not currently breastfeeding.     Patient Active Problem List   Diagnosis    Asthma    YONI (obstructive sleep apnea)    Left knee pain    Hidradenitis    Current smoker    Microalbuminuria    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)    CKD (chronic kidney disease) stage 4, GFR 15-29 ml/min (HCC)    Acute diastolic congestive heart failure (HCC)    Hyperkalemia    Acute systolic congestive heart failure (HCC)    Pulmonary hypertension ([facility]    Morbid obesity with BMI of 45.0-49.9, adult ([facility]    Acute on chronic respiratory failure with hypoxia (HCC)    COPD exacerbation (HCC)    Asthma exacerbation    Type 2 diabetes mellitus with diabetic nephropathy ([facility]    ESRD (end stage renal disease) on dialysis ([facility]    Bronchitis    Essential hypertension    YONI on CPAP    Hyperglycemia, drug-induced    Needs smoking cessation education    Hyperglycemia    Drowsiness    Acute on chronic respiratory failure with hypercapnia (HCC)    Mobility impaired    S/P cardiac cath-mINIMAL caD 3/15/16 - dR. Ever [place]    Type 2 diabetes mellitus with chronic kidney disease on chronic dialysis ([facility]    Type 2 diabetes mellitus without complication (HCC)    Congestive heart failure (HCC)    Asthma with COPD with exacerbation (HCC)    Acute exacerbation of CHF (congestive heart failure) (HCC)    Chronic obstructive pulmonary disease (HCC)    Chronic kidney disease, stage V (HCC)    Acute respiratory acidosis    Precordial pain    Gastroesophageal reflux disease without esophagitis    Pulmonary HTN ([facility]    Morbid obesity due to excess calories (HCC)    Symptomatic anemia    Elevated serum creatinine    ESRD needing dialysis ([facility]    Chest pain at rest    Absolute anemia    [facility] fl[facility] with [facility] ventricular response [facility]    Chest pain    Hypoglycemia    Hypotension    Atrial fibrillation with RVR ([facility])    Hematochezia    Anemia due to chronic kidney disease    Itching    Fluid overload    Pre-op chest exam       Palliative Interaction: Pt resting in bed, she is alert and able to hold a conversation. She is on CRRT. She is on 2 pressors today. She states she is not in pain but \"uncomfortable\". She states she is \"down\" in bed and wants boosted up. Pt is unable to sit up straight due to blood pressure. This has been explained to her several times but she asks each person who enters the room to boost her up in bed. Pt does not feel like conversing with me right now as she is feeling agitated and wants to be repositioned. Nurse and PCTs updated. PCTs entered room to help her reposition for comfort. Pt does state she feels somewhat better and her abdomen \"not as bloated\". Will continue to follow for support and decision making.      Goals/Plan of care  Education/support to patient  Discharge planning/helping to coordinate care  Communications with primary service  Providing support for coping/adaptation/distress of patient  Continue with current plan of care  Code status clarified: Full Code  Will continue to follow for support and assist with decision making       Palliative Care Coordinator  Mariangel Sandhu RN, [address] [facility] Office: [address] [facility] Office: [phone]    For Symptom Management Clinic scheduling please call [phone]

## 2021-10-22 NOTE — PROGRESS NOTES
Nutrition Assessment     Type and Reason for Visit: Reassess    Nutrition Recommendations/Plan:   1. Continue  ADULT DIET; Regular; 4 carb choices (60 gm/meal); Low Sodium (2 gm); Low Potassium (Less than 3000 mg/day); Low Phosphorus (Less than 1000 mg); 1800 ml  2. Monitor p.o intakes, GI status and labs    Nutrition Assessment:  Patient is now receiving CRRT and hemodialysis has been discontinued. Patient has not had anymore bleeding and abdomen is less bloated. RN reports patient is eating fine at meals but sometimes has nausea and vomiting after meals but then will ask for a snack. Continue current diet and monitor p.o intakes, GI status and labs. Malnutrition Assessment:  Malnutrition Status: No malnutrition    Estimated Daily Nutrient Needs:  Energy (kcal): 1813 kcal (MSJ factor 1.0); Weight Used for Energy Requirements:  Current     Protein (g): 86-95 gm (2.0-2.2 g/kg); Weight Used for Protein Requirements:  Ideal        Fluid (ml/day): 1800 mL fluid restriction; Weight Used for Fluid Requirements:  Other (Comment)      Nutrition Related Findings: Edema: +2 RUE, +2 pitting BLE. GI status: hemorroids and constipation, active bowel sounds, occasional nausea and vomiting      Current Nutrition Therapies:    ADULT DIET; Regular; 4 carb choices (60 gm/meal); Low Sodium (2 gm); Low Potassium (Less than 3000 mg/day);  Low Phosphorus (Less than 1000 mg); 1800 ml    Anthropometric Measures:  · Height: 4' 11\" (149.9 cm)  · Current Body Wt: 285 lb (129.3 kg)   · BMI: 57.5    Nutrition Diagnosis:   · Increased nutrient needs related to increase demand for energy/nutrients as evidenced by dialysis      Nutrition Interventions:   Food and/or Nutrient Delivery:  Continue Current Diet  Nutrition Education/Counseling:  Education not indicated   Coordination of Nutrition Care:  Continue to monitor while inpatient    Goals:  PO intakes to provide >75% of estimated nutritional needs       Nutrition Monitoring and Evaluation:   Behavioral-Environmental Outcomes:  None Identified   Food/Nutrient Intake Outcomes:  Food and Nutrient Intake  Physical Signs/Symptoms Outcomes:  Biochemical Data, Fluid Status or Edema, Skin, Weight     Discharge Planning:    Continue current diet         John PEARSON, RDN, LDN  Lead Clinical Dietitian  RD Office Phone [phone]

## 2021-10-23 PROBLEM — J96.02 ACUTE RESPIRATORY FAILURE WITH HYPOXIA AND HYPERCAPNIA (HCC): Status: ACTIVE | Noted: 2021-01-01

## 2021-10-23 PROBLEM — J96.01 ACUTE RESPIRATORY FAILURE WITH HYPOXIA AND HYPERCAPNIA (HCC): Status: ACTIVE | Noted: 2021-01-01

## 2021-10-23 NOTE — PROGRESS NOTES
Iftikhar at 300mcg/min, levo 100mcg/min, vaso 0.03, epi 30mcg/min. BP 58/43 map 47 T91.5 (heated blanket applied and esophageal temp probe placed) SpO2 81% on 100% fiO2 R 40 p16 in pressure control mode. Cardiology rounded and added dobutamine.

## 2021-10-23 NOTE — DISCHARGE SUMMARY
[facility]  Office: [address], DO, Yuki Sandoval DO, Lui Ho DO, Elizabet Green, DO, Lizbeth Short MD, Sona Mckee MD, Taina Tao MD, Oumar Gutierrez MD, Mariano Sheets MD, Jose L Houser MD, Justo Tapia MD, Clara Anna MD, Cecily Briggs DO, Kathryn Valentin DO, Humberto Peraza MD,  Sonja Brink, DO, Haleigh Ackerman MD, Shayna Torre MD, Cady Gill MD, Elizabeth aJde MD, Meena Javier MD, Carolee Hamilton MD, Oneil Lopez, [facility], [facility], CNP, Waqas Coffey, CNP, Kelvin Hassan, CNS, Saira Sultana, CNP, Marin He, CNP, Joesph Clarke, CNP, Per Patel, CNP, Chanelle Rascon, CNP, Chloe Limon CNP, Kristi Collier PA-C, Liban Zaman, [facility] [place], Lina Rome, CNP, Mari Dong, CNP, Jesus Burrell, CNP, Russell Navarrete, CNP, Juliana Fritz, CNP, Kieran Shea, CNP, Geovanni Gong, [facility]    Discharge Summary     Patient ID: Jeniffer Carter  :  1970   MRN: [id number]     ACCOUNT:  [de-identified]   Patient's PCP: Nicole Gordon MD  Admit Date: 10/8/2021   Discharge Date: 10/23/2021     Length of Stay: 14  Code Status:  DNR-CC  Admitting Physician: Cady Gill MD  Discharge Physician: Sisto Lesch Blood, DO     Active Discharge Diagnoses:     Hospital Problem Lists:  Principal Problem:    Hypotension  Active Problems:    Acute respiratory failure with hypoxia and hypercapnia (HCC)    CKD (chronic kidney disease) stage 4, GFR 15-29 ml/min (HCC)    Pulmonary hypertension ([facility]    Morbid obesity with BMI of 45.0-49.9, adult ([facility]    Essential hypertension    YONI on CPAP    Type 2 diabetes mellitus with chronic kidney disease on chronic dialysis ([facility]    Chronic obstructive pulmonary disease ([address]    Gastroesophageal reflux disease without esophagitis    ESRD needing dialysis ([address]    Atrial flutter with rapid ventricular response ([address]    Hematochezia    Anemia due to chronic kidney disease Itching    Fluid overload    Pre-op chest exam  Resolved Problems:    * No resolved hospital problems. *      Admission Condition:  poor     Discharged Condition:     Hospital Stay:     Hospital Course:  Kristian Greene is a 46 y.o. female who was admitted for the management of  Hypotension , presented to ER with Hypotension    Sent from dialysis for hypotension refractory to midodrine. Her sbp was <70. She was seen by renal and they ultimately told her she was a hospice candidate as they could not dialyze her with a pressure that low. She requested 2nd opinion who agreed with primary nephrologist but said transfer to icu and pressors could be tried to see if dialysis could be done. She had central line placed, placed on levophed and crrt started. Pressure continued to decline while on crrt and levophed had to be titrated up. Then a 2nd pressor had to be started and later that night she went into shock requiring intubation and 4 pressors. Family was alerted to the significant change and despite all maximum efforts she did not improve. They elected dnrcc and terminal extubation and she  on 10/23/21 at 1412      Also during her stay she had rectal bleed and seen by GI but colonoscopy could not be done due to low bp.   Also seen by cardio, ID and gen surg during the stay    Significant therapeutic interventions: see above    Significant Diagnostic Studies:   Labs / Micro:  CBC:   Lab Results   Component Value Date    WBC 7.5 10/23/2021    RBC 3.03 10/23/2021    RBC 4.24 2012    HGB 7.8 10/23/2021    HCT 27.1 10/23/2021    .3 10/23/2021    MCH 30.0 10/23/2021    MCHC 29.1 10/23/2021    RDW 17.9 10/23/2021    PLT 80 10/23/2021     2012     CMP:    Lab Results   Component Value Date    GLUCOSE 252 10/23/2021    GLUCOSE 132 2012     10/23/2021    K 3.6 10/23/2021    CL 92 10/23/2021    CO2 18 10/23/2021    BUN 21 10/23/2021    CREATININE 2.60 10/23/2021    ANIONGAP 19 10/23/2021    ALKPHOS 148 10/22/2021    ALT 14 10/22/2021    AST 25 10/22/2021    BILITOT 0.55 10/22/2021    LABALBU 3.4 10/22/2021    ALBUMIN NOT REPORTED 10/22/2021    LABGLOM 19 10/23/2021    GFRAA 24 10/23/2021    GFR      10/23/2021    GFR NOT REPORTED 10/23/2021    PROT 6.6 10/22/2021    CALCIUM 6.0 10/23/2021        Radiology:  CT ABDOMEN PELVIS WO CONTRAST Additional Contrast? Radiologist Recommendation    Result Date: 10/17/2021  Pericardial effusion, only partially imaged. This measures up to 1.8 cm along the right-sided heart. Consider echocardiogram for further evaluation, especially if concern for decreased cardiac function as a result. Only small amount of ascites, mostly in the right upper quadrant. Severe diffuse body wall edema, increased from August 2021 Small bilateral pleural effusions. IR FLUORO GUIDED CVA DEVICE PLMT/REPLACE/REMOVAL    Result Date: 10/21/2021  Successful triple-lumen left IJ central catheter placement, as described above and in a separate report (see above). Catheter is ready for use at this time. XR CHEST PORTABLE    Result Date: 10/23/2021  1. ET tube in place, tip approximately 1.3 cm above the marva. 2. Left IJ central line in place, tip overlying the right atrium 3. Right IJ central line in place, tip in the distal SVC 4. Moderate to large size left pleural effusion, new since the prior study. Differential considerations would include a posttraumatic hemothorax, and if this is a clinical concern, CT imaging of the chest should be performed 5. Cardiomegaly, diffuse interstitial alveolar opacities bilaterally, differential considerations include pulmonary edema versus diffuse infection Critical results were called by Dr. Arcadio Villagran to Dr. Sean Pretty  on 10/22/2021 at 12:50 a.m. hours. 6.    IR NONTUNNELED VASCULAR CATHETER > 5 YEARS    Result Date: 10/21/2021  Successful ultrasound guided left central catheter placement.   Successful exchange and up sizing Trialysis catheter for a larger temporary hemodialysis catheter, as above. Both catheters are ready for use at this time. IR NON TUNNELED CATH WO PORT REPLACEMENT    Result Date: 10/19/2021  Successful ultrasound and fluoroscopy guided non tunneled catheter placement. Catheter is ready for CRRT therapy at this time. The purse-string suture will require removal tomorrow (can be performed in IR if required); watch for bleeding at the catheter entry site. XR ABDOMEN (2 VIEWS)    Result Date: 10/22/2021  1. Limited exam, without evidence to suggest free air. Bowel gas pattern is nonobstructive       Consultations:    Consults:     Final Specialist Recommendations/Findings:   IP CONSULT TO HOSPITALIST  IP CONSULT TO CARDIOLOGY  IP CONSULT TO GI  IP CONSULT TO PALLIATIVE CARE  IP CONSULT TO NEPHROLOGY  IP CONSULT TO GI  IP CONSULT TO CRITICAL CARE  IP CONSULT TO GENERAL SURGERY  IP CONSULT TO INFECTIOUS DISEASES      The patient was seen and examined on day of discharge and this discharge summary is in conjunction with any daily progress note from day of discharge.     Discharge plan:     Disposition:     Physician Follow Up:     n/a     Requiring Further Evaluation/Follow Up POST HOSPITALIZATION/Incidental Findings: n/a    Diet: n/a    Activity: n/a    Instructions to Patient: n/a    Discharge Medications:      Medication List      STOP taking these medications    albuterol (2.5 MG/3ML) 0.083% nebulizer solution  Commonly known as: PROVENTIL     albuterol sulfate  (90 Base) MCG/ACT inhaler  Commonly known as: ProAir HFA     aspirin 81 MG EC tablet     atorvastatin 40 MG tablet  Commonly known as: LIPITOR     BD Lancet Ultrafine 30G Misc     calcitRIOL 0.25 MCG capsule  Commonly known as: ROCALTROL     Calcium Acetate (Phos Binder) 667 MG Caps     Diphen 25 MG tablet  Generic drug: diphenhydrAMINE     Easy Comfort Lancets Misc     Easy Comfort Pen Needles 31G X 5 MM Misc  Generic drug: Insulin Pen Needle     fluticasone-salmeterol 250-50 MCG/DOSE Aepb  Commonly known as: ADVAIR     furosemide 40 MG tablet  Commonly known as: Lasix     glucose monitoring kit     guaiFENesin 600 MG extended release tablet  Commonly known as: MUCINEX     hydrocortisone 2.5 % Crea rectal cream  Commonly known as: ANUSOL-HC     Lantus SoloStar 100 UNIT/ML injection pen  Generic drug: insulin glargine     metoprolol tartrate 25 MG tablet  Commonly known as: LOPRESSOR     midodrine 5 MG tablet  Commonly known as: PROAMATINE     Minerin Creme Crea     MIRCERA IJ     Misc. Devices Misc     nitroGLYCERIN 0.4 MG SL tablet  Commonly known as: NITROSTAT     OneTouch Ultra strip  Generic drug: blood glucose test strips     OXYGEN     pantoprazole 40 MG tablet  Commonly known as: PROTONIX     Pharmacist Choice Lancets Misc     PhosLo 667 MG Caps  Generic drug: Calcium Acetate (Phos Binder)     polyethylene glycol 17 GM/SCOOP powder  Commonly known as: GLYCOLAX     RenaPlex-D Tabs     sennosides-docusate sodium 8.6-50 MG tablet  Commonly known as: SENOKOT-S     Sensipar 90 MG tablet  Generic drug: cinacalcet     Simple Diagnostics Lancing Dev Misc     Kt Doc Control Normal Soln     True Metrix Meter w/Device Kit     Ultra-Care Alcohol Prep Pads 70 % Pads     Unifine Pentips 31G X 6 MM Misc  Generic drug: Insulin Pen Needle            No discharge procedures on file. Time Spent on discharge is  40 mins in patient examination, evaluation, counseling as well as medication reconciliation, prescriptions for required medications, discharge plan and follow up. Electronically signed by   Hilda Green DO  10/23/2021  2:32 PM      Thank you Dr. Edith Grant MD for the opportunity to be involved in this patient's care.

## 2021-10-23 NOTE — PROGRESS NOTES
Perfect serve sent to Critical Care regarding patient condition and any changes made. Orders for repeat electrolytes, troponin, lactic, abg, increase peep to 20, give 50g of 5% albumin, and 1 amp calcium chloride. States he will be in to see the patient soon.

## 2021-10-23 NOTE — PROGRESS NOTES
Patient discharged from unit with House of [address] escorted by security. All belongings collected and left with patient's mother.

## 2021-10-23 NOTE — PROGRESS NOTES
[facility]  Office: [address], DO, Kareemashwinililiya Barger, DO, Jenice Duane, DO, René Weldon Blood, DO, Janice Clement MD, Kris Borden MD, Abdiel Sutton MD, Timoteo Cortes MD, Lyric Nolen MD, Fior Castro MD, Yassine Mcgee MD, Lashon Hernandez MD, Jena Spivey, DO, Rob Epps, DO, Nieves Ortega MD,  Keila Heredia, DO, Mauricio Padilla MD, Justin Gatica MD, Dimple Combs MD, Guero Lynch MD, Maria L Black MD, Itz Jorge MD, Niki Santos, [facility], [facility], CNP, Reyna Kang, CNP, Tanda Lesches, CNS, Amanda Neil, CNP, Rashid Gonzales, CNP, Sarah Ayala, CNP, Guerline Castro, CNP, Yokasta Kinney, CNP, Delbert Aase, CNP, Guillermo Villagran PA-C, Lyndsay Mcdonald, [facility], Juliana Jones, CNP, Gigi Tomas, CNP, Bina Longoria, CNP, Margaret Hall, CNP, Curtis Thakur, [facility], [facility], [facility], Lucia Lyle [facility]    Progress Note    10/23/2021    2:51 PM    Name:   Yusef Balbuena  MRN:     [id number]     Acct:      [de-identified]   Room:   [address] [place] [address] Day:  15  Admit Date:  10/8/2021 12:20 PM    PCP:   Suzy Iyer MD  Code Status:  DNR-CC    Subjective:     C/C:   Chief Complaint   Patient presents with    Hypotension     Interval History Status: significantly worsened.      Remains hypotensive- now on 4 maxed pressors and was intubated overnight    CRRT stopped due to hypotension    o2 sats very low despite max vent support    Brief History:     Per my partner:  \"46year-old female with known medical history of hypertension on dialysis, admitted for diabetes presented to the hospital after she was sent from dialysis for blood pressure of systolic less than 70.  Patient states that she was feeling fatigued in the last few days.  After she received her Midrin dose her blood pressure stabilized.  10/9 patient developed atrial flutter, heart rate was elevated up to 120s.  Blood pressure continued to be low 80s systolic. Cardiology was consulted.    Patient was started on amiodarone and heparin. Patient noted to have recent EGD showing duodenal polyps, and was sent to colonoscopy outpatient patient did not follow-up. Patient will need GI clearance for anticoagulation . Despite multiple attempts for colonoscopy, colonoscopy could not be done given patient's chronic hypotension. Anesthesia was not willing to go ahead for the procedure. Eliquis was started and patient's hemoglobin was monitored for 2 days. Plan was to discharge with patient had severe abdominal distention, nephrology recommended dialysis. Dialysis could not be done on Sunday as dialysis nurse was not available.     Now patient requesting SNF placement with dialysis in house     Discharge planning delayed given multiple complexities\"    Review of Systems:     unobtainable      Medications: Allergies:     Allergies   Allergen Reactions    Ibuprofen     Tramadol Hives    Motrin [Ibuprofen Micronized] Itching    Strawberry Extract Itching and Rash    Tape Ruy Danika Tape] Rash     No paper tape silk only       Current Meds:   Scheduled Meds:    fluticasone  2 spray Each Nostril Daily    epoetin lyudmila-epbx  2,000 Units SubCUTAneous Once per day on Mon Wed Fri    And    epoetin lyudmila-epbx  3,000 Units SubCUTAneous Once per day on Mon Wed Fri    cefepime  2,000 mg IntraVENous Q8H    vancomycin (VANCOCIN) intermittent dosing (placeholder)   Other RX Placeholder    hydrocortisone sodium succinate PF  100 mg IntraVENous Q8H    metroNIDAZOLE  500 mg IntraVENous Q8H    sodium thiosulfate IVPB  25 g IntraVENous Once per day on Mon Wed Fri    [Held by provider] apixaban  5 mg Oral BID    polyethylene glycol  2,000 mL Oral Once    Hydrocortisone-Aloe Vera   Topical BID    gabapentin  100 mg Oral TID    sennosides-docusate sodium  2 tablet Oral BID    [Held by provider] amiodarone  200 mg Oral BID    midodrine  15 mg Oral 4x Daily    pantoprazole  40 mg Oral QAM AC    budesonide-formoterol  2 puff Inhalation BID    cinacalcet  90 mg Oral Once per day on Mon Wed Fri    atorvastatin  40 mg Oral Nightly    aspirin  81 mg Oral Daily    sodium chloride flush  5-40 mL IntraVENous 2 times per day     Continuous Infusions:    sodium chloride      phenylephrine (CRISTAL-SYNEPHRINE) 100 mg/250 mL infusion Stopped (10/23/21 1411)    norepinephrine (LEVOPHED) infusion (double concentration) Stopped (10/23/21 1411)    EPINEPHrine infusion (double concentration) Stopped (10/23/21 1411)    DOBUTamine Stopped (10/23/21 1411)    fentaNYL      sodium chloride      sodium chloride      midazolam Stopped (10/23/21 1411)    cisatracurium (NIMBEX) infusion      sodium bicarbonate infusion Stopped (10/23/21 1411)    CRRT dialysis builder 2,000 mL/hr (10/22/21 2000)    vasopressin (Septic Shock) infusion Stopped (10/23/21 1411)    sodium chloride 100 mL/hr at 10/21/21 2357    sodium chloride Stopped (10/13/21 1945)    dextrose Stopped (10/10/21 1415)     PRN Meds: sodium chloride, sodium chloride, sodium chloride, fentanNYL, LORazepam, potassium chloride **OR** potassium alternative oral replacement **OR** potassium chloride, potassium chloride, sodium phosphate IVPB **OR** sodium phosphate IVPB **OR** sodium phosphate IVPB **OR** sodium phosphate IVPB, calcium gluconate **OR** calcium gluconate **OR** calcium gluconate **OR** calcium gluconate, magnesium sulfate, potassium chloride, lactulose, HYDROcodone 5 mg - acetaminophen, acetaminophen, albuterol sulfate HFA, hydrOXYzine, diphenhydrAMINE, Calcium Acetate (Phos Binder), guaiFENesin, sodium chloride flush, sodium chloride, ondansetron **OR** ondansetron, polyethylene glycol, [DISCONTINUED] acetaminophen **OR** acetaminophen, glucose, dextrose, glucagon (rDNA), dextrose    Data:     Past Medical History:   has a past medical history of Asthma, CHF (congestive heart failure) ([facility] Chronic kidney disease, COPD (chronic obstructive pulmonary disease) ([facility] Dialysis patient ([facility] Hemodialysis patient ([facility] Hyperlipidemia, Hypertension, Obesity, YONI (obstructive sleep apnea), Pneumonia, Renal failure, Type II or unspecified type diabetes mellitus without mention of complication, not stated as uncontrolled, and Ventilator dependence ([facility] Social History:   reports that she quit smoking about 4 years ago. Her smoking use included cigarettes. She quit after 7.00 years of use. She has never used smokeless tobacco. She reports that she does not drink alcohol and does not use drugs. Family History:   Family History   Problem Relation Age of Onset    Diabetes Other     Cancer Mother         BREAST    Heart Disease Father         CAD-MI    Diabetes Father     High Blood Pressure Father     Diabetes Maternal Grandfather     Diabetes Paternal Grandfather     Heart Disease Paternal Grandfather     High Blood Pressure Paternal Grandfather        Vitals:  BP (!) 97/58   Pulse (!) 0   Temp (!) 91.5 °F (33.1 °C) (Core)   Resp (!) 36   Ht 4' 11\" (1.499 m)   Wt 285 lb 9.6 oz (129.5 kg)   LMP 2014 (Approximate)   SpO2 (!) 76%   BMI 57.68 kg/m²   Temp (24hrs), Av.8 °F (34.3 °C), Min:91.5 °F (33.1 °C), Max:96 °F (35.6 °C)    Recent Labs     10/21/21  1923 10/22/21  0812 10/22/21  1148 10/22/21  1637   POCGLU 155* 129* 132* 136*       I/O (24Hr):     Intake/Output Summary (Last 24 hours) at 10/23/2021 1451  Last data filed at 10/23/2021 0138  Gross per 24 hour   Intake 450 ml   Output 830 ml   Net -380 ml       Labs:  Hematology:  Recent Labs     10/23/21  0119 10/23/21  0520 10/23/21  1208   WBC 2.7* 7.5  --    RBC 2.52* 3.03*  --    HGB 7.6* 9.1* 7.8*   HCT 28.2* 31.3* 27.1*   .9* 103.3*  --    MCH 30.2 30.0  --    MCHC 27.0* 29.1  --    RDW 16.2* 17.9*  --    PLT 94* 80*  --    MPV 10.7 11.1  --      Chemistry:  Recent Labs     10/20/21  1559 10/21/21  1004 10/22/21  1040 10/22/21  1040 10/23/2021    POCPCO2 50.2 10/23/2021    POCPO2 45.6 10/23/2021    POCHCO3 14.1 10/23/2021    NBEA 16 10/23/2021    PBEA NOT REPORTED 10/23/2021    BQA2ULS NOT REPORTED 10/23/2021    YHPR8ETO 62 10/23/2021    FIO2 100.0 10/23/2021     Lab Results   Component Value Date/Time    SPECIAL RT WRIST 5ML 10/09/2021 12:15 PM     Lab Results   Component Value Date/Time    CULTURE NO GROWTH 6 DAYS 10/09/2021 12:15 PM       Radiology:  CT ABDOMEN PELVIS WO CONTRAST Additional Contrast? Radiologist Recommendation    Result Date: 10/17/2021  Pericardial effusion, only partially imaged. This measures up to 1.8 cm along the right-sided heart. Consider echocardiogram for further evaluation, especially if concern for decreased cardiac function as a result. Only small amount of ascites, mostly in the right upper quadrant. Severe diffuse body wall edema, increased from August 2021 Small bilateral pleural effusions.        Physical Examination:        General appearance:  no distress  Mental Status:  Sedated on vent  Lungs:  coarse bilaterally, normal effort; on vent  Heart:  regular rate and rhythm, no murmur  Abdomen:  nontender, much more distended today compared to previous days when she was distended with fluid,  obese  Extremities:  no redness, tenderness in the calves; 1-2+ ble edema  Skin:  no gross lesions, rashes, induration    Assessment:        Hospital Problems         Last Modified POA    * (Principal) Hypotension 10/20/2021 Yes    Overview Signed 10/20/2021  9:28 AM by Maye Green, DO     chronic         Acute respiratory failure with hypoxia and hypercapnia (HCC) 10/23/2021 No    CKD (chronic kidney disease) stage 4, GFR 15-29 ml/min (HCC) (Chronic) 10/8/2021 Yes    Pulmonary hypertension ([address] (Chronic) 10/8/2021 Yes    Morbid obesity with BMI of 45.0-49.9, adult ([address] 10/8/2021 Yes    Essential hypertension 10/8/2021 Yes    YONI on CPAP 10/11/2021 Yes    Type 2 diabetes mellitus with chronic kidney disease on chronic dialysis ([facility] 10/8/2021 Yes    Chronic obstructive pulmonary disease ([facility] 10/8/2021 Yes    Gastroesophageal reflux disease without esophagitis 10/8/2021 Yes    ESRD needing dialysis ([facility] [address] [facility] 10/8/2021 Yes    Atrial flutter with rapid ventricular response ([facility] 10/10/2021 Yes    Hematochezia 10/10/2021 Yes    Anemia due to chronic kidney disease 10/10/2021 Yes    Itching 10/11/2021 Yes    Fluid overload 10/17/2021 Yes      new worsened shock of unclear etiology-?sepsis, ?hemorrhage    Plan:        Levophed maxed at 100, vasopressin maxed at 0.04, gomez maxed at 300 and epi at 30  CRRT stopped due to worsened hypotension  Crit care consulted overnight when she decompensated  On empiric antibiotics; will ask for ID assistance  Replace magnesium  Hold amiodarone due to severe low bp  Serial h/h: unclear if she is bleeding  Ct chest/abd/pelvis if she stabilizes to be able to have these done, currently too unstable  Cardio considering milrinone or dobutamine  D/w mother-told her to get hold of her children and get them here asap for family meeting, the patient is very likely to  today  Poor prognosis  pall care eval-this appears to be a futile effort    Reginaldo Green DO  10/23/2021  2:51 PM

## 2021-10-23 NOTE — PROGRESS NOTES
[facility]    Gastroenterology Daily Progress Note      Patient:   Mally Brown   :    1970   [facility]  Date:     10/23/2021  Consultant:   Bryan Peterson, APRN - CNP, CNP      SUBJECTIVE  46 y.o. female admitted 10/8/2021 with Intra-dialytic hypotension [I95.3]  Hypotension, unspecified hypotension type [I95.9]  Atrial flutter ([address] [I48.92] and seen for anemia, rectal bleeding. The pt was seen and examined. She is intubated, unresponsive off of sedation, currently hypothermic and on 5 IV pressors in trendelenburg position and SBP in the 60's. hgb 9.1. RN reports no BM or emesis this shift. Abdomen very distended with absent bowel sounds, she has been evaluated by surgery for possible ischemic/necrotic bowel, she is not stable at this time for a CT abd scan.          OBJECTIVE  Scheduled Meds:   fluticasone  2 spray Each Nostril Daily    epoetin lyudmila-epbx  2,000 Units SubCUTAneous Once per day on     And    epoetin lyudmila-epbx  3,000 Units SubCUTAneous Once per day on     cefepime  2,000 mg IntraVENous Q8H    vancomycin (VANCOCIN) intermittent dosing (placeholder)   Other RX Placeholder    hydrocortisone sodium succinate PF  100 mg IntraVENous Q8H    metroNIDAZOLE  500 mg IntraVENous Q8H    sodium thiosulfate IVPB  25 g IntraVENous Once per day on     [Held by provider] apixaban  5 mg Oral BID    polyethylene glycol  2,000 mL Oral Once    Hydrocortisone-Aloe Vera   Topical BID    gabapentin  100 mg Oral TID    sennosides-docusate sodium  2 tablet Oral BID    [Held by provider] amiodarone  200 mg Oral BID    midodrine  15 mg Oral 4x Daily    pantoprazole  40 mg Oral QAM AC    budesonide-formoterol  2 puff Inhalation BID    cinacalcet  90 mg Oral Once per day on     atorvastatin  40 mg Oral Nightly    aspirin  81 mg Oral Daily    sodium chloride flush  5-40 mL IntraVENous 2 times per day       Vital Signs: BP (!) 97/58   Pulse 68   Temp (!) 91.5 °F (33.1 °C) (Core)   Resp (!) 0   Ht 4' 11\" (1.499 m)   Wt 285 lb 9.6 oz (129.5 kg)   LMP 11/12/2014 (Approximate)   SpO2 (!) 84%   BMI 57.68 kg/m²      Physical Exam:     General Appearance: ill appearing non responsive off of sedation, well-developed and well-nourished, in no acute distress  Skin: warm and dry, no rash or erythema  Head: normocephalic and atraumatic facial edema  Eyes:unble to assess due to pt positioning in bed  ENT: unable to assess  Neck: neck supple and non tender without mass, no thyromegaly or thyroid nodules, no cervical lymphadenopathy   Pulmonary/Chest: clear to auscultation bilaterally- no wheezes, rales or rhonchi, normal air movement, on vent  Cardiovascular: pt is hypotensive on 5 pressors  normal rate, regular rhythm, normal S1 and S2, no murmurs, rubs, clicks or gallops, distal pulses intact, no carotid bruits  Abdomen: firm distended absent bowel sounds, no masses or organomegaly  Extremities: no cyanosis, clubbing has generalized body  edema  Musculoskeletal:  no joint swelling, deformity or tenderness  Neurologic: unresponsive    Lab and Imaging Review     CBC  Recent Labs     10/23/21  0119 10/23/21  0520 10/23/21  1208   WBC 2.7* 7.5  --    HGB 7.6* 9.1* 7.8*   HCT 28.2* 31.3* 27.1*   .9* 103.3*  --    PLT 94* 80*  --        BMP  Recent Labs     10/22/21  1627 10/22/21  1627 10/23/21  0119 10/23/21  0430 10/23/21  1208   *   < > 129* 125* 129*   K 3.6*   < > 3.2* 2.8* 3.6*   CL 94*   < > 95* 91* 92*   CO2 21   < > 22 20 18*   BUN 20  --  20 21*  --    CREATININE 2.63*  --  2.61* 2.60*  --    GLUCOSE 158*  --  186* 252*  --    CALCIUM 6.6*  --  6.1* 6.0*  --     < > = values in this interval not displayed.        LFTS  Recent Labs     10/21/21  2215 10/22/21  1040 10/22/21  1627   ALKPHOS 177* 168* 148*   ALT 14 15 14   AST 25 27 25   PROT 6.7 7.1 6.6   BILITOT 0.53 0.51 0.55   LABALBU 3.7 3.5 3.4* ANEMIA STUDIES  Recent Labs     10/22/21  1040   LABIRON 75*   TIBC 172*   FERRITIN 884*     FINDINGS:ct abd 10/17/21   Lower Chest: Small bilateral pleural effusions.  Moderate bibasilar   atelectasis. Gweneth Cascade is partial visualization of a pericardial effusion   measuring up to 1.8 cm thickness along the right-side of the heart.       Organs: No non contrast evidence of acute process of the organs of the   abdomen.  No evidence of pancreatitis.  No ureteral stone or hydronephrosis. Bilateral renal atrophy again noted.       GI/Bowel: No bowel obstruction or other non contrast acute process   demonstrated.  No evidence of colitis, diverticulitis or appendicitis.       Pelvis: No evidence of acute process in the pelvis.  Bladder is collapsed       Peritoneum/Retroperitoneum: There is only a small amount of ascites, mostly   in the right upper quadrant adjacent to the liver.  No free air.       Bones/Soft Tissues: Severe diffuse severe body wall edema.  No soft tissue   gas.  No acute osseous findings.           Impression   Pericardial effusion, only partially imaged.  This measures up to 1.8 cm   along the right-sided heart.  Consider echocardiogram for further evaluation,   especially if concern for decreased cardiac function as a result.       Only small amount of ascites, mostly in the right upper quadrant.       Severe diffuse body wall edema, increased from August 2021       Small bilateral pleural effusions.               ASSESSMENT/plan  Anemia, rectal bleeding  -no further rectal bleeding, no BM overnight hgb 9.1    2.abdominal distention with absent bowel sounds ? ischemic bowel, ct abd 10/17 showed small amount of ascites in the RUQ  -pt is now on 5 IV pressors and remains hypotensive, hypothermic, unable to tolerate CRRT  -has been evaluated by surgery who feels that surgery is not an option, she is not currently stable for repeat abd ct scan.  -case was discussed with dr Quinten Roth and  KAREN penaloza reports that family is aware of her critical condition and has decided to withdrawal care. This plan was formulated in collaboration with Dr. Tiffanie Chapin.     Electronically signed by: VENTURA Arce CNP on 10/23/2021 at 2:17 PM

## 2021-10-23 NOTE — PROGRESS NOTES
[facility]   Progress Note                   Date:   10/23/2021  Patient name: Kristian Greene  Date of admission:  10/8/2021 12:20 PM  MRN:   [id number]  YOB: 1970  PCP: Rachel Payne MD    Reason for Admission:     Subjective:       Clinical Changes / Abnormalities:   Patient seen and examined. Events of yesterday noted. She is currently in the intensive care unit maxed out on Levophed, vasopressin, phenylephrine, epinephrine drips. She has been seen by both critical care and general surgery. She has worsening acidosis. She required intubation. I was called yesterday in order to approve a stat 2D echo. 2D echo is resulted below which is overall stable from prior echo. Her troponins have down trended from 41->37, and therefore not suggestive of acute MI. The mother is at bedside and I have discussed with her the critical state that her daughter is in.     Medications:   Scheduled Meds:   fluticasone  2 spray Each Nostril Daily    epoetin lyudmila-epbx  2,000 Units SubCUTAneous Once per day on Mon Wed Fri    And    epoetin lyudmila-epbx  3,000 Units SubCUTAneous Once per day on Mon Wed Fri    cefepime  2,000 mg IntraVENous Q8H    vancomycin (VANCOCIN) intermittent dosing (placeholder)   Other RX Placeholder    hydrocortisone sodium succinate PF  100 mg IntraVENous Q8H    midazolam        metroNIDAZOLE  500 mg IntraVENous Q8H    propofol        cisatracurium Besylate        sodium thiosulfate IVPB  25 g IntraVENous Once per day on Mon Wed Fri    [Held by provider] apixaban  5 mg Oral BID    polyethylene glycol  2,000 mL Oral Once    Hydrocortisone-Aloe Vera   Topical BID    gabapentin  100 mg Oral TID    sennosides-docusate sodium  2 tablet Oral BID    amiodarone  200 mg Oral BID    midodrine  15 mg Oral 4x Daily    pantoprazole  40 mg Oral QAM AC    budesonide-formoterol  2 puff Inhalation BID    cinacalcet  90 mg Oral Once per day on Mon Wed Fri    atorvastatin 40 mg Oral Nightly    aspirin  81 mg Oral Daily    sodium chloride flush  5-40 mL IntraVENous 2 times per day     Continuous Infusions:   sodium chloride      phenylephrine (CRISTAL-SYNEPHRINE) 100 mg/250 mL infusion      norepinephrine (LEVOPHED) infusion (double concentration)      EPINEPHrine infusion (double concentration)      milrinone      phenylephrine (CRISTAL-SYNEPHRINE) 50mg/250mL infusion 300 mcg/min (10/23/21 0233)    midazolam 1 mg/hr (10/22/21 2310)    cisatracurium (NIMBEX) infusion      sodium bicarbonate infusion 200 mL/hr at 10/23/21 0650    CRRT dialysis builder 2,000 mL/hr (10/22/21 2000)    vasopressin (Septic Shock) infusion 0.03 Units/min (10/23/21 0308)    sodium chloride 100 mL/hr at 10/21/21 2357    norepinephrine 100 mcg/min (10/23/21 0650)    sodium chloride Stopped (10/13/21 1945)    dextrose Stopped (10/10/21 1415)     CBC:   Recent Labs     10/22/21  2330 10/23/21  0119 10/23/21  0520   WBC  --  2.7* 7.5   HGB 7.1* 7.6* 9.1*   PLT  --  94* 80*     BMP:    Recent Labs     10/22/21  1627 10/23/21  0119 10/23/21  0430   * 129* 125*   K 3.6* 3.2* 2.8*   CL 94* 95* 91*   CO2 21 22 20   BUN 20 20 21*   CREATININE 2.63* 2.61* 2.60*   GLUCOSE 158* 186* 252*     Hepatic:   Recent Labs     10/21/21  2215 10/22/21  1040 10/22/21  1627   AST 25 27 25   ALT 14 15 14   BILITOT 0.53 0.51 0.55   ALKPHOS 177* 168* 148*     Troponin: No results for input(s): TROPONINI in the last 72 hours. BNP: No results for input(s): BNP in the last 72 hours. Lipids: No results for input(s): CHOL, HDL in the last 72 hours. Invalid input(s): LDLCALCU  INR:   No results for input(s): INR in the last 72 hours.     DIAGNOSTIC DATA  EKG Sinus rhythm with occasional Premature ventricular complexes  Low voltage QRS  Septal infarct (cited on or before 20-AUG-2021)  Possible Lateral infarct (cited on or before 20-AUG-2021)    ECHO 10/8/2021  Limited echo  Technically difficult study  Left ventricle is normal in size  Global left ventricular systolic function is moderately reduced EF 40-45%  Abnormal septal wall motions  Leftward compression of inter-ventricular septum (\"D-sign\") indicating RV  pressure overload. Right atrium is moderate to severely dilated . Right ventricular dilatation with reduced systolic function. AV, MV structure appears normal.  Severe tricuspid regurgitation. Moderate pulmonary hypertension with an estimated right ventricular systolic  pressure of 52 mmHg. Trivial pericardial effusion. ECHO 2D ECHO( 8/20/2021)  Mild left ventricular hypertrophy  Global left ventricular systolic function is normal  Estimated ejection fraction is 65 % . Right atrium is severely dilated . Right ventricular with severe dilatation with severely reduced systolic  function. Severe tricuspid regurgitation. Moderate to severe pulmonary hypertension. No pericardial effusion seen. Normal aortic root dimension. CT ABDOMEN PELVIS WO CONTRAST  Pericardial effusion, only partially imaged.  This measures up to 1.8 cm   along the right-sided heart.  Consider echocardiogram for further evaluation,   especially if concern for decreased cardiac function as a result.       Only small amount of ascites, mostly in the right upper quadrant.       Severe diffuse body wall edema, increased from August 2021       Small bilateral pleural effusions. Limited Echo 10/18/21:  Technically difficult study  Left ventricle is normal in size  Global left ventricular systolic function is moderately reduced EF 40-45%  Abnormal septal wall motions  Leftward compression of inter-ventricular septum (\"D-sign\") indicating RV  pressure overload. Right atrium is moderate to severely dilated . Right ventricular dilatation with reduced systolic function. AV, MV structure appears normal.  Severe tricuspid regurgitation. Moderate pulmonary hypertension with an estimated right ventricular systolic  pressure of 52 mmHg.   Trivial pericardial effusion. Echo 10/22/21:  Left ventricle is normal in size  Global left ventricular systolic function is mildly reduced. Estimated  ejection fraction is 45 % . Leftward compression of inter-ventricular septum (\"D-sign\") indicating RV  pressure overload. Right atrium is severely dilated . Severe tricuspid regurgitation. Estimated right ventricular systolic pressure is 52 mmHg, suggests moderate  pulm HTN. Trivial-Mild pericardial effusion without echocardiographic tamponade. Pleural effusion noted. Objective:   Vitals: BP (!) 63/54   Pulse 81   Temp 96 °F (35.6 °C) (Temporal)   Resp (!) 40   Ht 4' 11\" (1.499 m)   Wt 285 lb 9.6 oz (129.5 kg)   LMP 11/12/2014 (Approximate)   SpO2 (!) 86%   BMI 57.68 kg/m²   General appearance: intubated, sedated  HEENT: ET in placed  Neck:trachea midline   Lungs: mech BS bilaterally  Heart: regular rate and rhythm, S1, S2  Abdomen: abdominal distension. Extremities: + edema  Neurologic: Mental status: sedated      Assessment / Acute Cardiac Problems:   1. Severe refractory shock, with lactic acidosis, less likely cardiogenic given her stat echocardiogram results from 10/22/2021.  2. Abdominal distention-per primary and general surgery  3. Pleural effusion- too unstable for CT chest  4. Acute on chronic combined CHF- mostly R CHF / Cor Pulmonale from severe obesity/YONI/ESRD on HD. 5. Mildly reduced LVEF Echo 10/18/21- 45% - last echo normal EF- however echos in past showed EF 45%  6. Pericardial effusion- trivial/mild and not tamponade  7. PAF now back in NSR on Amiodarone PO  8. Hypotension - unable to tolerate HD  9. DM  10. HLP  11. YONI  12. ESRD on HD  13. Anemia and GI bleed. 14. Obesity    Plan of Treatment:   1. Gen surg is following- too unstable for CT abd  2. Pulm CC following  3. She is now maxed out on 4 pressors  4.  I discussed with mother at bedside that the patient is extremely extremely critical and that any moment she can have a cardiac arrest given her persistent hypotension and her requirement for pressors. 5. She understands this. 6. We will try to add either milrinone or dobutamine depending on whether the pharmacy thinks milrinone is acceptable given she is end-stage renal.  Milrinone may be preferable given her elevated pulmonary pressures. If not acceptable will use dobutamine instead and wean off epi  7. She has multiple electrolyte abnormalities of which nephrology and critical care managing    Poor Prognosis    Discussed with mom and nursing in detail. Thank you for allowing me to participate in the care of this patient, please do not hesitate to call if you have any questions. Rachid Giron DO, [facility], [address], [address], [place] [address] [facility]  [facility]  [phone]

## 2021-10-23 NOTE — FLOWSHEET NOTE
is called to patient's room for ACP discussion with family. Present were Dr.'s Merlin Gondola and Dr. Green, + 5 family members. Prateek states the major severity of patient. States that all can be done is being done for the patient and there is nothing else that can be done, there is no hope for the patient. Prateek helps family with change of code status, for a comfortable , peaceful death. Family very tearful, sad grieving the situation.  helps family celebrate patient's life.  shared in presence, listening, prayers. Follow up as needed. 10/23/21 1318   Encounter Summary   Services provided to: Patient and family together   Referral/Consult From: Nurse   Support System Parent; Children;Family members   Continue Visiting   (10-23-21)   Complexity of Encounter High   Length of Encounter 1 hour   Spiritual Assessment Completed Yes   Advance Care Planning Yes   Routine   Type Follow up   Grief and Life Adjustment   Type End of life;Grief and loss   Assessment Tearful;Grieving;Helplessness   Intervention Active listening;Explored feelings, thoughts, concerns;Prayer;Sustaining presence/ Ministry of presence; Discussed illness/injury and it's impact; Discussed belief system/L[employer] day practices/doris;Grief care; End of life care   Outcome Expressed gratitude;Engaged in conversation;Expressed feelings/needs/concerns;Grieving;Receptive

## 2021-10-23 NOTE — PROGRESS NOTES
Three adult children have want code status changed to [facility] and wish to withdraw care. Notified Internal Medicine, see orders for details.

## 2021-10-23 NOTE — PLAN OF CARE
I came by to see the patient again. She is now on Levophed, vasopressin, epinephrine, Iftikhar-Synephrine and dobutamine. 100% FiO2 with maps in the 40s. I discussed with the intensivist that over time the pressors will give the patient ischemic or necrotic bowel but given her current clinical state operative intervention is not a possibility. Patient has almost 100% chance of mortality and any operative intervention would only hasten the process. Patient not stable for CT scan. And critically ill.

## 2021-10-23 NOTE — PROGRESS NOTES
CrCl cannot be calculated (Unknown ideal weight. ). Recent Labs     10/22/21  0415 10/22/21  1040 10/22/21  1627   CREATININE 3.39* 2.96* 2.63*     PHARMACY CONSULTED TO DOSE CEFEPIME in this patient on CRRT. She has developed hypotension, r/o sepsis and cefepime and vancomycin are ordered. Recommended Cefepime dose for this indication on CRRT is 2 Gm IV q 8 hours. We will continue to follow patients renal function and adjust dose accordingly.

## 2021-10-23 NOTE — CONSULTS
[facility]  Initial Consult Note  Date: 10/23/2021    Hospital day :14     Impression:   1. Acute on chronic hypoxic and hypercapnic respiratory failure  2. Severe refractory shock with lactic acidosis with acute decompensation suspected septic shock  · Stat echocardiogram with ejection fraction of 45%  3. End-stage renal disease on hemodialysis   · has not been able to tolerate hemodialysis and CRRT  4. Acute on chronic combined congestive heart failure mostly right-sided CHF/cor pulmonale from severe obesity/obstructive sleep apnea  5. Paroxysmal fibrillation/flutter with rapid ventricular response  6. Pulmonary hypertension  7. Right heart failure  8. Morbid obesity with BMI of 45-49.9  9. Obstructive sleep apnea on CPAP  10. Diabetes mellitus type 2  11. COPD    Recommendations   · The patient was empirically started on antimicrobial therapy with cefepime, vancomycin and metronidazole. · There has been a clinical suspicion of intra-abdominal process given abdominal distention but this is pretty much impossible to evaluate clinically  · The patient is too unstable for CT imaging  · The patient is maxed out on 4 pressors and remains hypotensive. · The patient is on 100% FiO2 and increased PEEP and is also hypoxic. · The patient has a very poor prognosis. · At this point in time whether this is an infection or not would be difficult to elucidate. · I would recommend continuing the antimicrobial therapy as part of the supportive care. · Ultimately she has a poor prognosis and no real meaningful chance for recovery    Chief complaint/reason for consultation:   Shock-presumed septic    History of Present Illness:   Jr Luna is a 46y.o.-year-old female who was initially admitted on 10/8/2021. Cherelle Vivar is seen on hospital day #14 at the request of Dr. Abdoulaye gomez for possible sepsis.   Cherelle Vivar has a history of end-stage renal disease on hemodialysis Monday Wednesday and Friday via left upper extremity AV fistula, recent diagnosis of calciphylaxis, anemia of chronic disease, secondary hyperparathyroidism, chronic hypotension, calcific uremic arteriopathy, congestive heart failure, COPD, diabetes mellitus type 2, hypertension, hyperlipidemia, pulmonary hypertension, and morbid obesity. The patient was admitted after developing hypotension on hemodialysis and she was significantly fluid overloaded so she was sent to the hospital.  She was treated with ProAmatine and started on hemodialysis with 2 L of fluid removed and the patient developed hypotension thereafter. The patient has been seen by the nephrology service, cardiology service for paroxysmal atrial fibrillation/flutter and prior echocardiogram showing preserved LV systolic function. The patient continues to have issues with volume overload, hypotension difficulty removing fluid and systolic blood pressures have remained low making it difficult for her to tolerate hemodialysis. The patient was at high risk for cardiac arrest, myocardial infarction, stroke with attempted hemodialysis treatments and her nephrologist at felt that continued hemodialysis attempts would be unsafe and the patient had requested transfer to [facility] for a second opinion but there were no beds available and a different nephrology group was consulted for a second opinion. The patient was subsequently started on Levophed and last able to tolerate dialysis 10/16/2021 and the patient was started on CRRT 10/21/21 and the patient could not continue tolerating it as the patient had worsening hypotension maxing out on Levophed. The patient had continuing hypotension and had additional pressors started with vasopressin, Iftikhar-Synephrine, and epinephrine infusions on top of the Levophed she was already taking. Patient was intubated for acute on chronic hypoxic and hypercarbic respiratory failure.   The concern was for septic shock versus cardiogenic causes of 10/20/2021 [facility] SPECIAL PROCEDURES    SKIN FULL GRAFT      CHEST-BIRTH YOU AND MOLES REMOVED    UPPER GASTROINTESTINAL ENDOSCOPY  2021    UPPER GASTROINTESTINAL ENDOSCOPY N/A 2021    EGD BIOPSY performed by Latia Rivas MD at [address] [place] [address]     Medications:      magnesium sulfate  2,000 mg IntraVENous Once    fluticasone  2 spray Each Nostril Daily    epoetin lyudmila-epbx  2,000 Units SubCUTAneous Once per day on     And    epoetin lyudmila-epbx  3,000 Units SubCUTAneous Once per day on     cefepime  2,000 mg IntraVENous Q8H    vancomycin (VANCOCIN) intermittent dosing (placeholder)   Other RX Placeholder    hydrocortisone sodium succinate PF  100 mg IntraVENous Q8H    midazolam        metroNIDAZOLE  500 mg IntraVENous Q8H    propofol        cisatracurium Besylate        sodium thiosulfate IVPB  25 g IntraVENous Once per day on     [Held by provider] apixaban  5 mg Oral BID    polyethylene glycol  2,000 mL Oral Once    Hydrocortisone-Aloe Vera   Topical BID    gabapentin  100 mg Oral TID    sennosides-docusate sodium  2 tablet Oral BID    [Held by provider] amiodarone  200 mg Oral BID    midodrine  15 mg Oral 4x Daily    pantoprazole  40 mg Oral QAM AC    budesonide-formoterol  2 puff Inhalation BID    cinacalcet  90 mg Oral Once per day on     atorvastatin  40 mg Oral Nightly    aspirin  81 mg Oral Daily    sodium chloride flush  5-40 mL IntraVENous 2 times per day     Social History:     Social History     Socioeconomic History    Marital status: Single     Spouse name: Not on file    Number of children: Not on file    Years of education: Not on file    Highest education level: Not on file   Occupational History    Not on file   Tobacco Use    Smoking status: Former Smoker     Years: 7.00     Types: Cigarettes     Quit date: 2017     Years since quittin.6    Smokeless tobacco: Never Used    Tobacco comment: 1-2 cigs daily (35.6 °C)  General Appearance: The patient is intubated and on the ventilator 100% FiO2 16 of PEEP. She is on Versed for sedation. Patient is on multiple pressors with Iftikhar-Synephrine, norepinephrine, vasopressin, and epinephrine. Head: Normocephalic, without obvious abnormality, atraumatic  Eyes: pupils equal, round, and reactive to light  ENT: The patient is orally intubated and has macroglossia does have some oropharyngeal secretions in the nares  Neck: Supple, without lymphadenopathy. Pulmonary/Chest: Clear to auscultation, without wheezes, rales, or rhonchi  Cardiovascular: Regular rate and rhythm without murmurs, rubs, or gallops. Abdomen: Obese abdomen/protuberant as very thick indurated skin and a well-healed midline infraumbilical scar. Multiple skin folds in the groin area. Hypoactive bowel sounds. Extremities: No cyanosis, clubbing, edema, or effusions. Neurologic: Neurological exam deferred  Skin: Warm and dry with no rash. Medical Decision Making:   I have independently reviewed/ordered the following labs:  CBC with Differential:   Recent Labs     10/23/21  0119 10/23/21  0520   WBC 2.7* 7.5   HGB 7.6* 9.1*   HCT 28.2* 31.3*   PLT 94* 80*   LYMPHOPCT  --  2*   MONOPCT  --  4     BMP:   Recent Labs     10/22/21  1627 10/22/21  1627 10/23/21  0119 10/23/21  0430   *   < > 129* 125*   K 3.6*   < > 3.2* 2.8*   CL 94*   < > 95* 91*   CO2 21   < > 22 20   BUN 20   < > 20 21*   CREATININE 2.63*   < > 2.61* 2.60*   MG 1.5*  --   --  1.5*    < > = values in this interval not displayed.      Hepatic Function Panel:   Recent Labs     10/22/21  1040 10/22/21  1627   PROT 7.1 6.6   LABALBU 3.5 3.4*   BILITOT 0.51 0.55   ALKPHOS 168* 148*   ALT 15 14   AST 27 25       Lab Results   Component Value Date    PROCAL 0.47 10/09/2021       No results found for: CRP  Lab Results   Component Value Date    FERRITIN 884 10/22/2021    FERRITIN 903 04/12/2017    FERRITIN 47 12/08/2015     No results found for: FIBRINOGEN  Lab Results   Component Value Date    DDIMER 2.21 03/15/2016    DDIMER 2.35 03/14/2016    DDIMER 2.07 12/08/2015     Lab Results   Component Value Date     04/13/2017       No results found for: Juan R Rodríguez    Lab Results   Component Value Date    COVID19 Not Detected 10/22/2021    COVID19 Not Detected 08/23/2021     No results found for requested labs within last 30 days. Imaging Studies:   Echocardiogram 2D W M-Mode    Result Date: 10/22/2021  [facility] Transthoracic Echocardiography Report (TTE)  Patient Name Hillary Mendoza        Date of Study                 10/22/2021               [facility] AT [place]   Date of      1970  Gender                        Female  Birth   Age          46 year(s)  Race                          Black   Room Number  1107        Height:                       59 inch, 149.86 cm   Corporate ID [id number]    Weight:                       280 pounds, 127 kg  #   Patient [place] [de-identified]   BSA:           2.13 m^2       BMI:      56.55  #                                                                kg/m^2   MR #         [id number]     Sonographer                   Edy Wolff   Accession #  [id number]  Interpreting Physician        [address] [place] [address]   Fellow                   Referring Nurse Practitioner   Interpreting             Referring Physician           Josefa Louis  Fellow  Type of Study   TTE procedure:2D Echocardiogram, M-Mode, Doppler, Color Doppler. Procedure Date Date: 10/22/2021 Start: 10:14 PM Study Location: [facility] Technical Quality: Adequate visualization Indications:Pericardial effusion. History / Tech. Comments: Procedure explained to patient. Patient Status: Inpatient Height: 59 inches Weight: 280.01 pounds BSA: 2.13 m^2 BMI: 56.55 kg/m^2 Allergies   - *Unlisted:(motrin). CONCLUSIONS Summary Left ventricle is normal in size Global left ventricular systolic function is mildly reduced. Estimated ejection fraction is 45 % .  Leftward compression of inter-ventricular septum (\"D-sign\") indicating RV pressure overload. Right atrium is severely dilated . Severe tricuspid regurgitation. Estimated right ventricular systolic pressure is 52 mmHg, suggests moderate pulm HTN. Trivial-Mild pericardial effusion without echocardiographic tamponade. Pleural effusion noted. Signature ----------------------------------------------------------------------------  Electronically signed by Mabel Burnette on 10/22/2021  10:39 PM ---------------------------------------------------------------------------- ----------------------------------------------------------------------------  Electronically signed by Dao SahaInterpreting physician) on 10/22/2021  10:53 PM ---------------------------------------------------------------------------- FINDINGS Left Atrium Left atrium is normal in size. Left Ventricle Left ventricle is normal in size Global left ventricular systolic function is mildly reduced Estimated ejection fraction is 45 % . Leftward compression of inter-ventricular septum (\"D-sign\") indicating RV pressure overload. Right Atrium Right atrium is severely dilated . Right Ventricle Right ventricular dilatation with reduced systolic function. Mitral Valve Normal mitral valve structure and function. Trivial mitral regurgitation. Aortic Valve Normal aortic valve structure and function without stenosis or regurgitation. Tricuspid Valve Severe tricuspid regurgitation. Estimated right ventricular systolic pressure is 52 mmHg. Pulmonic Valve The pulmonic valve is normal in structure. No pulmonic insufficiency. Pericardial Effusion Mild pericardial effusion. Pleural Effusion Pleural effusion noted Miscellaneous Normal aortic root dimension.  M-mode / 2D Measurements & Calculations:   LVIDd:3.8 cm(3.7 - 5.6 cm)       Diastolic JKGYLI:29.6 ml  PCXCI:7.4 cm(2.2 - 4.0 cm)       Systolic DNEQZV:50.3 ml  IVSd:1 cm(0.6 - 1.1 cm)          Aortic Root:2.9 cm(2.0 - 3.7 cm)  LVPWd:1.2 cm(0.6 - 1.1 cm)       LA Dimension: 3.4 cm(1.9 - 4.0 cm)  Fractional Shortenin.42 %  Calculated LVEF (%): 45.72 %   Mitral:                                   Aortic   Valve Area (P1/2-Time): 5.24 cm^2         Peak Velocity: 1.40 m/s  Peak E-Wave: 0.75 m/s                     Peak Gradient: 7.84 mmHg  Peak A-Wave: 0.44 m/s  E/A Ratio: 1.69  Peak Gradient: 2.23 mmHg  Deceleration Time: 194 msec  P1/2t: 42 msec   Tricuspid:                                Pulmonic:   Estimated RVSP: 58 mmHg  Peak TR Velocity: 3.07 m/s  Peak TR Gradient: 37.6996 mmHg  Estimated RA Pressure: 20 mmHg                                            Estimated PASP: 57.7 mmHg      CT ABDOMEN PELVIS WO CONTRAST Additional Contrast? Radiologist Recommendation    Result Date: 10/17/2021  EXAMINATION: CT OF THE ABDOMEN AND PELVIS WITHOUT CONTRAST 10/17/2021 1:46 pm TECHNIQUE: CT of the abdomen and pelvis was performed without the administration of intravenous contrast. Multiplanar reformatted images are provided for review. Dose modulation, iterative reconstruction, and/or weight based adjustment of the mA/kV was utilized to reduce the radiation dose to as low as reasonably achievable. COMPARISON: CT abdomen and pelvis 2021 HISTORY: ORDERING SYSTEM PROVIDED HISTORY: Anasarca, evaluate ascites, abdominal pain TECHNOLOGIST PROVIDED HISTORY: Anasarca, evaluate ascites, abdominal pain Is the patient pregnant?->No Reason for Exam: Anasarca, evaluate ascites, abdominal pain Acuity: Acute Type of Exam: Initial FINDINGS: Lower Chest: Small bilateral pleural effusions. Moderate bibasilar atelectasis. There is partial visualization of a pericardial effusion measuring up to 1.8 cm thickness along the right-side of the heart. Organs: No non contrast evidence of acute process of the organs of the abdomen. No evidence of pancreatitis. No ureteral stone or hydronephrosis. Bilateral renal atrophy again noted.  GI/Bowel: No bowel obstruction or other non contrast acute process demonstrated. No evidence of colitis, diverticulitis or appendicitis. Pelvis: No evidence of acute process in the pelvis. Bladder is collapsed Peritoneum/Retroperitoneum: There is only a small amount of ascites, mostly in the right upper quadrant adjacent to the liver. No free air. Bones/Soft Tissues: Severe diffuse severe body wall edema. No soft tissue gas. No acute osseous findings. Pericardial effusion, only partially imaged. This measures up to 1.8 cm along the right-sided heart. Consider echocardiogram for further evaluation, especially if concern for decreased cardiac function as a result. Only small amount of ascites, mostly in the right upper quadrant. Severe diffuse body wall edema, increased from August 2021 Small bilateral pleural effusions. IR FLUORO GUIDED CVA DEVICE PLMT/REPLACE/REMOVAL    Result Date: 10/21/2021  PROCEDURE: IR FLUOROSCOPY GUIDED CENTRAL VENOUS ACCESS DEVICE PLACEMENT 10/20/2021 HISTORY: ORDERING SYSTEM PROVIDED HISTORY: pressors TECHNOLOGIST PROVIDED HISTORY: pressors Is the patient pregnant?->No CONTRAST: None used SEDATION: Local anesthesia FLUOROSCOPY DOSE AND TYPE OR TIME AND EXPOSURES: Fluoroscopy time-0.8 minutes D AP-6561 mGy cm squared. DESCRIPTION OF PROCEDURE: See separate report for up sizing right IJ hemodialysis catheter. In that report, a description of the left IJ central line placement procedure under ultrasound and fluoroscopic guidance is provided. FINDINGS: Final imaging shows excellent position of the left IJ central line in the upper right atrium. Successful triple-lumen left IJ central catheter placement, as described above and in a separate report (see above). Catheter is ready for use at this time.      XR CHEST PORTABLE    Result Date: 10/23/2021  EXAMINATION: ONE XRAY VIEW OF THE CHEST 10/22/2021 5:57 pm COMPARISON: October 9, 2021 HISTORY: ORDERING SYSTEM PROVIDED HISTORY: intubated TECHNOLOGIST PROVIDED HISTORY: intubated Reason for Exam: intubated Acuity: Acute Type of Exam: Initial FINDINGS: Exam is markedly degraded due to the patient's body habitus. Patient is now intubated. Tip of the ET tube appears to be 1.3 cm above the marva. Left IJ central line is in place, tip overlying the right atrium. Right IJ dialysis catheter is in place, tip in the distal SVC. Cardiomegaly is noted. Diffuse interstitial and alveolar opacities are present bilaterally, likely on the basis of pulmonary edema. NG tube is in place, tip not included on the radiograph. Moderate to large size left pleural effusion is present within the apical and lateral portions of the left hemithorax. 1. ET tube in place, tip approximately 1.3 cm above the marva. 2. Left IJ central line in place, tip overlying the right atrium 3. Right IJ central line in place, tip in the distal SVC 4. Moderate to large size left pleural effusion, new since the prior study. Differential considerations would include a posttraumatic hemothorax, and if this is a clinical concern, CT imaging of the chest should be performed 5. Cardiomegaly, diffuse interstitial alveolar opacities bilaterally, differential considerations include pulmonary edema versus diffuse infection Critical results were called by Dr. Arcadio Villagran to Dr. Sean Pretty  on 10/22/2021 at 12:50 a.m. hours. 6.    IR NONTUNNELED VASCULAR CATHETER > 5 YEARS    Result Date: 10/21/2021  PROCEDURE: ULTRASOUND GUIDED VASCULAR ACCESS. PLACEMENT OF A CENTRAL LINE AND NON-TUNNELED CATHETER. 10/20/2021. HISTORY: ORDERING SYSTEM PROVIDED HISTORY: dialysis- CRRT TECHNOLOGIST PROVIDED HISTORY: dialysis- CRRT; additional meds for hypotension requiring a central line. Bleeding at the original right IJ Trialysis catheter site.  Is the patient pregnant?->No Acuity: Unknown Type of Exam: Unknown FLUOROSCOPY DOSE AND TYPE OR TIME AND EXPOSURES: Fluoroscopy time - 0.5 minute DAP - 8956 mGy/cm2 SEDATION: Local anesthesia TECHNIQUE: Informed consent was obtained after a detailed explanation of the procedure including risks, benefits, and alternatives. Universal protocol was observed. The left neck was initially prepped and draped in sterile fashion using maximum sterile barrier technique. Local anesthesia was achieved with lidocaine. A micropuncture needle was used to access the left internal jugular vein using ultrasound guidance. An ultrasound image demonstrating patency of the vein with needle tip located within it. An image was obtained and stored in PACs. A 0.035 guidewire was used to place a 7 Welsh x 20 cm Arrow triple-lumen catheter after fascial tract dilation, with its tip in the upper right atrium. The catheter flushed easily and there was a good blood return. The catheter was sutured to the skin. The catheter lumens were flushed in the standard fashion. The patient tolerated the procedure well and there were no immediate complications. The right neck and existing Trialysis catheter were then prepped and draped in sterile fashion using maximum sterile barrier technique. Local anesthesia was achieved with lidocaine. A 0.035 guidewire was used to remove the existing catheter; a new larger 14 Welsh x 15 cm temporary hemodialysis catheter was then inserted. The catheter flushed easily and there was a good blood return. The catheter was sutured to the skin. The catheter was locked with heparinized saline. The patient tolerated the procedure well and there were no immediate complications. FINDINGS: Satisfactory position of the central line and Randal type catheter demonstrated. Both ready for use at this time. Successful ultrasound guided left central catheter placement. Successful exchange and up sizing Trialysis catheter for a larger temporary hemodialysis catheter, as above. Both catheters are ready for use at this time.      IR NON TUNNELED CATH WO PORT REPLACEMENT    Result Date: 10/19/2021  PROCEDURE: ULTRASOUND GUIDED VASCULAR ACCESS. FLUOROSCOPY GUIDED PLACEMENT OF A NON TUNNELED CATHETER. 10/19/2021. HISTORY: ORDERING SYSTEM PROVIDED HISTORY: for CRRT TECHNOLOGIST PROVIDED HISTORY: Patient has left AVF for CRRT How many lumens are being requested?->2 What site is the preferred site?->Brachial What side should this line be placed?->Right Is the patient pregnant?->No SEDATION: Local anesthesia FLUOROSCOPY DOSE AND TYPE OR TIME AND EXPOSURES: Fluoroscopy time-0.5 minutes D AP-2898 mGy cm squared. TECHNIQUE: Informed consent was obtained after a detailed explanation of the procedure including risks, benefits, and alternatives. Universal protocol was observed. The right neck and chest were prepped and draped in sterile fashion using maximum sterile barrier technique. Local anesthesia was achieved with lidocaine. A micropuncture needle was used to access the right internal jugular vein using ultrasound guidance. An ultrasound image demonstrating patency of the vein with needle tip located within it. An image was obtained and stored in PACs. A 0.035 guidewire was used to place a non tunneled 12 Cymraes x 16 cm Trialysis Mahurkar catheter under fluoroscopic guidance to the lower SVC/upper right atrium, after fascial dilatation. The catheter flushed easily and there was a good blood return. The catheter was sutured to the skin. Purse-string suture was required at the entry site to stop bleeding around the catheter. The catheter was locked with heparinized saline. The patient tolerated the procedure well and there were no immediate complications. FINDINGS: Fluoroscopic image demonstrates the tip of the catheter in the lower SVC/upper right atrium. Successful ultrasound and fluoroscopy guided non tunneled catheter placement. Catheter is ready for CRRT therapy at this time.   The purse-string suture will require removal tomorrow (can be performed in IR if required); watch for bleeding at the catheter entry site. Echocardiogram Limited 2D w Doppler w Color Adult    Result Date: 10/18/2021  [facility] Transthoracic Echocardiography Report (TTE)  Patient Name Roque Schirmer        Date of Study                 10/18/2021               [facility]   Date of      1970  Gender                        Female  Birth   Age          46 year(s)  Race                          Black   Room Number  1014        Height:                       59 inch, 149.86 cm   Corporate ID [id number]    Weight:                       280 pounds, 127 kg  #   Patient Acct [de-identified]   BSA:           2.13 m^2       BMI:      56.55  #                                                                kg/m^2   MR #         [id number]     Sonographer                   WildeXiomara ray   Accession #  [id number]  Interpreting Physician        Joanne Billings   Fellow                   Referring Nurse Practitioner   Interpreting             Referring Physician           Rafita Jacobo MD  Fellow  Type of Study   TTE procedure:2D Echocardiogram, Doppler, Color Doppler. Procedure Date Date: 10/18/2021 Start: 02:25 PM Study Location: [facility] Technical Quality: Adequate visualization Indications:Pericardial effusion. History / Tech. Comments: Limited echo for changes. Patient Status: Inpatient Height: 59 inches Weight: 280.01 pounds BSA: 2.13 m^2 BMI: 56.55 kg/m^2 Rhythm: Atrial fibrillation HR: 64 bpm Allergies   - *Unlisted:(motrin). CONCLUSIONS Summary Limited echo Technically difficult study Left ventricle is normal in size Global left ventricular systolic function is moderately reduced EF 40-45% Abnormal septal wall motions Leftward compression of inter-ventricular septum (\"D-sign\") indicating RV pressure overload. Right atrium is moderate to severely dilated . Right ventricular dilatation with reduced systolic function. AV, MV structure appears normal. Severe tricuspid regurgitation.  Moderate pulmonary hypertension with an estimated right ventricular systolic pressure of 52 mmHg. Trivial pericardial effusion. Signature ----------------------------------------------------------------------------  Electronically signed by Eduar Roman(Interpreting physician) on  10/18/2021 05:17 PM ---------------------------------------------------------------------------- ----------------------------------------------------------------------------  Electronically signed by Xiomara Wilde(Sonographer) on 10/18/2021 03:09  PM ---------------------------------------------------------------------------- FINDINGS Left Atrium Left atrium is normal in size. Left Ventricle Left ventricle is normal in size Global left ventricular systolic function is moderately reduced EF 40-45% Leftward compression of inter-ventricular septum (\"D-sign\") indicating RV pressure overload. Right Atrium Right atrium is moderate to severely dilated . Right Ventricle Right ventricular dilatation with reduced systolic function. Mitral Valve Thickened mitral valve leaflets. Aortic Valve Normal aortic valve structure. Tricuspid Valve Severe tricuspid regurgitation. Moderate pulmonary hypertension with an estimated right ventricular systolic pressure of 52 mmHg. Pulmonic Valve The pulmonic valve is normal in structure. Pericardial Effusion Trivial pericardial effusion. Pleural Effusion No pleural effusion seen. Miscellaneous Normal aortic root dimension.   Tricuspid:                               Pulmonic:   Estimated RVSP: 52 mmHg  Peak TR Velocity: 3.07 m/s  Peak TR Gradient: 37.6996 mmHg  Estimated RA Pressure: 15 mmHg                                           Estimated PASP: 52.7 mmHg      XR ABDOMEN (2 VIEWS)    Result Date: 10/22/2021  EXAMINATION: TWO XRAY VIEWS OF THE ABDOMEN 10/22/2021 3:01 pm COMPARISON: Abdomen films dated August 20, 2021 HISTORY: ORDERING SYSTEM PROVIDED HISTORY: 2 views TECHNOLOGIST PROVIDED HISTORY: 2 views Reason for Exam: possible perferration Acuity: Unknown Type of Exam: Unknown FINDINGS: Images are limited due to the patient's body habitus. Cardiomegaly is noted. The bowel gas pattern is nonspecific. No definitive evidence of free air is present. 1. Limited exam, without evidence to suggest free air. Bowel gas pattern is nonobstructive       Cultures:     COVID-19, Rapid [[id number]] Collected: 10/22/21 2030   Order Status: Completed Specimen: Nasopharyngeal Swab Updated: 10/22/21 2100    Specimen Description . NASOPHARYNGEAL SWAB    SARS-CoV-2, Rapid Not Detected    Comment:        Rapid NAAT:  The specimen is NEGATIVE for SARS-CoV-2, the novel coronavirus associated with   COVID-19.         The ID NOW COVID-19 assay is designed to detect the virus that causes COVID-19 in patients   with signs and symptoms of infection who are suspected of COVID-19. An individual without symptoms of COVID-19 and who is not shedding SARS-CoV-2 virus would   expect to have a negative (not detected) result in this assay. Negative results should be treated as presumptive and, if inconsistent with clinical signs   and symptoms or necessary for patient management,   should be tested with an alternative molecular assay. Negative results do not preclude   SARS-CoV-2 infection and   should not be used as the sole basis for patient management decisions.         Fact sheet for Healthcare Providers: Nai.es   Fact sheet for Patients: Nai.jean claude           Methodology: Isothermal Nucleic Acid Amplification       Culture, Blood 1 [[id number]] Collected: 10/09/21 1215   Order Status: Completed Specimen: Blood Updated: 10/15/21 0010    Specimen Description . BLOOD    Special Requests RT WRIST 5ML    Culture NO GROWTH 6 DAYS   Culture, Blood 1 [[id number]] Collected: 10/09/21 1135   Order Status: Completed Specimen: Blood Updated: 10/15/21 0010    Specimen Description . BLOOD    Special Requests RT AC 20ML    Culture NO GROWTH 6 DAYS           Thank you for allowing us to participate in the care of this patient. Please call with questions. Electronically signed by Yosvany Clay MD on 10/23/2021 at 9:45 AM      [facility]  Yosvany Clay MD  [employer] messaging  OFFICE: [phone]      This note is created with the assistance of a speech recognition program.  While intending to generate a document that actually reflects the content of the visit, the document can still have some errors including those of syntax and sound a like substitutions which may escape proof reading. In such instances, actual meaning can be extrapolated by contextual diversion.

## 2021-10-23 NOTE — PLAN OF CARE
Problem: Infection:  Goal: Will remain free from infection  Description: Will remain free from infection  10/23/2021 1709 by Dave Mix RN  Outcome: Completed  10/23/2021 1707 by Dave Mix RN  Outcome: Ongoing     Problem: Daily Care:  Goal: Daily care needs are met  Description: Daily care needs are met  10/23/2021 1709 by Dave Mix RN  Outcome: Completed  10/23/2021 1707 by Dave Mix RN  Outcome: Ongoing     Problem: Pain:  Goal: Patient's pain/discomfort is manageable  Description: Patient's pain/discomfort is manageable  10/23/2021 1709 by Dave Mix RN  Outcome: Completed  10/23/2021 1707 by Dave Mix RN  Outcome: Ongoing  Goal: Pain level will decrease  Description: Pain level will decrease  10/23/2021 1709 by Dave Mix RN  Outcome: Completed  10/23/2021 1707 by Dave Mix RN  Outcome: Ongoing  Goal: Control of acute pain  Description: Control of acute pain  10/23/2021 1709 by Dave Mix RN  Outcome: Completed  10/23/2021 1707 by Dave Mix RN  Outcome: Ongoing  Goal: Control of chronic pain  Description: Control of chronic pain  10/23/2021 1709 by Dave Mix RN  Outcome: Completed  10/23/2021 1707 by Dave Mix RN  Outcome: Ongoing     Problem: Skin Integrity:  Goal: Skin integrity will stabilize  Description: Skin integrity will stabilize  10/23/2021 1709 by Dave Mix RN  Outcome: Completed  10/23/2021 1707 by Dave Mix RN  Outcome: Ongoing     Problem: Discharge Planning:  Goal: Patients continuum of care needs are met  Description: Patients continuum of care needs are met  10/23/2021 1709 by Dave Mix RN  Outcome: Completed  10/23/2021 1707 by Dave Mix RN  Outcome: Ongoing     Problem: Falls - Risk of:  Goal: Will remain free from falls  Description: Will remain free from falls  10/23/2021 1709 by Dave Mix RN  Outcome: Completed  10/23/2021 1707 by Dave Mix RN  Outcome: Ongoing  Goal: Absence of physical injury  Description: Absence of physical injury  10/23/2021 1709 by Courtney Bustamante RN  Outcome: Completed  10/23/2021 1707 by Courtney Bustamante RN  Outcome: Ongoing     Problem: Falls - Risk of:  Goal: Will remain free from falls  Description: Will remain free from falls  10/23/2021 1709 by Courtney Bustamante RN  Outcome: Completed  10/23/2021 1707 by Courtney Bustamante RN  Outcome: Ongoing  Goal: Absence of physical injury  Description: Absence of physical injury  10/23/2021 1709 by Courtney Bustamante RN  Outcome: Completed  10/23/2021 1707 by Courtney Bustamante RN  Outcome: Ongoing     Problem: ABCDS Injury Assessment  Goal: Absence of physical injury  10/23/2021 1709 by Courtney Bustamante RN  Outcome: Completed  10/23/2021 1707 by Courtney Bustamante RN  Outcome: Ongoing     Problem:  Activity:  Goal: Fatigue will decrease  Description: Fatigue will decrease  10/23/2021 1709 by Courtney Bustamante RN  Outcome: Completed  10/23/2021 1707 by Courtney Bustamante RN  Outcome: Ongoing  Goal: Risk for activity intolerance will decrease  Description: Risk for activity intolerance will decrease  10/23/2021 1709 by Courtney Bustamante RN  Outcome: Completed  10/23/2021 1707 by Courtney Bustamante RN  Outcome: Ongoing

## 2021-10-23 NOTE — PROCEDURES
Procedure : intubation with a kaleidoscope  Indication: Acute on chronic hypoxic respiratory failure   Consent was obtained from patient's mother risk-benefit complication explained    Timeout was performed  Received Versed 6 mg IV and fentanyl 100 IV for the procedure  I used a glide scope #3 blade. I intubated patient on first attempt with 7.5 ET tube.   No complications  Chest x-ray ordered      Jayson Bowie MD  Pulmonary critical care sleep

## 2021-10-23 NOTE — SIGNIFICANT EVENT
Pronouncement Note:    Patient without spontaneous respirations or pulse. Pupils fixed and dilated and non-reactive. No response to painful stimuli.     Jenny Walker  on 10/23/2021 at 14:12    Condolences offered to family      Elena Green,   10/23/2021  2:29 PM

## 2021-10-23 NOTE — PLAN OF CARE
Family meeting with myself, Dr Merlin Gondola and mother, sister and 3 adult children. Diagnosis and prognosis explained to them. After discussion, they understand her chances of survival are virtually zero and wish for dnrcca while awaiting other family to have a chance to visit. An hour later, rn notified me of their wishes for dnrcc and extubation. I confirmed this with the children and all are in agreement.   >20 minutes spent on code status discussion    Colin Green, DO

## 2021-10-23 NOTE — FLOWSHEET NOTE
[address]   Patient Death Note  DEATH                  Room # 1107/1107-01   Name: Wesley Lane            Age: 46 y.o. Gender: female          [employer]: [facility] [employer]: [address] Date & Time: 10/8/2021 12:20 PM        Actual date of death: 10-23-21  PSY:[id number]       Referral:  Unit:ICU  Nurse: Mary Herman    SITUATION AT DEATH:  Patient was on vent,family meeting, shortly later patient . .     IS THIS A 'S CASE? No    SPIRITUAL/EMOTIONAL INTERVENTION:  Family expressed no major needs at this visit. Family wanted to be left alone. Gina Mancia RN waiting for [facility] to make contact again, PT: is a possible donor. DOCTOR SIGNING DEATH CERTIFICATE:  Name:Dr. Green  Phone number:[phone]    Copy of COMPLETED Release of Body Form Received? Yes    Patient's belongings: went with family     HOME:  Contacted Time 3:50 pm  Name: house of Day  Home  City:[place]   Phone Number:[phone]    NEXT OF KIN:  Name:Stephany Moreira  Relationship: Parent  [address]. City:[place]   State: [place]  Zip code: [address]  Phone Number: H: [phone] C: [phone]    ANY FOLLOW-UP NEEDED? No    IF SO, WHAT? Electronically signed by Mariangel Bob.  Chaplain Jin, on 10/23/2021 at 3:31 PM.  Gui  [phone]

## 2021-10-23 NOTE — PROGRESS NOTES
Pulmonary Critical Care Progress Note    Patient seen for the follow up of Hypotension/septic shock/respiratory failure    Subjective:  Patient is sedated on the ventilator. She had worsening oxygen requirement with increased CO2 in spite of aggressive pressure control ventilator measured respiratory rate at 40/min. I had to increase her PEEP to 20. She had worsening hypotension I added epinephrine and Iftikhar-Synephrine in addition to vasopressin. .  She is now on maxed epinephrine Iftikhar-Synephrine. Dobutamine also was added by cardiology and she is a maximum dobutamine. She has decreased urine output. She had persistent hypotension blood pressure 60 systolic in Trendelenburg positi I was contacted yesterday regarding left pleural effusion on chest x-ray by radiology. I ordered a CT scan of the chest.  CT abdomen and CT chest could not be done because of unstable condition with severe shock. I consulted surgery yesterday for concern of ischemic bowels. Examination:    Vitals: BP (!) 97/58   Pulse 68   Temp (!) 91.5 °F (33.1 °C) (Core)   Resp (!) 0   Ht 4' 11\" (1.499 m)   Wt 285 lb 9.6 oz (129.5 kg)   LMP 2014 (Approximate)   SpO2 (!) 84%   BMI 57.68 kg/m²   SpO2  Av.4 %  Min: 78 %  Max: 100 %  General appearance: Sedated on the ventilator  Neck: No JVD  Lungs: Decreased breath sounds no crackles or wheezing  Heart: regular rate and rhythm, S1, S2 normal, no gallop severely distended  Abdomen: Severely distended absent bowel sounds  Extremities: no cyanosis or clubbing.   2+ edema    LABs:    CBC:   Recent Labs     10/23/21  0119 10/23/21  0520   WBC 2.7* 7.5   HGB 7.6* 9.1*   HCT 28.2* 31.3*   PLT 94* 80*     BMP:   Recent Labs     10/23/21  0119 10/23/21  0430   * 125*   K 3.2* 2.8*   CO2 22 20   BUN 20 21*   CREATININE 2.61* 2.60*   LABGLOM 19* 19*   GLUCOSE 186* 252*     LIVER PROFILE:  Recent Labs     10/22/21  1040 10/22/21  1627   AST 27 25   ALT 15 14   LABALBU 3.5 3.4*   Results for Claudell Mans (MRN [id number]) as of 10/23/2021 11:56   Ref. Range 10/23/2021 06:23 10/23/2021 11:46   POC HCO3 Latest Ref Range: 21.0 - 28.0 mmol/L 19.4 (L) 14.1 (L)   POC O2 SAT Latest Ref Range: 94.0 - 98.0 % 68 (L) 62 (L)   POC pCO2 Latest Ref Range: 35.0 - 48.0 mm Hg 57.2 (H) 50.2 (H)   POC pCO2 Temp Latest Units: mm Hg NOT REPORTED NOT REPORTED   POC pH Latest Ref Range: 7.350 - 7.450  7.137 (LL) 7.055 (LL)   POC PO2 Latest Ref Range: 83.0 - 108.0 mm Hg 46.4 (LL) 45.6 (LL)       Radiology:  Chest x-ray 10/23    1. ET tube in place, tip approximately 1.3 cm above the marva. 2. Left IJ central line in place, tip overlying the right atrium   3. Right IJ central line in place, tip in the distal SVC   4. Moderate to large size left pleural effusion, new since the prior study. Differential considerations would include a posttraumatic hemothorax, and if   this is a clinical concern, CT imaging of the chest should be performed   5. Cardiomegaly, diffuse interstitial alveolar opacities bilaterally,   differential considerations include pulmonary edema versus diffuse infection   Critical results were called by Dr. Arcadio Villagran to Dr. Andres Tovar   10/22/2021 at 12:50 a.m. hours         Echocardiogram  Left ventricle is normal in size  Global left ventricular systolic function is mildly reduced. Estimated ejection fraction is 45 % . Leftward compression of inter-ventricular septum (\"D-sign\") indicating RV pressure overload. Right atrium is severely dilated . Severe tricuspid regurgitation. Estimated right ventricular systolic pressure is 52 mmHg, suggests moderate pulm HTN. Trivial-Mild pericardial effusion without echocardiographic tamponade. Pleural effusion noted.       Impression:  · Acute on chronic hypoxic and hypercarbic respiratory failure  · Severe intractable shock likely septic   · pulmonary edema fluid overload  · Left pleural effusion  · Pericardial effusion no tamponade on echocardiogram anemia decreased hemoglobin  · Obstructive sleep apnea/Obesity  · Chronic renal failure on hemodialysis  · New onset atrial fibrillation  · Possibly hemorrhagic   · anemia decreased hemoglobin  · Ileu likely ischemic bowel/abdominal sepsis      Recommendations:  · Assist-control ventilation increase PEEP to 20 feet respiratory rate of 40 pressure control ventilation  · sedation with Versed  · Fentanyl for pain  · Vanco cefepime Flagyl  · Infectious disease on consult/lactic blood cultures  ·  hydrocortisone 100 IV every 8 hours  · Unstable for CT abdomen pelvis  · Unstable for CT chest  · Cardiology on consult  · Monitor lactic acid/surgery consult/discussed with surgery patient is poor operative candidate given severe hypotension on max pressor support  · Vasopressin epinephrine Levophed Iftikhar-Synephrine and dobutamine  · Follow-up ABG/bicarb IV push/bicarb drip increase to 250 an hour  · Keep n.p.o.  · Cardiology on consult hold Eliquis  · GI on consult  · monitor hemoglobin/transfuse 2 units packed RBC   · Discussed with RN and RT  · Previously discussed with mother [place]   · Discussed with mother and children at bedside advised very poor prognosis ; they want patient to be DNR CCA.   They may consider withdrawal of support given poor outcome  · DVT prophylaxis EPC off anticoagulation    Anshul Delarosa MD, MD, [facility]  Pulmonary Critical Care and Sleep Medicine,  [facility]  Cell: [phone]  Office: [phone]  cc100 min

## 2021-10-23 NOTE — CONSULTS
Consult    NAME:  Ignacia Zelaya  MRN: [id number]   YOB: 1970   Date: 10/23/2021   Age: 46 y.o. Gender: female     Body mass index is 57.68 kg/m². Chief Complaint: abdominal distention    History of Present Illness: The patient is a 59-year-old -American female with extensive past medical history including chronic renal failure on hemodialysis, COPD, CHF, hypertension, diabetes, hyperlipidemia, obstructive sleep apnea and morbid obesity. The patient presented to the hospital on 10 8 for hypotension during dialysis. The patient has been seen by GI who is going to do a colonoscopy for bright red blood per rectum but were unable to do the colonoscopy secondary to hypotension. Since the patient's admission she has according to the hospital chart deteriorated. She is currently on 3 pressors and intubated on 100% of FiO2. I was called this evening because it was noted that she has abdominal distention. An abdominal x-ray was done which was nonspecific given the patient's body habitus but there was no free air noted. Patient has a CT scan from 10/17 because of abdominal distention and abdominal discomfort that had no abnormal intra-abdominal findings. As per the nurse the patient's last bowel movement was Wednesday. Because of the patient patient's clinical condition and intubation the history was taken from the EMR, the nurse and the patient's mother. No current facility-administered medications on file prior to encounter.      Current Outpatient Medications on File Prior to Encounter   Medication Sig Dispense Refill    atorvastatin (LIPITOR) 40 MG tablet Take 1 tablet by mouth nightly 30 tablet 3    metoprolol tartrate (LOPRESSOR) 25 MG tablet Take 1 tablet by mouth 2 times daily 60 tablet 3    pantoprazole (PROTONIX) 40 MG tablet Take 1 tablet by mouth every morning (before breakfast) 30 tablet 3    Calcium Acetate, Phos Binder, (PHOSLO) 667 MG CAPS Take 2 capsules by mouth as needed (snacks)       diphenhydrAMINE (DIPHEN) 25 MG tablet Take 25 mg by mouth 2 times daily as needed for Itching       Calcium Acetate, Phos Binder, 667 MG CAPS Take 2 capsules by mouth 3 times daily (with meals)       fluticasone-salmeterol (ADVAIR) 250-50 MCG/DOSE AEPB INHALE 1 PUFF BY MOUTH INTO THE LUNGS TWICE DAILY **RINSE MOUTH AFTER EACH USE** 60 each 5    LANTUS SOLOSTAR 100 UNIT/ML injection pen INJECT 15 UNITS SUBCUTANEOUSLY DAILY AT NIGHT 15 mL 5    OXYGEN Inhale into the lungs O2  3L  PER NASAL CANNULA NIGHTLY      Alcohol Swabs (ULTRA-CARE ALCOHOL PREP PADS) 70 % PADS USE TO CLEAN TESTING AND INJECTING SITES TWICE DAILY 1 each 1    hydrocortisone (ANUSOL-HC) 2.5 % CREA rectal cream Apply bid to hemorrhoids 1 Tube 0    guaiFENesin (MUCINEX) 600 MG extended release tablet Take 1 tablet by mouth 2 times daily as needed for Congestion 30 tablet 0    midodrine (PROAMATINE) 5 MG tablet Take 3 tablets by mouth 3 times daily (with meals) (Patient taking differently: Take 15 mg by mouth 3 times daily (with meals) As needed for BP) 270 tablet 1    nitroGLYCERIN (NITROSTAT) 0.4 MG SL tablet PLACE 1 TABLET UNDER THE TONGUE EVERY 5 MINUTES AS NEEDED FOR CHEST PAIN**IF NO RELIEF AFTER 3 DOSES CALL 911 OR DR** 25 tablet 5    albuterol sulfate HFA (PROAIR HFA) 108 (90 Base) MCG/ACT inhaler Inhale 2 puffs into the lungs every 6 hours as needed for Wheezing (Patient not taking: Reported on 8/31/2021) 1 Inhaler 3    calcitRIOL (ROCALTROL) 0.25 MCG capsule Take 0.25 mcg by mouth three times a week      Methoxy PEG-Epoetin Beta (MIRCERA IJ) Inject 225 mcg as directed every 14 days      cinacalcet (SENSIPAR) 90 MG tablet Take 90 mg by mouth three times a week Indications: after dialysis      sennosides-docusate sodium (SENOKOT-S) 8.6-50 MG tablet Take 1 tablet by mouth daily as needed for Constipation      polyethylene glycol (GLYCOLAX) 17 GM/SCOOP powder Take 17 g by mouth daily as needed       Multiple Vitamins-Minerals (RENAPLEX-D) TABS Take 1 tablet by mouth daily       aspirin 81 MG EC tablet TAKE 1 TABLET BY MOUTH DAILY 30 tablet 3    furosemide (LASIX) 40 MG tablet Take 1 tablet by mouth daily (Patient not taking: Reported on 8/31/2021) 30 tablet 3    albuterol (PROVENTIL) (2.5 MG/3ML) 0.083% nebulizer solution INHALE THE CONTENTS OF ONE VIAL VIA NEBULIZER EVERY 6 HOURS AS NEEDED FOR SHORTNESS OF BREATH OR WHEEZING 360 mL 3    Easy Comfort Lancets MISC USE TO TEST BLOOD SUGAR TWICE DAILY AS DIRECTED 100 each 3    blood glucose test strips (ONETOUCH ULTRA) strip USE TO TEST BLOOD SUGAR TWICE DAILY AS DIRECTED 300 each 1    Insulin Pen Needle (EASY COMFORT PEN NEEDLES) 31G X 5 MM MISC USE TO INJECT INSULIN ONCE DAILY 100 each 3    Skin Protectants, Misc. (MINERIN CREME) CREA APPLY TO AFFECTED AREA TOPICALLY AS NEEDED (Patient taking differently: every other day APPLY TO AFFECTED AREA TOPICALLY AS NEEDED) 454 g 3    Blood Glucose Monitoring Suppl (TRUE METRIX METER) w/Device KIT USE TO TEST BLOOD GLUCOSE 1 kit 0    Lancets (BD LANCET ULTRAFINE 30G) MISC TEST TWICE DAILY 100 each 3    Lancet Devices (SIMPLE DIAGNOSTICS LANCING DEV) MISC USE WITH LANCETS TO TEST BLOOD GLUCOSE 1 each 0    Blood Glucose Calibration (RIGOBERTO DOC CONTROL) Normal SOLN USE TO PERIODICALLY CHECK GLUCOSE MONITOR ACCORDING TO THE MANUAL 1 each 0    UNIFINE PENTIPS 31G X 6 MM MISC USE WITH insulin pens ONCE daily 100 each 0    PHARMACIST CHOICE LANCETS MISC USE TO TEST BLOOD GLUCOSE ONCE A  each 0    Misc.  Devices MISC 1 each by Does not apply route daily Electric scooter 1 Device 0    glucose monitoring kit (FREESTYLE) monitoring kit 1 kit by Does not apply route daily 1 kit 0       Allergies   Allergen Reactions    Ibuprofen     Tramadol Hives    Motrin [Ibuprofen Micronized] Itching    Strawberry Extract Itching and Rash    Tape [Adhesive Tape] Rash     No paper tape silk only       Past Medical History: Diagnosis Date    Asthma     AS A CHILD    [facility]est[facility]    Chronic kidney disease     COPD (chronic obstructive pulmonary disease) ([facility] [address] 2000    INHALER USE AS NEEDED    Dialysis patient ([facility] [address]     CENTRAL DIALYSIS MON, WEDS AND FRI, FLUID RESTRICTION 2L/24 HRS PER PT    Hemodialysis patient [facility])     had Dilaysis 8-17-16  M-W-F @ [facility]    Hyperlipidemia     Hypertension 2000    ON RX    Obesity     YONI (obstructive sleep apnea) 2013    SLEEP STUDY -6/2016 AWAITING MACHINE, USING O2 AT NIGHT PRESENTLY    Pneumonia 2000    HAD TO BE ON VENTILATOR 12 DAYS    Renal failure     STARTED DIALYSIS 02/25/2016    Type II or unspecified type diabetes mellitus without mention of complication, not stated as uncontrolled 2000    IDDM    Ventilator dependence ([facility] [address]     X 2 200 AND 2014.  2014 HAD TO GO ON VENT POST OP       Past Surgical History:   Procedure Laterality Date    [address]    x3    HYSTERECTOMY  12/12/2014    TOTAL    IR NONTUNNELED VASCULAR CATHETER  10/20/2021    IR NONTUNNELED VASCULAR CATHETER 10/20/2021 [facility]    SKIN FULL GRAFT  1983    CHEST-BIRTH YOU AND MOLES REMOVED    UPPER GASTROINTESTINAL ENDOSCOPY  08/27/2021    UPPER GASTROINTESTINAL ENDOSCOPY N/A 8/27/2021    EGD BIOPSY performed by Suni Gutiérrez MD at [facility] History   Problem Relation Age of Onset    Diabetes Other     Cancer Mother         BREAST    Heart Disease Father         CAD-MI   Darrell Calderon Diabetes Father     High Blood Pressure Father     Diabetes Maternal Grandfather     Diabetes Paternal Grandfather     Heart Disease Paternal Grandfather     High Blood Pressure Paternal Grandfather         Social History     Socioeconomic History    Marital status: Single     Spouse name: None    Number of children: None    Years of education: None    Highest education level: None   Occupational History    None   Tobacco Use    47.1 % Final     WBC   Date Value Ref Range Status   10/20/2021 8.5 3.5 - 11.3 k/uL Final     Sodium   Date Value Ref Range Status   10/22/2021 128 (L) 135 - 144 mmol/L Final     Potassium   Date Value Ref Range Status   10/22/2021 3.6 (L) 3.7 - 5.3 mmol/L Final     Chloride   Date Value Ref Range Status   10/22/2021 94 (L) 98 - 107 mmol/L Final     CO2   Date Value Ref Range Status   10/22/2021 21 20 - 31 mmol/L Final     BUN   Date Value Ref Range Status   10/22/2021 20 6 - 20 mg/dL Final     Glucose   Date Value Ref Range Status   10/22/2021 158 (H) 70 - 99 mg/dL Final   01/16/2012 132 (H) 74 - 106 mg/dL Final            Assessment: 80-year-old female intubated and hypotensive on 3 pressors    Plan: Given the patient's morbid obesity as well as intubation and sedation there is no way to get an accurate abdominal exam.  Unable to determine how long the patient has had abdominal distention for and if this is worse than previously seen or similar. Given the patient's severe morbid obesity it is hard to determine even if the patient has abdominal distention. This patient is very critically ill with a very poor prognosis at this time. She cannot even tolerate traveling to the CT scanner for imaging let alone operative intervention if it was indicated. The patient can undergo a CT scan with IV contrast if she becomes stable but at this time the patient would not be an operative candidate given her current severe critical clinical condition and extremely poor prognosis.   All medical management per critical care  Electronically signed by Charmaine King MD on 10/23/2021 at 2:03 AM

## 2021-10-23 NOTE — PROGRESS NOTES
Physical Therapy  DATE: 10/23/2021    NAME: Mary Subramanian  MRN: [id number]   : 1970    Patient not seen this date for Physical Therapy due to:      [x] Cancel by RN or physician due to: Patient not appropriate for PT treatment. [] Hemodialysis    [] Critical Lab Value Level     [] Blood transfusion in progress    [] Acute or unstable cardiovascular status   _MAP < 55 or more than >115  _HR < 40 or > 130    [] Acute or unstable pulmonary status   -FiO2 > 60%   _RR < 5 or >40    _O2 sats < 85%    [] Strict Bedrest    [] Off Unit for surgery or procedure    [] Off Unit for testing       [] Pending imaging to R/O fracture    [] Refusal by Patient      [] Other      [] PT being discontinued at this time. Patient independent. No further needs. [] PT being discontinued at this time as the patient has been transferred to hospice care. No further needs.       Micah Holden, PTA

## 2021-10-23 NOTE — CONSULTS
Pulmonary Medicine and Critical Care Consult    Patient - Yessica Calvillo   MRN -  [id number]   Acct # - [de-identified]   - 1970      Date of Admission -  10/8/2021 12:20 PM  Date of evaluation -  10/22/2021  Room - [address] [place] [address] Primary Care Physician - Lena Sharpe MD     Reason for Consult      ICU management  Assessment   · Acute on chronic hypoxic and hypercarbic respiratory failure  · Shock likely septic versus cardiogenic rule out pericardial tamponade  · Pericardial effusion ruled out tamponade  · Obstructive sleep apnea/Obesity  · Chronic renal failure on hemodialysis  · New onset atrial fibrillation  · Ileus ? Rule out abdominal sepsis  · Anemia    Recommendations   · Oxygen with BiPAP support  · Prepare for intubation given abdominal distention an severe shock  · Vasopressin Iftikhar-Synephrine and Levophed to MAP more than 65  · IV fluids 1 L bolus  · Blood cultures x2  · Start Vanco cefepime Flagyl  · Check cortisol start hydrocortisone 100 IV every 8 hours  · CT abdomen pelvis after intubation    · Stat echocardiogram  · Lactic acid troponin  · Stat chest x-ray  · Bicarb drip  · Keep n.p.o.  · Cardiology on consult/on Eliquis  · GI on consult-monitor hemoglobin  · Discussed with RN and RT  · Called mother [place] and discussed the situation ; Advised poor prognosis . Advised lilkelySemora of cardiac arrest after intubation . She agrees on intubation and understand risks .  She wants to continue support ; she understood dialysis failure is a terminal condition   · DVT prophylaxis    Problem List      Patient Active Problem List   Diagnosis    Asthma    YONI (obstructive sleep apnea)    Left knee pain    Hidradenitis    Current smoker    Microalbuminuria    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)    CKD (chronic kidney disease) stage 4, GFR 15-29 ml/min (HCC)    Acute diastolic congestive heart failure (HCC)    Hyperkalemia    Acute systolic congestive heart failure (HCC)    Pulmonary hypertension (HCC)    Morbid obesity with BMI of 45.0-49.9, adult (HCC)    Acute on chronic respiratory failure with hypoxia (HCC)    COPD exacerbation (HCC)    Asthma exacerbation    Type 2 diabetes mellitus with diabetic nephropathy ([facility]    ESRD (end stage renal disease) on dialysis (HCC)    Bronchitis    Essential hypertension    YONI on CPAP    Hyperglycemia, drug-induced    Needs smoking cessation education    Hyperglycemia    Drowsiness    Acute on chronic respiratory failure with hypercapnia ([address]    Mobility impaired    S/P cardiac cath-mINIMAL caD 3/15/16 - dR. Shaw Due    Type 2 diabetes mellitus with chronic kidney disease on chronic dialysis ([facility]    Type 2 diabetes mellitus without complication (HCC)    Congestive heart failure (HCC)    Asthma with COPD with exacerbation (HCC)    Acute exacerbation of CHF (congestive heart failure) (HCC)    Chronic obstructive pulmonary disease (HCC)    Chronic kidney disease, stage V (HCC)    Acute respiratory acidosis    Precordial pain    Gastroesophageal reflux disease without esophagitis    Pulmonary HTN ([facility]    Morbid obesity due to excess calories (HCC)    Symptomatic anemia    Elevated serum creatinine    ESRD needing dialysis ([address]    Chest pain at rest    Absolute anemia    Atrial flutter with rapid ventricular response (HCC)    Chest pain    Hypoglycemia    Hypotension    Atrial fibrillation with RVR (HCC)    Hematochezia    Anemia due to chronic kidney disease    Itching    Fluid overload    Pre-op chest exam       HPI     Viridiana Kimbrough is 46 y.o.,  female, previous medical history of chronic renal failure on hemodialysis, COPD, CHF, hypertension, diabetes, hyperlipidemia, obstructive sleep apnea, morbid obesity. The patient presented on 10/8 for hypotension per records. She has been on chronic midodrine for chronic hypotension.   She had new onset A. brent andhas been on anticoagulation. She was noted to have anemia and GI was consulted. She had OB positive stool on 10/20. .  Cardiology consulted heart rate was controlled. No echocardiogram was done. Hypotension was felt to be chronic and due to vascular disease. She was switched from heparin to Eliquis. She was moved to ICU after she did not tolerate dialysis for hypotension. She was started on CRRT. I was contacted this evening after she was maxed on Levophed at 100/min with vasopressin 0.04. I ordered fluid bolus blood cultures initiated antibiotics. I ordered a cortisol and started hydrocortisone. I was contacted again by RN mentioning that she had abdominal distention and that she had worsening respiratory distress on oxygen at 4 L. She contacted GI and they ordered x-ray abdomen. X-ray abdomen was done. Patient had ABG that showed CO2 retention. She was initiated on BiPAP. I came into evaluate for concern of severe shock and hypercapnic hypoxic respiratory failure. I ordered stat echocardiogram concern of pericardial tamponade after RN mentioned that was pericardial effusion on CT abdomen pelvis done on 10/17 . Nephrology was contacted decided to hold off CRRT given hemodynamic compromise. Palliative care were on consult but patient wanted full support. Patient on BIPAP is not able to volunteer history but  hand on command and is not able to answer questions .      PMHx   Past Medical History      Diagnosis Date    Asthma     AS A CHILD    CHF (congestive heart failure) ([facility]) 2000    Chronic kidney disease     COPD (chronic obstructive pulmonary disease) ([facility] 2000    INHALER USE AS NEEDED    Dialysis patient ([facility]     CENTRAL DIALYSIS MON, WEDS AND FRI, FLUID RESTRICTION 2L/24 HRS PER PT    Hemodialysis patient [facility])     had Dilaysis 8-17-16  RIO-WBUBBA @ [facility]    Hyperlipidemia     Hypertension 2000    ON RX    Obesity     YONI (obstructive sleep apnea)     SLEEP STUDY -2016 AWAITING MACHINE, USING O2 AT NIGHT PRESENTLY    Pneumonia     HAD TO BE ON VENTILATOR 12 DAYS    Renal failure     STARTED DIALYSIS 2016    Type II or unspecified type diabetes mellitus without mention of complication, not stated as uncontrolled     IDDM    Ventilator dependence ([facility]     X 2 200 AND 2014.  2014 HAD TO GO ON VENT POST OP      Past Surgical History        Procedure Laterality Date     SECTION  ,  & 1994    x3    HYSTERECTOMY  2014    TOTAL    IR NONTUNNELED VASCULAR CATHETER  10/20/2021    IR NONTUNNELED VASCULAR CATHETER 10/20/2021 [facility]    SKIN FULL GRAFT      CHEST-BIRTH YOU AND MOLES REMOVED    UPPER GASTROINTESTINAL ENDOSCOPY  2021    UPPER GASTROINTESTINAL ENDOSCOPY N/A 2021    EGD BIOPSY performed by Donovan Pham MD at [address] [facility]    Current Medications    sodium bicarbonate        midazolam        fluticasone  2 spray Each Nostril Daily    epoetin lyudmila-epbx  2,000 Units SubCUTAneous Once per day on     And    epoetin lyudmila-epbx  3,000 Units SubCUTAneous Once per day on     sodium chloride  1,000 mL IntraVENous Once    vancomycin  2,500 mg IntraVENous Once    cefepime  2,000 mg IntraVENous Q8H    vancomycin (VANCOCIN) intermittent dosing (placeholder)   Other RX Placeholder    hydrocortisone sodium succinate PF  100 mg IntraVENous Q8H    sodium bicarbonate  100 mEq IntraVENous Once    sodium thiosulfate IVPB  25 g IntraVENous Once per day on     apixaban  5 mg Oral BID    polyethylene glycol  2,000 mL Oral Once    Hydrocortisone-Aloe Vera   Topical BID    gabapentin  100 mg Oral TID    sennosides-docusate sodium  2 tablet Oral BID    amiodarone  200 mg Oral BID    midodrine  15 mg Oral 4x Daily    pantoprazole  40 mg Oral QAM AC    budesonide-formoterol  2 puff Inhalation BID    cinacalcet  90 mg Oral Once per day on  Fri    atorvastatin  40 mg Oral Nightly    aspirin  81 mg Oral Daily    sodium chloride flush  5-40 mL IntraVENous 2 times per day     potassium chloride **OR** potassium alternative oral replacement **OR** potassium chloride, potassium chloride, sodium phosphate IVPB **OR** sodium phosphate IVPB **OR** sodium phosphate IVPB **OR** sodium phosphate IVPB, calcium gluconate **OR** calcium gluconate **OR** calcium gluconate **OR** calcium gluconate, magnesium sulfate, potassium chloride, lactulose, HYDROcodone 5 mg - acetaminophen, acetaminophen, albuterol sulfate HFA, hydrOXYzine, diphenhydrAMINE, Calcium Acetate (Phos Binder), guaiFENesin, sodium chloride flush, sodium chloride, ondansetron **OR** ondansetron, polyethylene glycol, [DISCONTINUED] acetaminophen **OR** acetaminophen, glucose, dextrose, glucagon (rDNA), dextrose  IV Drips/Infusions   phenylephrine (CRISTAL-SYNEPHRINE) 50mg/250mL infusion 30 mcg/min (10/22/21 2013)    midazolam      CRRT dialysis builder 2,000 mL/hr (10/22/21 2000)    vasopressin (Septic Shock) infusion 0.03 Units/min (10/22/21 1824)    sodium chloride 100 mL/hr at 10/21/21 2357    norepinephrine 100 mcg/min (10/22/21 1349)    sodium chloride Stopped (10/13/21 1945)    dextrose Stopped (10/10/21 1415)     Home Medications  Medications Prior to Admission: atorvastatin (LIPITOR) 40 MG tablet, Take 1 tablet by mouth nightly  metoprolol tartrate (LOPRESSOR) 25 MG tablet, Take 1 tablet by mouth 2 times daily  pantoprazole (PROTONIX) 40 MG tablet, Take 1 tablet by mouth every morning (before breakfast)  Calcium Acetate, Phos Binder, (PHOSLO) 667 MG CAPS, Take 2 capsules by mouth as needed (snacks)   diphenhydrAMINE (DIPHEN) 25 MG tablet, Take 25 mg by mouth 2 times daily as needed for Itching   Calcium Acetate, Phos Binder, 667 MG CAPS, Take 2 capsules by mouth 3 times daily (with meals)   fluticasone-salmeterol (ADVAIR) 250-50 MCG/DOSE AEPB, INHALE 1 PUFF BY MOUTH INTO THE LUNGS TWICE DAILY **RINSE MOUTH AFTER EACH USE**  LANTUS SOLOSTAR 100 UNIT/ML injection pen, INJECT 15 UNITS SUBCUTANEOUSLY DAILY AT NIGHT  OXYGEN, Inhale into the lungs O2  3L  PER NASAL CANNULA NIGHTLY  Alcohol Swabs (ULTRA-CARE ALCOHOL PREP PADS) 70 % PADS, USE TO CLEAN TESTING AND INJECTING SITES TWICE DAILY  hydrocortisone (ANUSOL-HC) 2.5 % CREA rectal cream, Apply bid to hemorrhoids  guaiFENesin (MUCINEX) 600 MG extended release tablet, Take 1 tablet by mouth 2 times daily as needed for Congestion  midodrine (PROAMATINE) 5 MG tablet, Take 3 tablets by mouth 3 times daily (with meals) (Patient taking differently: Take 15 mg by mouth 3 times daily (with meals) As needed for BP)  nitroGLYCERIN (NITROSTAT) 0.4 MG SL tablet, PLACE 1 TABLET UNDER THE TONGUE EVERY 5 MINUTES AS NEEDED FOR CHEST PAIN**IF NO RELIEF AFTER 3 DOSES CALL 911 OR DR**  albuterol sulfate HFA (PROAIR HFA) 108 (90 Base) MCG/ACT inhaler, Inhale 2 puffs into the lungs every 6 hours as needed for Wheezing (Patient not taking: Reported on 8/31/2021)  calcitRIOL (ROCALTROL) 0.25 MCG capsule, Take 0.25 mcg by mouth three times a week  Methoxy PEG-Epoetin Beta (MIRCERA IJ), Inject 225 mcg as directed every 14 days  cinacalcet (SENSIPAR) 90 MG tablet, Take 90 mg by mouth three times a week Indications: after dialysis  sennosides-docusate sodium (SENOKOT-S) 8.6-50 MG tablet, Take 1 tablet by mouth daily as needed for Constipation  polyethylene glycol (GLYCOLAX) 17 GM/SCOOP powder, Take 17 g by mouth daily as needed   Multiple Vitamins-Minerals (RENAPLEX-D) TABS, Take 1 tablet by mouth daily   aspirin 81 MG EC tablet, TAKE 1 TABLET BY MOUTH DAILY  furosemide (LASIX) 40 MG tablet, Take 1 tablet by mouth daily (Patient not taking: Reported on 8/31/2021)  albuterol (PROVENTIL) (2.5 MG/3ML) 0.083% nebulizer solution, INHALE THE CONTENTS OF ONE VIAL VIA NEBULIZER EVERY 6 HOURS AS NEEDED FOR SHORTNESS OF BREATH OR WHEEZING  Easy Comfort Lancets MISC, USE TO TEST BLOOD SUGAR TWICE DAILY AS DIRECTED  blood glucose test strips (ONETOUCH ULTRA) strip, USE TO TEST BLOOD SUGAR TWICE DAILY AS DIRECTED  Insulin Pen Needle (EASY COMFORT PEN NEEDLES) 31G X 5 MM MISC, USE TO INJECT INSULIN ONCE DAILY  Skin Protectants, Misc. (MINERIN CREME) CREA, APPLY TO AFFECTED AREA TOPICALLY AS NEEDED (Patient taking differently: every other day APPLY TO AFFECTED AREA TOPICALLY AS NEEDED)  Blood Glucose Monitoring Suppl (TRUE METRIX METER) w/Device KIT, USE TO TEST BLOOD GLUCOSE  Lancets (BD LANCET ULTRAFINE 30G) MISC, TEST TWICE DAILY  Lancet Devices (Effektif DIAGNOSTICS LANCING DEV) MISC, USE WITH LANCETS TO TEST BLOOD GLUCOSE  Blood Glucose Calibration (RIGOBERTO DOC CONTROL) Normal SOLN, USE TO PERIODICALLY CHECK GLUCOSE MONITOR ACCORDING TO THE MANUAL  UNIFINE PENTIPS 31G X 6 MM MISC, USE WITH insulin pens ONCE daily  PHARMACIST CHOICE LANCETS MISC, USE TO TEST BLOOD GLUCOSE ONCE A DAY  Misc.  Devices MISC, 1 each by Does not apply route daily Electric scooter  glucose monitoring kit (FREESTYLE) monitoring kit, 1 kit by Does not apply route daily    Allergies    Ibuprofen, Tramadol, Motrin [ibuprofen micronized], Strawberry extract, and Tape [adhesive tape]  Social History     Social History     Socioeconomic History    Marital status: Single     Spouse name: Not on file    Number of children: Not on file    Years of education: Not on file    Highest education level: Not on file   Occupational History    Not on file   Tobacco Use    Smoking status: Former Smoker     Years: 7.00     Types: Cigarettes     Quit date: 2017     Years since quittin.6    Smokeless tobacco: Never Used    Tobacco comment: 1-2 cigs daily   Substance and Sexual Activity    Alcohol use: No    Drug use: No    Sexual activity: Not Currently     Partners: Male   Other Topics Concern    Not on file   Social History Narrative    Not on file     Social Determinants of Health     Financial Resource Strain:    [facility] Difficulty of Paying Living Expenses:    Food Insecurity:     Worried About Running Out of Food in the Last Year:     [address] in the Last Year:    Transportation Needs:     Lack of Transportation (Medical):  Lack of Transportation (Non-Medical):    Physical Activity:     Days of Exercise per Week:     Minutes of Exercise per Session:    Stress:     Feeling of Stress :    Social Connections:     Frequency of Communication with Friends and Family:     Frequency of Social Gatherings with Friends and Family:     Attends [employer] Services:     Active Member of Clubs or Organizations:     Attends Club or Organization Meetings:     Marital Status:    Intimate Partner Violence:     Fear of Current or Ex-Partner:     Emotionally Abused:     Physically Abused:     Sexually Abused:      Family History          Problem Relation Age of Onset    Diabetes Other     Cancer Mother         BREAST    Heart Disease Father         CAD-MI    Diabetes Father     High Blood Pressure Father     Diabetes Maternal Grandfather     Diabetes Paternal Grandfather     Heart Disease Paternal Grandfather     High Blood Pressure Paternal Grandfather      ROS - 11 systems   Attempted not possible   Vitals     height is 4' 11\" (1.499 m) and weight is 285 lb 9.6 oz (129.5 kg). Her temporal temperature is 96.9 °F (36.1 °C). Her blood pressure is 101/54 (abnormal) and her pulse is 87. Her respiration is 17 and oxygen saturation is 92%. Body mass index is 57.68 kg/m².   I/O        Intake/Output Summary (Last 24 hours) at 10/22/2021 2203  Last data filed at 10/22/2021 1900  Gross per 24 hour   Intake --   Output 4481 ml   Net -4481 ml     I/O last 3 completed shifts:  In: -   Out: 5097    Patient Vitals for the past 96 hrs (Last 3 readings):   Weight   10/20/21 0500 285 lb 9.6 oz (129.5 kg)     Exam   General Appearance  Awake, confused on BIPAP ; able to follow some commands obese  HEENT - Head is normocephalic, atraumatic. Pupil reactive to light  Neck - Supple, symmetrical, trachea midline and Soft, trachea midline and straight  Lungs significantly decreased breath sounds no crackles or wheezing  Cardiovascular - Heart sounds are normal.  Regular rhythm normal rate without murmur, gallop or rub. Abdomen - Soft, nontender significantly distended, no masses or organomegaly  Neurologic - CN II-XII are grossly intact.  There are no focal motor or sensory deficits  Skin - No bruising or bleeding  Extremities - No cyanosis, clubbing 1+ edema    Labs  - [facility] records and [facility] have [facility]   CBC     Lab Results   Component Value Date    WBC 8.5 10/20/2021    RBC 2.87 10/20/2021    RBC 4.24 01/16/2012    HGB 10.1 10/22/2021    HCT 36.5 10/22/2021     10/20/2021     01/16/2012    .0 10/20/2021    MCH 30.0 10/20/2021    MCHC 28.0 10/20/2021    RDW 15.3 10/20/2021    NRBC 2 03/18/2016    LYMPHOPCT 20 10/20/2021    MONOPCT 13 10/20/2021    BASOPCT 1 10/20/2021    MONOSABS 1.11 10/20/2021    LYMPHSABS 1.70 10/20/2021    EOSABS 0.09 10/20/2021    BASOSABS 0.09 10/20/2021    DIFFTYPE NOT REPORTED 10/20/2021     BMP   Lab Results   Component Value Date     10/22/2021    K 3.6 10/22/2021    CL 94 10/22/2021    CO2 21 10/22/2021    BUN 20 10/22/2021    CREATININE 2.63 10/22/2021    GLUCOSE 158 10/22/2021    GLUCOSE 132 01/16/2012    CALCIUM 6.6 10/22/2021    MG 1.5 10/22/2021     LFTS  Lab Results   Component Value Date    ALKPHOS 148 10/22/2021    ALT 14 10/22/2021    AST 25 10/22/2021    PROT 6.6 10/22/2021    BILITOT 0.55 10/22/2021    BILIDIR 0.27 10/20/2021    IBILI 0.20 10/20/2021    LABALBU 3.4 10/22/2021       Lab Results   Component Value Date    APTT 36.1 (H) 10/19/2021     INR   Lab Results   Component Value Date    INR 1.8 10/19/2021    INR 1.3 10/09/2021    INR 1.0 04/13/2017    PROTIME 20.6 (H) 10/19/2021    PROTIME 16.0 (H) 10/09/2021    PROTIME 10.7 04/13/2017     Results for [place], Amy Hannah (MRN [id number]) as of 10/22/2021 22:05   Ref. Range 10/22/2021 16:27   Albumin Latest Ref Range: 3.5 - 5.2 g/dL 3.4 (L)   Albumin/Globulin Ratio Latest Ref Range: 1.0 - 2.5  NOT REPORTED   Alk Phos Latest Ref Range: 35 - 104 U/L 148 (H)   ALT Latest Ref Range: 5 - 33 U/L 14   AST Latest Ref Range: <32 U/L 25   Bilirubin Latest Ref Range: 0.3 - 1.2 mg/dL 0.55   Results for Sammy Almeida (MRN [id number]) as of 10/22/2021 22:05   Ref. Range 10/22/2021 20:05 10/22/2021 21:44   POC HCO3 Latest Ref Range: 21.0 - 28.0 mmol/L 24.0 23.7   POC O2 SAT Latest Ref Range: 94.0 - 98.0 % 75 (L) 90 (L)   POC pCO2 Latest Ref Range: 35.0 - 48.0 mm Hg 64.9 (H) 72.0 (HH)   POC pCO2 Temp Latest Units: mm Hg NOT REPORTED NOT REPORTED   POC pH Latest Ref Range: 7.350 - 7.450  7. 176 (LL) 7.125 (LL)   POC PO2 Latest Ref Range: 83.0 - 108.0 mm Hg 50.9 (L) 79.9 (L)     Radiology    CXR 10/9  No acute process.       Stable cardiomegaly             X-ray abdomen       CT abdomen pelvis 10/17    Pericardial effusion, only partially imaged.  This measures up to 1.8 cm   along the right-sided heart.  Consider echocardiogram for further evaluation,   especially if concern for decreased cardiac function as a result.       Only small amount of ascites, mostly in the right upper quadrant.       Severe diffuse body wall edema, increased from August 2021       Small bilateral pleural effusions.             (See actual reports for details)    \"Thank you for asking us to see this patient\"    Case discussed with nurse and patient/family. Questions and concerns addressed.     Electronically signed by     Espinoza Rico MD on 10/22/2021 at 10:03 PM   cc90 min

## 2021-10-23 NOTE — PROGRESS NOTES
Nephrology Progress Note    Reason for consultation: Provide second opinion regarding continuation of dialysis in patient with end-stage renal disease. Consulting physician: Daryn Green DO. Interval history: Patient was seen and examined today in the intensive care unit where patient remains very critical.  She was intubated last night for worsening respiratory failure. She remains hemodynamically unstable and is on maximal on near maximal doses of 4 different pressors [Norepinephrine, epinephrine, vasopressin and Iftikhar-Synephrine]. Systolic blood pressure is in the 70 mmHg range. CVVHD was paused last night. History of present illness: Patient was admitted 10/8/2021 with volume overload, hypotension and difficulty removing fluid in her outpatient unit. She has been diagnosed with calciphylaxis and was receiving treatments in the outpatient. She has been unable to undergo dialysis due to persistent hypotension. Systolic blood pressures have been in the 70s over the last 2 to 3 days. Patient has multiple medical conditions with significant right heart failure, calciphylaxis, vascular disease and possibly some degree of shock. Her last dialysis was on 10/16/2021which was terminated due to hypotension. I discussed with the patient that with a systolic blood pressure of 73/49 and persistent hypotension, she stands fairly high risk for cardiac arrest, myocardial infarction and stroke if dialysis is attempted. This had been further explained to the patient by her primary nephrologist.  The patient had requested to transfer to [facility] for second opinion but there are no beds available. Hence we have been consulted for a second opinion.     Objective/     Vitals:    10/23/21 0855 10/23/21 0900 10/23/21 0905 10/23/21 0910   BP:       Pulse: 82 81 81 81   Resp: (!) 0 (!) 0 (!) 0    Temp:       TempSrc:       SpO2: (!) 82% (!) 82% (!) 83% (!) 83%   Weight:       Height:         24HR INTAKE/OUTPUT:      Intake/Output Summary (Last 24 hours) at 10/23/2021 1015  Last data filed at 10/23/2021 0138  Gross per 24 hour   Intake 450 ml   Output 1669 ml   Net -1219 ml     Patient Vitals for the past 96 hrs (Last 3 readings):   Weight   10/20/21 0500 285 lb 9.6 oz (129.5 kg)     Constitutional:  Alert, awake, no apparent distress  Cardiovascular:  S1, S2 without m/r/g  Respiratory:   diminished BS bases , no accessory muscle use. Abdomen: +bs, from significant abdominal distention body wall edema  Ext: 2+ LE edema    Data/  Recent Labs     10/22/21  2330 10/23/21  0119 10/23/21  0520   WBC  --  2.7* 7.5   HGB 7.1* 7.6* 9.1*   HCT 27.1* 28.2* 31.3*   MCV  --  111.9* 103.3*   PLT  --  94* 80*     Recent Labs     10/22/21  1040 10/22/21  1040 10/22/21  1627 10/23/21  0119 10/23/21  0430   *   < > 128* 129* 125*   K 3.8   < > 3.6* 3.2* 2.8*   CL 90*   < > 94* 95* 91*   CO2 23   < > 21 22 20   GLUCOSE 160*   < > 158* 186* 252*   PHOS 2.4*  --  2.4*  --  2.4*   MG 1.6  --  1.5*  --  1.5*   BUN 22*   < > 20 20 21*   CREATININE 2.96*   < > 2.63* 2.61* 2.60*   LABGLOM 17*   < > 19* 19* 19*   GFRAA 20*   < > 23* 23* 24*    < > = values in this interval not displayed. Assessment/Plan:   1. End-stage renal disease - Patient has been on dialysis for 4 years since undergoing hysterectomy. She is at least 15 kg above dry weight but ultrafiltration is nearly impossible at this time given degree of hemodynamic instability. I explained to patient's mother that the prognosis is poor and withdrawal of support should be considered and is recommended at this time. 2.  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 Persistent hypotension - Continue pressors to achieve systolic blood pressure greater than 90 mmHg. 4.  Hypokalemia - will correct with ICU potassium sliding scale.   5.  Hyponatremia patient has generalized edema but hypotension precludes continuation of CRR T at this time. 6. Acute respiratory failure multifactorial etiology including secondary to acute respiratory distress syndrome as well as complicating severe tricuspid regurgitation and decompensated heart failure with reduced ejection fraction [HFrEF with LVEF 40 to 45%] and moderate pulmonary hypertension. CT scan performed last night suggested large pericardial effusion but subsequent echocardiogram showed only mild pericardial effusion without tamponade. Patient is critically ill. Prognosis is poor. Radha Diane MD  Nephrology Attending.

## 2021-10-23 NOTE — PROGRESS NOTES
Pharmacy Note  Vancomycin Consult (Dialysis patient) - Initial Note       TODAY'S DATE:  10/22/2021  Patient name, age:  Camilla Plummer, 46 y.o. Vancomycin order/consult received from: DR MCCOLLUM  Indication: SEPSIS   Additional antimicrobials:  CEFEPIME      Allergies:  Ibuprofen, Tramadol, Motrin [ibuprofen micronized], Strawberry extract, and Tape [adhesive tape]   Actual Weight:    Wt Readings from Last 1 Encounters:   10/20/21 285 lb 9.6 oz (129.5 kg)     Labs/Ancillary Data  Recent Labs     10/22/21  1040 10/22/21  1627   BUN 22* 20     CrCl cannot be calculated (Unknown ideal weight. ). Recent Labs     10/22/21  1040 10/22/21  1627   CREATININE 2.96* 2.63*     Recent Labs     10/20/21  0422   WBC 8.5     Procalcitonin   Date Value Ref Range Status   10/09/2021 0.47 (H) <0.09 ng/mL Final     Comment:           Suspected Sepsis:  <0.50 ng/mL     Low likelihood of sepsis. 0.50-2.00 ng/mL     Increased likelihood of sepsis. Antibiotics encouraged. >2.00 ng/mL     High risk of sepsis/shock. Antibiotics strongly encouraged. Suspected Lower Resp Tract Infections:  <0.24 ng/mL     Low likelihood of bacterial infection. >0.24 ng/mL     Increased likelihood of bacterial infection. Antibiotics encouraged. With successful antibiotic therapy, PCT levels should decrease rapidly. (Half-life of 24 to   36 hours.)        Procalcitonin values from samples collected within the first 6 hours of systemic infection   may still be low. Retesting may be indicated. Values from day 1 and day 4 can be entered into the Change in Procalcitonin Calculator   ([email or web address]-pct-[email or web address] com) to determine the patient's Mortality Risk Prognosis        In healthy neonates, plasma Procalcitonin (PCT) concentrations increase gradually after   birth, reaching peak values at about 24 hours of age then decrease to normal values below   0.5 ng/mL by 48-72 hours of age.          Intake/Output Summary (Last 24 hours) at 10/22/2021 2142  Last data filed at 10/22/2021 1900  Gross per 24 hour   Intake --   Output 4695 ml   Net -4695 ml     Temp: 96.9      Recent vancomycin administrations        No vancomycin IV orders with administrations found. Orders not given:            vancomycin (VANCOCIN) 2,500 mg in dextrose 5 % 500 mL IVPB    vancomycin (VANCOCIN) intermittent dosing (placeholder)                  Culture Date / Source  /  Results  pending    PLAN    Vancomycin level ordered for: 24 hours after initial dose of 2500mg  . 2. ONLY for suspected pneumonia or COPD: MRSA Nasal Swab: N/A. Non-respiratory infection. .      Stable HD/PD patients do not require regular renal function monitoring. Intermittent Hemodialysis (HD)*do not use Bayesian software for dosing  Empiric Dosing Regimen:  LD = 25 mg/kg (MAX 2,000mg dose), give dialysis, then         MD  = 10-15 mg/kg after HD sessions (MAX 2,000mg per dose)  Timing and Frequency of Concentration Monitoring  Critically ill à pre-HD concentrations after LD  Stable/stable dialysis à pre-HD concentration after 1st or 2nd MD on dialysis 3x/week  Pre-dialysis level Invasive MRSA Infection or Sepsis Non-Invasive Infection or Non-MRSA Infection   ? 25 mg/L Hold vancomycin dose, ? subsequent dose by 250 mg Hold vancomycin dose, ? subsequent dose by 250-500 mg   21-24 mg/L Continue current dose Decrease dose by 250 mg   15-20 mg/L Increase dose by 250 mg Continue current dose   ? 14 mg/L Increase dose by 250-500 mg Increase dose by 250 mg   Note: Pre-HD concentration monitoring is preferred, if concentrations are drawn after HD, the level must be collected no sooner than 2-4 hours after the end of the session to allow for maximum redistribution and plasma rebound. Thank you for the consult. Pharmacy will continue to follow.   Kate Mcburney, [facility], RPh/PharmD  10/22/2021  9:42 PM

## 2021-10-27 LAB
ABO/RH: NORMAL
ANTIBODY SCREEN: NEGATIVE
ARM BAND NUMBER: NORMAL
BLD PROD TYP BPU: NORMAL
CROSSMATCH RESULT: NORMAL
DISPENSE STATUS BLOOD BANK: NORMAL
EXPIRATION DATE: NORMAL
TRANSFUSION STATUS: NORMAL
UNIT DIVISION: 0
UNIT NUMBER: NORMAL

## 2022-09-20 NOTE — PROGRESS NOTES
Nephrology Progress Note        Subjective: patient evaluated and she will shortly be starting  dialysis. ,  It fluctuates a lot bBlood pressure this morning was 114/76 and I am not sure how accurate these blood pressures truly are. Even with a reported blood pressure of 70/40 she is awake alert and conversant. Patient is tentatively scheduled for a colonoscopy at 2:30 this afternoon. She is n.p.o., she is receiving D10 because her sugars were only 79 this morning. Patient denies any fevers or chills. She denies any diarrhea or bloody bowel movements. Objective:  CURRENT TEMPERATURE:  Temp: 98.1 °F (36.7 °C)  MAXIMUM TEMPERATURE OVER 24HRS:  Temp (24hrs), Av.7 °F (36.5 °C), Min:97.3 °F (36.3 °C), Max:98.1 °F (36.7 °C)    CURRENT RESPIRATORY RATE:  Resp: 16  CURRENT PULSE:  Pulse: 141  CURRENT BLOOD PRESSURE:  BP: (!) 72/36  24HR BLOOD PRESSURE RANGE:  Systolic (79JVL), CM , Min:53 , ELZ:788   ; Diastolic (33PDR), SYJ:36, Min:29, Max:88    24HR INTAKE/OUTPUT:  No intake or output data in the 24 hours ending 10/15/21 0858  Patient Vitals for the past 96 hrs (Last 3 readings):   Weight   10/14/21 0554 260 lb (117.9 kg)   10/13/21 2107 259 lb 4.2 oz (117.6 kg)   10/13/21 1715 261 lb 7.5 oz (118.6 kg)         Physical Exam:  General appearance:Awake, alert, in no acute distress  Skin: warm and dry, no rash or erythema  Eyes: conjunctivae normal and sclera anicteric  ENT:no thrush no pharyngeal congestion   Neck:  No JVD, No Thyromegaly  Pulmonary: clear to auscultation and no wheezing or rhonchi   Cardiovascular: normal S1 and S2. NO rubs and NO murmur.  No S3 OR S4   Abdomen: soft nontender, bowel sounds present, no organomegaly,  no ascites   Extremities: + edema     Access:  previous  Left AV fistula    Current Medications:    dextrose 10 % infusion, Continuous  [Held by provider] 0.9 % sodium chloride infusion, Continuous  polyethylene glycol (GoLYTELY) solution 2,000 mL, Once  Hydrocortisone-Aloe Vera 1 % CREA, BID  gabapentin (NEURONTIN) capsule 100 mg, TID  sennosides-docusate sodium (SENOKOT-S) 8.6-50 MG tablet 2 tablet, BID  [Held by provider] enoxaparin (LOVENOX) injection 120 mg, Q24H  hydrOXYzine (ATARAX) tablet 25 mg, TID PRN  amiodarone (CORDARONE) tablet 200 mg, BID  midodrine (PROAMATINE) tablet 15 mg, 4x Daily  diphenhydrAMINE (BENADRYL) tablet 25 mg, Q6H PRN  Calcium Acetate (Phos Binder) CAPS 1,334 mg, TID WC  Calcium Acetate (Phos Binder) CAPS 667 mg, PRN  pantoprazole (PROTONIX) tablet 40 mg, QAM AC  guaiFENesin (MUCINEX) extended release tablet 600 mg, BID PRN  budesonide-formoterol (SYMBICORT) 160-4.5 MCG/ACT inhaler 2 puff, BID  cinacalcet (SENSIPAR) tablet 90 mg, Once per day on Mon Wed Fri  calcitRIOL (ROCALTROL) capsule 0.25 mcg, Once per day on Mon Wed Fri  atorvastatin (LIPITOR) tablet 40 mg, Nightly  [Held by provider] aspirin EC tablet 81 mg, Daily  albuterol (PROVENTIL) nebulizer solution 2.5 mg, Q4H PRN  sodium chloride flush 0.9 % injection 5-40 mL, 2 times per day  sodium chloride flush 0.9 % injection 10 mL, PRN  0.9 % sodium chloride infusion, PRN  ondansetron (ZOFRAN-ODT) disintegrating tablet 4 mg, Q8H PRN   Or  ondansetron (ZOFRAN) injection 4 mg, Q6H PRN  polyethylene glycol (GLYCOLAX) packet 17 g, Daily PRN  acetaminophen (TYLENOL) tablet 650 mg, Q6H PRN   Or  acetaminophen (TYLENOL) suppository 650 mg, Q6H PRN  potassium chloride (KLOR-CON M) extended release tablet 40 mEq, Once  sodium thiosulfate 25 g in sodium chloride 0.9 % 150 mL IVPB, As Directed RT PRN  glucose (GLUTOSE) 40 % oral gel 15 g, PRN  dextrose 50 % IV solution, PRN  glucagon (rDNA) injection 1 mg, PRN  dextrose 5 % solution, PRN      Labs:   CBC:   Recent Labs     10/13/21  0545 10/14/21  0549 10/15/21  0546   WBC 7.4 6.5 8.3   RBC 2.86* 2.77* 2.91*   HGB 8.8* 8.5* 9.1*   HCT 30.2* 30.0* 32.6*   * 132* 131*   MPV 12.3 11.7 11.0      BMP:   Recent Labs     10/13/21  0843 10/13/21  5911 Tazorac Counseling:  Patient advised that medication is irritating and drying.  Patient may need to apply sparingly and wash off after an hour before eventually leaving it on overnight.  The patient verbalized understanding of the proper use and possible adverse effects of tazorac.  All of the patient's questions and concerns were addressed.

## 2025-02-04 NOTE — TELEPHONE ENCOUNTER
Last visit: 05/27/20  Last Med refill:   Does patient have enough medication for 72 hours:     Next Visit Date:  No future appointments. Health Maintenance   Topic Date Due    Hepatitis C screen  1970    Diabetic retinal exam  02/25/1980    DTaP/Tdap/Td vaccine (1 - Tdap) 02/25/1989    Cervical cancer screen  09/05/2017    Annual Wellness Visit (AWV)  06/23/2019    Breast cancer screen  02/25/2020    Colon Cancer Screen FIT/FOBT  02/25/2020    Lipid screen  07/02/2020    Potassium monitoring  07/02/2020    Creatinine monitoring  07/02/2020    A1C test (Diabetic or Prediabetic)  08/19/2020    Flu vaccine (1) 09/01/2020    Hepatitis B vaccine (1 of 3 - Risk Recombivax 3-dose series) 05/27/2021 (Originally 2/25/1989)    Shingles Vaccine (1 of 2) 05/27/2021 (Originally 2/25/2020)    Pneumococcal 0-64 years Vaccine (3 of 3 - PPSV23) 05/27/2021 (Originally 10/3/2018)    Diabetic foot exam  05/27/2021    HIV screen  Completed    Hepatitis A vaccine  Aged Out    Hib vaccine  Aged Out    Meningococcal (ACWY) vaccine  Aged Out       Hemoglobin A1C (%)   Date Value   08/19/2019 4.6   03/22/2018 5.0   01/12/2017 5.6             ( goal A1C is < 7)   Microalb/Crt.  Ratio (mcg/mg creat)   Date Value   04/29/2014 1,828     LDL Cholesterol (mg/dL)   Date Value   07/02/2019 33   12/09/2015 65       (goal LDL is <100)   AST (U/L)   Date Value   04/13/2017 24     ALT (U/L)   Date Value   04/13/2017 33     BUN (mg/dL)   Date Value   07/02/2019 57 (H)     BP Readings from Last 3 Encounters:   05/27/20 130/80   08/19/19 (!) 140/76   07/02/19 (!) 142/80          (goal 120/80)    All Future Testing planned in [facility]  Lab Frequency Next Occurrence   KRISHNA Digital Screen Bilateral [[id number]] Once 05/27/2021   CBC With Auto Differential Once 05/27/2021               Patient Active Problem List:     Asthma     YONI (obstructive sleep apnea)     Left knee pain     Hidradenitis     Current smoker     Microalbuminuria What Is The Reason For Today's Visit?: Preventative Skin Check Additional History: Check spots on both ears and on back. 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 kidney disease) stage 4, GFR 15-29 ml/min (HCC)     Acute diastolic congestive heart failure (HCC)     Hyperkalemia     Acute systolic congestive heart failure (HCC)     Pulmonary hypertension (HCC)     Morbid obesity with BMI of 45.0-49.9, adult ([address]     Acute on chronic respiratory failure with hypoxia (HCC)     COPD exacerbation (HCC)     Asthma exacerbation     Type 2 diabetes mellitus with diabetic nephropathy (HCC)     ESRD (end stage renal disease) on dialysis ([address]     Bronchitis     Essential hypertension     YONI on CPAP     Hyperglycemia, drug-induced     Needs smoking cessation education     Hyperglycemia     Drowsiness     Acute on chronic respiratory failure with hypercapnia ([address]     Mobility impaired     S/P cardiac cath-mINIMAL caD 3/15/16 - dR. 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

## (undated) DEVICE — ELECTRODE PT RET AD L9FT HI MOIST COND ADH HYDRGEL CORDED

## (undated) DEVICE — Device: Brand: DEFENDO VALVE AND CONNECTOR KIT

## (undated) DEVICE — CO2 CANNULA,SUPERSOFT, ADLT,7'O2,7'CO2: Brand: MEDLINE

## (undated) DEVICE — MEDICINE CUP, GRADUATED, STER: Brand: MEDLINE

## (undated) DEVICE — GOWN,AURORA,NONREINFORCED,LARGE: Brand: MEDLINE

## (undated) DEVICE — BASIN EMSIS 700ML GRAPHITE PLAS KID SHP GRAD

## (undated) DEVICE — YANKAUER,FLEXIBLE HANDLE,REGLR CAPACITY: Brand: MEDLINE INDUSTRIES, INC.

## (undated) DEVICE — GLOVE SURG 8 11.7IN BEAD CUF LIGHT BRN SENSICARE LTX FREE

## (undated) DEVICE — TUBING, SUCTION, 1/4" X 12', STRAIGHT: Brand: MEDLINE

## (undated) DEVICE — FORCEPS BX L240CM WRK CHN 2.8MM STD CAP W/ NDL MIC MESH

## (undated) DEVICE — GAUZE,SPONGE,4"X4",16PLY,STRL,LF,10/TRAY: Brand: MEDLINE

## (undated) DEVICE — TRAP SURG QUAD PARABOLA SLOT DSGN SFTY SCRN TRAPEASE

## (undated) DEVICE — CONMED DISPOSABLE GASTROINTESTINAL CYTOLOGY BRUSH, STRAIGHT HANDLE, 2.5 MM X 160 CM: Brand: CONMED

## (undated) DEVICE — ADAPTER TBNG LUER STUB 15 GA INTMED

## (undated) DEVICE — SNARE ENDOSCP M L240CM LOOP W27MM SHTH DIA2.4MM OVL FLX

## (undated) DEVICE — CUP MED 1OZ CLR POLYPR FEED GRAD W/O LID

## (undated) DEVICE — JELLY,LUBE,STERILE,FLIP TOP,TUBE,2-OZ: Brand: MEDLINE

## (undated) DEVICE — SYRINGE MED 50ML LUERLOCK TIP

## (undated) DEVICE — BLOCK BITE 60FR RUBBER ADLT DENTAL